# Patient Record
Sex: MALE | Race: OTHER | Employment: OTHER | URBAN - METROPOLITAN AREA
[De-identification: names, ages, dates, MRNs, and addresses within clinical notes are randomized per-mention and may not be internally consistent; named-entity substitution may affect disease eponyms.]

---

## 2017-01-03 DIAGNOSIS — M81.0 OSTEOPOROSIS: ICD-10-CM

## 2017-01-03 DIAGNOSIS — F32.2 SEVERE SINGLE CURRENT EPISODE OF MAJOR DEPRESSIVE DISORDER, WITHOUT PSYCHOTIC FEATURES (HCC): ICD-10-CM

## 2017-01-03 DIAGNOSIS — M54.17 THORACIC AND LUMBOSACRAL NEURITIS: ICD-10-CM

## 2017-01-03 DIAGNOSIS — M54.2 NECK PAIN: Chronic | ICD-10-CM

## 2017-01-03 DIAGNOSIS — M54.14 THORACIC AND LUMBOSACRAL NEURITIS: ICD-10-CM

## 2017-01-05 ENCOUNTER — OFFICE VISIT (OUTPATIENT)
Dept: INTERNAL MEDICINE | Age: 67
End: 2017-01-05

## 2017-01-05 VITALS
RESPIRATION RATE: 16 BRPM | BODY MASS INDEX: 26.84 KG/M2 | HEART RATE: 88 BPM | OXYGEN SATURATION: 98 % | WEIGHT: 171 LBS | TEMPERATURE: 99 F | DIASTOLIC BLOOD PRESSURE: 82 MMHG | HEIGHT: 67 IN | SYSTOLIC BLOOD PRESSURE: 138 MMHG

## 2017-01-05 DIAGNOSIS — I10 ESSENTIAL HYPERTENSION, BENIGN: ICD-10-CM

## 2017-01-05 DIAGNOSIS — M10.00 ACUTE IDIOPATHIC GOUT, UNSPECIFIED SITE: Primary | ICD-10-CM

## 2017-01-05 DIAGNOSIS — D72.819 LEUKOPENIA, UNSPECIFIED TYPE: ICD-10-CM

## 2017-01-05 DIAGNOSIS — E78.00 HYPERCHOLESTEROLEMIA: Chronic | ICD-10-CM

## 2017-01-05 PROCEDURE — 99213 OFFICE O/P EST LOW 20 MIN: CPT | Performed by: INTERNAL MEDICINE

## 2017-01-06 ASSESSMENT — ENCOUNTER SYMPTOMS
VOMITING: 0
COUGH: 0
NAUSEA: 0
SHORTNESS OF BREATH: 0
DIARRHEA: 0
CONSTIPATION: 0

## 2017-01-13 ENCOUNTER — OFFICE VISIT (OUTPATIENT)
Dept: PHYSICAL MEDICINE AND REHAB | Age: 67
End: 2017-01-13

## 2017-01-13 VITALS
DIASTOLIC BLOOD PRESSURE: 80 MMHG | WEIGHT: 163 LBS | HEIGHT: 67 IN | SYSTOLIC BLOOD PRESSURE: 134 MMHG | BODY MASS INDEX: 25.58 KG/M2

## 2017-01-13 DIAGNOSIS — M54.14 THORACIC AND LUMBOSACRAL NEURITIS: ICD-10-CM

## 2017-01-13 DIAGNOSIS — F32.2 SEVERE SINGLE CURRENT EPISODE OF MAJOR DEPRESSIVE DISORDER, WITHOUT PSYCHOTIC FEATURES (HCC): ICD-10-CM

## 2017-01-13 DIAGNOSIS — M54.40 CHRONIC MIDLINE LOW BACK PAIN WITH SCIATICA, SCIATICA LATERALITY UNSPECIFIED: ICD-10-CM

## 2017-01-13 DIAGNOSIS — M81.0 OSTEOPOROSIS: ICD-10-CM

## 2017-01-13 DIAGNOSIS — G89.29 CHRONIC BILATERAL LOW BACK PAIN WITHOUT SCIATICA: ICD-10-CM

## 2017-01-13 DIAGNOSIS — G89.29 CHRONIC MIDLINE LOW BACK PAIN WITH SCIATICA, SCIATICA LATERALITY UNSPECIFIED: ICD-10-CM

## 2017-01-13 DIAGNOSIS — M54.42 CHRONIC MIDLINE LOW BACK PAIN WITH BILATERAL SCIATICA: Primary | ICD-10-CM

## 2017-01-13 DIAGNOSIS — M25.551 RIGHT HIP PAIN: ICD-10-CM

## 2017-01-13 DIAGNOSIS — M54.6 CHRONIC RIGHT-SIDED THORACIC BACK PAIN: ICD-10-CM

## 2017-01-13 DIAGNOSIS — E55.9 VITAMIN D DEFICIENCY: ICD-10-CM

## 2017-01-13 DIAGNOSIS — M10.012 ACUTE IDIOPATHIC GOUT OF LEFT SHOULDER: ICD-10-CM

## 2017-01-13 DIAGNOSIS — M79.10 MYALGIA: ICD-10-CM

## 2017-01-13 DIAGNOSIS — M54.17 THORACIC AND LUMBOSACRAL NEURITIS: ICD-10-CM

## 2017-01-13 DIAGNOSIS — G89.29 CHRONIC RIGHT-SIDED THORACIC BACK PAIN: ICD-10-CM

## 2017-01-13 DIAGNOSIS — G89.29 CHRONIC MIDLINE LOW BACK PAIN WITH BILATERAL SCIATICA: Primary | ICD-10-CM

## 2017-01-13 DIAGNOSIS — Z79.899 HIGH RISK MEDICATION USE: ICD-10-CM

## 2017-01-13 DIAGNOSIS — M54.50 CHRONIC BILATERAL LOW BACK PAIN WITHOUT SCIATICA: ICD-10-CM

## 2017-01-13 DIAGNOSIS — M54.2 NECK PAIN: Chronic | ICD-10-CM

## 2017-01-13 DIAGNOSIS — M53.3 SI (SACROILIAC) PAIN: ICD-10-CM

## 2017-01-13 DIAGNOSIS — M54.41 CHRONIC MIDLINE LOW BACK PAIN WITH BILATERAL SCIATICA: Primary | ICD-10-CM

## 2017-01-13 PROCEDURE — 99213 OFFICE O/P EST LOW 20 MIN: CPT | Performed by: PHYSICAL MEDICINE & REHABILITATION

## 2017-01-13 RX ORDER — OXYCODONE AND ACETAMINOPHEN 10; 325 MG/1; MG/1
TABLET ORAL
Qty: 60 TABLET | Refills: 0 | Status: SHIPPED | OUTPATIENT
Start: 2017-01-13 | End: 2017-02-16 | Stop reason: SDUPTHER

## 2017-01-13 RX ORDER — GABAPENTIN 300 MG/1
CAPSULE ORAL
Qty: 90 CAPSULE | Refills: 3 | Status: SHIPPED | OUTPATIENT
Start: 2017-01-13 | End: 2017-05-26 | Stop reason: SDUPTHER

## 2017-01-13 ASSESSMENT — ENCOUNTER SYMPTOMS
DIARRHEA: 0
CONSTIPATION: 1
SHORTNESS OF BREATH: 0
NAUSEA: 0
RESPIRATORY NEGATIVE: 1
VISUAL CHANGE: 0
VOMITING: 0
BACK PAIN: 1
CHEST TIGHTNESS: 0
EYES NEGATIVE: 1

## 2017-01-24 ENCOUNTER — PROCEDURE VISIT (OUTPATIENT)
Dept: PHYSICAL MEDICINE AND REHAB | Age: 67
End: 2017-01-24

## 2017-01-24 DIAGNOSIS — M79.10 MYALGIA: Primary | ICD-10-CM

## 2017-01-24 PROCEDURE — 20553 NJX 1/MLT TRIGGER POINTS 3/>: CPT | Performed by: PHYSICAL MEDICINE & REHABILITATION

## 2017-02-16 DIAGNOSIS — M54.2 NECK PAIN: Chronic | ICD-10-CM

## 2017-02-16 DIAGNOSIS — M54.14 THORACIC AND LUMBOSACRAL NEURITIS: ICD-10-CM

## 2017-02-16 DIAGNOSIS — G89.29 CHRONIC BILATERAL LOW BACK PAIN WITHOUT SCIATICA: ICD-10-CM

## 2017-02-16 DIAGNOSIS — M54.17 THORACIC AND LUMBOSACRAL NEURITIS: ICD-10-CM

## 2017-02-16 DIAGNOSIS — Z79.899 HIGH RISK MEDICATION USE: ICD-10-CM

## 2017-02-16 DIAGNOSIS — M54.40 CHRONIC MIDLINE LOW BACK PAIN WITH SCIATICA, SCIATICA LATERALITY UNSPECIFIED: ICD-10-CM

## 2017-02-16 DIAGNOSIS — M54.50 CHRONIC BILATERAL LOW BACK PAIN WITHOUT SCIATICA: ICD-10-CM

## 2017-02-16 DIAGNOSIS — G89.29 CHRONIC MIDLINE LOW BACK PAIN WITH SCIATICA, SCIATICA LATERALITY UNSPECIFIED: ICD-10-CM

## 2017-02-16 RX ORDER — OXYCODONE AND ACETAMINOPHEN 10; 325 MG/1; MG/1
TABLET ORAL
Qty: 60 TABLET | Refills: 0 | Status: SHIPPED | OUTPATIENT
Start: 2017-02-16 | End: 2017-03-13 | Stop reason: SDUPTHER

## 2017-02-28 ENCOUNTER — PROCEDURE VISIT (OUTPATIENT)
Dept: PHYSICAL MEDICINE AND REHAB | Age: 67
End: 2017-02-28

## 2017-02-28 DIAGNOSIS — M79.10 MYALGIA: Primary | ICD-10-CM

## 2017-02-28 PROCEDURE — 20553 NJX 1/MLT TRIGGER POINTS 3/>: CPT | Performed by: PHYSICAL MEDICINE & REHABILITATION

## 2017-03-01 DIAGNOSIS — D72.819 LEUKOPENIA, UNSPECIFIED TYPE: ICD-10-CM

## 2017-03-01 DIAGNOSIS — E78.00 HYPERCHOLESTEROLEMIA: Chronic | ICD-10-CM

## 2017-03-01 DIAGNOSIS — I10 ESSENTIAL HYPERTENSION, BENIGN: ICD-10-CM

## 2017-03-01 DIAGNOSIS — M10.00 ACUTE IDIOPATHIC GOUT, UNSPECIFIED SITE: ICD-10-CM

## 2017-03-01 LAB
ALBUMIN SERPL-MCNC: 4.1 G/DL (ref 3.9–4.9)
ALP BLD-CCNC: 53 U/L (ref 35–104)
ALT SERPL-CCNC: 18 U/L (ref 0–41)
ANION GAP SERPL CALCULATED.3IONS-SCNC: 10 MEQ/L (ref 7–13)
AST SERPL-CCNC: 20 U/L (ref 0–40)
BILIRUB SERPL-MCNC: 0.5 MG/DL (ref 0–1.2)
BUN BLDV-MCNC: 16 MG/DL (ref 8–23)
CALCIUM SERPL-MCNC: 9.4 MG/DL (ref 8.6–10.2)
CHLORIDE BLD-SCNC: 100 MEQ/L (ref 98–107)
CHOLESTEROL, TOTAL: 238 MG/DL (ref 0–199)
CO2: 28 MEQ/L (ref 22–29)
CREAT SERPL-MCNC: 1.02 MG/DL (ref 0.7–1.2)
GFR AFRICAN AMERICAN: >60
GFR NON-AFRICAN AMERICAN: >60
GLOBULIN: 2.9 G/DL (ref 2.3–3.5)
GLUCOSE BLD-MCNC: 112 MG/DL (ref 74–109)
HCT VFR BLD CALC: 45.4 % (ref 42–52)
HDLC SERPL-MCNC: 44 MG/DL (ref 40–59)
HEMOGLOBIN: 15.2 G/DL (ref 14–18)
LDL CHOLESTEROL CALCULATED: 166 MG/DL (ref 0–129)
MCH RBC QN AUTO: 32.2 PG (ref 27–31.3)
MCHC RBC AUTO-ENTMCNC: 33.6 % (ref 33–37)
MCV RBC AUTO: 95.9 FL (ref 80–100)
PDW BLD-RTO: 13.9 % (ref 11.5–14.5)
PLATELET # BLD: 244 K/UL (ref 130–400)
POTASSIUM SERPL-SCNC: 4.9 MEQ/L (ref 3.5–5.1)
RBC # BLD: 4.73 M/UL (ref 4.7–6.1)
SODIUM BLD-SCNC: 138 MEQ/L (ref 132–144)
TOTAL PROTEIN: 7 G/DL (ref 6.4–8.1)
TRIGL SERPL-MCNC: 138 MG/DL (ref 0–200)
URIC ACID, SERUM: 4.5 MG/DL (ref 3.4–7)
WBC # BLD: 4.9 K/UL (ref 4.8–10.8)

## 2017-03-06 ENCOUNTER — OFFICE VISIT (OUTPATIENT)
Dept: INTERNAL MEDICINE | Age: 67
End: 2017-03-06

## 2017-03-06 VITALS
HEIGHT: 67 IN | OXYGEN SATURATION: 98 % | DIASTOLIC BLOOD PRESSURE: 86 MMHG | HEART RATE: 82 BPM | BODY MASS INDEX: 26.53 KG/M2 | SYSTOLIC BLOOD PRESSURE: 134 MMHG | WEIGHT: 169 LBS | TEMPERATURE: 98.5 F | RESPIRATION RATE: 16 BRPM

## 2017-03-06 DIAGNOSIS — I25.119 CORONARY ARTERY DISEASE INVOLVING NATIVE CORONARY ARTERY OF NATIVE HEART WITH ANGINA PECTORIS (HCC): Chronic | ICD-10-CM

## 2017-03-06 DIAGNOSIS — Z78.9 STATIN INTOLERANCE: ICD-10-CM

## 2017-03-06 DIAGNOSIS — R73.01 IMPAIRED FASTING GLUCOSE: ICD-10-CM

## 2017-03-06 DIAGNOSIS — E78.00 HYPERCHOLESTEROLEMIA: Chronic | ICD-10-CM

## 2017-03-06 DIAGNOSIS — M1A.00X0 IDIOPATHIC CHRONIC GOUT WITHOUT TOPHUS, UNSPECIFIED SITE: ICD-10-CM

## 2017-03-06 DIAGNOSIS — I10 ESSENTIAL HYPERTENSION, BENIGN: Primary | ICD-10-CM

## 2017-03-06 PROCEDURE — 99213 OFFICE O/P EST LOW 20 MIN: CPT | Performed by: INTERNAL MEDICINE

## 2017-03-06 ASSESSMENT — ENCOUNTER SYMPTOMS
NAUSEA: 0
ABDOMINAL PAIN: 0
CONSTIPATION: 1
BLOOD IN STOOL: 0
DIARRHEA: 0
COUGH: 0
VOMITING: 0
ABDOMINAL DISTENTION: 0
SHORTNESS OF BREATH: 0

## 2017-03-10 DIAGNOSIS — I10 ESSENTIAL HYPERTENSION, BENIGN: Chronic | ICD-10-CM

## 2017-03-12 DIAGNOSIS — I10 ESSENTIAL HYPERTENSION, BENIGN: Chronic | ICD-10-CM

## 2017-03-13 ENCOUNTER — OFFICE VISIT (OUTPATIENT)
Dept: PHYSICAL MEDICINE AND REHAB | Age: 67
End: 2017-03-13

## 2017-03-13 VITALS
HEIGHT: 67 IN | WEIGHT: 168 LBS | SYSTOLIC BLOOD PRESSURE: 164 MMHG | DIASTOLIC BLOOD PRESSURE: 86 MMHG | BODY MASS INDEX: 26.37 KG/M2

## 2017-03-13 DIAGNOSIS — M54.40 CHRONIC MIDLINE LOW BACK PAIN WITH SCIATICA, SCIATICA LATERALITY UNSPECIFIED: ICD-10-CM

## 2017-03-13 DIAGNOSIS — M54.50 CHRONIC BILATERAL LOW BACK PAIN WITHOUT SCIATICA: ICD-10-CM

## 2017-03-13 DIAGNOSIS — G56.03 BILATERAL CARPAL TUNNEL SYNDROME: ICD-10-CM

## 2017-03-13 DIAGNOSIS — Z79.899 HIGH RISK MEDICATION USE: ICD-10-CM

## 2017-03-13 DIAGNOSIS — M81.0 OSTEOPOROSIS: ICD-10-CM

## 2017-03-13 DIAGNOSIS — M79.10 MYALGIA: Primary | ICD-10-CM

## 2017-03-13 DIAGNOSIS — G89.29 CHRONIC BILATERAL LOW BACK PAIN WITHOUT SCIATICA: ICD-10-CM

## 2017-03-13 DIAGNOSIS — G89.29 CHRONIC MIDLINE LOW BACK PAIN WITH SCIATICA, SCIATICA LATERALITY UNSPECIFIED: ICD-10-CM

## 2017-03-13 DIAGNOSIS — M54.14 THORACIC AND LUMBOSACRAL NEURITIS: ICD-10-CM

## 2017-03-13 DIAGNOSIS — M54.2 NECK PAIN: Chronic | ICD-10-CM

## 2017-03-13 DIAGNOSIS — M54.17 THORACIC AND LUMBOSACRAL NEURITIS: ICD-10-CM

## 2017-03-13 DIAGNOSIS — F32.2 SEVERE SINGLE CURRENT EPISODE OF MAJOR DEPRESSIVE DISORDER, WITHOUT PSYCHOTIC FEATURES (HCC): ICD-10-CM

## 2017-03-13 PROCEDURE — 99214 OFFICE O/P EST MOD 30 MIN: CPT | Performed by: PHYSICAL MEDICINE & REHABILITATION

## 2017-03-13 RX ORDER — METOPROLOL SUCCINATE 50 MG/1
TABLET, EXTENDED RELEASE ORAL
Qty: 90 TABLET | Refills: 0 | Status: SHIPPED | OUTPATIENT
Start: 2017-03-13 | End: 2017-06-09 | Stop reason: SDUPTHER

## 2017-03-13 RX ORDER — OXYCODONE AND ACETAMINOPHEN 10; 325 MG/1; MG/1
TABLET ORAL
Qty: 60 TABLET | Refills: 0 | Status: SHIPPED | OUTPATIENT
Start: 2017-03-13 | End: 2017-04-13 | Stop reason: SDUPTHER

## 2017-03-13 RX ORDER — NIFEDIPINE 60 MG/1
TABLET, FILM COATED, EXTENDED RELEASE ORAL
Qty: 90 TABLET | Refills: 0 | Status: SHIPPED | OUTPATIENT
Start: 2017-03-13 | End: 2017-06-13 | Stop reason: SDUPTHER

## 2017-03-13 ASSESSMENT — ENCOUNTER SYMPTOMS
RESPIRATORY NEGATIVE: 1
BACK PAIN: 1
VOMITING: 0
DIARRHEA: 0
CONSTIPATION: 1
NAUSEA: 0
SHORTNESS OF BREATH: 0
CHEST TIGHTNESS: 0
EYES NEGATIVE: 1
VISUAL CHANGE: 0

## 2017-03-21 ENCOUNTER — OFFICE VISIT (OUTPATIENT)
Dept: SURGERY | Age: 67
End: 2017-03-21

## 2017-03-21 VITALS
SYSTOLIC BLOOD PRESSURE: 155 MMHG | WEIGHT: 169 LBS | BODY MASS INDEX: 28.16 KG/M2 | HEIGHT: 65 IN | HEART RATE: 93 BPM | DIASTOLIC BLOOD PRESSURE: 84 MMHG

## 2017-03-21 DIAGNOSIS — N52.9 ERECTILE DYSFUNCTION, UNSPECIFIED ERECTILE DYSFUNCTION TYPE: ICD-10-CM

## 2017-03-21 DIAGNOSIS — E29.1 HYPOGONADISM MALE: Primary | ICD-10-CM

## 2017-03-21 PROCEDURE — 99213 OFFICE O/P EST LOW 20 MIN: CPT | Performed by: INTERNAL MEDICINE

## 2017-03-21 RX ORDER — TADALAFIL 20 MG/1
20 TABLET ORAL PRN
Qty: 10 TABLET | Refills: 3 | Status: SHIPPED | OUTPATIENT
Start: 2017-03-21 | End: 2017-10-11

## 2017-03-21 RX ORDER — TESTOSTERONE CYPIONATE 200 MG/ML
INJECTION INTRAMUSCULAR
Qty: 10 ML | Refills: 0 | Status: SHIPPED | OUTPATIENT
Start: 2017-03-21 | End: 2017-06-21 | Stop reason: SDUPTHER

## 2017-03-30 ENCOUNTER — PROCEDURE VISIT (OUTPATIENT)
Dept: PHYSICAL MEDICINE AND REHAB | Age: 67
End: 2017-03-30

## 2017-03-30 DIAGNOSIS — M79.10 MYALGIA: Primary | ICD-10-CM

## 2017-03-30 PROCEDURE — 20553 NJX 1/MLT TRIGGER POINTS 3/>: CPT | Performed by: PHYSICAL MEDICINE & REHABILITATION

## 2017-03-31 DIAGNOSIS — R73.01 IMPAIRED FASTING GLUCOSE: ICD-10-CM

## 2017-03-31 DIAGNOSIS — E29.1 HYPOGONADISM MALE: ICD-10-CM

## 2017-03-31 LAB
HBA1C MFR BLD: 6 % (ref 4.8–5.9)
HCT VFR BLD CALC: 44.2 % (ref 42–52)
HEMOGLOBIN: 14.6 G/DL (ref 14–18)
MCH RBC QN AUTO: 32 PG (ref 27–31.3)
MCHC RBC AUTO-ENTMCNC: 33.1 % (ref 33–37)
MCV RBC AUTO: 96.5 FL (ref 80–100)
PDW BLD-RTO: 14.3 % (ref 11.5–14.5)
PLATELET # BLD: 273 K/UL (ref 130–400)
RBC # BLD: 4.58 M/UL (ref 4.7–6.1)
WBC # BLD: 5.5 K/UL (ref 4.8–10.8)

## 2017-04-01 LAB
SEX HORMONE BINDING GLOBULIN: 51 NMOL/L (ref 11–80)
TESTOSTERONE FREE PERCENT: 1.8 % (ref 1.6–2.9)
TESTOSTERONE FREE, CALC: 243 PG/ML (ref 47–244)
TESTOSTERONE TOTAL-MALE: 1351 NG/DL (ref 300–720)

## 2017-04-03 DIAGNOSIS — F32.2 SEVERE SINGLE CURRENT EPISODE OF MAJOR DEPRESSIVE DISORDER, WITHOUT PSYCHOTIC FEATURES (HCC): ICD-10-CM

## 2017-04-03 DIAGNOSIS — M81.0 OSTEOPOROSIS: ICD-10-CM

## 2017-04-03 DIAGNOSIS — M54.17 THORACIC AND LUMBOSACRAL NEURITIS: ICD-10-CM

## 2017-04-03 DIAGNOSIS — M54.2 NECK PAIN: Chronic | ICD-10-CM

## 2017-04-03 DIAGNOSIS — M54.14 THORACIC AND LUMBOSACRAL NEURITIS: ICD-10-CM

## 2017-04-05 ENCOUNTER — OFFICE VISIT (OUTPATIENT)
Dept: PHYSICAL MEDICINE AND REHAB | Age: 67
End: 2017-04-05

## 2017-04-05 DIAGNOSIS — M54.12 C6 RADICULOPATHY: ICD-10-CM

## 2017-04-05 DIAGNOSIS — G56.03 BILATERAL CARPAL TUNNEL SYNDROME: Primary | ICD-10-CM

## 2017-04-05 PROCEDURE — 95912 NRV CNDJ TEST 11-12 STUDIES: CPT | Performed by: PHYSICAL MEDICINE & REHABILITATION

## 2017-04-05 PROCEDURE — 95886 MUSC TEST DONE W/N TEST COMP: CPT | Performed by: PHYSICAL MEDICINE & REHABILITATION

## 2017-04-11 DIAGNOSIS — G56.03 BILATERAL CARPAL TUNNEL SYNDROME: ICD-10-CM

## 2017-04-11 DIAGNOSIS — M54.12 C6 RADICULOPATHY: Primary | ICD-10-CM

## 2017-04-13 ENCOUNTER — HOSPITAL ENCOUNTER (OUTPATIENT)
Dept: MRI IMAGING | Age: 67
Discharge: HOME | End: 2017-04-13
Payer: MEDICARE

## 2017-04-13 DIAGNOSIS — G89.29 CHRONIC MIDLINE LOW BACK PAIN WITH SCIATICA, SCIATICA LATERALITY UNSPECIFIED: ICD-10-CM

## 2017-04-13 DIAGNOSIS — G56.03 BILATERAL CARPAL TUNNEL SYNDROME: ICD-10-CM

## 2017-04-13 DIAGNOSIS — M54.40 CHRONIC MIDLINE LOW BACK PAIN WITH SCIATICA, SCIATICA LATERALITY UNSPECIFIED: ICD-10-CM

## 2017-04-13 DIAGNOSIS — Z79.899 HIGH RISK MEDICATION USE: ICD-10-CM

## 2017-04-13 DIAGNOSIS — G89.29 CHRONIC BILATERAL LOW BACK PAIN WITHOUT SCIATICA: ICD-10-CM

## 2017-04-13 DIAGNOSIS — M54.14 THORACIC AND LUMBOSACRAL NEURITIS: ICD-10-CM

## 2017-04-13 DIAGNOSIS — M54.2 NECK PAIN: Chronic | ICD-10-CM

## 2017-04-13 DIAGNOSIS — M54.12 C6 RADICULOPATHY: ICD-10-CM

## 2017-04-13 DIAGNOSIS — M54.17 THORACIC AND LUMBOSACRAL NEURITIS: ICD-10-CM

## 2017-04-13 DIAGNOSIS — M54.50 CHRONIC BILATERAL LOW BACK PAIN WITHOUT SCIATICA: ICD-10-CM

## 2017-04-13 PROCEDURE — 72141 MRI NECK SPINE W/O DYE: CPT

## 2017-04-13 RX ORDER — OXYCODONE AND ACETAMINOPHEN 10; 325 MG/1; MG/1
TABLET ORAL
Qty: 60 TABLET | Refills: 0 | Status: SHIPPED | OUTPATIENT
Start: 2017-04-13 | End: 2017-05-16 | Stop reason: SDUPTHER

## 2017-05-16 DIAGNOSIS — Z79.899 HIGH RISK MEDICATION USE: ICD-10-CM

## 2017-05-16 DIAGNOSIS — M54.2 NECK PAIN: Chronic | ICD-10-CM

## 2017-05-16 DIAGNOSIS — M54.50 CHRONIC BILATERAL LOW BACK PAIN WITHOUT SCIATICA: ICD-10-CM

## 2017-05-16 DIAGNOSIS — M54.40 CHRONIC MIDLINE LOW BACK PAIN WITH SCIATICA, SCIATICA LATERALITY UNSPECIFIED: ICD-10-CM

## 2017-05-16 DIAGNOSIS — G89.29 CHRONIC BILATERAL LOW BACK PAIN WITHOUT SCIATICA: ICD-10-CM

## 2017-05-16 DIAGNOSIS — M54.14 THORACIC AND LUMBOSACRAL NEURITIS: ICD-10-CM

## 2017-05-16 DIAGNOSIS — G89.29 CHRONIC MIDLINE LOW BACK PAIN WITH SCIATICA, SCIATICA LATERALITY UNSPECIFIED: ICD-10-CM

## 2017-05-16 DIAGNOSIS — M54.17 THORACIC AND LUMBOSACRAL NEURITIS: ICD-10-CM

## 2017-05-16 RX ORDER — OXYCODONE AND ACETAMINOPHEN 10; 325 MG/1; MG/1
TABLET ORAL
Qty: 60 TABLET | Refills: 0 | Status: SHIPPED | OUTPATIENT
Start: 2017-05-16 | End: 2017-06-12 | Stop reason: SDUPTHER

## 2017-05-26 DIAGNOSIS — M25.551 RIGHT HIP PAIN: ICD-10-CM

## 2017-05-26 DIAGNOSIS — M54.6 CHRONIC RIGHT-SIDED THORACIC BACK PAIN: ICD-10-CM

## 2017-05-26 DIAGNOSIS — M53.3 SI (SACROILIAC) PAIN: ICD-10-CM

## 2017-05-26 DIAGNOSIS — G89.29 CHRONIC RIGHT-SIDED THORACIC BACK PAIN: ICD-10-CM

## 2017-05-26 DIAGNOSIS — M54.2 NECK PAIN: Chronic | ICD-10-CM

## 2017-05-26 RX ORDER — GABAPENTIN 300 MG/1
CAPSULE ORAL
Qty: 90 CAPSULE | Refills: 0 | Status: SHIPPED | OUTPATIENT
Start: 2017-05-26 | End: 2017-05-26 | Stop reason: SDUPTHER

## 2017-05-26 RX ORDER — GABAPENTIN 300 MG/1
CAPSULE ORAL
Qty: 270 CAPSULE | Refills: 0 | Status: SHIPPED | OUTPATIENT
Start: 2017-05-26 | End: 2017-09-19 | Stop reason: SDUPTHER

## 2017-06-01 ENCOUNTER — OFFICE VISIT (OUTPATIENT)
Dept: PHYSICAL MEDICINE AND REHAB | Age: 67
End: 2017-06-01

## 2017-06-01 VITALS
HEIGHT: 67 IN | SYSTOLIC BLOOD PRESSURE: 136 MMHG | DIASTOLIC BLOOD PRESSURE: 84 MMHG | WEIGHT: 165 LBS | BODY MASS INDEX: 25.9 KG/M2

## 2017-06-01 DIAGNOSIS — Z79.899 HIGH RISK MEDICATION USE: ICD-10-CM

## 2017-06-01 DIAGNOSIS — R29.898 HAND WEAKNESS: ICD-10-CM

## 2017-06-01 DIAGNOSIS — M54.12 C6 RADICULOPATHY: ICD-10-CM

## 2017-06-01 DIAGNOSIS — M54.2 NECK PAIN: ICD-10-CM

## 2017-06-01 DIAGNOSIS — M47.812 DJD (DEGENERATIVE JOINT DISEASE), CERVICAL: ICD-10-CM

## 2017-06-01 DIAGNOSIS — M79.10 MYALGIA: ICD-10-CM

## 2017-06-01 DIAGNOSIS — G56.03 BILATERAL CARPAL TUNNEL SYNDROME: Primary | ICD-10-CM

## 2017-06-01 PROCEDURE — 99215 OFFICE O/P EST HI 40 MIN: CPT | Performed by: PHYSICAL MEDICINE & REHABILITATION

## 2017-06-01 ASSESSMENT — ENCOUNTER SYMPTOMS
ABDOMINAL PAIN: 0
VOMITING: 0
SHORTNESS OF BREATH: 0
VISUAL CHANGE: 0
RESPIRATORY NEGATIVE: 1
DIARRHEA: 0
CONSTIPATION: 1
CHEST TIGHTNESS: 0
ALLERGIC/IMMUNOLOGIC NEGATIVE: 1
WHEEZING: 0
EYES NEGATIVE: 1
BACK PAIN: 1
NAUSEA: 0

## 2017-06-09 DIAGNOSIS — I10 ESSENTIAL HYPERTENSION, BENIGN: Chronic | ICD-10-CM

## 2017-06-12 ENCOUNTER — PROCEDURE VISIT (OUTPATIENT)
Dept: PHYSICAL MEDICINE AND REHAB | Age: 67
End: 2017-06-12

## 2017-06-12 DIAGNOSIS — M54.14 THORACIC AND LUMBOSACRAL NEURITIS: ICD-10-CM

## 2017-06-12 DIAGNOSIS — M79.10 MYALGIA: ICD-10-CM

## 2017-06-12 DIAGNOSIS — G89.29 CHRONIC BILATERAL LOW BACK PAIN WITHOUT SCIATICA: ICD-10-CM

## 2017-06-12 DIAGNOSIS — G89.29 CHRONIC MIDLINE LOW BACK PAIN WITH SCIATICA, SCIATICA LATERALITY UNSPECIFIED: ICD-10-CM

## 2017-06-12 DIAGNOSIS — Z79.899 HIGH RISK MEDICATION USE: ICD-10-CM

## 2017-06-12 DIAGNOSIS — M54.2 NECK PAIN: Chronic | ICD-10-CM

## 2017-06-12 DIAGNOSIS — M79.7 FIBROMYALGIA: Primary | ICD-10-CM

## 2017-06-12 DIAGNOSIS — M54.50 CHRONIC BILATERAL LOW BACK PAIN WITHOUT SCIATICA: ICD-10-CM

## 2017-06-12 DIAGNOSIS — M54.40 CHRONIC MIDLINE LOW BACK PAIN WITH SCIATICA, SCIATICA LATERALITY UNSPECIFIED: ICD-10-CM

## 2017-06-12 DIAGNOSIS — M54.17 THORACIC AND LUMBOSACRAL NEURITIS: ICD-10-CM

## 2017-06-12 PROCEDURE — 20553 NJX 1/MLT TRIGGER POINTS 3/>: CPT | Performed by: PHYSICAL MEDICINE & REHABILITATION

## 2017-06-12 RX ORDER — METOPROLOL SUCCINATE 50 MG/1
TABLET, EXTENDED RELEASE ORAL
Qty: 90 TABLET | Refills: 0 | Status: SHIPPED | OUTPATIENT
Start: 2017-06-12 | End: 2017-09-08 | Stop reason: SDUPTHER

## 2017-06-12 RX ORDER — OXYCODONE AND ACETAMINOPHEN 10; 325 MG/1; MG/1
TABLET ORAL
Qty: 60 TABLET | Refills: 0 | Status: SHIPPED | OUTPATIENT
Start: 2017-06-12 | End: 2017-07-13 | Stop reason: SDUPTHER

## 2017-06-13 ENCOUNTER — OFFICE VISIT (OUTPATIENT)
Dept: INTERNAL MEDICINE | Age: 67
End: 2017-06-13

## 2017-06-13 VITALS
DIASTOLIC BLOOD PRESSURE: 80 MMHG | RESPIRATION RATE: 16 BRPM | OXYGEN SATURATION: 98 % | TEMPERATURE: 97.7 F | SYSTOLIC BLOOD PRESSURE: 138 MMHG | HEIGHT: 67 IN | HEART RATE: 70 BPM | BODY MASS INDEX: 26.06 KG/M2 | WEIGHT: 166 LBS

## 2017-06-13 DIAGNOSIS — G56.03 CARPAL TUNNEL SYNDROME, BILATERAL: ICD-10-CM

## 2017-06-13 DIAGNOSIS — I10 ESSENTIAL HYPERTENSION, BENIGN: Primary | Chronic | ICD-10-CM

## 2017-06-13 DIAGNOSIS — R73.01 IMPAIRED FASTING GLUCOSE: ICD-10-CM

## 2017-06-13 DIAGNOSIS — E78.00 HYPERCHOLESTEROLEMIA: Chronic | ICD-10-CM

## 2017-06-13 DIAGNOSIS — M1A.00X0 IDIOPATHIC CHRONIC GOUT WITHOUT TOPHUS, UNSPECIFIED SITE: Chronic | ICD-10-CM

## 2017-06-13 DIAGNOSIS — Z78.9 STATIN INTOLERANCE: ICD-10-CM

## 2017-06-13 PROCEDURE — 99213 OFFICE O/P EST LOW 20 MIN: CPT | Performed by: INTERNAL MEDICINE

## 2017-06-13 RX ORDER — NIFEDIPINE 60 MG/1
TABLET, EXTENDED RELEASE ORAL
Qty: 90 TABLET | Refills: 0 | Status: SHIPPED | OUTPATIENT
Start: 2017-06-13 | End: 2017-10-14 | Stop reason: SDUPTHER

## 2017-06-13 RX ORDER — FEBUXOSTAT 80 MG/1
80 TABLET, FILM COATED ORAL DAILY
Qty: 30 TABLET | Refills: 2 | Status: SHIPPED | OUTPATIENT
Start: 2017-06-13 | End: 2017-11-01 | Stop reason: ALTCHOICE

## 2017-06-13 ASSESSMENT — PATIENT HEALTH QUESTIONNAIRE - PHQ9
SUM OF ALL RESPONSES TO PHQ9 QUESTIONS 1 & 2: 0
SUM OF ALL RESPONSES TO PHQ QUESTIONS 1-9: 0
2. FEELING DOWN, DEPRESSED OR HOPELESS: 0
1. LITTLE INTEREST OR PLEASURE IN DOING THINGS: 0

## 2017-06-13 ASSESSMENT — ENCOUNTER SYMPTOMS
SHORTNESS OF BREATH: 0
ABDOMINAL PAIN: 0
BACK PAIN: 1

## 2017-06-21 ENCOUNTER — OFFICE VISIT (OUTPATIENT)
Dept: SURGERY | Age: 67
End: 2017-06-21

## 2017-06-21 VITALS
BODY MASS INDEX: 26.37 KG/M2 | WEIGHT: 168 LBS | DIASTOLIC BLOOD PRESSURE: 76 MMHG | HEART RATE: 68 BPM | SYSTOLIC BLOOD PRESSURE: 136 MMHG | HEIGHT: 67 IN

## 2017-06-21 DIAGNOSIS — E29.1 HYPOGONADISM MALE: ICD-10-CM

## 2017-06-21 DIAGNOSIS — N52.9 ERECTILE DYSFUNCTION, UNSPECIFIED ERECTILE DYSFUNCTION TYPE: Primary | ICD-10-CM

## 2017-06-21 PROCEDURE — 99213 OFFICE O/P EST LOW 20 MIN: CPT | Performed by: INTERNAL MEDICINE

## 2017-06-21 RX ORDER — TESTOSTERONE CYPIONATE 200 MG/ML
INJECTION INTRAMUSCULAR
Qty: 10 ML | Refills: 0 | Status: SHIPPED | OUTPATIENT
Start: 2017-06-21 | End: 2017-08-21 | Stop reason: SDUPTHER

## 2017-07-13 DIAGNOSIS — M54.50 CHRONIC BILATERAL LOW BACK PAIN WITHOUT SCIATICA: ICD-10-CM

## 2017-07-13 DIAGNOSIS — M54.17 THORACIC AND LUMBOSACRAL NEURITIS: ICD-10-CM

## 2017-07-13 DIAGNOSIS — M54.14 THORACIC AND LUMBOSACRAL NEURITIS: ICD-10-CM

## 2017-07-13 DIAGNOSIS — Z79.899 HIGH RISK MEDICATION USE: ICD-10-CM

## 2017-07-13 DIAGNOSIS — G89.29 CHRONIC MIDLINE LOW BACK PAIN WITH SCIATICA, SCIATICA LATERALITY UNSPECIFIED: ICD-10-CM

## 2017-07-13 DIAGNOSIS — M54.40 CHRONIC MIDLINE LOW BACK PAIN WITH SCIATICA, SCIATICA LATERALITY UNSPECIFIED: ICD-10-CM

## 2017-07-13 DIAGNOSIS — G89.29 CHRONIC BILATERAL LOW BACK PAIN WITHOUT SCIATICA: ICD-10-CM

## 2017-07-13 DIAGNOSIS — M54.2 NECK PAIN: Chronic | ICD-10-CM

## 2017-07-13 RX ORDER — OXYCODONE AND ACETAMINOPHEN 10; 325 MG/1; MG/1
TABLET ORAL
Qty: 60 TABLET | Refills: 0 | Status: SHIPPED | OUTPATIENT
Start: 2017-07-13 | End: 2017-08-03 | Stop reason: SDUPTHER

## 2017-07-17 ENCOUNTER — PROCEDURE VISIT (OUTPATIENT)
Dept: PHYSICAL MEDICINE AND REHAB | Age: 67
End: 2017-07-17

## 2017-07-17 DIAGNOSIS — M79.10 MYALGIA: ICD-10-CM

## 2017-07-17 DIAGNOSIS — M79.7 FIBROMYALGIA: Primary | ICD-10-CM

## 2017-07-17 PROCEDURE — 20553 NJX 1/MLT TRIGGER POINTS 3/>: CPT | Performed by: PHYSICAL MEDICINE & REHABILITATION

## 2017-07-28 DIAGNOSIS — M54.14 THORACIC AND LUMBOSACRAL NEURITIS: ICD-10-CM

## 2017-07-28 DIAGNOSIS — M54.17 THORACIC AND LUMBOSACRAL NEURITIS: ICD-10-CM

## 2017-07-28 DIAGNOSIS — F32.2 SEVERE SINGLE CURRENT EPISODE OF MAJOR DEPRESSIVE DISORDER, WITHOUT PSYCHOTIC FEATURES (HCC): ICD-10-CM

## 2017-07-28 DIAGNOSIS — M81.0 OSTEOPOROSIS: ICD-10-CM

## 2017-07-28 DIAGNOSIS — M54.2 NECK PAIN: Chronic | ICD-10-CM

## 2017-08-03 ENCOUNTER — OFFICE VISIT (OUTPATIENT)
Dept: PHYSICAL MEDICINE AND REHAB | Age: 67
End: 2017-08-03

## 2017-08-03 VITALS
WEIGHT: 165 LBS | HEIGHT: 67 IN | BODY MASS INDEX: 25.9 KG/M2 | SYSTOLIC BLOOD PRESSURE: 100 MMHG | DIASTOLIC BLOOD PRESSURE: 60 MMHG

## 2017-08-03 DIAGNOSIS — M54.50 CHRONIC BILATERAL LOW BACK PAIN WITHOUT SCIATICA: ICD-10-CM

## 2017-08-03 DIAGNOSIS — G56.03 BILATERAL CARPAL TUNNEL SYNDROME: ICD-10-CM

## 2017-08-03 DIAGNOSIS — M54.12 C6 RADICULOPATHY: ICD-10-CM

## 2017-08-03 DIAGNOSIS — M54.40 LOW BACK PAIN WITH SCIATICA, SCIATICA LATERALITY UNSPECIFIED, UNSPECIFIED BACK PAIN LATERALITY, UNSPECIFIED CHRONICITY: ICD-10-CM

## 2017-08-03 DIAGNOSIS — M54.40 CHRONIC MIDLINE LOW BACK PAIN WITH SCIATICA, SCIATICA LATERALITY UNSPECIFIED: ICD-10-CM

## 2017-08-03 DIAGNOSIS — M54.42 CHRONIC BILATERAL LOW BACK PAIN WITH BILATERAL SCIATICA: Primary | ICD-10-CM

## 2017-08-03 DIAGNOSIS — M54.14 THORACIC AND LUMBOSACRAL NEURITIS: ICD-10-CM

## 2017-08-03 DIAGNOSIS — M53.3 SI (SACROILIAC) PAIN: ICD-10-CM

## 2017-08-03 DIAGNOSIS — G89.29 CHRONIC MIDLINE LOW BACK PAIN WITH SCIATICA, SCIATICA LATERALITY UNSPECIFIED: ICD-10-CM

## 2017-08-03 DIAGNOSIS — M79.10 MYALGIA: ICD-10-CM

## 2017-08-03 DIAGNOSIS — G89.29 CHRONIC BILATERAL LOW BACK PAIN WITHOUT SCIATICA: ICD-10-CM

## 2017-08-03 DIAGNOSIS — M54.2 NECK PAIN: Chronic | ICD-10-CM

## 2017-08-03 DIAGNOSIS — Z79.899 HIGH RISK MEDICATION USE: ICD-10-CM

## 2017-08-03 DIAGNOSIS — M54.41 CHRONIC BILATERAL LOW BACK PAIN WITH BILATERAL SCIATICA: Primary | ICD-10-CM

## 2017-08-03 DIAGNOSIS — G89.29 CHRONIC BILATERAL LOW BACK PAIN WITH BILATERAL SCIATICA: Primary | ICD-10-CM

## 2017-08-03 DIAGNOSIS — M54.16 RIGHT LUMBAR RADICULOPATHY: ICD-10-CM

## 2017-08-03 DIAGNOSIS — M54.17 THORACIC AND LUMBOSACRAL NEURITIS: ICD-10-CM

## 2017-08-03 PROCEDURE — 99214 OFFICE O/P EST MOD 30 MIN: CPT | Performed by: PHYSICAL MEDICINE & REHABILITATION

## 2017-08-03 RX ORDER — OXYCODONE AND ACETAMINOPHEN 10; 325 MG/1; MG/1
TABLET ORAL
Qty: 60 TABLET | Refills: 0 | Status: SHIPPED | OUTPATIENT
Start: 2017-08-14 | End: 2017-09-12 | Stop reason: SDUPTHER

## 2017-08-03 ASSESSMENT — ENCOUNTER SYMPTOMS
VISUAL CHANGE: 0
ALLERGIC/IMMUNOLOGIC NEGATIVE: 1
EYES NEGATIVE: 1
CONSTIPATION: 1
ABDOMINAL PAIN: 0
BACK PAIN: 1
DIARRHEA: 0
WHEEZING: 0
NAUSEA: 0
CHEST TIGHTNESS: 1
VOMITING: 0
SHORTNESS OF BREATH: 1

## 2017-08-15 ENCOUNTER — PROCEDURE VISIT (OUTPATIENT)
Dept: PHYSICAL MEDICINE AND REHAB | Age: 67
End: 2017-08-15

## 2017-08-15 DIAGNOSIS — M79.10 MYALGIA: ICD-10-CM

## 2017-08-15 DIAGNOSIS — M79.7 FIBROMYALGIA: Primary | ICD-10-CM

## 2017-08-15 PROCEDURE — 20553 NJX 1/MLT TRIGGER POINTS 3/>: CPT | Performed by: PHYSICAL MEDICINE & REHABILITATION

## 2017-08-21 DIAGNOSIS — E29.1 HYPOGONADISM MALE: ICD-10-CM

## 2017-08-21 RX ORDER — TESTOSTERONE CYPIONATE 200 MG/ML
INJECTION INTRAMUSCULAR
Qty: 10 ML | Refills: 1 | Status: SHIPPED | OUTPATIENT
Start: 2017-08-21 | End: 2017-08-22 | Stop reason: SDUPTHER

## 2017-08-22 DIAGNOSIS — E29.1 HYPOGONADISM MALE: ICD-10-CM

## 2017-08-22 RX ORDER — TESTOSTERONE CYPIONATE 200 MG/ML
INJECTION INTRAMUSCULAR
Qty: 10 ML | Refills: 1 | Status: SHIPPED | OUTPATIENT
Start: 2017-08-22 | End: 2018-04-06 | Stop reason: SDUPTHER

## 2017-09-05 DIAGNOSIS — M1A.00X0 IDIOPATHIC CHRONIC GOUT WITHOUT TOPHUS, UNSPECIFIED SITE: Chronic | ICD-10-CM

## 2017-09-05 DIAGNOSIS — E29.1 HYPOGONADISM MALE: ICD-10-CM

## 2017-09-05 DIAGNOSIS — I10 ESSENTIAL HYPERTENSION, BENIGN: Chronic | ICD-10-CM

## 2017-09-05 DIAGNOSIS — E78.00 HYPERCHOLESTEROLEMIA: Chronic | ICD-10-CM

## 2017-09-05 LAB
ANION GAP SERPL CALCULATED.3IONS-SCNC: 13 MEQ/L (ref 7–13)
BUN BLDV-MCNC: 19 MG/DL (ref 8–23)
CALCIUM SERPL-MCNC: 9.7 MG/DL (ref 8.6–10.2)
CHLORIDE BLD-SCNC: 100 MEQ/L (ref 98–107)
CHOLESTEROL, TOTAL: 271 MG/DL (ref 0–199)
CO2: 29 MEQ/L (ref 22–29)
CREAT SERPL-MCNC: 0.88 MG/DL (ref 0.7–1.2)
GFR AFRICAN AMERICAN: >60
GFR NON-AFRICAN AMERICAN: >60
GLUCOSE BLD-MCNC: 110 MG/DL (ref 74–109)
HDLC SERPL-MCNC: 54 MG/DL (ref 40–59)
LDL CHOLESTEROL CALCULATED: 186 MG/DL (ref 0–129)
POTASSIUM SERPL-SCNC: 5.2 MEQ/L (ref 3.5–5.1)
SODIUM BLD-SCNC: 142 MEQ/L (ref 132–144)
TRIGL SERPL-MCNC: 154 MG/DL (ref 0–200)
URIC ACID, SERUM: 7.2 MG/DL (ref 3.4–7)

## 2017-09-06 LAB
SEX HORMONE BINDING GLOBULIN: 54 NMOL/L (ref 11–80)
TESTOSTERONE FREE PERCENT: 1.3 % (ref 1.6–2.9)
TESTOSTERONE FREE, CALC: 31 PG/ML (ref 47–244)
TESTOSTERONE TOTAL-MALE: 239 NG/DL (ref 300–720)

## 2017-09-12 DIAGNOSIS — G89.29 CHRONIC MIDLINE LOW BACK PAIN WITH SCIATICA, SCIATICA LATERALITY UNSPECIFIED: ICD-10-CM

## 2017-09-12 DIAGNOSIS — M54.50 CHRONIC BILATERAL LOW BACK PAIN WITHOUT SCIATICA: ICD-10-CM

## 2017-09-12 DIAGNOSIS — Z79.899 HIGH RISK MEDICATION USE: ICD-10-CM

## 2017-09-12 DIAGNOSIS — M54.2 NECK PAIN: Chronic | ICD-10-CM

## 2017-09-12 DIAGNOSIS — M54.40 CHRONIC MIDLINE LOW BACK PAIN WITH SCIATICA, SCIATICA LATERALITY UNSPECIFIED: ICD-10-CM

## 2017-09-12 DIAGNOSIS — M54.14 THORACIC AND LUMBOSACRAL NEURITIS: ICD-10-CM

## 2017-09-12 DIAGNOSIS — G89.29 CHRONIC BILATERAL LOW BACK PAIN WITHOUT SCIATICA: ICD-10-CM

## 2017-09-12 DIAGNOSIS — M54.17 THORACIC AND LUMBOSACRAL NEURITIS: ICD-10-CM

## 2017-09-12 RX ORDER — OXYCODONE AND ACETAMINOPHEN 10; 325 MG/1; MG/1
TABLET ORAL
Qty: 60 TABLET | Refills: 0 | Status: SHIPPED | OUTPATIENT
Start: 2017-09-12 | End: 2017-10-04 | Stop reason: SDUPTHER

## 2017-09-19 ENCOUNTER — PROCEDURE VISIT (OUTPATIENT)
Dept: PHYSICAL MEDICINE AND REHAB | Age: 67
End: 2017-09-19

## 2017-09-19 DIAGNOSIS — G89.29 CHRONIC RIGHT-SIDED THORACIC BACK PAIN: ICD-10-CM

## 2017-09-19 DIAGNOSIS — M79.10 MYALGIA: ICD-10-CM

## 2017-09-19 DIAGNOSIS — M79.7 FIBROMYALGIA: Primary | ICD-10-CM

## 2017-09-19 DIAGNOSIS — M25.551 RIGHT HIP PAIN: ICD-10-CM

## 2017-09-19 DIAGNOSIS — M53.3 SI (SACROILIAC) PAIN: ICD-10-CM

## 2017-09-19 DIAGNOSIS — M54.2 NECK PAIN: Chronic | ICD-10-CM

## 2017-09-19 DIAGNOSIS — M54.6 CHRONIC RIGHT-SIDED THORACIC BACK PAIN: ICD-10-CM

## 2017-09-19 PROCEDURE — 20553 NJX 1/MLT TRIGGER POINTS 3/>: CPT | Performed by: PHYSICAL MEDICINE & REHABILITATION

## 2017-09-19 RX ORDER — GABAPENTIN 300 MG/1
CAPSULE ORAL
Qty: 270 CAPSULE | Refills: 1 | Status: ON HOLD | OUTPATIENT
Start: 2017-09-19 | End: 2017-10-13 | Stop reason: CLARIF

## 2017-10-04 ENCOUNTER — OFFICE VISIT (OUTPATIENT)
Dept: PHYSICAL MEDICINE AND REHAB | Age: 67
End: 2017-10-04

## 2017-10-04 VITALS
SYSTOLIC BLOOD PRESSURE: 110 MMHG | BODY MASS INDEX: 25.58 KG/M2 | WEIGHT: 163 LBS | HEIGHT: 67 IN | DIASTOLIC BLOOD PRESSURE: 62 MMHG

## 2017-10-04 DIAGNOSIS — E55.9 VITAMIN D DEFICIENCY: ICD-10-CM

## 2017-10-04 DIAGNOSIS — M54.12 C6 RADICULOPATHY: ICD-10-CM

## 2017-10-04 DIAGNOSIS — Z79.899 HIGH RISK MEDICATION USE: ICD-10-CM

## 2017-10-04 DIAGNOSIS — M54.14 THORACIC AND LUMBOSACRAL NEURITIS: ICD-10-CM

## 2017-10-04 DIAGNOSIS — M54.40 CHRONIC MIDLINE LOW BACK PAIN WITH SCIATICA, SCIATICA LATERALITY UNSPECIFIED: ICD-10-CM

## 2017-10-04 DIAGNOSIS — M54.50 CHRONIC BILATERAL LOW BACK PAIN WITHOUT SCIATICA: ICD-10-CM

## 2017-10-04 DIAGNOSIS — G89.29 CHRONIC LOW BACK PAIN WITH SCIATICA, SCIATICA LATERALITY UNSPECIFIED, UNSPECIFIED BACK PAIN LATERALITY: Primary | ICD-10-CM

## 2017-10-04 DIAGNOSIS — M54.17 THORACIC AND LUMBOSACRAL NEURITIS: ICD-10-CM

## 2017-10-04 DIAGNOSIS — M79.10 MYALGIA: ICD-10-CM

## 2017-10-04 DIAGNOSIS — G89.29 CHRONIC BILATERAL LOW BACK PAIN WITHOUT SCIATICA: ICD-10-CM

## 2017-10-04 DIAGNOSIS — M54.2 NECK PAIN: ICD-10-CM

## 2017-10-04 DIAGNOSIS — M54.16 RIGHT LUMBAR RADICULOPATHY: ICD-10-CM

## 2017-10-04 DIAGNOSIS — M54.40 CHRONIC LOW BACK PAIN WITH SCIATICA, SCIATICA LATERALITY UNSPECIFIED, UNSPECIFIED BACK PAIN LATERALITY: Primary | ICD-10-CM

## 2017-10-04 DIAGNOSIS — M53.3 SI (SACROILIAC) PAIN: ICD-10-CM

## 2017-10-04 DIAGNOSIS — G89.29 CHRONIC MIDLINE LOW BACK PAIN WITH SCIATICA, SCIATICA LATERALITY UNSPECIFIED: ICD-10-CM

## 2017-10-04 PROCEDURE — 99214 OFFICE O/P EST MOD 30 MIN: CPT | Performed by: PHYSICAL MEDICINE & REHABILITATION

## 2017-10-04 RX ORDER — OXYCODONE AND ACETAMINOPHEN 10; 325 MG/1; MG/1
TABLET ORAL
Qty: 60 TABLET | Refills: 0 | Status: SHIPPED | OUTPATIENT
Start: 2017-10-11 | End: 2017-11-06 | Stop reason: SDUPTHER

## 2017-10-04 ASSESSMENT — ENCOUNTER SYMPTOMS
WHEEZING: 0
ABDOMINAL PAIN: 0
CHEST TIGHTNESS: 1
EYES NEGATIVE: 1
VOMITING: 0
VISUAL CHANGE: 0
BACK PAIN: 1
DIARRHEA: 0
CONSTIPATION: 1
SHORTNESS OF BREATH: 1
NAUSEA: 0
ALLERGIC/IMMUNOLOGIC NEGATIVE: 1

## 2017-10-04 NOTE — MR AVS SNAPSHOT
Date Of Birth Sex Race Ethnicity Preferred Language    1950 Male Other / English      Problem List as of 10/4/2017  Date Reviewed: 9/20/2017                High risk medication use - 08/02/17 OARRS PM&R, 10/03/17 OARRS PM&R, MED CONTRACT 1/17/17    DJD (degenerative joint disease), cervical    Hand weakness    Bilateral carpal tunnel syndrome    C6 radiculopathy    Osteopenia    Chronic midline low back pain with sciatica    Severe single current episode of major depressive disorder, without psychotic features ([address]    Acute idiopathic gout of left shoulder    Prediabetes    Hyperparathyroidism ([address]    Vitamin D deficiency    Elevated hemoglobin A1c    Fasting hyperglycemia    Neck pain    Myalgia    Synovitis and tenosynovitis    SI (sacroiliac) pain    Low back pain with sciatica    Chronic low back pain    Scoliosis of lumbar spine    History of spinal surgery    Essential hypertension, benign    Hypercholesterolemia (Chronic)    Right lumbar radiculopathy    Gout    History of MI (myocardial infarction)    Coronary artery disease (Chronic)    Primary localized osteoarthrosis of shoulder region    Atherosclerosis of coronary artery (Chronic)    H/O percutaneous transluminal coronary angioplasty    Idiopathic localized osteoarthropathy    Schizo-affective schizophrenia, in remission ([address]    Thoracic and lumbosacral neuritis      Your Goals as of 10/4/2017 at 11:56 AM              Today    8/3/17    6/1/17       Exercise    Exercise 3x per week (30 min per time)   Not on track  Not on track  Not on track       Lifestyle    SOAPP-R GOAL LESS THAN 9   Not on track  Not on track  Not on track    Notes    09/16/16 SCORE: 33-HIGH RISK   11/11/16 score: 27-high risk     01/13/17 score: 16-moderate risk   03/13/17 Score: 11-moderate risk   06/01/17 score: 15-moderate risk  08/03/17 score: 15-moderate risk  10/04/17 score: 18-moderate risk          Immunizations as of 10/4/2017     Name Date Influenza Vaccine, unspecified formulation 11/4/2016    Influenza Virus Vaccine 9/16/2015    Influenza, High Dose 9/20/2017    Pneumococcal 13-valent Conjugate (Suanne Ream) 12/8/2016    Zoster 12/9/2014      Preventive Care        Date Due    Hepatitis C screening is recommended for all adults regardless of risk factors born between [facility] at least once (lifetime) who have never been tested. 1950    HIV screening is recommended for all people regardless of risk factors  aged 15-65 years at least once (lifetime) who have never been HIV tested. 12/6/1966    Tetanus Combination Vaccine (1 - Tdap) 9/20/2018 (Originally 12/6/1970)    Pneumococcal Vaccines (two) for all adults aged 72 and over (2 of 2 - PPSV23) 12/8/2017    Diabetes Screening 3/31/2020    Cholesterol Screening 9/5/2022    Colonoscopy 3/10/2024            MedStart[employer] Signup           Our records indicate that you have an active HipLink account. You can view your After Visit Summary by going to [email or web address] and logging in with your HipLink username and password. If you don't have a HipLink username and password but a parent or guardian has access to your record, the parent or guardian should login with their own HipLink username and password and access your record to view the After Visit Summary. Additional Information  If you have questions, please contact the physician practice where you receive care. Remember, HipLink is NOT to be used for urgent needs. For medical emergencies, dial 911. For questions regarding your HipLink account call [phone]. If you have a clinical question, please call your doctor's office.

## 2017-10-04 NOTE — PROGRESS NOTES
Loyd, 73 y.o. male presents today with:    Neck Pain      Back Pain TP injections 09/19/17 gave 95% reduction in pain lasting 3 weeks and would like to schedule for more. Hip Pain bilateral    Leg Pain bilateral    Hand Pain bilateral, was receiving injections for CTS. Medication Refill Percocet, pill count today-compliant    Discuss Medications Gabapentin and diclofenac sodium, pill count today-compliant    Blood Work tox screen needed       HPI Comments: His CT injection did not work. No signs of overuse or abuse of his meds    Injections help very much. Back Pain   This is a chronic problem. The current episode started more than 1 year ago. The problem occurs daily. The problem is unchanged. The pain is present in the lumbar spine. The quality of the pain is described as aching, cramping, shooting and stabbing. The pain radiates to the right foot, right knee and right thigh. The pain is at a severity of 6/10. The pain is severe. The pain is worse during the day. The symptoms are aggravated by bending, lying down, position, sitting, standing and twisting. Stiffness is present in the morning. Associated symptoms include leg pain, numbness and weakness. Pertinent negatives include no abdominal pain, chest pain, headaches, paresis, perianal numbness or tingling. Risk factors include lack of exercise. He has tried analgesics and home exercises (SI injections which help, trigger point injections, OXY-IR ) for the symptoms. The treatment provided significant relief. Mental Health Problem   The primary symptoms include dysphoric mood and negative symptoms. The primary symptoms do not include bizarre behavior, disorganized speech or somatic symptoms. The current episode started more than 1 month ago. This is a chronic problem. The onset of the illness is precipitated by a stressful event (chronic pain).  The degree of incapacity that he is experiencing as a consequence of his illness without psychotic features ([facility] 7/8/2016    Status post coronary artery stent placement 2002    Dr. Lul Fair     Past Surgical History:   Procedure Laterality Date   [address] COLONOSCOPY  3/10/14    DR Paula Jurado  2002    Dr. Jorge Worthington Left 2002    Dr. Zayas Going  12/19/13    Lily Menard  9/2009    Dr. Jeniffer Posada  2008    Dr. Elpidio Doherty  3/10/14    Susana Jeter, DR Barron Miguel     Social History     Social History    Marital status:      Spouse name: N/A    Number of children: N/A    Years of education: N/A     Occupational History    disability       Social History Main Topics    Smoking status: Never Smoker    Smokeless tobacco: Never Used    Alcohol use No      Comment: Stopped years ago.  Drug use: No      Comment: YEARS AGO    Sexual activity: Yes     Partners: Female      Comment: monogomous sexual partner, wife. Other Topics Concern    None     Social History Narrative    Tobacco -- never smoked or chewed. Alcohol -- have not used for past 10 years, prior to had consumed 6 pack of beer daily. Drugs -- tried marijuana and cocaine 14 years ago occasionally used for 3-4 years and then stopped completely 10 years ago. Education -- completed 11th grade in [place].    Occupation -- [employer] work,  at [employer].    Marital status --  44 years, 2 grown sons. Family History   Problem Relation Age of Onset    High Blood Pressure Mother     Arthritis Mother     Cancer Father      throat    Arthritis Father        No Known Allergies    Review of Systems   Constitutional: Positive for fatigue. Negative for activity change and unexpected weight change. HENT: Negative. Eyes: Negative.     Respiratory: Positive for present in the radial distribution. Decreased strength noted. He exhibits finger abduction, thumb/finger opposition and wrist extension trouble. Left hand: He exhibits decreased range of motion and bony tenderness. Decreased sensation noted. Decreased sensation is present in the ulnar distribution and is present in the radial distribution. Decreased strength noted. He exhibits finger abduction and wrist extension trouble. Right upper leg: Normal.        Left upper leg: Normal.        Right lower leg: Normal.        Left lower leg: Normal.        Legs:       Right foot: Normal.        Left foot: Normal.   Tender areas are indicated by numbered spot         Lymphadenopathy:     He has no cervical adenopathy. Neurological: He is alert and oriented to person, place, and time. He displays abnormal reflex. He displays no atrophy and no tremor. A sensory deficit is present. No cranial nerve deficit. He exhibits normal muscle tone. He has an abnormal Finger-Nose-Finger Test, an abnormal Heel to [employer] and an abnormal Romberg Test. Gait abnormal. Coordination normal. He displays no Babinski's sign on the right side. He displays no Babinski's sign on the left side. Reflex Scores:       Patellar reflexes are 1+ on the right side and 1+ on the left side. Achilles reflexes are 0 on the right side and 0 on the left side. Skin: Skin is warm, dry and intact. No abrasion, no bruising, no ecchymosis, no laceration, no petechiae and no rash noted. Rash is not macular, not pustular and not urticarial. He is not diaphoretic. No cyanosis or erythema. No pallor. Nails show no clubbing. Psychiatric: His behavior is normal. Judgment and thought content normal. His mood appears not anxious. His affect is not angry, not blunt, not labile and not inappropriate. His speech is not rapid and/or pressured, not delayed, not tangential and not slurred.  He is not agitated, not aggressive, not hyperactive, not slowed, not withdrawn, not actively hallucinating and not combative. Thought content is not paranoid and not delusional. Cognition and memory are normal. Cognition and memory are not impaired. He does not express impulsivity or inappropriate judgment. He does not exhibit a depressed mood. He expresses no homicidal and no suicidal ideation. He expresses no suicidal plans and no homicidal plans. He is communicative. He exhibits normal recent memory and normal remote memory.   high score on SOAPPR    Calm appropriate    NAD He is attentive. Vitals reviewed. Ortho Exam  Neurologic Exam     Mental Status   Oriented to person, place, and time. Speech: not slurred   Level of consciousness: alert  Knowledge: good. Able to name object. Able to read. Able to repeat. Able to write. Cranial Nerves     CN III, IV, VI   Pupils are equal, round, and reactive to light. Extraocular motions are normal.     Motor Exam   Muscle bulk: normal  Overall muscle tone: normal    Sensory Exam   Right arm light touch: decreased from fingers  Left arm light touch: decreased from fingers  Right leg light touch: decreased from ankle  Left leg light touch: decreased from ankle    Gait, Coordination, and Reflexes     Gait  Gait: wide-based    Coordination   Romberg: positive  Finger to nose coordination: abnormal  Heel to shin coordination: abnormal    Reflexes   Right patellar: 1+  Left patellar: 1+  Right achilles: 0  Left achilles: 0        After a thorough review and discussion of the previous medical records, patient comprehensive medical, surgical, and family and social history, Review of Systems, their OARRS, their Screener and Opioid Assessment for Patients with Pain (SOAPP®-R), recent diagnostics, and symptomatic results to previous treatment, it is my impression that the patients is suffering with progressive and severe:    1. Chronic low back pain with sciatica, sciatica laterality unspecified, unspecified back pain laterality     2. Right lumbar radiculopathy     3. SI (sacroiliac) pain     4. High risk medication use - 08/02/17 OARRS PM&R, 10/03/17 OARRS PM&R, MED CONTRACT 1/17/17     5. C6 radiculopathy     6. Myalgia  [place] INJECT TRIGGER POINTS, 3 OR GREATER    [place] INJECT TRIGGER POINTS, 3 OR GREATER   7. Neck pain  oxyCODONE-acetaminophen (PERCOCET)  MG per tablet   8. Vitamin D deficiency     9. High risk medication use OARRS RUN 11/10/16  Urine Drug Screen    oxyCODONE-acetaminophen (PERCOCET)  MG per tablet   10. Chronic midline low back pain with sciatica, sciatica laterality unspecified  oxyCODONE-acetaminophen (PERCOCET)  MG per tablet   11. Chronic bilateral low back pain without sciatica  oxyCODONE-acetaminophen (PERCOCET)  MG per tablet   12. Thoracic and lumbosacral neuritis  oxyCODONE-acetaminophen (PERCOCET)  MG per tablet       I am also concerned by lifestyle and mood issues including:    Past Medical History:   Diagnosis Date    Chronic back pain     Coronary artery disease 2002    Dr. Brad Gaytan at [address] [facility] Esophageal ulcer 3/10/2014    Dr. Harjit Kumar Gastric ulcer 3/10/2014    Dr. Aura Hand    Gout     Hiatal hernia 3/10/2014    Dr. Aura Hand    Hyperlipidemia     Hypertension     MI (myocardial infarction) [facility]) 2005    Dr. Va Pereira Peripheral vascular disease ([facility]     Prediabetes     Severe single current episode of major depressive disorder, without psychotic features ([facility] 7/8/2016    Status post coronary artery stent placement 2002    Dr. Brad Gaytan           Given their medication, chronic pain and lifestyle and medications they are at risk for :    Falls, constipation, addiction  Loss of livelyhood due to severe pain, debility, weight gain and  vitamin D deficiency    The patient was educated regarding proper diet, fitness routine, and regulatory restrictions concerning pain medications.         Previous notes, comprehensive past 60 tablet     Refill:  0        -which helps with pain and function. Otherwise, continue the current pain medications that I have prescibed. Radiologic:   Old films reviewed -Severe DJD DDD thoracolumbar spine with scoliosis ,     I discussed results with patients. see Follow up plans below  For any new studies. Care Everywhere Updates:  requested and reviewed. No new issues noted.          Labs:  Previous labs reviewed     Lab Results   Component Value Date     09/05/2017    K 5.2 09/05/2017     09/05/2017    CO2 29 09/05/2017    BUN 19 09/05/2017    CREATININE 0.88 09/05/2017    CALCIUM 9.7 09/05/2017    LABALBU 4.1 03/01/2017    BILITOT 0.5 03/01/2017    ALKPHOS 53 03/01/2017    AST 20 03/01/2017    ALT 18 03/01/2017     Lab Results   Component Value Date    WBC 5.5 03/31/2017    RBC 4.58 03/31/2017    HGB 14.6 03/31/2017    HCT 44.2 03/31/2017    MCV 96.5 03/31/2017    MCH 32.0 03/31/2017    MCHC 33.1 03/31/2017    RDW 14.3 03/31/2017     03/31/2017    MPV 9.9 09/15/2015       Lab Results   Component Value Date    LABAMPH Negative 10/03/2013    BARBSCNU Negative 10/03/2013    LABBENZ Negative 10/03/2013    CANSU Negative 10/03/2013    COCAIMETSCRU Negative 10/03/2013    PHENCYCLIDINESCREENURINE Negative 87/45/1726    TRICYCLIC Negative 17/47/8435    DSCOMMENT See Note 10/31/2014       Lab Results   Component Value Date    CODEINE Not Detected 09/16/2016    MORPHINE Not Detected 09/16/2016    ACETYLMORPHI Not Detected 09/16/2016    OXYCODONE Present 09/16/2016    NOROXYCODONE Present 09/16/2016    NOROXYMU Present 09/16/2016    HYDRCO Not Detected 09/16/2016    NORHYDU Not Detected 09/16/2016    HYDROMO Not Detected 09/16/2016    Bentley Harden Not Detected 09/16/2016    Ortiz Borrow Not Detected 09/16/2016    FENTA Not Detected 09/16/2016    NORFENT Not Detected 09/16/2016    MEPERIDINE Not Detected 09/16/2016    TAPENU Not Detected 09/16/2016    TAPOSULFUR Not Detected 09/16/2016 METHADONE Not Detected 09/16/2016    LABPROP Not Detected 09/16/2016    TRAM Not Detected 09/16/2016    AMPH Not Detected 09/16/2016    METHAMP Not Detected 09/16/2016    MDMA Not Detected 09/16/2016    ECMDA Not Detected 09/16/2016       Lab Results   Component Value Date    PHENTERMINE Not Detected 09/16/2016    BENZOYL Not Detected 09/16/2016    Artemio Alfred Not Detected 09/16/2016    ALPHAOHALPRA Not Detected 09/16/2016    CLONAZEPAM Not Detected 09/16/2016    Janessa Low Not Detected 09/16/2016    DIAZEP Not Detected 09/16/2016    SAFIA Not Detected 09/16/2016    OXAZ Not Detected 09/16/2016    Chad Miguel A Not Detected 09/16/2016    LORAZEPAM Not Detected 09/16/2016    MIDAZOLAM Not Detected 09/16/2016    ZOLPIDEM Not Detected 09/16/2016    REG Not Detected 09/16/2016    ETG Not Detected 09/16/2016    MARIJMET Not Detected 09/16/2016    PCP Not Detected 09/16/2016    PAINMGTDRUGP See Below 09/16/2016    EERPAINMGTPA See Note 09/16/2016    LABCREA 58.1 09/16/2016         , I discussed results with patient. See follow-up plans for new studies. Therapies:  HEP-gentle stretching and relaxation techniques-demonstrated with patient-they are to do them twice a day. They are also advised to make the following lifestyle changes:  Goals      Exercise 3x per week (30 min per time)      SOAPP-R GOAL LESS THAN 9            09/16/16 SCORE: 33-HIGH RISK   11/11/16 score: 27-high risk     01/13/17 score: 16-moderate risk   03/13/17 Score: 11-moderate risk   06/01/17 score: 15-moderate risk  08/03/17 score: 15-moderate risk  10/04/17 score: 18-moderate risk              Injections or Epidurals:  Injection options were discussed. Patient gave verbal consent to ordered injections. See follow-up plans for planned injections. Supplements:  Vitamin D,   Education was given on:   Dietary and Fitness             Follow up with Primary Care Physician regarding their general medical needs.            They are to follow up in 2 months

## 2017-10-05 DIAGNOSIS — Z79.899 HIGH RISK MEDICATION USE: ICD-10-CM

## 2017-10-05 LAB
AMPHETAMINE SCREEN, URINE: NORMAL
BARBITURATE SCREEN URINE: NORMAL
BENZODIAZEPINE SCREEN, URINE: NORMAL
CANNABINOID SCREEN URINE: NORMAL
COCAINE METABOLITE SCREEN URINE: NORMAL
Lab: NORMAL
OPIATE SCREEN URINE: NORMAL
PHENCYCLIDINE SCREEN URINE: NORMAL

## 2017-10-10 ENCOUNTER — HOSPITAL ENCOUNTER (OUTPATIENT)
Dept: NON INVASIVE DIAGNOSTICS | Age: 67
Discharge: HOME | End: 2017-10-10
Payer: MEDICARE

## 2017-10-10 DIAGNOSIS — I20.9 ANGINA PECTORIS, UNSPECIFIED (HCC): ICD-10-CM

## 2017-10-10 DIAGNOSIS — R07.9 CHEST PAIN ON EXERTION: ICD-10-CM

## 2017-10-10 LAB
LV EF: 50 %
LVEF MODALITY: NORMAL

## 2017-10-10 PROCEDURE — 93017 CV STRESS TEST TRACING ONLY: CPT

## 2017-10-10 PROCEDURE — 93306 TTE W/DOPPLER COMPLETE: CPT

## 2017-10-11 ENCOUNTER — OFFICE VISIT (OUTPATIENT)
Dept: CARDIOLOGY | Age: 67
End: 2017-10-11

## 2017-10-11 VITALS
HEART RATE: 82 BPM | WEIGHT: 165 LBS | DIASTOLIC BLOOD PRESSURE: 74 MMHG | RESPIRATION RATE: 16 BRPM | OXYGEN SATURATION: 98 % | BODY MASS INDEX: 25.9 KG/M2 | HEIGHT: 67 IN | SYSTOLIC BLOOD PRESSURE: 132 MMHG | TEMPERATURE: 99.1 F

## 2017-10-11 DIAGNOSIS — I25.10 ATHEROSCLEROSIS OF NATIVE CORONARY ARTERY OF NATIVE HEART WITHOUT ANGINA PECTORIS: Chronic | ICD-10-CM

## 2017-10-11 DIAGNOSIS — I25.119 CORONARY ARTERY DISEASE INVOLVING NATIVE CORONARY ARTERY OF NATIVE HEART WITH ANGINA PECTORIS (HCC): Chronic | ICD-10-CM

## 2017-10-11 DIAGNOSIS — Z98.61 H/O PERCUTANEOUS TRANSLUMINAL CORONARY ANGIOPLASTY: ICD-10-CM

## 2017-10-11 DIAGNOSIS — I10 ESSENTIAL HYPERTENSION, BENIGN: ICD-10-CM

## 2017-10-11 DIAGNOSIS — E78.00 HYPERCHOLESTEROLEMIA: Chronic | ICD-10-CM

## 2017-10-11 DIAGNOSIS — I25.2 HISTORY OF MI (MYOCARDIAL INFARCTION): ICD-10-CM

## 2017-10-11 PROCEDURE — 99215 OFFICE O/P EST HI 40 MIN: CPT | Performed by: INTERNAL MEDICINE

## 2017-10-11 ASSESSMENT — ENCOUNTER SYMPTOMS
CHEST TIGHTNESS: 1
BLOOD IN STOOL: 0
COUGH: 0
GASTROINTESTINAL NEGATIVE: 1
WHEEZING: 0
EYES NEGATIVE: 1
STRIDOR: 0
NAUSEA: 0
SHORTNESS OF BREATH: 1

## 2017-10-11 NOTE — PROGRESS NOTES
Subsequent Progress Note  Patient: Iam Mares  YOB: 1950  MRN: [id number]    Chief Complaint:  Chief Complaint   Patient presents with    Follow-up     Referred by [facility], for chest pain, former Sonya Mckinley patient   10/10/2017 Stress Echo ABN Stress Interpretation   This is an abnormal stress echo with wall motion abnormality mid distal   anterior segments at peak stress. Furthermore, distal Later segments   display abnormal motion. EF 50%    CAD stents 2000    Subjective    Has GUILLORY and Exertional chest tihtness. Can last up to 10 minutes but goes away with rest.  No symtoms at rest.  Does not radiate anywhere.   Tight feeling no sharp     EKG:  Past Medical History:   Diagnosis Date    Chronic back pain     Coronary artery disease 2002    Dr. Chuy Hernandez at [facility] Esophageal ulcer 3/10/2014    Dr. Bg Jimenez Gastric ulcer 3/10/2014    Dr. Yumi Clay    Gout     Hiatal hernia 3/10/2014    Dr. Yumi Clay    Hyperlipidemia     Hypertension     MI (myocardial infarction) [facility]) 2005    Dr. Milady Jimenez Peripheral vascular disease ([facility] [address]     Prediabetes     Severe single current episode of major depressive disorder, without psychotic features ([facility] [address] 7/8/2016    Status post coronary artery stent placement 2002    Dr. Chuy Hernandez       Past Surgical History:   Procedure Laterality Date   [address] COLONOSCOPY  3/10/14    DR Katherine Durham  2002    Dr. Samantha Mistry Left 2002    Dr. Leela Hamm  12/19/13    Lizbeth Nieto  9/2009    Dr. May Chatterjee  2008    Dr. Jacquelyn Delarosa  3/10/14    Larry Mose, DR Marylene Fritter       Family History   Problem Relation Age of Onset    High Blood Pressure Mother     Arthritis Mother     Cancer Father throat    Arthritis Father        Social History     Social History    Marital status:      Spouse name: N/A    Number of children: N/A    Years of education: N/A     Occupational History    disability       Social History Main Topics    Smoking status: Never Smoker    Smokeless tobacco: Never Used    Alcohol use No      Comment: Stopped years ago.  Drug use: No      Comment: YEARS AGO    Sexual activity: Yes     Partners: Female      Comment: monogomous sexual partner, wife. Other Topics Concern    None     Social History Narrative    Tobacco -- never smoked or chewed. Alcohol -- have not used for past 10 years, prior to had consumed 6 pack of beer daily. Drugs -- tried marijuana and cocaine 14 years ago occasionally used for 3-4 years and then stopped completely 10 years ago. Education -- completed 11th grade in [place].    Occupation -- [employer]. work,  at [employer].    Marital status --  44 years, 2 grown sons. No Known Allergies    Current Outpatient Prescriptions   Medication Sig Dispense Refill    oxyCODONE-acetaminophen (PERCOCET)  MG per tablet Take one tablet every 4 hours as needed for pain. Maximum of 60 tablets within a month. Do not get narcotic medications from any other doctor. Generic equivalent acceptable, unless otherwise noted. Kristin Ket Date: 10/11/17 60 tablet 0    pravastatin (PRAVACHOL) 40 MG tablet Take 1 tablet by mouth every evening 90 tablet 1    aspirin 81 MG EC tablet Take 1 tablet by mouth daily 90 tablet 1    gabapentin (NEURONTIN) 300 MG capsule TAKE ONE CAPSULE BY MOUTH DURING THE DAY AS TOLERATED AND 2 CAPSULES IN THE EVENING 270 capsule 1    metoprolol succinate (TOPROL XL) 50 MG extended release tablet TAKE 1 TABLET BY MOUTH DAILY 90 tablet 0    testosterone cypionate (DEPOTESTOTERONE CYPIONATE) 200 MG/ML injection INJECT 0.5 ML IM EVERY 2 WEEKS 10 mL 1    diclofenac (VOLTAREN) 50 MG EC tablet TAKE 1 TABLET BY MOUTH TWICE DAILY AS NEEDED FOR JOINT PAIN 180 tablet 0    Febuxostat 80 MG TABS Take 80 mg by mouth daily 30 tablet 2    NIFEdipine (NIFEDICAL XL) 60 MG extended release tablet TAKE 1 TABLET BY MOUTH ONCE DAILY 90 tablet 0    SYRINGE-NEEDLE, DISP, 3 ML (B-D INTEGRA SYRINGE) 22G X 1-1/2\" 3 ML MISC 1 each by Does not apply route daily 20 each 6    diclofenac sodium (VOLTAREN) 1 % GEL Apply 2 g topically 2 times daily      acetaminophen (TYLENOL) 325 MG tablet Take 650 mg by mouth 3 times daily       omeprazole (PRILOSEC) 40 MG capsule Take 1 capsule by mouth daily as needed. Jarm      nitroGLYCERIN (NITROSTAT) 0.4 MG SL tablet Place 1 tablet under the tongue every 5 minutes as needed x 3 doses for chest pain. 25 tablet 0    docusate sodium (COLACE) 100 MG capsule Take 100 mg by mouth 2 times daily. No current facility-administered medications for this visit. Review of Systems:   Review of Systems   Constitutional: Negative. Negative for diaphoresis and fatigue. HENT: Negative. Eyes: Negative. Respiratory: Positive for chest tightness and shortness of breath. Negative for cough, wheezing and stridor. Cardiovascular: Positive for chest pain. Negative for palpitations and leg swelling. Gastrointestinal: Negative. Negative for blood in stool and nausea. Genitourinary: Negative. Musculoskeletal: Negative. Skin: Negative. Neurological: Negative. Negative for dizziness, syncope, weakness and light-headedness. Hematological: Negative. Psychiatric/Behavioral: Negative. Physical Examination:    /74 (Site: Left Arm, Position: Sitting, Cuff Size: Large Adult)   Pulse 82   Temp 99.1 °F (37.3 °C) (Temporal)   Resp 16   Ht 5' 7\" (1.702 m)   Wt 165 lb (74.8 kg)   SpO2 98%   BMI 25.84 kg/m²    Physical Exam   Constitutional: He appears healthy. No distress.    HENT:   Normal cephalic and Atraumatic   Eyes: Pupils are equal, round, and reactive to light. Neck: Normal range of motion and thyroid normal. Neck supple. No JVD present. No neck adenopathy. No thyromegaly present. Cardiovascular: Normal rate, regular rhythm, normal heart sounds, intact distal pulses and normal pulses. Pulmonary/Chest: Effort normal and breath sounds normal. He has no wheezes. He has no rales. He exhibits no tenderness. Abdominal: Soft. Bowel sounds are normal.   Musculoskeletal: Normal range of motion. He exhibits no edema or tenderness. Neurological: He is alert and oriented to person, place, and time. Skin: Skin is warm. No cyanosis. Nails show no clubbing.        LABS:  CBC:   Lab Results   Component Value Date    WBC 5.5 03/31/2017    RBC 4.58 03/31/2017    HGB 14.6 03/31/2017    HCT 44.2 03/31/2017    MCV 96.5 03/31/2017    MCH 32.0 03/31/2017    MCHC 33.1 03/31/2017    RDW 14.3 03/31/2017     03/31/2017    MPV 9.9 09/15/2015     Lipids:  Lab Results   Component Value Date    CHOL 271 (H) 09/05/2017    CHOL 238 (H) 03/01/2017    CHOL 254 (H) 11/18/2016     Lab Results   Component Value Date    TRIG 154 09/05/2017    TRIG 138 03/01/2017    TRIG 129 11/18/2016     Lab Results   Component Value Date    HDL 54 09/05/2017    HDL 44 03/01/2017    HDL 53 11/18/2016     Lab Results   Component Value Date    LDLCALC 186 (H) 09/05/2017    LDLCALC 166 (H) 03/01/2017    LDLCALC 175 (H) 11/18/2016     No results found for: LABVLDL, VLDL  No results found for: CHOLHDLRATIO  CMP:    Lab Results   Component Value Date     09/05/2017    K 5.2 09/05/2017     09/05/2017    CO2 29 09/05/2017    BUN 19 09/05/2017    CREATININE 0.88 09/05/2017    GFRAA >60.0 09/05/2017    LABGLOM >60.0 09/05/2017    GLUCOSE 110 09/05/2017    PROT 7.0 03/01/2017    LABALBU 4.1 03/01/2017    CALCIUM 9.7 09/05/2017    BILITOT 0.5 03/01/2017    ALKPHOS 53 03/01/2017    AST 20 03/01/2017    ALT 18 03/01/2017     BMP:    Lab Results   Component Value Date     09/05/2017 K 5.2 09/05/2017     09/05/2017    CO2 29 09/05/2017    BUN 19 09/05/2017    LABALBU 4.1 03/01/2017    CREATININE 0.88 09/05/2017    CALCIUM 9.7 09/05/2017    GFRAA >60.0 09/05/2017    LABGLOM >60.0 09/05/2017    GLUCOSE 110 09/05/2017     Magnesium:  No results found for: MG  TSH:  Lab Results   Component Value Date    TSH 4.110 05/06/2016       Patient Active Problem List   Diagnosis    Chronic low back pain    Scoliosis of lumbar spine    History of spinal surgery    Essential hypertension, benign    Hypercholesterolemia    Right lumbar radiculopathy    Gout    History of MI (myocardial infarction)    Coronary artery disease    High risk medication use - 08/02/17 OARRS PM&R, 10/03/17 OARRS PM&R, 10/05/17 Urine Drug Screen: negative PM&R, MED CONTRACT 1/17/17    SI (sacroiliac) pain    Low back pain with sciatica    Neck pain    Myalgia    Atherosclerosis of coronary artery    H/O percutaneous transluminal coronary angioplasty    Idiopathic localized osteoarthropathy    Primary localized osteoarthrosis of shoulder region    Schizo-affective schizophrenia, in remission ([address]    Synovitis and tenosynovitis    Thoracic and lumbosacral neuritis    Vitamin D deficiency    Elevated hemoglobin A1c    Fasting hyperglycemia    Prediabetes    Hyperparathyroidism ([address]    Acute idiopathic gout of left shoulder    Severe single current episode of major depressive disorder, without psychotic features (HCC)    Chronic midline low back pain with sciatica    Osteopenia    Bilateral carpal tunnel syndrome    C6 radiculopathy    DJD (degenerative joint disease), cervical    Hand weakness       Assessment:    1. Essential hypertension, benign  CBC    Basic Metabolic Panel   2. Hypercholesterolemia     3. History of MI (myocardial infarction)  Referral to Cardiac Cath   4.  Coronary artery disease involving native coronary artery of native heart with angina pectoris [facility])  Referral to Cardiac

## 2017-10-13 ENCOUNTER — HOSPITAL ENCOUNTER (OUTPATIENT)
Dept: CARDIAC CATH/INVASIVE PROCEDURES | Age: 67
Discharge: ANOTHER HOSPITAL | End: 2017-10-13
Attending: INTERNAL MEDICINE | Admitting: INTERNAL MEDICINE
Payer: MEDICARE

## 2017-10-13 VITALS
BODY MASS INDEX: 25.26 KG/M2 | WEIGHT: 160.94 LBS | DIASTOLIC BLOOD PRESSURE: 64 MMHG | HEIGHT: 67 IN | RESPIRATION RATE: 18 BRPM | HEART RATE: 68 BPM | OXYGEN SATURATION: 100 % | TEMPERATURE: 97.6 F | SYSTOLIC BLOOD PRESSURE: 137 MMHG

## 2017-10-13 LAB
GLUCOSE BLD-MCNC: 93 MG/DL (ref 60–115)
PERFORMED ON: NORMAL

## 2017-10-13 PROCEDURE — 93458 L HRT ARTERY/VENTRICLE ANGIO: CPT | Performed by: INTERNAL MEDICINE

## 2017-10-13 PROCEDURE — C1725 CATH, TRANSLUMIN NON-LASER: HCPCS | Performed by: NURSE PRACTITIONER

## 2017-10-13 PROCEDURE — C1894 INTRO/SHEATH, NON-LASER: HCPCS | Performed by: NURSE PRACTITIONER

## 2017-10-13 PROCEDURE — 2720000010 HC SURG SUPPLY STERILE: Performed by: NURSE PRACTITIONER

## 2017-10-13 PROCEDURE — 6370000000 HC RX 637 (ALT 250 FOR IP): Performed by: NURSE PRACTITIONER

## 2017-10-13 PROCEDURE — 2580000003 HC RX 258: Performed by: INTERNAL MEDICINE

## 2017-10-13 PROCEDURE — 6360000002 HC RX W HCPCS

## 2017-10-13 PROCEDURE — G0378 HOSPITAL OBSERVATION PER HR: HCPCS

## 2017-10-13 PROCEDURE — 2500000003 HC RX 250 WO HCPCS

## 2017-10-13 RX ORDER — PRAVASTATIN SODIUM 40 MG
40 TABLET ORAL EVERY EVENING
Status: DISCONTINUED | OUTPATIENT
Start: 2017-10-13 | End: 2017-10-13 | Stop reason: HOSPADM

## 2017-10-13 RX ORDER — FEBUXOSTAT 40 MG/1
80 TABLET, FILM COATED ORAL DAILY
Status: DISCONTINUED | OUTPATIENT
Start: 2017-10-13 | End: 2017-10-13 | Stop reason: HOSPADM

## 2017-10-13 RX ORDER — SODIUM CHLORIDE 450 MG/100ML
75 INJECTION, SOLUTION INTRAVENOUS CONTINUOUS
Status: DISCONTINUED | OUTPATIENT
Start: 2017-10-13 | End: 2017-10-13 | Stop reason: HOSPADM

## 2017-10-13 RX ORDER — DEXTROSE MONOHYDRATE 25 G/50ML
12.5 INJECTION, SOLUTION INTRAVENOUS PRN
Status: DISCONTINUED | OUTPATIENT
Start: 2017-10-13 | End: 2017-10-13 | Stop reason: HOSPADM

## 2017-10-13 RX ORDER — GABAPENTIN 300 MG/1
300 CAPSULE ORAL 3 TIMES DAILY
COMMUNITY
End: 2018-04-04 | Stop reason: ALTCHOICE

## 2017-10-13 RX ORDER — ASPIRIN 81 MG/1
81 TABLET ORAL DAILY
Status: DISCONTINUED | OUTPATIENT
Start: 2017-10-13 | End: 2017-10-13 | Stop reason: HOSPADM

## 2017-10-13 RX ORDER — METOPROLOL SUCCINATE 50 MG/1
50 TABLET, EXTENDED RELEASE ORAL DAILY
Status: DISCONTINUED | OUTPATIENT
Start: 2017-10-13 | End: 2017-10-13 | Stop reason: HOSPADM

## 2017-10-13 RX ORDER — NITROGLYCERIN 0.4 MG/1
0.4 TABLET SUBLINGUAL EVERY 5 MIN PRN
Status: DISCONTINUED | OUTPATIENT
Start: 2017-10-13 | End: 2017-10-13 | Stop reason: HOSPADM

## 2017-10-13 RX ORDER — SODIUM CHLORIDE 0.9 % (FLUSH) 0.9 %
10 SYRINGE (ML) INJECTION PRN
Status: DISCONTINUED | OUTPATIENT
Start: 2017-10-13 | End: 2017-10-13 | Stop reason: HOSPADM

## 2017-10-13 RX ORDER — NICOTINE POLACRILEX 4 MG
15 LOZENGE BUCCAL PRN
Status: DISCONTINUED | OUTPATIENT
Start: 2017-10-13 | End: 2017-10-13 | Stop reason: HOSPADM

## 2017-10-13 RX ORDER — SODIUM CHLORIDE 0.9 % (FLUSH) 0.9 %
10 SYRINGE (ML) INJECTION EVERY 12 HOURS SCHEDULED
Status: DISCONTINUED | OUTPATIENT
Start: 2017-10-13 | End: 2017-10-13 | Stop reason: HOSPADM

## 2017-10-13 RX ORDER — NIFEDIPINE 60 MG/1
60 TABLET, EXTENDED RELEASE ORAL DAILY
Status: DISCONTINUED | OUTPATIENT
Start: 2017-10-13 | End: 2017-10-13 | Stop reason: HOSPADM

## 2017-10-13 RX ORDER — DEXTROSE MONOHYDRATE 50 MG/ML
100 INJECTION, SOLUTION INTRAVENOUS PRN
Status: DISCONTINUED | OUTPATIENT
Start: 2017-10-13 | End: 2017-10-13 | Stop reason: HOSPADM

## 2017-10-13 RX ORDER — GABAPENTIN 300 MG/1
300 CAPSULE ORAL 3 TIMES DAILY
Status: DISCONTINUED | OUTPATIENT
Start: 2017-10-13 | End: 2017-10-13 | Stop reason: HOSPADM

## 2017-10-13 RX ORDER — ONDANSETRON 2 MG/ML
4 INJECTION INTRAMUSCULAR; INTRAVENOUS EVERY 6 HOURS PRN
Status: DISCONTINUED | OUTPATIENT
Start: 2017-10-13 | End: 2017-10-13 | Stop reason: HOSPADM

## 2017-10-13 RX ORDER — OXYCODONE AND ACETAMINOPHEN 10; 325 MG/1; MG/1
1 TABLET ORAL EVERY 4 HOURS PRN
Status: DISCONTINUED | OUTPATIENT
Start: 2017-10-13 | End: 2017-10-13 | Stop reason: HOSPADM

## 2017-10-13 RX ORDER — DIPHENHYDRAMINE HYDROCHLORIDE 50 MG/ML
50 INJECTION INTRAMUSCULAR; INTRAVENOUS ONCE
Status: DISCONTINUED | OUTPATIENT
Start: 2017-10-13 | End: 2017-10-13 | Stop reason: HOSPADM

## 2017-10-13 RX ADMIN — SODIUM CHLORIDE 75 ML/HR: 4.5 INJECTION, SOLUTION INTRAVENOUS at 10:15

## 2017-10-13 RX ADMIN — GABAPENTIN 300 MG: 300 CAPSULE ORAL at 20:28

## 2017-10-13 RX ADMIN — OXYCODONE HYDROCHLORIDE AND ACETAMINOPHEN 1 TABLET: 10; 325 TABLET ORAL at 15:59

## 2017-10-13 RX ADMIN — GABAPENTIN 300 MG: 300 CAPSULE ORAL at 15:59

## 2017-10-13 RX ADMIN — PRAVASTATIN SODIUM 40 MG: 40 TABLET ORAL at 18:16

## 2017-10-13 RX ADMIN — OXYCODONE HYDROCHLORIDE AND ACETAMINOPHEN 1 TABLET: 10; 325 TABLET ORAL at 20:28

## 2017-10-13 ASSESSMENT — PAIN SCALES - GENERAL
PAINLEVEL_OUTOF10: 0
PAINLEVEL_OUTOF10: 4
PAINLEVEL_OUTOF10: 0
PAINLEVEL_OUTOF10: 5
PAINLEVEL_OUTOF10: 5

## 2017-10-13 NOTE — BRIEF OP NOTE
Section of Cardiology  Adult Brief Cardiac Cath Procedure Note        Procedure(s):  LHC, b/l coronary angio    Pre-operative Diagnosis:  ABN Stress Echo    H&P Status: Completed and reviewed.      Post-operative Diagnosis:  3V CAD  LVEF 50-55%      Findings:  See full report    Complications:  none    Primary Proceduralist:   Li Root MD

## 2017-10-26 DIAGNOSIS — M54.2 NECK PAIN: Chronic | ICD-10-CM

## 2017-10-26 DIAGNOSIS — M54.17 THORACIC AND LUMBOSACRAL NEURITIS: ICD-10-CM

## 2017-10-26 DIAGNOSIS — M81.0 OSTEOPOROSIS: ICD-10-CM

## 2017-10-26 DIAGNOSIS — M54.14 THORACIC AND LUMBOSACRAL NEURITIS: ICD-10-CM

## 2017-10-26 DIAGNOSIS — F32.2 SEVERE SINGLE CURRENT EPISODE OF MAJOR DEPRESSIVE DISORDER, WITHOUT PSYCHOTIC FEATURES (HCC): ICD-10-CM

## 2017-11-01 DIAGNOSIS — R73.09 ABNORMAL BLOOD SUGAR: ICD-10-CM

## 2017-11-01 DIAGNOSIS — E87.5 HYPERKALEMIA: ICD-10-CM

## 2017-11-01 LAB
HBA1C MFR BLD: 5.8 % (ref 4.8–5.9)
POTASSIUM SERPL-SCNC: 5.9 MEQ/L (ref 3.5–5.1)

## 2017-11-02 ENCOUNTER — PROCEDURE VISIT (OUTPATIENT)
Dept: PHYSICAL MEDICINE AND REHAB | Age: 67
End: 2017-11-02

## 2017-11-02 DIAGNOSIS — M79.10 MYALGIA: ICD-10-CM

## 2017-11-02 DIAGNOSIS — M79.7 FIBROMYALGIA: Primary | ICD-10-CM

## 2017-11-02 PROCEDURE — 20553 NJX 1/MLT TRIGGER POINTS 3/>: CPT | Performed by: PHYSICAL MEDICINE & REHABILITATION

## 2017-11-03 LAB — ALDOSTERONE: 17 NG/DL

## 2017-11-06 DIAGNOSIS — G89.29 CHRONIC MIDLINE LOW BACK PAIN WITH SCIATICA, SCIATICA LATERALITY UNSPECIFIED: ICD-10-CM

## 2017-11-06 DIAGNOSIS — M54.40 CHRONIC MIDLINE LOW BACK PAIN WITH SCIATICA, SCIATICA LATERALITY UNSPECIFIED: ICD-10-CM

## 2017-11-06 DIAGNOSIS — M54.50 CHRONIC BILATERAL LOW BACK PAIN WITHOUT SCIATICA: ICD-10-CM

## 2017-11-06 DIAGNOSIS — Z79.899 HIGH RISK MEDICATION USE: ICD-10-CM

## 2017-11-06 DIAGNOSIS — M54.14 THORACIC AND LUMBOSACRAL NEURITIS: ICD-10-CM

## 2017-11-06 DIAGNOSIS — G89.29 CHRONIC BILATERAL LOW BACK PAIN WITHOUT SCIATICA: ICD-10-CM

## 2017-11-06 DIAGNOSIS — M54.2 NECK PAIN: ICD-10-CM

## 2017-11-06 DIAGNOSIS — M54.17 THORACIC AND LUMBOSACRAL NEURITIS: ICD-10-CM

## 2017-11-06 RX ORDER — OXYCODONE AND ACETAMINOPHEN 10; 325 MG/1; MG/1
TABLET ORAL
Qty: 60 TABLET | Refills: 0 | Status: SHIPPED | OUTPATIENT
Start: 2017-11-09 | End: 2017-12-08 | Stop reason: SDUPTHER

## 2017-11-08 ENCOUNTER — OFFICE VISIT (OUTPATIENT)
Dept: CARDIOLOGY | Age: 67
End: 2017-11-08

## 2017-11-08 VITALS
DIASTOLIC BLOOD PRESSURE: 72 MMHG | HEIGHT: 71 IN | RESPIRATION RATE: 16 BRPM | TEMPERATURE: 98.7 F | SYSTOLIC BLOOD PRESSURE: 130 MMHG | HEART RATE: 82 BPM | WEIGHT: 163 LBS | OXYGEN SATURATION: 98 % | BODY MASS INDEX: 22.82 KG/M2

## 2017-11-08 DIAGNOSIS — Z95.1 HX OF CABG: ICD-10-CM

## 2017-11-08 DIAGNOSIS — I10 ESSENTIAL HYPERTENSION, BENIGN: Primary | ICD-10-CM

## 2017-11-08 DIAGNOSIS — I25.2 HISTORY OF MI (MYOCARDIAL INFARCTION): ICD-10-CM

## 2017-11-08 DIAGNOSIS — E78.00 HYPERCHOLESTEROLEMIA: ICD-10-CM

## 2017-11-08 DIAGNOSIS — E87.5 HYPERKALEMIA: ICD-10-CM

## 2017-11-08 LAB — POTASSIUM SERPL-SCNC: 5.4 MEQ/L (ref 3.5–5.1)

## 2017-11-08 PROCEDURE — 99214 OFFICE O/P EST MOD 30 MIN: CPT | Performed by: INTERNAL MEDICINE

## 2017-11-08 RX ORDER — FEBUXOSTAT 40 MG/1
40 TABLET, FILM COATED ORAL DAILY
COMMUNITY
End: 2018-02-23

## 2017-11-08 RX ORDER — ATORVASTATIN CALCIUM 40 MG/1
40 TABLET, FILM COATED ORAL DAILY
Qty: 90 TABLET | Refills: 0 | Status: SHIPPED | OUTPATIENT
Start: 2017-11-08 | End: 2017-12-06 | Stop reason: SDUPTHER

## 2017-11-08 ASSESSMENT — ENCOUNTER SYMPTOMS
BLOOD IN STOOL: 0
SHORTNESS OF BREATH: 0
NAUSEA: 0
WHEEZING: 0
RESPIRATORY NEGATIVE: 1
COUGH: 0
GASTROINTESTINAL NEGATIVE: 1
EYES NEGATIVE: 1
CHEST TIGHTNESS: 0
STRIDOR: 0

## 2017-11-08 NOTE — PROGRESS NOTES
tenderness. Abdominal: Soft. Bowel sounds are normal. There is no tenderness. Musculoskeletal: Normal range of motion. He exhibits no edema or tenderness. Neurological: He is alert and oriented to person, place, and time. Skin: Skin is warm. No cyanosis. Nails show no clubbing.        LABS:  CBC:   Lab Results   Component Value Date    WBC 10.7 10/27/2017    RBC 3.54 10/27/2017    HGB 11.2 10/27/2017    HCT 34.1 10/27/2017    MCV 96.3 10/27/2017    MCH 31.7 10/27/2017    MCHC 32.9 10/27/2017    RDW 13.5 10/27/2017     10/27/2017    MPV 9.9 09/15/2015     Lipids:  Lab Results   Component Value Date    CHOL 271 (H) 09/05/2017    CHOL 238 (H) 03/01/2017    CHOL 254 (H) 11/18/2016     Lab Results   Component Value Date    TRIG 154 09/05/2017    TRIG 138 03/01/2017    TRIG 129 11/18/2016     Lab Results   Component Value Date    HDL 54 09/05/2017    HDL 44 03/01/2017    HDL 53 11/18/2016     Lab Results   Component Value Date    LDLCALC 186 (H) 09/05/2017    LDLCALC 166 (H) 03/01/2017    LDLCALC 175 (H) 11/18/2016     No results found for: LABVLDL, VLDL  No results found for: CHOLHDLRATIO  CMP:    Lab Results   Component Value Date     10/27/2017    K 5.9 11/01/2017    CL 95 10/27/2017    CO2 26 10/27/2017    BUN 28 10/27/2017    CREATININE 0.93 10/27/2017    GFRAA >60.0 10/27/2017    LABGLOM >60.0 10/27/2017    GLUCOSE 102 10/27/2017    PROT 7.0 03/01/2017    LABALBU 4.1 03/01/2017    CALCIUM 9.8 10/27/2017    BILITOT 0.5 03/01/2017    ALKPHOS 53 03/01/2017    AST 20 03/01/2017    ALT 18 03/01/2017     BMP:    Lab Results   Component Value Date     10/27/2017    K 5.9 11/01/2017    CL 95 10/27/2017    CO2 26 10/27/2017    BUN 28 10/27/2017    LABALBU 4.1 03/01/2017    CREATININE 0.93 10/27/2017    CALCIUM 9.8 10/27/2017    GFRAA >60.0 10/27/2017    LABGLOM >60.0 10/27/2017    GLUCOSE 102 10/27/2017     Magnesium:  No results found for: MG  TSH:  Lab Results   Component Value Date    TSH 4.110 05/06/2016       Patient Active Problem List   Diagnosis    Chronic low back pain    Scoliosis of lumbar spine    History of spinal surgery    Essential hypertension, benign    Hypercholesterolemia    Right lumbar radiculopathy    Gout    History of MI (myocardial infarction)    Coronary artery disease    High risk medication use - 08/02/17 OARRS PM&R, 10/03/17 OARRS PM&R, 10/05/17 Urine Drug Screen: negative PM&R, MED CONTRACT 1/17/17    SI (sacroiliac) pain    Low back pain with sciatica    Neck pain    Myalgia    Atherosclerosis of coronary artery    H/O percutaneous transluminal coronary angioplasty    Idiopathic localized osteoarthropathy    Primary localized osteoarthrosis of shoulder region    Schizo-affective schizophrenia, in remission ([facility] [address]    Synovitis and tenosynovitis    Thoracic and lumbosacral neuritis    Vitamin D deficiency    Elevated hemoglobin A1c    Fasting hyperglycemia    Prediabetes    Hyperparathyroidism ([facility] [address]    Acute idiopathic gout of left shoulder    Severe single current episode of major depressive disorder, without psychotic features (HCC)    Chronic midline low back pain with sciatica    Osteopenia    Bilateral carpal tunnel syndrome    C6 radiculopathy    DJD (degenerative joint disease), cervical    Hand weakness    Angina effort (HCC)       Assessment/Plan:    1. Essential hypertension, benign  stable    2. Hypercholesterolemia  pravastatin    3. History of MI (myocardial infarction)  No angina    4. Hx of CABG  Need cardiac rehab eval       Counseling:  Walk. Heart healthy life style    Return in about 2 months (around 1/8/2018) for Cardiovascular care., PRN.         Electronically signed by Miri Avelar MD on 11/8/2017 at 2:21 PM

## 2017-11-10 LAB — ALDOSTERONE: 14.2 NG/DL

## 2017-11-21 ENCOUNTER — HOSPITAL ENCOUNTER (OUTPATIENT)
Dept: CARDIAC REHAB | Age: 67
Setting detail: THERAPIES SERIES
Discharge: HOME | End: 2017-11-21
Payer: MEDICARE

## 2017-11-21 PROCEDURE — 93798 PHYS/QHP OP CAR RHAB W/ECG: CPT

## 2017-11-23 DIAGNOSIS — F32.2 SEVERE SINGLE CURRENT EPISODE OF MAJOR DEPRESSIVE DISORDER, WITHOUT PSYCHOTIC FEATURES (HCC): ICD-10-CM

## 2017-11-23 DIAGNOSIS — M54.14 THORACIC AND LUMBOSACRAL NEURITIS: ICD-10-CM

## 2017-11-23 DIAGNOSIS — M54.2 NECK PAIN: Chronic | ICD-10-CM

## 2017-11-23 DIAGNOSIS — M81.0 OSTEOPOROSIS: ICD-10-CM

## 2017-11-23 DIAGNOSIS — M54.17 THORACIC AND LUMBOSACRAL NEURITIS: ICD-10-CM

## 2017-11-24 ENCOUNTER — HOSPITAL ENCOUNTER (OUTPATIENT)
Dept: CARDIAC REHAB | Age: 67
Setting detail: THERAPIES SERIES
Discharge: HOME | End: 2017-11-24
Payer: MEDICARE

## 2017-11-24 PROCEDURE — 93798 PHYS/QHP OP CAR RHAB W/ECG: CPT

## 2017-11-29 ENCOUNTER — HOSPITAL ENCOUNTER (OUTPATIENT)
Dept: CARDIAC REHAB | Age: 67
Setting detail: THERAPIES SERIES
Discharge: HOME | End: 2017-11-29
Payer: MEDICARE

## 2017-11-29 PROCEDURE — 93798 PHYS/QHP OP CAR RHAB W/ECG: CPT

## 2017-12-01 ENCOUNTER — HOSPITAL ENCOUNTER (OUTPATIENT)
Dept: CARDIAC REHAB | Age: 67
Setting detail: THERAPIES SERIES
Discharge: HOME | End: 2017-12-01
Payer: MEDICARE

## 2017-12-01 DIAGNOSIS — E29.1 HYPOGONADISM MALE: ICD-10-CM

## 2017-12-01 PROCEDURE — 93798 PHYS/QHP OP CAR RHAB W/ECG: CPT

## 2017-12-03 LAB
SEX HORMONE BINDING GLOBULIN: 45 NMOL/L (ref 11–80)
TESTOSTERONE FREE PERCENT: 1.8 % (ref 1.6–2.9)
TESTOSTERONE FREE, CALC: 170 PG/ML (ref 47–244)
TESTOSTERONE TOTAL-MALE: 954 NG/DL (ref 300–720)

## 2017-12-04 ENCOUNTER — HOSPITAL ENCOUNTER (OUTPATIENT)
Dept: CARDIAC REHAB | Age: 67
Setting detail: THERAPIES SERIES
Discharge: HOME | End: 2017-12-04
Payer: MEDICARE

## 2017-12-04 PROCEDURE — 93798 PHYS/QHP OP CAR RHAB W/ECG: CPT

## 2017-12-06 ENCOUNTER — HOSPITAL ENCOUNTER (EMERGENCY)
Age: 67
Discharge: HOME | End: 2017-12-06
Payer: MEDICARE

## 2017-12-06 ENCOUNTER — HOSPITAL ENCOUNTER (OUTPATIENT)
Dept: CARDIAC REHAB | Age: 67
Setting detail: THERAPIES SERIES
Discharge: HOME | End: 2017-12-06
Payer: MEDICARE

## 2017-12-06 VITALS
SYSTOLIC BLOOD PRESSURE: 164 MMHG | BODY MASS INDEX: 25.58 KG/M2 | RESPIRATION RATE: 18 BRPM | HEART RATE: 86 BPM | OXYGEN SATURATION: 98 % | WEIGHT: 163 LBS | TEMPERATURE: 97.9 F | HEIGHT: 67 IN | DIASTOLIC BLOOD PRESSURE: 92 MMHG

## 2017-12-06 DIAGNOSIS — Z71.1 NO PROBLEM, FEARED COMPLAINT UNFOUNDED: Primary | ICD-10-CM

## 2017-12-06 LAB
ANION GAP SERPL CALCULATED.3IONS-SCNC: 8 MEQ/L (ref 7–13)
BUN BLDV-MCNC: 21 MG/DL (ref 8–23)
CALCIUM SERPL-MCNC: 9.6 MG/DL (ref 8.6–10.2)
CHLORIDE BLD-SCNC: 102 MEQ/L (ref 98–107)
CO2: 33 MEQ/L (ref 22–29)
CREAT SERPL-MCNC: 0.79 MG/DL (ref 0.7–1.2)
GFR AFRICAN AMERICAN: >60
GFR NON-AFRICAN AMERICAN: >60
GLUCOSE BLD-MCNC: 139 MG/DL (ref 74–109)
POTASSIUM SERPL-SCNC: 4.9 MEQ/L (ref 3.5–5.1)
SODIUM BLD-SCNC: 143 MEQ/L (ref 132–144)

## 2017-12-06 PROCEDURE — 36415 COLL VENOUS BLD VENIPUNCTURE: CPT

## 2017-12-06 PROCEDURE — 80048 BASIC METABOLIC PNL TOTAL CA: CPT

## 2017-12-06 PROCEDURE — 93798 PHYS/QHP OP CAR RHAB W/ECG: CPT

## 2017-12-06 PROCEDURE — 99284 EMERGENCY DEPT VISIT MOD MDM: CPT

## 2017-12-06 ASSESSMENT — ENCOUNTER SYMPTOMS
DIARRHEA: 0
VOMITING: 0
SHORTNESS OF BREATH: 0
COUGH: 0
ABDOMINAL PAIN: 0
NAUSEA: 0

## 2017-12-06 NOTE — ED PROVIDER NOTES
 Hypertension     MI (myocardial infarction) 2005    Dr. Cady Goel Peripheral vascular disease ([facility] [address]     Prediabetes     Severe single current episode of major depressive disorder, without psychotic features ([facility] [address] 7/8/2016    Status post coronary artery stent placement 2002    Dr. Kojo Bryant       Past Surgical History:   Procedure Laterality Date   [address] COLONOSCOPY  3/10/14    DR Tempie Scheuermann  2002    Dr. Alexandra Burch GRAFT  10/17/2017    KNEE ARTHROSCOPY Left 2002    Dr. Charles Lazcano  12/19/13    CAUDAL Monna Leaver DR Jason Self SPINE SURGERY  9/2009    Dr. April Reese  2008    Dr. Minoo Sales  3/10/14    BX, DILATION, DR Alcira Sosa         CURRENT MEDICATIONS       Previous Medications    ASPIRIN 81 MG EC TABLET    Take 1 tablet by mouth daily    ATORVASTATIN (LIPITOR) 40 MG TABLET    Take 1 tablet by mouth nightly    DICLOFENAC (VOLTAREN) 50 MG EC TABLET    TAKE 1 TABLET BY MOUTH TWICE DAILY AS NEEDED FOR JOINT PAIN    DICLOFENAC SODIUM (VOLTAREN) 1 % GEL    Apply 2 g topically 2 times daily    DOCUSATE SODIUM (COLACE) 100 MG CAPSULE    Take 100 mg by mouth 2 times daily. FEBUXOSTAT (ULORIC) 40 MG TABS TABLET    Take 40 mg by mouth daily    GABAPENTIN (NEURONTIN) 300 MG CAPSULE    Take 300 mg by mouth 3 times daily    METOPROLOL TARTRATE (LOPRESSOR) 50 MG TABLET    Take 1 tablet by mouth 3 times daily    MISC. DEVICES (WALKER) MISC    1 each by Does not apply route daily    NITROGLYCERIN (NITROSTAT) 0.4 MG SL TABLET    Place 1 tablet under the tongue every 5 minutes as needed x 3 doses for chest pain. OXYCODONE-ACETAMINOPHEN (PERCOCET)  MG PER TABLET    Take one tablet every 4 hours as needed for pain.   Maximum of 60 continues to deny any complaints. He will be discharged home in stable condition. Standard anticipatory guidance given to patient upon discharge. Have given them a specific time frame in which to follow-up and who to follow-up with. I have also advised them that they should return to the emergency department if they get worse, or not getting better or develop any new or concerning symptoms. Patient demonstrates understanding. PROCEDURES:    Procedures      FINAL IMPRESSION      1.  No problem, feared complaint unfounded          DISPOSITION/PLAN   DISPOSITION Decision to Discharge    PATIENT REFERRED TO:  Eufemia Lay PA-C  [address] [phone]    In 1 day  As scheduled      DISCHARGE MEDICATIONS:  New Prescriptions    No medications on file       (Please note that portions of this note were completed with a voice recognition program.  Efforts were made to edit the dictations but occasionally words are mis-transcribed.)    Kosta Pickett, [address] [facility], CNP  12/06/17 [id number]

## 2017-12-08 ENCOUNTER — OFFICE VISIT (OUTPATIENT)
Dept: PHYSICAL MEDICINE AND REHAB | Age: 67
End: 2017-12-08

## 2017-12-08 VITALS
HEIGHT: 67 IN | BODY MASS INDEX: 25.43 KG/M2 | DIASTOLIC BLOOD PRESSURE: 62 MMHG | SYSTOLIC BLOOD PRESSURE: 110 MMHG | WEIGHT: 162 LBS

## 2017-12-08 DIAGNOSIS — M54.50 CHRONIC BILATERAL LOW BACK PAIN WITHOUT SCIATICA: ICD-10-CM

## 2017-12-08 DIAGNOSIS — G89.29 CHRONIC LOW BACK PAIN WITH SCIATICA, SCIATICA LATERALITY UNSPECIFIED, UNSPECIFIED BACK PAIN LATERALITY: ICD-10-CM

## 2017-12-08 DIAGNOSIS — M54.16 RIGHT LUMBAR RADICULOPATHY: ICD-10-CM

## 2017-12-08 DIAGNOSIS — Z79.899 HIGH RISK MEDICATION USE: ICD-10-CM

## 2017-12-08 DIAGNOSIS — M54.40 CHRONIC LOW BACK PAIN WITH SCIATICA, SCIATICA LATERALITY UNSPECIFIED, UNSPECIFIED BACK PAIN LATERALITY: ICD-10-CM

## 2017-12-08 DIAGNOSIS — M54.17 THORACIC AND LUMBOSACRAL NEURITIS: ICD-10-CM

## 2017-12-08 DIAGNOSIS — M54.14 THORACIC AND LUMBOSACRAL NEURITIS: ICD-10-CM

## 2017-12-08 DIAGNOSIS — G89.29 CHRONIC MIDLINE LOW BACK PAIN WITH SCIATICA, SCIATICA LATERALITY UNSPECIFIED: ICD-10-CM

## 2017-12-08 DIAGNOSIS — M79.10 MYALGIA: ICD-10-CM

## 2017-12-08 DIAGNOSIS — M54.40 CHRONIC MIDLINE LOW BACK PAIN WITH SCIATICA, SCIATICA LATERALITY UNSPECIFIED: ICD-10-CM

## 2017-12-08 DIAGNOSIS — M53.3 SI (SACROILIAC) PAIN: ICD-10-CM

## 2017-12-08 DIAGNOSIS — G89.29 CHRONIC BILATERAL LOW BACK PAIN WITHOUT SCIATICA: ICD-10-CM

## 2017-12-08 DIAGNOSIS — M54.2 NECK PAIN: Primary | ICD-10-CM

## 2017-12-08 PROCEDURE — 99214 OFFICE O/P EST MOD 30 MIN: CPT | Performed by: PHYSICAL MEDICINE & REHABILITATION

## 2017-12-08 RX ORDER — OXYCODONE AND ACETAMINOPHEN 10; 325 MG/1; MG/1
TABLET ORAL
Qty: 60 TABLET | Refills: 0 | Status: SHIPPED | OUTPATIENT
Start: 2017-12-08 | End: 2018-01-05 | Stop reason: SDUPTHER

## 2017-12-08 ASSESSMENT — ENCOUNTER SYMPTOMS
ABDOMINAL PAIN: 0
SHORTNESS OF BREATH: 0
DIARRHEA: 0
NAUSEA: 0
BACK PAIN: 1
CHEST TIGHTNESS: 0
ALLERGIC/IMMUNOLOGIC NEGATIVE: 1
WHEEZING: 0
VISUAL CHANGE: 0
VOMITING: 0
CONSTIPATION: 1
EYES NEGATIVE: 1

## 2017-12-08 NOTE — PROGRESS NOTES
Loyd, 78 y.o. male presents today with:       Back Pain TP injections 11/02/17 gave 95% reduction in pain lasting 3 weeks and would like to schedule for more. Neck Pain     Hip Pain bilateral    Leg Pain bilateral    Hand Pain bilateral    Medication Refill Percocet, did not bring medication and advised to bring to every F/U appt. Patient thought he was getting injections today. He is having increased pain in his right sacroiliac joint sometimes goes to his left sacroiliac joint rediscussed taking left sacroiliac injections as needed and scheduling a right sacroiliac injection to be followed by trigger point injections. He is in agreement with the plan. No signs of overuse or abuse of his meds    Injections still help very much. Back Pain   This is a chronic problem. The current episode started more than 1 year ago. The problem occurs daily. The problem is unchanged. The pain is present in the lumbar spine. The quality of the pain is described as aching, cramping, shooting and stabbing. The pain radiates to the right foot, right knee and right thigh. The pain is at a severity of 6/10. The pain is severe. The pain is worse during the day. The symptoms are aggravated by bending, lying down, position, sitting, standing and twisting. Stiffness is present in the morning. Associated symptoms include leg pain, numbness and weakness. Pertinent negatives include no abdominal pain, chest pain, headaches, paresis, perianal numbness or tingling. Risk factors include lack of exercise. He has tried analgesics and home exercises (SI injections which help, trigger point injections, OXY-IR ) for the symptoms. The treatment provided significant relief. Mental Health Problem   The primary symptoms include negative symptoms. The primary symptoms do not include dysphoric mood, bizarre behavior, disorganized speech or somatic symptoms. The current episode started more than 1 month ago.  This is a chronic problem. The onset of the illness is precipitated by a stressful event (chronic pain). The degree of incapacity that he is experiencing as a consequence of his illness is severe. Sequelae of the illness include an inability to work. Additional symptoms of the illness include anhedonia and fatigue. Additional symptoms of the illness do not include unexpected weight change, psychomotor retardation, feelings of worthlessness, attention impairment, euphoric mood, increased goal-directed activity, flight of ideas, inflated self-esteem, decreased need for sleep, distractible, poor judgment, visual change, headaches, abdominal pain or seizures. He does not admit to suicidal ideas. He does not have a plan to commit suicide. He does not contemplate harming himself. He has not already injured self. He does not contemplate injuring another person. He has not already  injured another person. Risk factors that are present for mental illness include a history of mental illness. Hand Pain    The incident occurred more than 1 week ago. The incident occurred at home. There was no injury mechanism. The pain is present in the right wrist and right hand. The quality of the pain is described as cramping. The pain is at a severity of 8/10. The pain is moderate. The pain has been intermittent since the incident. Associated symptoms include numbness. Pertinent negatives include no chest pain, muscle weakness or tingling. The symptoms are aggravated by movement. He has tried ice for the symptoms. The treatment provided mild relief.        Past Medical History:   Diagnosis Date    Chronic back pain     Coronary artery disease 2002    [facility] Ziggy [facility]itez at [facility] Esophageal ulcer 3/10/2014    Dr. Delfina Robison    Gastric ulcer 3/10/2014    Dr. Delfina Robison    Gout     Hiatal hernia 3/10/2014    Dr. Delfina Robison    Hyperlipidemia     Hypertension     MI (myocardial infarction) 2005    Dr. Daja Reynaga Osteoarthritis     [facility] [address]     Prediabetes     Severe single current episode of major depressive disorder, without psychotic features ([facility] [address] 7/8/2016    Status post coronary artery stent placement 2002    Dr. Marisol Perrin     Past Surgical History:   Procedure Laterality Date   Brisas 2117      COLONOSCOPY  3/10/14    DR Doris Peoples  2002    Dr. Morris 83 GRAFT  10/17/2017    KNEE ARTHROSCOPY Left 2002    Dr. Jeanne Baldwin  12/19/13    Tash Burgess  9/2009    Dr. Danii Dubose  2008    Dr. Jhonny Durham  3/10/14    Erendira Granados, DR Jacinto Sol     Social History     Social History    Marital status:      Spouse name: N/A    Number of children: N/A    Years of education: N/A     Occupational History    disability       Social History Main Topics    Smoking status: Never Smoker    Smokeless tobacco: Never Used    Alcohol use No      Comment: Stopped years ago.  Drug use: No      Comment: YEARS AGO    Sexual activity: Yes     Partners: Female      Comment: monogomous sexual partner, wife. Other Topics Concern    None     Social History Narrative    Tobacco -- never smoked or chewed. Alcohol -- have not used for past 10 years, prior to had consumed 6 pack of beer daily. Drugs -- tried marijuana and cocaine 14 years ago occasionally used for 3-4 years and then stopped completely 10 years ago. Education -- completed 11th grade in [place].    Occupation -- [address] work,  at [employer].    Marital status --  44 years, 2 grown sons.      Family History   Problem Relation Age of Onset    High Blood Pressure Mother     Arthritis Mother     Cancer Father      throat    Arthritis Father        No Known Allergies    Review of Systems   Constitutional: Positive for fatigue. Negative for activity change and unexpected weight change. HENT: Negative. Eyes: Negative. Respiratory: Negative for chest tightness, shortness of breath and wheezing. Cardiovascular: Negative for chest pain, palpitations and leg swelling. Gastrointestinal: Positive for constipation. Negative for abdominal pain, diarrhea, nausea and vomiting. Endocrine: Negative. Genitourinary: Negative. Musculoskeletal: Positive for arthralgias, back pain, gait problem, myalgias, neck pain and neck stiffness. Skin: Negative. Allergic/Immunologic: Negative. Neurological: Positive for weakness and numbness. Negative for dizziness, tingling, seizures, light-headedness and headaches. Hematological: Bruises/bleeds easily. Psychiatric/Behavioral: Negative for dysphoric mood and sleep disturbance. The patient is not nervous/anxious. Objective    Vitals:    12/08/17 1130   BP: 110/62   Weight: 162 lb (73.5 kg)   Height: 5' 7\" (1.702 m)     Pain Score: SEVEN     Physical Exam   Constitutional: He is oriented to person, place, and time. Vital signs are normal. He appears well-developed and well-nourished. Non-toxic appearance. He does not have a sickly appearance. He does not appear ill. No distress. HENT:   Head: Normocephalic and atraumatic. Right Ear: Hearing normal.   Left Ear: Hearing normal.   Nose: Nose normal.   Mouth/Throat: Oropharynx is clear and moist and mucous membranes are normal. No oral lesions. Normal dentition. No oropharyngeal exudate. No signs of toxic erosions. Eyes: Conjunctivae and EOM are normal. Pupils are equal, round, and reactive to light. Right eye exhibits no chemosis, no discharge and no exudate. Left eye exhibits no chemosis, no discharge and no exudate. No scleral icterus. Neck: Neck supple. No JVD present. Spinous process tenderness and muscular tenderness present.  No tracheal tenderness present. No neck rigidity. No tracheal deviation and no edema present. No thyromegaly present. Cardiovascular: Normal rate, regular rhythm, normal heart sounds and intact distal pulses. Exam reveals no gallop, no friction rub and no decreased pulses. No murmur heard. Pulmonary/Chest: Effort normal. No accessory muscle usage. No apnea, no tachypnea and no bradypnea. No respiratory distress. He has decreased breath sounds. He has no wheezes. He has no rales. He exhibits no tenderness. Abdominal: Soft. Bowel sounds are normal. He exhibits no distension and no mass. There is no tenderness. There is no rebound and no guarding. Musculoskeletal: He exhibits tenderness. He exhibits no edema. Right shoulder: He exhibits decreased range of motion, tenderness and bony tenderness. Left shoulder: He exhibits decreased range of motion, tenderness and bony tenderness. Right elbow: Normal.       Left elbow: Normal.        Right wrist: Normal.        Left wrist: Normal.        Right hip: Normal.        Left hip: Normal.        Right knee: Normal.        Left knee: Normal.        Right ankle: Normal. Achilles tendon normal.        Left ankle: Normal. Achilles tendon normal.        Cervical back: He exhibits decreased range of motion, tenderness, bony tenderness, laceration, pain and spasm. He exhibits no swelling, no edema and no deformity. Thoracic back: He exhibits decreased range of motion, tenderness, bony tenderness, deformity, pain and spasm. Lumbar back: He exhibits decreased range of motion, tenderness, bony tenderness and pain. He exhibits no swelling, no edema, no deformity, no laceration and normal pulse. Back:         Right upper arm: Normal.        Left upper arm: Normal.        Right forearm: Normal.        Left forearm: Normal.        Arms:       Right hand: He exhibits decreased range of motion and bony tenderness.  He exhibits normal capillary refill, no deformity, no laceration and no swelling. Decreased sensation noted. Decreased sensation is present in the ulnar distribution, is present in the medial distribution and is present in the radial distribution. Decreased strength noted. He exhibits finger abduction, thumb/finger opposition and wrist extension trouble. Left hand: He exhibits decreased range of motion and bony tenderness. Decreased sensation noted. Decreased sensation is present in the ulnar distribution and is present in the radial distribution. Decreased strength noted. He exhibits finger abduction and wrist extension trouble. Right upper leg: Normal.        Left upper leg: Normal.        Right lower leg: Normal.        Left lower leg: Normal.        Legs:       Right foot: Normal.        Left foot: Normal.   Tender areas are indicated by numbered spot         Lymphadenopathy:     He has no cervical adenopathy. Neurological: He is alert and oriented to person, place, and time. He displays abnormal reflex. He displays no atrophy and no tremor. A sensory deficit is present. No cranial nerve deficit. He exhibits normal muscle tone. Gait abnormal. Coordination normal. He displays no Babinski's sign on the right side. He displays no Babinski's sign on the left side. Reflex Scores:       Patellar reflexes are 1+ on the right side and 1+ on the left side. Achilles reflexes are 0 on the right side and 0 on the left side. Skin: Skin is warm, dry and intact. No abrasion, no bruising, no ecchymosis, no laceration, no petechiae and no rash noted. Rash is not macular, not pustular and not urticarial. He is not diaphoretic. No cyanosis or erythema. No pallor. Nails show no clubbing. Psychiatric: His behavior is normal. Judgment and thought content normal. His mood appears not anxious. His affect is not angry, not blunt, not labile and not inappropriate.  His speech is not rapid and/or pressured, not delayed, not tangential and not slurred. He is not agitated, not aggressive, not hyperactive, not slowed, not withdrawn, not actively hallucinating and not combative. Thought content is not paranoid and not delusional. Cognition and memory are normal. Cognition and memory are not impaired. He does not express impulsivity or inappropriate judgment. He does not exhibit a depressed mood. He expresses no homicidal and no suicidal ideation. He expresses no suicidal plans and no homicidal plans. He is communicative. He exhibits normal recent memory and normal remote memory.   high score on SOAPPR    Calm appropriate    NAD He is attentive. Vitals reviewed. Ortho Exam  Neurologic Exam     Mental Status   Oriented to person, place, and time. Speech: not slurred     Cranial Nerves     CN III, IV, VI   Pupils are equal, round, and reactive to light. Extraocular motions are normal.     Gait, Coordination, and Reflexes     Reflexes   Right patellar: 1+  Left patellar: 1+  Right achilles: 0  Left achilles: 0        After a thorough review and discussion of the previous medical records, patient comprehensive medical, surgical, and family and social history, Review of Systems, their OARRS, their Screener and Opioid Assessment for Patients with Pain (SOAPP®-R), recent diagnostics, and symptomatic results to previous treatment, it is my impression that the patients is suffering with progressive and severe:    1. Neck pain  oxyCODONE-acetaminophen (PERCOCET)  MG per tablet   2. Myalgia  UT INJECT TRIGGER POINTS, 3 OR GREATER    UT INJECT TRIGGER POINTS, 3 OR GREATER   3. Right lumbar radiculopathy     4. Chronic low back pain with sciatica, sciatica laterality unspecified, unspecified back pain laterality     5. High risk medication use - 08/02/17 OARRS PM&R, 10/03/17 OARRS PM&R, 10/05/17 Urine Drug Screen: negative PM&R, MED CONTRACT 1/17/17     6.  High risk medication use OARRS RUN 11/10/16  oxyCODONE-acetaminophen (PERCOCET)  MG per tablet   7. Chronic midline low back pain with sciatica, sciatica laterality unspecified  oxyCODONE-acetaminophen (PERCOCET)  MG per tablet   8. Chronic bilateral low back pain without sciatica  oxyCODONE-acetaminophen (PERCOCET)  MG per tablet   9. Thoracic and lumbosacral neuritis  oxyCODONE-acetaminophen (PERCOCET)  MG per tablet       I am also concerned by lifestyle and mood issues including:    Past Medical History:   Diagnosis Date    Chronic back pain     Coronary artery disease 2002    Dr. Avani Cheney at [address] Esophageal ulcer 3/10/2014    Dr. Jass Alvarez    Gastric ulcer 3/10/2014    Dr. Jass Alvarez    Gout     Hiatal hernia 3/10/2014    Dr. Jass Alvarez    Hyperlipidemia     Hypertension     MI (myocardial infarction) 2005    Dr. Kaci Drew Peripheral vascular disease ([facility] [address]     Prediabetes     Severe single current episode of major depressive disorder, without psychotic features ([facility] [address] 7/8/2016    Status post coronary artery stent placement 2002    Dr. Avani Cheney           Given their medication, chronic pain and lifestyle and medications they are at risk for :    Falls, constipation, addiction  Loss of livelyhood due to severe pain, debility, weight gain and  vitamin D deficiency    The patient was educated regarding proper diet, fitness routine, and regulatory restrictions concerning pain medications. Previous notes, comprehensive past medical, surgical, family history, and diagnostics were reviewed. Patient education and councelling were provided regarding off label use,treatment options and medication and injection risks. Current and old OARRS ([place] Automated Prescription Reporting System) records reviewed, all refills reviewed since last visit,  Behavioral agreement/[employer] regulations   and Toxicology screen was reviewed with patient and is up to date. There are no current red flags.    They are making good GREATER   Right sacroiliac injection with C-arm    Brenda Scmaryann, DO

## 2017-12-11 ENCOUNTER — PROCEDURE VISIT (OUTPATIENT)
Dept: PHYSICAL MEDICINE AND REHAB | Age: 67
End: 2017-12-11

## 2017-12-11 ENCOUNTER — HOSPITAL ENCOUNTER (OUTPATIENT)
Dept: CARDIAC REHAB | Age: 67
Setting detail: THERAPIES SERIES
Discharge: HOME | End: 2017-12-11
Payer: MEDICARE

## 2017-12-11 DIAGNOSIS — M79.10 MYALGIA: ICD-10-CM

## 2017-12-11 DIAGNOSIS — M79.7 FIBROMYALGIA: Primary | ICD-10-CM

## 2017-12-11 PROCEDURE — 20553 NJX 1/MLT TRIGGER POINTS 3/>: CPT | Performed by: PHYSICAL MEDICINE & REHABILITATION

## 2017-12-11 PROCEDURE — 93798 PHYS/QHP OP CAR RHAB W/ECG: CPT

## 2017-12-15 ENCOUNTER — HOSPITAL ENCOUNTER (OUTPATIENT)
Dept: CARDIAC REHAB | Age: 67
Setting detail: THERAPIES SERIES
Discharge: HOME | End: 2017-12-15
Payer: MEDICARE

## 2017-12-15 PROCEDURE — 93798 PHYS/QHP OP CAR RHAB W/ECG: CPT

## 2017-12-18 ENCOUNTER — HOSPITAL ENCOUNTER (OUTPATIENT)
Dept: CARDIAC REHAB | Age: 67
Setting detail: THERAPIES SERIES
Discharge: HOME | End: 2017-12-18
Payer: MEDICARE

## 2017-12-18 PROCEDURE — 93798 PHYS/QHP OP CAR RHAB W/ECG: CPT

## 2017-12-20 ENCOUNTER — HOSPITAL ENCOUNTER (OUTPATIENT)
Dept: CARDIAC REHAB | Age: 67
Setting detail: THERAPIES SERIES
Discharge: HOME | End: 2017-12-20
Payer: MEDICARE

## 2017-12-20 PROCEDURE — 93798 PHYS/QHP OP CAR RHAB W/ECG: CPT

## 2017-12-22 ENCOUNTER — HOSPITAL ENCOUNTER (OUTPATIENT)
Dept: CARDIAC REHAB | Age: 67
Setting detail: THERAPIES SERIES
Discharge: HOME | End: 2017-12-22
Payer: MEDICARE

## 2017-12-22 PROCEDURE — 93798 PHYS/QHP OP CAR RHAB W/ECG: CPT

## 2017-12-27 ENCOUNTER — HOSPITAL ENCOUNTER (OUTPATIENT)
Dept: CARDIAC REHAB | Age: 67
Setting detail: THERAPIES SERIES
Discharge: HOME | End: 2017-12-27
Payer: MEDICARE

## 2017-12-27 PROCEDURE — 93798 PHYS/QHP OP CAR RHAB W/ECG: CPT

## 2018-01-03 ENCOUNTER — HOSPITAL ENCOUNTER (OUTPATIENT)
Dept: CARDIAC REHAB | Age: 68
Setting detail: THERAPIES SERIES
Discharge: HOME | End: 2018-01-03
Payer: MEDICARE

## 2018-01-03 ENCOUNTER — OFFICE VISIT (OUTPATIENT)
Dept: CARDIOLOGY | Age: 68
End: 2018-01-03

## 2018-01-03 VITALS
RESPIRATION RATE: 16 BRPM | OXYGEN SATURATION: 98 % | DIASTOLIC BLOOD PRESSURE: 76 MMHG | TEMPERATURE: 99.6 F | HEART RATE: 71 BPM | WEIGHT: 166 LBS | SYSTOLIC BLOOD PRESSURE: 136 MMHG | HEIGHT: 67 IN | BODY MASS INDEX: 26.06 KG/M2

## 2018-01-03 DIAGNOSIS — I25.10 CORONARY ARTERY DISEASE WITH HX OF MYOCARDIAL INFARCT W/O HX OF CABG: ICD-10-CM

## 2018-01-03 DIAGNOSIS — I10 ESSENTIAL HYPERTENSION, BENIGN: ICD-10-CM

## 2018-01-03 DIAGNOSIS — R06.83 SNORING: Primary | ICD-10-CM

## 2018-01-03 DIAGNOSIS — I25.2 CORONARY ARTERY DISEASE WITH HX OF MYOCARDIAL INFARCT W/O HX OF CABG: ICD-10-CM

## 2018-01-03 DIAGNOSIS — E78.00 HYPERCHOLESTEROLEMIA: Chronic | ICD-10-CM

## 2018-01-03 DIAGNOSIS — I25.2 HISTORY OF MI (MYOCARDIAL INFARCTION): ICD-10-CM

## 2018-01-03 PROCEDURE — 93798 PHYS/QHP OP CAR RHAB W/ECG: CPT

## 2018-01-03 PROCEDURE — 99214 OFFICE O/P EST MOD 30 MIN: CPT | Performed by: INTERNAL MEDICINE

## 2018-01-03 ASSESSMENT — ENCOUNTER SYMPTOMS
COUGH: 0
CHEST TIGHTNESS: 0
WHEEZING: 0
EYES NEGATIVE: 1
GASTROINTESTINAL NEGATIVE: 1
BLOOD IN STOOL: 0
NAUSEA: 0
SHORTNESS OF BREATH: 0
RESPIRATORY NEGATIVE: 1
STRIDOR: 0

## 2018-01-03 NOTE — PROGRESS NOTES
Subsequent Progress Note  Patient: Renetta Villareal  YOB: 1950  MRN: [id number]    Chief Complaint:CABG, Day time somnolence  Chief Complaint   Patient presents with    Coronary Artery Disease     3 m f/u    Hypertension   10/17/2017 CABG x2 EF 55    Subjective/HPI: slleping a lot 9 hours at night and 3 hrs during the day. Wife complaints he snores extensively. No cp mann. Doing well with cardiac rehab.   Taking mes no bleed no falls     EKG:  Past Medical History:   Diagnosis Date    Chronic back pain     Coronary artery disease 2002    [facility] Jigna Becerra at [facility] Esophageal ulcer 3/10/2014    Dr. Ihsan Rivas Gastric ulcer 3/10/2014    Dr. Mali Perera    Gout     Hiatal hernia 3/10/2014    Dr. Mali Perera    Hyperlipidemia     Hypertension     MI (myocardial infarction) 2005    Dr. Sydney De Jesus Peripheral vascular disease ([facility] [address]     Prediabetes     Severe single current episode of major depressive disorder, without psychotic features ([facility] [address] 7/8/2016    Status post coronary artery stent placement 2002    Dr. Jigna Becerra       Past Surgical History:   Procedure Laterality Date   Brisas 2117      COLONOSCOPY  3/10/14    DR Lisa Rao  2002    Dr. Morris 83 GRAFT  10/17/2017    KNEE ARTHROSCOPY Left 2002    Dr. Esteban Ramirez  12/19/13    Javy Hirsch  9/2009    Dr. Kami Meyer  2008    Dr. Joe Dhillon  3/10/14    DR Chito Strickland       Family History   Problem Relation Age of Onset    High Blood Pressure Mother     Arthritis Mother     Cancer Father      throat    Arthritis Father        Social History     Social History    Marital status:      Spouse name: N/A    Number of children: N/A

## 2018-01-05 DIAGNOSIS — G89.29 CHRONIC MIDLINE LOW BACK PAIN WITH SCIATICA, SCIATICA LATERALITY UNSPECIFIED: ICD-10-CM

## 2018-01-05 DIAGNOSIS — M54.17 THORACIC AND LUMBOSACRAL NEURITIS: ICD-10-CM

## 2018-01-05 DIAGNOSIS — G89.29 CHRONIC BILATERAL LOW BACK PAIN WITHOUT SCIATICA: ICD-10-CM

## 2018-01-05 DIAGNOSIS — M54.50 CHRONIC BILATERAL LOW BACK PAIN WITHOUT SCIATICA: ICD-10-CM

## 2018-01-05 DIAGNOSIS — M54.14 THORACIC AND LUMBOSACRAL NEURITIS: ICD-10-CM

## 2018-01-05 DIAGNOSIS — M54.40 CHRONIC MIDLINE LOW BACK PAIN WITH SCIATICA, SCIATICA LATERALITY UNSPECIFIED: ICD-10-CM

## 2018-01-05 DIAGNOSIS — M54.2 NECK PAIN: ICD-10-CM

## 2018-01-05 DIAGNOSIS — Z79.899 HIGH RISK MEDICATION USE: ICD-10-CM

## 2018-01-05 RX ORDER — OXYCODONE AND ACETAMINOPHEN 10; 325 MG/1; MG/1
TABLET ORAL
Qty: 60 TABLET | Refills: 0 | Status: SHIPPED | OUTPATIENT
Start: 2018-01-05 | End: 2018-02-09 | Stop reason: SDUPTHER

## 2018-01-08 ENCOUNTER — HOSPITAL ENCOUNTER (OUTPATIENT)
Dept: CARDIAC REHAB | Age: 68
Setting detail: THERAPIES SERIES
Discharge: HOME | End: 2018-01-08
Payer: MEDICARE

## 2018-01-08 PROCEDURE — 93798 PHYS/QHP OP CAR RHAB W/ECG: CPT

## 2018-01-09 ENCOUNTER — OFFICE VISIT (OUTPATIENT)
Dept: SURGERY | Age: 68
End: 2018-01-09

## 2018-01-09 VITALS
SYSTOLIC BLOOD PRESSURE: 137 MMHG | HEIGHT: 67 IN | WEIGHT: 163 LBS | HEART RATE: 67 BPM | DIASTOLIC BLOOD PRESSURE: 80 MMHG | BODY MASS INDEX: 25.58 KG/M2

## 2018-01-09 DIAGNOSIS — E29.1 HYPOGONADISM MALE: Primary | ICD-10-CM

## 2018-01-09 PROCEDURE — 99213 OFFICE O/P EST LOW 20 MIN: CPT | Performed by: INTERNAL MEDICINE

## 2018-01-09 NOTE — PROGRESS NOTES
Subjective:      Patient ID: Sandi Meza is a 78 y.o. male. Other   This is a chronic (hypogonadism) problem. The current episode started more than 1 year ago. The problem occurs intermittently. The problem has been waxing and waning. Pertinent negatives include no fatigue. Associated symptoms comments: Low libido and erectile dysfunction . Treatments tried: testosterone injections. The treatment provided moderate relief.            Patient Active Problem List   Diagnosis    Chronic low back pain    Scoliosis of lumbar spine    Essential hypertension, benign    Hypercholesterolemia    Right lumbar radiculopathy    Gout    History of MI (myocardial infarction)    Coronary artery disease    High risk medication use - 10/03/17 OARRS PM&R, 12/07/17 OARRS PM&R, 10/05/17 Urine Drug Screen: negative PM&R, MED CONTRACT 1/17/17    SI (sacroiliac) pain    Low back pain with sciatica    Neck pain    Myalgia    Atherosclerosis of coronary artery    H/O percutaneous transluminal coronary angioplasty    Idiopathic localized osteoarthropathy    Primary localized osteoarthrosis of shoulder region    Schizo-affective schizophrenia, in remission ([address]    Synovitis and tenosynovitis    Thoracic and lumbosacral neuritis    Vitamin D deficiency    Elevated hemoglobin A1c    Fasting hyperglycemia    Prediabetes    Hyperparathyroidism ([address]    Acute idiopathic gout of left shoulder    Severe single current episode of major depressive disorder, without psychotic features ([address]    Chronic midline low back pain with sciatica    Osteopenia    Bilateral carpal tunnel syndrome    C6 radiculopathy    DJD (degenerative joint disease), cervical    Hand weakness    Angina effort ([address]    Snoring    Coronary artery disease with hx of myocardial infarct w/o hx of CABG         No Known Allergies    Current Outpatient Prescriptions:     oxyCODONE-acetaminophen (PERCOCET)  MG per tablet, Take one tablet Pulse: 67    Weight: 163 lb (73.9 kg)    Height: 5' 7\" (1.702 m)        Objective:   Physical Exam   Constitutional: He appears well-developed and well-nourished. HENT:   Head: Normocephalic and atraumatic. Neck: Neck supple. Cardiovascular: Normal rate. Pulmonary/Chest: Effort normal.   Musculoskeletal: Normal range of motion. Neurological: He is alert. Skin: Skin is warm. Psychiatric: He has a normal mood and affect. Results for Leticia Mo (MRN [id number]) as of 1/9/2018 11:04   Ref. Range 12/1/2017 12:35   Sex Hormone Binding Latest Ref Range: 11 - 80 nmol/L 45   Testosterone Free, Calc Latest Ref Range: 47 - 244 pg/mL 170   Total Testosterone Latest Ref Range: 300 - 720 ng/dL 954 (H)   Testosterone % Free Latest Ref Range: 1.6 - 2.9 % 1.8       Assessment:      1.  Hypogonadism male             Plan:       Orders Placed This Encounter   Procedures    Testosterone Free And Total Male     Standing Status:   Future     Standing Expiration Date:   1/9/2019     Continue current dose of testosterone injection  F/u in 3-6 months

## 2018-01-10 ENCOUNTER — HOSPITAL ENCOUNTER (OUTPATIENT)
Dept: CARDIAC REHAB | Age: 68
Setting detail: THERAPIES SERIES
Discharge: HOME | End: 2018-01-10
Payer: MEDICARE

## 2018-01-10 PROCEDURE — 93798 PHYS/QHP OP CAR RHAB W/ECG: CPT

## 2018-01-12 ENCOUNTER — HOSPITAL ENCOUNTER (OUTPATIENT)
Dept: CARDIAC REHAB | Age: 68
Setting detail: THERAPIES SERIES
Discharge: HOME | End: 2018-01-12
Payer: MEDICARE

## 2018-01-12 PROCEDURE — 93798 PHYS/QHP OP CAR RHAB W/ECG: CPT

## 2018-01-15 ENCOUNTER — APPOINTMENT (OUTPATIENT)
Dept: CARDIAC REHAB | Age: 68
End: 2018-01-15
Payer: MEDICARE

## 2018-01-16 ENCOUNTER — HOSPITAL ENCOUNTER (OUTPATIENT)
Dept: INTERVENTIONAL RADIOLOGY/VASCULAR | Age: 68
Discharge: HOME | End: 2018-01-16
Payer: MEDICARE

## 2018-01-16 VITALS
OXYGEN SATURATION: 100 % | DIASTOLIC BLOOD PRESSURE: 97 MMHG | HEART RATE: 70 BPM | RESPIRATION RATE: 18 BRPM | SYSTOLIC BLOOD PRESSURE: 157 MMHG

## 2018-01-16 DIAGNOSIS — M53.3 SACROILIAC PAIN: ICD-10-CM

## 2018-01-16 PROCEDURE — 2500000003 HC RX 250 WO HCPCS: Performed by: PHYSICAL MEDICINE & REHABILITATION

## 2018-01-16 PROCEDURE — 6360000002 HC RX W HCPCS: Performed by: PHYSICAL MEDICINE & REHABILITATION

## 2018-01-16 PROCEDURE — 27096 INJECT SACROILIAC JOINT: CPT | Performed by: PHYSICAL MEDICINE & REHABILITATION

## 2018-01-16 PROCEDURE — 62323 NJX INTERLAMINAR LMBR/SAC: CPT | Performed by: PHYSICAL MEDICINE & REHABILITATION

## 2018-01-16 RX ORDER — LIDOCAINE HYDROCHLORIDE 5 MG/ML
50 INJECTION, SOLUTION INFILTRATION; INTRAVENOUS ONCE
Status: COMPLETED | OUTPATIENT
Start: 2018-01-16 | End: 2018-01-16

## 2018-01-16 RX ORDER — METHYLPREDNISOLONE ACETATE 40 MG/ML
40 INJECTION, SUSPENSION INTRA-ARTICULAR; INTRALESIONAL; INTRAMUSCULAR; SOFT TISSUE ONCE
Status: COMPLETED | OUTPATIENT
Start: 2018-01-16 | End: 2018-01-16

## 2018-01-16 RX ADMIN — LIDOCAINE HYDROCHLORIDE 50 ML: 5 INJECTION, SOLUTION INFILTRATION; INTRAVENOUS at 11:57

## 2018-01-16 RX ADMIN — METHYLPREDNISOLONE ACETATE 40 MG: 40 INJECTION, SUSPENSION INTRA-ARTICULAR; INTRALESIONAL; INTRAMUSCULAR; SOFT TISSUE at 11:57

## 2018-01-16 ASSESSMENT — PAIN - FUNCTIONAL ASSESSMENT
PAIN_FUNCTIONAL_ASSESSMENT: 0-10
PAIN_FUNCTIONAL_ASSESSMENT: 0-10

## 2018-01-16 NOTE — PROGRESS NOTES
Pt to specials via wheelchair. Handoff to caroline PEARL. Electronically signed by Natali Marinelli RN on 1/16/2018 at 11:38 AM

## 2018-01-16 NOTE — PROGRESS NOTES
Pt returned to CT holding via wheelchair. VSS. Pt changed back into their clothes independently. Site clean, dry, and no swelling. Pt provided discharge instructions and verbalized understanding. Pt discharged via ambulation to friend's vehicle who is picking him up as pt drove to hospital. Pt gait steady. Electronically signed by Aurna Butt RN on 1/16/2018 at 12:11 PM

## 2018-01-18 ENCOUNTER — HOSPITAL ENCOUNTER (OUTPATIENT)
Dept: SLEEP CENTER | Age: 68
Discharge: HOME | End: 2018-01-18
Payer: MEDICARE

## 2018-01-18 PROCEDURE — 95810 POLYSOM 6/> YRS 4/> PARAM: CPT

## 2018-01-22 ENCOUNTER — HOSPITAL ENCOUNTER (OUTPATIENT)
Dept: CARDIAC REHAB | Age: 68
Setting detail: THERAPIES SERIES
Discharge: HOME | End: 2018-01-22
Payer: MEDICARE

## 2018-01-22 PROCEDURE — 93798 PHYS/QHP OP CAR RHAB W/ECG: CPT

## 2018-01-24 ENCOUNTER — HOSPITAL ENCOUNTER (OUTPATIENT)
Dept: CARDIAC REHAB | Age: 68
Setting detail: THERAPIES SERIES
Discharge: HOME | End: 2018-01-24
Payer: MEDICARE

## 2018-01-24 DIAGNOSIS — M54.2 NECK PAIN: Chronic | ICD-10-CM

## 2018-01-24 DIAGNOSIS — M81.0 OSTEOPOROSIS: ICD-10-CM

## 2018-01-24 DIAGNOSIS — M54.14 THORACIC AND LUMBOSACRAL NEURITIS: ICD-10-CM

## 2018-01-24 DIAGNOSIS — F32.2 SEVERE SINGLE CURRENT EPISODE OF MAJOR DEPRESSIVE DISORDER, WITHOUT PSYCHOTIC FEATURES (HCC): ICD-10-CM

## 2018-01-24 DIAGNOSIS — M54.17 THORACIC AND LUMBOSACRAL NEURITIS: ICD-10-CM

## 2018-01-24 PROCEDURE — 93798 PHYS/QHP OP CAR RHAB W/ECG: CPT

## 2018-01-26 ENCOUNTER — PROCEDURE VISIT (OUTPATIENT)
Dept: PHYSICAL MEDICINE AND REHAB | Age: 68
End: 2018-01-26
Payer: MEDICARE

## 2018-01-26 ENCOUNTER — HOSPITAL ENCOUNTER (OUTPATIENT)
Dept: CARDIAC REHAB | Age: 68
Setting detail: THERAPIES SERIES
Discharge: HOME | End: 2018-01-26
Payer: MEDICARE

## 2018-01-26 DIAGNOSIS — M79.10 MYALGIA: ICD-10-CM

## 2018-01-26 PROCEDURE — 20553 NJX 1/MLT TRIGGER POINTS 3/>: CPT | Performed by: PHYSICAL MEDICINE & REHABILITATION

## 2018-01-26 PROCEDURE — 93798 PHYS/QHP OP CAR RHAB W/ECG: CPT

## 2018-01-29 ENCOUNTER — HOSPITAL ENCOUNTER (OUTPATIENT)
Dept: CARDIAC REHAB | Age: 68
Setting detail: THERAPIES SERIES
Discharge: HOME | End: 2018-01-29
Payer: MEDICARE

## 2018-01-29 PROCEDURE — 93798 PHYS/QHP OP CAR RHAB W/ECG: CPT

## 2018-01-31 ENCOUNTER — HOSPITAL ENCOUNTER (OUTPATIENT)
Dept: CARDIAC REHAB | Age: 68
Setting detail: THERAPIES SERIES
Discharge: HOME | End: 2018-01-31
Payer: MEDICARE

## 2018-01-31 PROCEDURE — 93798 PHYS/QHP OP CAR RHAB W/ECG: CPT

## 2018-02-02 ENCOUNTER — HOSPITAL ENCOUNTER (OUTPATIENT)
Dept: CARDIAC REHAB | Age: 68
Setting detail: THERAPIES SERIES
Discharge: HOME | End: 2018-02-02
Payer: MEDICARE

## 2018-02-02 PROCEDURE — 93798 PHYS/QHP OP CAR RHAB W/ECG: CPT

## 2018-02-07 ENCOUNTER — HOSPITAL ENCOUNTER (OUTPATIENT)
Dept: CARDIAC REHAB | Age: 68
Setting detail: THERAPIES SERIES
Discharge: HOME | End: 2018-02-07
Payer: MEDICARE

## 2018-02-07 PROCEDURE — 93798 PHYS/QHP OP CAR RHAB W/ECG: CPT

## 2018-02-09 ENCOUNTER — OFFICE VISIT (OUTPATIENT)
Dept: PHYSICAL MEDICINE AND REHAB | Age: 68
End: 2018-02-09
Payer: MEDICARE

## 2018-02-09 ENCOUNTER — HOSPITAL ENCOUNTER (OUTPATIENT)
Dept: CARDIAC REHAB | Age: 68
Setting detail: THERAPIES SERIES
Discharge: HOME | End: 2018-02-09
Payer: MEDICARE

## 2018-02-09 VITALS
DIASTOLIC BLOOD PRESSURE: 80 MMHG | BODY MASS INDEX: 26.68 KG/M2 | SYSTOLIC BLOOD PRESSURE: 130 MMHG | WEIGHT: 170 LBS | HEIGHT: 67 IN

## 2018-02-09 DIAGNOSIS — Z79.899 HIGH RISK MEDICATION USE: ICD-10-CM

## 2018-02-09 DIAGNOSIS — M54.17 THORACIC AND LUMBOSACRAL NEURITIS: ICD-10-CM

## 2018-02-09 DIAGNOSIS — M79.10 MYALGIA: Primary | ICD-10-CM

## 2018-02-09 DIAGNOSIS — G89.29 CHRONIC MIDLINE LOW BACK PAIN WITH SCIATICA, SCIATICA LATERALITY UNSPECIFIED: ICD-10-CM

## 2018-02-09 DIAGNOSIS — G89.29 CHRONIC BILATERAL LOW BACK PAIN WITHOUT SCIATICA: ICD-10-CM

## 2018-02-09 DIAGNOSIS — M54.2 NECK PAIN: ICD-10-CM

## 2018-02-09 DIAGNOSIS — M54.50 CHRONIC BILATERAL LOW BACK PAIN WITHOUT SCIATICA: ICD-10-CM

## 2018-02-09 DIAGNOSIS — M54.14 THORACIC AND LUMBOSACRAL NEURITIS: ICD-10-CM

## 2018-02-09 DIAGNOSIS — M54.40 CHRONIC MIDLINE LOW BACK PAIN WITH SCIATICA, SCIATICA LATERALITY UNSPECIFIED: ICD-10-CM

## 2018-02-09 PROCEDURE — 93798 PHYS/QHP OP CAR RHAB W/ECG: CPT

## 2018-02-09 PROCEDURE — 99214 OFFICE O/P EST MOD 30 MIN: CPT | Performed by: PHYSICAL MEDICINE & REHABILITATION

## 2018-02-09 RX ORDER — OXYCODONE AND ACETAMINOPHEN 10; 325 MG/1; MG/1
TABLET ORAL
Qty: 60 TABLET | Refills: 0 | Status: SHIPPED | OUTPATIENT
Start: 2018-02-09 | End: 2018-03-07 | Stop reason: SDUPTHER

## 2018-02-09 RX ORDER — NIFEDIPINE 60 MG/1
1 TABLET, EXTENDED RELEASE ORAL DAILY
COMMUNITY
Start: 2018-01-14 | End: 2018-03-06 | Stop reason: SDUPTHER

## 2018-02-09 RX ORDER — PRAVASTATIN SODIUM 40 MG
TABLET ORAL
Refills: 1 | COMMUNITY
Start: 2017-12-18 | End: 2018-03-06 | Stop reason: SDUPTHER

## 2018-02-09 ASSESSMENT — ENCOUNTER SYMPTOMS
CHEST TIGHTNESS: 0
VISUAL CHANGE: 0
EYES NEGATIVE: 1
DIARRHEA: 0
SHORTNESS OF BREATH: 0
CONSTIPATION: 1
WHEEZING: 0
ABDOMINAL PAIN: 0
NAUSEA: 0
BACK PAIN: 1
VOMITING: 0
ALLERGIC/IMMUNOLOGIC NEGATIVE: 1

## 2018-02-09 NOTE — PROGRESS NOTES
Loyd, 78 y.o. male presents today with:       Back Pain caudal block 01/16/18 gave 50% reduction in pain lasting 1.5 weeks; TP injection 12/11/17 gave 80% reduction in pain lasting 1.5-2 month and would like to schedule for more     Neck Pain     Hip Pain bilateral    Leg Pain bilateral    Hand Pain bilateral    Medication Refill Percocet, pill count today-compliant            No signs of overuse or abuse of his meds    Injections still help very much. Back Pain   This is a chronic problem. The current episode started more than 1 year ago. The problem occurs daily. The problem is unchanged. The pain is present in the lumbar spine. The quality of the pain is described as aching, cramping, shooting and stabbing. The pain radiates to the right foot, right knee and right thigh. The pain is at a severity of 6/10. The pain is severe. The pain is worse during the day. The symptoms are aggravated by bending, lying down, position, sitting, standing and twisting. Stiffness is present in the morning. Associated symptoms include leg pain, numbness and weakness. Pertinent negatives include no abdominal pain, chest pain, headaches, paresis, perianal numbness or tingling. Risk factors include lack of exercise. He has tried analgesics and home exercises (SI injections which help, trigger point injections, OXY-IR ) for the symptoms. The treatment provided significant relief. Mental Health Problem   The primary symptoms include negative symptoms. The primary symptoms do not include dysphoric mood, bizarre behavior, disorganized speech or somatic symptoms. The current episode started more than 1 month ago. This is a chronic problem. The onset of the illness is precipitated by a stressful event (chronic pain). The degree of incapacity that he is experiencing as a consequence of his illness is severe. Sequelae of the illness include an inability to work.  Additional symptoms of the illness include anhedonia. Additional symptoms of the illness do not include unexpected weight change, fatigue, psychomotor retardation, feelings of worthlessness, attention impairment, euphoric mood, increased goal-directed activity, flight of ideas, inflated self-esteem, decreased need for sleep, distractible, poor judgment, visual change, headaches, abdominal pain or seizures. He does not admit to suicidal ideas. He does not have a plan to commit suicide. He does not contemplate harming himself. He has not already injured self. He does not contemplate injuring another person. He has not already  injured another person. Risk factors that are present for mental illness include a history of mental illness. Hand Pain    The incident occurred more than 1 week ago. The incident occurred at home. There was no injury mechanism. The pain is present in the right wrist and right hand. The quality of the pain is described as cramping. The pain is at a severity of 8/10. The pain is moderate. The pain has been intermittent since the incident. Associated symptoms include numbness. Pertinent negatives include no chest pain, muscle weakness or tingling. The symptoms are aggravated by movement. He has tried ice for the symptoms. The treatment provided mild relief.        Past Medical History:   Diagnosis Date    Chronic back pain     Coronary artery disease 2002    [facility]. Mayola Goltz at [facility] Esophageal ulcer 3/10/2014    Dr. Chelsey Fonseca Gastric ulcer 3/10/2014    Dr. Jenny Ruiz    Gout     Hiatal hernia 3/10/2014    Dr. Jenny Ruiz    Hyperlipidemia     Hypertension     MI (myocardial infarction) 2005    Dr. Marylee Bunkers Peripheral vascular disease ([facility] [address]     Prediabetes     Severe single current episode of major depressive disorder, without psychotic features ([address] 7/8/2016    Status post coronary artery stent placement 2002    Dr. Mayola Goltz     Past Surgical History:   Procedure Laterality Date    APPENDECTOMY  1970    CARDIAC SURGERY      COLONOSCOPY  3/10/14    DR Dm Trevizo  2002    Dr. Morris 83 GRAFT  10/17/2017    KNEE ARTHROSCOPY Left 2002    Dr. Candace Ford  12/19/13    CAUDAL BLOCKS DONE BY DR Amber Cross SURGERY  9/2009    Dr. Rikki Mittal  2008    Dr. Luiz Contreras  3/10/14    Va Cabrera, DR David Altman     Social History     Social History    Marital status:      Spouse name: N/A    Number of children: N/A    Years of education: N/A     Occupational History    disability       Social History Main Topics    Smoking status: Never Smoker    Smokeless tobacco: Never Used    Alcohol use No      Comment: Stopped years ago.  Drug use: No      Comment: YEARS AGO    Sexual activity: Yes     Partners: Female      Comment: monogomous sexual partner, wife. Other Topics Concern    None     Social History Narrative    Tobacco -- never smoked or chewed. Alcohol -- have not used for past 10 years, prior to had consumed 6 pack of beer daily. Drugs -- tried marijuana and cocaine 14 years ago occasionally used for 3-4 years and then stopped completely 10 years ago. Education -- completed 11th grade in [place].    Occupation -- [employer] work,  at [employer].    Marital status --  44 years, 2 grown sons. Family History   Problem Relation Age of Onset    High Blood Pressure Mother     Arthritis Mother     Cancer Father      throat    Arthritis Father        No Known Allergies    Review of Systems   Constitutional: Negative for activity change, fatigue and unexpected weight change. HENT: Negative. Eyes: Negative. Respiratory: Negative for chest tightness, shortness of breath and wheezing.     Cardiovascular: Negative for chest pain, palpitations and Chronic back pain     Coronary artery disease 2002    Dr. Marianne Mendiola at [facility] Esophageal ulcer 3/10/2014    Dr. Diaz Prophet    Gastric ulcer 3/10/2014    Dr. Joe Fuller    Gout     Hiatal hernia 3/10/2014    Dr. Joe Fuller    Hyperlipidemia     Hypertension     MI (myocardial infarction) 2005    Dr. Willow Ibanez Peripheral vascular disease ([address]     Prediabetes     Severe single current episode of major depressive disorder, without psychotic features ([address] 7/8/2016    Status post coronary artery stent placement 2002    Dr. Marianne Mendiola           Given their medication, chronic pain and lifestyle and medications they are at risk for :    Falls, constipation, addiction  Loss of livelyhood due to severe pain, debility, weight gain and  vitamin D deficiency    The patient was educated regarding proper diet, fitness routine, and regulatory restrictions concerning pain medications. Previous notes, comprehensive past medical, surgical, family history, and diagnostics were reviewed. Patient education and councelling were provided regarding off label use,treatment options and medication and injection risks. Current and old OARRS ([place] Automated Prescription Reporting System) records reviewed, all refills reviewed since last visit,  Behavioral agreement/[place] regulations   and Toxicology screen was reviewed with patient and is up to date. There are no current red flags. They are making good progress regarding pain relief, they are performing at a functional level regarding activities of daily living and psychological functioning, they're not having any adverse effects or side effects from the current medications, and I see no findings of aberrant drug taking her addiction related behaviors. Attestation: The Prescription Monitoring Report for this patient was reviewed today.  (Marva Villalta, DO)  Documentation: No signs of potential drug abuse or diversion Neg 10/05/2017    BARBSCNU Neg 10/05/2017    LABBENZ Neg 10/05/2017    CANSU Neg 10/05/2017    COCAIMETSCRU Neg 10/05/2017    PHENCYCLIDINESCREENURINE Neg 19/83/8385    TRICYCLIC Negative 01/06/6546    DSCOMMENT see below 10/05/2017       Lab Results   Component Value Date    CODEINE Not Detected 09/16/2016    MORPHINE Not Detected 09/16/2016    ACETYLMORPHI Not Detected 09/16/2016    OXYCODONE Present 09/16/2016    NOROXYCODONE Present 09/16/2016    NOROXYMU Present 09/16/2016    HYDRCO Not Detected 09/16/2016    NORHYDU Not Detected 09/16/2016    HYDROMO Not Detected 09/16/2016    BUPREN Not Detected 09/16/2016    NORBUPRNOR Not Detected 09/16/2016    FENTA Not Detected 09/16/2016    NORFENT Not Detected 09/16/2016    MEPERIDINE Not Detected 09/16/2016    TAPENU Not Detected 09/16/2016    TAPOSULFUR Not Detected 09/16/2016    METHADONE Not Detected 09/16/2016    LABPROP Not Detected 09/16/2016    TRAM Not Detected 09/16/2016    AMPH Not Detected 09/16/2016    METHAMP Not Detected 09/16/2016    MDMA Not Detected 09/16/2016    ECMDA Not Detected 09/16/2016       Lab Results   Component Value Date    PHENTERMINE Not Detected 09/16/2016    BENZOYL Not Detected 09/16/2016    Gurmeet Likens Not Detected 09/16/2016    ALPHAOHALPRA Not Detected 09/16/2016    CLONAZEPAM Not Detected 09/16/2016    Jettie Reveal Not Detected 09/16/2016    DIAZEP Not Detected 09/16/2016    SAFIA Not Detected 09/16/2016    OXAZ Not Detected 09/16/2016    [place] Not Detected 09/16/2016    LORAZEPAM Not Detected 09/16/2016    MIDAZOLAM Not Detected 09/16/2016    ZOLPIDEM Not Detected 09/16/2016    REG Not Detected 09/16/2016    ETG Not Detected 09/16/2016    MARIJMET Not Detected 09/16/2016    PCP Not Detected 09/16/2016    PAINMGTDRUGP See Below 09/16/2016    EERPAINMGTPA See Note 09/16/2016    LABCREA 58.1 09/16/2016         , I discussed results with patient. See follow-up plans for new studies.     Therapies:  HEP-gentle stretching and relaxation techniques-demonstrated with patient-they are to do them twice a day. They are also advised to make the following lifestyle changes:  Goals      Exercise 3x per week (30 min per time)      SOAPP-R GOAL LESS THAN 9            09/16/16 SCORE: 33-HIGH RISK   11/11/16 score: 27-high risk     01/13/17 score: 16-moderate risk   03/13/17 Score: 11-moderate risk   06/01/17 score: 15-moderate risk  08/03/17 score: 15-moderate risk  10/04/17 score: 18-moderate risk  12/08/17 score: 11-moderate risk  02/09/18 score: 12-moderate risk              Injections or Epidurals:  Injection options were discussed. Patient gave verbal consent to ordered injections. See follow-up plans for planned injections. Supplements:  Vitamin D,   Education was given on:   Dietary and Fitness             Follow up with Primary Care Physician regarding their general medical needs. They are to follow up in 2 months to review medication, efficacy of injections, pill counts, OARRS check, SOAPPR assessment, review diagnostics, to review previous and future treatment plans and assess appropriateness for continued therapy.         New Diagnostics  Orders Placed This Encounter   Procedures    23288 - [place] INJECT TRIGGER POINTS, 3 OR GREATER    99237 - [place] INJECT TRIGGER POINTS, 3 OR GREATER       Brenda tSubbs,

## 2018-02-12 ENCOUNTER — HOSPITAL ENCOUNTER (OUTPATIENT)
Dept: CARDIAC REHAB | Age: 68
Setting detail: THERAPIES SERIES
Discharge: HOME | End: 2018-02-12
Payer: MEDICARE

## 2018-02-12 PROCEDURE — 93798 PHYS/QHP OP CAR RHAB W/ECG: CPT

## 2018-02-16 ENCOUNTER — HOSPITAL ENCOUNTER (OUTPATIENT)
Dept: CARDIAC REHAB | Age: 68
Setting detail: THERAPIES SERIES
Discharge: HOME | End: 2018-02-16
Payer: MEDICARE

## 2018-02-16 PROCEDURE — 93798 PHYS/QHP OP CAR RHAB W/ECG: CPT

## 2018-02-19 ENCOUNTER — HOSPITAL ENCOUNTER (OUTPATIENT)
Dept: CARDIAC REHAB | Age: 68
Setting detail: THERAPIES SERIES
Discharge: HOME | End: 2018-02-19
Payer: MEDICARE

## 2018-02-19 PROCEDURE — 93798 PHYS/QHP OP CAR RHAB W/ECG: CPT

## 2018-02-21 ENCOUNTER — HOSPITAL ENCOUNTER (OUTPATIENT)
Dept: CARDIAC REHAB | Age: 68
Setting detail: THERAPIES SERIES
Discharge: HOME | End: 2018-02-21
Payer: MEDICARE

## 2018-02-21 PROCEDURE — 93798 PHYS/QHP OP CAR RHAB W/ECG: CPT

## 2018-02-22 ENCOUNTER — TELEPHONE (OUTPATIENT)
Dept: FAMILY MEDICINE CLINIC | Age: 68
End: 2018-02-22

## 2018-02-22 DIAGNOSIS — M1A.9XX0 CHRONIC GOUT WITHOUT TOPHUS, UNSPECIFIED CAUSE, UNSPECIFIED SITE: Primary | ICD-10-CM

## 2018-02-22 NOTE — TELEPHONE ENCOUNTER
Patient is asking for an alternate medication for his Uloric. This medication is to expensive. Please send to [facility] if possible.

## 2018-02-23 ENCOUNTER — HOSPITAL ENCOUNTER (OUTPATIENT)
Dept: CARDIAC REHAB | Age: 68
Setting detail: THERAPIES SERIES
Discharge: HOME | End: 2018-02-23
Payer: MEDICARE

## 2018-02-23 PROCEDURE — 93798 PHYS/QHP OP CAR RHAB W/ECG: CPT

## 2018-02-23 RX ORDER — ALLOPURINOL 100 MG/1
100 TABLET ORAL DAILY
Qty: 30 TABLET | Refills: 3 | Status: SHIPPED | OUTPATIENT
Start: 2018-02-23 | End: 2018-06-20 | Stop reason: SDUPTHER

## 2018-02-26 ENCOUNTER — HOSPITAL ENCOUNTER (OUTPATIENT)
Dept: CARDIAC REHAB | Age: 68
Setting detail: THERAPIES SERIES
Discharge: HOME | End: 2018-02-26
Payer: MEDICARE

## 2018-02-26 PROCEDURE — 93798 PHYS/QHP OP CAR RHAB W/ECG: CPT

## 2018-02-27 ENCOUNTER — PROCEDURE VISIT (OUTPATIENT)
Dept: PHYSICAL MEDICINE AND REHAB | Age: 68
End: 2018-02-27
Payer: MEDICARE

## 2018-02-27 DIAGNOSIS — M79.10 MYALGIA: ICD-10-CM

## 2018-02-27 PROCEDURE — 20553 NJX 1/MLT TRIGGER POINTS 3/>: CPT | Performed by: PHYSICAL MEDICINE & REHABILITATION

## 2018-02-28 ENCOUNTER — HOSPITAL ENCOUNTER (OUTPATIENT)
Dept: CARDIAC REHAB | Age: 68
Setting detail: THERAPIES SERIES
Discharge: HOME | End: 2018-02-28
Payer: MEDICARE

## 2018-02-28 PROCEDURE — 93798 PHYS/QHP OP CAR RHAB W/ECG: CPT

## 2018-03-02 ENCOUNTER — HOSPITAL ENCOUNTER (OUTPATIENT)
Dept: CARDIAC REHAB | Age: 68
Setting detail: THERAPIES SERIES
Discharge: HOME | End: 2018-03-02
Payer: MEDICARE

## 2018-03-02 PROCEDURE — 93798 PHYS/QHP OP CAR RHAB W/ECG: CPT

## 2018-03-05 ENCOUNTER — HOSPITAL ENCOUNTER (OUTPATIENT)
Dept: CARDIAC REHAB | Age: 68
Setting detail: THERAPIES SERIES
Discharge: HOME | End: 2018-03-05
Payer: MEDICARE

## 2018-03-05 PROCEDURE — 93798 PHYS/QHP OP CAR RHAB W/ECG: CPT

## 2018-03-06 ENCOUNTER — OFFICE VISIT (OUTPATIENT)
Dept: FAMILY MEDICINE CLINIC | Age: 68
End: 2018-03-06
Payer: MEDICARE

## 2018-03-06 VITALS
DIASTOLIC BLOOD PRESSURE: 88 MMHG | TEMPERATURE: 96.5 F | HEART RATE: 66 BPM | BODY MASS INDEX: 25.8 KG/M2 | SYSTOLIC BLOOD PRESSURE: 148 MMHG | OXYGEN SATURATION: 97 % | HEIGHT: 67 IN | WEIGHT: 164.4 LBS | RESPIRATION RATE: 16 BRPM

## 2018-03-06 DIAGNOSIS — Z86.79 HISTORY OF ANGINA: ICD-10-CM

## 2018-03-06 DIAGNOSIS — I25.118 CORONARY ARTERY DISEASE OF NATIVE ARTERY OF NATIVE HEART WITH STABLE ANGINA PECTORIS (HCC): Primary | ICD-10-CM

## 2018-03-06 DIAGNOSIS — T40.2X5A THERAPEUTIC OPIOID-INDUCED CONSTIPATION (OIC): ICD-10-CM

## 2018-03-06 DIAGNOSIS — G47.33 OSA (OBSTRUCTIVE SLEEP APNEA): ICD-10-CM

## 2018-03-06 DIAGNOSIS — I10 BENIGN ESSENTIAL HTN: ICD-10-CM

## 2018-03-06 DIAGNOSIS — E78.2 MIXED HYPERLIPIDEMIA: ICD-10-CM

## 2018-03-06 DIAGNOSIS — K59.03 THERAPEUTIC OPIOID-INDUCED CONSTIPATION (OIC): ICD-10-CM

## 2018-03-06 DIAGNOSIS — R03.0 ELEVATED BLOOD PRESSURE READING: ICD-10-CM

## 2018-03-06 DIAGNOSIS — Z23 NEED FOR PNEUMOCOCCAL VACCINATION: ICD-10-CM

## 2018-03-06 PROCEDURE — 90732 PPSV23 VACC 2 YRS+ SUBQ/IM: CPT | Performed by: PHYSICIAN ASSISTANT

## 2018-03-06 PROCEDURE — 99214 OFFICE O/P EST MOD 30 MIN: CPT | Performed by: PHYSICIAN ASSISTANT

## 2018-03-06 PROCEDURE — G0009 ADMIN PNEUMOCOCCAL VACCINE: HCPCS | Performed by: PHYSICIAN ASSISTANT

## 2018-03-06 RX ORDER — NIFEDIPINE 60 MG/1
60 TABLET, EXTENDED RELEASE ORAL DAILY
Qty: 90 TABLET | Refills: 2 | Status: SHIPPED | OUTPATIENT
Start: 2018-03-06 | End: 2018-08-08 | Stop reason: SDUPTHER

## 2018-03-06 RX ORDER — PRAVASTATIN SODIUM 40 MG
TABLET ORAL
Qty: 90 TABLET | Refills: 2 | Status: SHIPPED | OUTPATIENT
Start: 2018-03-06 | End: 2018-03-28 | Stop reason: SDUPTHER

## 2018-03-06 RX ORDER — NITROGLYCERIN 0.4 MG/1
TABLET SUBLINGUAL
Qty: 25 TABLET | Refills: 0 | Status: SHIPPED | OUTPATIENT
Start: 2018-03-06 | End: 2018-03-14 | Stop reason: SDUPTHER

## 2018-03-06 ASSESSMENT — ENCOUNTER SYMPTOMS
COLOR CHANGE: 0
DIARRHEA: 0
TROUBLE SWALLOWING: 0
CONSTIPATION: 1
BLOOD IN STOOL: 0
ABDOMINAL DISTENTION: 0
CHEST TIGHTNESS: 0
SHORTNESS OF BREATH: 0
BACK PAIN: 1
COUGH: 0
NAUSEA: 0
ABDOMINAL PAIN: 0

## 2018-03-06 NOTE — PROGRESS NOTES
[facility] 78 [facility] presents today with:  Chief Complaint   Patient presents with    3 Month Follow-Up     no complaints        HPI   Patient presents to the office today for 3 month follow up. Last OV with me was on: 12/6/2017  Due for pneumonia vaccine and shingles. Since last OV, patient followed up with Dr. Nile Lombardi on 1/3/2018. Dr. Nile Lombardi ordered a sleep study for evaluation due to excessive fatigue and excessive daytime sleepiness. Patient had sleep study on 1/18/2018. Study showed moderate apnea. CPAP was recommended. Daniel Nelson has an appointment scheduled with Dr. Veronica Thomas on 3/21/2018 to discuss results. Overall, feeling well. Denies CP, GUILLORY, SOB, dizziness, epigastric pain/discomfort. Requesting refill of his pravastatin as he misplaced his prescription. Has not been taking it for several days. States that he stopped his procardia, not sure why. Cannot remember if he was advised to stop this blood pressure medication. Continuing with cardiac rehab s/p CABG x 2. This is his last week of rehab; he is being graduated out of the program due to progress. Plans to continue exercising on his own. Dr. Precious Bills is continuing to manage patient's hypogonadism. Patient gives himself testosterone injections twice monthly. Reports no adverse side effects from the medications. Last testosterone labs were 12/2017. Patient is continuing to have difficulty with achieving an erection. States that the testosterone has helped with desire, but he is continuing to have erection difficulty. Patient has had this problem in the past but it was when he was on antidepressant medications (in the 1980's). Previous management has included: viagra---with good achievement. Continuing with Dr. Jonatan Camejo for pain management related to chronic joint pain. States that he has been suffering from chronic constipation. Denies hematochezia, melena. Takes a stool softener every evening. Will have a hard bowel movement every morning. Review of Systems   Constitutional: Positive for fatigue. Negative for activity change, appetite change, chills, diaphoresis, fever and unexpected weight change. HENT: Negative for postnasal drip and trouble swallowing. Respiratory: Negative for cough, chest tightness and shortness of breath. Cardiovascular: Negative for chest pain, palpitations and leg swelling. Gastrointestinal: Positive for constipation. Negative for abdominal distention, abdominal pain, blood in stool, diarrhea and nausea. Musculoskeletal: Positive for arthralgias, back pain and neck stiffness. Negative for joint swelling. Skin: Negative for color change and wound. Neurological: Negative for dizziness, weakness, light-headedness, numbness and headaches. Psychiatric/Behavioral: Positive for sleep disturbance (snorning ). Negative for confusion and dysphoric mood. The patient is not nervous/anxious and is not hyperactive.       Past Medical History:   Diagnosis Date    Chronic back pain     Coronary artery disease 2002    [facility] Nicholas [facility]acon [facility] Esophageal ulcer 3/10/2014    Dr. Goodwin Kil Gastric ulcer 3/10/2014    Dr. Lala Seek    Gout     Hiatal hernia 3/10/2014    Dr. Lala Seek    Hyperlipidemia     Hypertension     MI (myocardial infarction) 2005    Dr. Norah Jones Peripheral vascular disease ([facility] [address]     Prediabetes     Severe single current episode of major depressive disorder, without psychotic features ([address] 7/8/2016    Status post coronary artery stent placement 2002    Dr. Nicholas Chacon     Past Surgical History:   Procedure Laterality Date    [facility]      COLONOSCOPY  3/10/14    DR Derrek Scott  2002    Dr. George Keller GRAFT  10/17/2017    KNEE ARTHROSCOPY Left 2002    Dr. Kolby Wallace diclofenac (VOLTAREN) 50 MG EC tablet TAKE 1 TABLET BY MOUTH TWICE DAILY AS NEEDED FOR JOINT PAIN 180 tablet 0    metoprolol tartrate (LOPRESSOR) 50 MG tablet Take 1 tablet by mouth 3 times daily 90 tablet 3    Misc. Devices (WALKER) MISC 1 each by Does not apply route daily 1 each 0    pantoprazole (PROTONIX) 40 MG tablet Take 40 mg by mouth daily      diclofenac sodium (VOLTAREN) 1 % GEL Apply 2 g topically 2 times daily 1 Tube 3    gabapentin (NEURONTIN) 300 MG capsule Take 300 mg by mouth 3 times daily      aspirin 81 MG EC tablet Take 1 tablet by mouth daily 90 tablet 1    testosterone cypionate (DEPOTESTOTERONE CYPIONATE) 200 MG/ML injection INJECT 0.5 ML IM EVERY 2 WEEKS 10 mL 1    SYRINGE-NEEDLE, DISP, 3 ML (B-D INTEGRA SYRINGE) 22G X 1-1/2\" 3 ML MISC 1 each by Does not apply route daily 20 each 6    docusate sodium (COLACE) 100 MG capsule Take 100 mg by mouth 2 times daily.  oxyCODONE-acetaminophen (PERCOCET)  MG per tablet Take one tablet every 4 hours as needed for pain. Maximum of 60 tablets within a month. Do not get narcotic medications from any other doctor. Generic equivalent acceptable, unless otherwise noted. [place] Date: 2/9/18 60 tablet 0     No current facility-administered medications for this visit. PMH, Surgical Hx, Family Hx, and Social Hx reviewed and updated. Health Maintenance reviewed.     Objective  Vitals:    03/06/18 0813 03/06/18 0855   BP: (!) 142/78 (!) 148/88   Site: Left Arm Left Arm   Position: Sitting Sitting   Cuff Size: Medium Adult Medium Adult   Pulse: 66    Resp: 16    Temp: 96.5 °F (35.8 °C)    TempSrc: Tympanic    SpO2: 97%    Weight: 164 lb 6.4 oz (74.6 kg)    Height: 5' 7\" (1.702 m)      BP Readings from Last 3 Encounters:   03/06/18 (!) 148/88   02/09/18 130/80   01/16/18 (!) 157/97     Wt Readings from Last 3 Encounters:   03/06/18 164 lb 6.4 oz (74.6 kg)   02/09/18 170 lb (77.1 kg)   01/09/18 163 lb (73.9 kg)     Physical Exam Constitutional: He is oriented to person, place, and time. He appears well-developed and well-nourished. No distress. Normal weight male, sitting comfortably in exam room. HENT:   Head: Normocephalic and atraumatic. Right Ear: External ear normal.   Left Ear: External ear normal.   Nose: Nose normal.   Mouth/Throat: Oropharynx is clear and moist. No oropharyngeal exudate. Eyes: Conjunctivae are normal.   Neck: Normal range of motion. Neck supple. Cardiovascular: Normal rate, regular rhythm and normal heart sounds. No murmur heard. Pulmonary/Chest: Effort normal and breath sounds normal. No respiratory distress. He has no wheezes. He has no rales. Musculoskeletal:   Ataxic gait noted. Neurological: He is alert and oriented to person, place, and time. No cranial nerve deficit. Skin: Skin is warm and dry. He is not diaphoretic. Psychiatric: He has a normal mood and affect. His behavior is normal. Judgment and thought content normal.     Assessment & Plan   1. Coronary artery disease of native artery of native heart with stable angina pectoris (HCC)  nitroGLYCERIN (NITROSTAT) 0.4 MG SL tablet    pravastatin (PRAVACHOL) 40 MG tablet    NIFEdipine (PROCARDIA XL) 60 MG extended release tablet    CBC Auto Differential    Comprehensive Metabolic Panel    Lipid Panel   2. History of angina  nitroGLYCERIN (NITROSTAT) 0.4 MG SL tablet    pravastatin (PRAVACHOL) 40 MG tablet    Lipid Panel   3. Therapeutic opioid-induced constipation (OIC)  naloxegol (MOVANTIK) 12.5 MG TABS tablet   4. Benign essential HTN  NIFEdipine (PROCARDIA XL) 60 MG extended release tablet    CBC Auto Differential    Comprehensive Metabolic Panel   5. Need for pneumococcal vaccination  Pneumococcal polysaccharide vaccine 23-valent greater than or equal to 1yo subcutaneous/IM   6. Elevated blood pressure reading     7. Mixed hyperlipidemia     8.  PRASAD (obstructive sleep apnea)     -Fasting labs ordered, patient to return in the morning to have drawn.  -Pneumonia vaccine today. -Restart pravastatin and procardia medications.   -Discussed sleep apnea and the long-term effects apneic episodes can have on the body including uncontrolled blood pressure and possible ED.    -Follow up with Dr. Elijah Carpenter as scheduled. -Return to office in 3 months for routine follow up.  -No other questions today. Orders Placed This Encounter   Procedures    Pneumococcal polysaccharide vaccine 23-valent greater than or equal to 1yo subcutaneous/IM     NDC 2636-6457-59 Use for 1 MDV PER PACKAGE  PNEUMOVAX 23 is approved for use in persons 48years of age or  older and persons aged . 2 years who are at increased risk for  pneumococcal disease    CBC Auto Differential     Standing Status:   Future     Standing Expiration Date:   3/6/2019    Comprehensive Metabolic Panel     Standing Status:   Future     Standing Expiration Date:   3/6/2019    Lipid Panel     Standing Status:   Future     Standing Expiration Date:   3/6/2019     Order Specific Question:   Is Patient Fasting?/# of Hours     Answer:   yes, 8+ hours     Orders Placed This Encounter   Medications    nitroGLYCERIN (NITROSTAT) 0.4 MG SL tablet     Sig: Place 1 tablet under the tongue every 5 minutes as needed x 3 doses for chest pain.      Dispense:  25 tablet     Refill:  0    pravastatin (PRAVACHOL) 40 MG tablet     Sig: TK 1 T PO QPM     Dispense:  90 tablet     Refill:  2    NIFEdipine (PROCARDIA XL) 60 MG extended release tablet     Sig: Take 1 tablet by mouth daily     Dispense:  90 tablet     Refill:  2    naloxegol (MOVANTIK) 12.5 MG TABS tablet     Sig: Take 1 tablet by mouth every morning     Dispense:  30 tablet     Refill:  2     Medications Discontinued During This Encounter   Medication Reason    diclofenac (VOLTAREN) 50 MG EC tablet Duplicate Order    nitroGLYCERIN (NITROSTAT) 0.4 MG SL tablet Reorder    pravastatin (PRAVACHOL) 40 MG tablet Reorder    NIFEdipine (PROCARDIA XL) 60 MG extended release tablet Reorder     No Follow-up on file. Reviewed with the patient: current clinical status, medications, activities and diet. Side effects, adverse effects of the medication prescribed today, as well as treatment plan/ rationale and result expectations have been discussed with the patient who expresses understanding and desires to proceed. Close follow up to evaluate treatment results and for coordination of care. I have reviewed the patient's medical history in detail and updated the computerized patient record.     Sheryl Tellez PA-C

## 2018-03-07 ENCOUNTER — HOSPITAL ENCOUNTER (OUTPATIENT)
Dept: CARDIAC REHAB | Age: 68
Setting detail: THERAPIES SERIES
Discharge: HOME | End: 2018-03-07
Payer: MEDICARE

## 2018-03-07 DIAGNOSIS — M54.50 CHRONIC BILATERAL LOW BACK PAIN WITHOUT SCIATICA: ICD-10-CM

## 2018-03-07 DIAGNOSIS — Z79.899 HIGH RISK MEDICATION USE: ICD-10-CM

## 2018-03-07 DIAGNOSIS — M54.14 THORACIC AND LUMBOSACRAL NEURITIS: ICD-10-CM

## 2018-03-07 DIAGNOSIS — G89.29 CHRONIC BILATERAL LOW BACK PAIN WITHOUT SCIATICA: ICD-10-CM

## 2018-03-07 DIAGNOSIS — M54.40 CHRONIC MIDLINE LOW BACK PAIN WITH SCIATICA, SCIATICA LATERALITY UNSPECIFIED: ICD-10-CM

## 2018-03-07 DIAGNOSIS — M54.2 NECK PAIN: ICD-10-CM

## 2018-03-07 DIAGNOSIS — G89.29 CHRONIC MIDLINE LOW BACK PAIN WITH SCIATICA, SCIATICA LATERALITY UNSPECIFIED: ICD-10-CM

## 2018-03-07 DIAGNOSIS — M54.17 THORACIC AND LUMBOSACRAL NEURITIS: ICD-10-CM

## 2018-03-07 PROCEDURE — 93798 PHYS/QHP OP CAR RHAB W/ECG: CPT

## 2018-03-07 RX ORDER — OXYCODONE AND ACETAMINOPHEN 10; 325 MG/1; MG/1
TABLET ORAL
Qty: 60 TABLET | Refills: 0 | Status: SHIPPED | OUTPATIENT
Start: 2018-03-07 | End: 2018-04-06 | Stop reason: SDUPTHER

## 2018-03-09 ENCOUNTER — HOSPITAL ENCOUNTER (OUTPATIENT)
Dept: CARDIAC REHAB | Age: 68
Setting detail: THERAPIES SERIES
Discharge: HOME | End: 2018-03-09
Payer: MEDICARE

## 2018-03-09 PROCEDURE — 93798 PHYS/QHP OP CAR RHAB W/ECG: CPT

## 2018-03-12 ENCOUNTER — TELEPHONE (OUTPATIENT)
Dept: FAMILY MEDICINE CLINIC | Age: 68
End: 2018-03-12

## 2018-03-12 DIAGNOSIS — I10 BENIGN ESSENTIAL HTN: ICD-10-CM

## 2018-03-12 DIAGNOSIS — I25.118 CORONARY ARTERY DISEASE OF NATIVE ARTERY OF NATIVE HEART WITH STABLE ANGINA PECTORIS (HCC): ICD-10-CM

## 2018-03-12 DIAGNOSIS — Z86.79 HISTORY OF ANGINA: ICD-10-CM

## 2018-03-12 LAB
ALBUMIN SERPL-MCNC: 4.4 G/DL (ref 3.9–4.9)
ALP BLD-CCNC: 58 U/L (ref 35–104)
ALT SERPL-CCNC: 36 U/L (ref 0–41)
ANION GAP SERPL CALCULATED.3IONS-SCNC: 13 MEQ/L (ref 7–13)
AST SERPL-CCNC: 26 U/L (ref 0–40)
BASOPHILS ABSOLUTE: 0 K/UL (ref 0–0.2)
BASOPHILS RELATIVE PERCENT: 0.7 %
BILIRUB SERPL-MCNC: 0.4 MG/DL (ref 0–1.2)
BUN BLDV-MCNC: 17 MG/DL (ref 8–23)
CALCIUM SERPL-MCNC: 9.2 MG/DL (ref 8.6–10.2)
CHLORIDE BLD-SCNC: 100 MEQ/L (ref 98–107)
CHOLESTEROL, TOTAL: 152 MG/DL (ref 0–199)
CO2: 29 MEQ/L (ref 22–29)
CREAT SERPL-MCNC: 0.97 MG/DL (ref 0.7–1.2)
EOSINOPHILS ABSOLUTE: 0.1 K/UL (ref 0–0.7)
EOSINOPHILS RELATIVE PERCENT: 1.3 %
GFR AFRICAN AMERICAN: >60
GFR NON-AFRICAN AMERICAN: >60
GLOBULIN: 2.6 G/DL (ref 2.3–3.5)
GLUCOSE BLD-MCNC: 105 MG/DL (ref 74–109)
HCT VFR BLD CALC: 45.7 % (ref 42–52)
HDLC SERPL-MCNC: 45 MG/DL (ref 40–59)
HEMOGLOBIN: 14.9 G/DL (ref 14–18)
LDL CHOLESTEROL CALCULATED: 76 MG/DL (ref 0–129)
LYMPHOCYTES ABSOLUTE: 1.1 K/UL (ref 1–4.8)
LYMPHOCYTES RELATIVE PERCENT: 21.1 %
MCH RBC QN AUTO: 30.6 PG (ref 27–31.3)
MCHC RBC AUTO-ENTMCNC: 32.7 % (ref 33–37)
MCV RBC AUTO: 93.8 FL (ref 80–100)
MONOCYTES ABSOLUTE: 0.5 K/UL (ref 0.2–0.8)
MONOCYTES RELATIVE PERCENT: 11 %
NEUTROPHILS ABSOLUTE: 3.3 K/UL (ref 1.4–6.5)
NEUTROPHILS RELATIVE PERCENT: 65.9 %
PDW BLD-RTO: 16.8 % (ref 11.5–14.5)
PLATELET # BLD: 220 K/UL (ref 130–400)
POTASSIUM SERPL-SCNC: 5.3 MEQ/L (ref 3.5–5.1)
RBC # BLD: 4.88 M/UL (ref 4.7–6.1)
SODIUM BLD-SCNC: 142 MEQ/L (ref 132–144)
TOTAL PROTEIN: 7 G/DL (ref 6.4–8.1)
TRIGL SERPL-MCNC: 157 MG/DL (ref 0–200)
WBC # BLD: 5 K/UL (ref 4.8–10.8)

## 2018-03-14 ENCOUNTER — HOSPITAL ENCOUNTER (OUTPATIENT)
Dept: CARDIAC REHAB | Age: 68
Setting detail: THERAPIES SERIES
Discharge: HOME | End: 2018-03-14
Payer: MEDICARE

## 2018-03-14 DIAGNOSIS — I25.118 CORONARY ARTERY DISEASE OF NATIVE ARTERY OF NATIVE HEART WITH STABLE ANGINA PECTORIS (HCC): ICD-10-CM

## 2018-03-14 DIAGNOSIS — E87.5 HYPERKALEMIA: Primary | ICD-10-CM

## 2018-03-14 DIAGNOSIS — Z86.79 HISTORY OF ANGINA: ICD-10-CM

## 2018-03-14 PROCEDURE — 93798 PHYS/QHP OP CAR RHAB W/ECG: CPT

## 2018-03-15 RX ORDER — NITROGLYCERIN 0.4 MG/1
TABLET SUBLINGUAL
Qty: 25 TABLET | Refills: 0 | Status: SHIPPED | OUTPATIENT
Start: 2018-03-15 | End: 2020-02-20 | Stop reason: SDUPTHER

## 2018-03-16 ENCOUNTER — APPOINTMENT (OUTPATIENT)
Dept: CARDIAC REHAB | Age: 68
End: 2018-03-16
Payer: MEDICARE

## 2018-03-17 DIAGNOSIS — M25.551 RIGHT HIP PAIN: ICD-10-CM

## 2018-03-17 DIAGNOSIS — M54.6 CHRONIC RIGHT-SIDED THORACIC BACK PAIN: ICD-10-CM

## 2018-03-17 DIAGNOSIS — M53.3 SI (SACROILIAC) PAIN: ICD-10-CM

## 2018-03-17 DIAGNOSIS — I25.10 CORONARY ARTERY DISEASE INVOLVING NATIVE HEART WITHOUT ANGINA PECTORIS, UNSPECIFIED VESSEL OR LESION TYPE: ICD-10-CM

## 2018-03-17 DIAGNOSIS — M54.2 NECK PAIN: Chronic | ICD-10-CM

## 2018-03-17 DIAGNOSIS — G89.29 CHRONIC RIGHT-SIDED THORACIC BACK PAIN: ICD-10-CM

## 2018-03-17 NOTE — TELEPHONE ENCOUNTER
Requested Prescriptions     Pending Prescriptions Disp Refills    metoprolol tartrate (LOPRESSOR) 50 MG tablet 90 tablet 3     Sig: Take 1 tablet by mouth 3 times daily

## 2018-03-18 RX ORDER — METOPROLOL TARTRATE 50 MG/1
50 TABLET, FILM COATED ORAL 3 TIMES DAILY
Qty: 90 TABLET | Refills: 3 | Status: SHIPPED | OUTPATIENT
Start: 2018-03-18 | End: 2018-04-04 | Stop reason: SDUPTHER

## 2018-03-19 ENCOUNTER — APPOINTMENT (OUTPATIENT)
Dept: CARDIAC REHAB | Age: 68
End: 2018-03-19
Payer: MEDICARE

## 2018-03-20 RX ORDER — GABAPENTIN 300 MG/1
CAPSULE ORAL
Qty: 270 CAPSULE | Refills: 0 | Status: SHIPPED | OUTPATIENT
Start: 2018-03-20 | End: 2018-07-02 | Stop reason: SDUPTHER

## 2018-03-21 ENCOUNTER — APPOINTMENT (OUTPATIENT)
Dept: CARDIAC REHAB | Age: 68
End: 2018-03-21
Payer: MEDICARE

## 2018-03-22 ENCOUNTER — OFFICE VISIT (OUTPATIENT)
Dept: PULMONOLOGY | Age: 68
End: 2018-03-22
Payer: MEDICARE

## 2018-03-22 VITALS
TEMPERATURE: 96.2 F | HEART RATE: 78 BPM | OXYGEN SATURATION: 96 % | HEIGHT: 67 IN | WEIGHT: 167 LBS | DIASTOLIC BLOOD PRESSURE: 80 MMHG | SYSTOLIC BLOOD PRESSURE: 132 MMHG | BODY MASS INDEX: 26.21 KG/M2

## 2018-03-22 DIAGNOSIS — G47.33 OSA (OBSTRUCTIVE SLEEP APNEA): Primary | ICD-10-CM

## 2018-03-22 DIAGNOSIS — E66.3 OVERWEIGHT (BMI 25.0-29.9): ICD-10-CM

## 2018-03-22 DIAGNOSIS — R06.83 SNORING: ICD-10-CM

## 2018-03-22 PROCEDURE — 99204 OFFICE O/P NEW MOD 45 MIN: CPT | Performed by: INTERNAL MEDICINE

## 2018-03-22 ASSESSMENT — ENCOUNTER SYMPTOMS
CHEST TIGHTNESS: 0
EYE ITCHING: 0
VOMITING: 0
NAUSEA: 0
COUGH: 0
ABDOMINAL PAIN: 0
WHEEZING: 0
VOICE CHANGE: 0
SORE THROAT: 0
DIARRHEA: 0
RHINORRHEA: 0
SHORTNESS OF BREATH: 0

## 2018-03-22 NOTE — PROGRESS NOTES
Subjective:     Renee Owens is a 78 y.o. male who complains today of:     Chief Complaint   Patient presents with    Sleep Apnea     sleep study PSG       HPI  Patient has PSG done on  1/18/18 an shows PRASAD. AHI  26.2  RDI 31.1  Patient is complaining of snoring and daytime sleepiness and tiredness. C/o witness apnea  Patient wakes up with gasping for air. No complaint of morning headache  Patient does not have restful sleep  Patient does not taking daily naps  Patient does not fall asleep while watching TV. Patient does  have a complaint of sleepiness while driving, fall a sleep as passanrger. Patient does not have history of motor vehicle accident. Patient has no c/o vivid dreams or night alex. Patient does not have difficulty falling sleep or staying asleep        Allergies:  Patient has no known allergies.   Past Medical History:   Diagnosis Date    Chronic back pain     Coronary artery disease 2002    Dr. Pernell Ortega at [facility] Esophageal ulcer 3/10/2014    Dr. Blanquita Arnold Gastric ulcer 3/10/2014    Dr. Jamar Han    Gout     Hiatal hernia 3/10/2014    Dr. Jamar Han    Hyperlipidemia     Hypertension     MI (myocardial infarction) 2005    Dr. Reyes Amaro Peripheral vascular disease ([facility] [address]     Prediabetes     Severe single current episode of major depressive disorder, without psychotic features ([address] 7/8/2016    Status post coronary artery stent placement 2002    Dr. Pernell Ortega     Past Surgical History:   Procedure Laterality Date    [facility]      COLONOSCOPY  3/10/14    DR Maru Brumfield  2002    Dr. Morris 83 GRAFT  10/17/2017    KNEE ARTHROSCOPY Left 2002    Dr. Claire Rodrigues  12/19/13    CAUDAL BLOCKS DONE BY DR Obdulia Luna SURGERY  9/2009    Dr. Izabella Dyson machinery if there is a feeling of drowsiness, especially while driving it is preferable to stop driving and take a brief nap. Sleep hygiene: He avoid supine sleep, sleep on her sides. Avoid  sleep deprivation. Explained sleep hygiene. Advice to avoid Alcohol and sedative      2. Snoring  Snoring likely due to sleep apnea, will start CPAP to resolve symptoms once CPAP study done. 3. Overweight (BMI 25.0-29. 9)  Patient patient is advised try to lose weight. obesity related risk explained to the patient ,  Current weight:  167 lb (75.8 kg) Lbs. BMI:  Body mass index is 26.16 kg/m². Return in about 4 weeks (around 4/19/2018) for benoit.       Pooja Montgomery MD

## 2018-03-23 ENCOUNTER — APPOINTMENT (OUTPATIENT)
Dept: CARDIAC REHAB | Age: 68
End: 2018-03-23
Payer: MEDICARE

## 2018-03-28 DIAGNOSIS — I25.118 CORONARY ARTERY DISEASE OF NATIVE ARTERY OF NATIVE HEART WITH STABLE ANGINA PECTORIS (HCC): ICD-10-CM

## 2018-03-28 DIAGNOSIS — Z86.79 HISTORY OF ANGINA: ICD-10-CM

## 2018-03-28 RX ORDER — PRAVASTATIN SODIUM 40 MG
TABLET ORAL
Qty: 90 TABLET | Refills: 2 | Status: SHIPPED | OUTPATIENT
Start: 2018-03-28 | End: 2019-02-25 | Stop reason: SDUPTHER

## 2018-03-29 DIAGNOSIS — E87.5 HYPERKALEMIA: ICD-10-CM

## 2018-03-29 LAB — POTASSIUM SERPL-SCNC: 5.3 MEQ/L (ref 3.5–5.1)

## 2018-04-03 ENCOUNTER — TELEPHONE (OUTPATIENT)
Dept: INTERNAL MEDICINE CLINIC | Age: 68
End: 2018-04-03

## 2018-04-04 ENCOUNTER — OFFICE VISIT (OUTPATIENT)
Dept: CARDIOLOGY CLINIC | Age: 68
End: 2018-04-04
Payer: MEDICARE

## 2018-04-04 VITALS
SYSTOLIC BLOOD PRESSURE: 124 MMHG | BODY MASS INDEX: 26.84 KG/M2 | OXYGEN SATURATION: 98 % | HEIGHT: 67 IN | WEIGHT: 171 LBS | HEART RATE: 78 BPM | RESPIRATION RATE: 16 BRPM | DIASTOLIC BLOOD PRESSURE: 68 MMHG

## 2018-04-04 DIAGNOSIS — Z98.61 H/O PERCUTANEOUS TRANSLUMINAL CORONARY ANGIOPLASTY: ICD-10-CM

## 2018-04-04 DIAGNOSIS — I25.2 HISTORY OF MI (MYOCARDIAL INFARCTION): ICD-10-CM

## 2018-04-04 DIAGNOSIS — I25.10 CORONARY ARTERY DISEASE INVOLVING NATIVE HEART WITHOUT ANGINA PECTORIS, UNSPECIFIED VESSEL OR LESION TYPE: ICD-10-CM

## 2018-04-04 DIAGNOSIS — I25.10 CORONARY ARTERY DISEASE WITH HX OF MYOCARDIAL INFARCT W/O HX OF CABG: ICD-10-CM

## 2018-04-04 DIAGNOSIS — E78.00 HYPERCHOLESTEROLEMIA: Chronic | ICD-10-CM

## 2018-04-04 DIAGNOSIS — I10 ESSENTIAL HYPERTENSION, BENIGN: Primary | ICD-10-CM

## 2018-04-04 DIAGNOSIS — I25.2 CORONARY ARTERY DISEASE WITH HX OF MYOCARDIAL INFARCT W/O HX OF CABG: ICD-10-CM

## 2018-04-04 DIAGNOSIS — G47.33 OSA (OBSTRUCTIVE SLEEP APNEA): ICD-10-CM

## 2018-04-04 PROCEDURE — 99214 OFFICE O/P EST MOD 30 MIN: CPT | Performed by: INTERNAL MEDICINE

## 2018-04-04 RX ORDER — METOPROLOL TARTRATE 50 MG/1
50 TABLET, FILM COATED ORAL 2 TIMES DAILY
Qty: 90 TABLET | Refills: 3
Start: 2018-04-04 | End: 2018-08-16 | Stop reason: SDUPTHER

## 2018-04-04 ASSESSMENT — ENCOUNTER SYMPTOMS
COUGH: 0
CHEST TIGHTNESS: 0
NAUSEA: 0
BLOOD IN STOOL: 0
WHEEZING: 0
GASTROINTESTINAL NEGATIVE: 1
EYES NEGATIVE: 1
SHORTNESS OF BREATH: 0
STRIDOR: 0
RESPIRATORY NEGATIVE: 1

## 2018-04-06 DIAGNOSIS — M54.2 NECK PAIN: ICD-10-CM

## 2018-04-06 DIAGNOSIS — M54.14 THORACIC AND LUMBOSACRAL NEURITIS: ICD-10-CM

## 2018-04-06 DIAGNOSIS — M54.17 THORACIC AND LUMBOSACRAL NEURITIS: ICD-10-CM

## 2018-04-06 DIAGNOSIS — Z79.899 HIGH RISK MEDICATION USE: ICD-10-CM

## 2018-04-06 DIAGNOSIS — G89.29 CHRONIC BILATERAL LOW BACK PAIN WITHOUT SCIATICA: ICD-10-CM

## 2018-04-06 DIAGNOSIS — G89.29 CHRONIC MIDLINE LOW BACK PAIN WITH SCIATICA, SCIATICA LATERALITY UNSPECIFIED: ICD-10-CM

## 2018-04-06 DIAGNOSIS — M54.50 CHRONIC BILATERAL LOW BACK PAIN WITHOUT SCIATICA: ICD-10-CM

## 2018-04-06 DIAGNOSIS — M54.40 CHRONIC MIDLINE LOW BACK PAIN WITH SCIATICA, SCIATICA LATERALITY UNSPECIFIED: ICD-10-CM

## 2018-04-06 DIAGNOSIS — E29.1 HYPOGONADISM MALE: ICD-10-CM

## 2018-04-06 RX ORDER — TESTOSTERONE CYPIONATE 200 MG/ML
INJECTION INTRAMUSCULAR
Qty: 10 ML | Refills: 0 | Status: SHIPPED | OUTPATIENT
Start: 2018-04-06 | End: 2018-10-09 | Stop reason: ALTCHOICE

## 2018-04-09 ENCOUNTER — PROCEDURE VISIT (OUTPATIENT)
Dept: PHYSICAL MEDICINE AND REHAB | Age: 68
End: 2018-04-09
Payer: MEDICARE

## 2018-04-09 ENCOUNTER — TELEPHONE (OUTPATIENT)
Dept: ENDOCRINOLOGY | Age: 68
End: 2018-04-09

## 2018-04-09 DIAGNOSIS — M54.31 SCIATICA OF RIGHT SIDE: Primary | ICD-10-CM

## 2018-04-09 PROCEDURE — 64445 NJX AA&/STRD SCIATIC NRV IMG: CPT | Performed by: PHYSICAL MEDICINE & REHABILITATION

## 2018-04-09 RX ORDER — OXYCODONE AND ACETAMINOPHEN 10; 325 MG/1; MG/1
TABLET ORAL
Qty: 60 TABLET | Refills: 0 | Status: SHIPPED | OUTPATIENT
Start: 2018-04-09 | End: 2018-04-09 | Stop reason: DRUGHIGH

## 2018-04-09 RX ORDER — OXYCODONE AND ACETAMINOPHEN 7.5; 325 MG/1; MG/1
1 TABLET ORAL EVERY 4 HOURS PRN
Qty: 60 TABLET | Refills: 0 | Status: SHIPPED | OUTPATIENT
Start: 2018-04-09 | End: 2018-05-04 | Stop reason: SDUPTHER

## 2018-04-13 ENCOUNTER — OFFICE VISIT (OUTPATIENT)
Dept: PHYSICAL MEDICINE AND REHAB | Age: 68
End: 2018-04-13
Payer: MEDICARE

## 2018-04-13 VITALS
DIASTOLIC BLOOD PRESSURE: 68 MMHG | HEIGHT: 67 IN | BODY MASS INDEX: 26.92 KG/M2 | WEIGHT: 171.5 LBS | SYSTOLIC BLOOD PRESSURE: 116 MMHG

## 2018-04-13 DIAGNOSIS — G89.29 CHRONIC MIDLINE LOW BACK PAIN WITH SCIATICA, SCIATICA LATERALITY UNSPECIFIED: ICD-10-CM

## 2018-04-13 DIAGNOSIS — M79.10 MYALGIA: ICD-10-CM

## 2018-04-13 DIAGNOSIS — M54.16 RIGHT LUMBAR RADICULOPATHY: ICD-10-CM

## 2018-04-13 DIAGNOSIS — G89.29 CHRONIC LOW BACK PAIN WITH SCIATICA, SCIATICA LATERALITY UNSPECIFIED, UNSPECIFIED BACK PAIN LATERALITY: Primary | ICD-10-CM

## 2018-04-13 DIAGNOSIS — M54.40 CHRONIC LOW BACK PAIN WITH SCIATICA, SCIATICA LATERALITY UNSPECIFIED, UNSPECIFIED BACK PAIN LATERALITY: Primary | ICD-10-CM

## 2018-04-13 DIAGNOSIS — M53.3 SI (SACROILIAC) PAIN: ICD-10-CM

## 2018-04-13 DIAGNOSIS — M54.40 CHRONIC MIDLINE LOW BACK PAIN WITH SCIATICA, SCIATICA LATERALITY UNSPECIFIED: ICD-10-CM

## 2018-04-13 PROCEDURE — 99213 OFFICE O/P EST LOW 20 MIN: CPT | Performed by: PHYSICAL MEDICINE & REHABILITATION

## 2018-04-13 ASSESSMENT — ENCOUNTER SYMPTOMS
NAUSEA: 0
ALLERGIC/IMMUNOLOGIC NEGATIVE: 1
SHORTNESS OF BREATH: 0
DIARRHEA: 0
ABDOMINAL PAIN: 0
VOMITING: 0
CONSTIPATION: 1
CHEST TIGHTNESS: 0
VISUAL CHANGE: 0
BACK PAIN: 1
EYES NEGATIVE: 1
WHEEZING: 0

## 2018-04-20 DIAGNOSIS — M81.0 OSTEOPOROSIS: ICD-10-CM

## 2018-04-20 DIAGNOSIS — M54.17 THORACIC AND LUMBOSACRAL NEURITIS: ICD-10-CM

## 2018-04-20 DIAGNOSIS — M54.14 THORACIC AND LUMBOSACRAL NEURITIS: ICD-10-CM

## 2018-04-20 DIAGNOSIS — F32.2 SEVERE SINGLE CURRENT EPISODE OF MAJOR DEPRESSIVE DISORDER, WITHOUT PSYCHOTIC FEATURES (HCC): ICD-10-CM

## 2018-04-20 DIAGNOSIS — M54.2 NECK PAIN: Chronic | ICD-10-CM

## 2018-04-24 ENCOUNTER — TELEPHONE (OUTPATIENT)
Dept: PULMONOLOGY | Age: 68
End: 2018-04-24

## 2018-04-30 ENCOUNTER — HOSPITAL ENCOUNTER (OUTPATIENT)
Dept: SLEEP CENTER | Age: 68
Discharge: HOME | End: 2018-05-02
Payer: MEDICARE

## 2018-05-04 DIAGNOSIS — E29.1 HYPOGONADISM MALE: ICD-10-CM

## 2018-05-04 DIAGNOSIS — G89.29 CHRONIC MIDLINE LOW BACK PAIN WITH SCIATICA, SCIATICA LATERALITY UNSPECIFIED: ICD-10-CM

## 2018-05-04 DIAGNOSIS — M54.40 CHRONIC MIDLINE LOW BACK PAIN WITH SCIATICA, SCIATICA LATERALITY UNSPECIFIED: ICD-10-CM

## 2018-05-04 DIAGNOSIS — M54.50 CHRONIC BILATERAL LOW BACK PAIN WITHOUT SCIATICA: ICD-10-CM

## 2018-05-04 DIAGNOSIS — M54.2 NECK PAIN: ICD-10-CM

## 2018-05-04 DIAGNOSIS — Z79.899 HIGH RISK MEDICATION USE: ICD-10-CM

## 2018-05-04 DIAGNOSIS — M54.14 THORACIC AND LUMBOSACRAL NEURITIS: ICD-10-CM

## 2018-05-04 DIAGNOSIS — M54.17 THORACIC AND LUMBOSACRAL NEURITIS: ICD-10-CM

## 2018-05-04 DIAGNOSIS — G89.29 CHRONIC BILATERAL LOW BACK PAIN WITHOUT SCIATICA: ICD-10-CM

## 2018-05-04 RX ORDER — OXYCODONE AND ACETAMINOPHEN 7.5; 325 MG/1; MG/1
1 TABLET ORAL EVERY 4 HOURS PRN
Qty: 60 TABLET | Refills: 0 | Status: SHIPPED | OUTPATIENT
Start: 2018-05-08 | End: 2018-06-05 | Stop reason: SDUPTHER

## 2018-05-05 LAB
SEX HORMONE BINDING GLOBULIN: 52 NMOL/L (ref 11–80)
TESTOSTERONE FREE PERCENT: 1.3 % (ref 1.6–2.9)
TESTOSTERONE FREE, CALC: 35 PG/ML (ref 47–244)
TESTOSTERONE TOTAL-MALE: 263 NG/DL (ref 300–720)

## 2018-05-07 ENCOUNTER — OFFICE VISIT (OUTPATIENT)
Dept: PULMONOLOGY | Age: 68
End: 2018-05-07
Payer: MEDICARE

## 2018-05-07 VITALS
RESPIRATION RATE: 16 BRPM | WEIGHT: 169.6 LBS | BODY MASS INDEX: 26.62 KG/M2 | TEMPERATURE: 96.1 F | OXYGEN SATURATION: 98 % | SYSTOLIC BLOOD PRESSURE: 124 MMHG | DIASTOLIC BLOOD PRESSURE: 72 MMHG | HEART RATE: 62 BPM | HEIGHT: 67 IN

## 2018-05-07 DIAGNOSIS — G47.33 OSA (OBSTRUCTIVE SLEEP APNEA): Primary | ICD-10-CM

## 2018-05-07 PROCEDURE — 99214 OFFICE O/P EST MOD 30 MIN: CPT | Performed by: INTERNAL MEDICINE

## 2018-05-07 ASSESSMENT — ENCOUNTER SYMPTOMS
NAUSEA: 0
WHEEZING: 0
ABDOMINAL PAIN: 0
SORE THROAT: 0
SHORTNESS OF BREATH: 0
VOMITING: 0
DIARRHEA: 0
COUGH: 0
VOICE CHANGE: 0
CHEST TIGHTNESS: 0
EYE ITCHING: 0
RHINORRHEA: 0

## 2018-05-08 ENCOUNTER — OFFICE VISIT (OUTPATIENT)
Dept: ENDOCRINOLOGY | Age: 68
End: 2018-05-08
Payer: MEDICARE

## 2018-05-08 VITALS
WEIGHT: 169 LBS | HEART RATE: 62 BPM | HEIGHT: 67 IN | SYSTOLIC BLOOD PRESSURE: 142 MMHG | BODY MASS INDEX: 26.53 KG/M2 | DIASTOLIC BLOOD PRESSURE: 75 MMHG

## 2018-05-08 DIAGNOSIS — E29.1 HYPOGONADISM MALE: Primary | ICD-10-CM

## 2018-05-08 PROCEDURE — 99213 OFFICE O/P EST LOW 20 MIN: CPT | Performed by: INTERNAL MEDICINE

## 2018-05-21 ENCOUNTER — PROCEDURE VISIT (OUTPATIENT)
Dept: PHYSICAL MEDICINE AND REHAB | Age: 68
End: 2018-05-21
Payer: MEDICARE

## 2018-05-21 DIAGNOSIS — M54.31 SCIATICA OF RIGHT SIDE: Primary | ICD-10-CM

## 2018-05-21 PROCEDURE — 64445 NJX AA&/STRD SCIATIC NRV IMG: CPT | Performed by: PHYSICAL MEDICINE & REHABILITATION

## 2018-06-05 DIAGNOSIS — G89.29 CHRONIC MIDLINE LOW BACK PAIN WITH SCIATICA, SCIATICA LATERALITY UNSPECIFIED: ICD-10-CM

## 2018-06-05 DIAGNOSIS — Z79.899 HIGH RISK MEDICATION USE: ICD-10-CM

## 2018-06-05 DIAGNOSIS — M54.50 CHRONIC BILATERAL LOW BACK PAIN WITHOUT SCIATICA: ICD-10-CM

## 2018-06-05 DIAGNOSIS — M54.17 THORACIC AND LUMBOSACRAL NEURITIS: ICD-10-CM

## 2018-06-05 DIAGNOSIS — M54.2 NECK PAIN: ICD-10-CM

## 2018-06-05 DIAGNOSIS — M54.40 CHRONIC MIDLINE LOW BACK PAIN WITH SCIATICA, SCIATICA LATERALITY UNSPECIFIED: ICD-10-CM

## 2018-06-05 DIAGNOSIS — G89.29 CHRONIC BILATERAL LOW BACK PAIN WITHOUT SCIATICA: ICD-10-CM

## 2018-06-05 DIAGNOSIS — M54.14 THORACIC AND LUMBOSACRAL NEURITIS: ICD-10-CM

## 2018-06-06 ENCOUNTER — OFFICE VISIT (OUTPATIENT)
Dept: FAMILY MEDICINE CLINIC | Age: 68
End: 2018-06-06
Payer: MEDICARE

## 2018-06-06 ENCOUNTER — TELEPHONE (OUTPATIENT)
Dept: CARDIOLOGY CLINIC | Age: 68
End: 2018-06-06

## 2018-06-06 VITALS
TEMPERATURE: 96.7 F | OXYGEN SATURATION: 99 % | SYSTOLIC BLOOD PRESSURE: 130 MMHG | HEART RATE: 58 BPM | DIASTOLIC BLOOD PRESSURE: 72 MMHG | HEIGHT: 67 IN | WEIGHT: 170 LBS | RESPIRATION RATE: 18 BRPM | BODY MASS INDEX: 26.68 KG/M2

## 2018-06-06 DIAGNOSIS — G89.29 CHRONIC LOW BACK PAIN WITH SCIATICA, SCIATICA LATERALITY UNSPECIFIED, UNSPECIFIED BACK PAIN LATERALITY: ICD-10-CM

## 2018-06-06 DIAGNOSIS — R06.09 DOE (DYSPNEA ON EXERTION): ICD-10-CM

## 2018-06-06 DIAGNOSIS — I10 BENIGN ESSENTIAL HTN: ICD-10-CM

## 2018-06-06 DIAGNOSIS — K59.03 THERAPEUTIC OPIOID INDUCED CONSTIPATION: ICD-10-CM

## 2018-06-06 DIAGNOSIS — G47.33 OSA (OBSTRUCTIVE SLEEP APNEA): ICD-10-CM

## 2018-06-06 DIAGNOSIS — M54.40 CHRONIC LOW BACK PAIN WITH SCIATICA, SCIATICA LATERALITY UNSPECIFIED, UNSPECIFIED BACK PAIN LATERALITY: ICD-10-CM

## 2018-06-06 DIAGNOSIS — T40.2X5A THERAPEUTIC OPIOID INDUCED CONSTIPATION: ICD-10-CM

## 2018-06-06 DIAGNOSIS — I25.10 CORONARY ARTERY DISEASE INVOLVING NATIVE CORONARY ARTERY OF NATIVE HEART WITHOUT ANGINA PECTORIS: Primary | ICD-10-CM

## 2018-06-06 DIAGNOSIS — I25.10 CORONARY ARTERY DISEASE INVOLVING NATIVE CORONARY ARTERY OF NATIVE HEART WITHOUT ANGINA PECTORIS: ICD-10-CM

## 2018-06-06 PROBLEM — R29.898 HAND WEAKNESS: Status: RESOLVED | Noted: 2017-06-01 | Resolved: 2018-06-06

## 2018-06-06 PROBLEM — G56.03 BILATERAL CARPAL TUNNEL SYNDROME: Status: RESOLVED | Noted: 2017-04-05 | Resolved: 2018-06-06

## 2018-06-06 PROBLEM — R06.83 SNORING: Status: RESOLVED | Noted: 2018-01-03 | Resolved: 2018-06-06

## 2018-06-06 LAB
ALBUMIN SERPL-MCNC: 4.6 G/DL (ref 3.9–4.9)
ALP BLD-CCNC: 51 U/L (ref 35–104)
ALT SERPL-CCNC: 26 U/L (ref 0–41)
ANION GAP SERPL CALCULATED.3IONS-SCNC: 13 MEQ/L (ref 7–13)
AST SERPL-CCNC: 28 U/L (ref 0–40)
BASOPHILS ABSOLUTE: 0 K/UL (ref 0–0.2)
BASOPHILS RELATIVE PERCENT: 1 %
BILIRUB SERPL-MCNC: 0.6 MG/DL (ref 0–1.2)
BUN BLDV-MCNC: 17 MG/DL (ref 8–23)
CALCIUM SERPL-MCNC: 9.7 MG/DL (ref 8.6–10.2)
CHLORIDE BLD-SCNC: 98 MEQ/L (ref 98–107)
CO2: 28 MEQ/L (ref 22–29)
CREAT SERPL-MCNC: 0.96 MG/DL (ref 0.7–1.2)
EOSINOPHILS ABSOLUTE: 0.1 K/UL (ref 0–0.7)
EOSINOPHILS RELATIVE PERCENT: 2.2 %
GFR AFRICAN AMERICAN: >60
GFR NON-AFRICAN AMERICAN: >60
GLOBULIN: 3 G/DL (ref 2.3–3.5)
GLUCOSE BLD-MCNC: 83 MG/DL (ref 74–109)
HCT VFR BLD CALC: 45.8 % (ref 42–52)
HEMOGLOBIN: 15.2 G/DL (ref 14–18)
LYMPHOCYTES ABSOLUTE: 0.9 K/UL (ref 1–4.8)
LYMPHOCYTES RELATIVE PERCENT: 18.6 %
MCH RBC QN AUTO: 33 PG (ref 27–31.3)
MCHC RBC AUTO-ENTMCNC: 33.1 % (ref 33–37)
MCV RBC AUTO: 99.5 FL (ref 80–100)
MONOCYTES ABSOLUTE: 0.5 K/UL (ref 0.2–0.8)
MONOCYTES RELATIVE PERCENT: 10.2 %
NEUTROPHILS ABSOLUTE: 3.2 K/UL (ref 1.4–6.5)
NEUTROPHILS RELATIVE PERCENT: 68 %
PDW BLD-RTO: 15.4 % (ref 11.5–14.5)
PLATELET # BLD: 265 K/UL (ref 130–400)
POTASSIUM SERPL-SCNC: 5.3 MEQ/L (ref 3.5–5.1)
RBC # BLD: 4.6 M/UL (ref 4.7–6.1)
SODIUM BLD-SCNC: 139 MEQ/L (ref 132–144)
TOTAL PROTEIN: 7.6 G/DL (ref 6.4–8.1)
WBC # BLD: 4.7 K/UL (ref 4.8–10.8)

## 2018-06-06 PROCEDURE — 93000 ELECTROCARDIOGRAM COMPLETE: CPT | Performed by: PHYSICIAN ASSISTANT

## 2018-06-06 PROCEDURE — 99214 OFFICE O/P EST MOD 30 MIN: CPT | Performed by: PHYSICIAN ASSISTANT

## 2018-06-06 ASSESSMENT — ENCOUNTER SYMPTOMS
ABDOMINAL DISTENTION: 0
BACK PAIN: 1
CHEST TIGHTNESS: 0
SHORTNESS OF BREATH: 0
ABDOMINAL PAIN: 0
COUGH: 0
WHEEZING: 0
COLOR CHANGE: 0

## 2018-06-08 RX ORDER — OXYCODONE AND ACETAMINOPHEN 7.5; 325 MG/1; MG/1
1 TABLET ORAL EVERY 4 HOURS PRN
Qty: 60 TABLET | Refills: 0 | Status: SHIPPED | OUTPATIENT
Start: 2018-06-08 | End: 2018-07-02 | Stop reason: SDUPTHER

## 2018-06-12 ENCOUNTER — TELEPHONE (OUTPATIENT)
Dept: FAMILY MEDICINE CLINIC | Age: 68
End: 2018-06-12

## 2018-06-12 DIAGNOSIS — K59.04 CHRONIC IDIOPATHIC CONSTIPATION: Primary | ICD-10-CM

## 2018-06-18 RX ORDER — LUBIPROSTONE 24 UG/1
24 CAPSULE, GELATIN COATED ORAL 2 TIMES DAILY WITH MEALS
Qty: 60 CAPSULE | Refills: 2 | Status: CANCELLED | OUTPATIENT
Start: 2018-06-18

## 2018-06-18 RX ORDER — LUBIPROSTONE 8 UG/1
8 CAPSULE, GELATIN COATED ORAL 2 TIMES DAILY WITH MEALS
Qty: 60 CAPSULE | Refills: 2 | Status: SHIPPED | OUTPATIENT
Start: 2018-06-18 | End: 2018-10-09 | Stop reason: SDUPTHER

## 2018-06-19 ENCOUNTER — TELEPHONE (OUTPATIENT)
Dept: FAMILY MEDICINE CLINIC | Age: 68
End: 2018-06-19

## 2018-06-20 DIAGNOSIS — M1A.9XX0 CHRONIC GOUT WITHOUT TOPHUS, UNSPECIFIED CAUSE, UNSPECIFIED SITE: ICD-10-CM

## 2018-06-20 RX ORDER — ALLOPURINOL 100 MG/1
100 TABLET ORAL DAILY
Qty: 30 TABLET | Refills: 3 | Status: SHIPPED | OUTPATIENT
Start: 2018-06-20 | End: 2018-10-29 | Stop reason: SDUPTHER

## 2018-06-20 NOTE — TELEPHONE ENCOUNTER
Rx request   Requested Prescriptions     Pending Prescriptions Disp Refills    allopurinol (ZYLOPRIM) 100 MG tablet 30 tablet 3     Sig: Take 1 tablet by mouth daily     LOV 6/6/2018  Next Visit Date:  Future Appointments  Date Time Provider Alexis Vilchisi   7/12/2018 10:00 AM Anali Ramirez DO [address]   8/7/2018 11:00 AM Martinez Long MD [facility]   8/8/2018 1:30 PM [place] [facility]   8/10/2018 11:15 AM Genie White, [address] [facility]   10/3/2018 8:00 AM Linnea Mora PA-C [address] [place] [address]

## 2018-07-02 DIAGNOSIS — M25.551 RIGHT HIP PAIN: ICD-10-CM

## 2018-07-02 DIAGNOSIS — G89.29 CHRONIC BILATERAL LOW BACK PAIN WITHOUT SCIATICA: ICD-10-CM

## 2018-07-02 DIAGNOSIS — M54.2 NECK PAIN: Chronic | ICD-10-CM

## 2018-07-02 DIAGNOSIS — M54.50 CHRONIC BILATERAL LOW BACK PAIN WITHOUT SCIATICA: ICD-10-CM

## 2018-07-02 DIAGNOSIS — G89.29 CHRONIC RIGHT-SIDED THORACIC BACK PAIN: ICD-10-CM

## 2018-07-02 DIAGNOSIS — M54.17 THORACIC AND LUMBOSACRAL NEURITIS: ICD-10-CM

## 2018-07-02 DIAGNOSIS — G89.29 CHRONIC MIDLINE LOW BACK PAIN WITH SCIATICA, SCIATICA LATERALITY UNSPECIFIED: ICD-10-CM

## 2018-07-02 DIAGNOSIS — M53.3 SI (SACROILIAC) PAIN: ICD-10-CM

## 2018-07-02 DIAGNOSIS — M54.6 CHRONIC RIGHT-SIDED THORACIC BACK PAIN: ICD-10-CM

## 2018-07-02 DIAGNOSIS — Z79.899 HIGH RISK MEDICATION USE: ICD-10-CM

## 2018-07-02 DIAGNOSIS — M54.40 CHRONIC MIDLINE LOW BACK PAIN WITH SCIATICA, SCIATICA LATERALITY UNSPECIFIED: ICD-10-CM

## 2018-07-02 DIAGNOSIS — M54.14 THORACIC AND LUMBOSACRAL NEURITIS: ICD-10-CM

## 2018-07-02 RX ORDER — OXYCODONE AND ACETAMINOPHEN 7.5; 325 MG/1; MG/1
1 TABLET ORAL EVERY 4 HOURS PRN
Qty: 60 TABLET | Refills: 0 | Status: SHIPPED | OUTPATIENT
Start: 2018-07-08 | End: 2018-08-02 | Stop reason: SDUPTHER

## 2018-07-02 RX ORDER — GABAPENTIN 300 MG/1
CAPSULE ORAL
Qty: 270 CAPSULE | Refills: 0 | Status: SHIPPED | OUTPATIENT
Start: 2018-07-02 | End: 2018-09-30 | Stop reason: SDUPTHER

## 2018-07-12 ENCOUNTER — OFFICE VISIT (OUTPATIENT)
Dept: PHYSICAL MEDICINE AND REHAB | Age: 68
End: 2018-07-12
Payer: MEDICARE

## 2018-07-12 VITALS
DIASTOLIC BLOOD PRESSURE: 82 MMHG | HEIGHT: 68 IN | BODY MASS INDEX: 24.71 KG/M2 | WEIGHT: 163 LBS | SYSTOLIC BLOOD PRESSURE: 128 MMHG

## 2018-07-12 DIAGNOSIS — F25.9 SCHIZO-AFFECTIVE SCHIZOPHRENIA, IN REMISSION (HCC): ICD-10-CM

## 2018-07-12 DIAGNOSIS — G89.29 CHRONIC LOW BACK PAIN WITH SCIATICA, SCIATICA LATERALITY UNSPECIFIED, UNSPECIFIED BACK PAIN LATERALITY: Primary | ICD-10-CM

## 2018-07-12 DIAGNOSIS — M54.40 CHRONIC LOW BACK PAIN WITH SCIATICA, SCIATICA LATERALITY UNSPECIFIED, UNSPECIFIED BACK PAIN LATERALITY: Primary | ICD-10-CM

## 2018-07-12 DIAGNOSIS — M10.00 IDIOPATHIC GOUT, UNSPECIFIED CHRONICITY, UNSPECIFIED SITE: ICD-10-CM

## 2018-07-12 DIAGNOSIS — M79.10 MYALGIA: ICD-10-CM

## 2018-07-12 DIAGNOSIS — M54.16 RIGHT LUMBAR RADICULOPATHY: ICD-10-CM

## 2018-07-12 DIAGNOSIS — M53.3 SI (SACROILIAC) PAIN: ICD-10-CM

## 2018-07-12 DIAGNOSIS — M54.40 BILATERAL LOW BACK PAIN WITH SCIATICA, SCIATICA LATERALITY UNSPECIFIED, UNSPECIFIED CHRONICITY: ICD-10-CM

## 2018-07-12 DIAGNOSIS — E55.9 VITAMIN D DEFICIENCY: ICD-10-CM

## 2018-07-12 DIAGNOSIS — M47.812 DJD (DEGENERATIVE JOINT DISEASE), CERVICAL: ICD-10-CM

## 2018-07-12 PROCEDURE — 99214 OFFICE O/P EST MOD 30 MIN: CPT | Performed by: PHYSICAL MEDICINE & REHABILITATION

## 2018-07-12 ASSESSMENT — ENCOUNTER SYMPTOMS
VISUAL CHANGE: 0
NAUSEA: 0
ABDOMINAL PAIN: 0
WHEEZING: 0
EYES NEGATIVE: 1
ALLERGIC/IMMUNOLOGIC NEGATIVE: 1
VOMITING: 0
CONSTIPATION: 1
CHEST TIGHTNESS: 0
DIARRHEA: 0
SHORTNESS OF BREATH: 0
BACK PAIN: 1

## 2018-07-12 NOTE — PROGRESS NOTES
ago. This is a chronic problem. The onset of the illness is precipitated by a stressful event (chronic pain). The degree of incapacity that he is experiencing as a consequence of his illness is severe. Sequelae of the illness include an inability to work. Additional symptoms of the illness include anhedonia. Additional symptoms of the illness do not include unexpected weight change, fatigue, psychomotor retardation, feelings of worthlessness, attention impairment, euphoric mood, increased goal-directed activity, flight of ideas, inflated self-esteem, decreased need for sleep, distractible, poor judgment, visual change, headaches, abdominal pain or seizures. He does not admit to suicidal ideas. He does not have a plan to commit suicide. He does not contemplate harming himself. He has not already injured self. He does not contemplate injuring another person. He has not already  injured another person. Risk factors that are present for mental illness include a history of mental illness. Hand Pain    The incident occurred more than 1 week ago. The incident occurred at home. There was no injury mechanism. The pain is present in the right wrist and right hand. The quality of the pain is described as cramping. The pain is at a severity of 5/10. The pain is moderate. The pain has been intermittent since the incident. Associated symptoms include numbness. Pertinent negatives include no chest pain, muscle weakness or tingling. The symptoms are aggravated by movement. He has tried ice for the symptoms. The treatment provided moderate relief.        Past Medical History:   Diagnosis Date    Chronic back pain     Coronary artery disease 2002    Dr. Fredy Carvajal at [facility] Esophageal ulcer 3/10/2014    Dr. Jeniffer Cuadra    Gastric ulcer 3/10/2014    Dr. Jeniffer Cuadra    Gout     Hiatal hernia 3/10/2014    Dr. Jeniffer Cuadra    Hyperlipidemia     Hypertension     MI (myocardial infarction) 2005    Dr. Mukund Lyons No Known Allergies    Review of Systems   Constitutional: Negative for activity change, fatigue and unexpected weight change. HENT: Negative. Eyes: Negative. Respiratory: Negative for chest tightness, shortness of breath and wheezing. Cardiovascular: Negative for chest pain, palpitations and leg swelling. Gastrointestinal: Positive for constipation. Negative for abdominal pain, diarrhea, nausea and vomiting. Endocrine: Negative. Genitourinary: Negative. Musculoskeletal: Positive for arthralgias, back pain, gait problem, myalgias, neck pain and neck stiffness. Skin: Negative. Allergic/Immunologic: Negative. Neurological: Positive for weakness and numbness. Negative for dizziness, tingling, seizures, light-headedness and headaches. Hematological: Bruises/bleeds easily. Psychiatric/Behavioral: Negative for dysphoric mood and sleep disturbance. The patient is not nervous/anxious. Objective    Vitals:    07/12/18 1015   BP: 128/82   Site: Left Arm   Position: Sitting   Cuff Size: Medium Adult   Weight: 163 lb (73.9 kg)   Height: 5' 8.4\" (1.737 m)     Pain Score: SIX     Physical Exam   Constitutional: He is oriented to person, place, and time. Vital signs are normal. He appears well-developed and well-nourished. Non-toxic appearance. He does not have a sickly appearance. He does not appear ill. No distress. HENT:   Head: Normocephalic and atraumatic. Right Ear: Hearing normal.   Left Ear: Hearing normal.   Nose: Nose normal.   Mouth/Throat: Oropharynx is clear and moist and mucous membranes are normal. No oral lesions. Normal dentition. No oropharyngeal exudate. No signs of toxic erosions. Eyes: Conjunctivae and EOM are normal. Pupils are equal, round, and reactive to light. Right eye exhibits no chemosis, no discharge and no exudate. Left eye exhibits no chemosis, no discharge and no exudate. No scleral icterus. Neck: Neck supple. No JVD present.  Spinous process tenderness and muscular tenderness present. No tracheal tenderness present. No neck rigidity. No tracheal deviation and no edema present. No thyromegaly present. Cardiovascular: Normal rate, regular rhythm, normal heart sounds and intact distal pulses. Exam reveals no gallop, no friction rub and no decreased pulses. No murmur heard. Pulmonary/Chest: Effort normal. No accessory muscle usage. No apnea, no tachypnea and no bradypnea. No respiratory distress. He has decreased breath sounds. He has no wheezes. He has no rales. He exhibits no tenderness. Abdominal: Soft. Bowel sounds are normal. He exhibits no distension and no mass. There is no tenderness. There is no rebound and no guarding. Musculoskeletal: He exhibits tenderness. He exhibits no edema. Right shoulder: He exhibits decreased range of motion, tenderness and bony tenderness. Left shoulder: He exhibits decreased range of motion, tenderness and bony tenderness. Right elbow: Normal.       Left elbow: Normal.        Right wrist: Normal.        Left wrist: Normal.        Right hip: Normal.        Left hip: Normal.        Right knee: Normal.        Left knee: Normal.        Right ankle: Normal. Achilles tendon normal.        Left ankle: Normal. Achilles tendon normal.        Cervical back: He exhibits decreased range of motion, tenderness, bony tenderness, laceration, pain and spasm. He exhibits no swelling, no edema and no deformity. Thoracic back: He exhibits decreased range of motion, tenderness, bony tenderness, deformity, pain and spasm. Lumbar back: He exhibits decreased range of motion, tenderness, bony tenderness and pain. He exhibits no swelling, no edema, no deformity, no laceration and normal pulse.         Back:         Right upper arm: Normal.        Left upper arm: Normal.        Right forearm: Normal.        Left forearm: Normal.        Arms:       Right hand: He exhibits decreased range of rapid and/or pressured, not delayed, not tangential and not slurred. He is not agitated, not aggressive, not hyperactive, not slowed, not withdrawn, not actively hallucinating and not combative. Thought content is not paranoid and not delusional. Cognition and memory are normal. Cognition and memory are not impaired. He does not express impulsivity or inappropriate judgment. He does not exhibit a depressed mood. He expresses no homicidal and no suicidal ideation. He expresses no suicidal plans and no homicidal plans. He is communicative. He exhibits normal recent memory and normal remote memory.   high score on SOAPPR    Calm appropriate    NAD He is attentive. Vitals reviewed. Ortho Exam  Neurologic Exam     Mental Status   Oriented to person, place, and time. Speech: not slurred   Level of consciousness: alert  Knowledge: good. Able to name object. Able to read. Able to repeat. Able to write. Normal comprehension. Cranial Nerves     CN III, IV, VI   Pupils are equal, round, and reactive to light. Extraocular motions are normal.     Sensory Exam   Sensory deficit distribution on right: non-dermatomal distribution    Gait, Coordination, and Reflexes     Reflexes   Right patellar: 1+  Left patellar: 1+  Right achilles: 0  Left achilles: 0        After a thorough review and discussion of the previous medical records, patient comprehensive medical, surgical, and family and social history, Review of Systems, their OARRS, their Screener and Opioid Assessment for Patients with Pain (SOAPP®-R), recent diagnostics, and symptomatic results to previous treatment, it is my impression that the patients is suffering with progressive and severe:     Diagnosis Orders   1. Chronic low back pain with sciatica, sciatica laterality unspecified, unspecified back pain laterality  DC INJ,ANES AGENT,SCIATIC NERVE,SINGLE   2. Right lumbar radiculopathy     3. SI (sacroiliac) pain     4.  Bilateral low back pain with sciatica, sciatica laterality unspecified, unspecified chronicity     5. Idiopathic gout, unspecified chronicity, unspecified site     6. Vitamin D deficiency     7. Schizo-affective schizophrenia, in remission ([facility] [address]     8. DJD (degenerative joint disease), cervical     9. Myalgia  NC INJECT TRIGGER POINTS, 3 OR GREATER       I am also concerned by lifestyle and mood issues including:    Past Medical History:   Diagnosis Date    Chronic back pain     Coronary artery disease 2002    [facility] Winter Garcia at [facility] Esophageal ulcer 3/10/2014    Dr. Gianni Velazco    Gastric ulcer 3/10/2014    Dr. Gianni Velazco    Gout     Hiatal hernia 3/10/2014    Dr. Gianni Velazco    Hyperlipidemia     Hypertension     MI (myocardial infarction) 2005    Dr. Osmin Melgoza Peripheral vascular disease ([facility] [address]     Prediabetes     Severe single current [facility] of major [facility], [facility] 7/8/2016    Status post coronary artery stent placement 2002    Dr. Winter Garcia           Given their medication, chronic pain and lifestyle and medications they are at risk for :    Falls, constipation, addiction  Loss of livelyhood due to severe pain, debility, weight gain and  vitamin D deficiency    The patient was educated regarding proper diet, fitness routine, and regulatory restrictions concerning pain medications. Previous notes, comprehensive past medical, surgical, family history, and diagnostics were reviewed. Patient education and councelling were provided regarding off label use,treatment options and medication and injection risks. Current and old OARRS ([place] Automated Prescription Reporting System) records reviewed, all refills reviewed since last visit,  Behavioral agreement/[employer] regulations   and Toxicology screen was reviewed with patient and is up to date. There are no current red flags.    They are making good progress regarding pain relief, they are performing at a functional level regarding activities of daily living and psychological functioning, they're not having any adverse effects or side effects from the current medications, and I see no findings of aberrant drug taking her addiction related behaviors. Attestation: The Prescription Monitoring Report for this patient was reviewed today. (Lyndsey Perez, DO)  Documentation: No signs of potential drug abuse or diversion identified., Obtaining appropriate analgesic effect of treatment. , Possible medication side effects, risk of tolerance/dependence & alternative treatments discussed. (Lyndsey Perez, DO)       Patient is currently taking:       I am having Mr. Wright maintain his SYRINGE-NEEDLE (DISP) 3 ML, aspirin, pantoprazole, diclofenac sodium, Walker, NIFEdipine, nitroGLYCERIN, pravastatin, metoprolol tartrate, testosterone cypionate, diclofenac, CPAP Machine, Plecanatide, lubiprostone, allopurinol, gabapentin, and oxyCODONE-acetaminophen. I also recommend the following Medications:    Continue Percocet and Neurontin as needed     -which helps with pain and function. Otherwise, continue the current pain medications that I have prescibed. Radiologic:   Old films reviewed  severe DDD DJD of the lumbar spine with previous back surgery,     I discussed results with patients. see Follow up plans below  For any new studies. Care Everywhere Updates:  requested and reviewed. No new issues noted.            Labs:  Previous labs reviewed     Lab Results   Component Value Date     06/06/2018    K 5.3 06/06/2018    CL 98 06/06/2018    CO2 28 06/06/2018    BUN 17 06/06/2018    CREATININE 0.96 06/06/2018    CALCIUM 9.7 06/06/2018    LABALBU 4.6 06/06/2018    BILITOT 0.6 06/06/2018    ALKPHOS 51 06/06/2018    AST 28 06/06/2018    ALT 26 06/06/2018     Lab Results   Component Value Date    WBC 4.7 06/06/2018    RBC 4.60 06/06/2018    HGB 15.2 06/06/2018    HCT 45.8 06/06/2018    MCV 99.5

## 2018-07-18 ENCOUNTER — PROCEDURE VISIT (OUTPATIENT)
Dept: PHYSICAL MEDICINE AND REHAB | Age: 68
End: 2018-07-18
Payer: MEDICARE

## 2018-07-18 DIAGNOSIS — F32.2 SEVERE SINGLE CURRENT EPISODE OF MAJOR DEPRESSIVE DISORDER, WITHOUT PSYCHOTIC FEATURES (HCC): ICD-10-CM

## 2018-07-18 DIAGNOSIS — M54.2 NECK PAIN: Chronic | ICD-10-CM

## 2018-07-18 DIAGNOSIS — M79.10 MYALGIA: ICD-10-CM

## 2018-07-18 DIAGNOSIS — M81.0 OSTEOPOROSIS, UNSPECIFIED OSTEOPOROSIS TYPE, UNSPECIFIED PATHOLOGICAL FRACTURE PRESENCE: ICD-10-CM

## 2018-07-18 DIAGNOSIS — M81.0 OSTEOPOROSIS: ICD-10-CM

## 2018-07-18 DIAGNOSIS — M54.17 THORACIC AND LUMBOSACRAL NEURITIS: ICD-10-CM

## 2018-07-18 DIAGNOSIS — M79.7 FIBROMYALGIA: Primary | ICD-10-CM

## 2018-07-18 DIAGNOSIS — M54.14 THORACIC AND LUMBOSACRAL NEURITIS: ICD-10-CM

## 2018-07-18 PROCEDURE — 20553 NJX 1/MLT TRIGGER POINTS 3/>: CPT | Performed by: PHYSICAL MEDICINE & REHABILITATION

## 2018-07-30 RX ORDER — LIDOCAINE HYDROCHLORIDE 5 MG/ML
20 INJECTION, SOLUTION INFILTRATION; INTRAVENOUS ONCE
Status: COMPLETED | OUTPATIENT
Start: 2018-07-30 | End: 2018-07-30

## 2018-07-30 RX ADMIN — LIDOCAINE HYDROCHLORIDE 20 ML: 5 INJECTION, SOLUTION INFILTRATION; INTRAVENOUS at 13:00

## 2018-08-02 DIAGNOSIS — G89.29 CHRONIC MIDLINE LOW BACK PAIN WITH SCIATICA, SCIATICA LATERALITY UNSPECIFIED: ICD-10-CM

## 2018-08-02 DIAGNOSIS — M54.50 CHRONIC BILATERAL LOW BACK PAIN WITHOUT SCIATICA: ICD-10-CM

## 2018-08-02 DIAGNOSIS — Z79.899 HIGH RISK MEDICATION USE: ICD-10-CM

## 2018-08-02 DIAGNOSIS — M54.40 CHRONIC MIDLINE LOW BACK PAIN WITH SCIATICA, SCIATICA LATERALITY UNSPECIFIED: ICD-10-CM

## 2018-08-02 DIAGNOSIS — M54.2 NECK PAIN: Chronic | ICD-10-CM

## 2018-08-02 DIAGNOSIS — M54.17 THORACIC AND LUMBOSACRAL NEURITIS: ICD-10-CM

## 2018-08-02 DIAGNOSIS — G89.29 CHRONIC BILATERAL LOW BACK PAIN WITHOUT SCIATICA: ICD-10-CM

## 2018-08-02 DIAGNOSIS — M54.14 THORACIC AND LUMBOSACRAL NEURITIS: ICD-10-CM

## 2018-08-02 RX ORDER — OXYCODONE AND ACETAMINOPHEN 7.5; 325 MG/1; MG/1
1 TABLET ORAL EVERY 4 HOURS PRN
Qty: 60 TABLET | Refills: 0 | Status: SHIPPED | OUTPATIENT
Start: 2018-08-07 | End: 2018-09-04 | Stop reason: SDUPTHER

## 2018-08-08 ENCOUNTER — OFFICE VISIT (OUTPATIENT)
Dept: CARDIOLOGY CLINIC | Age: 68
End: 2018-08-08
Payer: MEDICARE

## 2018-08-08 VITALS
OXYGEN SATURATION: 100 % | DIASTOLIC BLOOD PRESSURE: 72 MMHG | RESPIRATION RATE: 16 BRPM | HEART RATE: 66 BPM | BODY MASS INDEX: 26.06 KG/M2 | HEIGHT: 67 IN | SYSTOLIC BLOOD PRESSURE: 148 MMHG | WEIGHT: 166 LBS

## 2018-08-08 DIAGNOSIS — G47.33 OSA (OBSTRUCTIVE SLEEP APNEA): ICD-10-CM

## 2018-08-08 DIAGNOSIS — I25.2 HISTORY OF MI (MYOCARDIAL INFARCTION): Primary | ICD-10-CM

## 2018-08-08 DIAGNOSIS — I25.118 CORONARY ARTERY DISEASE OF NATIVE ARTERY OF NATIVE HEART WITH STABLE ANGINA PECTORIS (HCC): ICD-10-CM

## 2018-08-08 DIAGNOSIS — I25.10 CORONARY ARTERY DISEASE WITH HX OF MYOCARDIAL INFARCT W/O HX OF CABG: ICD-10-CM

## 2018-08-08 DIAGNOSIS — E78.00 HYPERCHOLESTEROLEMIA: Chronic | ICD-10-CM

## 2018-08-08 DIAGNOSIS — I25.2 CORONARY ARTERY DISEASE WITH HX OF MYOCARDIAL INFARCT W/O HX OF CABG: ICD-10-CM

## 2018-08-08 DIAGNOSIS — I10 BENIGN ESSENTIAL HTN: ICD-10-CM

## 2018-08-08 DIAGNOSIS — I10 ESSENTIAL HYPERTENSION, BENIGN: ICD-10-CM

## 2018-08-08 PROCEDURE — 99214 OFFICE O/P EST MOD 30 MIN: CPT | Performed by: INTERNAL MEDICINE

## 2018-08-08 RX ORDER — NIFEDIPINE 60 MG/1
60 TABLET, EXTENDED RELEASE ORAL DAILY
Qty: 90 TABLET | Refills: 3 | Status: SHIPPED | OUTPATIENT
Start: 2018-08-08 | End: 2019-01-28 | Stop reason: ALTCHOICE

## 2018-08-08 ASSESSMENT — ENCOUNTER SYMPTOMS
EYES NEGATIVE: 1
RESPIRATORY NEGATIVE: 1
STRIDOR: 0
CHEST TIGHTNESS: 0
COUGH: 0
BLOOD IN STOOL: 0
SHORTNESS OF BREATH: 0
GASTROINTESTINAL NEGATIVE: 1
WHEEZING: 0
NAUSEA: 0

## 2018-08-08 NOTE — PROGRESS NOTES
Subsequent Progress Note  Patient: Betty Gutierrez  YOB: 1950  MRN: [id number]    Chief Complaint: HTN CAD  Chief Complaint   Patient presents with    Coronary Artery Disease     4 m f/u    Hypertension    Hyperlipidemia       CV Data:  Prior CAD/Stentsx 2  10/17/2017 CABG x2 EF 55  Subjective/HPI: ran out of Nifedipine 2 weeks ago.  No cp breathing good no bleed     EKG:    Past Medical History:   Diagnosis Date    Chronic back pain     Coronary artery disease 2002    [facility] Renita [facility] Esophageal ulcer 3/10/2014    Dr. Jamie Castro Gastric ulcer 3/10/2014    Dr. Soledad Davenport    Gout     Hiatal hernia 3/10/2014    Dr. Soledad Daevnport    Hyperlipidemia     Hypertension     MI ([facility]ocardial infarction) [facility]) 2005    Dr. Mariangel Dawn Peripheral vascular disease ([facility] [address]     Prediabetes     Severe single current episode of major depressive disorder, without psychotic features ([facility] [address] 7/8/2016    Status post coronary artery stent placement 2002    Dr. Renita Riojas       Past Surgical History:   Procedure Laterality Date   [address]      COLONOSCOPY  3/10/14    DR Eileen Reveles  2002    Dr. Morris 83 GRAFT  10/17/2017    KNEE ARTHROSCOPY Left 2002    Dr. Tyson Stallings  12/19/13    Maridee Deist DR Johannah Jeans  9/2009    Dr. Brannon Buerger  2008    Dr. Mary Demarco  3/10/14    Severiano Soho, DR Norrine March       Family History   Problem Relation Age of Onset    High Blood Pressure Mother     Arthritis Mother     Cancer Father         throat    Arthritis Father        Social History     Social History    Marital status:      Spouse name: N/A    Number of children: N/A    Years of education: N/A     Occupational History    MG tablet Take 1 tablet by mouth 2 times daily 90 tablet 3    pravastatin (PRAVACHOL) 40 MG tablet TK 1 T PO QPM 90 tablet 2    nitroGLYCERIN (NITROSTAT) 0.4 MG SL tablet Place 1 tablet under the tongue every 5 minutes as needed x 3 doses for chest pain. 25 tablet 0    Misc. Devices (WALKER) MISC 1 each by Does not apply route daily 1 each 0    pantoprazole (PROTONIX) 40 MG tablet Take 40 mg by mouth daily      diclofenac sodium (VOLTAREN) 1 % GEL Apply 2 g topically 2 times daily 1 Tube 3    aspirin 81 MG EC tablet Take 1 tablet by mouth daily 90 tablet 1    SYRINGE-NEEDLE, DISP, 3 ML (B-D [employer] SYRINGE) 22G X 1-1/2\" 3 ML MISC 1 each by Does not apply route daily 20 each 6     No current facility-administered medications for this visit. Review of Systems:   Review of Systems   Constitutional: Negative. Negative for diaphoresis and fatigue. HENT: Negative. Eyes: Negative. Respiratory: Negative. Negative for cough, chest tightness, shortness of breath, wheezing and stridor. Cardiovascular: Negative. Negative for chest pain, palpitations and leg swelling. Gastrointestinal: Negative. Negative for blood in stool and nausea. Genitourinary: Negative. Musculoskeletal: Negative. Skin: Negative. Neurological: Negative. Negative for dizziness, syncope, weakness and light-headedness. Hematological: Negative. Psychiatric/Behavioral: Negative. Physical Examination:    BP (!) 148/72 (Site: Right Arm, Position: Sitting, Cuff Size: Large Adult)   Pulse 66   Resp 16   Ht 5' 7\" (1.702 m)   Wt 166 lb (75.3 kg)   SpO2 100%   BMI 26.00 kg/m²    Physical Exam   Constitutional: He appears healthy. No distress. HENT:   Normal cephalic and Atraumatic   Eyes: Pupils are equal, round, and reactive to light. Neck: Normal range of motion and thyroid normal. Neck supple. No JVD present. No neck adenopathy. No thyromegaly present.    Cardiovascular: Normal rate, regular rhythm, Coronary artery disease of native artery of native heart with stable angina pectoris (HCC)      - NIFEdipine (PROCARDIA XL) 60 MG extended release tablet; Take 1 tablet by mouth daily  Dispense: 90 tablet; Refill: 3    7. Benign essential HTN      - NIFEdipine (PROCARDIA XL) 60 MG extended release tablet; Take 1 tablet by mouth daily  Dispense: 90 tablet; Refill: 3       Counseling:  Heart Healthy Lifestyle, Low Salt Diet, Take Precautions to Prevent Falls, Regular Exercise and Walk Daily    Return in about 4 months (around 12/8/2018) for Cardiovascular care. .      Electronically signed by Rk Ramirez MD on 8/8/2018 at 2:19 PM

## 2018-08-11 DIAGNOSIS — E29.1 HYPOGONADISM MALE: ICD-10-CM

## 2018-08-11 LAB
HCT VFR BLD CALC: 46 % (ref 42–52)
HEMOGLOBIN: 15.8 G/DL (ref 14–18)
MCH RBC QN AUTO: 33.6 PG (ref 27–31.3)
MCHC RBC AUTO-ENTMCNC: 34.3 % (ref 33–37)
MCV RBC AUTO: 97.9 FL (ref 80–100)
PDW BLD-RTO: 13.8 % (ref 11.5–14.5)
PLATELET # BLD: 228 K/UL (ref 130–400)
RBC # BLD: 4.7 M/UL (ref 4.7–6.1)
WBC # BLD: 5.2 K/UL (ref 4.8–10.8)

## 2018-08-13 LAB
SEX HORMONE BINDING GLOBULIN: 48 NMOL/L (ref 11–80)
TESTOSTERONE FREE PERCENT: 1.4 % (ref 1.6–2.9)
TESTOSTERONE FREE, CALC: 40 PG/ML (ref 47–244)
TESTOSTERONE TOTAL-MALE: 283 NG/DL (ref 300–720)

## 2018-08-14 ENCOUNTER — OFFICE VISIT (OUTPATIENT)
Dept: ENDOCRINOLOGY | Age: 68
End: 2018-08-14
Payer: MEDICARE

## 2018-08-14 VITALS
SYSTOLIC BLOOD PRESSURE: 139 MMHG | DIASTOLIC BLOOD PRESSURE: 82 MMHG | HEART RATE: 56 BPM | WEIGHT: 166 LBS | BODY MASS INDEX: 26.06 KG/M2 | HEIGHT: 67 IN

## 2018-08-14 DIAGNOSIS — E29.1 HYPOGONADISM MALE: Primary | ICD-10-CM

## 2018-08-14 DIAGNOSIS — N52.9 ERECTILE DYSFUNCTION, UNSPECIFIED ERECTILE DYSFUNCTION TYPE: ICD-10-CM

## 2018-08-14 PROCEDURE — 99213 OFFICE O/P EST LOW 20 MIN: CPT | Performed by: INTERNAL MEDICINE

## 2018-08-14 RX ORDER — SILDENAFIL 100 MG/1
100 TABLET, FILM COATED ORAL PRN
Qty: 10 TABLET | Refills: 3 | Status: SHIPPED | OUTPATIENT
Start: 2018-08-14 | End: 2018-10-09 | Stop reason: ALTCHOICE

## 2018-08-14 NOTE — PROGRESS NOTES
Subjective:      Patient ID: Nichelle Pablo is a 78 y.o. male. Other   This is a chronic (hypogonadism) problem. The current episode started more than 1 year ago. The problem has been waxing and waning. Treatments tried: testosterone injections. The treatment provided moderate relief. Erectile dysfunction patient requesting prescription for Viagra         3 month f/u last two testosterone levels lower measured late cycle     Reviewed labs   Results for Eleazar Marvin (MRN [id number]) as of 8/14/2018 11:04   Ref.  Range 8/11/2018 08:31   Sex Hormone Binding Latest Ref Range: 11 - 80 nmol/L 48   Testosterone Free, Calc Latest Ref Range: 47 - 244 pg/mL 40 (L)   Total Testosterone Latest Ref Range: 300 - 720 ng/dL 283 (L)   Testosterone % Free Latest Ref Range: 1.6 - 2.9 % 1.4 (L)   WBC Latest Ref Range: 4.8 - 10.8 K/uL 5.2   RBC Latest Ref Range: 4.70 - 6.10 M/uL 4.70   Hemoglobin Quant Latest Ref Range: 14.0 - 18.0 g/dL 15.8   Hematocrit Latest Ref Range: 42.0 - 52.0 % 46.0   MCV Latest Ref Range: 80.0 - 100.0 fL 97.9   MCH Latest Ref Range: 27.0 - 31.3 pg 33.6 (H)   MCHC Latest Ref Range: 33.0 - 37.0 % 34.3   RDW Latest Ref Range: 11.5 - 14.5 % 13.8   Platelet Count Latest Ref Range: 130 - 400 K/uL 228           Patient Active Problem List   Diagnosis    Chronic low back pain    Scoliosis of lumbar spine    Essential hypertension, benign    Hypercholesterolemia    Right lumbar radiculopathy    Gout    History of MI (myocardial infarction)    High risk medication use - 12/07/17 OARRS PM&R, 02/08/18 OARRS PM&R, 10/05/17 Urine Drug Screen: negative PM&R, MED CONTRACT 1/17/17    SI (sacroiliac) pain    Low back pain with sciatica    Neck pain    Schizo-affective schizophrenia, in remission (HCC)    Synovitis and tenosynovitis    Thoracic and lumbosacral neuritis    Vitamin D deficiency    Prediabetes    Hyperparathyroidism ([address]    Osteopenia    C6 radiculopathy    DJD (degenerative joint disease), cervical    Coronary artery disease with hx of myocardial infarct w/o hx of CABG    PRASAD (obstructive sleep apnea)         No Known Allergies    Current Outpatient Prescriptions:     NIFEdipine (PROCARDIA XL) 60 MG extended release tablet, Take 1 tablet by mouth daily, Disp: 90 tablet, Rfl: 3    oxyCODONE-acetaminophen (PERCOCET) 7.5-325 MG per tablet, Take 1 tablet by mouth every 4 hours as needed for Pain (Max of 60 tablets per month) for up to 30 days. Efra Bond Date: 8/7/18, Disp: 60 tablet, Rfl: 0    diclofenac (VOLTAREN) 50 MG EC tablet, TAKE 1 TABLET BY MOUTH TWICE DAILY AS NEEDED FOR JOINT PAIN, Disp: 180 tablet, Rfl: 0    gabapentin (NEURONTIN) 300 MG capsule, TAKE 1 CAPSULE BY MOUTH DURING THE DAY AND 2 CAPSULES IN THE EVENING, Disp: 270 capsule, Rfl: 0    allopurinol (ZYLOPRIM) 100 MG tablet, Take 1 tablet by mouth daily, Disp: 30 tablet, Rfl: 3    lubiprostone (AMITIZA) 8 MCG CAPS capsule, Take 1 capsule by mouth 2 times daily (with meals), Disp: 60 capsule, Rfl: 2    Plecanatide 3 MG TABS, Take 1 tablet daily for idiopathic constipation. , Disp: 30 tablet, Rfl: 2    CPAP Machine MISC, by Does not apply route New CPAP  with 10 cm, Disp: 1 each, Rfl: 0    testosterone cypionate (DEPOTESTOTERONE CYPIONATE) 200 MG/ML injection, INJECT 0.5 ML INTO THE MUSCLE EVERY 2 WEEKS, Disp: 10 mL, Rfl: 0    metoprolol tartrate (LOPRESSOR) 50 MG tablet, Take 1 tablet by mouth 2 times daily, Disp: 90 tablet, Rfl: 3    pravastatin (PRAVACHOL) 40 MG tablet, TK 1 T PO QPM, Disp: 90 tablet, Rfl: 2    nitroGLYCERIN (NITROSTAT) 0.4 MG SL tablet, Place 1 tablet under the tongue every 5 minutes as needed x 3 doses for chest pain., Disp: 25 tablet, Rfl: 0    Misc.  Devices (WALKER) MISC, 1 each by Does not apply route daily, Disp: 1 each, Rfl: 0    pantoprazole (PROTONIX) 40 MG tablet, Take 40 mg by mouth daily, Disp: , Rfl:     diclofenac sodium (VOLTAREN) 1 % GEL, Apply 2 g topically 2 times daily, Disp: 1 Tube, Rfl: 3    aspirin 81 MG EC tablet, Take 1 tablet by mouth daily, Disp: 90 tablet, Rfl: 1    SYRINGE-NEEDLE, DISP, 3 ML (B-D [employer]TE[employer]RA SYRINGE) 22G X 1-1/2\" 3 ML MISC, 1 each by Does not apply route daily, Disp: 20 each, Rfl: 6    Review of Systems   All other systems reviewed and are negative. Vitals:    08/14/18 1058   BP: 139/82   Site: Left Arm   Position: Sitting   Cuff Size: Medium Adult   Pulse: 56   Weight: 166 lb (75.3 kg)   Height: 5' 7\" (1.702 m)       Objective:   Physical Exam   Constitutional: He appears well-developed and well-nourished. HENT:   Head: Normocephalic and atraumatic. Neck: Neck supple. Cardiovascular: Normal rate. Pulmonary/Chest: Effort normal.   Musculoskeletal: Normal range of motion. Neurological: He is alert. Skin: Skin is warm. Psychiatric: He has a normal mood and affect. Assessment:       Diagnosis Orders   1. Hypogonadism male  CBC    Testosterone Free And Total Male   2.  Erectile dysfunction, unspecified erectile dysfunction type             Plan:       Orders Placed This Encounter   Procedures    CBC     Standing Status:   Future     Standing Expiration Date:   8/14/2019    Testosterone Free And Total Male     Standing Status:   Future     Standing Expiration Date:   8/14/2019     Continue testosterone injection 200 mg/mL half cc every 2 weeks  Advised patient to do a midcycle labs  Orders Placed This Encounter   Medications    sildenafil (VIAGRA) 100 MG tablet     Sig: Take 1 tablet by mouth as needed for Erectile Dysfunction     Dispense:  10 tablet     Refill:  3

## 2018-08-16 DIAGNOSIS — I25.10 CORONARY ARTERY DISEASE INVOLVING NATIVE HEART WITHOUT ANGINA PECTORIS, UNSPECIFIED VESSEL OR LESION TYPE: ICD-10-CM

## 2018-08-16 RX ORDER — METOPROLOL TARTRATE 50 MG/1
TABLET, FILM COATED ORAL
Qty: 270 TABLET | Refills: 0 | Status: SHIPPED | OUTPATIENT
Start: 2018-08-16 | End: 2018-11-19 | Stop reason: SDUPTHER

## 2018-08-17 ENCOUNTER — PROCEDURE VISIT (OUTPATIENT)
Dept: PHYSICAL MEDICINE AND REHAB | Age: 68
End: 2018-08-17
Payer: MEDICARE

## 2018-08-17 DIAGNOSIS — M79.10 MYALGIA: ICD-10-CM

## 2018-08-17 DIAGNOSIS — M79.7 FIBROMYALGIA: Primary | ICD-10-CM

## 2018-08-17 PROCEDURE — 20553 NJX 1/MLT TRIGGER POINTS 3/>: CPT | Performed by: PHYSICAL MEDICINE & REHABILITATION

## 2018-08-23 RX ORDER — LIDOCAINE HYDROCHLORIDE 5 MG/ML
30 INJECTION, SOLUTION INFILTRATION; INTRAVENOUS ONCE
Status: COMPLETED | OUTPATIENT
Start: 2018-08-17 | End: 2018-08-23

## 2018-08-23 RX ADMIN — LIDOCAINE HYDROCHLORIDE 30 ML: 5 INJECTION, SOLUTION INFILTRATION; INTRAVENOUS at 09:17

## 2018-08-23 NOTE — PROGRESS NOTES
Patient taken to exam room, seated on draped locked stool, and area marked and cleansed with alcohol.

## 2018-09-04 DIAGNOSIS — M54.50 CHRONIC BILATERAL LOW BACK PAIN WITHOUT SCIATICA: ICD-10-CM

## 2018-09-04 DIAGNOSIS — M54.17 THORACIC AND LUMBOSACRAL NEURITIS: ICD-10-CM

## 2018-09-04 DIAGNOSIS — G89.29 CHRONIC MIDLINE LOW BACK PAIN WITH SCIATICA, SCIATICA LATERALITY UNSPECIFIED: ICD-10-CM

## 2018-09-04 DIAGNOSIS — M54.14 THORACIC AND LUMBOSACRAL NEURITIS: ICD-10-CM

## 2018-09-04 DIAGNOSIS — M54.2 NECK PAIN: Chronic | ICD-10-CM

## 2018-09-04 DIAGNOSIS — M54.40 CHRONIC MIDLINE LOW BACK PAIN WITH SCIATICA, SCIATICA LATERALITY UNSPECIFIED: ICD-10-CM

## 2018-09-04 DIAGNOSIS — Z79.899 HIGH RISK MEDICATION USE: ICD-10-CM

## 2018-09-04 DIAGNOSIS — G89.29 CHRONIC BILATERAL LOW BACK PAIN WITHOUT SCIATICA: ICD-10-CM

## 2018-09-04 RX ORDER — OXYCODONE AND ACETAMINOPHEN 7.5; 325 MG/1; MG/1
1 TABLET ORAL EVERY 4 HOURS PRN
Qty: 60 TABLET | Refills: 0 | Status: SHIPPED | OUTPATIENT
Start: 2018-09-06 | End: 2018-10-04 | Stop reason: SDUPTHER

## 2018-09-12 ENCOUNTER — OFFICE VISIT (OUTPATIENT)
Dept: PHYSICAL MEDICINE AND REHAB | Age: 68
End: 2018-09-12
Payer: MEDICARE

## 2018-09-12 VITALS
WEIGHT: 168 LBS | HEIGHT: 68 IN | DIASTOLIC BLOOD PRESSURE: 80 MMHG | BODY MASS INDEX: 25.46 KG/M2 | SYSTOLIC BLOOD PRESSURE: 138 MMHG

## 2018-09-12 DIAGNOSIS — G89.29 CHRONIC LOW BACK PAIN WITH SCIATICA, SCIATICA LATERALITY UNSPECIFIED, UNSPECIFIED BACK PAIN LATERALITY: Primary | ICD-10-CM

## 2018-09-12 DIAGNOSIS — M53.3 SI (SACROILIAC) PAIN: ICD-10-CM

## 2018-09-12 DIAGNOSIS — M54.16 RIGHT LUMBAR RADICULOPATHY: ICD-10-CM

## 2018-09-12 DIAGNOSIS — M54.40 CHRONIC LOW BACK PAIN WITH SCIATICA, SCIATICA LATERALITY UNSPECIFIED, UNSPECIFIED BACK PAIN LATERALITY: Primary | ICD-10-CM

## 2018-09-12 DIAGNOSIS — M10.00 IDIOPATHIC GOUT, UNSPECIFIED CHRONICITY, UNSPECIFIED SITE: ICD-10-CM

## 2018-09-12 DIAGNOSIS — F25.9 SCHIZO-AFFECTIVE SCHIZOPHRENIA, IN REMISSION (HCC): ICD-10-CM

## 2018-09-12 DIAGNOSIS — M79.10 MYALGIA: ICD-10-CM

## 2018-09-12 DIAGNOSIS — Z79.899 HIGH RISK MEDICATION USE: ICD-10-CM

## 2018-09-12 DIAGNOSIS — M54.12 C6 RADICULOPATHY: ICD-10-CM

## 2018-09-12 PROCEDURE — 99214 OFFICE O/P EST MOD 30 MIN: CPT | Performed by: PHYSICAL MEDICINE & REHABILITATION

## 2018-09-12 ASSESSMENT — ENCOUNTER SYMPTOMS
VOMITING: 0
ABDOMINAL PAIN: 0
SHORTNESS OF BREATH: 0
BACK PAIN: 1
ALLERGIC/IMMUNOLOGIC NEGATIVE: 1
CONSTIPATION: 1
CHEST TIGHTNESS: 0
DIARRHEA: 0
NAUSEA: 0
VISUAL CHANGE: 0
EYES NEGATIVE: 1
WHEEZING: 0

## 2018-09-12 NOTE — PROGRESS NOTES
Loyd, 78 y.o. male presents today with:       Back Pain 7/18/18 TR injection 90% pain reduction X 1 month. 8/17/18 TR injection 90% pain reduction X 3 weeks. Patient does want to schedule more injections today. Neck Pain     Hip Pain bilat    Leg Pain bilat    Medication Check percocet pill count today-compliant        He is more functional on the opiate meds--able to do yard work and interact with friends and family in a positive friendly manner. No signs of overuse or abuse of his meds. He states that his right leg has been going numb but he is told that from his back and there is no further surgery they could help that. He had surgery in the back several times in the past.  He would benefit from a handicap parking placard and I will put through a ladder and signed it so she can get one from the [employer]. Injections still help very much. He would like to schedule monthly trigger point and sciatic injection in between times. Back Pain   This is a chronic problem. The current episode started more than 1 year ago. The problem occurs daily. The problem is unchanged. The pain is present in the lumbar spine. The quality of the pain is described as aching, cramping, shooting and stabbing. The pain radiates to the right foot, right knee and right thigh. The pain is at a severity of 5/10. The pain is severe. The pain is worse during the day. The symptoms are aggravated by bending, lying down, position, sitting, standing and twisting. Stiffness is present in the morning. Associated symptoms include leg pain, numbness and weakness. Pertinent negatives include no abdominal pain, chest pain, headaches, paresis, perianal numbness or tingling. Risk factors include lack of exercise. He has tried analgesics, home exercises, NSAIDs, muscle relaxant and heat (SI injections which help, trigger point injections, OXY-IR ) for the symptoms.  The treatment provided significant Esophageal ulcer 3/10/2014    Dr. Alec Contreras    Gastric ulcer 3/10/2014    Dr. Alec Contreras    Gout     Hiatal hernia 3/10/2014    Dr. Alec Contreras    Hyperlipidemia     Hypertension     [facility]    Dr. Checo Krishna [facility]     Prediabetes     Severe single current episode of major depressive disorder, without psychotic features ([facility] 7/8/2016    Status post coronary artery stent placement 2002    Dr. Kymberly Hastings     Past Surgical History:   Procedure Laterality Date   [address]      COLONOSCOPY  3/10/14    DR Farhad Morton  2002    Dr. Morris 83 GRAFT  10/17/2017    KNEE ARTHROSCOPY Left 2002    Dr. Gelacio Modi  12/19/13    Ky Daniel DR Viridiana Wilson  9/2009    Dr. Jihan Moreno  2008    Dr. Shaq Minaya  3/10/14    Kristen Rhoades, DR Suly Rodarte     Social History     Social History    Marital status:      Spouse name: N/A    Number of children: N/A    Years of education: N/A     Occupational History    disability       Social History Main Topics    Smoking status: Never Smoker    Smokeless tobacco: Never Used    Alcohol use No      Comment: Stopped years ago.  Drug use: No      Comment: YEARS AGO    Sexual activity: Yes     Partners: Female      Comment: monogomous sexual partner, wife. Other Topics Concern    None     Social History Narrative    Tobacco -- never smoked or chewed. Alcohol -- have not used for past 10 years, prior to had consumed 6 pack of beer daily. Drugs -- tried marijuana and cocaine 14 years ago occasionally used for 3-4 years and then stopped completely 10 years ago.     Education -- completed 11th grade in [employer].    Occupation -- [employer] work, discharge and no exudate. Left eye exhibits no chemosis, no discharge and no exudate. No scleral icterus. Neck: Neck supple. No JVD present. Spinous process tenderness and muscular tenderness present. No tracheal tenderness present. No neck rigidity. No tracheal deviation and no edema present. No thyromegaly present. Cardiovascular: Normal rate, regular rhythm, normal heart sounds and intact distal pulses. Exam reveals no gallop, no friction rub and no decreased pulses. No murmur heard. Pulmonary/Chest: Effort normal. No accessory muscle usage. No apnea, no tachypnea and no bradypnea. No respiratory distress. He has decreased breath sounds. He has no wheezes. He has no rales. He exhibits no tenderness. Abdominal: Soft. Bowel sounds are normal. He exhibits no distension and no mass. There is no tenderness. There is no rebound and no guarding. Musculoskeletal: He exhibits tenderness. He exhibits no edema. Right shoulder: He exhibits decreased range of motion, tenderness and bony tenderness. Left shoulder: He exhibits decreased range of motion, tenderness and bony tenderness. Right elbow: Normal.       Left elbow: Normal.        Right wrist: Normal.        Left wrist: Normal.        Right hip: Normal.        Left hip: Normal.        Right knee: Normal.        Left knee: Normal.        Right ankle: Normal. Achilles tendon normal.        Left ankle: Normal. Achilles tendon normal.        Cervical back: He exhibits decreased range of motion, tenderness, bony tenderness, laceration, pain and spasm. He exhibits no swelling, no edema and no deformity. Thoracic back: He exhibits decreased range of motion, tenderness, bony tenderness, deformity, pain and spasm. Lumbar back: He exhibits decreased range of motion, tenderness, bony tenderness and pain. He exhibits no swelling, no edema, no deformity, no laceration and normal pulse.         Back:         Right upper arm: Normal. Left upper arm: Normal.        Right forearm: Normal.        Left forearm: Normal.        Arms:       Right hand: He exhibits decreased range of motion and bony tenderness. He exhibits normal capillary refill, no deformity, no laceration and no swelling. Decreased sensation noted. Decreased sensation is present in the ulnar distribution, is present in the medial distribution and is present in the radial distribution. Decreased strength noted. He exhibits finger abduction, thumb/finger opposition and wrist extension trouble. Left hand: He exhibits decreased range of motion and bony tenderness. Decreased sensation noted. Decreased sensation is present in the ulnar distribution and is present in the radial distribution. Decreased strength noted. He exhibits finger abduction and wrist extension trouble. Right upper leg: Normal.        Left upper leg: Normal.        Right lower leg: Normal.        Left lower leg: Normal.        Legs:       Right foot: Normal.        Left foot: Normal.   Tender areas are indicated by numbered spot         Lymphadenopathy:     He has no cervical adenopathy. Neurological: He is alert and oriented to person, place, and time. He displays abnormal reflex. He displays no atrophy and no tremor. A sensory deficit is present. No cranial nerve deficit. He exhibits normal muscle tone. Gait abnormal. Coordination normal. He displays no Babinski's sign on the right side. He displays no Babinski's sign on the left side. Reflex Scores:       Patellar reflexes are 1+ on the right side and 1+ on the left side. Achilles reflexes are 0 on the right side and 0 on the left side. Skin: Skin is warm, dry and intact. No abrasion, no bruising, no ecchymosis, no laceration, no petechiae and no rash noted. Rash is not macular, not pustular and not urticarial. He is not diaphoretic. No cyanosis or erythema. No pallor. Nails show no clubbing.    Psychiatric: His behavior is normal. Judgment and thought content normal. His mood appears not anxious. His affect is not angry, not blunt, not labile and not inappropriate. His speech is not rapid and/or pressured, not delayed, not tangential and not slurred. He is not agitated, not aggressive, not hyperactive, not slowed, not withdrawn, not actively hallucinating and not combative. Thought content is not paranoid and not delusional. Cognition and memory are normal. Cognition and memory are not impaired. He does not express impulsivity or inappropriate judgment. He does not exhibit a depressed mood. He expresses no homicidal and no suicidal ideation. He expresses no suicidal plans and no homicidal plans. He is communicative. He exhibits normal recent memory and normal remote memory.   high score on SOAPPR    Calm appropriate    NAD He is attentive. Vitals reviewed. Ortho Exam  Neurologic Exam     Mental Status   Oriented to person, place, and time. Speech: not slurred     Cranial Nerves     CN III, IV, VI   Pupils are equal, round, and reactive to light. Extraocular motions are normal.     Gait, Coordination, and Reflexes     Reflexes   Right patellar: 1+  Left patellar: 1+  Right achilles: 0  Left achilles: 0      After a thorough review and discussion of the previous medical records, patient comprehensive medical, surgical, and family and social history, Review of Systems, their OARRS, their Screener and Opioid Assessment for Patients with Pain (SOAPP®-R), recent diagnostics, and symptomatic results to previous treatment, it is my impression that the patients is suffering with progressive and severe:     Diagnosis Orders   1. Chronic low back pain with sciatica, sciatica laterality unspecified, unspecified back pain laterality     2. Right lumbar radiculopathy     3. SI (sacroiliac) pain     4. C6 radiculopathy     5.  High risk medication use - 12/07/17 OARRS PM&R, 02/08/18 OARRS PM&R, 10/05/17 Urine Drug Screen: negative PM&R, MED CONTRACT functioning, they're not having any adverse effects or side effects from the current medications, and I see no findings of aberrant drug taking her addiction related behaviors. Attestation: The Prescription Monitoring Report for this patient was reviewed today. (Jen Ford DO)  Documentation: No signs of potential drug abuse or diversion identified., Obtaining appropriate analgesic effect of treatment. , Possible medication side effects, risk of tolerance/dependence & alternative treatments discussed. (Jen Ford DO)       Patient is currently taking:       I am having Mr. Wright maintain his SYRINGE-NEEDLE (DISP) 3 ML, aspirin, pantoprazole, diclofenac sodium, Walker, nitroGLYCERIN, pravastatin, testosterone cypionate, CPAP Machine, Plecanatide, lubiprostone, allopurinol, gabapentin, diclofenac, NIFEdipine, sildenafil, metoprolol tartrate, and oxyCODONE-acetaminophen. I also recommend the following Medications:    No orders of the defined types were placed in this encounter.       -which helps with pain and function. Otherwise, continue the current pain medications that I have prescibed. Radiologic:   Old films reviewed  ,     I discussed results with patients. see Follow up plans below  For any new studies. Care Everywhere Updates:  requested and reviewed. No new issues noted.              Labs:  Previous labs reviewed     Lab Results   Component Value Date     06/06/2018    K 5.3 06/06/2018    CL 98 06/06/2018    CO2 28 06/06/2018    BUN 17 06/06/2018    CREATININE 0.96 06/06/2018    CALCIUM 9.7 06/06/2018    LABALBU 4.6 06/06/2018    BILITOT 0.6 06/06/2018    ALKPHOS 51 06/06/2018    AST 28 06/06/2018    ALT 26 06/06/2018     Lab Results   Component Value Date    WBC 5.2 08/11/2018    RBC 4.70 08/11/2018    HGB 15.8 08/11/2018    HCT 46.0 08/11/2018    MCV 97.9 08/11/2018    MCH 33.6 08/11/2018    MCHC 34.3 08/11/2018    RDW 13.8 08/11/2018     08/11/2018    MPV 9.9 09/15/2015       Lab Results   Component Value Date    LABAMPH Neg 10/05/2017    BARBSCNU Neg 10/05/2017    LABBENZ Neg 10/05/2017    CANSU Neg 10/05/2017    COCAIMETSCRU Neg 10/05/2017    PHENCYCLIDINESCREENURINE Neg 30/42/0679    TRICYCLIC Negative 88/33/5006    DSCOMMENT see below 10/05/2017       Lab Results   Component Value Date    CODEINE Not Detected 09/16/2016    MORPHINE Not Detected 09/16/2016    ACETYLMORPHI Not Detected 09/16/2016    OXYCODONE Present 09/16/2016    NOROXYCODONE Present 09/16/2016    NOROXYMU Present 09/16/2016    HYDRCO Not Detected 09/16/2016    NORHYDU Not Detected 09/16/2016    HYDROMO Not Detected 09/16/2016    BUPREN Not Detected 09/16/2016    NORBUPRNOR Not Detected 09/16/2016    FENTA Not Detected 09/16/2016    NORFENT Not Detected 09/16/2016    MEPERIDINE Not Detected 09/16/2016    TAPENU Not Detected 09/16/2016    TAPOSULFUR Not Detected 09/16/2016    METHADONE Not Detected 09/16/2016    LABPROP Not Detected 09/16/2016    TRAM Not Detected 09/16/2016    AMPH Not Detected 09/16/2016    METHAMP Not Detected 09/16/2016    MDMA Not Detected 09/16/2016    ECMDA Not Detected 09/16/2016       Lab Results   Component Value Date    PHENTERMINE Not Detected 09/16/2016    BENZOYL Not Detected 09/16/2016    Margret Coffey Not Detected 09/16/2016    ALPHAOHALPRA Not Detected 09/16/2016    CLONAZEPAM Not Detected 09/16/2016    Woody Gold Not Detected 09/16/2016    DIAZEP Not Detected 09/16/2016    SAFIA Not Detected 09/16/2016    OXAZ Not Detected 09/16/2016    Trever Poll Not Detected 09/16/2016    LORAZEPAM Not Detected 09/16/2016    MIDAZOLAM Not Detected 09/16/2016    ZOLPIDEM Not Detected 09/16/2016    REG Not Detected 09/16/2016    ETG Not Detected 09/16/2016    MARIJMET Not Detected 09/16/2016    PCP Not Detected 09/16/2016    PAINMGTDRUGP See Below 09/16/2016    EERPAINMGTPA See Note 09/16/2016    LABCREA 58.1 09/16/2016         , I discussed results with patient.   See follow-up plans for new studies. Therapies:  HEP-gentle stretching and relaxation techniques-demonstrated with patient-they are to do them twice a day. They are also advised to make the following lifestyle changes:  Goals      Exercise 3x per week (30 min per time)      SOAPP-R GOAL LESS THAN 9            09/16/16 SCORE: 33-HIGH RISK   11/11/16 score: 27-high risk     01/13/17 score: 16-moderate risk   03/13/17 Score: 11-moderate risk   06/01/17 score: 15-moderate risk  08/03/17 score: 15-moderate risk  10/04/17 score: 18-moderate risk  12/08/17 score: 11-moderate risk  02/09/18 score: 12-moderate risk  04/13/18 score: 14-moderate risk  7/12/18 score 10-moderate risk            Injections or Epidurals:  Injection options were discussed. Monthly trigger point injections I will add vitamin B12   Patient gave verbal consent to ordered injections. See follow-up plans for planned injections. Supplements:  Vitamin D,   Education was given on:   Dietary and Fitness             Follow up with Primary Care Physician regarding their general medical needs. They are to follow up in 2 months to review medication, efficacy of injections, pill counts, OARRS check, SOAPPR assessment, review diagnostics, to review previous and future treatment plans and assess appropriateness for continued therapy.         New Diagnostics  Orders Placed This Encounter   Procedures    OR INJECT TRIGGER POINTS, 3 OR GREATER    OR INJECT TRIGGER POINTS, 3 OR GREATER       Brenda Enoc, DO

## 2018-09-12 NOTE — LETTER
[facility] Physical Medicine and [address], [address] [address]  Phone: [phone]  Fax: [phone] [facility],         September 12, 2018     Patient: René Suarez   YOB: 1950   Date of Visit: 9/12/2018       To Whom It May Concern: It is my medical opinion that René Suarez requires a disability parking placard for the following reasons:  He has limited walking ability due to a neurologic condition. Duration of need: 5 years    If you have any questions or concerns, please don't hesitate to call.     Sincerely,        Bryan Johnston DO

## 2018-09-14 ENCOUNTER — OFFICE VISIT (OUTPATIENT)
Dept: PULMONOLOGY | Age: 68
End: 2018-09-14
Payer: MEDICARE

## 2018-09-14 VITALS
HEIGHT: 67 IN | RESPIRATION RATE: 16 BRPM | SYSTOLIC BLOOD PRESSURE: 134 MMHG | WEIGHT: 169 LBS | HEART RATE: 69 BPM | OXYGEN SATURATION: 96 % | TEMPERATURE: 97 F | DIASTOLIC BLOOD PRESSURE: 70 MMHG | BODY MASS INDEX: 26.53 KG/M2

## 2018-09-14 DIAGNOSIS — G47.33 OSA (OBSTRUCTIVE SLEEP APNEA): Primary | ICD-10-CM

## 2018-09-14 DIAGNOSIS — E66.3 OVERWEIGHT (BMI 25.0-29.9): ICD-10-CM

## 2018-09-14 PROCEDURE — 99214 OFFICE O/P EST MOD 30 MIN: CPT | Performed by: INTERNAL MEDICINE

## 2018-09-14 ASSESSMENT — ENCOUNTER SYMPTOMS
NAUSEA: 0
SORE THROAT: 0
VOMITING: 0
CHEST TIGHTNESS: 0
RHINORRHEA: 0
VOICE CHANGE: 0
ABDOMINAL PAIN: 0
COUGH: 0
EYE ITCHING: 0
WHEEZING: 0
SHORTNESS OF BREATH: 0
DIARRHEA: 0

## 2018-09-14 NOTE — PROGRESS NOTES
Subjective:     Sourav Jackson is a 78 y.o. male who complains today of:     Chief Complaint   Patient presents with    Follow-up     three month f/u for PRASAD. HPI  Patient is using CPAP with  10 centimeters of H2O with heated humidity. Patient is using CPAP for about  7- 8 hours every night. Patient is using CPAP with  Nasal pillow   Patient said  sleep is restful with the CPAP use. Patient is compliant with CPAP therapy and benefiting with CPAP use. No snoring with CPAP use. No early morning headache. Patient has no c/o vivid dreams or night alex. No complaint of daytime sleepiness or tiredness with CPAP use. Patient denies taking naps. No sleepiness with driving,   Patient denies difficulty falling asleep or staying asleep. Allergies:  Patient has no known allergies.   Past Medical History:   Diagnosis Date    Chronic back pain     Coronary artery disease 2002    Dr. Ortega at [facility] Esophageal ulcer 3/10/2014    Dr. Cathy Rivers Gastric ulcer 3/10/2014    Dr. Ashleigh Schwartz    Gout     Hiatal hernia 3/10/2014    Dr. Ashleigh Schwartz    Hyperlipidemia     Hypertension     [facility]) 2005    Dr. Lopez Bhatti Peripheral vascular disease [facility] [address]     Prediabetes     Severe single current episode of major depressive disorder, without psychotic features ([facility] [address] 7/8/2016    Status post coronary artery stent placement 2002    Dr. Ortega     Past Surgical History:   Procedure Laterality Date    [address] COLONOSCOPY  3/10/14    DR Angela Richardson  2002    Dr. Morris 83 GRAFT  10/17/2017    KNEE ARTHROSCOPY Left 2002    Dr. Howie Marks  12/19/13    CAUDAL Sam[place] DR Vincent Elliott SPINE SURGERY  9/2009    Dr. Nicki Villaseñor  2008    Dr. Asya Long tablet by mouth daily 90 tablet 3    diclofenac (VOLTAREN) 50 MG EC tablet TAKE 1 TABLET BY MOUTH TWICE DAILY AS NEEDED FOR JOINT PAIN 180 tablet 0    gabapentin (NEURONTIN) 300 MG capsule TAKE 1 CAPSULE BY MOUTH DURING THE DAY AND 2 CAPSULES IN THE EVENING 270 capsule 0    allopurinol (ZYLOPRIM) 100 MG tablet Take 1 tablet by mouth daily 30 tablet 3    lubiprostone (AMITIZA) 8 MCG CAPS capsule Take 1 capsule by mouth 2 times daily (with meals) 60 capsule 2    Plecanatide 3 MG TABS Take 1 tablet daily for idiopathic constipation. 30 tablet 2    CPAP Machine MISC by Does not apply route New CPAP  with 10 cm 1 each 0    pravastatin (PRAVACHOL) 40 MG tablet TK 1 T PO QPM 90 tablet 2    nitroGLYCERIN (NITROSTAT) 0.4 MG SL tablet Place 1 tablet under the tongue every 5 minutes as needed x 3 doses for chest pain. 25 tablet 0    Misc. Devices (WALKER) MISC 1 each by Does not apply route daily 1 each 0    pantoprazole (PROTONIX) 40 MG tablet Take 40 mg by mouth daily      diclofenac sodium (VOLTAREN) 1 % GEL Apply 2 g topically 2 times daily 1 Tube 3    aspirin 81 MG EC tablet Take 1 tablet by mouth daily 90 tablet 1    SYRINGE-NEEDLE, DISP, 3 ML (B-D INTEGRA SYRINGE) 22G X 1-1/2\" 3 ML MISC 1 each by Does not apply route daily 20 each 6    testosterone cypionate (DEPOTESTOTERONE CYPIONATE) 200 MG/ML injection INJECT 0.5 ML INTO THE MUSCLE EVERY 2 WEEKS 10 mL 0     No current facility-administered medications for this visit. Assessment/Plan:     1. PRASAD (obstructive sleep apnea)  Patient is using CPAP with  10 centimeters of H2O with heated humidity. Patient is using CPAP for about  7- 8 hours every night. Patient is using CPAP with  Nasal pillow . Patient said  sleep is restful with the CPAP use. Patient is compliant with CPAP therapy and benefiting with CPAP use. Counseling: CPAP/BiPAP uses, patient advised to use CPAP at least 5-6 hours every night.     Driving: patient is advised for extreme

## 2018-09-30 DIAGNOSIS — M53.3 SI (SACROILIAC) PAIN: ICD-10-CM

## 2018-09-30 DIAGNOSIS — M54.2 NECK PAIN: Chronic | ICD-10-CM

## 2018-09-30 DIAGNOSIS — G89.29 CHRONIC RIGHT-SIDED THORACIC BACK PAIN: ICD-10-CM

## 2018-09-30 DIAGNOSIS — M54.6 CHRONIC RIGHT-SIDED THORACIC BACK PAIN: ICD-10-CM

## 2018-09-30 DIAGNOSIS — M25.551 RIGHT HIP PAIN: ICD-10-CM

## 2018-10-01 RX ORDER — GABAPENTIN 300 MG/1
CAPSULE ORAL
Qty: 270 CAPSULE | Refills: 0 | Status: SHIPPED | OUTPATIENT
Start: 2018-10-01 | End: 2018-12-28 | Stop reason: SDUPTHER

## 2018-10-04 DIAGNOSIS — G89.29 CHRONIC MIDLINE LOW BACK PAIN WITH SCIATICA, SCIATICA LATERALITY UNSPECIFIED: ICD-10-CM

## 2018-10-04 DIAGNOSIS — M54.50 CHRONIC BILATERAL LOW BACK PAIN WITHOUT SCIATICA: ICD-10-CM

## 2018-10-04 DIAGNOSIS — M54.40 CHRONIC MIDLINE LOW BACK PAIN WITH SCIATICA, SCIATICA LATERALITY UNSPECIFIED: ICD-10-CM

## 2018-10-04 DIAGNOSIS — G89.29 CHRONIC BILATERAL LOW BACK PAIN WITHOUT SCIATICA: ICD-10-CM

## 2018-10-04 DIAGNOSIS — M54.2 NECK PAIN: Chronic | ICD-10-CM

## 2018-10-04 DIAGNOSIS — Z79.899 HIGH RISK MEDICATION USE: ICD-10-CM

## 2018-10-04 DIAGNOSIS — M54.17 THORACIC AND LUMBOSACRAL NEURITIS: ICD-10-CM

## 2018-10-04 DIAGNOSIS — M54.14 THORACIC AND LUMBOSACRAL NEURITIS: ICD-10-CM

## 2018-10-04 RX ORDER — OXYCODONE AND ACETAMINOPHEN 7.5; 325 MG/1; MG/1
1 TABLET ORAL EVERY 4 HOURS PRN
Qty: 60 TABLET | Refills: 0 | Status: SHIPPED | OUTPATIENT
Start: 2018-10-05 | End: 2018-11-01 | Stop reason: SDUPTHER

## 2018-10-09 ENCOUNTER — OFFICE VISIT (OUTPATIENT)
Dept: FAMILY MEDICINE CLINIC | Age: 68
End: 2018-10-09
Payer: MEDICARE

## 2018-10-09 VITALS
BODY MASS INDEX: 25.99 KG/M2 | HEIGHT: 67 IN | WEIGHT: 165.6 LBS | HEART RATE: 68 BPM | DIASTOLIC BLOOD PRESSURE: 78 MMHG | OXYGEN SATURATION: 99 % | SYSTOLIC BLOOD PRESSURE: 128 MMHG | RESPIRATION RATE: 16 BRPM | TEMPERATURE: 97 F

## 2018-10-09 DIAGNOSIS — Z13.1 DIABETES MELLITUS SCREENING: ICD-10-CM

## 2018-10-09 DIAGNOSIS — M54.40 CHRONIC LOW BACK PAIN WITH SCIATICA, SCIATICA LATERALITY UNSPECIFIED, UNSPECIFIED BACK PAIN LATERALITY: ICD-10-CM

## 2018-10-09 DIAGNOSIS — G89.29 CHRONIC LOW BACK PAIN WITH SCIATICA, SCIATICA LATERALITY UNSPECIFIED, UNSPECIFIED BACK PAIN LATERALITY: ICD-10-CM

## 2018-10-09 DIAGNOSIS — T40.2X5A THERAPEUTIC OPIOID-INDUCED CONSTIPATION (OIC): Primary | ICD-10-CM

## 2018-10-09 DIAGNOSIS — E87.5 HYPERKALEMIA: ICD-10-CM

## 2018-10-09 DIAGNOSIS — R79.9 ABNORMAL FINDING OF BLOOD CHEMISTRY: ICD-10-CM

## 2018-10-09 DIAGNOSIS — I10 BENIGN ESSENTIAL HTN: ICD-10-CM

## 2018-10-09 DIAGNOSIS — M54.16 RIGHT LUMBAR RADICULOPATHY: ICD-10-CM

## 2018-10-09 DIAGNOSIS — K59.03 THERAPEUTIC OPIOID-INDUCED CONSTIPATION (OIC): Primary | ICD-10-CM

## 2018-10-09 DIAGNOSIS — M41.116 JUVENILE IDIOPATHIC SCOLIOSIS OF LUMBAR REGION: ICD-10-CM

## 2018-10-09 DIAGNOSIS — R73.03 PREDIABETES: ICD-10-CM

## 2018-10-09 LAB
ALBUMIN SERPL-MCNC: 4.4 G/DL (ref 3.9–4.9)
ALP BLD-CCNC: 46 U/L (ref 35–104)
ALT SERPL-CCNC: 25 U/L (ref 0–41)
ANION GAP SERPL CALCULATED.3IONS-SCNC: 13 MEQ/L (ref 7–13)
AST SERPL-CCNC: 28 U/L (ref 0–40)
BILIRUB SERPL-MCNC: 0.6 MG/DL (ref 0–1.2)
BUN BLDV-MCNC: 22 MG/DL (ref 8–23)
CALCIUM SERPL-MCNC: 9.3 MG/DL (ref 8.6–10.2)
CHLORIDE BLD-SCNC: 96 MEQ/L (ref 98–107)
CO2: 30 MEQ/L (ref 22–29)
CREAT SERPL-MCNC: 1.11 MG/DL (ref 0.7–1.2)
GFR AFRICAN AMERICAN: >60
GFR NON-AFRICAN AMERICAN: >60
GLOBULIN: 3.3 G/DL (ref 2.3–3.5)
GLUCOSE BLD-MCNC: 79 MG/DL (ref 74–109)
HBA1C MFR BLD: 6.2 % (ref 4.8–5.9)
POTASSIUM SERPL-SCNC: 5.5 MEQ/L (ref 3.5–5.1)
SODIUM BLD-SCNC: 139 MEQ/L (ref 132–144)
TOTAL PROTEIN: 7.7 G/DL (ref 6.4–8.1)

## 2018-10-09 PROCEDURE — 99214 OFFICE O/P EST MOD 30 MIN: CPT | Performed by: PHYSICIAN ASSISTANT

## 2018-10-09 RX ORDER — LUBIPROSTONE 24 UG/1
8 CAPSULE, GELATIN COATED ORAL 2 TIMES DAILY WITH MEALS
Qty: 180 CAPSULE | Refills: 1 | Status: SHIPPED | OUTPATIENT
Start: 2018-10-09 | End: 2018-10-18 | Stop reason: SDUPTHER

## 2018-10-09 ASSESSMENT — ENCOUNTER SYMPTOMS
ABDOMINAL DISTENTION: 1
RECTAL PAIN: 0
BLOOD IN STOOL: 0
SINUS PRESSURE: 0
CHEST TIGHTNESS: 0
CONSTIPATION: 1
WHEEZING: 0
SHORTNESS OF BREATH: 0
BACK PAIN: 1
NAUSEA: 0
COLOR CHANGE: 0
COUGH: 0
ABDOMINAL PAIN: 0
TROUBLE SWALLOWING: 0
DIARRHEA: 0

## 2018-10-09 ASSESSMENT — PATIENT HEALTH QUESTIONNAIRE - PHQ9
1. LITTLE INTEREST OR PLEASURE IN DOING THINGS: 0
2. FEELING DOWN, DEPRESSED OR HOPELESS: 0
SUM OF ALL RESPONSES TO PHQ9 QUESTIONS 1 & 2: 0
SUM OF ALL RESPONSES TO PHQ QUESTIONS 1-9: 0
SUM OF ALL RESPONSES TO PHQ QUESTIONS 1-9: 0

## 2018-10-09 NOTE — PROGRESS NOTES
education: N/A     Occupational History    disability       Social History Main Topics    Smoking status: Never Smoker    Smokeless tobacco: Never Used    Alcohol use No      Comment: Stopped years ago.  Drug use: No      Comment: YEARS AGO    Sexual activity: Yes     Partners: Female      Comment: monogomous sexual partner, wife. Other Topics Concern    Not on file     Social History Narrative    Tobacco -- never smoked or chewed. Alcohol -- have not used for past 10 years, prior to had consumed 6 pack of beer daily. Drugs -- tried marijuana and cocaine 14 years ago occasionally used for 3-4 years and then stopped completely 10 years ago. Education -- completed 11th grade in [place].    Occupation -- [employer]. work,  at [employer].    Marital status --  44 years, 2 grown sons. Family History   Problem Relation Age of Onset    High Blood Pressure Mother     Arthritis Mother     Cancer Father         throat    Arthritis Father      No Known Allergies  Current Outpatient Prescriptions   Medication Sig Dispense Refill    lubiprostone (AMITIZA) 24 MCG capsule Take 1 capsule by mouth 2 times daily (with meals) 180 capsule 1    oxyCODONE-acetaminophen (PERCOCET) 7.5-325 MG per tablet Take 1 tablet by mouth every 4 hours as needed for Pain (Max of 60 tablets per month) for up to 30 days. Vickii Kidney Date: 10/5/18 60 tablet 0    gabapentin (NEURONTIN) 300 MG capsule TAKE 1 CAPSULE BY MOUTH DURING THE DAY AND 2 CAPSULE BY MOUTH IN THE EVENING 270 capsule 0    metoprolol tartrate (LOPRESSOR) 50 MG tablet TAKE 1 TABLET BY MOUTH THREE TIMES DAILY 270 tablet 0    NIFEdipine (PROCARDIA XL) 60 MG extended release tablet Take 1 tablet by mouth daily 90 tablet 3    diclofenac (VOLTAREN) 50 MG EC tablet TAKE 1 TABLET BY MOUTH TWICE DAILY AS NEEDED FOR JOINT PAIN 180 tablet 0    allopurinol (ZYLOPRIM) 100 MG tablet Take 1 tablet by mouth daily 30 tablet 3    CPAP Neurosurgery     Referral Priority:   Routine     Referral Type:   Eval and Treat     Referral Reason:   Specialty Services Required     Referred to Provider:   Devon Sheehan MD     Requested Specialty:   Neurosurgery     Number of Visits Requested:   1     Orders Placed This Encounter   Medications    lubiprostone (AMITIZA) 24 MCG capsule     Sig: Take 1 capsule by mouth 2 times daily (with meals)     Dispense:  180 capsule     Refill:  1     Medications Discontinued During This Encounter   Medication Reason    pantoprazole (PROTONIX) 40 MG tablet Therapy completed    testosterone cypionate (DEPOTESTOTERONE CYPIONATE) 200 MG/ML injection Therapy completed    Plecanatide 3 MG TABS Therapy completed    sildenafil (VIAGRA) 100 MG tablet Therapy completed    lubiprostone (AMITIZA) 8 MCG CAPS capsule REORDER     No Follow-up on file. Reviewed with the patient: current clinical status, medications, activities and diet. Side effects, adverse effects of the medication prescribed today, as well as treatment plan/ rationale and result expectations have been discussed with the patient who expresses understanding and desires to proceed. Close follow up to evaluate treatment results and for coordination of care. I have reviewed the patient's medical history in detail and updated the computerized patient record.     Sheryl Tellez PA-C

## 2018-10-10 ENCOUNTER — TELEPHONE (OUTPATIENT)
Dept: CARDIOLOGY CLINIC | Age: 68
End: 2018-10-10

## 2018-10-11 DIAGNOSIS — E87.5 HYPERKALEMIA: Primary | ICD-10-CM

## 2018-10-16 ENCOUNTER — TELEPHONE (OUTPATIENT)
Dept: FAMILY MEDICINE CLINIC | Age: 68
End: 2018-10-16

## 2018-10-17 ENCOUNTER — TELEPHONE (OUTPATIENT)
Dept: FAMILY MEDICINE CLINIC | Age: 68
End: 2018-10-17

## 2018-10-17 DIAGNOSIS — E87.5 HYPERKALEMIA: ICD-10-CM

## 2018-10-17 LAB
ANION GAP SERPL CALCULATED.3IONS-SCNC: 10 MEQ/L (ref 7–13)
BUN BLDV-MCNC: 17 MG/DL (ref 8–23)
CALCIUM SERPL-MCNC: 9.5 MG/DL (ref 8.6–10.2)
CHLORIDE BLD-SCNC: 98 MEQ/L (ref 98–107)
CO2: 32 MEQ/L (ref 22–29)
CREAT SERPL-MCNC: 0.9 MG/DL (ref 0.7–1.2)
GFR AFRICAN AMERICAN: >60
GFR NON-AFRICAN AMERICAN: >60
GLUCOSE BLD-MCNC: 109 MG/DL (ref 74–109)
POTASSIUM SERPL-SCNC: 5 MEQ/L (ref 3.5–5.1)
SODIUM BLD-SCNC: 140 MEQ/L (ref 132–144)

## 2018-10-18 ENCOUNTER — OFFICE VISIT (OUTPATIENT)
Dept: CARDIOLOGY CLINIC | Age: 68
End: 2018-10-18
Payer: MEDICARE

## 2018-10-18 VITALS
DIASTOLIC BLOOD PRESSURE: 72 MMHG | HEIGHT: 67 IN | HEART RATE: 78 BPM | SYSTOLIC BLOOD PRESSURE: 140 MMHG | BODY MASS INDEX: 25.9 KG/M2 | RESPIRATION RATE: 16 BRPM | OXYGEN SATURATION: 98 % | WEIGHT: 165 LBS

## 2018-10-18 DIAGNOSIS — M54.40 CHRONIC LOW BACK PAIN WITH SCIATICA, SCIATICA LATERALITY UNSPECIFIED, UNSPECIFIED BACK PAIN LATERALITY: Primary | ICD-10-CM

## 2018-10-18 DIAGNOSIS — G89.29 CHRONIC LOW BACK PAIN WITH SCIATICA, SCIATICA LATERALITY UNSPECIFIED, UNSPECIFIED BACK PAIN LATERALITY: Primary | ICD-10-CM

## 2018-10-18 DIAGNOSIS — E87.5 HYPERKALEMIA: Primary | ICD-10-CM

## 2018-10-18 DIAGNOSIS — I25.2 CORONARY ARTERY DISEASE WITH HX OF MYOCARDIAL INFARCT W/O HX OF CABG: ICD-10-CM

## 2018-10-18 DIAGNOSIS — I10 ESSENTIAL HYPERTENSION, BENIGN: ICD-10-CM

## 2018-10-18 DIAGNOSIS — M41.116 JUVENILE IDIOPATHIC SCOLIOSIS OF LUMBAR REGION: ICD-10-CM

## 2018-10-18 DIAGNOSIS — T40.2X5A THERAPEUTIC OPIOID-INDUCED CONSTIPATION (OIC): ICD-10-CM

## 2018-10-18 DIAGNOSIS — K59.03 THERAPEUTIC OPIOID-INDUCED CONSTIPATION (OIC): ICD-10-CM

## 2018-10-18 DIAGNOSIS — E78.00 HYPERCHOLESTEROLEMIA: Chronic | ICD-10-CM

## 2018-10-18 DIAGNOSIS — I25.10 CORONARY ARTERY DISEASE WITH HX OF MYOCARDIAL INFARCT W/O HX OF CABG: ICD-10-CM

## 2018-10-18 DIAGNOSIS — M54.16 RIGHT LUMBAR RADICULOPATHY: ICD-10-CM

## 2018-10-18 PROCEDURE — 99214 OFFICE O/P EST MOD 30 MIN: CPT | Performed by: INTERNAL MEDICINE

## 2018-10-18 RX ORDER — LUBIPROSTONE 8 UG/1
24 CAPSULE, GELATIN COATED ORAL 2 TIMES DAILY WITH MEALS
Qty: 180 CAPSULE | Refills: 2 | Status: SHIPPED | OUTPATIENT
Start: 2018-10-18 | End: 2019-02-04

## 2018-10-18 ASSESSMENT — ENCOUNTER SYMPTOMS
SHORTNESS OF BREATH: 0
COUGH: 0
EYES NEGATIVE: 1
BLOOD IN STOOL: 0
NAUSEA: 0
RESPIRATORY NEGATIVE: 1
STRIDOR: 0
CHEST TIGHTNESS: 0
WHEEZING: 0
GASTROINTESTINAL NEGATIVE: 1

## 2018-10-19 DIAGNOSIS — M54.2 NECK PAIN: Chronic | ICD-10-CM

## 2018-10-19 DIAGNOSIS — M54.14 THORACIC AND LUMBOSACRAL NEURITIS: ICD-10-CM

## 2018-10-19 DIAGNOSIS — M81.0 OSTEOPOROSIS: ICD-10-CM

## 2018-10-19 DIAGNOSIS — M54.17 THORACIC AND LUMBOSACRAL NEURITIS: ICD-10-CM

## 2018-10-19 DIAGNOSIS — F32.2 SEVERE SINGLE CURRENT EPISODE OF MAJOR DEPRESSIVE DISORDER, WITHOUT PSYCHOTIC FEATURES (HCC): ICD-10-CM

## 2018-11-01 DIAGNOSIS — G89.29 CHRONIC BILATERAL LOW BACK PAIN WITHOUT SCIATICA: ICD-10-CM

## 2018-11-01 DIAGNOSIS — Z79.899 HIGH RISK MEDICATION USE: ICD-10-CM

## 2018-11-01 DIAGNOSIS — M54.17 THORACIC AND LUMBOSACRAL NEURITIS: ICD-10-CM

## 2018-11-01 DIAGNOSIS — M54.14 THORACIC AND LUMBOSACRAL NEURITIS: ICD-10-CM

## 2018-11-01 DIAGNOSIS — M54.40 CHRONIC MIDLINE LOW BACK PAIN WITH SCIATICA, SCIATICA LATERALITY UNSPECIFIED: ICD-10-CM

## 2018-11-01 DIAGNOSIS — M54.2 NECK PAIN: Chronic | ICD-10-CM

## 2018-11-01 DIAGNOSIS — M54.50 CHRONIC BILATERAL LOW BACK PAIN WITHOUT SCIATICA: ICD-10-CM

## 2018-11-01 DIAGNOSIS — G89.29 CHRONIC MIDLINE LOW BACK PAIN WITH SCIATICA, SCIATICA LATERALITY UNSPECIFIED: ICD-10-CM

## 2018-11-01 RX ORDER — OXYCODONE AND ACETAMINOPHEN 7.5; 325 MG/1; MG/1
1 TABLET ORAL EVERY 4 HOURS PRN
Qty: 60 TABLET | Refills: 0 | Status: SHIPPED | OUTPATIENT
Start: 2018-11-04 | End: 2018-11-29 | Stop reason: SDUPTHER

## 2018-11-12 ENCOUNTER — PROCEDURE VISIT (OUTPATIENT)
Dept: PHYSICAL MEDICINE AND REHAB | Age: 68
End: 2018-11-12
Payer: MEDICARE

## 2018-11-12 DIAGNOSIS — M54.31 SCIATIC NOTCH PAIN, RIGHT: Primary | ICD-10-CM

## 2018-11-12 PROCEDURE — 96372 THER/PROPH/DIAG INJ SC/IM: CPT | Performed by: PHYSICAL MEDICINE & REHABILITATION

## 2018-11-12 PROCEDURE — 76942 ECHO GUIDE FOR BIOPSY: CPT | Performed by: PHYSICAL MEDICINE & REHABILITATION

## 2018-11-12 PROCEDURE — 64445 NJX AA&/STRD SCIATIC NRV IMG: CPT | Performed by: PHYSICAL MEDICINE & REHABILITATION

## 2018-11-13 DIAGNOSIS — E29.1 HYPOGONADISM MALE: ICD-10-CM

## 2018-11-13 LAB
HCT VFR BLD CALC: 45.4 % (ref 42–52)
HEMOGLOBIN: 15.7 G/DL (ref 14–18)
MCH RBC QN AUTO: 34.1 PG (ref 27–31.3)
MCHC RBC AUTO-ENTMCNC: 34.6 % (ref 33–37)
MCV RBC AUTO: 98.5 FL (ref 80–100)
PDW BLD-RTO: 14.5 % (ref 11.5–14.5)
PLATELET # BLD: 240 K/UL (ref 130–400)
RBC # BLD: 4.61 M/UL (ref 4.7–6.1)
WBC # BLD: 4.9 K/UL (ref 4.8–10.8)

## 2018-11-14 ENCOUNTER — OFFICE VISIT (OUTPATIENT)
Dept: ENDOCRINOLOGY | Age: 68
End: 2018-11-14
Payer: MEDICARE

## 2018-11-14 VITALS
HEIGHT: 67 IN | HEART RATE: 62 BPM | SYSTOLIC BLOOD PRESSURE: 133 MMHG | OXYGEN SATURATION: 98 % | DIASTOLIC BLOOD PRESSURE: 76 MMHG | BODY MASS INDEX: 25.74 KG/M2 | WEIGHT: 164 LBS

## 2018-11-14 DIAGNOSIS — R73.9 HYPERGLYCEMIA: ICD-10-CM

## 2018-11-14 DIAGNOSIS — E29.1 HYPOGONADISM MALE: Primary | ICD-10-CM

## 2018-11-14 DIAGNOSIS — N52.9 ERECTILE DYSFUNCTION, UNSPECIFIED ERECTILE DYSFUNCTION TYPE: ICD-10-CM

## 2018-11-14 PROCEDURE — 99213 OFFICE O/P EST LOW 20 MIN: CPT | Performed by: INTERNAL MEDICINE

## 2018-11-14 RX ORDER — SILDENAFIL 100 MG/1
100 TABLET, FILM COATED ORAL PRN
Qty: 30 TABLET | Refills: 3 | Status: SHIPPED | OUTPATIENT
Start: 2018-11-14 | End: 2019-05-16 | Stop reason: ALTCHOICE

## 2018-11-14 RX ORDER — CYANOCOBALAMIN 1000 UG/ML
1000 INJECTION INTRAMUSCULAR; SUBCUTANEOUS ONCE
Status: COMPLETED | OUTPATIENT
Start: 2018-11-14 | End: 2018-11-14

## 2018-11-14 RX ADMIN — CYANOCOBALAMIN 1000 MCG: 1000 INJECTION INTRAMUSCULAR; SUBCUTANEOUS at 12:10

## 2018-11-15 LAB
SEX HORMONE BINDING GLOBULIN: 60 NMOL/L (ref 11–80)
TESTOSTERONE FREE PERCENT: 1.3 % (ref 1.6–2.9)
TESTOSTERONE FREE, CALC: 56 PG/ML (ref 47–244)
TESTOSTERONE TOTAL-MALE: 439 NG/DL (ref 300–720)

## 2018-11-19 DIAGNOSIS — I25.10 CORONARY ARTERY DISEASE INVOLVING NATIVE HEART WITHOUT ANGINA PECTORIS, UNSPECIFIED VESSEL OR LESION TYPE: ICD-10-CM

## 2018-11-19 RX ORDER — METOPROLOL TARTRATE 50 MG/1
TABLET, FILM COATED ORAL
Qty: 270 TABLET | Refills: 0 | Status: SHIPPED | OUTPATIENT
Start: 2018-11-19 | End: 2019-02-15 | Stop reason: SDUPTHER

## 2018-11-21 RX ORDER — ROPIVACAINE HYDROCHLORIDE 5 MG/ML
16 INJECTION, SOLUTION EPIDURAL; INFILTRATION; PERINEURAL ONCE
Status: COMPLETED | OUTPATIENT
Start: 2018-11-21 | End: 2018-11-21

## 2018-11-21 RX ADMIN — ROPIVACAINE HYDROCHLORIDE 16 ML: 5 INJECTION, SOLUTION EPIDURAL; INFILTRATION; PERINEURAL at 14:07

## 2018-11-29 ENCOUNTER — OFFICE VISIT (OUTPATIENT)
Dept: PHYSICAL MEDICINE AND REHAB | Age: 68
End: 2018-11-29
Payer: MEDICARE

## 2018-11-29 VITALS
SYSTOLIC BLOOD PRESSURE: 120 MMHG | DIASTOLIC BLOOD PRESSURE: 82 MMHG | HEIGHT: 68 IN | BODY MASS INDEX: 25.01 KG/M2 | WEIGHT: 165 LBS

## 2018-11-29 DIAGNOSIS — M54.50 CHRONIC BILATERAL LOW BACK PAIN WITHOUT SCIATICA: ICD-10-CM

## 2018-11-29 DIAGNOSIS — M54.14 THORACIC AND LUMBOSACRAL NEURITIS: ICD-10-CM

## 2018-11-29 DIAGNOSIS — G89.29 CHRONIC MIDLINE LOW BACK PAIN WITH SCIATICA, SCIATICA LATERALITY UNSPECIFIED: ICD-10-CM

## 2018-11-29 DIAGNOSIS — M54.40 CHRONIC MIDLINE LOW BACK PAIN WITH SCIATICA, SCIATICA LATERALITY UNSPECIFIED: ICD-10-CM

## 2018-11-29 DIAGNOSIS — M54.17 THORACIC AND LUMBOSACRAL NEURITIS: ICD-10-CM

## 2018-11-29 DIAGNOSIS — F25.9 SCHIZO-AFFECTIVE SCHIZOPHRENIA, IN REMISSION (HCC): ICD-10-CM

## 2018-11-29 DIAGNOSIS — M47.892 OTHER OSTEOARTHRITIS OF SPINE, CERVICAL REGION: ICD-10-CM

## 2018-11-29 DIAGNOSIS — M54.2 NECK PAIN: Chronic | ICD-10-CM

## 2018-11-29 DIAGNOSIS — M54.12 C6 RADICULOPATHY: Primary | ICD-10-CM

## 2018-11-29 DIAGNOSIS — G89.29 CHRONIC BILATERAL LOW BACK PAIN WITHOUT SCIATICA: ICD-10-CM

## 2018-11-29 DIAGNOSIS — M41.06 INFANTILE IDIOPATHIC SCOLIOSIS OF LUMBAR REGION: ICD-10-CM

## 2018-11-29 DIAGNOSIS — Z79.899 HIGH RISK MEDICATION USE: ICD-10-CM

## 2018-11-29 DIAGNOSIS — M79.10 MYALGIA: ICD-10-CM

## 2018-11-29 PROCEDURE — 99214 OFFICE O/P EST MOD 30 MIN: CPT | Performed by: PHYSICAL MEDICINE & REHABILITATION

## 2018-11-29 RX ORDER — OXYCODONE AND ACETAMINOPHEN 7.5; 325 MG/1; MG/1
1 TABLET ORAL EVERY 4 HOURS PRN
Qty: 60 TABLET | Refills: 0 | Status: SHIPPED | OUTPATIENT
Start: 2018-12-04 | End: 2018-12-27 | Stop reason: SDUPTHER

## 2018-11-29 ASSESSMENT — ENCOUNTER SYMPTOMS
EYES NEGATIVE: 1
ALLERGIC/IMMUNOLOGIC NEGATIVE: 1
NAUSEA: 0
DIARRHEA: 0
SHORTNESS OF BREATH: 0
BACK PAIN: 1
WHEEZING: 0
VISUAL CHANGE: 0
CHEST TIGHTNESS: 0
ABDOMINAL PAIN: 0
VOMITING: 0
CONSTIPATION: 1

## 2018-11-29 NOTE — PROGRESS NOTES
Loyd, 78 y.o. male presents today with:       Back Pain      Hip Pain bilat    Leg Pain bilat    Medication Refill Percocet pill count today- compliant    Injections Right sciatic block 50% pain reduction X 2 wks. Patient wants to schedule more injection today. He is more functional on the opiate meds--able to do yard work and interact with friends and family in a positive friendly manner. Still no signs of overuse or abuse of his meds. He states that his right leg has been going numb but he is told that from his back and there is no further surgery they could help that. He had surgery in the back several times in the past.     Injections still help very much. He would like to schedule monthly trigger point and sciatic injection in between times. Back Pain   This is a chronic problem. The current episode started more than 1 year ago. The problem occurs daily. The problem is unchanged. The pain is present in the lumbar spine. The quality of the pain is described as aching, cramping, shooting and stabbing. The pain radiates to the right foot, right knee and right thigh. The pain is at a severity of 5/10. The pain is severe. The pain is worse during the day. The symptoms are aggravated by bending, lying down, position, sitting, standing and twisting. Stiffness is present in the morning. Associated symptoms include leg pain, numbness and weakness. Pertinent negatives include no abdominal pain, chest pain, headaches, paresis, perianal numbness or tingling. Risk factors include lack of exercise. He has tried analgesics, home exercises, NSAIDs, muscle relaxant and heat (SI injections which help, trigger point injections, OXY-IR ) for the symptoms. The treatment provided significant relief. Mental Health Problem   The primary symptoms include negative symptoms. The primary symptoms do not include dysphoric mood, bizarre behavior, disorganized speech or somatic symptoms.  The current infarction) [facility]) 2005    Dr. Jodi Roman Peripheral vascular disease ([facility] [address]     Prediabetes     Severe single current episode of major depressive disorder, without psychotic features ([facility] [address] 7/8/2016    Status post coronary artery stent placement 2002    Dr. Laura Skelton     Past Surgical History:   Procedure Laterality Date   [address]      COLONOSCOPY  3/10/14    DR Kevin Edgar  2002    Dr. Morris 83 GRAFT  10/17/2017    KNEE ARTHROSCOPY Left 2002    Dr. Paula Martin  12/19/13    Emmanuel Abbott  9/2009    Dr. Terra Ayala  2008    Dr. Serenity Reyes  3/10/14    Gagandeep Ocampo, DR Christiano Acosta     Social History     Social History    Marital status:      Spouse name: N/A    Number of children: N/A    Years of education: N/A     Occupational History    disability       Social History Main Topics    Smoking status: Never Smoker    Smokeless tobacco: Never Used    Alcohol use No      Comment: Stopped years ago.  Drug use: No      Comment: YEARS AGO    Sexual activity: Yes     Partners: Female      Comment: monogomous sexual partner, wife. Other Topics Concern    None     Social History Narrative    Tobacco -- never smoked or chewed. Alcohol -- have not used for past 10 years, prior to had consumed 6 pack of beer daily. Drugs -- tried marijuana and cocaine 14 years ago occasionally used for 3-4 years and then stopped completely 10 years ago. Education -- completed 11th grade in [place].    Occupation -- [employer]. work,  at [employer].    Marital status --  44 years, 2 grown sons.      Family History   Problem Relation Age of Onset    High Blood Pressure Mother     Arthritis Mother     Cancer bony tenderness. He exhibits normal capillary refill, no deformity, no laceration and no swelling. Decreased sensation noted. Decreased sensation is present in the ulnar distribution, is present in the medial distribution and is present in the radial distribution. Decreased strength noted. He exhibits finger abduction, thumb/finger opposition and wrist extension trouble. Left hand: He exhibits decreased range of motion and bony tenderness. Decreased sensation noted. Decreased sensation is present in the ulnar distribution and is present in the radial distribution. Decreased strength noted. He exhibits finger abduction and wrist extension trouble. Right upper leg: Normal.        Left upper leg: Normal.        Right lower leg: Normal.        Left lower leg: Normal.        Legs:       Right foot: Normal.        Left foot: Normal.   Tender areas are indicated by numbered spot         Lymphadenopathy:     He has no cervical adenopathy. Neurological: He is alert and oriented to person, place, and time. He displays abnormal reflex. He displays no atrophy and no tremor. A sensory deficit is present. No cranial nerve deficit. He exhibits normal muscle tone. Gait abnormal. Coordination normal. He displays no Babinski's sign on the right side. He displays no Babinski's sign on the left side. Reflex Scores:       Patellar reflexes are 1+ on the right side and 1+ on the left side. Achilles reflexes are 0 on the right side and 0 on the left side. Skin: Skin is warm, dry and intact. No abrasion, no bruising, no ecchymosis, no laceration, no petechiae and no rash noted. Rash is not macular, not pustular and not urticarial. He is not diaphoretic. No cyanosis or erythema. No pallor. Nails show no clubbing. Psychiatric: His behavior is normal. Judgment and thought content normal. His mood appears not anxious. His affect is not angry, not blunt, not labile and not inappropriate.  His speech is not rapid and/or MG per tablet   5. Neck pain  oxyCODONE-acetaminophen (PERCOCET) 7.5-325 MG per tablet   6. Thoracic and lumbosacral neuritis  oxyCODONE-acetaminophen (PERCOCET) 7.5-325 MG per tablet   7. Myalgia  SD INJECT TRIGGER POINTS, 3 OR GREATER    SD INJECT TRIGGER POINTS, 3 OR GREATER   8. Infantile idiopathic scoliosis of lumbar region     9. Schizo-affective schizophrenia, in remission ([facility] [address]     10. Other osteoarthritis of spine, cervical region         I am also concerned by lifestyle and mood issues including:    Past Medical History:   Diagnosis Date    Chronic back pain     Coronary artery disease 2002    [facility]. Harris [facility] at [facility] Esophageal ulcer 3/10/2014    Dr. Karen Reid    Gastric ulcer 3/10/2014    Dr. Karen Reid    Gout     Hiatal hernia 3/10/2014    Dr. Karen Reid    Hyperlipidemia     Hypertension     MI (myocardial infarction) [facility]) 2005    Dr. Dacia Grider Peripheral vascular disease ([facility] [address]     Prediabetes     Severe single current episode of major depressive disorder, without psychotic features ([facility] [address] 7/8/2016    Status post coronary artery stent placement 2002    Dr. Harris Garcia           Given their medication, chronic pain and lifestyle and medications they are at risk for :    Falls, constipation, addiction  Loss of livelyhood due to severe pain, debility, weight gain and  vitamin D deficiency    The patient was educated regarding proper diet, fitness routine, and regulatory restrictions concerning pain medications. Previous notes, comprehensive past medical, surgical, family history, and diagnostics were reviewed. Patient education and councelling were provided regarding off label use,treatment options and medication and injection risks.     Current and old OARRS ([place] Automated Prescription Reporting System) records reviewed, all refills reviewed since last visit,  Behavioral agreement/[place] regulations   and Toxicology screen was reviewed with patient and is up to date. There are no current red flags. They are making good progress regarding pain relief, they are performing at a functional level regarding activities of daily living and psychological functioning, they're not having any adverse effects or side effects from the current medications, and I see no findings of aberrant drug taking her addiction related behaviors. RX Monitoring 11/29/2018   Attestation The Prescription Monitoring Report for this patient was reviewed today. Documentation Obtaining appropriate analgesic effect of treatment. ;No signs of potential drug abuse or diversion identified. ;Possible medication side effects, risk of tolerance/dependence & alternative treatments discussed. Chronic Pain Dose reduction has been attempted. ;Reviewed the patient's functional status and documentation. ;Reestablished informed consent. ;Functional status reviewed - continues with improved or maintaining ADL's.;Treatment objectives documented - patient is progressing appropriately. Medication Contracts Existing medication contract. Attestation: The Prescription Monitoring Report for this patient was reviewed today. (Driss Coronado, DO)  Documentation: Obtaining appropriate analgesic effect of treatment., No signs of potential drug abuse or diversion identified. , Possible medication side effects, risk of tolerance/dependence & alternative treatments discussed. (Driss Coronado, )       Patient is currently taking:       I have changed Mr. Jorge Carter oxyCODONE-acetaminophen. I am also having him maintain his SYRINGE-NEEDLE (DISP) 3 ML, aspirin, diclofenac sodium, Walker, nitroGLYCERIN, pravastatin, CPAP Machine, diclofenac, NIFEdipine, gabapentin, lubiprostone, diclofenac, allopurinol, sildenafil, and metoprolol tartrate.               I also recommend the following Medications:    Orders Placed This Encounter   Medications    oxyCODONE-acetaminophen (PERCOCET) 7.5-325 MG per tablet Sig: Take 1 tablet by mouth every 4 hours as needed for Pain (Max of 60 tablets per month) for up to 30 days. Mainor Natasha Date: 12/4/18     Dispense:  60 tablet     Refill:  0        -which helps with pain and function. Otherwise, continue the current pain medications that I have prescibed. Radiologic:   Old films reviewed  ,     I discussed results with patients. see Follow up plans below  For any new studies. Care Everywhere Updates:  requested and reviewed. No new issues noted.            Labs:  Previous labs reviewed     Lab Results   Component Value Date     10/17/2018    K 5.0 10/17/2018    CL 98 10/17/2018    CO2 32 10/17/2018    BUN 17 10/17/2018    CREATININE 0.90 10/17/2018    CALCIUM 9.5 10/17/2018    LABALBU 4.4 10/09/2018    BILITOT 0.6 10/09/2018    ALKPHOS 46 10/09/2018    AST 28 10/09/2018    ALT 25 10/09/2018     Lab Results   Component Value Date    WBC 4.9 11/13/2018    RBC 4.61 11/13/2018    HGB 15.7 11/13/2018    HCT 45.4 11/13/2018    MCV 98.5 11/13/2018    MCH 34.1 11/13/2018    MCHC 34.6 11/13/2018    RDW 14.5 11/13/2018     11/13/2018    MPV 9.9 09/15/2015       Lab Results   Component Value Date    LABAMPH Neg 10/05/2017    BARBSCNU Neg 10/05/2017    LABBENZ Neg 10/05/2017    CANSU Neg 10/05/2017    COCAIMETSCRU Neg 10/05/2017    PHENCYCLIDINESCREENURINE Neg 77/36/7094    TRICYCLIC Negative 49/37/5129    DSCOMMENT see below 10/05/2017       Lab Results   Component Value Date    CODEINE Not Detected 09/16/2016    MORPHINE Not Detected 09/16/2016    ACETYLMORPHI Not Detected 09/16/2016    OXYCODONE Present 09/16/2016    NOROXYCODONE Present 09/16/2016    NOROXYMU Present 09/16/2016    HYDRCO Not Detected 09/16/2016    NORHYDU Not Detected 09/16/2016    HYDROMO Not Detected 09/16/2016    BUPREN Not Detected 09/16/2016    NORBUPRNOR Not Detected 09/16/2016    FENTA Not Detected 09/16/2016    NORFENT Not Detected 09/16/2016    MEPERIDINE Not Detected See follow-up plans for planned injections. Supplements:  Vitamin D---With increased dosing during the winter months,   Education was given on:   Dietary and Fitness-daily and/or walks and stretches             Follow up with Primary Care Physician regarding their general medical needs. They are to follow up in 2 months to review medication, efficacy of injections, pill counts, OARRS check, SOAPPR assessment, review diagnostics, to review previous and future treatment plans and assess appropriateness for continued therapy.         New Diagnostics  Orders Placed This Encounter   Procedures    Urine Drug Screen    OH INJECT TRIGGER POINTS, 3 OR GREATER    OH INJECT TRIGGER POINTS, 3 OR GREATER       Brenda Stubbs,

## 2018-11-30 DIAGNOSIS — Z79.899 HIGH RISK MEDICATION USE: ICD-10-CM

## 2018-12-08 DIAGNOSIS — E29.1 HYPOGONADISM MALE: Primary | ICD-10-CM

## 2018-12-10 ENCOUNTER — TELEPHONE (OUTPATIENT)
Dept: ENDOCRINOLOGY | Age: 68
End: 2018-12-10

## 2018-12-12 ENCOUNTER — PROCEDURE VISIT (OUTPATIENT)
Dept: PHYSICAL MEDICINE AND REHAB | Age: 68
End: 2018-12-12
Payer: MEDICARE

## 2018-12-12 DIAGNOSIS — M79.10 MYALGIA: ICD-10-CM

## 2018-12-12 DIAGNOSIS — M79.7 FIBROMYALGIA: Primary | ICD-10-CM

## 2018-12-12 PROCEDURE — 20553 NJX 1/MLT TRIGGER POINTS 3/>: CPT | Performed by: PHYSICAL MEDICINE & REHABILITATION

## 2018-12-12 PROCEDURE — 96372 THER/PROPH/DIAG INJ SC/IM: CPT | Performed by: PHYSICAL MEDICINE & REHABILITATION

## 2018-12-12 RX ORDER — CYANOCOBALAMIN 1000 UG/ML
1000 INJECTION INTRAMUSCULAR; SUBCUTANEOUS ONCE
Status: COMPLETED | OUTPATIENT
Start: 2018-12-12 | End: 2018-12-12

## 2018-12-12 RX ORDER — ROPIVACAINE HYDROCHLORIDE 5 MG/ML
16 INJECTION, SOLUTION EPIDURAL; INFILTRATION; PERINEURAL ONCE
Status: COMPLETED | OUTPATIENT
Start: 2018-12-12 | End: 2018-12-12

## 2018-12-12 RX ADMIN — CYANOCOBALAMIN 1000 MCG: 1000 INJECTION INTRAMUSCULAR; SUBCUTANEOUS at 11:49

## 2018-12-12 RX ADMIN — ROPIVACAINE HYDROCHLORIDE 16 ML: 5 INJECTION, SOLUTION EPIDURAL; INFILTRATION; PERINEURAL at 11:50

## 2018-12-13 DIAGNOSIS — E29.1 HYPOGONADISM MALE: ICD-10-CM

## 2018-12-13 RX ORDER — TESTOSTERONE CYPIONATE 200 MG/ML
INJECTION INTRAMUSCULAR
Qty: 10 ML | Refills: 2 | Status: SHIPPED | OUTPATIENT
Start: 2018-12-13 | End: 2019-06-19 | Stop reason: SDUPTHER

## 2018-12-27 DIAGNOSIS — G89.29 CHRONIC BILATERAL LOW BACK PAIN WITHOUT SCIATICA: ICD-10-CM

## 2018-12-27 DIAGNOSIS — M54.14 THORACIC AND LUMBOSACRAL NEURITIS: ICD-10-CM

## 2018-12-27 DIAGNOSIS — M54.2 NECK PAIN: Chronic | ICD-10-CM

## 2018-12-27 DIAGNOSIS — M54.40 CHRONIC MIDLINE LOW BACK PAIN WITH SCIATICA, SCIATICA LATERALITY UNSPECIFIED: ICD-10-CM

## 2018-12-27 DIAGNOSIS — Z79.899 HIGH RISK MEDICATION USE: ICD-10-CM

## 2018-12-27 DIAGNOSIS — G89.29 CHRONIC MIDLINE LOW BACK PAIN WITH SCIATICA, SCIATICA LATERALITY UNSPECIFIED: ICD-10-CM

## 2018-12-27 DIAGNOSIS — M54.17 THORACIC AND LUMBOSACRAL NEURITIS: ICD-10-CM

## 2018-12-27 DIAGNOSIS — M54.50 CHRONIC BILATERAL LOW BACK PAIN WITHOUT SCIATICA: ICD-10-CM

## 2018-12-27 RX ORDER — OXYCODONE AND ACETAMINOPHEN 7.5; 325 MG/1; MG/1
1 TABLET ORAL EVERY 4 HOURS PRN
Qty: 60 TABLET | Refills: 0 | Status: SHIPPED | OUTPATIENT
Start: 2018-12-27 | End: 2019-01-28 | Stop reason: SDUPTHER

## 2018-12-28 DIAGNOSIS — M25.551 RIGHT HIP PAIN: ICD-10-CM

## 2018-12-28 DIAGNOSIS — M53.3 SI (SACROILIAC) PAIN: ICD-10-CM

## 2018-12-28 DIAGNOSIS — M54.6 CHRONIC RIGHT-SIDED THORACIC BACK PAIN: ICD-10-CM

## 2018-12-28 DIAGNOSIS — M54.2 NECK PAIN: Chronic | ICD-10-CM

## 2018-12-28 DIAGNOSIS — G89.29 CHRONIC RIGHT-SIDED THORACIC BACK PAIN: ICD-10-CM

## 2018-12-28 RX ORDER — GABAPENTIN 300 MG/1
CAPSULE ORAL
Qty: 270 CAPSULE | Refills: 0 | Status: SHIPPED | OUTPATIENT
Start: 2018-12-28 | End: 2019-03-26 | Stop reason: SDUPTHER

## 2019-01-14 ENCOUNTER — PROCEDURE VISIT (OUTPATIENT)
Dept: PHYSICAL MEDICINE AND REHAB | Age: 69
End: 2019-01-14
Payer: MEDICARE

## 2019-01-14 DIAGNOSIS — M79.10 MYALGIA: ICD-10-CM

## 2019-01-14 DIAGNOSIS — M54.40 BILATERAL LOW BACK PAIN WITH SCIATICA, SCIATICA LATERALITY UNSPECIFIED, UNSPECIFIED CHRONICITY: ICD-10-CM

## 2019-01-14 DIAGNOSIS — M54.40 CHRONIC LOW BACK PAIN WITH SCIATICA, SCIATICA LATERALITY UNSPECIFIED, UNSPECIFIED BACK PAIN LATERALITY: Primary | ICD-10-CM

## 2019-01-14 DIAGNOSIS — G89.29 CHRONIC LOW BACK PAIN WITH SCIATICA, SCIATICA LATERALITY UNSPECIFIED, UNSPECIFIED BACK PAIN LATERALITY: Primary | ICD-10-CM

## 2019-01-14 PROCEDURE — 96372 THER/PROPH/DIAG INJ SC/IM: CPT | Performed by: PHYSICAL MEDICINE & REHABILITATION

## 2019-01-14 PROCEDURE — 20553 NJX 1/MLT TRIGGER POINTS 3/>: CPT | Performed by: PHYSICAL MEDICINE & REHABILITATION

## 2019-01-14 RX ORDER — CYANOCOBALAMIN 1000 UG/ML
1000 INJECTION INTRAMUSCULAR; SUBCUTANEOUS ONCE
Status: COMPLETED | OUTPATIENT
Start: 2019-01-14 | End: 2019-01-14

## 2019-01-14 RX ORDER — LIDOCAINE HYDROCHLORIDE 10 MG/ML
15 INJECTION, SOLUTION INFILTRATION; PERINEURAL ONCE
Status: COMPLETED | OUTPATIENT
Start: 2019-01-14 | End: 2019-01-14

## 2019-01-14 RX ADMIN — CYANOCOBALAMIN 1000 MCG: 1000 INJECTION INTRAMUSCULAR; SUBCUTANEOUS at 11:13

## 2019-01-14 RX ADMIN — LIDOCAINE HYDROCHLORIDE 15 ML: 10 INJECTION, SOLUTION INFILTRATION; PERINEURAL at 11:14

## 2019-01-15 DIAGNOSIS — M79.10 MYALGIA: ICD-10-CM

## 2019-01-15 PROCEDURE — 96372 THER/PROPH/DIAG INJ SC/IM: CPT | Performed by: PHYSICAL MEDICINE & REHABILITATION

## 2019-01-15 PROCEDURE — 20553 NJX 1/MLT TRIGGER POINTS 3/>: CPT | Performed by: PHYSICAL MEDICINE & REHABILITATION

## 2019-01-23 DIAGNOSIS — M81.0 OSTEOPOROSIS: ICD-10-CM

## 2019-01-23 DIAGNOSIS — M54.17 THORACIC AND LUMBOSACRAL NEURITIS: ICD-10-CM

## 2019-01-23 DIAGNOSIS — M54.2 NECK PAIN: Chronic | ICD-10-CM

## 2019-01-23 DIAGNOSIS — M54.14 THORACIC AND LUMBOSACRAL NEURITIS: ICD-10-CM

## 2019-01-23 DIAGNOSIS — F32.2 SEVERE SINGLE CURRENT EPISODE OF MAJOR DEPRESSIVE DISORDER, WITHOUT PSYCHOTIC FEATURES (HCC): ICD-10-CM

## 2019-01-28 ENCOUNTER — OFFICE VISIT (OUTPATIENT)
Dept: PHYSICAL MEDICINE AND REHAB | Age: 69
End: 2019-01-28
Payer: MEDICARE

## 2019-01-28 VITALS
SYSTOLIC BLOOD PRESSURE: 134 MMHG | DIASTOLIC BLOOD PRESSURE: 68 MMHG | BODY MASS INDEX: 24.95 KG/M2 | OXYGEN SATURATION: 99 % | HEART RATE: 75 BPM | WEIGHT: 166 LBS

## 2019-01-28 DIAGNOSIS — M54.12 C6 RADICULOPATHY: ICD-10-CM

## 2019-01-28 DIAGNOSIS — M54.17 THORACIC AND LUMBOSACRAL NEURITIS: Primary | ICD-10-CM

## 2019-01-28 DIAGNOSIS — M54.14 THORACIC AND LUMBOSACRAL NEURITIS: Primary | ICD-10-CM

## 2019-01-28 DIAGNOSIS — M25.511 CHRONIC PAIN OF BOTH SHOULDERS: ICD-10-CM

## 2019-01-28 DIAGNOSIS — Z79.899 HIGH RISK MEDICATION USE: ICD-10-CM

## 2019-01-28 DIAGNOSIS — M79.10 MYALGIA: ICD-10-CM

## 2019-01-28 DIAGNOSIS — G89.29 CHRONIC RIGHT SHOULDER PAIN: ICD-10-CM

## 2019-01-28 DIAGNOSIS — G89.29 CHRONIC PAIN OF BOTH SHOULDERS: ICD-10-CM

## 2019-01-28 DIAGNOSIS — F25.9 SCHIZO-AFFECTIVE SCHIZOPHRENIA, IN REMISSION (HCC): ICD-10-CM

## 2019-01-28 DIAGNOSIS — G89.29 CHRONIC MIDLINE LOW BACK PAIN WITH SCIATICA, SCIATICA LATERALITY UNSPECIFIED: ICD-10-CM

## 2019-01-28 DIAGNOSIS — M54.40 CHRONIC MIDLINE LOW BACK PAIN WITH SCIATICA, SCIATICA LATERALITY UNSPECIFIED: ICD-10-CM

## 2019-01-28 DIAGNOSIS — M54.2 NECK PAIN: Chronic | ICD-10-CM

## 2019-01-28 DIAGNOSIS — M54.50 CHRONIC BILATERAL LOW BACK PAIN WITHOUT SCIATICA: ICD-10-CM

## 2019-01-28 DIAGNOSIS — E55.9 VITAMIN D DEFICIENCY: ICD-10-CM

## 2019-01-28 DIAGNOSIS — M25.511 CHRONIC RIGHT SHOULDER PAIN: ICD-10-CM

## 2019-01-28 DIAGNOSIS — M25.512 CHRONIC PAIN OF BOTH SHOULDERS: ICD-10-CM

## 2019-01-28 DIAGNOSIS — G89.29 CHRONIC BILATERAL LOW BACK PAIN WITHOUT SCIATICA: ICD-10-CM

## 2019-01-28 DIAGNOSIS — M54.16 RIGHT LUMBAR RADICULOPATHY: ICD-10-CM

## 2019-01-28 PROCEDURE — 99214 OFFICE O/P EST MOD 30 MIN: CPT | Performed by: PHYSICAL MEDICINE & REHABILITATION

## 2019-01-28 RX ORDER — NALOXONE HYDROCHLORIDE 4 MG/.1ML
1 SPRAY NASAL PRN
Qty: 1 EACH | Refills: 1 | Status: CANCELLED | OUTPATIENT
Start: 2019-01-28

## 2019-01-28 RX ORDER — OXYCODONE AND ACETAMINOPHEN 7.5; 325 MG/1; MG/1
1 TABLET ORAL EVERY 4 HOURS PRN
Qty: 60 TABLET | Refills: 0 | Status: SHIPPED | OUTPATIENT
Start: 2019-02-01 | End: 2019-03-04 | Stop reason: SDUPTHER

## 2019-01-28 RX ORDER — NALOXONE HYDROCHLORIDE 4 MG/.1ML
1 SPRAY NASAL PRN
Qty: 1 EACH | Refills: 0 | Status: SHIPPED | OUTPATIENT
Start: 2019-01-28 | End: 2019-02-04

## 2019-01-28 ASSESSMENT — ENCOUNTER SYMPTOMS
ALLERGIC/IMMUNOLOGIC NEGATIVE: 1
NAUSEA: 0
EYES NEGATIVE: 1
VOMITING: 0
SHORTNESS OF BREATH: 0
VISUAL CHANGE: 0
DIARRHEA: 0
ABDOMINAL PAIN: 0
CHEST TIGHTNESS: 0
WHEEZING: 0
BACK PAIN: 1
CONSTIPATION: 1

## 2019-02-04 ENCOUNTER — OFFICE VISIT (OUTPATIENT)
Dept: FAMILY MEDICINE CLINIC | Age: 69
End: 2019-02-04
Payer: MEDICARE

## 2019-02-04 ENCOUNTER — HOSPITAL ENCOUNTER (OUTPATIENT)
Dept: GENERAL RADIOLOGY | Age: 69
Discharge: HOME | End: 2019-02-06
Payer: MEDICARE

## 2019-02-04 VITALS
DIASTOLIC BLOOD PRESSURE: 74 MMHG | BODY MASS INDEX: 26.37 KG/M2 | HEART RATE: 66 BPM | OXYGEN SATURATION: 98 % | TEMPERATURE: 97.9 F | RESPIRATION RATE: 16 BRPM | SYSTOLIC BLOOD PRESSURE: 126 MMHG | HEIGHT: 67 IN | WEIGHT: 168 LBS

## 2019-02-04 DIAGNOSIS — G89.29 CHRONIC PAIN OF BOTH SHOULDERS: ICD-10-CM

## 2019-02-04 DIAGNOSIS — Z79.899 HIGH RISK MEDICATION USE: ICD-10-CM

## 2019-02-04 DIAGNOSIS — I25.2 CORONARY ARTERY DISEASE WITH HX OF MYOCARDIAL INFARCT W/O HX OF CABG: ICD-10-CM

## 2019-02-04 DIAGNOSIS — T40.2X5A THERAPEUTIC OPIOID-INDUCED CONSTIPATION (OIC): Primary | ICD-10-CM

## 2019-02-04 DIAGNOSIS — I10 ESSENTIAL HYPERTENSION, BENIGN: ICD-10-CM

## 2019-02-04 DIAGNOSIS — K59.03 THERAPEUTIC OPIOID-INDUCED CONSTIPATION (OIC): Primary | ICD-10-CM

## 2019-02-04 DIAGNOSIS — E29.1 HYPOGONADISM IN MALE: ICD-10-CM

## 2019-02-04 DIAGNOSIS — M25.511 CHRONIC PAIN OF BOTH SHOULDERS: ICD-10-CM

## 2019-02-04 DIAGNOSIS — M41.06 INFANTILE IDIOPATHIC SCOLIOSIS OF LUMBAR REGION: ICD-10-CM

## 2019-02-04 DIAGNOSIS — G47.33 OSA (OBSTRUCTIVE SLEEP APNEA): ICD-10-CM

## 2019-02-04 DIAGNOSIS — G56.91 NEUROPATHY OF RIGHT UPPER EXTREMITY: ICD-10-CM

## 2019-02-04 DIAGNOSIS — M25.511 CHRONIC RIGHT SHOULDER PAIN: ICD-10-CM

## 2019-02-04 DIAGNOSIS — M25.512 CHRONIC PAIN OF BOTH SHOULDERS: ICD-10-CM

## 2019-02-04 DIAGNOSIS — G89.29 CHRONIC RIGHT SHOULDER PAIN: ICD-10-CM

## 2019-02-04 DIAGNOSIS — I25.10 CORONARY ARTERY DISEASE WITH HX OF MYOCARDIAL INFARCT W/O HX OF CABG: ICD-10-CM

## 2019-02-04 PROCEDURE — 99214 OFFICE O/P EST MOD 30 MIN: CPT | Performed by: PHYSICIAN ASSISTANT

## 2019-02-04 PROCEDURE — 73030 X-RAY EXAM OF SHOULDER: CPT

## 2019-02-04 ASSESSMENT — ENCOUNTER SYMPTOMS
COLOR CHANGE: 0
CONSTIPATION: 1
BACK PAIN: 1
RECTAL PAIN: 0
WHEEZING: 0
COUGH: 0
BLOOD IN STOOL: 0
SINUS PRESSURE: 0
DIARRHEA: 0
ABDOMINAL DISTENTION: 1
NAUSEA: 0
ABDOMINAL PAIN: 0
CHEST TIGHTNESS: 0
SHORTNESS OF BREATH: 0
TROUBLE SWALLOWING: 0

## 2019-02-08 ENCOUNTER — TELEPHONE (OUTPATIENT)
Dept: FAMILY MEDICINE CLINIC | Age: 69
End: 2019-02-08

## 2019-02-08 NOTE — TELEPHONE ENCOUNTER
Patient is calling to let you know that the medication that you prescribed for constipation is too expensive. He was wondering if you can sent something different to his pharmacy for him. Also want you to know that the EMG is going to be in March 20, 2019 done by Dr. Hamilton Shelley his pain management doctor. Please advise and thanks!

## 2019-02-14 ENCOUNTER — OFFICE VISIT (OUTPATIENT)
Dept: CARDIOLOGY CLINIC | Age: 69
End: 2019-02-14
Payer: MEDICARE

## 2019-02-14 VITALS
RESPIRATION RATE: 18 BRPM | HEIGHT: 67 IN | BODY MASS INDEX: 26.53 KG/M2 | SYSTOLIC BLOOD PRESSURE: 155 MMHG | DIASTOLIC BLOOD PRESSURE: 77 MMHG | OXYGEN SATURATION: 99 % | WEIGHT: 169 LBS | HEART RATE: 70 BPM

## 2019-02-14 DIAGNOSIS — I25.10 CORONARY ARTERY DISEASE WITH HX OF MYOCARDIAL INFARCT W/O HX OF CABG: ICD-10-CM

## 2019-02-14 DIAGNOSIS — E78.00 HYPERCHOLESTEROLEMIA: Primary | Chronic | ICD-10-CM

## 2019-02-14 DIAGNOSIS — I25.2 CORONARY ARTERY DISEASE WITH HX OF MYOCARDIAL INFARCT W/O HX OF CABG: ICD-10-CM

## 2019-02-14 DIAGNOSIS — I10 ESSENTIAL HYPERTENSION, BENIGN: ICD-10-CM

## 2019-02-14 PROCEDURE — 99214 OFFICE O/P EST MOD 30 MIN: CPT | Performed by: INTERNAL MEDICINE

## 2019-02-14 ASSESSMENT — ENCOUNTER SYMPTOMS
NAUSEA: 0
RESPIRATORY NEGATIVE: 1
EYES NEGATIVE: 1
STRIDOR: 0
GASTROINTESTINAL NEGATIVE: 1
COUGH: 0
SHORTNESS OF BREATH: 0
CHEST TIGHTNESS: 0
WHEEZING: 0
BLOOD IN STOOL: 0

## 2019-02-15 ENCOUNTER — PROCEDURE VISIT (OUTPATIENT)
Dept: PHYSICAL MEDICINE AND REHAB | Age: 69
End: 2019-02-15
Payer: MEDICARE

## 2019-02-15 DIAGNOSIS — M89.8X1 PAIN OF RIGHT SCAPULA: Primary | ICD-10-CM

## 2019-02-15 PROCEDURE — 64418 NJX AA&/STRD SPRSCAP NRV: CPT | Performed by: PHYSICAL MEDICINE & REHABILITATION

## 2019-03-01 ENCOUNTER — PROCEDURE VISIT (OUTPATIENT)
Dept: PHYSICAL MEDICINE AND REHAB | Age: 69
End: 2019-03-01
Payer: MEDICARE

## 2019-03-01 DIAGNOSIS — M54.17 THORACIC AND LUMBOSACRAL NEURITIS: ICD-10-CM

## 2019-03-01 DIAGNOSIS — Z79.899 HIGH RISK MEDICATION USE: ICD-10-CM

## 2019-03-01 DIAGNOSIS — M54.2 NECK PAIN: Chronic | ICD-10-CM

## 2019-03-01 DIAGNOSIS — M54.40 CHRONIC MIDLINE LOW BACK PAIN WITH SCIATICA, SCIATICA LATERALITY UNSPECIFIED: ICD-10-CM

## 2019-03-01 DIAGNOSIS — G89.29 CHRONIC BILATERAL LOW BACK PAIN WITHOUT SCIATICA: ICD-10-CM

## 2019-03-01 DIAGNOSIS — M54.50 CHRONIC BILATERAL LOW BACK PAIN WITHOUT SCIATICA: ICD-10-CM

## 2019-03-01 DIAGNOSIS — M54.14 THORACIC AND LUMBOSACRAL NEURITIS: ICD-10-CM

## 2019-03-01 DIAGNOSIS — M79.7 FIBROMYALGIA: Primary | ICD-10-CM

## 2019-03-01 DIAGNOSIS — G89.29 CHRONIC MIDLINE LOW BACK PAIN WITH SCIATICA, SCIATICA LATERALITY UNSPECIFIED: ICD-10-CM

## 2019-03-01 PROCEDURE — 20553 NJX 1/MLT TRIGGER POINTS 3/>: CPT | Performed by: PHYSICAL MEDICINE & REHABILITATION

## 2019-03-01 RX ORDER — LIDOCAINE HYDROCHLORIDE 10 MG/ML
16 INJECTION, SOLUTION INFILTRATION; PERINEURAL ONCE
Status: COMPLETED | OUTPATIENT
Start: 2019-03-01 | End: 2019-03-01

## 2019-03-01 RX ORDER — OXYCODONE AND ACETAMINOPHEN 7.5; 325 MG/1; MG/1
1 TABLET ORAL EVERY 4 HOURS PRN
Qty: 60 TABLET | Refills: 0 | Status: CANCELLED | OUTPATIENT
Start: 2019-03-01 | End: 2019-03-31

## 2019-03-01 RX ADMIN — LIDOCAINE HYDROCHLORIDE 16 ML: 10 INJECTION, SOLUTION INFILTRATION; PERINEURAL at 12:46

## 2019-03-04 DIAGNOSIS — G89.29 CHRONIC MIDLINE LOW BACK PAIN WITH SCIATICA, SCIATICA LATERALITY UNSPECIFIED: ICD-10-CM

## 2019-03-04 DIAGNOSIS — Z79.899 HIGH RISK MEDICATION USE: ICD-10-CM

## 2019-03-04 DIAGNOSIS — M54.17 THORACIC AND LUMBOSACRAL NEURITIS: ICD-10-CM

## 2019-03-04 DIAGNOSIS — M54.40 CHRONIC MIDLINE LOW BACK PAIN WITH SCIATICA, SCIATICA LATERALITY UNSPECIFIED: ICD-10-CM

## 2019-03-04 DIAGNOSIS — M54.14 THORACIC AND LUMBOSACRAL NEURITIS: ICD-10-CM

## 2019-03-04 DIAGNOSIS — G89.29 CHRONIC BILATERAL LOW BACK PAIN WITHOUT SCIATICA: ICD-10-CM

## 2019-03-04 DIAGNOSIS — M54.50 CHRONIC BILATERAL LOW BACK PAIN WITHOUT SCIATICA: ICD-10-CM

## 2019-03-04 DIAGNOSIS — M54.2 NECK PAIN: Chronic | ICD-10-CM

## 2019-03-04 RX ORDER — OXYCODONE AND ACETAMINOPHEN 7.5; 325 MG/1; MG/1
1 TABLET ORAL EVERY 4 HOURS PRN
Qty: 60 TABLET | Refills: 0 | Status: SHIPPED | OUTPATIENT
Start: 2019-03-04 | End: 2019-04-01 | Stop reason: SDUPTHER

## 2019-03-07 DIAGNOSIS — E29.1 HYPOGONADISM MALE: ICD-10-CM

## 2019-03-07 DIAGNOSIS — R73.9 HYPERGLYCEMIA: ICD-10-CM

## 2019-03-07 LAB
ANION GAP SERPL CALCULATED.3IONS-SCNC: 14 MEQ/L (ref 9–15)
BUN BLDV-MCNC: 14 MG/DL (ref 8–23)
CALCIUM SERPL-MCNC: 9.7 MG/DL (ref 8.5–9.9)
CHLORIDE BLD-SCNC: 95 MEQ/L (ref 95–107)
CO2: 29 MEQ/L (ref 20–31)
CREAT SERPL-MCNC: 0.99 MG/DL (ref 0.7–1.2)
GFR AFRICAN AMERICAN: >60
GFR NON-AFRICAN AMERICAN: >60
GLUCOSE BLD-MCNC: 102 MG/DL (ref 70–99)
HBA1C MFR BLD: 6.3 % (ref 4.8–5.9)
HCT VFR BLD CALC: 46 % (ref 42–52)
HEMOGLOBIN: 15.3 G/DL (ref 14–18)
MCH RBC QN AUTO: 33.2 PG (ref 27–31.3)
MCHC RBC AUTO-ENTMCNC: 33.3 % (ref 33–37)
MCV RBC AUTO: 99.7 FL (ref 80–100)
PDW BLD-RTO: 14.4 % (ref 11.5–14.5)
PLATELET # BLD: 242 K/UL (ref 130–400)
POTASSIUM SERPL-SCNC: 5 MEQ/L (ref 3.4–4.9)
RBC # BLD: 4.62 M/UL (ref 4.7–6.1)
SODIUM BLD-SCNC: 138 MEQ/L (ref 135–144)
WBC # BLD: 4.8 K/UL (ref 4.8–10.8)

## 2019-03-08 RX ORDER — LIDOCAINE HYDROCHLORIDE 5 MG/ML
6 INJECTION, SOLUTION INFILTRATION; INTRAVENOUS ONCE
Status: COMPLETED | OUTPATIENT
Start: 2019-02-15 | End: 2019-03-08

## 2019-03-08 RX ORDER — LIDOCAINE HYDROCHLORIDE 20 MG/ML
5 INJECTION, SOLUTION INFILTRATION; PERINEURAL ONCE
Status: COMPLETED | OUTPATIENT
Start: 2019-02-15 | End: 2019-03-08

## 2019-03-08 RX ADMIN — LIDOCAINE HYDROCHLORIDE 5 ML: 20 INJECTION, SOLUTION INFILTRATION; PERINEURAL at 14:06

## 2019-03-08 RX ADMIN — LIDOCAINE HYDROCHLORIDE 6 ML: 5 INJECTION, SOLUTION INFILTRATION; INTRAVENOUS at 14:07

## 2019-03-09 LAB
SEX HORMONE BINDING GLOBULIN: 52 NMOL/L (ref 11–80)
TESTOSTERONE FREE PERCENT: 1.3 % (ref 1.6–2.9)
TESTOSTERONE FREE, CALC: 34 PG/ML (ref 47–244)
TESTOSTERONE TOTAL-MALE: 255 NG/DL (ref 300–720)

## 2019-03-14 ENCOUNTER — OFFICE VISIT (OUTPATIENT)
Dept: ENDOCRINOLOGY | Age: 69
End: 2019-03-14
Payer: MEDICARE

## 2019-03-14 VITALS
WEIGHT: 171 LBS | BODY MASS INDEX: 26.84 KG/M2 | HEART RATE: 73 BPM | SYSTOLIC BLOOD PRESSURE: 125 MMHG | HEIGHT: 67 IN | DIASTOLIC BLOOD PRESSURE: 75 MMHG

## 2019-03-14 DIAGNOSIS — E29.1 HYPOGONADISM IN MALE: Primary | ICD-10-CM

## 2019-03-14 DIAGNOSIS — N52.9 ERECTILE DYSFUNCTION, UNSPECIFIED ERECTILE DYSFUNCTION TYPE: ICD-10-CM

## 2019-03-14 DIAGNOSIS — R73.03 PREDIABETES: ICD-10-CM

## 2019-03-14 PROCEDURE — 99213 OFFICE O/P EST LOW 20 MIN: CPT | Performed by: INTERNAL MEDICINE

## 2019-03-14 RX ORDER — TADALAFIL 20 MG/1
20 TABLET ORAL PRN
Qty: 30 TABLET | Refills: 3 | Status: SHIPPED | OUTPATIENT
Start: 2019-03-14 | End: 2019-08-05 | Stop reason: SDUPTHER

## 2019-03-20 ENCOUNTER — OFFICE VISIT (OUTPATIENT)
Dept: PHYSICAL MEDICINE AND REHAB | Age: 69
End: 2019-03-20
Payer: MEDICARE

## 2019-03-20 DIAGNOSIS — G56.91 NEUROPATHY OF RIGHT UPPER EXTREMITY: ICD-10-CM

## 2019-03-20 DIAGNOSIS — M54.12 C6 RADICULOPATHY: Primary | ICD-10-CM

## 2019-03-20 DIAGNOSIS — G56.03 BILATERAL CARPAL TUNNEL SYNDROME: ICD-10-CM

## 2019-03-20 PROCEDURE — 95886 MUSC TEST DONE W/N TEST COMP: CPT | Performed by: PHYSICAL MEDICINE & REHABILITATION

## 2019-03-20 PROCEDURE — 95912 NRV CNDJ TEST 11-12 STUDIES: CPT | Performed by: PHYSICAL MEDICINE & REHABILITATION

## 2019-03-26 DIAGNOSIS — M54.40 CHRONIC MIDLINE LOW BACK PAIN WITH SCIATICA, SCIATICA LATERALITY UNSPECIFIED: ICD-10-CM

## 2019-03-26 DIAGNOSIS — G89.29 CHRONIC MIDLINE LOW BACK PAIN WITH SCIATICA, SCIATICA LATERALITY UNSPECIFIED: ICD-10-CM

## 2019-03-26 DIAGNOSIS — M25.551 RIGHT HIP PAIN: ICD-10-CM

## 2019-03-26 DIAGNOSIS — M54.2 NECK PAIN: Chronic | ICD-10-CM

## 2019-03-26 DIAGNOSIS — M54.17 THORACIC AND LUMBOSACRAL NEURITIS: ICD-10-CM

## 2019-03-26 DIAGNOSIS — M54.50 CHRONIC BILATERAL LOW BACK PAIN WITHOUT SCIATICA: ICD-10-CM

## 2019-03-26 DIAGNOSIS — G89.29 CHRONIC RIGHT-SIDED THORACIC BACK PAIN: ICD-10-CM

## 2019-03-26 DIAGNOSIS — Z79.899 HIGH RISK MEDICATION USE: ICD-10-CM

## 2019-03-26 DIAGNOSIS — M53.3 SI (SACROILIAC) PAIN: ICD-10-CM

## 2019-03-26 DIAGNOSIS — M54.14 THORACIC AND LUMBOSACRAL NEURITIS: ICD-10-CM

## 2019-03-26 DIAGNOSIS — G89.29 CHRONIC BILATERAL LOW BACK PAIN WITHOUT SCIATICA: ICD-10-CM

## 2019-03-26 DIAGNOSIS — M54.6 CHRONIC RIGHT-SIDED THORACIC BACK PAIN: ICD-10-CM

## 2019-04-01 RX ORDER — GABAPENTIN 300 MG/1
CAPSULE ORAL
Qty: 270 CAPSULE | Refills: 0 | Status: SHIPPED | OUTPATIENT
Start: 2019-04-01 | End: 2019-06-10 | Stop reason: SDUPTHER

## 2019-04-01 RX ORDER — OXYCODONE AND ACETAMINOPHEN 7.5; 325 MG/1; MG/1
1 TABLET ORAL EVERY 4 HOURS PRN
Qty: 60 TABLET | Refills: 0 | Status: SHIPPED | OUTPATIENT
Start: 2019-04-01 | End: 2019-04-29 | Stop reason: SDUPTHER

## 2019-04-08 ENCOUNTER — OFFICE VISIT (OUTPATIENT)
Dept: PHYSICAL MEDICINE AND REHAB | Age: 69
End: 2019-04-08
Payer: MEDICARE

## 2019-04-08 VITALS
SYSTOLIC BLOOD PRESSURE: 118 MMHG | WEIGHT: 158 LBS | DIASTOLIC BLOOD PRESSURE: 78 MMHG | BODY MASS INDEX: 24.8 KG/M2 | HEIGHT: 67 IN

## 2019-04-08 DIAGNOSIS — E87.5 HYPERKALEMIA: ICD-10-CM

## 2019-04-08 DIAGNOSIS — M79.10 MYALGIA: Primary | ICD-10-CM

## 2019-04-08 DIAGNOSIS — M25.511 CHRONIC PAIN OF BOTH SHOULDERS: ICD-10-CM

## 2019-04-08 DIAGNOSIS — M47.892 OTHER OSTEOARTHRITIS OF SPINE, CERVICAL REGION: ICD-10-CM

## 2019-04-08 DIAGNOSIS — G56.03 BILATERAL CARPAL TUNNEL SYNDROME: ICD-10-CM

## 2019-04-08 DIAGNOSIS — M75.21 BICIPITAL TENDINITIS OF RIGHT SHOULDER: ICD-10-CM

## 2019-04-08 DIAGNOSIS — F32.2 SEVERE SINGLE CURRENT EPISODE OF MAJOR DEPRESSIVE DISORDER, WITHOUT PSYCHOTIC FEATURES (HCC): ICD-10-CM

## 2019-04-08 DIAGNOSIS — M25.512 CHRONIC PAIN OF BOTH SHOULDERS: ICD-10-CM

## 2019-04-08 DIAGNOSIS — M54.2 NECK PAIN: Chronic | ICD-10-CM

## 2019-04-08 DIAGNOSIS — G89.29 CHRONIC PAIN OF BOTH SHOULDERS: ICD-10-CM

## 2019-04-08 DIAGNOSIS — M54.14 THORACIC AND LUMBOSACRAL NEURITIS: ICD-10-CM

## 2019-04-08 DIAGNOSIS — M54.17 THORACIC AND LUMBOSACRAL NEURITIS: ICD-10-CM

## 2019-04-08 DIAGNOSIS — M75.21 BICEPS TENDINITIS OF RIGHT UPPER EXTREMITY: ICD-10-CM

## 2019-04-08 PROCEDURE — 99215 OFFICE O/P EST HI 40 MIN: CPT | Performed by: PHYSICAL MEDICINE & REHABILITATION

## 2019-04-08 PROCEDURE — 20553 NJX 1/MLT TRIGGER POINTS 3/>: CPT | Performed by: PHYSICAL MEDICINE & REHABILITATION

## 2019-04-08 ASSESSMENT — ENCOUNTER SYMPTOMS
CONSTIPATION: 1
BACK PAIN: 1
NAUSEA: 0
ALLERGIC/IMMUNOLOGIC NEGATIVE: 1
DIARRHEA: 0
VISUAL CHANGE: 0
CHEST TIGHTNESS: 0
VOMITING: 0
EYES NEGATIVE: 1
WHEEZING: 0
ABDOMINAL PAIN: 0
SHORTNESS OF BREATH: 0

## 2019-04-08 NOTE — PROGRESS NOTES
Problem   The primary symptoms include negative symptoms. The primary symptoms do not include dysphoric mood, bizarre behavior, disorganized speech or somatic symptoms. The current episode started more than 1 month ago. This is a chronic problem. The onset of the illness is precipitated by a stressful event (chronic pain). The degree of incapacity that he is experiencing as a consequence of his illness is severe. Sequelae of the illness include an inability to work. Additional symptoms of the illness include anhedonia and fatigue. Additional symptoms of the illness do not include unexpected weight change, psychomotor retardation, feelings of worthlessness, attention impairment, euphoric mood, increased goal-directed activity, flight of ideas, inflated self-esteem, decreased need for sleep, distractible, poor judgment, visual change, headaches, abdominal pain or seizures. He does not admit to suicidal ideas. He does not have a plan to commit suicide. He does not contemplate harming himself. He has not already injured self. He does not contemplate injuring another person. He has not already  injured another person. Risk factors that are present for mental illness include a history of mental illness. Hand Pain    The incident occurred more than 1 week ago. The incident occurred at home. There was no injury mechanism. The pain is present in the right wrist and right hand. The quality of the pain is described as cramping. The pain is at a severity of 5/10. The pain is moderate. The pain has been intermittent since the incident. Associated symptoms include numbness. Pertinent negatives include no chest pain, muscle weakness or tingling. The symptoms are aggravated by movement. He has tried ice for the symptoms. The treatment provided moderate relief.        Past Medical History:   Diagnosis Date    Chronic back pain     Coronary artery disease 2002    [facility]. Ko Clark at [facility] Esophageal ulcer 3/10/2014    Dr. Jose M Craft    Gastric ulcer 3/10/2014    Dr. Jose M Craft    Gout     Hiatal hernia 3/10/2014    Dr. Jose M Craft    Hyperlipidemia     Hypertension     [facility]) 2005    Dr. Evelia Robertson [facility]     Prediabetes     Severe single current episode of major depressive disorder, without psychotic features ([facility] 7/8/2016    Status post coronary artery stent placement 2002    Dr. Gil Hein     Past Surgical History:   Procedure Laterality Date   [address]ris[address]      COLONOSCOPY  3/10/14    DR Yonny Leong  2002    Dr. Morris 84 GRAFT  10/17/2017    KNEE ARTHROSCOPY Left 2002    Dr. Srinath Callahan  12/19/13    Ziggy Thomas  9/2009    Dr. Puma Glez  2008    Dr. Robert Thomas  3/10/14    Clara Barrera, DR Denisa Ball     Social History     Socioeconomic History    Marital status:      Spouse name: None    Number of children: None    Years of education: None    Highest education level: None   Occupational History    Occupation: disability    Social Needs    Financial resource strain: None    Food insecurity:     Worry: None     Inability: None    Transportation needs:     Medical: None     Non-medical: None   Tobacco Use    Smoking status: Never Smoker    Smokeless tobacco: Never Used   Substance and Sexual Activity    Alcohol use: No     Alcohol/week: 0.0 oz     Comment: Stopped years ago.  Drug use: No     Comment: YEARS AGO    Sexual activity: Yes     Partners: Female     Comment: monogomous sexual partner, wife.    Lifestyle    Physical activity:     Days per week: None     Minutes per session: None    Stress: None   Relationships    Social connections:     Talks on dysphoric mood and sleep disturbance. The patient is not nervous/anxious. Objective    Vitals:    04/08/19 1014   BP: 118/78   Weight: 158 lb (71.7 kg)   Height: 5' 7\" (1.702 m)     Pain Score: Three       Physical Exam   Constitutional: He is oriented to person, place, and time. Vital signs are normal. He appears well-developed and well-nourished. Non-toxic appearance. He does not have a sickly appearance. He does not appear ill. No distress. HENT:   Head: Normocephalic and atraumatic. Right Ear: Hearing normal.   Left Ear: Hearing normal.   Nose: Nose normal.   Mouth/Throat: Oropharynx is clear and moist and mucous membranes are normal. No oral lesions. Normal dentition. No oropharyngeal exudate. No signs of toxic erosions. Eyes: Pupils are equal, round, and reactive to light. Conjunctivae and EOM are normal. Right eye exhibits no chemosis, no discharge and no exudate. Left eye exhibits no chemosis, no discharge and no exudate. No scleral icterus. Neck: Neck supple. No JVD present. Spinous process tenderness and muscular tenderness present. No tracheal tenderness present. No neck rigidity. No tracheal deviation and no edema present. No thyromegaly present. Cardiovascular: Intact distal pulses. Exam reveals no gallop, no friction rub and no decreased pulses. No murmur heard. Pulmonary/Chest: Effort normal. No accessory muscle usage. No apnea, no tachypnea and no bradypnea. No respiratory distress. He has decreased breath sounds. He has no wheezes. He has no rales. He exhibits no tenderness. Abdominal: Soft. He exhibits no distension and no mass. There is no tenderness. There is no rebound and no guarding. Musculoskeletal: He exhibits tenderness. He exhibits no edema. Right shoulder: He exhibits decreased range of motion, tenderness and bony tenderness. Left shoulder: He exhibits decreased range of motion, tenderness and bony tenderness.         Right elbow: Normal.       Left elbow: Normal.        Right wrist: Normal.        Left wrist: Normal.        Right hip: Normal.        Left hip: Normal.        Right knee: Normal.        Left knee: Normal.        Right ankle: Normal. Achilles tendon normal.        Left ankle: Normal. Achilles tendon normal.        Cervical back: He exhibits decreased range of motion, tenderness, bony tenderness, laceration, pain and spasm. He exhibits no swelling, no edema and no deformity. Thoracic back: He exhibits decreased range of motion, tenderness, bony tenderness, deformity, pain and spasm. Lumbar back: He exhibits decreased range of motion, tenderness, bony tenderness and pain. He exhibits no swelling, no edema, no deformity, no laceration and normal pulse. Back:         Right upper arm: Normal.        Left upper arm: Normal.        Right forearm: Normal.        Left forearm: Normal.        Arms:       Right hand: He exhibits decreased range of motion and bony tenderness. He exhibits normal capillary refill, no deformity, no laceration and no swelling. Decreased sensation noted. Decreased sensation is present in the ulnar distribution, is present in the medial distribution and is present in the radial distribution. Decreased strength noted. He exhibits finger abduction, thumb/finger opposition and wrist extension trouble. Left hand: He exhibits decreased range of motion and bony tenderness. Decreased sensation noted. Decreased sensation is present in the ulnar distribution and is present in the radial distribution. Decreased strength noted. He exhibits finger abduction and wrist extension trouble. Right upper leg: Normal.        Left upper leg: Normal.        Right lower leg: Normal.        Left lower leg: Normal.        Legs:       Right foot: Normal.        Left foot: Normal.   Tender areas are indicated by numbered spot         Lymphadenopathy:     He has no cervical adenopathy.    Neurological: He is alert and oriented to person, place, and time. He displays abnormal reflex. He displays no atrophy and no tremor. A sensory deficit is present. No cranial nerve deficit. He exhibits normal muscle tone. Gait abnormal. Coordination normal. He displays no Babinski's sign on the right side. He displays no Babinski's sign on the left side. Reflex Scores:       Patellar reflexes are 1+ on the right side and 1+ on the left side. Achilles reflexes are 0 on the right side and 0 on the left side. Skin: Skin is warm, dry and intact. No abrasion, no bruising, no ecchymosis, no laceration, no petechiae and no rash noted. Rash is not macular, not pustular and not urticarial. He is not diaphoretic. No cyanosis or erythema. No pallor. Nails show no clubbing. Psychiatric: His behavior is normal. Judgment and thought content normal. His mood appears not anxious. His affect is not angry, not blunt, not labile and not inappropriate. His speech is not rapid and/or pressured, not delayed, not tangential and not slurred. He is not agitated, not aggressive, not hyperactive, not slowed, not withdrawn, not actively hallucinating and not combative. Thought content is not paranoid and not delusional. Cognition and memory are normal. Cognition and memory are not impaired. He does not express impulsivity or inappropriate judgment. He does not exhibit a depressed mood. He expresses no homicidal and no suicidal ideation. He expresses no suicidal plans and no homicidal plans. He is communicative. He exhibits normal recent memory and normal remote memory.   high score on SOAPPR    Calm appropriate    NAD He is attentive. Vitals reviewed. Ortho Exam  Neurologic Exam     Mental Status   Oriented to person, place, and time. Speech: not slurred   Level of consciousness: alert  Knowledge: good. Able to name object. Able to read. Able to repeat. Able to write. Normal comprehension.      Cranial Nerves     CN III, IV, VI   Pupils are equal, round, and reactive to light. Extraocular motions are normal.     Sensory Exam   Right arm light touch: decreased from fingers  Left arm light touch: decreased from fingers    Gait, Coordination, and Reflexes     Reflexes   Right patellar: 1+  Left patellar: 1+  Right achilles: 0  Left achilles: 0          After a thorough review and discussion of the previous medical records, patient comprehensive medical, surgical, and family and social history, Review of Systems, their OARRS, their Screener and Opioid Assessment for Patients with Pain (SOAPP®-R), recent diagnostics, and symptomatic results to previous treatment, it is my impression that the patients is suffering with progressive and severe:     Diagnosis Orders   1. Myalgia  MD INJECT TRIGGER POINTS, 3 OR GREATER    MD INJECT TRIGGER POINTS, 3 OR GREATER   2. Neck pain  DISCONTINUED: diclofenac (VOLTAREN) 50 MG EC tablet   3. Thoracic and lumbosacral neuritis  DISCONTINUED: diclofenac (VOLTAREN) 50 MG EC tablet   4. Osteoporosis  DISCONTINUED: diclofenac (VOLTAREN) 50 MG EC tablet   5. Severe single current episode of major depressive disorder, without psychotic features ([address]  DISCONTINUED: diclofenac (VOLTAREN) 50 MG EC tablet   6. Bilateral carpal tunnel syndrome  Ambulatory referral to Orthopedic Surgery   7. Biceps tendinitis of right upper extremity  Ambulatory referral to Orthopedic Surgery    MRI SHOULDER RIGHT W WO CONTRAST   8. Bicipital tendinitis of right shoulder  MRI SHOULDER RIGHT W WO CONTRAST   9. Hyperkalemia     10. Chronic pain of both shoulders  MRI SHOULDER RIGHT W WO CONTRAST   11.  Other osteoarthritis of spine, cervical region         I am also concerned by lifestyle and mood issues including:    Past Medical History:   Diagnosis Date    Chronic back pain     Coronary artery disease 2002    [facility] Reid Fry at [facility] Esophageal ulcer 3/10/2014    Dr. Rodri Cassidy    Gastric ulcer 3/10/2014    Dr. Richard Medina Gout  Hiatal hernia 3/10/2014    Dr. Fanta Brand    Hyperlipidemia     Hypertension     MI (myocardial infarction) [facility]) 2005    Dr. Nomi Hemphill Peripheral vascular disease ([facility]     Prediabetes     Severe single current episode of major depressive disorder, without psychotic features ([facility] 7/8/2016    Status post coronary artery stent placement 2002    Dr. Basilia Saha           Given their medication, chronic pain and lifestyle and medications they are at risk for :    Falls, constipation, addiction  Loss of livelyhood due to severe pain, debility, weight gain and  vitamin D deficiency    The patient was educated regarding proper diet, fitness routine, and regulatory restrictions concerning pain medications. Previous notes, comprehensive past medical, surgical, family history, and diagnostics were reviewed. Patient education and councelling were provided regarding off label use,treatment options and medication and injection risks. Current and old OARRS ([place] Automated Prescription Reporting System) records reviewed, all refills reviewed since last visit,  Behavioral agreement/[employer] regulations   and Toxicology screen was reviewed with patient and is up to date. There are no current red flags. They are making good progress regarding pain relief, they are performing at a functional level regarding activities of daily living, family interactions and psychological functioning, they're not having any adverse effects or side effects from the current medications, and I see no findings of aberrant drug taking or addiction related behaviors. The patient is aware that they have a chronic pain condition and they may require opiates dosing for life. All efforts will be made to wean to the lowest effective dose. Other therapies for pain have not been effective including nonopiate medications. Injections and exercises are only partially effective.   A Rx for Narcan was offered to help prevent accidental overdose. RX Monitoring 4/8/2019   Attestation The Prescription Monitoring Report for this patient was reviewed today. Chronic Pain Routine Monitoring No signs of potential drug abuse or diversion identified: otherwise, see note documentation;Obtaining appropriate analgesic effect of treatment.;Possible medication side effects, risk of tolerance/dependence & alternative treatments discussed. Chronic Pain > 50 MEDD Obtained or confirmened \"Consent of Opioid Use\" on file.;Considered consultation with a specialist.;Re-evaluated the status of the patient's underlying condition causing pain. Chronic Pain > 80 MEDD Obtained or confirmed a written medication contract was on file.;Consulted with a specialist.;Looked for signs of prescription misuse. Attestation: The Prescription Monitoring Report for this patient was reviewed today. (Sera Garcia DO)  Chronic Pain Routine Monitoring: No signs of potential drug abuse or diversion identified: otherwise, see note documentation, Obtaining appropriate analgesic effect of treatment. , Possible medication side effects, risk of tolerance/dependence & alternative treatments discussed. (Sera Garcia DO)       Patient is currently taking:       I have discontinued Scotty Wright's diclofenac. I am also having him start on diclofenac. Additionally, I am having him maintain his SYRINGE-NEEDLE (DISP) 3 ML, aspirin, diclofenac sodium, Walker, nitroGLYCERIN, CPAP Machine, sildenafil, testosterone cypionate, metoprolol tartrate, pravastatin, allopurinol, tadalafil, gabapentin, and oxyCODONE-acetaminophen.               I also recommend the following Medications:    Orders Placed This Encounter   Medications    DISCONTD: diclofenac (VOLTAREN) 50 MG EC tablet     Sig: TAKE 1 TABLET BY MOUTH TWICE DAILY AS NEEDED FOR JOINT PAIN     Dispense:  180 tablet     Refill:  0    diclofenac (VOLTAREN) 50 MG EC tablet     Sig: Take 1 tablet by mouth daily as needed for Pain     Dispense:  30 tablet     Refill:  1        -which helps with pain and function. Otherwise, continue the current pain medications that I have prescibed. Radiologic:   Old films reviewed  ddd djd c spine,  XR SHOULDER RIGHT (MIN 2 VIEWS)       CLINICAL HISTORY: M25.511 Chronic right shoulder pain ICD10 no history of trauma reported       COMPARISONS: None.       FINDINGS:       Four views of the right shoulder are submitted. No acute fractures. No dislocations. No focal bony abnormalities                                                I discussed results with patients. see Follow up plans below  For any new studies. Care Everywhere Updates:  requested and reviewed. No new issues noted. Electrodiagnostic:  Previous studies requested,     I discussed results with patient. See follow-up plans for new studies.         Labs:  Previous labs reviewed     Lab Results   Component Value Date     03/07/2019    K 5.0 03/07/2019    CL 95 03/07/2019    CO2 29 03/07/2019    BUN 14 03/07/2019    CREATININE 0.99 03/07/2019    CALCIUM 9.7 03/07/2019    LABALBU 4.4 10/09/2018    BILITOT 0.6 10/09/2018    ALKPHOS 46 10/09/2018    AST 28 10/09/2018    ALT 25 10/09/2018     Lab Results   Component Value Date    WBC 4.8 03/07/2019    RBC 4.62 03/07/2019    HGB 15.3 03/07/2019    HCT 46.0 03/07/2019    MCV 99.7 03/07/2019    MCH 33.2 03/07/2019    MCHC 33.3 03/07/2019    RDW 14.4 03/07/2019     03/07/2019    MPV 9.9 09/15/2015       Lab Results   Component Value Date    LABAMPH Neg 11/30/2018    BARBSCNU Neg 11/30/2018    LABBENZ Neg 11/30/2018    CANSU Neg 11/30/2018    COCAIMETSCRU Neg 11/30/2018    PHENCYCLIDINESCREENURINE Neg 17/53/3378    TRICYCLIC Negative 92/15/2058    DSCOMMENT see below 11/30/2018       Lab Results   Component Value Date    CODEINE Not Detected 09/16/2016    MORPHINE Not Detected 09/16/2016    ACETYLMORPHI Not Detected 09/16/2016    OXYCODONE Present 09/16/2016    NOROXYCODONE Present 09/16/2016    NOROXYMU Present 09/16/2016    HYDRCO Not Detected 09/16/2016    NORHYDU Not Detected 09/16/2016    HYDROMO Not Detected 09/16/2016    BUPREN Not Detected 09/16/2016    NORBUPRNOR Not Detected 09/16/2016    FENTA Not Detected 09/16/2016    NORFENT Not Detected 09/16/2016    MEPERIDINE Not Detected 09/16/2016    TAPENU Not Detected 09/16/2016    TAPOSULFUR Not Detected 09/16/2016    METHADONE Not Detected 09/16/2016    LABPROP Not Detected 09/16/2016    TRAM Not Detected 09/16/2016    AMPH Not Detected 09/16/2016    METHAMP Not Detected 09/16/2016    MDMA Not Detected 09/16/2016    ECMDA Not Detected 09/16/2016       Lab Results   Component Value Date    PHENTERMINE Not Detected 09/16/2016    BENZOYL Not Detected 09/16/2016    Yoko Contras Not Detected 09/16/2016    ALPHAOHALPRA Not Detected 09/16/2016    CLONAZEPAM Not Detected 09/16/2016    Veleria Espinoza Not Detected 09/16/2016    DIAZEP Not Detected 09/16/2016    SAFIA Not Detected 09/16/2016    OXAZ Not Detected 09/16/2016    Cordie Admire Not Detected 09/16/2016    LORAZEPAM Not Detected 09/16/2016    MIDAZOLAM Not Detected 09/16/2016    ZOLPIDEM Not Detected 09/16/2016    REG Not Detected 09/16/2016    ETG Not Detected 09/16/2016    MARIJMET Not Detected 09/16/2016    PCP Not Detected 09/16/2016    PAINMGTDRUGP See Below 09/16/2016    EERPAINMGTPA See Note 09/16/2016    LABCREA 58.1 09/16/2016         , I discussed results with patient. See follow-up plans for new studies. Therapies:  HEP-gentle stretching and relaxation techniques-demonstrated with patient-they are to do them twice a day.   They are also advised to make the following lifestyle changes:  Goals      Exercise 3x per week (30 min per time)      SOAPP-R GOAL LESS THAN 9      09/16/16 SCORE: 33-HIGH RISK   11/11/16 score: 27-high risk     01/13/17 score: 16-moderate risk   03/13/17 Score: 11-moderate risk   06/01/17 score: 15-moderate risk  08/03/17 score: 15-moderate risk  10/04/17 score: 18-moderate risk  12/08/17 score: 11-moderate risk  02/09/18 score: 12-moderate risk  04/13/18 score: 14-moderate risk  7/12/18 score 10-moderate risk  11/29/18 score 17- moderate risk  01/28/19 score  16-moderate risk  4/8/19 score  7- low risk            Injections or Epidurals:  Injection options were discussed. Patient gave verbal consent to ordered injections. See follow-up plans for planned injections. Supplements:  Vitamin D with increased dosing during the winter months,   Education was given on:   Dietary and Fitness--daily stretches and low carb diet             Follow up with Primary Care Physician regarding their general medical needs. They are to follow up in 2 months to review medication, efficacy of injections, pill counts, OARRS check, SOAPPR assessment, review diagnostics, to review previous and future treatment plans and assess appropriateness for continued therapy.         New Diagnostics  Orders Placed This Encounter   Procedures    MRI SHOULDER RIGHT W WO CONTRAST    Ambulatory referral to Orthopedic Surgery    CA INJECT TRIGGER POINTS, 3 OR GREATER    CA INJECT TRIGGER POINTS, 3 OR GREATER       Brenda Stubbs DO

## 2019-04-09 ENCOUNTER — PROCEDURE VISIT (OUTPATIENT)
Dept: PHYSICAL MEDICINE AND REHAB | Age: 69
End: 2019-04-09
Payer: MEDICARE

## 2019-04-09 DIAGNOSIS — M25.511 CHRONIC RIGHT SHOULDER PAIN: Primary | ICD-10-CM

## 2019-04-09 DIAGNOSIS — G89.29 CHRONIC RIGHT SHOULDER PAIN: Primary | ICD-10-CM

## 2019-04-09 PROCEDURE — 76942 ECHO GUIDE FOR BIOPSY: CPT | Performed by: PHYSICAL MEDICINE & REHABILITATION

## 2019-04-09 PROCEDURE — 96372 THER/PROPH/DIAG INJ SC/IM: CPT | Performed by: PHYSICAL MEDICINE & REHABILITATION

## 2019-04-09 RX ORDER — LIDOCAINE HYDROCHLORIDE 10 MG/ML
8 INJECTION, SOLUTION INFILTRATION; PERINEURAL ONCE
Status: COMPLETED | OUTPATIENT
Start: 2019-04-09 | End: 2019-04-09

## 2019-04-09 RX ORDER — LIDOCAINE HYDROCHLORIDE 20 MG/ML
2 INJECTION, SOLUTION INFILTRATION; PERINEURAL ONCE
Status: COMPLETED | OUTPATIENT
Start: 2019-04-09 | End: 2019-04-09

## 2019-04-09 RX ADMIN — LIDOCAINE HYDROCHLORIDE 2 ML: 20 INJECTION, SOLUTION INFILTRATION; PERINEURAL at 12:10

## 2019-04-09 RX ADMIN — LIDOCAINE HYDROCHLORIDE 8 ML: 10 INJECTION, SOLUTION INFILTRATION; PERINEURAL at 12:09

## 2019-04-09 NOTE — PROGRESS NOTES
Patient was here today for TP injections of R ight Bicipital tendon guided by U/S . Patient placed in upright position areas marked and prepped with alcohol. Sites injected with 2% 2 cc Lidocaine and Kenalog 1 cc and TP of 1% 8 cc of Lidocaine administered  By provider.

## 2019-04-10 DIAGNOSIS — M25.511 CHRONIC PAIN OF BOTH SHOULDERS: ICD-10-CM

## 2019-04-10 DIAGNOSIS — M75.21 BICEPS TENDINITIS OF RIGHT UPPER EXTREMITY: Primary | ICD-10-CM

## 2019-04-10 DIAGNOSIS — M25.512 CHRONIC PAIN OF BOTH SHOULDERS: ICD-10-CM

## 2019-04-10 DIAGNOSIS — G89.29 CHRONIC PAIN OF BOTH SHOULDERS: ICD-10-CM

## 2019-04-10 DIAGNOSIS — M75.21 BICIPITAL TENDINITIS OF RIGHT SHOULDER: ICD-10-CM

## 2019-04-11 DIAGNOSIS — F41.9 ANXIETY: ICD-10-CM

## 2019-04-11 RX ORDER — DIAZEPAM 5 MG/1
TABLET ORAL
Qty: 2 TABLET | Refills: 0 | Status: SHIPPED | OUTPATIENT
Start: 2019-04-11 | End: 2019-06-12 | Stop reason: SDUPTHER

## 2019-04-17 ENCOUNTER — HOSPITAL ENCOUNTER (OUTPATIENT)
Dept: MRI IMAGING | Age: 69
Discharge: HOME | End: 2019-04-19
Payer: MEDICARE

## 2019-04-17 DIAGNOSIS — G89.29 CHRONIC PAIN OF BOTH SHOULDERS: ICD-10-CM

## 2019-04-17 DIAGNOSIS — M25.511 CHRONIC PAIN OF BOTH SHOULDERS: ICD-10-CM

## 2019-04-17 DIAGNOSIS — M75.21 BICIPITAL TENDINITIS OF RIGHT SHOULDER: ICD-10-CM

## 2019-04-17 DIAGNOSIS — M25.512 CHRONIC PAIN OF BOTH SHOULDERS: ICD-10-CM

## 2019-04-17 PROCEDURE — 73221 MRI JOINT UPR EXTREM W/O DYE: CPT

## 2019-04-23 ENCOUNTER — PROCEDURE VISIT (OUTPATIENT)
Dept: PHYSICAL MEDICINE AND REHAB | Age: 69
End: 2019-04-23
Payer: MEDICARE

## 2019-04-23 DIAGNOSIS — M79.7 FIBROMYALGIA: Primary | ICD-10-CM

## 2019-04-23 PROCEDURE — 20553 NJX 1/MLT TRIGGER POINTS 3/>: CPT | Performed by: PHYSICAL MEDICINE & REHABILITATION

## 2019-04-23 RX ORDER — LIDOCAINE HYDROCHLORIDE 10 MG/ML
16 INJECTION, SOLUTION INFILTRATION; PERINEURAL ONCE
Status: COMPLETED | OUTPATIENT
Start: 2019-04-23 | End: 2019-04-23

## 2019-04-23 RX ADMIN — LIDOCAINE HYDROCHLORIDE 16 ML: 10 INJECTION, SOLUTION INFILTRATION; PERINEURAL at 12:02

## 2019-04-23 NOTE — PROGRESS NOTES
Patient was here today for TP injections of lower back. Patient placed in upright position areas marked and prepped with alcohol. Sites injected with 16cc 1% Lidocaine  administered  By provider.

## 2019-04-29 DIAGNOSIS — M54.14 THORACIC AND LUMBOSACRAL NEURITIS: ICD-10-CM

## 2019-04-29 DIAGNOSIS — M54.40 CHRONIC MIDLINE LOW BACK PAIN WITH SCIATICA, SCIATICA LATERALITY UNSPECIFIED: ICD-10-CM

## 2019-04-29 DIAGNOSIS — M54.50 CHRONIC BILATERAL LOW BACK PAIN WITHOUT SCIATICA: ICD-10-CM

## 2019-04-29 DIAGNOSIS — G89.29 CHRONIC MIDLINE LOW BACK PAIN WITH SCIATICA, SCIATICA LATERALITY UNSPECIFIED: ICD-10-CM

## 2019-04-29 DIAGNOSIS — G89.29 CHRONIC BILATERAL LOW BACK PAIN WITHOUT SCIATICA: ICD-10-CM

## 2019-04-29 DIAGNOSIS — M54.17 THORACIC AND LUMBOSACRAL NEURITIS: ICD-10-CM

## 2019-04-29 DIAGNOSIS — M54.2 NECK PAIN: Chronic | ICD-10-CM

## 2019-04-29 DIAGNOSIS — Z79.899 HIGH RISK MEDICATION USE: ICD-10-CM

## 2019-04-30 RX ORDER — OXYCODONE AND ACETAMINOPHEN 7.5; 325 MG/1; MG/1
1 TABLET ORAL EVERY 4 HOURS PRN
Qty: 60 TABLET | Refills: 0 | Status: SHIPPED | OUTPATIENT
Start: 2019-04-30 | End: 2019-05-28 | Stop reason: SDUPTHER

## 2019-05-16 ENCOUNTER — OFFICE VISIT (OUTPATIENT)
Dept: FAMILY MEDICINE CLINIC | Age: 69
End: 2019-05-16
Payer: MEDICARE

## 2019-05-16 VITALS
BODY MASS INDEX: 26.37 KG/M2 | HEIGHT: 67 IN | OXYGEN SATURATION: 98 % | DIASTOLIC BLOOD PRESSURE: 68 MMHG | SYSTOLIC BLOOD PRESSURE: 132 MMHG | HEART RATE: 68 BPM | RESPIRATION RATE: 16 BRPM | WEIGHT: 168 LBS | TEMPERATURE: 98.1 F

## 2019-05-16 DIAGNOSIS — Z01.818 PREOP EXAMINATION: Primary | ICD-10-CM

## 2019-05-16 DIAGNOSIS — I25.10 CORONARY ARTERY DISEASE INVOLVING NATIVE HEART WITHOUT ANGINA PECTORIS, UNSPECIFIED VESSEL OR LESION TYPE: ICD-10-CM

## 2019-05-16 DIAGNOSIS — E78.00 HYPERCHOLESTEROLEMIA: Chronic | ICD-10-CM

## 2019-05-16 DIAGNOSIS — M1A.9XX0 CHRONIC GOUT WITHOUT TOPHUS, UNSPECIFIED CAUSE, UNSPECIFIED SITE: ICD-10-CM

## 2019-05-16 PROCEDURE — 93000 ELECTROCARDIOGRAM COMPLETE: CPT | Performed by: FAMILY MEDICINE

## 2019-05-16 PROCEDURE — 99214 OFFICE O/P EST MOD 30 MIN: CPT | Performed by: FAMILY MEDICINE

## 2019-05-16 RX ORDER — NIFEDIPINE 60 MG/1
60 TABLET, EXTENDED RELEASE ORAL DAILY
COMMUNITY
End: 2019-10-25 | Stop reason: SDUPTHER

## 2019-05-16 RX ORDER — METOPROLOL TARTRATE 50 MG/1
TABLET, FILM COATED ORAL
Qty: 270 TABLET | Refills: 3 | Status: ON HOLD | OUTPATIENT
Start: 2019-05-16 | End: 2019-06-04 | Stop reason: SDUPTHER

## 2019-05-16 RX ORDER — ALLOPURINOL 100 MG/1
100 TABLET ORAL DAILY
Qty: 90 TABLET | Refills: 3 | Status: SHIPPED | OUTPATIENT
Start: 2019-05-16 | End: 2020-05-05

## 2019-05-16 RX ORDER — PRAVASTATIN SODIUM 40 MG
TABLET ORAL
Qty: 90 TABLET | Refills: 3 | Status: SHIPPED | OUTPATIENT
Start: 2019-05-16 | End: 2019-08-12 | Stop reason: SDUPTHER

## 2019-05-16 NOTE — PROGRESS NOTES
Preoperative Consultation      Long Hagen  YOB: 1950    Date of Service:  5/16/2019    Vitals:    05/16/19 1023   BP: 132/68   Pulse: 68   Resp: 16   Temp: 98.1 °F (36.7 °C)   SpO2: 98%   Weight: 168 lb (76.2 kg)   Height: 5' 7\" (1.702 m)      Wt Readings from Last 2 Encounters:   05/16/19 168 lb (76.2 kg)   04/08/19 158 lb (71.7 kg)     BP Readings from Last 3 Encounters:   05/16/19 132/68   04/08/19 118/78   03/14/19 125/75        Chief Complaint   Patient presents with    Pre-op Exam     right carpal tunnel surgery scheduled 06/04/19 with dr Soheila Naylor     No Known Allergies  Outpatient Medications Marked as Taking for the 5/16/19 encounter (Office Visit) with Carolyn Hobson MD   Medication Sig Dispense Refill    allopurinol (ZYLOPRIM) 100 MG tablet Take 1 tablet by mouth daily 90 tablet 3    metoprolol tartrate (LOPRESSOR) 50 MG tablet TAKE 1 TABLET BY MOUTH THREE TIMES DAILY 270 tablet 3    pravastatin (PRAVACHOL) 40 MG tablet TAKE 1 TABLET BY MOUTH EVERY EVENING 90 tablet 3    NIFEdipine (PROCARDIA XL) 60 MG extended release tablet Take 60 mg by mouth daily      oxyCODONE-acetaminophen (PERCOCET) 7.5-325 MG per tablet Take 1 tablet by mouth every 4 hours as needed for Pain (Max of 60 tablets per month) for up to 30 days. 60 tablet 0    diclofenac (VOLTAREN) 50 MG EC tablet Take 1 tablet by mouth daily as needed for Pain 30 tablet 1    tadalafil (CIALIS) 20 MG tablet Take 1 tablet by mouth as needed for Erectile Dysfunction 30 tablet 3    CPAP Machine MISC by Does not apply route New CPAP  with 10 cm 1 each 0    nitroGLYCERIN (NITROSTAT) 0.4 MG SL tablet Place 1 tablet under the tongue every 5 minutes as needed x 3 doses for chest pain. 25 tablet 0    Misc.  Devices (WALKER) MISC 1 each by Does not apply route daily 1 each 0    diclofenac sodium (VOLTAREN) 1 % GEL Apply 2 g topically 2 times daily 1 Tube 3    aspirin 81 MG EC tablet Take 1 tablet by mouth daily 90 tablet 1    SYRINGE-NEEDLE, DISP, 3 ML (B-D INTEGRA SYRINGE) 22G X 1-1/2\" 3 ML MISC 1 each by Does not apply route daily 20 each 6       This patient presents to the office today for a preoperative consultation at the request of surgeon, Dr. Ayush Louis, who plans on performing right CTR on June 4. The current problem began several years ago, and symptoms have been unchanged with time. Conservative therapy: Yes: NSAIDS, PT, rest, immobilization, which has been not very effective. .    Planned anesthesia: TBD   Known anesthesia problems: None   Bleeding risk: No recent or remote history of abnormal bleeding  Personal or FH of DVT/PE: No    Patient objection to receiving blood products: No    Patient Active Problem List   Diagnosis    Chronic low back pain    Scoliosis of lumbar spine    Essential hypertension, benign    Hypercholesterolemia    Right lumbar radiculopathy    Gout    High risk medication use - 12/07/17 OARRS PM&R, 02/08/18 OARRS PM&R, 10/05/17 Urine Drug Screen: negative PM&R, MED CONTRACT 1/17/17    Low back pain with sciatica    Myalgia    Schizo-affective schizophrenia, in remission ([address]    Synovitis and tenosynovitis    Thoracic and lumbosacral neuritis    Vitamin D deficiency    Prediabetes    Hyperparathyroidism ([address]    Osteopenia    C6 radiculopathy    DJD (degenerative joint disease), cervical    Coronary artery disease with hx of myocardial infarct w/o hx of CABG    PRASAD (obstructive sleep apnea)    Hyperkalemia    Chronic pain of both shoulders    Hypogonadism in male    Bicipital tendinitis of right shoulder       Past Medical History:   Diagnosis Date    Chronic back pain     Coronary [facility] disease 2002    [facility] Esophageal ulcer 3/10/2014    Dr. Justine Smith    Gastric ulcer 3/10/2014    Dr. Justine Smith    Gout     Hiatal hernia 3/10/2014    Dr. Justine Smith    Hyperlipidemia     Hypertension     MI (myocardial infarction) ([address] Relationships    Social connections:     Talks on phone: Not on file     Gets together: Not on file     Attends Baptism service: Not on file     Active member of club or organization: Not on file     Attends meetings of clubs or organizations: Not on file     Relationship status: Not on file    Intimate partner violence:     Fear of current or ex partner: Not on file     Emotionally abused: Not on file     Physically abused: Not on file     Forced sexual activity: Not on file   Other Topics Concern    Not on file   Social History Narrative    Tobacco -- never smoked or chewed. Alcohol -- have not used for past 10 years, prior to had consumed 6 pack of beer daily. Drugs -- tried marijuana and cocaine 14 years ago occasionally used for 3-4 years and then stopped completely 10 years ago. Education -- completed 11th grade in [place].    Occupation -- [employer]. work,  at [employer].    Marital status --  44 years, 2 grown sons. Review of Systems  A comprehensive review of systems was negative except for what was noted in the HPI. Physical Exam   Constitutional: He is oriented to person, place, and time. He appears well-developed and well-nourished. No distress. HENT:   Head: Normocephalic and atraumatic. Mouth/Throat: Uvula is midline, oropharynx is clear and moist and mucous membranes are normal.   Eyes: Conjunctivae and EOM are normal. Pupils are equal, round, and reactive to light. Neck: Trachea normal and normal range of motion. Neck supple. No JVD present. Carotid bruit is not present. No mass and no thyromegaly present. Cardiovascular: Normal rate, regular rhythm, normal heart sounds and intact distal pulses. Exam reveals no gallop and no friction rub. No murmur heard. Pulmonary/Chest: Effort normal and breath sounds normal. No respiratory distress. He has no wheezes. He has no rales. Abdominal: Soft.  Normal aorta and bowel sounds are normal. He exhibits no distension and no mass. There is no hepatosplenomegaly. No tenderness. Musculoskeletal: He exhibits no edema and no tenderness. Neurological: He is alert and oriented to person, place, and time. He has normal strength. No cranial nerve deficit or sensory deficit. Coordination and gait normal.   Skin: Skin is warm and dry. No rash noted. No erythema. Psychiatric: He has a normal mood and affect. His behavior is normal.     EKG Interpretation:  NSR, RBBB, LAE, unchanged from previous tracings. Lab Review   No visits with results within 2 Month(s) from this visit. Latest known visit with results is:   Orders Only on 03/07/2019   Component Date Value    Sodium 03/07/2019 138     Potassium 03/07/2019 5.0*    Chloride 03/07/2019 95     CO2 03/07/2019 29     Anion Gap 03/07/2019 14     Glucose 03/07/2019 102*    BUN 03/07/2019 14     CREATININE 03/07/2019 0.99     GFR Non- 03/07/2019 >60.0     GFR  03/07/2019 >60.0     Calcium 03/07/2019 9.7     Hemoglobin A1C 03/07/2019 6.3*    Testosterone Free, Calc 03/07/2019 34*    Testosterone % Free 03/07/2019 1.3*    Total Testosterone 03/07/2019 255*    Sex Hormone Binding 03/07/2019 52     WBC 03/07/2019 4.8     RBC 03/07/2019 4.62*    Hemoglobin 03/07/2019 15.3     Hematocrit 03/07/2019 46.0     MCV 03/07/2019 99.7     MCH 03/07/2019 33.2*    MCHC 03/07/2019 33.3     RDW 03/07/2019 14.4     Platelets 35/85/5834 242           CV Data:  Prior CAD/Stentsx 2  10/17/2017 CABG x2 EF 55  10/2018  Stress echo EF 55       Assessment:       80 y.o. patient with planned surgery as above. Known risk factors for perioperative complications: Coronary artery disease, Hypertension  Current medications which may produce withdrawal symptoms if withheld perioperatively: none      Plan:     1.  Preoperative workup as follows: ECG, hemoglobin, hematocrit, electrolytes, creatinine, glucose, liver function studies,

## 2019-05-24 DIAGNOSIS — M1A.9XX0 CHRONIC GOUT WITHOUT TOPHUS, UNSPECIFIED CAUSE, UNSPECIFIED SITE: ICD-10-CM

## 2019-05-24 DIAGNOSIS — Z01.818 PREOP EXAMINATION: ICD-10-CM

## 2019-05-24 DIAGNOSIS — I25.10 CORONARY ARTERY DISEASE INVOLVING NATIVE HEART WITHOUT ANGINA PECTORIS, UNSPECIFIED VESSEL OR LESION TYPE: ICD-10-CM

## 2019-05-24 DIAGNOSIS — E78.00 HYPERCHOLESTEROLEMIA: Chronic | ICD-10-CM

## 2019-05-24 LAB
ALBUMIN SERPL-MCNC: 4.1 G/DL (ref 3.5–4.6)
ALP BLD-CCNC: 43 U/L (ref 35–104)
ALT SERPL-CCNC: 21 U/L (ref 0–41)
ANION GAP SERPL CALCULATED.3IONS-SCNC: 11 MEQ/L (ref 9–15)
AST SERPL-CCNC: 22 U/L (ref 0–40)
BACTERIA: NEGATIVE /HPF
BASOPHILS ABSOLUTE: 0 K/UL (ref 0–0.2)
BASOPHILS RELATIVE PERCENT: 1 %
BILIRUB SERPL-MCNC: 0.3 MG/DL (ref 0.2–0.7)
BILIRUBIN URINE: NEGATIVE
BLOOD, URINE: NEGATIVE
BUN BLDV-MCNC: 17 MG/DL (ref 8–23)
CALCIUM SERPL-MCNC: 8.9 MG/DL (ref 8.5–9.9)
CHLORIDE BLD-SCNC: 101 MEQ/L (ref 95–107)
CHOLESTEROL, FASTING: 148 MG/DL (ref 0–199)
CLARITY: CLEAR
CO2: 27 MEQ/L (ref 20–31)
COLOR: YELLOW
CREAT SERPL-MCNC: 0.96 MG/DL (ref 0.7–1.2)
EOSINOPHILS ABSOLUTE: 0.3 K/UL (ref 0–0.7)
EOSINOPHILS RELATIVE PERCENT: 7 %
EPITHELIAL CELLS, UA: NORMAL /HPF (ref 0–5)
GFR AFRICAN AMERICAN: >60
GFR NON-AFRICAN AMERICAN: >60
GLOBULIN: 2.8 G/DL (ref 2.3–3.5)
GLUCOSE BLD-MCNC: 122 MG/DL (ref 70–99)
GLUCOSE URINE: NEGATIVE MG/DL
HCT VFR BLD CALC: 43.4 % (ref 42–52)
HDLC SERPL-MCNC: 42 MG/DL (ref 40–59)
HEMOGLOBIN: 14.6 G/DL (ref 14–18)
HYALINE CASTS: NORMAL /HPF (ref 0–5)
KETONES, URINE: NEGATIVE MG/DL
LDL CHOLESTEROL CALCULATED: 86 MG/DL (ref 0–129)
LEUKOCYTE ESTERASE, URINE: NEGATIVE
LYMPHOCYTES ABSOLUTE: 0.8 K/UL (ref 1–4.8)
LYMPHOCYTES RELATIVE PERCENT: 21 %
MCH RBC QN AUTO: 33.4 PG (ref 27–31.3)
MCHC RBC AUTO-ENTMCNC: 33.6 % (ref 33–37)
MCV RBC AUTO: 99.4 FL (ref 80–100)
MONOCYTES ABSOLUTE: 0.3 K/UL (ref 0.2–0.8)
MONOCYTES RELATIVE PERCENT: 6.7 %
NEUTROPHILS ABSOLUTE: 2.5 K/UL (ref 1.4–6.5)
NEUTROPHILS RELATIVE PERCENT: 65 %
NITRITE, URINE: NEGATIVE
PDW BLD-RTO: 14.2 % (ref 11.5–14.5)
PH UA: 6 (ref 5–9)
PLATELET # BLD: 264 K/UL (ref 130–400)
PLATELET SLIDE REVIEW: ADEQUATE
POTASSIUM SERPL-SCNC: 4.6 MEQ/L (ref 3.4–4.9)
PROTEIN UA: NEGATIVE MG/DL
RBC # BLD: 4.37 M/UL (ref 4.7–6.1)
RBC UA: NORMAL /HPF (ref 0–5)
SODIUM BLD-SCNC: 139 MEQ/L (ref 135–144)
SPECIFIC GRAVITY UA: 1.02 (ref 1–1.03)
TOTAL PROTEIN: 6.9 G/DL (ref 6.3–8)
TRIGLYCERIDE, FASTING: 98 MG/DL (ref 0–150)
URIC ACID, SERUM: 6.5 MG/DL (ref 3.4–7)
UROBILINOGEN, URINE: 0.2 E.U./DL
WBC # BLD: 3.9 K/UL (ref 4.8–10.8)
WBC UA: NORMAL /HPF (ref 0–5)

## 2019-05-28 ENCOUNTER — PROCEDURE VISIT (OUTPATIENT)
Dept: PHYSICAL MEDICINE AND REHAB | Age: 69
End: 2019-05-28
Payer: MEDICARE

## 2019-05-28 DIAGNOSIS — M79.7 FIBROMYALGIA: Primary | ICD-10-CM

## 2019-05-28 DIAGNOSIS — G89.29 CHRONIC BILATERAL LOW BACK PAIN WITHOUT SCIATICA: ICD-10-CM

## 2019-05-28 DIAGNOSIS — M54.17 THORACIC AND LUMBOSACRAL NEURITIS: ICD-10-CM

## 2019-05-28 DIAGNOSIS — Z79.899 HIGH RISK MEDICATION USE: ICD-10-CM

## 2019-05-28 DIAGNOSIS — G89.29 CHRONIC MIDLINE LOW BACK PAIN WITH SCIATICA, SCIATICA LATERALITY UNSPECIFIED: ICD-10-CM

## 2019-05-28 DIAGNOSIS — M54.40 CHRONIC MIDLINE LOW BACK PAIN WITH SCIATICA, SCIATICA LATERALITY UNSPECIFIED: ICD-10-CM

## 2019-05-28 DIAGNOSIS — M54.14 THORACIC AND LUMBOSACRAL NEURITIS: ICD-10-CM

## 2019-05-28 DIAGNOSIS — M54.50 CHRONIC BILATERAL LOW BACK PAIN WITHOUT SCIATICA: ICD-10-CM

## 2019-05-28 DIAGNOSIS — M54.2 NECK PAIN: Chronic | ICD-10-CM

## 2019-05-28 PROCEDURE — 20553 NJX 1/MLT TRIGGER POINTS 3/>: CPT | Performed by: PHYSICAL MEDICINE & REHABILITATION

## 2019-05-29 RX ORDER — LIDOCAINE HYDROCHLORIDE 10 MG/ML
16 INJECTION, SOLUTION INFILTRATION; PERINEURAL ONCE
Status: COMPLETED | OUTPATIENT
Start: 2019-05-29 | End: 2019-05-29

## 2019-05-29 RX ORDER — OXYCODONE AND ACETAMINOPHEN 7.5; 325 MG/1; MG/1
1 TABLET ORAL EVERY 4 HOURS PRN
Qty: 60 TABLET | Refills: 0 | Status: SHIPPED | OUTPATIENT
Start: 2019-05-29 | End: 2019-06-10 | Stop reason: SDUPTHER

## 2019-05-29 RX ADMIN — LIDOCAINE HYDROCHLORIDE 16 ML: 10 INJECTION, SOLUTION INFILTRATION; PERINEURAL at 15:03

## 2019-05-29 NOTE — PROGRESS NOTES
Patient was here today for TP injections of low back. Patient placed in upright position areas marked and prepped with alcohol. Sites injected with 16cc 1% Lidocaine administered  By provider.

## 2019-05-30 ENCOUNTER — TELEPHONE (OUTPATIENT)
Dept: FAMILY MEDICINE CLINIC | Age: 69
End: 2019-05-30

## 2019-06-03 ENCOUNTER — ANESTHESIA EVENT (OUTPATIENT)
Dept: OPERATING ROOM | Age: 69
End: 2019-06-03
Payer: MEDICARE

## 2019-06-03 NOTE — ANESTHESIA PRE PROCEDURE
Department of Anesthesiology  Preprocedure Note       Name:  Matteo Calhoun   Age:  80 y.o.  :  1950                                          MRN:  [id number]         Date:  2019      Surgeon: Rossy Cordova):  Sharifa Diaz MD    Procedure: RIGHT WRIST CARPAL TUNNEL RELEASE, SUPINE, PAT AT PCP (Right )    Medications prior to admission:   Prior to Admission medications    Medication Sig Start Date End Date Taking? Authorizing Provider   oxyCODONE-acetaminophen (PERCOCET) 7.5-325 MG per tablet Take 1 tablet by mouth every 4 hours as needed for Pain (Max of 60 tablets per month) for up to 30 days. 19 Yes Brenda Stubbs DO   metoprolol tartrate (LOPRESSOR) 50 MG tablet TAKE 1 TABLET BY MOUTH THREE TIMES DAILY 19  Yes Sheryl Tellez PA-C   allopurinol (ZYLOPRIM) 100 MG tablet Take 1 tablet by mouth daily 19  Yes Florence Whalen MD   pravastatin (PRAVACHOL) 40 MG tablet TAKE 1 TABLET BY MOUTH EVERY EVENING 19  Yes Florence Whalen MD   NIFEdipine (PROCARDIA XL) 60 MG extended release tablet Take 60 mg by mouth daily   Yes Historical Provider, MD   diclofenac (VOLTAREN) 50 MG EC tablet Take 1 tablet by mouth daily as needed for Pain 19  Yes Brenda Stubbs DO   gabapentin (NEURONTIN) 300 MG capsule TAKE ONE CAPSULE BY MOUTH DURING THE DAY AND 2 CAPSULES BY MOUTH EVERY EVENING 19 Yes Brenda Stubbs DO   testosterone cypionate (DEPOTESTOTERONE CYPIONATE) 200 MG/ML injection INJECT 0.5 ML INTO THE MUSCLE EVERY 2 WEEKS. 18 Yes Gilmar Byers MD   CPAP Machine MISC by Does not apply route New CPAP  with 10 cm 18  Yes Marsha Henriquez MD   Misc.  Devices [facility]) MISC 1 each by Does not apply route daily 17  Yes Sheryl Tellez PA-C   aspirin 81 MG EC tablet Take 1 tablet by mouth daily 17  Yes Sheryl Tellez PA-C   SYRINGE-NEEDLE, DISP, 3 ML (B-D INTEGRA SYRINGE) 22G X 1-1/2\" 3 ML MISC 1 each by Does not apply route daily 12/19/16  Yes Eric Hutchison MD   tadalafil (CIALIS) 20 MG tablet Take 1 tablet by mouth as needed for Erectile Dysfunction 3/14/19   Eric Hutchison MD   nitroGLYCERIN (NITROSTAT) 0.4 MG SL tablet Place 1 tablet under the tongue every 5 minutes as needed x 3 doses for chest pain.  3/15/18   Sheryl Tellez PA-C   diclofenac sodium (VOLTAREN) 1 % GEL Apply 2 g topically 2 times daily 11/1/17   Sheryl Tellez PA-C       Current medications:    Current Facility-Administered Medications   Medication Dose Route Frequency Provider Last Rate Last Dose    lactated ringers infusion   Intravenous Continuous Jose Cirri APRN -  mL/hr at 06/04/19 0645 1,000 mL at 06/04/19 0645    sodium chloride flush 0.9 % injection 10 mL  10 mL Intravenous 2 times per day Jose Cirri, APRN - CNP        sodium chloride flush 0.9 % injection 10 mL  10 mL Intravenous PRN Jose Cirri, APRN - CNP        lidocaine PF 1 % injection 1 mL  1 mL Intradermal Once PRN Jose Cirri, APRN - CNP        ceFAZolin (ANCEF) 2 g in dextrose 3 % 50 mL IVPB (duplex)  2 g Intravenous On Call to Kwaku Mccrary MD           Allergies:  No Known Allergies    Problem List:    Patient Active Problem List   Diagnosis Code    Chronic low back pain M54.5, G89.29    Scoliosis of lumbar spine M41.9    Essential hypertension, benign I10    Hypercholesterolemia E78.00    Right lumbar radiculopathy M54.16    Gout M10.9    High risk medication use - 12/07/17 OARRS PM&R, 02/08/18 OARRS PM&R, 10/05/17 Urine Drug Screen: negative PM&R, MED CONTRACT 1/17/17 Z79.899    Low back pain with sciatica M54.40    Myalgia M79.10    Schizo-affective schizophrenia, in remission ([facility] F25.8    Synovitis and tenosynovitis M65.9    Thoracic and lumbosacral neuritis M54.14, M54.17    Vitamin D deficiency E55.9    Prediabetes R73.03    Hyperparathyroidism ([facility] E21.3    Osteopenia M85.80    C6 radiculopathy M54.12    DJD (degenerative joint disease), TempSrc: Temporal   SpO2: 98%   Weight: 168 lb (76.2 kg)   Height: 5' 4\" (1.626 m)                                              BP Readings from Last 3 Encounters:   06/04/19 (!) 180/93   05/16/19 132/68   04/08/19 118/78       NPO Status: Time of last liquid consumption: 2200                        Time of last solid consumption: 2200                        Date of last liquid consumption: 06/03/19                        Date of last solid food consumption: 06/03/19    BMI:   Wt Readings from Last 3 Encounters:   06/04/19 168 lb (76.2 kg)   05/16/19 168 lb (76.2 kg)   04/08/19 158 lb (71.7 kg)     Body mass index is 28.84 kg/m². CBC:   Lab Results   Component Value Date    WBC 3.9 05/24/2019    RBC 4.37 05/24/2019    HGB 14.6 05/24/2019    HCT 43.4 05/24/2019    MCV 99.4 05/24/2019    RDW 14.2 05/24/2019     05/24/2019       CMP:   Lab Results   Component Value Date     05/24/2019    K 4.6 05/24/2019     05/24/2019    CO2 27 05/24/2019    BUN 17 05/24/2019    CREATININE 0.96 05/24/2019    GFRAA >60.0 05/24/2019    LABGLOM >60.0 05/24/2019    GLUCOSE 122 05/24/2019    PROT 6.9 05/24/2019    CALCIUM 8.9 05/24/2019    BILITOT 0.3 05/24/2019    ALKPHOS 43 05/24/2019    AST 22 05/24/2019    ALT 21 05/24/2019       POC Tests: No results for input(s): POCGLU, POCNA, POCK, POCCL, POCBUN, POCHEMO, POCHCT in the last 72 hours.     Coags: No results found for: PROTIME, INR, APTT    HCG (If Applicable): No results found for: PREGTESTUR, PREGSERUM, HCG, HCGQUANT     ABGs: No results found for: PHART, PO2ART, XMY2SLN, NII7ZFM, BEART, N9BGPOJV     Type & Screen (If Applicable):  No results found for: LABABO, [address]    Anesthesia Evaluation  Patient summary reviewed and Nursing notes reviewed no history of anesthetic complications:   Airway: Mallampati: II  TM distance: >3 FB   Neck ROM: full  Mouth opening: > = 3 FB Dental: normal exam         Pulmonary:normal exam  breath sounds clear to auscultation  (+) sleep apnea: on CPAP,                             Cardiovascular:  Exercise tolerance: good (>4 METS),   (+) hypertension:, past MI:, CAD:, CABG/stent:, hyperlipidemia      ECG reviewed  Rhythm: regular  Rate: normal           Beta Blocker:  Dose within 24 Hrs      ROS comment: Sinus  Rhythm   -Right bundle branch block.    -Left atrial enlargement.    -  Nonspecific T-abnormality. Neuro/Psych:   (+) neuromuscular disease:, psychiatric history:            GI/Hepatic/Renal:   (+) hiatal hernia, PUD,           Endo/Other: Negative Endo/Other ROS             Pt had PAT visit. Abdominal:           Vascular: negative vascular ROS. Anesthesia Plan      Myra block and MAC     ASA 3       Induction: intravenous. MIPS: Prophylactic antiemetics administered. Anesthetic plan and risks discussed with patient. Plan discussed with CRNA.     Attending anesthesiologist reviewed and agrees with DO Nicola   6/4/2019

## 2019-06-04 ENCOUNTER — ANESTHESIA (OUTPATIENT)
Dept: OPERATING ROOM | Age: 69
End: 2019-06-04
Payer: MEDICARE

## 2019-06-04 ENCOUNTER — HOSPITAL ENCOUNTER (OUTPATIENT)
Age: 69
Setting detail: OUTPATIENT SURGERY
Discharge: HOME | End: 2019-06-04
Attending: ORTHOPAEDIC SURGERY | Admitting: ORTHOPAEDIC SURGERY
Payer: MEDICARE

## 2019-06-04 VITALS
WEIGHT: 168 LBS | HEART RATE: 63 BPM | HEIGHT: 64 IN | RESPIRATION RATE: 16 BRPM | OXYGEN SATURATION: 99 % | BODY MASS INDEX: 28.68 KG/M2 | DIASTOLIC BLOOD PRESSURE: 94 MMHG | TEMPERATURE: 97.9 F | SYSTOLIC BLOOD PRESSURE: 162 MMHG

## 2019-06-04 VITALS — DIASTOLIC BLOOD PRESSURE: 79 MMHG | SYSTOLIC BLOOD PRESSURE: 140 MMHG | OXYGEN SATURATION: 100 %

## 2019-06-04 PROCEDURE — 3600000013 HC SURGERY LEVEL 3 ADDTL 15MIN: Performed by: ORTHOPAEDIC SURGERY

## 2019-06-04 PROCEDURE — 6360000002 HC RX W HCPCS: Performed by: NURSE ANESTHETIST, CERTIFIED REGISTERED

## 2019-06-04 PROCEDURE — 3700000001 HC ADD 15 MINUTES (ANESTHESIA): Performed by: ORTHOPAEDIC SURGERY

## 2019-06-04 PROCEDURE — 2500000003 HC RX 250 WO HCPCS: Performed by: ORTHOPAEDIC SURGERY

## 2019-06-04 PROCEDURE — 2580000003 HC RX 258: Performed by: NURSE PRACTITIONER

## 2019-06-04 PROCEDURE — 3700000000 HC ANESTHESIA ATTENDED CARE: Performed by: ORTHOPAEDIC SURGERY

## 2019-06-04 PROCEDURE — 7100000011 HC PHASE II RECOVERY - ADDTL 15 MIN: Performed by: ORTHOPAEDIC SURGERY

## 2019-06-04 PROCEDURE — 7100000010 HC PHASE II RECOVERY - FIRST 15 MIN: Performed by: ORTHOPAEDIC SURGERY

## 2019-06-04 PROCEDURE — 2709999900 HC NON-CHARGEABLE SUPPLY: Performed by: ORTHOPAEDIC SURGERY

## 2019-06-04 PROCEDURE — 2580000003 HC RX 258: Performed by: ORTHOPAEDIC SURGERY

## 2019-06-04 PROCEDURE — 6360000002 HC RX W HCPCS: Performed by: ORTHOPAEDIC SURGERY

## 2019-06-04 PROCEDURE — 2500000003 HC RX 250 WO HCPCS: Performed by: NURSE ANESTHETIST, CERTIFIED REGISTERED

## 2019-06-04 PROCEDURE — 2580000003 HC RX 258: Performed by: NURSE ANESTHETIST, CERTIFIED REGISTERED

## 2019-06-04 PROCEDURE — 3600000003 HC SURGERY LEVEL 3 BASE: Performed by: ORTHOPAEDIC SURGERY

## 2019-06-04 RX ORDER — ONDANSETRON 2 MG/ML
4 INJECTION INTRAMUSCULAR; INTRAVENOUS EVERY 6 HOURS PRN
Status: DISCONTINUED | OUTPATIENT
Start: 2019-06-04 | End: 2019-06-04 | Stop reason: HOSPADM

## 2019-06-04 RX ORDER — FENTANYL CITRATE 50 UG/ML
INJECTION, SOLUTION INTRAMUSCULAR; INTRAVENOUS PRN
Status: DISCONTINUED | OUTPATIENT
Start: 2019-06-04 | End: 2019-06-04 | Stop reason: SDUPTHER

## 2019-06-04 RX ORDER — PROPOFOL 10 MG/ML
INJECTION, EMULSION INTRAVENOUS PRN
Status: DISCONTINUED | OUTPATIENT
Start: 2019-06-04 | End: 2019-06-04 | Stop reason: SDUPTHER

## 2019-06-04 RX ORDER — ONDANSETRON 2 MG/ML
4 INJECTION INTRAMUSCULAR; INTRAVENOUS
Status: DISCONTINUED | OUTPATIENT
Start: 2019-06-04 | End: 2019-06-04 | Stop reason: HOSPADM

## 2019-06-04 RX ORDER — SODIUM CHLORIDE 9 MG/ML
50 INJECTION, SOLUTION INTRAVENOUS CONTINUOUS
Status: DISCONTINUED | OUTPATIENT
Start: 2019-06-04 | End: 2019-06-04 | Stop reason: HOSPADM

## 2019-06-04 RX ORDER — LIDOCAINE HYDROCHLORIDE 20 MG/ML
INJECTION, SOLUTION INTRAVENOUS PRN
Status: DISCONTINUED | OUTPATIENT
Start: 2019-06-04 | End: 2019-06-04 | Stop reason: SDUPTHER

## 2019-06-04 RX ORDER — SODIUM CHLORIDE, SODIUM LACTATE, POTASSIUM CHLORIDE, CALCIUM CHLORIDE 600; 310; 30; 20 MG/100ML; MG/100ML; MG/100ML; MG/100ML
INJECTION, SOLUTION INTRAVENOUS CONTINUOUS
Status: DISCONTINUED | OUTPATIENT
Start: 2019-06-04 | End: 2019-06-04 | Stop reason: HOSPADM

## 2019-06-04 RX ORDER — FENTANYL CITRATE 50 UG/ML
50 INJECTION, SOLUTION INTRAMUSCULAR; INTRAVENOUS EVERY 10 MIN PRN
Status: DISCONTINUED | OUTPATIENT
Start: 2019-06-04 | End: 2019-06-04 | Stop reason: HOSPADM

## 2019-06-04 RX ORDER — MORPHINE SULFATE 4 MG/ML
4 INJECTION, SOLUTION INTRAMUSCULAR; INTRAVENOUS
Status: DISCONTINUED | OUTPATIENT
Start: 2019-06-04 | End: 2019-06-04 | Stop reason: HOSPADM

## 2019-06-04 RX ORDER — MIDAZOLAM HYDROCHLORIDE 1 MG/ML
INJECTION INTRAMUSCULAR; INTRAVENOUS PRN
Status: DISCONTINUED | OUTPATIENT
Start: 2019-06-04 | End: 2019-06-04 | Stop reason: SDUPTHER

## 2019-06-04 RX ORDER — BUPIVACAINE HYDROCHLORIDE 5 MG/ML
INJECTION, SOLUTION EPIDURAL; INTRACAUDAL PRN
Status: DISCONTINUED | OUTPATIENT
Start: 2019-06-04 | End: 2019-06-04 | Stop reason: ALTCHOICE

## 2019-06-04 RX ORDER — HYDROCODONE BITARTRATE AND ACETAMINOPHEN 5; 325 MG/1; MG/1
2 TABLET ORAL PRN
Status: DISCONTINUED | OUTPATIENT
Start: 2019-06-04 | End: 2019-06-04 | Stop reason: HOSPADM

## 2019-06-04 RX ORDER — PROPOFOL 10 MG/ML
INJECTION, EMULSION INTRAVENOUS CONTINUOUS PRN
Status: DISCONTINUED | OUTPATIENT
Start: 2019-06-04 | End: 2019-06-04 | Stop reason: SDUPTHER

## 2019-06-04 RX ORDER — OXYCODONE HYDROCHLORIDE AND ACETAMINOPHEN 5; 325 MG/1; MG/1
1 TABLET ORAL EVERY 4 HOURS PRN
Status: DISCONTINUED | OUTPATIENT
Start: 2019-06-04 | End: 2019-06-04 | Stop reason: HOSPADM

## 2019-06-04 RX ORDER — SODIUM CHLORIDE 0.9 % (FLUSH) 0.9 %
10 SYRINGE (ML) INJECTION PRN
Status: DISCONTINUED | OUTPATIENT
Start: 2019-06-04 | End: 2019-06-04 | Stop reason: HOSPADM

## 2019-06-04 RX ORDER — HYDROCODONE BITARTRATE AND ACETAMINOPHEN 5; 325 MG/1; MG/1
1 TABLET ORAL PRN
Status: DISCONTINUED | OUTPATIENT
Start: 2019-06-04 | End: 2019-06-04 | Stop reason: HOSPADM

## 2019-06-04 RX ORDER — MORPHINE SULFATE 2 MG/ML
2 INJECTION, SOLUTION INTRAMUSCULAR; INTRAVENOUS
Status: DISCONTINUED | OUTPATIENT
Start: 2019-06-04 | End: 2019-06-04 | Stop reason: HOSPADM

## 2019-06-04 RX ORDER — LIDOCAINE HYDROCHLORIDE 5 MG/ML
INJECTION, SOLUTION INFILTRATION; INTRAVENOUS PRN
Status: DISCONTINUED | OUTPATIENT
Start: 2019-06-04 | End: 2019-06-04 | Stop reason: SDUPTHER

## 2019-06-04 RX ORDER — CEFAZOLIN SODIUM 2 G/50ML
2 SOLUTION INTRAVENOUS
Status: COMPLETED | OUTPATIENT
Start: 2019-06-04 | End: 2019-06-04

## 2019-06-04 RX ORDER — SODIUM CHLORIDE, SODIUM LACTATE, POTASSIUM CHLORIDE, CALCIUM CHLORIDE 600; 310; 30; 20 MG/100ML; MG/100ML; MG/100ML; MG/100ML
INJECTION, SOLUTION INTRAVENOUS CONTINUOUS PRN
Status: DISCONTINUED | OUTPATIENT
Start: 2019-06-04 | End: 2019-06-04 | Stop reason: SDUPTHER

## 2019-06-04 RX ORDER — METOCLOPRAMIDE HYDROCHLORIDE 5 MG/ML
10 INJECTION INTRAMUSCULAR; INTRAVENOUS
Status: DISCONTINUED | OUTPATIENT
Start: 2019-06-04 | End: 2019-06-04 | Stop reason: HOSPADM

## 2019-06-04 RX ORDER — MAGNESIUM HYDROXIDE 1200 MG/15ML
LIQUID ORAL CONTINUOUS PRN
Status: COMPLETED | OUTPATIENT
Start: 2019-06-04 | End: 2019-06-04

## 2019-06-04 RX ORDER — MEPERIDINE HYDROCHLORIDE 25 MG/ML
12.5 INJECTION INTRAMUSCULAR; INTRAVENOUS; SUBCUTANEOUS EVERY 5 MIN PRN
Status: DISCONTINUED | OUTPATIENT
Start: 2019-06-04 | End: 2019-06-04 | Stop reason: HOSPADM

## 2019-06-04 RX ORDER — OXYCODONE HYDROCHLORIDE AND ACETAMINOPHEN 5; 325 MG/1; MG/1
2 TABLET ORAL EVERY 4 HOURS PRN
Status: DISCONTINUED | OUTPATIENT
Start: 2019-06-04 | End: 2019-06-04 | Stop reason: HOSPADM

## 2019-06-04 RX ORDER — DOCUSATE SODIUM 100 MG/1
100 CAPSULE, LIQUID FILLED ORAL 2 TIMES DAILY
Status: DISCONTINUED | OUTPATIENT
Start: 2019-06-04 | End: 2019-06-04 | Stop reason: HOSPADM

## 2019-06-04 RX ORDER — SODIUM CHLORIDE 0.9 % (FLUSH) 0.9 %
10 SYRINGE (ML) INJECTION EVERY 12 HOURS SCHEDULED
Status: DISCONTINUED | OUTPATIENT
Start: 2019-06-04 | End: 2019-06-04 | Stop reason: HOSPADM

## 2019-06-04 RX ORDER — DIPHENHYDRAMINE HYDROCHLORIDE 50 MG/ML
12.5 INJECTION INTRAMUSCULAR; INTRAVENOUS
Status: DISCONTINUED | OUTPATIENT
Start: 2019-06-04 | End: 2019-06-04 | Stop reason: HOSPADM

## 2019-06-04 RX ORDER — LIDOCAINE HYDROCHLORIDE 10 MG/ML
1 INJECTION, SOLUTION EPIDURAL; INFILTRATION; INTRACAUDAL; PERINEURAL
Status: DISCONTINUED | OUTPATIENT
Start: 2019-06-04 | End: 2019-06-04 | Stop reason: HOSPADM

## 2019-06-04 RX ADMIN — LIDOCAINE HYDROCHLORIDE 150 MG: 5 INJECTION, SOLUTION INFILTRATION at 07:37

## 2019-06-04 RX ADMIN — CEFAZOLIN SODIUM 2 G: 2 SOLUTION INTRAVENOUS at 07:31

## 2019-06-04 RX ADMIN — FENTANYL CITRATE 50 MCG: 50 INJECTION, SOLUTION INTRAMUSCULAR; INTRAVENOUS at 07:45

## 2019-06-04 RX ADMIN — LIDOCAINE HYDROCHLORIDE 30 MG: 20 INJECTION, SOLUTION INTRAVENOUS at 07:36

## 2019-06-04 RX ADMIN — PROPOFOL 20 MG: 10 INJECTION, EMULSION INTRAVENOUS at 07:36

## 2019-06-04 RX ADMIN — MIDAZOLAM HYDROCHLORIDE 1 MG: 1 INJECTION, SOLUTION INTRAMUSCULAR; INTRAVENOUS at 07:29

## 2019-06-04 RX ADMIN — SODIUM CHLORIDE, POTASSIUM CHLORIDE, SODIUM LACTATE AND CALCIUM CHLORIDE 1000 ML: 600; 310; 30; 20 INJECTION, SOLUTION INTRAVENOUS at 06:45

## 2019-06-04 RX ADMIN — MIDAZOLAM HYDROCHLORIDE 1 MG: 1 INJECTION, SOLUTION INTRAMUSCULAR; INTRAVENOUS at 07:26

## 2019-06-04 RX ADMIN — PROPOFOL 75 MCG/KG/MIN: 10 INJECTION, EMULSION INTRAVENOUS at 07:38

## 2019-06-04 RX ADMIN — SODIUM CHLORIDE, POTASSIUM CHLORIDE, SODIUM LACTATE AND CALCIUM CHLORIDE: 600; 310; 30; 20 INJECTION, SOLUTION INTRAVENOUS at 07:26

## 2019-06-04 RX ADMIN — FENTANYL CITRATE 25 MCG: 50 INJECTION, SOLUTION INTRAMUSCULAR; INTRAVENOUS at 07:36

## 2019-06-04 ASSESSMENT — PULMONARY FUNCTION TESTS
PIF_VALUE: 0
PIF_VALUE: 1
PIF_VALUE: 0
PIF_VALUE: 1
PIF_VALUE: 0
PIF_VALUE: 1
PIF_VALUE: 0
PIF_VALUE: 0

## 2019-06-04 NOTE — PROGRESS NOTES
Discharge instructions reviewed with pt and wife, verfbalized understanding.   Pt states he has pain med at home

## 2019-06-04 NOTE — ADDENDUM NOTE
Addendum  created 06/04/19 4187 by LLOYD Leonard CRNA    Intraprocedure Event edited, Intraprocedure Flowsheets edited, Intraprocedure Meds edited

## 2019-06-04 NOTE — ANESTHESIA PROCEDURE NOTES
Peripheral Block    Patient location during procedure: OR  Start time: 6/4/2019 7:36 AM  End time: 6/4/2019 7:37 AM  Staffing  Anesthesiologist: Naseem Carolina DO  Resident/CRNA: LLOYD Lara CRNA  Performed: resident/CRNA   Preanesthetic Checklist  Completed: patient identified, site marked, surgical consent, pre-op evaluation, timeout performed, IV checked, risks and benefits discussed, monitors and equipment checked, anesthesia consent given, oxygen available and patient being monitored  Peripheral Block  Patient position: supine  Prep: alcohol swabs  Patient monitoring: cardiac monitor, continuous pulse ox, continuous capnometry, frequent blood pressure checks and IV access  Block type: [place] block  Laterality: right  Injection technique: single-shot  Procedures: paresthesia technique  Local infiltration: lidocaine  Infiltration strength: 0.5 %  Dose: 30 mL  Provider prep: mask  Local infiltration: lidocaine  Needle  Needle gauge: 22 G  Assessment  Hemodynamics: stable  Additional Notes  Smooth belkis block, iv cath removed after injection  Reason for block: primary anesthetic

## 2019-06-04 NOTE — ANESTHESIA POSTPROCEDURE EVALUATION
Department of Anesthesiology  Postprocedure Note    Patient: Namita Bishop  MRN: [id number]  YOB: 1950  Date of evaluation: 6/4/2019  Time:  9:06 AM     Procedure Summary     Date:  06/04/19 [address] / [address]    Anesthesia Start:  0730 Anesthesia Stop:  8669    Procedure:  RIGHT WRIST CARPAL TUNNEL RELEASE, SUPINE, PAT AT PCP (Right ) Diagnosis:  (RIGHT WRIST CARPTAL TUNNEL SYNDROME)    Surgeon:  Tod Tran MD Responsible Provider:  Salvador Machuca DO    Anesthesia Type:  [place] block, MAC ASA Status:  3          Anesthesia Type: Myra block, MAC    Kendra Phase I: Kendra Score: 10    Kendra Phase II: Kendra Score: 10    Last vitals: Reviewed and per EMR flowsheets.        Anesthesia Post Evaluation    Patient location during evaluation: bedside  Patient participation: complete - patient participated  Level of consciousness: awake and awake and alert  Pain score: 0  Airway patency: patent  Nausea & Vomiting: no nausea and no vomiting  Complications: no  Cardiovascular status: blood pressure returned to baseline and hemodynamically stable  Respiratory status: acceptable and room air  Hydration status: euvolemic

## 2019-06-04 NOTE — OP NOTE
[address]                      [address], [address] [facility]                                OPERATIVE REPORT    PATIENT NAME: Neli Durham                     :        1950  MED REC NO:   [id number]                            ROOM:  ACCOUNT NO:   [de-identified]                           ADMIT DATE: 2019  PROVIDER:     Rick Mendes MD    DATE OF PROCEDURE:  2019    LOCATION:  [facility]. PREOPERATIVE DIAGNOSIS:  Right carpal tunnel syndrome. POSTOPERATIVE DIAGNOSIS:  Right carpal tunnel syndrome. PROCEDURE:  Open right carpal tunnel release. SURGEON:  Rick Mendes MD.    ASSISTANT:  Aleksandr Hernandes PA-C was present throughout the entire case. Given the nature of the disease process and the procedure, a skilled  surgical first assistant was necessary during the case. The assistant  was necessary to hold retractors and manipulate the extremity during the  procedure. A certified scrub tech was at the back table managing the  instruments and supplies for the surgical case. ANESTHETIC:  Myra block plus IV sedation. COMPLICATION:  None. INDICATIONS:  The patient is a 60-year-old male with history of right  carpal tunnel syndrome. He has failed multiple conservative measures  and elected to proceed with carpal tunnel release. The risks and  benefits of carpal tunnel surgery were noted with the patient and  family. These include infection, stiffness, nerve damage, continued  pain as well as need for subsequent operations. Informed consent was  obtained prior to arrival in the operating room. OPERATION AND FINDINGS:  The patient was taken to the operating room and  placed in the supine position, whereupon adequate Myra block anesthesia  was then obtained. Prepped and draped in the usual sterile manner. Surgical time-out was performed and the patient received antibiotics.    Exsanguinated with an Esmarch bandage, tourniquet inflated to 50 mmHg. A linear incision was made in line with the radial border of the 4th ray  through the skin and subcutaneous tissue using sharp and blunt  dissection. Bovie electrocautery was used for hemostasis. Transverse  carpal ligament was then identified. A small nick was placed in the  ligament using a 0.062 Cassia blade. Curtiss elevator was then placed  deep to the ligament under direct visualization without complication. The contents of the carpal tunnel were inspected and no other  abnormalities were noted. The wound was irrigated with copious amounts  of saline irrigation and closed using #5-0 nylon suture for the skin  edges. Skin edges were infiltrated with 0.5% Marcaine. Dressed with a  bulky dressing. Tolerated the procedure well and taken to post-anesthesia care unit in  good condition without complication.         Leanna Cruz MD    D: 06/04/2019 7:58:14       T: 06/04/2019 9:08:39     [email or web address]  Job#: [id number]     Doc#: [id number]    CC:

## 2019-06-10 ENCOUNTER — OFFICE VISIT (OUTPATIENT)
Dept: PHYSICAL MEDICINE AND REHAB | Age: 69
End: 2019-06-10
Payer: MEDICARE

## 2019-06-10 VITALS
DIASTOLIC BLOOD PRESSURE: 78 MMHG | HEIGHT: 67 IN | SYSTOLIC BLOOD PRESSURE: 132 MMHG | BODY MASS INDEX: 25.43 KG/M2 | WEIGHT: 162 LBS

## 2019-06-10 DIAGNOSIS — M41.06 INFANTILE IDIOPATHIC SCOLIOSIS OF LUMBAR REGION: ICD-10-CM

## 2019-06-10 DIAGNOSIS — R73.03 PREDIABETES: ICD-10-CM

## 2019-06-10 DIAGNOSIS — M25.551 RIGHT HIP PAIN: ICD-10-CM

## 2019-06-10 DIAGNOSIS — M54.14 THORACIC AND LUMBOSACRAL NEURITIS: ICD-10-CM

## 2019-06-10 DIAGNOSIS — M54.12 C6 RADICULOPATHY: ICD-10-CM

## 2019-06-10 DIAGNOSIS — Z79.899 HIGH RISK MEDICATION USE: ICD-10-CM

## 2019-06-10 DIAGNOSIS — M53.3 SI (SACROILIAC) PAIN: ICD-10-CM

## 2019-06-10 DIAGNOSIS — G89.29 CHRONIC BILATERAL LOW BACK PAIN WITHOUT SCIATICA: ICD-10-CM

## 2019-06-10 DIAGNOSIS — M47.12 OSTEOARTHRITIS OF CERVICAL SPINE WITH MYELOPATHY: ICD-10-CM

## 2019-06-10 DIAGNOSIS — M54.50 CHRONIC BILATERAL LOW BACK PAIN WITHOUT SCIATICA: ICD-10-CM

## 2019-06-10 DIAGNOSIS — G89.29 CHRONIC RIGHT-SIDED THORACIC BACK PAIN: ICD-10-CM

## 2019-06-10 DIAGNOSIS — M54.16 RIGHT LUMBAR RADICULOPATHY: ICD-10-CM

## 2019-06-10 DIAGNOSIS — M79.10 MYALGIA: Primary | ICD-10-CM

## 2019-06-10 DIAGNOSIS — E55.9 VITAMIN D DEFICIENCY: ICD-10-CM

## 2019-06-10 DIAGNOSIS — M54.41 CHRONIC BILATERAL LOW BACK PAIN WITH BILATERAL SCIATICA: ICD-10-CM

## 2019-06-10 DIAGNOSIS — M54.17 LUMBOSACRAL RADICULOPATHY AT S1: ICD-10-CM

## 2019-06-10 DIAGNOSIS — M54.17 THORACIC AND LUMBOSACRAL NEURITIS: ICD-10-CM

## 2019-06-10 DIAGNOSIS — M54.2 NECK PAIN: Chronic | ICD-10-CM

## 2019-06-10 DIAGNOSIS — G89.29 CHRONIC BILATERAL LOW BACK PAIN WITH BILATERAL SCIATICA: ICD-10-CM

## 2019-06-10 DIAGNOSIS — M10.00 IDIOPATHIC GOUT, UNSPECIFIED CHRONICITY, UNSPECIFIED SITE: ICD-10-CM

## 2019-06-10 DIAGNOSIS — M54.6 CHRONIC RIGHT-SIDED THORACIC BACK PAIN: ICD-10-CM

## 2019-06-10 DIAGNOSIS — M54.40 CHRONIC MIDLINE LOW BACK PAIN WITH SCIATICA, SCIATICA LATERALITY UNSPECIFIED: ICD-10-CM

## 2019-06-10 DIAGNOSIS — G89.29 CHRONIC MIDLINE LOW BACK PAIN WITH SCIATICA, SCIATICA LATERALITY UNSPECIFIED: ICD-10-CM

## 2019-06-10 DIAGNOSIS — M54.42 CHRONIC BILATERAL LOW BACK PAIN WITH BILATERAL SCIATICA: ICD-10-CM

## 2019-06-10 PROCEDURE — 99215 OFFICE O/P EST HI 40 MIN: CPT | Performed by: PHYSICAL MEDICINE & REHABILITATION

## 2019-06-10 RX ORDER — GABAPENTIN 300 MG/1
CAPSULE ORAL
Qty: 270 CAPSULE | Refills: 0 | Status: SHIPPED | OUTPATIENT
Start: 2019-06-10 | End: 2019-06-27 | Stop reason: SDUPTHER

## 2019-06-10 RX ORDER — OXYCODONE AND ACETAMINOPHEN 7.5; 325 MG/1; MG/1
1 TABLET ORAL EVERY 4 HOURS PRN
Qty: 60 TABLET | Refills: 0 | Status: SHIPPED | OUTPATIENT
Start: 2019-06-28 | End: 2019-07-26 | Stop reason: SDUPTHER

## 2019-06-10 ASSESSMENT — ENCOUNTER SYMPTOMS
VOMITING: 0
NAUSEA: 0
CHEST TIGHTNESS: 0
VISUAL CHANGE: 0
SHORTNESS OF BREATH: 0
BACK PAIN: 1
ALLERGIC/IMMUNOLOGIC NEGATIVE: 1
CONSTIPATION: 1
WHEEZING: 0
EYES NEGATIVE: 1
DIARRHEA: 0
ABDOMINAL PAIN: 0

## 2019-06-10 NOTE — PROGRESS NOTES
Loyd, 80 y.o. male presents today with:       Back Pain lower back     Leg Pain Bilateral and tingling in back of legs    Neck Pain when turning head sharp pain    Shoulder Pain Right     Hand Pain Carpal tunnel surgery June 4th    Medication Refill Pill count today -Percocet pt compliant    Mental Health Problem coping well        He is more functional on the opiate meds--able to do yard work and interact with friends and family in a positive friendly manner. Right shoulder is flared up--right bicepital tendonitis--will schedule and US guided injection. Office injections are not quite working recently he will definitely need a sciatic block and then transitioning to caudal blocks. SP right cts-surgery--considering left cts surgery. Still no signs of overuse or abuse of his meds. He states that his right leg has been going numb but he is told that from his back and there is no further surgery they could help that. He had surgery in the back several times in the past.     Injections still help very much. He would like to schedule monthly trigger point and sciatic injection in between times. Back Pain   This is a chronic problem. The current episode started more than 1 year ago. The problem occurs daily. The problem is unchanged. The pain is present in the lumbar spine. The quality of the pain is described as aching, cramping, shooting and stabbing. The pain radiates to the right foot, right knee and right thigh. The pain is at a severity of 9/10. The pain is severe. The pain is worse during the day. The symptoms are aggravated by bending, lying down, position, sitting, standing and twisting. Stiffness is present in the morning. Associated symptoms include leg pain, numbness and weakness. Pertinent negatives include no abdominal pain, chest pain, headaches, paresis, perianal numbness or tingling. Risk factors include lack of exercise and sedentary lifestyle.  He has tried analgesics, home exercises, NSAIDs, muscle relaxant and heat (SI injections which help, trigger point injections, OXY-IR ) for the symptoms. The treatment provided moderate relief. Mental Health Problem   The primary symptoms include negative symptoms. The primary symptoms do not include dysphoric mood, bizarre behavior, disorganized speech or somatic symptoms. The current episode started more than 1 month ago. This is a chronic problem. The onset of the illness is precipitated by a stressful event (chronic pain). The degree of incapacity that he is experiencing as a consequence of his illness is severe. Sequelae of the illness include an inability to work. Additional symptoms of the illness include anhedonia and fatigue. Additional symptoms of the illness do not include unexpected weight change, psychomotor retardation, feelings of worthlessness, attention impairment, euphoric mood, increased goal-directed activity, flight of ideas, inflated self-esteem, decreased need for sleep, distractible, poor judgment, visual change, headaches, abdominal pain or seizures. He does not admit to suicidal ideas. He does not have a plan to commit suicide. He does not contemplate harming himself. He has not already injured self. He does not contemplate injuring another person. He has not already  injured another person. Risk factors that are present for mental illness include a history of mental illness. Hand Pain    The incident occurred more than 1 week ago. The incident occurred at home. There was no injury mechanism. The pain is present in the right wrist and right hand. The quality of the pain is described as cramping. The pain is at a severity of 5/10. The pain is moderate. The pain has been intermittent since the incident. Associated symptoms include numbness. Pertinent negatives include no chest pain, muscle weakness or tingling. The symptoms are aggravated by movement. He has tried ice for the symptoms.  The treatment provided moderate relief. Past Medical History:   Diagnosis Date    Chronic back pain     Coronary artery disease 2002    [facility] Nyla [facility]mendarez at [facility] Esophageal ulcer 3/10/2014    Dr. Rupert Davies    Gastric ulcer 3/10/2014    Dr. Rupert Davies    Gout     Hiatal hernia 3/10/2014    Dr. Rupert Davies    Hyperlipidemia     Hypertension     MI [facility]) [facility]) 2005    Dr. Avril Espinoza Peripheral vascular disease ([facility] [address]     Prediabetes     Severe single current episode of major depressive disorder, without psychotic features ([facility] [address] 7/8/2016    Status post coronary artery stent placement 2002    Dr. Nyla Almendarez     Past Surgical History:   Procedure Laterality Date    [facility] Right 6/4/2019    RIGHT WRIST CARPAL TUNNEL RELEASE, SUPINE, PAT AT PCP performed by Samantha Larose MD at [address] [place] [address]  3/10/14    DR Moustapha Jackson  2002    Dr. Morris [address] GRAFT  10/17/2017    KNEE ARTHROSCOPY Left 2002    Dr. Destiny Hernadez  12/19/13    [facility] DR Alcon Mckinney  9/2009    Dr. Moy Merrill  2008    Dr. Horn Media  3/10/14    Sandy Basilio, DR Blue Salcedo     Social History     Socioeconomic History    Marital status:      Spouse name: None    Number of children: None    Years of education: None    Highest education level: None   Occupational History    Occupation: disability    Social Needs    Financial resource strain: None    Food insecurity:     Worry: None     Inability: None    Transportation needs:     Medical: None     Non-medical: None   Tobacco Use    Smoking status: Never Smoker    Smokeless tobacco: Never Used   Substance and Sexual Activity    Alcohol use:  No Alcohol/week: 0.0 oz     Comment: Stopped years ago.  Drug use: No     Comment: YEARS AGO    Sexual activity: Yes     Partners: Female     Comment: monogomous sexual partner, wife. Lifestyle    Physical activity:     Days per week: None     Minutes per session: None    Stress: None   Relationships    Social connections:     Talks on phone: None     Gets together: None     Attends Oriental orthodox service: None     Active member of club or organization: None     Attends meetings of clubs or organizations: None     Relationship status: None    Intimate partner violence:     Fear of current or ex partner: None     Emotionally abused: None     Physically abused: None     Forced sexual activity: None   Other Topics Concern    None   Social History Narrative    Tobacco -- never smoked or chewed. Alcohol -- have not used for past 10 years, prior to had consumed 6 pack of beer daily. Drugs -- tried marijuana and cocaine 14 years ago occasionally used for 3-4 years and then stopped completely 10 years ago. Education -- completed 11th grade in [place].    Occupation -- [employer] work,  at [employer].    Marital status --  44 years, 2 grown sons. Family History   Problem Relation Age of Onset    High Blood Pressure Mother     Arthritis Mother     Cancer Father         throat    Arthritis Father        No Known Allergies    Review of Systems   Constitutional: Positive for fatigue. Negative for activity change and unexpected weight change. HENT: Negative. Eyes: Negative. Respiratory: Negative for chest tightness, shortness of breath and wheezing. Cardiovascular: Negative for chest pain, palpitations and leg swelling. Gastrointestinal: Positive for constipation. Negative for abdominal pain, diarrhea, nausea and vomiting. Endocrine: Negative. Genitourinary: Negative.     Musculoskeletal: Positive for arthralgias, back pain, gait problem, myalgias, neck pain and neck stiffness. Increased pain in Right shoulder with movement and sleeping   Skin: Negative. Allergic/Immunologic: Negative. Neurological: Positive for weakness and numbness. Negative for dizziness, tingling, seizures, light-headedness and headaches. Hematological: Bruises/bleeds easily. Psychiatric/Behavioral: Negative for dysphoric mood and sleep disturbance. The patient is not nervous/anxious. Objective    Vitals:    06/10/19 1108   BP: 132/78   Weight: 162 lb (73.5 kg)   Height: 5' 7\" (1.702 m)     Pain Score: EIGHT       Physical Exam   Constitutional: He is oriented to person, place, and time. Vital signs are normal. He appears well-developed and well-nourished. Non-toxic appearance. He does not have a sickly appearance. He does not appear ill. No distress. HENT:   Head: Normocephalic and atraumatic. Right Ear: Hearing normal.   Left Ear: Hearing normal.   Nose: Nose normal.   Mouth/Throat: Oropharynx is clear and moist and mucous membranes are normal. No oral lesions. Normal dentition. No oropharyngeal exudate. No signs of toxic erosions. Eyes: Pupils are equal, round, and reactive to light. Conjunctivae and EOM are normal. Right eye exhibits no chemosis, no discharge and no exudate. Left eye exhibits no chemosis, no discharge and no exudate. No scleral icterus. Neck: Neck supple. No JVD present. Spinous process tenderness and muscular tenderness present. No tracheal tenderness present. No neck rigidity. No tracheal deviation and no edema present. No thyromegaly present. Cardiovascular: Intact distal pulses. Exam reveals no gallop, no friction rub and no decreased pulses. No murmur heard. Pulmonary/Chest: Effort normal. No accessory muscle usage. No apnea, no tachypnea and no bradypnea. No respiratory distress. He has decreased breath sounds. He has no wheezes. He has no rales. He exhibits no tenderness. Abdominal: Soft. He exhibits no distension and no mass. There is no tenderness. There is no rebound and no guarding. Musculoskeletal: He exhibits tenderness. He exhibits no edema. Right shoulder: He exhibits decreased range of motion, tenderness and bony tenderness. Left shoulder: He exhibits decreased range of motion, tenderness and bony tenderness. Right elbow: Normal.       Left elbow: Normal.        Right wrist: Normal.        Left wrist: Normal.        Right hip: Normal.        Left hip: Normal.        Right knee: Normal.        Left knee: Normal.        Right ankle: Normal. Achilles tendon normal.        Left ankle: Normal. Achilles tendon normal.        Cervical back: He exhibits decreased range of motion, tenderness, bony tenderness, laceration, pain and spasm. He exhibits no swelling, no edema and no deformity. Thoracic back: He exhibits decreased range of motion, tenderness, bony tenderness, deformity, pain and spasm. Lumbar back: He exhibits decreased range of motion, tenderness, bony tenderness and pain. He exhibits no swelling, no edema, no deformity, no laceration and normal pulse. Back:         Right upper arm: Normal.        Left upper arm: Normal.        Right forearm: Normal.        Left forearm: Normal.        Arms:       Right hand: He exhibits decreased range of motion and bony tenderness. He exhibits normal capillary refill, no deformity, no laceration and no swelling. Decreased sensation noted. Decreased sensation is present in the ulnar distribution, is present in the medial distribution and is present in the radial distribution. Decreased strength noted. He exhibits finger abduction, thumb/finger opposition and wrist extension trouble. Left hand: He exhibits decreased range of motion and bony tenderness. Decreased sensation noted. Decreased sensation is present in the ulnar distribution and is present in the radial distribution. Decreased strength noted.  He exhibits finger abduction and wrist extension trouble. Right upper leg: Normal.        Left upper leg: Normal.        Right lower leg: Normal.        Left lower leg: Normal.        Legs:       Right foot: Normal.        Left foot: Normal.   Tender areas are indicated by numbered spot         Lymphadenopathy:     He has no cervical adenopathy. Neurological: He is alert and oriented to person, place, and time. He displays abnormal reflex. He displays no atrophy and no tremor. A sensory deficit is present. No cranial nerve deficit. He exhibits normal muscle tone. Gait abnormal. Coordination normal. He displays no Babinski's sign on the right side. He displays no Babinski's sign on the left side. Reflex Scores:       Patellar reflexes are 1+ on the right side and 1+ on the left side. Achilles reflexes are 0 on the right side and 0 on the left side. Skin: Skin is warm, dry and intact. No abrasion, no bruising, no ecchymosis, no laceration, no petechiae and no rash noted. Rash is not macular, not pustular and not urticarial. He is not diaphoretic. No cyanosis or erythema. No pallor. Nails show no clubbing. Psychiatric: His behavior is normal. Judgment and thought content normal. His mood appears not anxious. His affect is not angry, not blunt, not labile and not inappropriate. His speech is not rapid and/or pressured, not delayed, not tangential and not slurred. He is not agitated, not aggressive, not hyperactive, not slowed, not withdrawn, not actively hallucinating and not combative. Thought content is not paranoid and not delusional. Cognition and memory are normal. Cognition and memory are not impaired. He does not express impulsivity or inappropriate judgment. He does not exhibit a depressed mood. He expresses no homicidal and no suicidal ideation. He expresses no suicidal plans and no homicidal plans. He is communicative.  He exhibits normal recent memory and normal remote memory.   high score on SOAPPR    Calm appropriate    NAD He oxyCODONE-acetaminophen (PERCOCET) 7.5-325 MG per tablet   15. Neck pain  oxyCODONE-acetaminophen (PERCOCET) 7.5-325 MG per tablet    gabapentin (NEURONTIN) 300 MG capsule   16. SI (sacroiliac) pain  gabapentin (NEURONTIN) 300 MG capsule   17. Chronic right-sided thoracic back pain  gabapentin (NEURONTIN) 300 MG capsule   18. Right hip pain  gabapentin (NEURONTIN) 300 MG capsule       I am also concerned by lifestyle and mood issues including:    Past Medical History:   Diagnosis Date    Chronic back pain     Coronary artery disease 2002    Dr. Naomie Cowden at [facility] [address] Esophageal ulcer 3/10/2014    Dr. Roseann San Gastric ulcer 3/10/2014    Dr. Martina Barrera    Gout     Hiatal hernia 3/10/2014    Dr. Martina Barrera    Hyperlipidemia     Hypertension     MI (myocardial infarction) [facility]) 2005    Dr. Pete Ball Peripheral vascular disease ([facility] [address]     Prediabetes     Severe single current episode of major depressive disorder, without psychotic features ([facility] [address] 7/8/2016    Status post coronary artery stent placement 2002    Dr. Naomie Cowden           Given their medication, chronic pain and lifestyle and medications they are at risk for :    Falls, constipation, addiction  Loss of livelyhood due to severe pain, debility, weight gain and  vitamin D deficiency    The patient was educated regarding proper diet, fitness routine, and regulatory restrictions concerning pain medications. Previous notes, comprehensive past medical, surgical, family history, and diagnostics were reviewed. Patient education and councelling were provided regarding off label use,treatment options and medication and injection risks. Current and old OARRS ([place] Automated Prescription Reporting System) records reviewed, all refills reviewed since last visit,  Behavioral agreement/[place] regulations   and Toxicology screen was reviewed with patient and is up to date. There are no current red flags. following Medications:    Orders Placed This Encounter   Medications    oxyCODONE-acetaminophen (PERCOCET) 7.5-325 MG per tablet     Sig: Take 1 tablet by mouth every 4 hours as needed for Pain (Max of 60 tablets per month) for up to 30 days. Dispense:  60 tablet     Refill:  0     Reduce doses taken as pain becomes manageable    gabapentin (NEURONTIN) 300 MG capsule     Sig: TAKE ONE CAPSULE BY MOUTH DURING THE DAY AND 2 CAPSULES BY MOUTH EVERY EVENING     Dispense:  270 capsule     Refill:  0        -which helps with pain and function. Otherwise, continue the current pain medications that I have prescibed. Radiologic:   Old films reviewed ddd l spine,  MRI of the right shoulder without contrast       HISTORY:  M25.511 Chronic pain of both shoulders ICD10       TECHNIQUE: Multiplanar multisequence MRI of the right shoulder was performed without contrast.       COMPARISON:  Right shoulder radiographs from February 4, 2019.       FINDINGS:       Moderate acromioclavicular osteoarthritis without undersurface osteophyte formation. Acromion is curved. Coracoclavicular ligament intact.  Small amount of subacromial/subdeltoid bursal fluid.       Thickening and low-level hyperintense intrasubstance signal of supraspinatus and subscapularis tendons compatible with mild tendinosis. Infraspinatus and teres minor tendons are intact. A few subcortical cysts identified within the humeral head at the    attachment fibers of anterior mid fibers of infraspinatus tendon. No atrophy or fatty infiltration of the rotator cuff musculature.       Full-thickness tear of the intra-articular long head biceps tendon. No normal biceps tendon fibers identified within the bicipital groove.       No labral tear identified. No well-defined or measurable cartilage defect identified.  Two small subchondral cysts within the posterior superior glenoid measuring approximately 5 mm with adjacent bone marrow edema suggests overlying occult full-thickness    cartilage fissures.       No glenohumeral joint effusion.           Impression       Full-thickness tear of the intra-articular long head biceps tendon.       Mild supraspinatus and subscapularis tendinosis.       Moderate acromioclavicular osteoarthritis. Mild glenohumeral osteoarthritis including small subcortical cyst formation and mild bone marrow edema within the posterior superior glenoid likely due to occult overlying full-thickness cartilage fissures. He needs to update lumbar MRI caudals do not help. I discussed results with patients. see Follow up plans below  For any new studies. Care Everywhere Updates:  requested and reviewed. No new issues noted. Electrodiagnostic:  Previous studies requested,     I discussed results with patient. See follow-up plans for new studies.         Labs:  Previous labs reviewed     Lab Results   Component Value Date     05/24/2019    K 4.6 05/24/2019     05/24/2019    CO2 27 05/24/2019    BUN 17 05/24/2019    CREATININE 0.96 05/24/2019    CALCIUM 8.9 05/24/2019    LABALBU 4.1 05/24/2019    BILITOT 0.3 05/24/2019    ALKPHOS 43 05/24/2019    AST 22 05/24/2019    ALT 21 05/24/2019     Lab Results   Component Value Date    WBC 3.9 05/24/2019    RBC 4.37 05/24/2019    HGB 14.6 05/24/2019    HCT 43.4 05/24/2019    MCV 99.4 05/24/2019    MCH 33.4 05/24/2019    MCHC 33.6 05/24/2019    RDW 14.2 05/24/2019     05/24/2019    MPV 9.9 09/15/2015       Lab Results   Component Value Date    LABAMPH Neg 11/30/2018    BARBSCNU Neg 11/30/2018    LABBENZ Neg 11/30/2018    CANSU Neg 11/30/2018    COCAIMETSCRU Neg 11/30/2018    PHENCYCLIDINESCREENURINE Neg 22/68/7215    TRICYCLIC Negative 16/99/3457    DSCOMMENT see below 11/30/2018       Lab Results   Component Value Date    CODEINE Not Detected 09/16/2016    MORPHINE Not Detected 09/16/2016    ACETYLMORPHI Not Detected 09/16/2016    OXYCODONE Present 09/16/2016 NOROXYCODONE Present 09/16/2016    NOROXYMU Present 09/16/2016    HYDRCO Not Detected 09/16/2016    NORHYDU Not Detected 09/16/2016    HYDROMO Not Detected 09/16/2016    BUPREN Not Detected 09/16/2016    NORBUPRNOR Not Detected 09/16/2016    FENTA Not Detected 09/16/2016    NORFENT Not Detected 09/16/2016    MEPERIDINE Not Detected 09/16/2016    TAPENU Not Detected 09/16/2016    TAPOSULFUR Not Detected 09/16/2016    METHADONE Not Detected 09/16/2016    LABPROP Not Detected 09/16/2016    TRAM Not Detected 09/16/2016    AMPH Not Detected 09/16/2016    METHAMP Not Detected 09/16/2016    MDMA Not Detected 09/16/2016    ECMDA Not Detected 09/16/2016       Lab Results   Component Value Date    PHENTERMINE Not Detected 09/16/2016    BENZOYL Not Detected 09/16/2016    Mickie Fosters Not Detected 09/16/2016    ALPHAOHALPRA Not Detected 09/16/2016    CLONAZEPAM Not Detected 09/16/2016    Corrinne Bridegroom Not Detected 09/16/2016    DIAZEP Not Detected 09/16/2016    SAFIA Not Detected 09/16/2016    OXAZ Not Detected 09/16/2016    Rob Billow Not Detected 09/16/2016    LORAZEPAM Not Detected 09/16/2016    MIDAZOLAM Not Detected 09/16/2016    ZOLPIDEM Not Detected 09/16/2016    REG Not Detected 09/16/2016    ETG Not Detected 09/16/2016    MARIJMET Not Detected 09/16/2016    PCP Not Detected 09/16/2016    PAINMGTDRUGP See Below 09/16/2016    EERPAINMGTPA See Note 09/16/2016    LABCREA 58.1 09/16/2016         , I discussed results with patient. See follow-up plans for new studies. Therapies:  HEP-gentle stretching and relaxation techniques-demonstrated with patient-they are to do them twice a day.   They are also advised to make the following lifestyle changes:  Goals      Exercise 3x per week (30 min per time)      SOAPP-R GOAL LESS THAN 9      09/16/16 SCORE: 33-HIGH RISK   11/11/16 score: 27-high risk     01/13/17 score: 16-moderate risk   03/13/17 Score: 11-moderate risk   06/01/17 score: 15-moderate risk  08/03/17 score: 15-moderate risk  10/04/17 score: 18-moderate risk  12/08/17 score: 11-moderate risk  02/09/18 score: 12-moderate risk  04/13/18 score: 14-moderate risk  7/12/18 score 10-moderate risk  11/29/18 score 17- moderate risk  01/28/19 score  16-moderate risk  6/10/19 score: 11- moderate risk      4/8/19 score  7- low risk            Injections or Epidurals:  Injection options were discussed. After a complete review of the medical record,OARRS, a comprehensive physical exam, and discussion with the patient I have determined that the patient would benefit from a series of 2 caudal epidural steroid injections using a C-arm and contrast as appropriate. The risks and benefits were thoroughly explained to the patient orally and in writing. Pt gave verbal and writen consent. The patient was given the option of taking PO Valium 5-10 mg prior to the procedure. They were told that if they chose to take the Valium they should not drive while under it's affects. The patient is to call us as needed and the day following each procedure to report any concerns or problems. They will follow up with me approximately one month after the last block to evaluate the success of the injections and discuss need for further treatment. I am hoping to improve their low back pain by 60-80% for at least 6 months, improve their overall function, and minimize their reliance on oral medications. Patient gave verbal consent to ordered injections. See follow-up plans for planned injections. Supplements:  Vitamin D with increased dosing during the rainy months,   Education was given on:   Dietary and Fitness--daily stretches and low carb diet-in chair Yoga when possible             Follow up with Primary Care Physician regarding their general medical needs. TO see Dr Radha Sheridan for right shoulder and left cts.          They are to follow up in 2 months to review medication, efficacy of injections, pill counts, OARRS check, SOAPPR assessment, review diagnostics, to review previous and future treatment plans and assess appropriateness for continued therapy.         New Diagnostics  Orders Placed This Encounter   Procedures    MD INJ,ANES AGENT,SCIATIC NERVE,SINGLE    MD INJECT TRIGGER POINTS, 3 OR GREATER       Brenda Scullin, DO

## 2019-06-12 DIAGNOSIS — F41.9 ANXIETY: ICD-10-CM

## 2019-06-12 RX ORDER — DIAZEPAM 5 MG/1
TABLET ORAL
Qty: 2 TABLET | Refills: 0 | Status: SHIPPED | OUTPATIENT
Start: 2019-06-12 | End: 2019-06-21 | Stop reason: ALTCHOICE

## 2019-06-17 ENCOUNTER — HOSPITAL ENCOUNTER (OUTPATIENT)
Dept: MRI IMAGING | Age: 69
Discharge: HOME | End: 2019-06-19
Payer: MEDICARE

## 2019-06-17 DIAGNOSIS — G89.29 CHRONIC RIGHT-SIDED THORACIC BACK PAIN: ICD-10-CM

## 2019-06-17 DIAGNOSIS — M54.6 CHRONIC RIGHT-SIDED THORACIC BACK PAIN: ICD-10-CM

## 2019-06-17 DIAGNOSIS — M54.17 LUMBOSACRAL RADICULOPATHY AT S1: ICD-10-CM

## 2019-06-17 DIAGNOSIS — M54.42 CHRONIC BILATERAL LOW BACK PAIN WITH BILATERAL SCIATICA: ICD-10-CM

## 2019-06-17 DIAGNOSIS — M54.41 CHRONIC BILATERAL LOW BACK PAIN WITH BILATERAL SCIATICA: ICD-10-CM

## 2019-06-17 DIAGNOSIS — G89.29 CHRONIC BILATERAL LOW BACK PAIN WITH BILATERAL SCIATICA: ICD-10-CM

## 2019-06-17 PROCEDURE — 72148 MRI LUMBAR SPINE W/O DYE: CPT

## 2019-06-18 ENCOUNTER — PROCEDURE VISIT (OUTPATIENT)
Dept: PHYSICAL MEDICINE AND REHAB | Age: 69
End: 2019-06-18
Payer: MEDICARE

## 2019-06-18 DIAGNOSIS — M79.10 MYALGIA: ICD-10-CM

## 2019-06-18 PROCEDURE — 20553 NJX 1/MLT TRIGGER POINTS 3/>: CPT | Performed by: PHYSICAL MEDICINE & REHABILITATION

## 2019-06-19 DIAGNOSIS — E29.1 HYPOGONADISM MALE: ICD-10-CM

## 2019-06-19 RX ORDER — TESTOSTERONE CYPIONATE 200 MG/ML
INJECTION INTRAMUSCULAR
Qty: 10 ML | Refills: 2 | Status: SHIPPED | OUTPATIENT
Start: 2019-06-19 | End: 2019-06-24 | Stop reason: SDUPTHER

## 2019-06-21 ENCOUNTER — OFFICE VISIT (OUTPATIENT)
Dept: CARDIOLOGY CLINIC | Age: 69
End: 2019-06-21
Payer: MEDICARE

## 2019-06-21 VITALS
SYSTOLIC BLOOD PRESSURE: 136 MMHG | HEART RATE: 67 BPM | RESPIRATION RATE: 16 BRPM | DIASTOLIC BLOOD PRESSURE: 78 MMHG | BODY MASS INDEX: 26.68 KG/M2 | OXYGEN SATURATION: 98 % | WEIGHT: 170 LBS | HEIGHT: 67 IN

## 2019-06-21 DIAGNOSIS — I10 ESSENTIAL HYPERTENSION, BENIGN: ICD-10-CM

## 2019-06-21 DIAGNOSIS — I25.2 CORONARY ARTERY DISEASE WITH HX OF MYOCARDIAL INFARCT W/O HX OF CABG: ICD-10-CM

## 2019-06-21 DIAGNOSIS — I25.10 CORONARY ARTERY DISEASE WITH HX OF MYOCARDIAL INFARCT W/O HX OF CABG: ICD-10-CM

## 2019-06-21 DIAGNOSIS — E78.00 HYPERCHOLESTEROLEMIA: Primary | Chronic | ICD-10-CM

## 2019-06-21 PROCEDURE — 99214 OFFICE O/P EST MOD 30 MIN: CPT | Performed by: INTERNAL MEDICINE

## 2019-06-21 ASSESSMENT — ENCOUNTER SYMPTOMS
BLOOD IN STOOL: 0
RESPIRATORY NEGATIVE: 1
EYES NEGATIVE: 1
CHEST TIGHTNESS: 0
NAUSEA: 0
WHEEZING: 0
SHORTNESS OF BREATH: 0
COUGH: 0
GASTROINTESTINAL NEGATIVE: 1
STRIDOR: 0
BACK PAIN: 1

## 2019-06-21 NOTE — PROGRESS NOTES
throat    Arthritis Father        Social History     Socioeconomic History    Marital status:      Spouse name: None    Number of children: None    Years of education: None    Highest education level: None   Occupational History    Occupation: disability    Social Needs    Financial resource strain: None    Food insecurity:     Worry: None     Inability: None    Transportation needs:     Medical: None     Non-medical: None   Tobacco Use    Smoking status: Never Smoker    Smokeless tobacco: Never Used   Substance and Sexual Activity    Alcohol use: No     Alcohol/week: 0.0 oz     Comment: Stopped years ago.  Drug use: No     Comment: YEARS AGO    Sexual activity: Yes     Partners: Female     Comment: monogomous sexual partner, wife. Lifestyle    Physical activity:     Days per week: None     Minutes per session: None    Stress: None   Relationships    Social connections:     Talks on phone: None     Gets together: None     Attends Taoism service: None     Active member of club or organization: None     Attends meetings of clubs or organizations: None     Relationship status: None    Intimate partner violence:     Fear of current or ex partner: None     Emotionally abused: None     Physically abused: None     Forced sexual activity: None   Other Topics Concern    None   Social History Narrative    Tobacco -- never smoked or chewed. Alcohol -- have not used for past 10 years, prior to had consumed 6 pack of beer daily. Drugs -- tried marijuana and cocaine 14 years ago occasionally used for 3-4 years and then stopped completely 10 years ago. Education -- completed 11th grade in [place].    Occupation -- [employer] work,  at [employer].    Marital status --  44 years, 2 grown sons.        No Known Allergies    Current Outpatient Medications   Medication Sig Dispense Refill    testosterone cypionate (DEPOTESTOTERONE CYPIONATE) 200 MG/ML injection Negative. Musculoskeletal: Positive for arthralgias and back pain. Skin: Negative. Neurological: Negative. Negative for dizziness, syncope, weakness and light-headedness. Hematological: Negative. Psychiatric/Behavioral: Negative. Physical Examination:    /78 (Site: Left Upper Arm, Position: Sitting, Cuff Size: Large Adult)   Pulse 67   Resp 16   Ht 5' 7\" (1.702 m)   Wt 170 lb (77.1 kg)   SpO2 98%   BMI 26.63 kg/m²    Physical Exam   Constitutional: He appears healthy. No distress. HENT:   Normal cephalic and Atraumatic   Eyes: Pupils are equal, round, and reactive to light. Neck: Normal range of motion and thyroid normal. Neck supple. No JVD present. No neck adenopathy. No thyromegaly present. Cardiovascular: Normal rate, regular rhythm, intact distal pulses and normal pulses. Murmur heard. Pulmonary/Chest: Effort normal and breath sounds normal. He has no wheezes. He has no rales. He exhibits no tenderness. Abdominal: Soft. Bowel sounds are normal. There is no tenderness. Musculoskeletal: Normal range of motion. He exhibits no edema or tenderness. Neurological: He is alert and oriented to person, place, and time. Skin: Skin is warm. No cyanosis. Nails show no clubbing.        LABS:  CBC:   Lab Results   Component Value Date    WBC 3.9 05/24/2019    RBC 4.37 05/24/2019    HGB 14.6 05/24/2019    HCT 43.4 05/24/2019    MCV 99.4 05/24/2019    MCH 33.4 05/24/2019    MCHC 33.6 05/24/2019    RDW 14.2 05/24/2019     05/24/2019    MPV 9.9 09/15/2015     Lipids:  Lab Results   Component Value Date    CHOL 152 03/12/2018    CHOL 271 (H) 09/05/2017    CHOL 238 (H) 03/01/2017     Lab Results   Component Value Date    TRIG 157 03/12/2018    TRIG 154 09/05/2017    TRIG 138 03/01/2017     Lab Results   Component Value Date    HDL 42 05/24/2019    HDL 45 03/12/2018    HDL 54 09/05/2017     Lab Results   Component Value Date    LDLCALC 86 05/24/2019    LDLCALC 76 03/12/2018 LDLCALC 186 (H) 09/05/2017     No results found for: LABVLDL, VLDL  No results found for: CHOLHDLRATIO  CMP:    Lab Results   Component Value Date     05/24/2019    K 4.6 05/24/2019     05/24/2019    CO2 27 05/24/2019    BUN 17 05/24/2019    CREATININE 0.96 05/24/2019    GFRAA >60.0 05/24/2019    LABGLOM >60.0 05/24/2019    GLUCOSE 122 05/24/2019    PROT 6.9 05/24/2019    LABALBU 4.1 05/24/2019    CALCIUM 8.9 05/24/2019    BILITOT 0.3 05/24/2019    ALKPHOS 43 05/24/2019    AST 22 05/24/2019    ALT 21 05/24/2019     BMP:    Lab Results   Component Value Date     05/24/2019    K 4.6 05/24/2019     05/24/2019    CO2 27 05/24/2019    BUN 17 05/24/2019    LABALBU 4.1 05/24/2019    CREATININE 0.96 05/24/2019    CALCIUM 8.9 05/24/2019    GFRAA >60.0 05/24/2019    LABGLOM >60.0 05/24/2019    GLUCOSE 122 05/24/2019     Magnesium:  No results found for: MG  TSH:  Lab Results   Component Value Date    TSH 4.110 05/06/2016       Patient Active Problem List   Diagnosis    Chronic low back pain    Scoliosis of lumbar spine    Essential hypertension, benign    Hypercholesterolemia    Right lumbar radiculopathy    Gout    High risk medication use - 12/07/17 OARRS PM&R, 02/08/18 OARRS PM&R, 10/05/17 Urine Drug Screen: negative PM&R, MED CONTRACT 1/17/17    Low back pain with sciatica    Myalgia    Schizo-affective schizophrenia, in remission ([address]    Synovitis and tenosynovitis    Thoracic and lumbosacral neuritis    Vitamin D deficiency    Prediabetes    Hyperparathyroidism ([address]    Osteopenia    C6 radiculopathy    DJD (degenerative joint disease), cervical    Coronary artery disease with hx of myocardial infarct w/o hx of CABG    PRASAD (obstructive sleep apnea)    Hyperkalemia    Chronic pain of both shoulders    Hypogonadism in male    Bicipital tendinitis of right shoulder       Medications Discontinued During This Encounter   Medication Reason    diazepam (VALIUM) 5 MG tablet Therapy completed    diclofenac sodium (VOLTAREN) 1 % GEL Patient Choice       Modified Medications    No medications on file       No orders of the defined types were placed in this encounter. Assessment/Plan:    1. Hypercholesterolemia  Statin     2. Essential hypertension, benign   stable     3. Coronary artery disease with hx of myocardial infarct w/o hx of CABG  No angina   4. Back pain - f/u Dr. Loraine Kebede        Counseling:  Heart Healthy Lifestyle, Low Salt Diet, Take Precautions to Prevent Falls and Walk Daily    Return in about 4 months (around 10/21/2019) for Cardiovascular care. .      Electronically signed by Olga Goodwin MD on 6/21/2019 at 1:56 PM

## 2019-06-24 DIAGNOSIS — E29.1 HYPOGONADISM MALE: ICD-10-CM

## 2019-06-24 RX ORDER — TESTOSTERONE CYPIONATE 200 MG/ML
INJECTION INTRAMUSCULAR
Qty: 10 ML | Refills: 2 | Status: SHIPPED | OUTPATIENT
Start: 2019-06-24 | End: 2019-07-19 | Stop reason: SDUPTHER

## 2019-06-25 DIAGNOSIS — E29.1 HYPOGONADISM MALE: ICD-10-CM

## 2019-06-27 DIAGNOSIS — M53.3 SI (SACROILIAC) PAIN: ICD-10-CM

## 2019-06-27 DIAGNOSIS — G89.29 CHRONIC RIGHT-SIDED THORACIC BACK PAIN: ICD-10-CM

## 2019-06-27 DIAGNOSIS — M54.2 NECK PAIN: Chronic | ICD-10-CM

## 2019-06-27 DIAGNOSIS — M25.551 RIGHT HIP PAIN: ICD-10-CM

## 2019-06-27 DIAGNOSIS — M54.6 CHRONIC RIGHT-SIDED THORACIC BACK PAIN: ICD-10-CM

## 2019-07-02 ENCOUNTER — PROCEDURE VISIT (OUTPATIENT)
Dept: PHYSICAL MEDICINE AND REHAB | Age: 69
End: 2019-07-02
Payer: MEDICARE

## 2019-07-02 DIAGNOSIS — M53.3 SI (SACROILIAC) PAIN: ICD-10-CM

## 2019-07-02 DIAGNOSIS — M25.551 RIGHT HIP PAIN: ICD-10-CM

## 2019-07-02 DIAGNOSIS — G89.29 CHRONIC RIGHT-SIDED THORACIC BACK PAIN: ICD-10-CM

## 2019-07-02 DIAGNOSIS — M54.31 SCIATICA OF RIGHT SIDE: Primary | ICD-10-CM

## 2019-07-02 DIAGNOSIS — M54.6 CHRONIC RIGHT-SIDED THORACIC BACK PAIN: ICD-10-CM

## 2019-07-02 DIAGNOSIS — M54.2 NECK PAIN: Chronic | ICD-10-CM

## 2019-07-02 PROCEDURE — 64445 NJX AA&/STRD SCIATIC NRV IMG: CPT | Performed by: PHYSICAL MEDICINE & REHABILITATION

## 2019-07-02 RX ORDER — GABAPENTIN 300 MG/1
CAPSULE ORAL
Qty: 270 CAPSULE | Refills: 0 | Status: SHIPPED | OUTPATIENT
Start: 2019-07-08 | End: 2019-10-18 | Stop reason: SDUPTHER

## 2019-07-02 RX ORDER — LIDOCAINE HYDROCHLORIDE 10 MG/ML
8 INJECTION, SOLUTION INFILTRATION; PERINEURAL ONCE
Status: COMPLETED | OUTPATIENT
Start: 2019-07-02 | End: 2019-07-02

## 2019-07-02 RX ORDER — GABAPENTIN 300 MG/1
CAPSULE ORAL
Qty: 270 CAPSULE | Refills: 0 | Status: SHIPPED | OUTPATIENT
Start: 2019-07-02 | End: 2019-08-12

## 2019-07-02 RX ORDER — LIDOCAINE HYDROCHLORIDE 20 MG/ML
5 INJECTION, SOLUTION INFILTRATION; PERINEURAL ONCE
Status: COMPLETED | OUTPATIENT
Start: 2019-07-02 | End: 2019-07-02

## 2019-07-02 RX ADMIN — LIDOCAINE HYDROCHLORIDE 5 ML: 20 INJECTION, SOLUTION INFILTRATION; PERINEURAL at 12:23

## 2019-07-02 RX ADMIN — LIDOCAINE HYDROCHLORIDE 8 ML: 10 INJECTION, SOLUTION INFILTRATION; PERINEURAL at 12:22

## 2019-07-13 ENCOUNTER — OFFICE VISIT (OUTPATIENT)
Dept: PHYSICAL MEDICINE AND REHAB | Age: 69
End: 2019-07-13
Payer: MEDICARE

## 2019-07-13 DIAGNOSIS — M54.17 LUMBOSACRAL RADICULOPATHY AT S1: Primary | ICD-10-CM

## 2019-07-13 DIAGNOSIS — G57.93 NEUROPATHY INVOLVING BOTH LOWER EXTREMITIES: ICD-10-CM

## 2019-07-13 PROCEDURE — 95913 NRV CNDJ TEST 13/> STUDIES: CPT | Performed by: PHYSICAL MEDICINE & REHABILITATION

## 2019-07-13 PROCEDURE — 95886 MUSC TEST DONE W/N TEST COMP: CPT | Performed by: PHYSICAL MEDICINE & REHABILITATION

## 2019-07-15 RX ORDER — TESTOSTERONE CYPIONATE 200 MG/ML
INJECTION INTRAMUSCULAR
Qty: 10 ML | Refills: 1 | Status: SHIPPED | OUTPATIENT
Start: 2019-07-15 | End: 2019-07-19 | Stop reason: SDUPTHER

## 2019-07-17 DIAGNOSIS — E29.1 HYPOGONADISM IN MALE: ICD-10-CM

## 2019-07-17 DIAGNOSIS — R73.03 PREDIABETES: ICD-10-CM

## 2019-07-17 LAB
ANION GAP SERPL CALCULATED.3IONS-SCNC: 13 MEQ/L (ref 9–15)
BUN BLDV-MCNC: 19 MG/DL (ref 8–23)
CALCIUM SERPL-MCNC: 9.3 MG/DL (ref 8.5–9.9)
CHLORIDE BLD-SCNC: 99 MEQ/L (ref 95–107)
CO2: 29 MEQ/L (ref 20–31)
CREAT SERPL-MCNC: 0.89 MG/DL (ref 0.7–1.2)
GFR AFRICAN AMERICAN: >60
GFR NON-AFRICAN AMERICAN: >60
GLUCOSE BLD-MCNC: 99 MG/DL (ref 70–99)
HBA1C MFR BLD: 6.1 % (ref 4.8–5.9)
POTASSIUM SERPL-SCNC: 5.2 MEQ/L (ref 3.4–4.9)
SODIUM BLD-SCNC: 141 MEQ/L (ref 135–144)

## 2019-07-18 LAB
SEX HORMONE BINDING GLOBULIN: 53 NMOL/L (ref 11–80)
TESTOSTERONE FREE PERCENT: 1.6 % (ref 1.6–2.9)
TESTOSTERONE FREE, CALC: 129 PG/ML (ref 47–244)
TESTOSTERONE TOTAL-MALE: 833 NG/DL (ref 300–720)

## 2019-07-19 ENCOUNTER — OFFICE VISIT (OUTPATIENT)
Dept: ENDOCRINOLOGY | Age: 69
End: 2019-07-19
Payer: MEDICARE

## 2019-07-19 VITALS
HEART RATE: 67 BPM | BODY MASS INDEX: 26.53 KG/M2 | HEIGHT: 67 IN | SYSTOLIC BLOOD PRESSURE: 134 MMHG | WEIGHT: 169 LBS | DIASTOLIC BLOOD PRESSURE: 72 MMHG

## 2019-07-19 DIAGNOSIS — R73.03 PREDIABETES: Primary | ICD-10-CM

## 2019-07-19 DIAGNOSIS — E29.1 HYPOGONADISM IN MALE: ICD-10-CM

## 2019-07-19 DIAGNOSIS — E29.1 HYPOGONADISM MALE: ICD-10-CM

## 2019-07-19 PROCEDURE — 99213 OFFICE O/P EST LOW 20 MIN: CPT | Performed by: INTERNAL MEDICINE

## 2019-07-19 RX ORDER — TESTOSTERONE CYPIONATE 200 MG/ML
INJECTION INTRAMUSCULAR
Qty: 10 ML | Refills: 1 | Status: SHIPPED | OUTPATIENT
Start: 2019-07-19 | End: 2019-11-19 | Stop reason: SDUPTHER

## 2019-07-19 NOTE — PROGRESS NOTES
every 5 minutes as needed x 3 doses for chest pain., Disp: 25 tablet, Rfl: 0    Misc. Devices (WALKER) MISC, 1 each by Does not apply route daily, Disp: 1 each, Rfl: 0    aspirin 81 MG EC tablet, Take 1 tablet by mouth daily, Disp: 90 tablet, Rfl: 1    SYRINGE-NEEDLE, DISP, 3 ML (B-D INTEGRA SYRINGE) 22G X 1-1/2\" 3 ML MISC, 1 each by Does not apply route daily, Disp: 20 each, Rfl: 6      Review of Systems   Genitourinary: Negative. All other systems reviewed and are negative. Vitals:    19 0858   BP: 134/72   Site: Right Upper Arm   Position: Sitting   Cuff Size: Medium Adult   Pulse: 67   Weight: 169 lb (76.7 kg)   Height: 5' 7\" (1.702 m)       Objective:   Physical Exam   Constitutional: He appears well-developed and well-nourished. Cardiovascular: Normal rate. Musculoskeletal: Normal range of motion. Skin: Skin is warm. Assessment:       Diagnosis Orders   1. Prediabetes  Basic Metabolic Panel    Hemoglobin A1C   2. Hypogonadism in male  Basic Metabolic Panel    Testosterone Free and Total Male   3.  Hypogonadism male  testosterone cypionate (DEPOTESTOTERONE CYPIONATE) 200 MG/ML injection           Plan:      Orders Placed This Encounter   Procedures    Basic Metabolic Panel     Standing Status:   Future     Standing Expiration Date:   2020    Hemoglobin A1C     Standing Status:   Future     Standing Expiration Date:   2020    Testosterone Free and Total Male     Standing Status:   Future     Standing Expiration Date:   2020       Orders Placed This Encounter   Medications    testosterone cypionate (DEPOTESTOTERONE CYPIONATE) 200 MG/ML injection     Sig: INJECT 0.5 ML INTO THE MUSCLE EVERY 2 WEEKS     Dispense:  10 mL     Refill:  1    alprostadil (EDEX) 20 MCG injection     Si mcg by Intracavitary route as needed for Erectile Dysfunction use no more than 3 times per week     Dispense:  2 Syringe     Refill:  0             Oleg Valdez MD

## 2019-07-26 DIAGNOSIS — M54.40 CHRONIC MIDLINE LOW BACK PAIN WITH SCIATICA, SCIATICA LATERALITY UNSPECIFIED: ICD-10-CM

## 2019-07-26 DIAGNOSIS — M54.17 THORACIC AND LUMBOSACRAL NEURITIS: ICD-10-CM

## 2019-07-26 DIAGNOSIS — G89.29 CHRONIC BILATERAL LOW BACK PAIN WITHOUT SCIATICA: ICD-10-CM

## 2019-07-26 DIAGNOSIS — M54.2 NECK PAIN: Chronic | ICD-10-CM

## 2019-07-26 DIAGNOSIS — M54.50 CHRONIC BILATERAL LOW BACK PAIN WITHOUT SCIATICA: ICD-10-CM

## 2019-07-26 DIAGNOSIS — G89.29 CHRONIC MIDLINE LOW BACK PAIN WITH SCIATICA, SCIATICA LATERALITY UNSPECIFIED: ICD-10-CM

## 2019-07-26 DIAGNOSIS — M54.14 THORACIC AND LUMBOSACRAL NEURITIS: ICD-10-CM

## 2019-07-26 DIAGNOSIS — Z79.899 HIGH RISK MEDICATION USE: ICD-10-CM

## 2019-07-26 RX ORDER — OXYCODONE AND ACETAMINOPHEN 7.5; 325 MG/1; MG/1
1 TABLET ORAL EVERY 4 HOURS PRN
Qty: 60 TABLET | Refills: 0 | Status: SHIPPED | OUTPATIENT
Start: 2019-07-27 | End: 2019-08-15 | Stop reason: SDUPTHER

## 2019-07-29 DIAGNOSIS — M54.17 LUMBOSACRAL RADICULOPATHY AT S1: ICD-10-CM

## 2019-07-29 DIAGNOSIS — G89.29 CHRONIC RIGHT-SIDED THORACIC BACK PAIN: ICD-10-CM

## 2019-07-29 DIAGNOSIS — M54.6 CHRONIC RIGHT-SIDED THORACIC BACK PAIN: ICD-10-CM

## 2019-08-05 ENCOUNTER — TELEPHONE (OUTPATIENT)
Dept: ENDOCRINOLOGY | Age: 69
End: 2019-08-05

## 2019-08-05 RX ORDER — TADALAFIL 20 MG/1
20 TABLET ORAL PRN
Qty: 30 TABLET | Refills: 3 | Status: SHIPPED | OUTPATIENT
Start: 2019-08-05 | End: 2019-08-12

## 2019-08-05 NOTE — TELEPHONE ENCOUNTER
Patient is requesting an RX for ED  Medication to be sent to [facility]. The cost is too expensive elsewhere. Please advise patient.

## 2019-08-09 DIAGNOSIS — E29.1 HYPOGONADISM MALE: ICD-10-CM

## 2019-08-09 LAB — PROSTATE SPECIFIC ANTIGEN: 1.84 NG/ML (ref 0–5.4)

## 2019-08-09 RX ORDER — SILDENAFIL 100 MG/1
100 TABLET, FILM COATED ORAL PRN
Qty: 30 TABLET | Refills: 3 | Status: SHIPPED | OUTPATIENT
Start: 2019-08-09 | End: 2020-04-09 | Stop reason: SDUPTHER

## 2019-08-12 ENCOUNTER — OFFICE VISIT (OUTPATIENT)
Dept: FAMILY MEDICINE CLINIC | Age: 69
End: 2019-08-12
Payer: MEDICARE

## 2019-08-12 VITALS
TEMPERATURE: 97.3 F | SYSTOLIC BLOOD PRESSURE: 128 MMHG | DIASTOLIC BLOOD PRESSURE: 78 MMHG | OXYGEN SATURATION: 100 % | HEART RATE: 97 BPM | WEIGHT: 168 LBS | BODY MASS INDEX: 26.37 KG/M2 | HEIGHT: 67 IN | RESPIRATION RATE: 16 BRPM

## 2019-08-12 DIAGNOSIS — M79.605 BILATERAL LEG PAIN: ICD-10-CM

## 2019-08-12 DIAGNOSIS — I25.10 CORONARY ARTERY DISEASE WITH HX OF MYOCARDIAL INFARCT W/O HX OF CABG: ICD-10-CM

## 2019-08-12 DIAGNOSIS — M54.41 CHRONIC BILATERAL LOW BACK PAIN WITH BILATERAL SCIATICA: ICD-10-CM

## 2019-08-12 DIAGNOSIS — I10 ESSENTIAL HYPERTENSION, BENIGN: ICD-10-CM

## 2019-08-12 DIAGNOSIS — M54.42 CHRONIC BILATERAL LOW BACK PAIN WITH BILATERAL SCIATICA: ICD-10-CM

## 2019-08-12 DIAGNOSIS — I25.2 CORONARY ARTERY DISEASE WITH HX OF MYOCARDIAL INFARCT W/O HX OF CABG: ICD-10-CM

## 2019-08-12 DIAGNOSIS — M79.604 BILATERAL LEG PAIN: ICD-10-CM

## 2019-08-12 DIAGNOSIS — G47.33 OSA (OBSTRUCTIVE SLEEP APNEA): ICD-10-CM

## 2019-08-12 DIAGNOSIS — G89.29 CHRONIC LOW BACK PAIN WITH SCIATICA, SCIATICA LATERALITY UNSPECIFIED, UNSPECIFIED BACK PAIN LATERALITY: ICD-10-CM

## 2019-08-12 DIAGNOSIS — E87.5 HYPERKALEMIA: ICD-10-CM

## 2019-08-12 DIAGNOSIS — R73.03 PREDIABETES: ICD-10-CM

## 2019-08-12 DIAGNOSIS — E78.00 HYPERCHOLESTEROLEMIA: Chronic | ICD-10-CM

## 2019-08-12 DIAGNOSIS — M54.40 CHRONIC LOW BACK PAIN WITH SCIATICA, SCIATICA LATERALITY UNSPECIFIED, UNSPECIFIED BACK PAIN LATERALITY: ICD-10-CM

## 2019-08-12 DIAGNOSIS — I25.10 CORONARY ARTERY DISEASE INVOLVING NATIVE HEART WITHOUT ANGINA PECTORIS, UNSPECIFIED VESSEL OR LESION TYPE: Primary | ICD-10-CM

## 2019-08-12 DIAGNOSIS — G89.29 CHRONIC BILATERAL LOW BACK PAIN WITH BILATERAL SCIATICA: ICD-10-CM

## 2019-08-12 DIAGNOSIS — R09.89 OTHER SPECIFIED SYMPTOMS AND SIGNS INVOLVING THE CIRCULATORY AND RESPIRATORY SYSTEMS: ICD-10-CM

## 2019-08-12 DIAGNOSIS — K59.03 THERAPEUTIC OPIOID-INDUCED CONSTIPATION (OIC): ICD-10-CM

## 2019-08-12 DIAGNOSIS — T40.2X5A THERAPEUTIC OPIOID-INDUCED CONSTIPATION (OIC): ICD-10-CM

## 2019-08-12 PROCEDURE — 99214 OFFICE O/P EST MOD 30 MIN: CPT | Performed by: PHYSICIAN ASSISTANT

## 2019-08-12 RX ORDER — LACTULOSE 10 G/15ML
20 SOLUTION ORAL DAILY
Qty: 473 ML | Refills: 1 | Status: SHIPPED | OUTPATIENT
Start: 2019-08-12 | End: 2019-08-12 | Stop reason: SDUPTHER

## 2019-08-12 RX ORDER — METOPROLOL TARTRATE 50 MG/1
TABLET, FILM COATED ORAL
Qty: 270 TABLET | Refills: 2 | Status: SHIPPED | OUTPATIENT
Start: 2019-08-12 | End: 2020-02-20 | Stop reason: SDUPTHER

## 2019-08-12 RX ORDER — PRAVASTATIN SODIUM 40 MG
TABLET ORAL
Qty: 90 TABLET | Refills: 3 | Status: SHIPPED | OUTPATIENT
Start: 2019-08-12 | End: 2020-05-05

## 2019-08-12 ASSESSMENT — ENCOUNTER SYMPTOMS
SINUS PRESSURE: 0
BLOOD IN STOOL: 0
RECTAL PAIN: 0
NAUSEA: 0
ABDOMINAL PAIN: 0
ABDOMINAL DISTENTION: 1
WHEEZING: 0
COLOR CHANGE: 0
COUGH: 0
CONSTIPATION: 1
DIARRHEA: 0
BACK PAIN: 1
TROUBLE SWALLOWING: 0
CHEST TIGHTNESS: 0
SHORTNESS OF BREATH: 0

## 2019-08-12 NOTE — PROGRESS NOTES
Inability: Not on file    Transportation needs:     Medical: Not on file     Non-medical: Not on file   Tobacco Use    Smoking status: Never Smoker    Smokeless tobacco: Never Used   Substance and Sexual Activity    Alcohol use: No     Alcohol/week: 0.0 standard drinks     Comment: Stopped years ago.  Drug use: No     Comment: YEARS AGO    Sexual activity: Yes     Partners: Female     Comment: monogomous sexual partner, wife. Lifestyle    Physical activity:     Days per week: Not on file     Minutes per session: Not on file    Stress: Not on file   Relationships    Social connections:     Talks on phone: Not on file     Gets together: Not on file     Attends Jain service: Not on file     Active member of club or organization: Not on file     Attends meetings of clubs or organizations: Not on file     Relationship status: Not on file    Intimate partner violence:     Fear of current or ex partner: Not on file     Emotionally abused: Not on file     Physically abused: Not on file     Forced sexual activity: Not on file   Other Topics Concern    Not on file   Social History Narrative    Tobacco -- never smoked or chewed. Alcohol -- have not used for past 10 years, prior to had consumed 6 pack of beer daily. Drugs -- tried marijuana and cocaine 14 years ago occasionally used for 3-4 years and then stopped completely 10 years ago. Education -- completed 11th grade in [place].    Occupation -- [employer] work,  at [employer].    Marital status --  44 years, 2 grown sons.      Family History   Problem Relation Age of Onset    High Blood Pressure Mother     Arthritis Mother     Cancer Father         throat    Arthritis Father       No Known Allergies  Current Outpatient Medications   Medication Sig Dispense Refill    metoprolol tartrate (LOPRESSOR) 50 MG tablet TAKE 1 TABLET BY MOUTH THREE TIMES DAILY 270 tablet 2    pravastatin (PRAVACHOL) 40 MG tablet TAKE 1 Dispense:  473 mL     Refill:  1     Medications Discontinued During This Encounter   Medication Reason    metoprolol tartrate (LOPRESSOR) 50 MG tablet REORDER    pravastatin (PRAVACHOL) 40 MG tablet REORDER    gabapentin (NEURONTIN) 300 MG capsule LIST CLEANUP    alprostadil (EDEX) 20 MCG injection LIST CLEANUP    tadalafil (CIALIS) 20 MG tablet LIST CLEANUP     No follow-ups on file. Reviewed with the patient: current clinical status,medications, activities and diet. Side effects, adverse effects of themedication prescribed today, as well as treatment plan/ rationale and result expectations have been discussed with the patient who expresses understanding and desires to proceed. Close follow up to evaluate treatment results and for coordination of care. I have reviewed the patient's medical history in detail and updated the computerized patient record.      Sheryl Tellez PA-C

## 2019-08-13 RX ORDER — SILDENAFIL 100 MG/1
TABLET, FILM COATED ORAL
Qty: 30 TABLET | Refills: 3 | Status: SHIPPED | OUTPATIENT
Start: 2019-08-13 | End: 2019-11-19 | Stop reason: SDUPTHER

## 2019-08-15 ENCOUNTER — OFFICE VISIT (OUTPATIENT)
Dept: PHYSICAL MEDICINE AND REHAB | Age: 69
End: 2019-08-15
Payer: MEDICARE

## 2019-08-15 VITALS
DIASTOLIC BLOOD PRESSURE: 88 MMHG | BODY MASS INDEX: 26.37 KG/M2 | SYSTOLIC BLOOD PRESSURE: 126 MMHG | HEIGHT: 67 IN | WEIGHT: 168 LBS

## 2019-08-15 DIAGNOSIS — M25.512 CHRONIC PAIN OF BOTH SHOULDERS: ICD-10-CM

## 2019-08-15 DIAGNOSIS — G89.29 CHRONIC MIDLINE LOW BACK PAIN WITH SCIATICA, SCIATICA LATERALITY UNSPECIFIED: ICD-10-CM

## 2019-08-15 DIAGNOSIS — M25.511 CHRONIC PAIN OF BOTH SHOULDERS: ICD-10-CM

## 2019-08-15 DIAGNOSIS — M41.56 OTHER SECONDARY SCOLIOSIS, LUMBAR REGION: ICD-10-CM

## 2019-08-15 DIAGNOSIS — M75.21 BICIPITAL TENDINITIS OF RIGHT SHOULDER: ICD-10-CM

## 2019-08-15 DIAGNOSIS — E87.5 HYPERKALEMIA: ICD-10-CM

## 2019-08-15 DIAGNOSIS — M54.40 BILATERAL LOW BACK PAIN WITH SCIATICA, SCIATICA LATERALITY UNSPECIFIED, UNSPECIFIED CHRONICITY: ICD-10-CM

## 2019-08-15 DIAGNOSIS — M79.604 BILATERAL LEG PAIN: Primary | ICD-10-CM

## 2019-08-15 DIAGNOSIS — G89.29 CHRONIC PAIN OF BOTH SHOULDERS: ICD-10-CM

## 2019-08-15 DIAGNOSIS — M79.10 MYALGIA: ICD-10-CM

## 2019-08-15 DIAGNOSIS — M54.17 THORACIC AND LUMBOSACRAL NEURITIS: ICD-10-CM

## 2019-08-15 DIAGNOSIS — Z79.899 HIGH RISK MEDICATION USE: ICD-10-CM

## 2019-08-15 DIAGNOSIS — E55.9 VITAMIN D DEFICIENCY: ICD-10-CM

## 2019-08-15 DIAGNOSIS — E78.00 HYPERCHOLESTEROLEMIA: Chronic | ICD-10-CM

## 2019-08-15 DIAGNOSIS — M85.80 OSTEOPENIA, UNSPECIFIED LOCATION: ICD-10-CM

## 2019-08-15 DIAGNOSIS — M79.605 BILATERAL LEG PAIN: Primary | ICD-10-CM

## 2019-08-15 DIAGNOSIS — G89.29 CHRONIC BILATERAL LOW BACK PAIN WITHOUT SCIATICA: ICD-10-CM

## 2019-08-15 DIAGNOSIS — M54.40 CHRONIC MIDLINE LOW BACK PAIN WITH SCIATICA, SCIATICA LATERALITY UNSPECIFIED: ICD-10-CM

## 2019-08-15 DIAGNOSIS — M62.838 SPASM OF MUSCLE: ICD-10-CM

## 2019-08-15 DIAGNOSIS — M54.14 THORACIC AND LUMBOSACRAL NEURITIS: ICD-10-CM

## 2019-08-15 DIAGNOSIS — M54.2 NECK PAIN: Chronic | ICD-10-CM

## 2019-08-15 DIAGNOSIS — I25.10 CORONARY ARTERY DISEASE INVOLVING NATIVE HEART WITHOUT ANGINA PECTORIS, UNSPECIFIED VESSEL OR LESION TYPE: ICD-10-CM

## 2019-08-15 DIAGNOSIS — M54.50 CHRONIC BILATERAL LOW BACK PAIN WITHOUT SCIATICA: ICD-10-CM

## 2019-08-15 LAB — POTASSIUM SERPL-SCNC: 5 MEQ/L (ref 3.4–4.9)

## 2019-08-15 PROCEDURE — 99215 OFFICE O/P EST HI 40 MIN: CPT | Performed by: PHYSICAL MEDICINE & REHABILITATION

## 2019-08-15 RX ORDER — OXYCODONE AND ACETAMINOPHEN 7.5; 325 MG/1; MG/1
1 TABLET ORAL EVERY 4 HOURS PRN
Qty: 90 TABLET | Refills: 0 | Status: SHIPPED | OUTPATIENT
Start: 2019-08-25 | End: 2019-10-18 | Stop reason: SDUPTHER

## 2019-08-15 RX ORDER — BACLOFEN 10 MG/1
10 TABLET ORAL 3 TIMES DAILY
Qty: 90 TABLET | Refills: 1 | Status: SHIPPED | OUTPATIENT
Start: 2019-08-15 | End: 2019-10-18 | Stop reason: SDUPTHER

## 2019-08-15 RX ORDER — OXYCODONE AND ACETAMINOPHEN 7.5; 325 MG/1; MG/1
1 TABLET ORAL EVERY 4 HOURS PRN
Qty: 60 TABLET | Refills: 0 | Status: SHIPPED | OUTPATIENT
Start: 2019-08-25 | End: 2019-08-15

## 2019-08-15 ASSESSMENT — ENCOUNTER SYMPTOMS
SHORTNESS OF BREATH: 0
EYES NEGATIVE: 1
WHEEZING: 0
CONSTIPATION: 1
BACK PAIN: 1
CHEST TIGHTNESS: 0
ABDOMINAL PAIN: 0
VISUAL CHANGE: 0
DIARRHEA: 0
ALLERGIC/IMMUNOLOGIC NEGATIVE: 1
VOMITING: 0
NAUSEA: 0

## 2019-08-15 NOTE — PROGRESS NOTES
Loyd, 80 y.o. male presents today with:       Back Pain Trigger points 6/18/19 with 80% reduction in pain lasting 1.5 months. Right sciatic 7/2/19 with 80% reduction in pain lasting 1 month. Evaluation at [facility] in [place] with Dr. Mekhi Lucas for back surgery. Leg Pain Bilateral, right is worse. Muscle spams. BLE U/S scheduled 8/25/19. Shoulder Pain Right    Hand Pain Improving since carpal tunnel surgery 6/2019    Foot Pain Bilateral, right is worse. Numbness & tingling. Medication Refill Percocet, Gabapentin, Diclofenac refill & pill count today- compliant         He is more functional on the opiate meds--able to do yard work and interact with friends and family in a positive friendly manner. Right shoulder is flared up--right bicepital tendonitis--will schedule and US guided injection. Caudals no help--spasms are worse --occ bowel cramps with the spasms--awaits NS eval..  Need Baclofen and to increase Percocet to 3/day prn until he sees the surgeon-no signs of abuse--more functional on the meds--good interactions with friends and family. Office injections are  TP sciatic block and help some. Still no signs of overuse or abuse of his meds. He states that his right leg has been going numb but he is told that from his back and there is no further surgery they could help that. He had surgery in the back several times in the past.     Injections still help very much. He would like to schedule monthly trigger point and sciatic injection in between times. Back Pain   This is a chronic problem. The current episode started more than 1 year ago. The problem occurs daily. The problem is unchanged. The pain is present in the lumbar spine. The quality of the pain is described as aching, cramping, shooting and stabbing. The pain radiates to the right foot, right knee and right thigh. The pain is at a severity of 9/10. The pain is severe. The pain is worse during the day.  The symptoms are aggravated by bending, lying down, position, sitting, standing and twisting. Stiffness is present in the morning. Associated symptoms include leg pain, numbness and weakness. Pertinent negatives include no abdominal pain, chest pain, headaches, paresis, perianal numbness or tingling. Risk factors include lack of exercise and sedentary lifestyle. He has tried analgesics, home exercises, NSAIDs, muscle relaxant and heat (SI injections which help, trigger point injections, OXY-IR ) for the symptoms. The treatment provided moderate relief. Mental Health Problem   The primary symptoms include negative symptoms. The primary symptoms do not include dysphoric mood, bizarre behavior, disorganized speech or somatic symptoms. The current episode started more than 1 month ago. This is a chronic problem. The onset of the illness is precipitated by a stressful event (chronic pain). The degree of incapacity that he is experiencing as a consequence of his illness is severe. Sequelae of the illness include an inability to work. Additional symptoms of the illness include anhedonia and fatigue. Additional symptoms of the illness do not include unexpected weight change, psychomotor retardation, feelings of worthlessness, attention impairment, euphoric mood, increased goal-directed activity, flight of ideas, inflated self-esteem, decreased need for sleep, distractible, poor judgment, visual change, headaches, abdominal pain or seizures. He does not admit to suicidal ideas. He does not have a plan to commit suicide. He does not contemplate harming himself. He has not already injured self. He does not contemplate injuring another person. He has not already  injured another person. Risk factors that are present for mental illness include a history of mental illness. Hand Pain    The incident occurred more than 1 week ago. The incident occurred at home. There was no injury mechanism.  The pain is present in the right wrist and right hand. The quality of the pain is described as cramping. The pain is at a severity of 5/10. The pain is moderate. The pain has been intermittent since the incident. Associated symptoms include numbness. Pertinent negatives include no chest pain, muscle weakness or tingling. The symptoms are aggravated by movement. He has tried ice for the symptoms. The treatment provided moderate relief.        Past Medical History:   Diagnosis Date    Chronic back pain     Coronary artery disease 2002    Dr. Carlita Yee at [facility] Esophageal ulcer 3/10/2014    Dr. Cris Elaine Gastric ulcer 3/10/2014    Dr. Alfredo Bah    Gout     Hiatal hernia 3/10/2014    Dr. Alfredo Bah    Hyperlipidemia     Hypertension     MI [facility]myocardial infarction) [facility]) 2005    Dr. Alisa Dillard Peripheral vascular disease ([facility]     Prediabetes     Schizo-affective schizophrenia, in remission ([facility] 2/1/2005    Overview:  followed at the [facility] Severe single current episode of major depressive disorder, without psychotic features ([facility] 7/8/2016    Status post coronary artery stent placement 2002    Dr. Carlita Yee     Past Surgical History:   Procedure Laterality Date    [facility] Right 6/4/2019    RIGHT WRIST CARPAL TUNNEL RELEASE, SUPINE, PAT AT PCP performed by Rob Barajas MD at [address] [place] [address]  3/10/14    DR Trevor Hugo  2002    Dr. Morris [address] GRAFT  10/17/2017    KNEE ARTHROSCOPY Left 2002    Dr. Tom Valdovinos  12/19/13    DARWIN Quinteros  9/2009    Dr. Nnamdi Fuller  2008    Dr. Marc Davenport  3/10/14    DR Jenni Tovar     Social History     Socioeconomic History    Marital status:  no laceration and no swelling. Decreased sensation noted. Decreased sensation is present in the ulnar distribution, is present in the medial distribution and is present in the radial distribution. Decreased strength noted. He exhibits finger abduction, thumb/finger opposition and wrist extension trouble. Left hand: He exhibits decreased range of motion and bony tenderness. Decreased sensation noted. Decreased sensation is present in the ulnar distribution and is present in the radial distribution. Decreased strength noted. He exhibits finger abduction and wrist extension trouble. Right upper leg: Normal.        Left upper leg: Normal.        Right lower leg: Normal.        Left lower leg: Normal.        Legs:       Right foot: Normal.        Left foot: Normal.   Tender areas are indicated by numbered spot         Lymphadenopathy:     He has no cervical adenopathy. Neurological: He is alert and oriented to person, place, and time. He displays abnormal reflex. He displays no atrophy and no tremor. A sensory deficit is present. No cranial nerve deficit. He exhibits normal muscle tone. He has an abnormal Finger-Nose-Finger Test and an abnormal Romberg Test. Gait abnormal. Coordination normal. He displays no Babinski's sign on the right side. He displays no Babinski's sign on the left side. Reflex Scores:       Patellar reflexes are 1+ on the right side and 1+ on the left side. Achilles reflexes are 0 on the right side and 0 on the left side. Skin: Skin is warm, dry and intact. No abrasion, no bruising, no ecchymosis, no laceration, no petechiae and no rash noted. Rash is not macular, not pustular and not urticarial. He is not diaphoretic. No cyanosis or erythema. No pallor. Nails show no clubbing. Psychiatric: His behavior is normal. Judgment and thought content normal. His mood appears not anxious. His affect is not angry, not blunt, not labile and not inappropriate.  His speech is not rapid and/or pressured, not delayed, not tangential and not slurred. He is not agitated, not aggressive, not hyperactive, not slowed, not withdrawn, not actively hallucinating and not combative. Thought content is not paranoid and not delusional. Cognition and memory are normal. Cognition and memory are not impaired. He does not express impulsivity or inappropriate judgment. He does not exhibit a depressed mood. He expresses no homicidal and no suicidal ideation. He expresses no suicidal plans and no homicidal plans. He is communicative. He exhibits normal recent memory and normal remote memory.   high score on SOAPPR    Calm appropriate    NAD He is attentive. Vitals reviewed. Ortho Exam  Neurologic Exam     Mental Status   Oriented to person, place, and time. Speech: not slurred   Level of consciousness: alert  Knowledge: good. Able to name object. Able to read. Able to repeat. Able to write. Normal comprehension. Cranial Nerves     CN III, IV, VI   Pupils are equal, round, and reactive to light. Extraocular motions are normal.     Motor Exam   Muscle bulk: decreased  Overall muscle tone: normal    Sensory Exam   Right leg light touch: decreased from ankle  Left leg light touch: decreased from ankle  Sensory deficit distribution on right: L5    Gait, Coordination, and Reflexes     Gait  Gait: wide-based    Coordination   Romberg: positive  Finger to nose coordination: abnormal    Reflexes   Right patellar: 1+  Left patellar: 1+  Right achilles: 0  Left achilles: 0          After a thorough review and discussion of the previous medical records, patient comprehensive medical, surgical, and family and social history, Review of Systems, their OARRS, their Screener and Opioid Assessment for Patients with Pain (SOAPP®-R), recent diagnostics, and symptomatic results to previous treatment, it is my impression that the patients is suffering with progressive and severe:     Diagnosis Orders   1.  Bilateral leg pain

## 2019-08-20 ENCOUNTER — PROCEDURE VISIT (OUTPATIENT)
Dept: PHYSICAL MEDICINE AND REHAB | Age: 69
End: 2019-08-20
Payer: MEDICARE

## 2019-08-20 DIAGNOSIS — M79.10 MYALGIA: Primary | ICD-10-CM

## 2019-08-20 PROCEDURE — 20553 NJX 1/MLT TRIGGER POINTS 3/>: CPT | Performed by: PHYSICAL MEDICINE & REHABILITATION

## 2019-08-20 RX ORDER — LIDOCAINE HYDROCHLORIDE 10 MG/ML
16 INJECTION, SOLUTION INFILTRATION; PERINEURAL ONCE
Status: COMPLETED | OUTPATIENT
Start: 2019-08-20 | End: 2019-08-20

## 2019-08-20 RX ADMIN — LIDOCAINE HYDROCHLORIDE 16 ML: 10 INJECTION, SOLUTION INFILTRATION; PERINEURAL at 15:42

## 2019-08-20 NOTE — PROGRESS NOTES
Patient here for trigger point injections. Patient taken back to exam room, and placed on drape locking stool. Areas to be injected marked appropriately, and cleansed with alcohol. 16cc of 1% Lidocaine to be injected by provider.

## 2019-08-25 ENCOUNTER — HOSPITAL ENCOUNTER (OUTPATIENT)
Dept: ULTRASOUND IMAGING | Age: 69
Discharge: HOME | End: 2019-08-27
Payer: MEDICARE

## 2019-08-25 DIAGNOSIS — I25.10 CORONARY ARTERY DISEASE WITH HX OF MYOCARDIAL INFARCT W/O HX OF CABG: ICD-10-CM

## 2019-08-25 DIAGNOSIS — I25.2 CORONARY ARTERY DISEASE WITH HX OF MYOCARDIAL INFARCT W/O HX OF CABG: ICD-10-CM

## 2019-08-25 DIAGNOSIS — M79.604 BILATERAL LEG PAIN: ICD-10-CM

## 2019-08-25 DIAGNOSIS — M79.605 BILATERAL LEG PAIN: ICD-10-CM

## 2019-08-25 DIAGNOSIS — R09.89 OTHER SPECIFIED SYMPTOMS AND SIGNS INVOLVING THE CIRCULATORY AND RESPIRATORY SYSTEMS: ICD-10-CM

## 2019-08-25 PROCEDURE — 93926 LOWER EXTREMITY STUDY: CPT

## 2019-08-25 PROCEDURE — 93925 LOWER EXTREMITY STUDY: CPT

## 2019-08-26 DIAGNOSIS — Z01.818 PREOP EXAMINATION: ICD-10-CM

## 2019-08-26 LAB
BILIRUBIN URINE: NEGATIVE
BLOOD, URINE: NEGATIVE
CLARITY: CLEAR
COLOR: YELLOW
GLUCOSE URINE: NEGATIVE MG/DL
KETONES, URINE: NEGATIVE MG/DL
LEUKOCYTE ESTERASE, URINE: NEGATIVE
NITRITE, URINE: NEGATIVE
PH UA: 5.5 (ref 5–9)
PROTEIN UA: NEGATIVE MG/DL
SPECIFIC GRAVITY UA: 1.02 (ref 1–1.03)
UROBILINOGEN, URINE: 0.2 E.U./DL

## 2019-08-29 ENCOUNTER — TELEPHONE (OUTPATIENT)
Dept: FAMILY MEDICINE CLINIC | Age: 69
End: 2019-08-29

## 2019-08-29 NOTE — TELEPHONE ENCOUNTER
I called patient and gave results of most recent US and patient verbalized understanding. Patient wanted to let you know that he has been having really bad neck pain for 2 days and he is unable to turn his head to one side. He says that he is unsure if it is related to his back pain but he is unsure and wants to know what to do. Please advise.

## 2019-09-10 ENCOUNTER — PROCEDURE VISIT (OUTPATIENT)
Dept: PHYSICAL MEDICINE AND REHAB | Age: 69
End: 2019-09-10
Payer: MEDICARE

## 2019-09-10 DIAGNOSIS — M54.40 BILATERAL LOW BACK PAIN WITH SCIATICA, SCIATICA LATERALITY UNSPECIFIED, UNSPECIFIED CHRONICITY: Primary | ICD-10-CM

## 2019-09-10 PROCEDURE — 64445 NJX AA&/STRD SCIATIC NRV IMG: CPT | Performed by: PHYSICAL MEDICINE & REHABILITATION

## 2019-09-10 PROCEDURE — 76942 ECHO GUIDE FOR BIOPSY: CPT | Performed by: PHYSICAL MEDICINE & REHABILITATION

## 2019-09-10 RX ORDER — LIDOCAINE HYDROCHLORIDE 20 MG/ML
5 INJECTION, SOLUTION INFILTRATION; PERINEURAL ONCE
Status: COMPLETED | OUTPATIENT
Start: 2019-09-10 | End: 2019-09-10

## 2019-09-10 RX ORDER — LIDOCAINE HYDROCHLORIDE 10 MG/ML
8 INJECTION, SOLUTION INFILTRATION; PERINEURAL ONCE
Status: COMPLETED | OUTPATIENT
Start: 2019-09-10 | End: 2019-09-10

## 2019-09-10 RX ADMIN — LIDOCAINE HYDROCHLORIDE 8 ML: 10 INJECTION, SOLUTION INFILTRATION; PERINEURAL at 12:56

## 2019-09-10 RX ADMIN — LIDOCAINE HYDROCHLORIDE 5 ML: 20 INJECTION, SOLUTION INFILTRATION; PERINEURAL at 12:57

## 2019-09-10 NOTE — PROGRESS NOTES
Patient here for right sciatic injection with U/S. Patient taken back to exam room, and placed on drape locking stool. Areas to be injected marked appropriately, and cleansed with alcohol. 8cc of 1% Lidocaine, and 5cc of 2% Lidocaine to be injected by provider.

## 2019-10-07 ENCOUNTER — PROCEDURE VISIT (OUTPATIENT)
Dept: PHYSICAL MEDICINE AND REHAB | Age: 69
End: 2019-10-07
Payer: MEDICARE

## 2019-10-07 DIAGNOSIS — M79.10 MYALGIA: Primary | ICD-10-CM

## 2019-10-07 PROCEDURE — 20553 NJX 1/MLT TRIGGER POINTS 3/>: CPT | Performed by: PHYSICAL MEDICINE & REHABILITATION

## 2019-10-07 RX ORDER — LIDOCAINE HYDROCHLORIDE 10 MG/ML
24 INJECTION, SOLUTION INFILTRATION; PERINEURAL ONCE
Status: COMPLETED | OUTPATIENT
Start: 2019-10-07 | End: 2019-10-07

## 2019-10-07 RX ADMIN — LIDOCAINE HYDROCHLORIDE 24 ML: 10 INJECTION, SOLUTION INFILTRATION; PERINEURAL at 11:32

## 2019-10-18 ENCOUNTER — OFFICE VISIT (OUTPATIENT)
Dept: PHYSICAL MEDICINE AND REHAB | Age: 69
End: 2019-10-18
Payer: MEDICARE

## 2019-10-18 VITALS
DIASTOLIC BLOOD PRESSURE: 82 MMHG | HEIGHT: 67 IN | WEIGHT: 168 LBS | SYSTOLIC BLOOD PRESSURE: 140 MMHG | BODY MASS INDEX: 26.37 KG/M2

## 2019-10-18 DIAGNOSIS — R26.9 GAIT ABNORMALITY: ICD-10-CM

## 2019-10-18 DIAGNOSIS — M53.3 SI (SACROILIAC) PAIN: ICD-10-CM

## 2019-10-18 DIAGNOSIS — G89.29 CHRONIC BILATERAL LOW BACK PAIN WITHOUT SCIATICA: ICD-10-CM

## 2019-10-18 DIAGNOSIS — G89.29 CHRONIC RIGHT-SIDED THORACIC BACK PAIN: ICD-10-CM

## 2019-10-18 DIAGNOSIS — M54.40 CHRONIC LOW BACK PAIN WITH SCIATICA, SCIATICA LATERALITY UNSPECIFIED, UNSPECIFIED BACK PAIN LATERALITY: ICD-10-CM

## 2019-10-18 DIAGNOSIS — M54.16 RIGHT LUMBAR RADICULOPATHY: Primary | ICD-10-CM

## 2019-10-18 DIAGNOSIS — M54.14 THORACIC AND LUMBOSACRAL NEURITIS: ICD-10-CM

## 2019-10-18 DIAGNOSIS — G89.29 CHRONIC LOW BACK PAIN WITH SCIATICA, SCIATICA LATERALITY UNSPECIFIED, UNSPECIFIED BACK PAIN LATERALITY: ICD-10-CM

## 2019-10-18 DIAGNOSIS — M41.9 SCOLIOSIS OF LUMBAR SPINE, UNSPECIFIED SCOLIOSIS TYPE: ICD-10-CM

## 2019-10-18 DIAGNOSIS — M25.551 RIGHT HIP PAIN: ICD-10-CM

## 2019-10-18 DIAGNOSIS — M79.604 BILATERAL LEG PAIN: ICD-10-CM

## 2019-10-18 DIAGNOSIS — M54.2 NECK PAIN: Chronic | ICD-10-CM

## 2019-10-18 DIAGNOSIS — Z79.899 HIGH RISK MEDICATION USE: ICD-10-CM

## 2019-10-18 DIAGNOSIS — M79.10 MYALGIA: ICD-10-CM

## 2019-10-18 DIAGNOSIS — M54.17 THORACIC AND LUMBOSACRAL NEURITIS: ICD-10-CM

## 2019-10-18 DIAGNOSIS — E55.9 VITAMIN D DEFICIENCY: ICD-10-CM

## 2019-10-18 DIAGNOSIS — G89.29 CHRONIC MIDLINE LOW BACK PAIN WITH SCIATICA, SCIATICA LATERALITY UNSPECIFIED: ICD-10-CM

## 2019-10-18 DIAGNOSIS — M62.838 SPASM OF MUSCLE: ICD-10-CM

## 2019-10-18 DIAGNOSIS — M54.12 C6 RADICULOPATHY: ICD-10-CM

## 2019-10-18 DIAGNOSIS — G95.9 MYELOPATHY (HCC): ICD-10-CM

## 2019-10-18 DIAGNOSIS — M54.40 CHRONIC MIDLINE LOW BACK PAIN WITH SCIATICA, SCIATICA LATERALITY UNSPECIFIED: ICD-10-CM

## 2019-10-18 DIAGNOSIS — M79.605 BILATERAL LEG PAIN: ICD-10-CM

## 2019-10-18 DIAGNOSIS — M54.6 CHRONIC RIGHT-SIDED THORACIC BACK PAIN: ICD-10-CM

## 2019-10-18 DIAGNOSIS — M75.21 BICIPITAL TENDINITIS OF RIGHT SHOULDER: ICD-10-CM

## 2019-10-18 DIAGNOSIS — M54.50 CHRONIC BILATERAL LOW BACK PAIN WITHOUT SCIATICA: ICD-10-CM

## 2019-10-18 PROCEDURE — 99215 OFFICE O/P EST HI 40 MIN: CPT | Performed by: PHYSICAL MEDICINE & REHABILITATION

## 2019-10-18 RX ORDER — FENTANYL 25 UG/H
1 PATCH TRANSDERMAL
Qty: 10 PATCH | Refills: 0 | Status: SHIPPED | OUTPATIENT
Start: 2019-10-18 | End: 2019-11-17

## 2019-10-18 RX ORDER — GABAPENTIN 300 MG/1
CAPSULE ORAL
Qty: 270 CAPSULE | Refills: 0 | Status: SHIPPED | OUTPATIENT
Start: 2019-10-18 | End: 2019-12-16

## 2019-10-18 RX ORDER — OXYCODONE AND ACETAMINOPHEN 7.5; 325 MG/1; MG/1
1 TABLET ORAL EVERY 4 HOURS PRN
Qty: 90 TABLET | Refills: 0 | Status: SHIPPED | OUTPATIENT
Start: 2019-10-18 | End: 2019-11-21 | Stop reason: SDUPTHER

## 2019-10-18 RX ORDER — BACLOFEN 10 MG/1
10 TABLET ORAL 3 TIMES DAILY
Qty: 90 TABLET | Refills: 1 | Status: SHIPPED | OUTPATIENT
Start: 2019-10-18 | End: 2020-03-11 | Stop reason: SDUPTHER

## 2019-10-18 ASSESSMENT — ENCOUNTER SYMPTOMS
VOMITING: 0
WHEEZING: 0
EYES NEGATIVE: 1
VISUAL CHANGE: 0
NAUSEA: 0
ALLERGIC/IMMUNOLOGIC NEGATIVE: 1
CONSTIPATION: 1
ABDOMINAL PAIN: 0
SHORTNESS OF BREATH: 0
DIARRHEA: 0
CHEST TIGHTNESS: 0
BACK PAIN: 1

## 2019-10-25 ENCOUNTER — OFFICE VISIT (OUTPATIENT)
Dept: CARDIOLOGY CLINIC | Age: 69
End: 2019-10-25
Payer: MEDICARE

## 2019-10-25 VITALS
OXYGEN SATURATION: 97 % | RESPIRATION RATE: 18 BRPM | DIASTOLIC BLOOD PRESSURE: 84 MMHG | WEIGHT: 165 LBS | BODY MASS INDEX: 25.84 KG/M2 | SYSTOLIC BLOOD PRESSURE: 160 MMHG | HEART RATE: 90 BPM

## 2019-10-25 DIAGNOSIS — I10 ESSENTIAL HYPERTENSION, BENIGN: Primary | ICD-10-CM

## 2019-10-25 DIAGNOSIS — E87.5 HYPERKALEMIA: ICD-10-CM

## 2019-10-25 DIAGNOSIS — E78.00 HYPERCHOLESTEROLEMIA: Chronic | ICD-10-CM

## 2019-10-25 DIAGNOSIS — I25.118 CORONARY ARTERY DISEASE OF NATIVE ARTERY OF NATIVE HEART WITH STABLE ANGINA PECTORIS (HCC): ICD-10-CM

## 2019-10-25 PROCEDURE — 99214 OFFICE O/P EST MOD 30 MIN: CPT | Performed by: INTERNAL MEDICINE

## 2019-10-25 RX ORDER — NIFEDIPINE 60 MG/1
60 TABLET, EXTENDED RELEASE ORAL DAILY
Qty: 90 TABLET | Refills: 3 | Status: SHIPPED | OUTPATIENT
Start: 2019-10-25 | End: 2020-06-25

## 2019-10-25 ASSESSMENT — ENCOUNTER SYMPTOMS
WHEEZING: 0
SHORTNESS OF BREATH: 0
RESPIRATORY NEGATIVE: 1
STRIDOR: 0
COUGH: 0
EYES NEGATIVE: 1
BLOOD IN STOOL: 0
NAUSEA: 0
GASTROINTESTINAL NEGATIVE: 1
CHEST TIGHTNESS: 0
BACK PAIN: 1

## 2019-11-08 ENCOUNTER — PROCEDURE VISIT (OUTPATIENT)
Dept: PHYSICAL MEDICINE AND REHAB | Age: 69
End: 2019-11-08
Payer: MEDICARE

## 2019-11-08 DIAGNOSIS — M79.10 MYALGIA: Primary | ICD-10-CM

## 2019-11-08 PROCEDURE — 20553 NJX 1/MLT TRIGGER POINTS 3/>: CPT | Performed by: PHYSICAL MEDICINE & REHABILITATION

## 2019-11-08 RX ORDER — LIDOCAINE HYDROCHLORIDE 10 MG/ML
24 INJECTION, SOLUTION INFILTRATION; PERINEURAL ONCE
Status: COMPLETED | OUTPATIENT
Start: 2019-11-08 | End: 2019-11-08

## 2019-11-08 RX ADMIN — LIDOCAINE HYDROCHLORIDE 24 ML: 10 INJECTION, SOLUTION INFILTRATION; PERINEURAL at 10:49

## 2019-11-11 DIAGNOSIS — E29.1 HYPOGONADISM IN MALE: ICD-10-CM

## 2019-11-11 DIAGNOSIS — R73.03 PREDIABETES: ICD-10-CM

## 2019-11-11 LAB
ANION GAP SERPL CALCULATED.3IONS-SCNC: 14 MEQ/L (ref 9–15)
BUN BLDV-MCNC: 15 MG/DL (ref 8–23)
CALCIUM SERPL-MCNC: 10 MG/DL (ref 8.5–9.9)
CHLORIDE BLD-SCNC: 96 MEQ/L (ref 95–107)
CO2: 30 MEQ/L (ref 20–31)
CREAT SERPL-MCNC: 1.05 MG/DL (ref 0.7–1.2)
GFR AFRICAN AMERICAN: >60
GFR NON-AFRICAN AMERICAN: >60
GLUCOSE BLD-MCNC: 105 MG/DL (ref 70–99)
HBA1C MFR BLD: 6.5 % (ref 4.8–5.9)
POTASSIUM SERPL-SCNC: 5 MEQ/L (ref 3.4–4.9)
PROSTATE SPECIFIC ANTIGEN: 2.22 NG/ML (ref 0–5.4)
SODIUM BLD-SCNC: 140 MEQ/L (ref 135–144)

## 2019-11-12 LAB
SEX HORMONE BINDING GLOBULIN: 53 NMOL/L (ref 11–80)
TESTOSTERONE FREE PERCENT: 1.5 % (ref 1.6–2.9)
TESTOSTERONE FREE, CALC: 103 PG/ML (ref 47–244)
TESTOSTERONE TOTAL-MALE: 691 NG/DL (ref 300–720)

## 2019-11-19 ENCOUNTER — OFFICE VISIT (OUTPATIENT)
Dept: ENDOCRINOLOGY | Age: 69
End: 2019-11-19
Payer: MEDICARE

## 2019-11-19 VITALS
HEART RATE: 76 BPM | HEIGHT: 67 IN | BODY MASS INDEX: 26.21 KG/M2 | DIASTOLIC BLOOD PRESSURE: 79 MMHG | SYSTOLIC BLOOD PRESSURE: 127 MMHG | WEIGHT: 167 LBS

## 2019-11-19 DIAGNOSIS — E29.1 HYPOGONADISM MALE: ICD-10-CM

## 2019-11-19 DIAGNOSIS — E29.1 HYPOGONADISM IN MALE: Primary | ICD-10-CM

## 2019-11-19 DIAGNOSIS — E11.65 UNCONTROLLED TYPE 2 DIABETES MELLITUS WITH HYPERGLYCEMIA (HCC): ICD-10-CM

## 2019-11-19 PROCEDURE — 99213 OFFICE O/P EST LOW 20 MIN: CPT | Performed by: INTERNAL MEDICINE

## 2019-11-19 RX ORDER — SILDENAFIL 100 MG/1
TABLET, FILM COATED ORAL
Qty: 30 TABLET | Refills: 3 | Status: SHIPPED | OUTPATIENT
Start: 2019-11-19 | End: 2020-02-20

## 2019-11-19 RX ORDER — GABAPENTIN 300 MG/1
CAPSULE ORAL
Qty: 270 CAPSULE | Refills: 0 | Status: CANCELLED | OUTPATIENT
Start: 2019-11-19 | End: 2019-12-24

## 2019-11-19 RX ORDER — TESTOSTERONE CYPIONATE 200 MG/ML
INJECTION INTRAMUSCULAR
Qty: 10 ML | Refills: 1 | Status: SHIPPED | OUTPATIENT
Start: 2019-11-19 | End: 2020-02-18 | Stop reason: SDUPTHER

## 2019-11-21 DIAGNOSIS — Z79.899 HIGH RISK MEDICATION USE: ICD-10-CM

## 2019-11-21 DIAGNOSIS — G89.29 CHRONIC BILATERAL LOW BACK PAIN WITHOUT SCIATICA: ICD-10-CM

## 2019-11-21 DIAGNOSIS — M54.50 CHRONIC BILATERAL LOW BACK PAIN WITHOUT SCIATICA: ICD-10-CM

## 2019-11-21 DIAGNOSIS — M54.40 CHRONIC MIDLINE LOW BACK PAIN WITH SCIATICA, SCIATICA LATERALITY UNSPECIFIED: ICD-10-CM

## 2019-11-21 DIAGNOSIS — G89.29 CHRONIC MIDLINE LOW BACK PAIN WITH SCIATICA, SCIATICA LATERALITY UNSPECIFIED: ICD-10-CM

## 2019-11-21 DIAGNOSIS — M54.14 THORACIC AND LUMBOSACRAL NEURITIS: ICD-10-CM

## 2019-11-21 DIAGNOSIS — M54.17 THORACIC AND LUMBOSACRAL NEURITIS: ICD-10-CM

## 2019-11-21 DIAGNOSIS — M54.2 NECK PAIN: Chronic | ICD-10-CM

## 2019-11-21 RX ORDER — OXYCODONE AND ACETAMINOPHEN 7.5; 325 MG/1; MG/1
1 TABLET ORAL EVERY 4 HOURS PRN
Qty: 90 TABLET | Refills: 0 | Status: SHIPPED | OUTPATIENT
Start: 2019-11-21 | End: 2019-11-22 | Stop reason: SDUPTHER

## 2019-11-22 DIAGNOSIS — E29.1 HYPOGONADISM IN MALE: ICD-10-CM

## 2019-11-22 DIAGNOSIS — G89.29 CHRONIC MIDLINE LOW BACK PAIN WITH SCIATICA, SCIATICA LATERALITY UNSPECIFIED: ICD-10-CM

## 2019-11-22 DIAGNOSIS — M54.50 CHRONIC BILATERAL LOW BACK PAIN WITHOUT SCIATICA: ICD-10-CM

## 2019-11-22 DIAGNOSIS — M54.17 THORACIC AND LUMBOSACRAL NEURITIS: ICD-10-CM

## 2019-11-22 DIAGNOSIS — G89.29 CHRONIC BILATERAL LOW BACK PAIN WITHOUT SCIATICA: ICD-10-CM

## 2019-11-22 DIAGNOSIS — E11.65 UNCONTROLLED TYPE 2 DIABETES MELLITUS WITH HYPERGLYCEMIA (HCC): ICD-10-CM

## 2019-11-22 DIAGNOSIS — M54.2 NECK PAIN: Chronic | ICD-10-CM

## 2019-11-22 DIAGNOSIS — M54.40 CHRONIC MIDLINE LOW BACK PAIN WITH SCIATICA, SCIATICA LATERALITY UNSPECIFIED: ICD-10-CM

## 2019-11-22 DIAGNOSIS — Z79.899 HIGH RISK MEDICATION USE: ICD-10-CM

## 2019-11-22 DIAGNOSIS — M54.14 THORACIC AND LUMBOSACRAL NEURITIS: ICD-10-CM

## 2019-11-22 LAB
ANION GAP SERPL CALCULATED.3IONS-SCNC: 10 MEQ/L (ref 9–15)
BUN BLDV-MCNC: 15 MG/DL (ref 8–23)
CALCIUM SERPL-MCNC: 9.8 MG/DL (ref 8.5–9.9)
CHLORIDE BLD-SCNC: 96 MEQ/L (ref 95–107)
CO2: 32 MEQ/L (ref 20–31)
CREAT SERPL-MCNC: 0.95 MG/DL (ref 0.7–1.2)
GFR AFRICAN AMERICAN: >60
GFR NON-AFRICAN AMERICAN: >60
GLUCOSE BLD-MCNC: 97 MG/DL (ref 70–99)
HBA1C MFR BLD: 6.4 % (ref 4.8–5.9)
POTASSIUM SERPL-SCNC: 5 MEQ/L (ref 3.4–4.9)
SODIUM BLD-SCNC: 138 MEQ/L (ref 135–144)

## 2019-11-22 RX ORDER — OXYCODONE AND ACETAMINOPHEN 7.5; 325 MG/1; MG/1
1 TABLET ORAL EVERY 4 HOURS PRN
Qty: 90 TABLET | Refills: 0 | Status: SHIPPED | OUTPATIENT
Start: 2019-11-22 | End: 2019-12-23 | Stop reason: SDUPTHER

## 2019-11-23 LAB
SEX HORMONE BINDING GLOBULIN: 53 NMOL/L (ref 11–80)
TESTOSTERONE FREE PERCENT: 1.6 % (ref 1.6–2.9)
TESTOSTERONE FREE, CALC: 133 PG/ML (ref 47–244)
TESTOSTERONE TOTAL-MALE: 856 NG/DL (ref 300–720)

## 2019-12-16 ENCOUNTER — OFFICE VISIT (OUTPATIENT)
Dept: FAMILY MEDICINE CLINIC | Age: 69
End: 2019-12-16
Payer: MEDICARE

## 2019-12-16 VITALS
HEART RATE: 62 BPM | BODY MASS INDEX: 30.91 KG/M2 | RESPIRATION RATE: 15 BRPM | WEIGHT: 168 LBS | TEMPERATURE: 98 F | SYSTOLIC BLOOD PRESSURE: 132 MMHG | HEIGHT: 62 IN | DIASTOLIC BLOOD PRESSURE: 84 MMHG | OXYGEN SATURATION: 98 %

## 2019-12-16 VITALS
HEART RATE: 62 BPM | RESPIRATION RATE: 15 BRPM | SYSTOLIC BLOOD PRESSURE: 132 MMHG | WEIGHT: 168 LBS | TEMPERATURE: 97.9 F | OXYGEN SATURATION: 98 % | HEIGHT: 62 IN | BODY MASS INDEX: 30.91 KG/M2 | DIASTOLIC BLOOD PRESSURE: 84 MMHG

## 2019-12-16 DIAGNOSIS — E29.1 HYPOGONADISM IN MALE: ICD-10-CM

## 2019-12-16 DIAGNOSIS — T40.2X5A THERAPEUTIC OPIOID-INDUCED CONSTIPATION (OIC): ICD-10-CM

## 2019-12-16 DIAGNOSIS — K59.03 THERAPEUTIC OPIOID-INDUCED CONSTIPATION (OIC): ICD-10-CM

## 2019-12-16 DIAGNOSIS — R73.03 PREDIABETES: ICD-10-CM

## 2019-12-16 DIAGNOSIS — M54.17 THORACIC AND LUMBOSACRAL NEURITIS: ICD-10-CM

## 2019-12-16 DIAGNOSIS — M41.9 SCOLIOSIS OF LUMBAR SPINE, UNSPECIFIED SCOLIOSIS TYPE: ICD-10-CM

## 2019-12-16 DIAGNOSIS — E78.00 HYPERCHOLESTEROLEMIA: Chronic | ICD-10-CM

## 2019-12-16 DIAGNOSIS — G47.33 OSA (OBSTRUCTIVE SLEEP APNEA): ICD-10-CM

## 2019-12-16 DIAGNOSIS — M54.14 THORACIC AND LUMBOSACRAL NEURITIS: ICD-10-CM

## 2019-12-16 DIAGNOSIS — I10 ESSENTIAL HYPERTENSION, BENIGN: Primary | ICD-10-CM

## 2019-12-16 DIAGNOSIS — I25.118 CORONARY ARTERY DISEASE OF NATIVE ARTERY OF NATIVE HEART WITH STABLE ANGINA PECTORIS (HCC): ICD-10-CM

## 2019-12-16 DIAGNOSIS — M54.40 CHRONIC LOW BACK PAIN WITH SCIATICA, SCIATICA LATERALITY UNSPECIFIED, UNSPECIFIED BACK PAIN LATERALITY: ICD-10-CM

## 2019-12-16 DIAGNOSIS — G89.29 CHRONIC LOW BACK PAIN WITH SCIATICA, SCIATICA LATERALITY UNSPECIFIED, UNSPECIFIED BACK PAIN LATERALITY: ICD-10-CM

## 2019-12-16 DIAGNOSIS — Z00.00 ROUTINE GENERAL MEDICAL EXAMINATION AT A HEALTH CARE FACILITY: Primary | ICD-10-CM

## 2019-12-16 PROBLEM — I25.10 CORONARY ARTERY DISEASE INVOLVING NATIVE HEART WITHOUT ANGINA PECTORIS: Status: RESOLVED | Noted: 2018-01-03 | Resolved: 2019-12-16

## 2019-12-16 PROCEDURE — 99214 OFFICE O/P EST MOD 30 MIN: CPT | Performed by: PHYSICIAN ASSISTANT

## 2019-12-16 PROCEDURE — G0438 PPPS, INITIAL VISIT: HCPCS | Performed by: PHYSICIAN ASSISTANT

## 2019-12-16 ASSESSMENT — ENCOUNTER SYMPTOMS
SINUS PRESSURE: 0
CONSTIPATION: 1
BACK PAIN: 1
TROUBLE SWALLOWING: 0
DIARRHEA: 0
WHEEZING: 0
COUGH: 0
NAUSEA: 0
COLOR CHANGE: 0
BLOOD IN STOOL: 0
SHORTNESS OF BREATH: 0
CHEST TIGHTNESS: 0
RECTAL PAIN: 0
ABDOMINAL DISTENTION: 1
ABDOMINAL PAIN: 0

## 2019-12-16 ASSESSMENT — PATIENT HEALTH QUESTIONNAIRE - PHQ9
1. LITTLE INTEREST OR PLEASURE IN DOING THINGS: 0
SUM OF ALL RESPONSES TO PHQ QUESTIONS 1-9: 0
2. FEELING DOWN, DEPRESSED OR HOPELESS: 0
SUM OF ALL RESPONSES TO PHQ9 QUESTIONS 1 & 2: 0
SUM OF ALL RESPONSES TO PHQ QUESTIONS 1-9: 0

## 2019-12-16 ASSESSMENT — LIFESTYLE VARIABLES: HOW OFTEN DO YOU HAVE A DRINK CONTAINING ALCOHOL: 0

## 2019-12-23 DIAGNOSIS — M54.40 CHRONIC MIDLINE LOW BACK PAIN WITH SCIATICA, SCIATICA LATERALITY UNSPECIFIED: ICD-10-CM

## 2019-12-23 DIAGNOSIS — M54.2 NECK PAIN: Chronic | ICD-10-CM

## 2019-12-23 DIAGNOSIS — M54.14 THORACIC AND LUMBOSACRAL NEURITIS: ICD-10-CM

## 2019-12-23 DIAGNOSIS — G89.29 CHRONIC MIDLINE LOW BACK PAIN WITH SCIATICA, SCIATICA LATERALITY UNSPECIFIED: ICD-10-CM

## 2019-12-23 DIAGNOSIS — M54.50 CHRONIC BILATERAL LOW BACK PAIN WITHOUT SCIATICA: ICD-10-CM

## 2019-12-23 DIAGNOSIS — M54.17 THORACIC AND LUMBOSACRAL NEURITIS: ICD-10-CM

## 2019-12-23 DIAGNOSIS — G89.29 CHRONIC BILATERAL LOW BACK PAIN WITHOUT SCIATICA: ICD-10-CM

## 2019-12-23 DIAGNOSIS — Z79.899 HIGH RISK MEDICATION USE: ICD-10-CM

## 2019-12-23 RX ORDER — OXYCODONE AND ACETAMINOPHEN 7.5; 325 MG/1; MG/1
1 TABLET ORAL EVERY 4 HOURS PRN
Qty: 90 TABLET | Refills: 0 | Status: SHIPPED | OUTPATIENT
Start: 2019-12-23 | End: 2020-03-11 | Stop reason: SDUPTHER

## 2019-12-26 DIAGNOSIS — K59.03 THERAPEUTIC OPIOID-INDUCED CONSTIPATION (OIC): ICD-10-CM

## 2019-12-26 DIAGNOSIS — T40.2X5A THERAPEUTIC OPIOID-INDUCED CONSTIPATION (OIC): ICD-10-CM

## 2020-01-19 NOTE — FLOWSHEET NOTE
Patient complains of back pain rated 4/10. Medicated with 1 tab PO percocet 10/325mg. No distress noted. Call light remains in reach.
Female

## 2020-02-18 ENCOUNTER — OFFICE VISIT (OUTPATIENT)
Dept: ENDOCRINOLOGY | Age: 70
End: 2020-02-18
Payer: MEDICARE

## 2020-02-18 VITALS
DIASTOLIC BLOOD PRESSURE: 80 MMHG | SYSTOLIC BLOOD PRESSURE: 145 MMHG | HEIGHT: 62 IN | WEIGHT: 167 LBS | BODY MASS INDEX: 30.73 KG/M2 | HEART RATE: 71 BPM

## 2020-02-18 PROCEDURE — 99213 OFFICE O/P EST LOW 20 MIN: CPT | Performed by: INTERNAL MEDICINE

## 2020-02-18 RX ORDER — TESTOSTERONE CYPIONATE 200 MG/ML
INJECTION INTRAMUSCULAR
Qty: 10 ML | Refills: 1 | Status: SHIPPED | OUTPATIENT
Start: 2020-02-18 | End: 2020-08-31

## 2020-02-18 NOTE — PROGRESS NOTES
Subjective:      Patient ID: Allyson Espinoza is a 77 y.o. male. 3 to 4-month follow-up on hypogonadism and prediabetes recently had back surgery at [facility] his had prior back surgeries also had a PSA done which was normal and had prostate checked is going to see urologist for possible evaluation for penile transplant implant  Other   This is a chronic (Hypogonadism) problem. The current episode started more than 1 year ago. The problem has been unchanged. Treatments tried: Testosterone injections. The treatment provided moderate relief. Prediabetes on metformin 500 mg twice daily  Results for Tawnya Chavez (MRN [id number]) as of 2/18/2020 10:04   Ref. Range 11/11/2019 10:04 11/22/2019 09:31   Hemoglobin A1C Latest Ref Range: 4.8 - 5.9 % 6.5 (H) 6.4 (H)       Results for Tawnya Chavez (MRN [id number]) as of 2/18/2020 10:04   Ref. Range 11/11/2019 10:04 11/22/2019 09:31   Total Testosterone Latest Ref Range: 300 - 720 ng/dL 691 856 (H)       Results for Tawnya Chavez (MRN [id number]) as of 2/18/2020 10:04   Ref.  Range 2/3/2020 12:15   Prostatic Specific Ag Latest Ref Range: 0.00 - 5.40 ng/mL 2.89     Patient Active Problem List   Diagnosis    Chronic low back pain    Scoliosis of lumbar spine    Essential hypertension, benign    Hypercholesterolemia    Right lumbar radiculopathy    Gout    High risk medication use - 12/07/17 OARRS PM&R, 02/08/18 OARRS PM&R, 10/05/17 Urine Drug Screen: negative PM&R, MED CONTRACT 1/17/17    Low back pain with sciatica    Myalgia    Synovitis and tenosynovitis    Thoracic and lumbosacral neuritis    Vitamin D deficiency    Prediabetes    Hyperparathyroidism ([address]    Osteopenia    C6 radiculopathy    DJD (degenerative joint disease), cervical    PRASAD (obstructive sleep apnea)    Hyperkalemia    Chronic pain of both shoulders    Hypogonadism in male    Bicipital tendinitis of right shoulder    Therapeutic opioid-induced constipation (OIC)    Bilateral leg pain    Other specified symptoms and signs involving the circulatory and respiratory systems     Myelopathy (HCC)    Coronary artery disease of native artery of native heart with stable angina pectoris (HCC)     No Known Allergies      Current Outpatient Medications:     testosterone cypionate (DEPOTESTOTERONE CYPIONATE) 200 MG/ML injection, INJECT 0.5 ML INTO THE MUSCLE EVERY 2 WEEKS, Disp: 10 mL, Rfl: 1    naloxegol (MOVANTIK) 12.5 MG TABS tablet, Take 1 tablet by mouth every morning, Disp: 30 tablet, Rfl: 3    CPAP Machine MISC, by NOT APPLICABLE route, Disp: , Rfl:     diclofenac (VOLTAREN) 50 MG EC tablet, Take by mouth, Disp: , Rfl:     metFORMIN (GLUCOPHAGE) 500 MG tablet, TAKE 1 TABLET BY MOUTH TWICE DAILY WITH MEALS, Disp: 180 tablet, Rfl: 3    metFORMIN (GLUCOPHAGE) 500 MG tablet, TAKE 1 TABLET BY MOUTH TWICE DAILY WITH MEALS, Disp: 180 tablet, Rfl: 3    sildenafil (VIAGRA) 100 MG tablet, TAKE 1 TABLET BY MOUTH AS NEEDED FOR ERECTILE DYSFUNCTION (NO MORE THAN 1 TABLET EVERY 24 HOURS), Disp: 30 tablet, Rfl: 3    SYRINGE-NEEDLE, DISP, 3 ML (B-D INTEGRA SYRINGE) 22G X 1-1/2\" 3 ML MISC, 1 each by Does not apply route daily, Disp: 20 each, Rfl: 6    NIFEdipine (PROCARDIA XL) 60 MG extended release tablet, Take 1 tablet by mouth daily, Disp: 90 tablet, Rfl: 3    baclofen (LIORESAL) 10 MG tablet, Take 1 tablet by mouth 3 times daily, Disp: 90 tablet, Rfl: 1    Misc.  Devices (ROLLATOR) MISC, 1 Device by Does not apply route daily, Disp: 1 each, Rfl: 1    metoprolol tartrate (LOPRESSOR) 50 MG tablet, TAKE 1 TABLET BY MOUTH THREE TIMES DAILY, Disp: 270 tablet, Rfl: 2    pravastatin (PRAVACHOL) 40 MG tablet, TAKE 1 TABLET BY MOUTH EVERY EVENING, Disp: 90 tablet, Rfl: 3    lactulose (CHRONULAC) 10 GM/15ML solution, TAKE 30 ML BY MOUTH DAILY, Disp: 2838 mL, Rfl: 1    sildenafil (VIAGRA) 100 MG tablet, Take 1 tablet by mouth as needed for Erectile Dysfunction, Disp: 30 tablet, Rfl: 3   EVERY 2 WEEKS     Dispense:  10 mL     Refill:  1     Continue testosterone 100 mg every 2 weeks repeat labs continue metformin 500 mg twice daily follow-up in 3 months        Júnior Negrete MD

## 2020-02-20 ENCOUNTER — OFFICE VISIT (OUTPATIENT)
Dept: FAMILY MEDICINE CLINIC | Age: 70
End: 2020-02-20
Payer: MEDICARE

## 2020-02-20 VITALS
HEART RATE: 72 BPM | DIASTOLIC BLOOD PRESSURE: 72 MMHG | BODY MASS INDEX: 30.99 KG/M2 | HEIGHT: 62 IN | SYSTOLIC BLOOD PRESSURE: 128 MMHG | TEMPERATURE: 97.6 F | WEIGHT: 168.4 LBS | RESPIRATION RATE: 16 BRPM | OXYGEN SATURATION: 99 %

## 2020-02-20 PROBLEM — Z86.79 HISTORY OF ANGINA: Status: ACTIVE | Noted: 2020-02-20

## 2020-02-20 PROBLEM — R79.89 ABNORMAL CBC: Status: ACTIVE | Noted: 2020-02-20

## 2020-02-20 PROBLEM — Z86.79 HISTORY OF ANGINA: Status: RESOLVED | Noted: 2020-02-20 | Resolved: 2020-02-20

## 2020-02-20 PROBLEM — M75.21 BICIPITAL TENDINITIS OF RIGHT SHOULDER: Status: RESOLVED | Noted: 2019-04-08 | Resolved: 2020-02-20

## 2020-02-20 PROBLEM — E87.5 HYPERKALEMIA: Status: RESOLVED | Noted: 2018-10-09 | Resolved: 2020-02-20

## 2020-02-20 PROCEDURE — 99214 OFFICE O/P EST MOD 30 MIN: CPT | Performed by: PHYSICIAN ASSISTANT

## 2020-02-20 RX ORDER — TAMSULOSIN HYDROCHLORIDE 0.4 MG/1
CAPSULE ORAL
COMMUNITY
Start: 2020-02-04

## 2020-02-20 RX ORDER — METOPROLOL TARTRATE 50 MG/1
TABLET, FILM COATED ORAL
Qty: 270 TABLET | Refills: 2 | Status: SHIPPED | OUTPATIENT
Start: 2020-02-20 | End: 2021-01-26

## 2020-02-20 RX ORDER — NITROGLYCERIN 0.4 MG/1
TABLET SUBLINGUAL
Qty: 25 TABLET | Refills: 0 | Status: SHIPPED | OUTPATIENT
Start: 2020-02-20 | End: 2020-03-16 | Stop reason: SDUPTHER

## 2020-02-20 ASSESSMENT — ENCOUNTER SYMPTOMS
CONSTIPATION: 1
ABDOMINAL PAIN: 0
BLOOD IN STOOL: 0
TROUBLE SWALLOWING: 0
BACK PAIN: 1
CHEST TIGHTNESS: 0
DIARRHEA: 0
COLOR CHANGE: 0
SHORTNESS OF BREATH: 0
RECTAL PAIN: 0
ABDOMINAL DISTENTION: 1
COUGH: 0
NAUSEA: 0
WHEEZING: 0
SINUS PRESSURE: 0

## 2020-02-20 ASSESSMENT — PATIENT HEALTH QUESTIONNAIRE - PHQ9
1. LITTLE INTEREST OR PLEASURE IN DOING THINGS: 0
2. FEELING DOWN, DEPRESSED OR HOPELESS: 0
SUM OF ALL RESPONSES TO PHQ QUESTIONS 1-9: 0
SUM OF ALL RESPONSES TO PHQ QUESTIONS 1-9: 0
SUM OF ALL RESPONSES TO PHQ9 QUESTIONS 1 & 2: 0

## 2020-02-20 NOTE — PROGRESS NOTES
service: Not on file     Active member of club or organization: Not on file     Attends meetings of clubs or organizations: Not on file     Relationship status: Not on file    Intimate partner violence:     Fear of current or ex partner: Not on file     Emotionally abused: Not on file     Physically abused: Not on file     Forced sexual activity: Not on file   Other Topics Concern    Not on file   Social History Narrative    Tobacco -- never smoked or chewed. Alcohol -- have not used for past 10 years, prior to had consumed 6 pack of beer daily. Drugs -- tried marijuana and cocaine 14 years ago occasionally used for 3-4 years and then stopped completely 10 years ago. Education -- completed 11th grade in [place].    Occupation -- [employer] work,  at [employer].    Marital status --  44 years, 2 grown sons. Family History   Problem Relation Age of Onset    High Blood Pressure Mother     Arthritis Mother     Cancer Father         throat    Arthritis Father      No Known Allergies  Current Outpatient Medications   Medication Sig Dispense Refill    tamsulosin (FLOMAX) 0.4 MG capsule TAKE 1 CAPSULE DAILY AT BEDTIME      metoprolol tartrate (LOPRESSOR) 50 MG tablet TAKE 1 TABLET BY MOUTH THREE TIMES DAILY 270 tablet 2    nitroGLYCERIN (NITROSTAT) 0.4 MG SL tablet Place 1 tablet under the tongue every 5 minutes as needed x 3 doses for chest pain.  25 tablet 0    testosterone cypionate (DEPOTESTOTERONE CYPIONATE) 200 MG/ML injection INJECT 0.5 ML INTO THE MUSCLE EVERY 2 WEEKS 10 mL 1    naloxegol (MOVANTIK) 12.5 MG TABS tablet Take 1 tablet by mouth every morning 30 tablet 3    CPAP Machine MISC by NOT APPLICABLE route      diclofenac (VOLTAREN) 50 MG EC tablet Take by mouth      metFORMIN (GLUCOPHAGE) 500 MG tablet TAKE 1 TABLET BY MOUTH TWICE DAILY WITH MEALS 180 tablet 3    SYRINGE-NEEDLE, DISP, 3 ML (B-D INTEGRA SYRINGE) 22G X 1-1/2\" 3 ML MISC 1 each by Does not apply route daily 20 each 6    NIFEdipine (PROCARDIA XL) 60 MG extended release tablet Take 1 tablet by mouth daily 90 tablet 3    baclofen (LIORESAL) 10 MG tablet Take 1 tablet by mouth 3 times daily 90 tablet 1    Misc. Devices (ROLLATOR) MISC 1 Device by Does not apply route daily 1 each 1    pravastatin (PRAVACHOL) 40 MG tablet TAKE 1 TABLET BY MOUTH EVERY EVENING 90 tablet 3    sildenafil (VIAGRA) 100 MG tablet Take 1 tablet by mouth as needed for Erectile Dysfunction 30 tablet 3    allopurinol (ZYLOPRIM) 100 MG tablet Take 1 tablet by mouth daily 90 tablet 3    CPAP Machine MISC by Does not apply route New CPAP  with 10 cm 1 each 0    Misc. Devices (WALKER) MISC 1 each by Does not apply route daily 1 each 0    aspirin 81 MG EC tablet Take 1 tablet by mouth daily 90 tablet 1     No current facility-administered medications for this visit. PMH, Surgical Hx, Family Hx, and Social Hx reviewed and updated. Health Maintenance reviewed. Objective  Vitals:    02/20/20 1043   BP: 128/72   Site: Left Upper Arm   Position: Sitting   Cuff Size: Medium Adult   Pulse: 72   Resp: 16   Temp: 97.6 °F (36.4 °C)   TempSrc: Temporal   SpO2: 99%   Weight: 168 lb 6.4 oz (76.4 kg)   Height: 5' 2\" (1.575 m)     BP Readings from Last 3 Encounters:   02/20/20 128/72   02/18/20 (!) 145/80   12/16/19 132/84     Wt Readings from Last 3 Encounters:   02/20/20 168 lb 6.4 oz (76.4 kg)   02/18/20 167 lb (75.8 kg)   12/16/19 168 lb (76.2 kg)     Physical Exam  Vitals signs reviewed. Constitutional:       General: He is not in acute distress. Appearance: He is well-developed. He is not diaphoretic. Comments: Sitting comfortably in exam room. NAD. HENT:      Head: Normocephalic and atraumatic. Right Ear: External ear normal.      Left Ear: External ear normal.      Nose: Nose normal.   Eyes:      Conjunctiva/sclera: Conjunctivae normal.   Neck:      Musculoskeletal: Normal range of motion and neck supple. Cardiovascular:      Rate and Rhythm: Normal rate and regular rhythm. Heart sounds: Normal heart sounds. No murmur. Pulmonary:      Effort: Pulmonary effort is normal. No respiratory distress. Breath sounds: Normal breath sounds. No wheezing or rales. Abdominal:      Palpations: There is no mass. Tenderness: There is no guarding or rebound. Hernia: No hernia is present. Musculoskeletal:         General: Deformity present. No swelling or tenderness. Comments: Shoulder height almost at even height. Hip height discrepancy also improved. Skin:     General: Skin is warm and dry. Findings: Laceration present. Neurological:      Mental Status: He is alert and oriented to person, place, and time. Cranial Nerves: No cranial nerve deficit. Psychiatric:         Behavior: Behavior normal.         Thought Content: Thought content normal.         Judgment: Judgment normal.      Comments: Overall, feeling well. Sleeping much better. Trying to stay as active as possible. Doing very well post-surgery. Assessment & Plan   Nae ACUÑA was seen today for hypertension. Diagnoses and all orders for this visit:    Coronary artery disease of native artery of native heart with stable angina pectoris (HCC)  -     nitroGLYCERIN (NITROSTAT) 0.4 MG SL tablet; Place 1 tablet under the tongue every 5 minutes as needed x 3 doses for chest pain. History of angina  -     nitroGLYCERIN (NITROSTAT) 0.4 MG SL tablet; Place 1 tablet under the tongue every 5 minutes as needed x 3 doses for chest pain. Abnormal CBC  -     CBC;  Future    Essential hypertension, benign  -     metoprolol tartrate (LOPRESSOR) 50 MG tablet; TAKE 1 TABLET BY MOUTH THREE TIMES DAILY    Other specified symptoms and signs involving the circulatory and respiratory systems     PRASAD (obstructive sleep apnea)    Therapeutic opioid-induced constipation (OIC)    Hypogonadism in male    Bilateral low back pain with sciatica, sciatica laterality unspecified, unspecified chronicity    Thoracic and lumbosacral neuritis    Scoliosis of lumbar spine, unspecified scoliosis type    Vitamin D deficiency    Prediabetes    Urinary retention    Overall, doing very well. No acute questions or concerns at this time. Continue flomax. Follow up with me in 4 months. Orders Placed This Encounter   Procedures    CBC     Standing Status:   Future     Standing Expiration Date:   2/19/2021     Orders Placed This Encounter   Medications    metoprolol tartrate (LOPRESSOR) 50 MG tablet     Sig: TAKE 1 TABLET BY MOUTH THREE TIMES DAILY     Dispense:  270 tablet     Refill:  2    nitroGLYCERIN (NITROSTAT) 0.4 MG SL tablet     Sig: Place 1 tablet under the tongue every 5 minutes as needed x 3 doses for chest pain. Dispense:  25 tablet     Refill:  0     Medications Discontinued During This Encounter   Medication Reason    metFORMIN (GLUCOPHAGE) 500 MG tablet LIST CLEANUP    sildenafil (VIAGRA) 100 MG tablet LIST CLEANUP    metoprolol tartrate (LOPRESSOR) 50 MG tablet REORDER    nitroGLYCERIN (NITROSTAT) 0.4 MG SL tablet REORDER    lactulose (CHRONULAC) 10 GM/15ML solution LIST CLEANUP     No follow-ups on file. Reviewed with the patient: current clinical status, medications, activities and diet. Side effects, adverse effects of the medication prescribed today, as well as treatment plan/ rationale and result expectations have been discussed with the patient who expresses understanding and desires to proceed. Close follow up to evaluate treatment results and for coordination of care. I have reviewed the patient's medical history in detail and updated the computerized patient record.     Sheryl Tellez PA-C

## 2020-02-26 ENCOUNTER — OFFICE VISIT (OUTPATIENT)
Dept: CARDIOLOGY CLINIC | Age: 70
End: 2020-02-26
Payer: MEDICARE

## 2020-02-26 VITALS
HEIGHT: 67 IN | SYSTOLIC BLOOD PRESSURE: 141 MMHG | RESPIRATION RATE: 18 BRPM | HEART RATE: 77 BPM | BODY MASS INDEX: 25.9 KG/M2 | WEIGHT: 165 LBS | DIASTOLIC BLOOD PRESSURE: 70 MMHG | OXYGEN SATURATION: 100 %

## 2020-02-26 PROBLEM — R09.89 BILATERAL CAROTID BRUITS: Status: ACTIVE | Noted: 2020-02-26

## 2020-02-26 PROCEDURE — 99214 OFFICE O/P EST MOD 30 MIN: CPT | Performed by: INTERNAL MEDICINE

## 2020-02-26 ASSESSMENT — ENCOUNTER SYMPTOMS
CHEST TIGHTNESS: 0
COUGH: 0
BACK PAIN: 1
BLOOD IN STOOL: 0
STRIDOR: 0
EYES NEGATIVE: 1
GASTROINTESTINAL NEGATIVE: 1
NAUSEA: 0
WHEEZING: 0
SHORTNESS OF BREATH: 0
RESPIRATORY NEGATIVE: 1

## 2020-02-26 NOTE — PROGRESS NOTES
Subsequent Progress Note  Patient: Courtney Reagan  YOB: 1950  MRN: [id number]    Chief Complaint: htn cad lipid preop back   Chief Complaint   Patient presents with    Follow-up     4 month    Coronary Artery Disease    Hypertension       CV Data:  Prior CAD/Stentsx 2  10/17/2017 CABG x2 EF 55  10/2018  Stress echo EF 55  Persistent HyperK    Subjective/HPI: no cp no osb no falls noblled has back arthritis getting shots with some releif. 10/25/2019 No cp no sob no falls no bleed. Takes meds. Eating well. 2/26/2020 no cp no sob. Had extensive back SX. Did well.       EKG:    Past Medical History:   Diagnosis Date    Chronic back pain     Coronary artery disease 2002    [facility]. Seble [facility]spinosa [facility] Esophageal ulcer 3/10/2014    Dr. Maria Dolores Whyte    Gastric ulcer 3/10/2014    Dr. Maria Dolores Whyte    Gout     Hiatal hernia 3/10/2014    Dr. Maria Dolores Whyte    Hyperlipidemia     Hypertension     MI (myocardial infarction) [facility]) 2005    Dr. Myles Swartz Peripheral vascular disease ([address]     Prediabetes     Schizo-affective schizophrenia, in remission ([address] 2/1/2005    Overview:  followed at the [facility] Severe single current episode of major depressive disorder, without psychotic features ([address] 7/8/2016    Status post coronary artery stent placement 2002    Dr. Seble Espinosa       Past Surgical History:   Procedure Laterality Date    [facility] Right 6/4/2019    RIGHT WRIST CARPAL TUNNEL RELEASE, SUPINE, PAT AT PCP performed by Gerhardt Grapes, MD at [address] [place] [address]  3/10/14    DR Stephanie Garcia  2002    Dr. Morris 83 GRAFT  10/17/2017    KNEE ARTHROSCOPY Left 2002    Dr. Jerod Soler  12/19/13    Swetha Wiley  [address]  9/2009    Dr. Scarlet Cheadle [facility] SURGERY  2008    Dr. Lupis Medrano UPPER GASTROINTESTINAL ENDOSCOPY  3/10/14    BX, DILATION, DR Mayo Raymond       Family History   Problem Relation Age of Onset    High Blood Pressure Mother     Arthritis Mother     Cancer Father         throat    Arthritis Father        Social History     Socioeconomic History    Marital status:      Spouse name: None    Number of children: None    Years of education: None    Highest education level: None   Occupational History    Occupation: disability    Social Needs    Financial resource strain: None    Food insecurity:     Worry: None     Inability: None    Transportation needs:     Medical: None     Non-medical: None   Tobacco Use    Smoking status: Never Smoker    Smokeless tobacco: Never Used   Substance and Sexual Activity    Alcohol use: No     Alcohol/week: 0.0 standard drinks     Comment: Stopped years ago.  Drug use: No     Comment: YEARS AGO    Sexual activity: Yes     Partners: Female     Comment: monogomous sexual partner, wife. Lifestyle    Physical activity:     Days per week: None     Minutes per session: None    Stress: None   Relationships    Social connections:     Talks on phone: None     Gets together: None     Attends Amish service: None     Active member of club or organization: None     Attends meetings of clubs or organizations: None     Relationship status: None    Intimate partner violence:     Fear of current or ex partner: None     Emotionally abused: None     Physically abused: None     Forced sexual activity: None   Other Topics Concern    None   Social History Narrative    Tobacco -- never smoked or chewed. Alcohol -- have not used for past 10 years, prior to had consumed 6 pack of beer daily. Drugs -- tried marijuana and cocaine 14 years ago occasionally used for 3-4 years and then stopped completely 10 years ago.     Education -- completed 11th grade in [place].    Occupation -- factory work,  at [employer].    Marital status --  44 years, 2 grown sons. No Known Allergies    Current Outpatient Medications   Medication Sig Dispense Refill    tamsulosin (FLOMAX) 0.4 MG capsule TAKE 1 CAPSULE DAILY AT BEDTIME      metoprolol tartrate (LOPRESSOR) 50 MG tablet TAKE 1 TABLET BY MOUTH THREE TIMES DAILY 270 tablet 2    nitroGLYCERIN (NITROSTAT) 0.4 MG SL tablet Place 1 tablet under the tongue every 5 minutes as needed x 3 doses for chest pain. 25 tablet 0    testosterone cypionate (DEPOTESTOTERONE CYPIONATE) 200 MG/ML injection INJECT 0.5 ML INTO THE MUSCLE EVERY 2 WEEKS 10 mL 1    CPAP Machine MISC by NOT APPLICABLE route      diclofenac (VOLTAREN) 50 MG EC tablet Take by mouth      metFORMIN (GLUCOPHAGE) 500 MG tablet TAKE 1 TABLET BY MOUTH TWICE DAILY WITH MEALS 180 tablet 3    SYRINGE-NEEDLE, DISP, 3 ML (B-D INTEGRA SYRINGE) 22G X 1-1/2\" 3 ML MISC 1 each by Does not apply route daily 20 each 6    NIFEdipine (PROCARDIA XL) 60 MG extended release tablet Take 1 tablet by mouth daily 90 tablet 3    baclofen (LIORESAL) 10 MG tablet Take 1 tablet by mouth 3 times daily 90 tablet 1    Misc. Devices (ROLLATOR) MISC 1 Device by Does not apply route daily 1 each 1    pravastatin (PRAVACHOL) 40 MG tablet TAKE 1 TABLET BY MOUTH EVERY EVENING 90 tablet 3    sildenafil (VIAGRA) 100 MG tablet Take 1 tablet by mouth as needed for Erectile Dysfunction 30 tablet 3    allopurinol (ZYLOPRIM) 100 MG tablet Take 1 tablet by mouth daily 90 tablet 3    CPAP Machine MISC by Does not apply route New CPAP  with 10 cm 1 each 0    Misc. Devices (WALKER) MISC 1 each by Does not apply route daily 1 each 0    aspirin 81 MG EC tablet Take 1 tablet by mouth daily 90 tablet 1    naloxegol (MOVANTIK) 12.5 MG TABS tablet Take 1 tablet by mouth every morning (Patient not taking: Reported on 2/26/2020) 30 tablet 3     No current facility-administered medications for this visit.

## 2020-03-02 DIAGNOSIS — R79.89 ABNORMAL CBC: ICD-10-CM

## 2020-03-02 DIAGNOSIS — E29.1 HYPOGONADISM MALE: ICD-10-CM

## 2020-03-02 LAB
ANION GAP SERPL CALCULATED.3IONS-SCNC: 14 MEQ/L (ref 9–15)
BUN BLDV-MCNC: 19 MG/DL (ref 8–23)
CALCIUM SERPL-MCNC: 9.7 MG/DL (ref 8.5–9.9)
CHLORIDE BLD-SCNC: 99 MEQ/L (ref 95–107)
CO2: 28 MEQ/L (ref 20–31)
CREAT SERPL-MCNC: 1.03 MG/DL (ref 0.7–1.2)
CREATININE URINE: 199.2 MG/DL
GFR AFRICAN AMERICAN: >60
GFR NON-AFRICAN AMERICAN: >60
GLUCOSE BLD-MCNC: 103 MG/DL (ref 70–99)
HBA1C MFR BLD: 5.7 % (ref 4.8–5.9)
HCT VFR BLD CALC: 39.4 % (ref 42–52)
HEMOGLOBIN: 12.5 G/DL (ref 14–18)
MCH RBC QN AUTO: 28.9 PG (ref 27–31.3)
MCHC RBC AUTO-ENTMCNC: 31.8 % (ref 33–37)
MCV RBC AUTO: 91 FL (ref 80–100)
MICROALBUMIN UR-MCNC: 1.7 MG/DL
MICROALBUMIN/CREAT UR-RTO: 8.5 MG/G (ref 0–30)
PDW BLD-RTO: 15.9 % (ref 11.5–14.5)
PLATELET # BLD: 349 K/UL (ref 130–400)
POTASSIUM SERPL-SCNC: 5 MEQ/L (ref 3.4–4.9)
RBC # BLD: 4.33 M/UL (ref 4.7–6.1)
SODIUM BLD-SCNC: 141 MEQ/L (ref 135–144)
WBC # BLD: 6.8 K/UL (ref 4.8–10.8)

## 2020-03-03 ENCOUNTER — HOSPITAL ENCOUNTER (EMERGENCY)
Age: 70
Discharge: HOME | End: 2020-03-03
Payer: MEDICARE

## 2020-03-03 VITALS
TEMPERATURE: 99.6 F | DIASTOLIC BLOOD PRESSURE: 81 MMHG | WEIGHT: 162 LBS | SYSTOLIC BLOOD PRESSURE: 155 MMHG | RESPIRATION RATE: 18 BRPM | HEIGHT: 67 IN | OXYGEN SATURATION: 97 % | HEART RATE: 98 BPM | BODY MASS INDEX: 25.43 KG/M2

## 2020-03-03 LAB
INFLUENZA A BY PCR: NEGATIVE
INFLUENZA B BY PCR: NEGATIVE

## 2020-03-03 PROCEDURE — 87502 INFLUENZA DNA AMP PROBE: CPT

## 2020-03-03 PROCEDURE — 99282 EMERGENCY DEPT VISIT SF MDM: CPT

## 2020-03-03 RX ORDER — GABAPENTIN 300 MG/1
300 CAPSULE ORAL 2 TIMES DAILY
COMMUNITY
End: 2020-03-11 | Stop reason: SDUPTHER

## 2020-03-03 RX ORDER — OSELTAMIVIR PHOSPHATE 75 MG/1
75 CAPSULE ORAL 2 TIMES DAILY
Qty: 10 CAPSULE | Refills: 0 | Status: SHIPPED | OUTPATIENT
Start: 2020-03-03 | End: 2020-03-08

## 2020-03-03 RX ORDER — OXYCODONE AND ACETAMINOPHEN 7.5; 325 MG/1; MG/1
1 TABLET ORAL EVERY 4 HOURS PRN
COMMUNITY
End: 2020-06-25

## 2020-03-03 ASSESSMENT — ENCOUNTER SYMPTOMS
COUGH: 1
SHORTNESS OF BREATH: 0
NAUSEA: 0
ABDOMINAL PAIN: 0
VOMITING: 0
DIARRHEA: 0
SORE THROAT: 0
BACK PAIN: 0
WHEEZING: 0
TROUBLE SWALLOWING: 0

## 2020-03-03 ASSESSMENT — PAIN DESCRIPTION - LOCATION: LOCATION: GENERALIZED

## 2020-03-03 ASSESSMENT — PAIN DESCRIPTION - PAIN TYPE: TYPE: ACUTE PAIN

## 2020-03-03 ASSESSMENT — PAIN DESCRIPTION - DESCRIPTORS: DESCRIPTORS: ACHING

## 2020-03-03 ASSESSMENT — PAIN SCALES - GENERAL: PAINLEVEL_OUTOF10: 8

## 2020-03-04 LAB
SEX HORMONE BINDING GLOBULIN: 52 NMOL/L (ref 11–80)
TESTOSTERONE FREE PERCENT: 1.4 % (ref 1.6–2.9)
TESTOSTERONE FREE, CALC: 73 PG/ML (ref 47–244)
TESTOSTERONE TOTAL-MALE: 508 NG/DL (ref 300–720)

## 2020-03-04 NOTE — ED PROVIDER NOTES
History:   Procedure Laterality Date    APPENDECTOMY  1970    CARDIAC SURGERY      CARPAL TUNNEL RELEASE Right 6/4/2019    RIGHT WRIST CARPAL TUNNEL RELEASE, SUPINE, PAT AT PCP performed by Jacey Weaver MD at [address] [place] [address]  3/10/14    DR Lora Cates  2002    Dr. Morris 83 GRAFT  10/17/2017    KNEE ARTHROSCOPY Left 2002    Dr. Jose Scherer  12/19/13    CAUDAL Tommy Bevels DR Joeseph Bence SPINE SURGERY  9/2009    Dr. Iris Alcaraz  2008    Dr. Mellisa Moscoso  3/10/14    BX, DILATION, DR García [place]         CURRENT MEDICATIONS       Previous Medications    ALLOPURINOL (ZYLOPRIM) 100 MG TABLET    Take 1 tablet by mouth daily    ASPIRIN 81 MG EC TABLET    Take 1 tablet by mouth daily    BACLOFEN (LIORESAL) 10 MG TABLET    Take 1 tablet by mouth 3 times daily    CPAP MACHINE MISC    by Does not apply route New CPAP  with 10 cm    CPAP MACHINE MISC    by NOT APPLICABLE route    DICLOFENAC (VOLTAREN) 50 MG EC TABLET    Take by mouth    GABAPENTIN (NEURONTIN) 300 MG CAPSULE    Take 300 mg by mouth 2 times daily. One capsule in am, 2 in hs    METFORMIN (GLUCOPHAGE) 500 MG TABLET    TAKE 1 TABLET BY MOUTH TWICE DAILY WITH MEALS    METOPROLOL TARTRATE (LOPRESSOR) 50 MG TABLET    TAKE 1 TABLET BY MOUTH THREE TIMES DAILY    MISC. DEVICES (ROLLATOR) MISC    1 Device by Does not apply route daily    MISC. DEVICES (WALKER) MISC    1 each by Does not apply route daily    NALOXEGOL (MOVANTIK) 12.5 MG TABS TABLET    Take 1 tablet by mouth every morning    NIFEDIPINE (PROCARDIA XL) 60 MG EXTENDED RELEASE TABLET    Take 1 tablet by mouth daily    NITROGLYCERIN (NITROSTAT) 0.4 MG SL TABLET    Place 1 tablet under the tongue every 5 minutes as needed x 3 doses for chest pain.     OXYCODONE-ACETAMINOPHEN (PERCOCET) 7.5-325 MG PER TABLET    Take 1 tablet by mouth every 4 hours as needed for Pain. PRAVASTATIN (PRAVACHOL) 40 MG TABLET    TAKE 1 TABLET BY MOUTH EVERY EVENING    SILDENAFIL (VIAGRA) 100 MG TABLET    Take 1 tablet by mouth as needed for Erectile Dysfunction    SYRINGE-NEEDLE, DISP, 3 ML (B-D INTEGRA SYRINGE) 22G X 1-1/2\" 3 ML MISC    1 each by Does not apply route daily    TAMSULOSIN (FLOMAX) 0.4 MG CAPSULE    TAKE 1 CAPSULE DAILY AT BEDTIME    TESTOSTERONE CYPIONATE (DEPOTESTOTERONE CYPIONATE) 200 MG/ML INJECTION    INJECT 0.5 ML INTO THE MUSCLE EVERY 2 WEEKS    VITAMIN D (CHOLECALCIFEROL) 25 MCG (1000 UT) TABS TABLET    Take 1,000 Units by mouth daily       ALLERGIES     Patient has no known allergies. FAMILY HISTORY       Family History   Problem Relation Age of Onset    High Blood Pressure Mother     Arthritis Mother     Cancer Father         throat    Arthritis Father           SOCIAL HISTORY       Social History     Socioeconomic History    Marital status:      Spouse name: None    Number of children: None    Years of education: None    Highest education level: None   Occupational History    Occupation: disability    Social Needs    Financial resource strain: None    Food insecurity:     Worry: None     Inability: None    Transportation needs:     Medical: None     Non-medical: None   Tobacco Use    Smoking status: Never Smoker    Smokeless tobacco: Never Used   Substance and Sexual Activity    Alcohol use: No     Alcohol/week: 0.0 standard drinks     Comment: Stopped years ago.  Drug use: No     Comment: YEARS AGO    Sexual activity: Yes     Partners: Female     Comment: monogomous sexual partner, wife.    Lifestyle    Physical activity:     Days per week: None     Minutes per session: None    Stress: None   Relationships    Social connections:     Talks on phone: None     Gets together: None     Attends Latter day service: None     Active member of club or organization: None     Attends meetings of clubs or organizations: None     Relationship status: None    Intimate partner violence:     Fear of current or ex partner: None     Emotionally abused: None     Physically abused: None     Forced sexual activity: None   Other Topics Concern    None   Social History Narrative    Tobacco -- never smoked or chewed. Alcohol -- have not used for past 10 years, prior to had consumed 6 pack of beer daily. Drugs -- tried marijuana and cocaine 14 years ago occasionally used for 3-4 years and then stopped completely 10 years ago. Education -- completed 11th grade in [place].    Occupation -- [address] work,  at [employer].    Marital status --  44 years, 2 grown sons. SCREENINGS      @[id number])@      PHYSICAL EXAM    (up to 7 for level 4, 8 or more for level 5)     ED Triage Vitals [03/03/20 2005]   BP Temp Temp Source Pulse Resp SpO2 Height Weight   (!) 155/81 99.6 °F (37.6 °C) Oral 98 18 97 % 5' 7\" (1.702 m) 162 lb (73.5 kg)       Physical Exam  Vitals signs and nursing note reviewed. Constitutional:       Appearance: He is well-developed. HENT:      Head: Normocephalic and atraumatic. Right Ear: Hearing, tympanic membrane, ear canal and external ear normal.      Left Ear: Hearing, tympanic membrane, ear canal and external ear normal.      Nose: Nose normal.      Mouth/Throat:      Lips: Pink. Mouth: Mucous membranes are moist.      Pharynx: Oropharynx is clear. Eyes:      Conjunctiva/sclera: Conjunctivae normal.      Pupils: Pupils are equal, round, and reactive to light. Neck:      Musculoskeletal: Normal range of motion and neck supple. Cardiovascular:      Rate and Rhythm: Normal rate and regular rhythm. Pulmonary:      Effort: Pulmonary effort is normal. No accessory muscle usage or respiratory distress. Breath sounds: Normal breath sounds. No decreased air movement. No decreased breath sounds or wheezing.    Abdominal:      General: Bowel sounds are

## 2020-03-04 NOTE — ED TRIAGE NOTES
Patient c/o cough, runny nose, body aches since yesterday, wife tested positive for influenza today. He took baby aspirin at 1700 for fever of 100.7.

## 2020-03-11 ENCOUNTER — OFFICE VISIT (OUTPATIENT)
Dept: PHYSICAL MEDICINE AND REHAB | Age: 70
End: 2020-03-11
Payer: MEDICARE

## 2020-03-11 VITALS
SYSTOLIC BLOOD PRESSURE: 138 MMHG | WEIGHT: 161 LBS | BODY MASS INDEX: 25.27 KG/M2 | DIASTOLIC BLOOD PRESSURE: 64 MMHG | HEIGHT: 67 IN

## 2020-03-11 PROBLEM — G95.9 SPINAL CORD DISEASE (HCC): Status: ACTIVE | Noted: 2019-10-18

## 2020-03-11 PROBLEM — J11.1 INFLUENZA-LIKE ILLNESS: Status: ACTIVE | Noted: 2020-03-11

## 2020-03-11 PROCEDURE — 99214 OFFICE O/P EST MOD 30 MIN: CPT | Performed by: PHYSICAL MEDICINE & REHABILITATION

## 2020-03-11 RX ORDER — GABAPENTIN 300 MG/1
300 CAPSULE ORAL 2 TIMES DAILY
Qty: 60 CAPSULE | Refills: 2 | Status: SHIPPED | OUTPATIENT
Start: 2020-03-11 | End: 2020-06-25

## 2020-03-11 RX ORDER — GABAPENTIN 300 MG/1
CAPSULE ORAL
COMMUNITY
End: 2020-03-11

## 2020-03-11 RX ORDER — OXYCODONE AND ACETAMINOPHEN 7.5; 325 MG/1; MG/1
1 TABLET ORAL EVERY 4 HOURS PRN
Qty: 90 TABLET | Refills: 0 | Status: SHIPPED | OUTPATIENT
Start: 2020-03-17 | End: 2020-04-13 | Stop reason: SDUPTHER

## 2020-03-11 RX ORDER — BACLOFEN 10 MG/1
10 TABLET ORAL 3 TIMES DAILY
Qty: 90 TABLET | Refills: 1 | Status: SHIPPED | OUTPATIENT
Start: 2020-03-11 | End: 2020-05-08

## 2020-03-11 ASSESSMENT — ENCOUNTER SYMPTOMS
NAUSEA: 0
CONSTIPATION: 1
VOMITING: 0
BACK PAIN: 1
DIARRHEA: 0
EYES NEGATIVE: 1
CHEST TIGHTNESS: 0
WHEEZING: 0
SHORTNESS OF BREATH: 0
ALLERGIC/IMMUNOLOGIC NEGATIVE: 1
VISUAL CHANGE: 0
ABDOMINAL PAIN: 0

## 2020-03-11 NOTE — PROGRESS NOTES
OXY-IR ) for the symptoms. The treatment provided significant relief. Mental Health Problem   The primary symptoms include negative symptoms. The primary symptoms do not include dysphoric mood, bizarre behavior, disorganized speech or somatic symptoms. The current episode started more than 1 month ago. This is a chronic problem. The onset of the illness is precipitated by a stressful event (chronic pain). The degree of incapacity that he is experiencing as a consequence of his illness is severe. Sequelae of the illness include an inability to work. Additional symptoms of the illness include anhedonia and fatigue. Additional symptoms of the illness do not include unexpected weight change, psychomotor retardation, feelings of worthlessness, attention impairment, euphoric mood, increased goal-directed activity, flight of ideas, inflated self-esteem, decreased need for sleep, distractible, poor judgment, visual change, headaches, abdominal pain or seizures. He does not admit to suicidal ideas. He does not have a plan to commit suicide. He does not contemplate harming himself. He has not already injured self. He does not contemplate injuring another person. He has not already  injured another person. Risk factors that are present for mental illness include a history of mental illness. Hand Pain    The incident occurred more than 1 week ago. The incident occurred at home. There was no injury mechanism. The pain is present in the right wrist and right hand. The quality of the pain is described as cramping. The pain is at a severity of 5/10. The pain is moderate. The pain has been intermittent since the incident. Associated symptoms include numbness. Pertinent negatives include no chest pain, muscle weakness or tingling. The symptoms are aggravated by movement. He has tried ice for the symptoms. The treatment provided moderate relief.        Past Medical History:   Diagnosis Date    Chronic back pain     Coronary noted. Decreased sensation is present in the ulnar distribution and is present in the radial distribution. Decreased strength noted. He exhibits finger abduction and wrist extension trouble. Right upper leg: Normal.      Left upper leg: Normal.      Right lower leg: Normal.      Left lower leg: Normal.        Legs:       Right foot: Normal.      Left foot: Normal.      Comments: Tender areas are indicated by numbered spot         Lymphadenopathy:      Cervical: No cervical adenopathy. Skin:     General: Skin is warm and dry. Coloration: Skin is not pale. Findings: No abrasion, bruising, ecchymosis, erythema, laceration, petechiae or rash. Rash is not macular, pustular or urticarial.      Nails: There is no clubbing. Neurological:      Mental Status: He is alert and oriented to person, place, and time. Cranial Nerves: No cranial nerve deficit. Sensory: Sensory deficit present. Motor: No tremor, atrophy or abnormal muscle tone. Coordination: Coordination normal.      Gait: Gait abnormal.      Deep Tendon Reflexes: Reflexes abnormal. Babinski sign absent on the right side. Babinski sign absent on the left side. Reflex Scores:       Patellar reflexes are 1+ on the right side and 1+ on the left side. Achilles reflexes are 0 on the right side and 0 on the left side. Psychiatric:         Attention and Perception: He is attentive. Mood and Affect: Mood is not anxious or depressed. Affect is not labile, blunt, angry or inappropriate. Speech: He is communicative. Speech is not rapid and pressured, delayed, slurred or tangential.         Behavior: Behavior normal. Behavior is not agitated, slowed, aggressive, withdrawn, hyperactive or combative. Thought Content: Thought content normal. Thought content is not paranoid or delusional. Thought content does not include homicidal or suicidal ideation.  Thought content does not include homicidal or suicidal their Screener and Opioid Assessment for Patients with Pain (SOAPP®-R), recent diagnostics, and symptomatic results to previous treatment, it is my impression that the patients is suffering with progressive and severe:     Diagnosis Orders   1. Scoliosis of lumbar spine, unspecified scoliosis type     2. High risk medication use - 12/07/17 OARRS PM&R, 02/08/18 OARRS PM&R, 10/05/17 Urine Drug Screen: negative PM&R, MED CONTRACT 1/17/17     3. C6 radiculopathy     4. Osteopenia, unspecified location     5. Hyperparathyroidism ([facility] [address]     6. Thoracic and lumbosacral neuritis  oxyCODONE-acetaminophen (PERCOCET) 7.5-325 MG per tablet   7. Vitamin D deficiency     8. Bilateral low back pain with sciatica, sciatica laterality unspecified, unspecified chronicity     9. High risk medication use OARRS RUN 11/10/16  Urine Drug Screen    oxyCODONE-acetaminophen (PERCOCET) 7.5-325 MG per tablet   10. Chronic midline low back pain with sciatica, sciatica laterality unspecified  oxyCODONE-acetaminophen (PERCOCET) 7.5-325 MG per tablet   11. Chronic bilateral low back pain without sciatica  oxyCODONE-acetaminophen (PERCOCET) 7.5-325 MG per tablet   12. Neck pain  oxyCODONE-acetaminophen (PERCOCET) 7.5-325 MG per tablet   13. Bilateral leg pain  baclofen (LIORESAL) 10 MG tablet   14. Spasm of muscle  baclofen (LIORESAL) 10 MG tablet   15.  Myalgia  [place] INJECT TRIGGER POINTS, 3 OR GREATER       I am also concerned by lifestyle and mood issues including:    Past Medical History:   Diagnosis Date    Chronic back pain     Coronary artery disease 2002    Dr. Gonsalo Macdonald at [facility] Esophageal ulcer 3/10/2014    Dr. Joselin Soriano    Gastric ulcer 3/10/2014    Dr. Joselin Soriano    Gout     Hiatal hernia 3/10/2014    Dr. Joselin Soriano    Hyperlipidemia     Hypertension     MI ([facility]ocardial infarction) [facility]) 2005    Dr. Tracey Santoyo Peripheral vascular disease ([facility] [address]     Prediabetes     Schizo-affective with the patient to discuss their questions. Additional time spent with the patient devoted to discussing treatment strategy, planning, and implementation. Discussed findings, impression and plan with patient. Patient understands above plan; questions asked and answered. Patient agrees to plan as noted above. F/U in 2-3months  At least 50% of the visit was involved in the discussion of the options for treatment. We discussed exercises, medication, interventional therapies and surgery. Healthy life style is essential with patient hard work to achieve the wellness. In addition; discussion with the patient and/or family about any of the diagnostic results, impressions and/or recommended diagnostic studies, prognosis, risks and benefits of treatment options, instructions for treatment and/or follow-up, importance of compliance with chosen treatment options, risk-factor reduction, and patient/family education. They are to follow up in 2 months to review medication, efficacy of injections, pill counts, OARRS check, SOAPPR assessment, review diagnostics, to review previous and future treatment plans and assess appropriateness for continued therapy.         New Diagnostics  Orders Placed This Encounter   Procedures    Urine Drug Screen    [place] INJECT TRIGGER POINTS, 3 OR GREATER       Brenda Stubbs,

## 2020-03-12 DIAGNOSIS — Z79.899 HIGH RISK MEDICATION USE: ICD-10-CM

## 2020-03-12 LAB
AMPHETAMINE SCREEN, URINE: ABNORMAL
BARBITURATE SCREEN URINE: ABNORMAL
BENZODIAZEPINE SCREEN, URINE: ABNORMAL
CANNABINOID SCREEN URINE: ABNORMAL
COCAINE METABOLITE SCREEN URINE: ABNORMAL
Lab: ABNORMAL
METHADONE SCREEN, URINE: ABNORMAL
OPIATE SCREEN URINE: ABNORMAL
OXYCODONE URINE: POSITIVE
PHENCYCLIDINE SCREEN URINE: ABNORMAL
PROPOXYPHENE SCREEN: ABNORMAL

## 2020-03-13 ENCOUNTER — TELEPHONE (OUTPATIENT)
Dept: ENDOCRINOLOGY | Age: 70
End: 2020-03-13

## 2020-03-13 NOTE — TELEPHONE ENCOUNTER
Patient would like to have results from labs done recently. He would also like to have results sent to nurse at [email or web address] call her at [phone].

## 2020-03-16 ENCOUNTER — PROCEDURE VISIT (OUTPATIENT)
Dept: PHYSICAL MEDICINE AND REHAB | Age: 70
End: 2020-03-16
Payer: MEDICARE

## 2020-03-16 PROCEDURE — 20553 NJX 1/MLT TRIGGER POINTS 3/>: CPT | Performed by: PHYSICAL MEDICINE & REHABILITATION

## 2020-03-16 PROCEDURE — 96372 THER/PROPH/DIAG INJ SC/IM: CPT | Performed by: PHYSICAL MEDICINE & REHABILITATION

## 2020-03-16 RX ORDER — NITROGLYCERIN 0.4 MG/1
TABLET SUBLINGUAL
Qty: 25 TABLET | Refills: 0 | OUTPATIENT
Start: 2020-03-16

## 2020-03-16 RX ORDER — LIDOCAINE HYDROCHLORIDE 10 MG/ML
15 INJECTION, SOLUTION INFILTRATION; PERINEURAL ONCE
Status: COMPLETED | OUTPATIENT
Start: 2020-03-16 | End: 2020-03-16

## 2020-03-16 RX ORDER — NITROGLYCERIN 0.4 MG/1
TABLET SUBLINGUAL
Qty: 25 TABLET | Refills: 2 | Status: SHIPPED | OUTPATIENT
Start: 2020-03-16

## 2020-03-16 RX ORDER — CYANOCOBALAMIN 1000 UG/ML
1000 INJECTION INTRAMUSCULAR; SUBCUTANEOUS ONCE
Status: COMPLETED | OUTPATIENT
Start: 2020-03-16 | End: 2020-03-16

## 2020-03-16 RX ADMIN — LIDOCAINE HYDROCHLORIDE 15 ML: 10 INJECTION, SOLUTION INFILTRATION; PERINEURAL at 15:19

## 2020-03-16 RX ADMIN — CYANOCOBALAMIN 1000 MCG: 1000 INJECTION INTRAMUSCULAR; SUBCUTANEOUS at 15:18

## 2020-03-20 NOTE — TELEPHONE ENCOUNTER
Okay to do so testosterone was 508 A1c was 5.7  Ok  to send the labs to [facility] nurse

## 2020-04-09 RX ORDER — SILDENAFIL 100 MG/1
100 TABLET, FILM COATED ORAL PRN
Qty: 30 TABLET | Refills: 3 | Status: SHIPPED | OUTPATIENT
Start: 2020-04-09 | End: 2020-06-25

## 2020-04-09 RX ORDER — SILDENAFIL 100 MG/1
TABLET, FILM COATED ORAL
Qty: 30 TABLET | Refills: 3 | Status: SHIPPED | OUTPATIENT
Start: 2020-04-09 | End: 2020-12-15 | Stop reason: ALTCHOICE

## 2020-04-09 NOTE — TELEPHONE ENCOUNTER
Requested Prescriptions     Pending Prescriptions Disp Refills    sildenafil (VIAGRA) 100 MG tablet 30 tablet 3     Sig: Take 1 tablet by mouth as needed for Erectile Dysfunction

## 2020-04-14 RX ORDER — OXYCODONE AND ACETAMINOPHEN 7.5; 325 MG/1; MG/1
1 TABLET ORAL EVERY 4 HOURS PRN
Qty: 90 TABLET | Refills: 0 | Status: SHIPPED | OUTPATIENT
Start: 2020-05-14 | End: 2020-06-13

## 2020-04-14 RX ORDER — OXYCODONE AND ACETAMINOPHEN 7.5; 325 MG/1; MG/1
1 TABLET ORAL EVERY 4 HOURS PRN
Qty: 90 TABLET | Refills: 0 | Status: SHIPPED | OUTPATIENT
Start: 2020-04-15 | End: 2020-05-15

## 2020-04-21 ENCOUNTER — PROCEDURE VISIT (OUTPATIENT)
Dept: PHYSICAL MEDICINE AND REHAB | Age: 70
End: 2020-04-21
Payer: MEDICARE

## 2020-04-21 PROCEDURE — 20553 NJX 1/MLT TRIGGER POINTS 3/>: CPT | Performed by: PHYSICAL MEDICINE & REHABILITATION

## 2020-04-21 PROCEDURE — 96372 THER/PROPH/DIAG INJ SC/IM: CPT | Performed by: PHYSICAL MEDICINE & REHABILITATION

## 2020-04-21 RX ORDER — CYANOCOBALAMIN 1000 UG/ML
1000 INJECTION INTRAMUSCULAR; SUBCUTANEOUS ONCE
Status: COMPLETED | OUTPATIENT
Start: 2020-04-21 | End: 2020-04-21

## 2020-04-21 RX ORDER — LIDOCAINE HYDROCHLORIDE 10 MG/ML
23 INJECTION, SOLUTION INFILTRATION; PERINEURAL ONCE
Status: COMPLETED | OUTPATIENT
Start: 2020-04-21 | End: 2020-04-21

## 2020-04-21 RX ADMIN — LIDOCAINE HYDROCHLORIDE 23 ML: 10 INJECTION, SOLUTION INFILTRATION; PERINEURAL at 16:13

## 2020-04-21 RX ADMIN — CYANOCOBALAMIN 1000 MCG: 1000 INJECTION INTRAMUSCULAR; SUBCUTANEOUS at 16:12

## 2020-04-21 NOTE — PROGRESS NOTES
Patient here for trigger point injections. Patient taken back to exam room, and placed on drape locking stool. Areas to be injected marked appropriately, and cleansed with alcohol. 23cc of 1% Lidocaine, and 1cc of B12 to be injected by provider.

## 2020-05-08 RX ORDER — BACLOFEN 10 MG/1
TABLET ORAL
Qty: 90 TABLET | Refills: 1 | Status: SHIPPED | OUTPATIENT
Start: 2020-05-08 | End: 2020-07-08

## 2020-05-18 ENCOUNTER — OFFICE VISIT (OUTPATIENT)
Dept: PHYSICAL MEDICINE AND REHAB | Age: 70
End: 2020-05-18
Payer: MEDICARE

## 2020-05-18 ENCOUNTER — VIRTUAL VISIT (OUTPATIENT)
Dept: ENDOCRINOLOGY | Age: 70
End: 2020-05-18
Payer: MEDICARE

## 2020-05-18 VITALS
BODY MASS INDEX: 25.43 KG/M2 | DIASTOLIC BLOOD PRESSURE: 68 MMHG | HEIGHT: 67 IN | SYSTOLIC BLOOD PRESSURE: 126 MMHG | WEIGHT: 162 LBS

## 2020-05-18 PROCEDURE — 99214 OFFICE O/P EST MOD 30 MIN: CPT | Performed by: PHYSICAL MEDICINE & REHABILITATION

## 2020-05-18 PROCEDURE — 99442 PR PHYS/QHP TELEPHONE EVALUATION 11-20 MIN: CPT | Performed by: INTERNAL MEDICINE

## 2020-05-18 ASSESSMENT — ENCOUNTER SYMPTOMS
EYE PAIN: 0
CONSTIPATION: 1
VISUAL CHANGE: 0
SHORTNESS OF BREATH: 1
STRIDOR: 0
NAUSEA: 0
WHEEZING: 0
BLOOD IN STOOL: 0
EYE REDNESS: 0
DIARRHEA: 0
COUGH: 0
ABDOMINAL PAIN: 0
SORE THROAT: 0
BACK PAIN: 1
VOMITING: 0
PHOTOPHOBIA: 0

## 2020-05-18 NOTE — PROGRESS NOTES
Standing Status:   Future     Standing Expiration Date:   5/18/2021    Hemoglobin A1C     Standing Status:   Future     Standing Expiration Date:   5/18/2021    Testosterone Free And Total Male     Standing Status:   Future     Standing Expiration Date:   5/18/2021    Basic Metabolic Panel     Standing Status:   Future     Standing Expiration Date:   5/18/2021     Continue on metformin 500 mg twice a day plus testosterone 100 mg every 2 weeks repeat labs for testosterone A1c BMP prior to his next visit    Total time spent with patient was 17 minutes    An  electronic signature was used to authenticate this note. --Fawn Hunt MD on 5/18/2020 at 10:10 AM        Pursuant to the emergency declaration under the [facility], [facility] waiver authority and the [employer] Act, this Virtual  Visit was conducted, with patient's consent, to reduce the patient's risk of exposure to COVID-19 and provide continuity of care for an established patient. Services were provided through a video synchronous discussion virtually to substitute for in-person clinic visit.

## 2020-05-18 NOTE — PROGRESS NOTES
distribution and is present in the radial distribution. Decreased strength noted. He exhibits finger abduction, thumb/finger opposition and wrist extension trouble. Left hand: He exhibits decreased range of motion and bony tenderness. Decreased sensation noted. Decreased sensation is present in the ulnar distribution and is present in the radial distribution. Decreased strength noted. He exhibits finger abduction and wrist extension trouble. Right upper leg: Normal.      Left upper leg: Normal.      Right lower leg: Normal.      Left lower leg: Normal.        Legs:       Right foot: Normal.      Left foot: Normal.      Comments: Tender areas are indicated by numbered spot         Lymphadenopathy:      Cervical: No cervical adenopathy. Skin:     General: Skin is warm and dry. Coloration: Skin is not pale. Findings: No abrasion, bruising, ecchymosis, erythema, laceration, petechiae or rash. Rash is not macular, pustular or urticarial.      Nails: There is no clubbing. Neurological:      Mental Status: He is alert and oriented to person, place, and time. Cranial Nerves: No cranial nerve deficit. Sensory: Sensory deficit present. Motor: No tremor, atrophy or abnormal muscle tone. Coordination: Coordination normal.      Gait: Gait abnormal.      Deep Tendon Reflexes: Reflexes abnormal. Babinski sign absent on the right side. Babinski sign absent on the left side. Reflex Scores:       Patellar reflexes are 1+ on the right side and 1+ on the left side. Achilles reflexes are 0 on the right side and 0 on the left side. Psychiatric:         Attention and Perception: He is attentive. Mood and Affect: Mood is not anxious or depressed. Affect is not labile, blunt, angry or inappropriate. Speech: He is communicative.  Speech is not rapid and pressured, delayed, slurred or tangential.         Behavior: Behavior normal. Behavior is not agitated, slowed, reviewed. No new issues noted. Electrodiagnostic:  Previous studies requested,     I discussed results with patient. See follow-up plans for new studies.         Labs:  Previous labs reviewed     Lab Results   Component Value Date     05/01/2020    K 4.6 05/05/2020     05/01/2020    CO2 28 03/02/2020    BUN 19 03/02/2020    CREATININE 1.22 05/01/2020    CALCIUM 10.4 05/01/2020    LABALBU 4.6 05/01/2020    BILITOT 0.3 05/01/2020    ALKPHOS 98 05/01/2020    AST 18 05/01/2020    ALT 13 05/01/2020     Lab Results   Component Value Date    WBC 5.2 05/01/2020    WBC 6.8 03/02/2020    RBC 4.68 05/01/2020    HGB 13.1 05/01/2020    HCT 43.1 05/01/2020    MCV 92 05/01/2020    MCH 28.9 03/02/2020    MCHC 30.4 05/01/2020    RDW 15.9 03/02/2020     05/01/2020    MPV 9.9 09/15/2015       Lab Results   Component Value Date    LABAMPH Neg 03/12/2020    BARBSCNU Neg 03/12/2020    LABBENZ Neg 03/12/2020    CANSU Neg 03/12/2020    COCAIMETSCRU Neg 03/12/2020    PHENCYCLIDINESCREENURINE Neg 03/08/6628    TRICYCLIC Negative 00/20/8727    DSCOMMENT see below 03/12/2020       Lab Results   Component Value Date    CODEINE Not Detected 09/16/2016    MORPHINE Not Detected 09/16/2016    ACETYLMORPHI Not Detected 09/16/2016    OXYCODONE Present 09/16/2016    NOROXYCODONE Present 09/16/2016    NOROXYMU Present 09/16/2016    HYDRCO Not Detected 09/16/2016    NORHYDU Not Detected 09/16/2016    HYDROMO Not Detected 09/16/2016    BUPREN Not Detected 09/16/2016    Harrel Cherry Not Detected 09/16/2016    FENTA Not Detected 09/16/2016    NORFENT Not Detected 09/16/2016    MEPERIDINE Not Detected 09/16/2016    TAPENU Not Detected 09/16/2016    TAPOSULFUR Not Detected 09/16/2016    METHADONE Not Detected 09/16/2016    LABPROP Not Detected 09/16/2016    TRAM Not Detected 09/16/2016    AMPH Not Detected 09/16/2016    METHAMP Not Detected 09/16/2016    MDMA Not Detected 09/16/2016    ECMDA Not Detected 09/16/2016       Lab Results   Component Value Date    PHENTERMINE Not Detected 09/16/2016    BENZOYL Not Detected 09/16/2016    Job Blush Not Detected 09/16/2016    ALPHAOHALPRA Not Detected 09/16/2016    CLONAZEPAM Not Detected 09/16/2016    Aneita Ellsworth Not Detected 09/16/2016    DIAZEP Not Detected 09/16/2016    SAFIA Not Detected 09/16/2016    OXAZ Not Detected 09/16/2016    Arvilla Sutton Not Detected 09/16/2016    LORAZEPAM Not Detected 09/16/2016    MIDAZOLAM Not Detected 09/16/2016    ZOLPIDEM Not Detected 09/16/2016    REG Not Detected 09/16/2016    ETG Not Detected 09/16/2016    MARIJMET Not Detected 09/16/2016    PCP Not Detected 09/16/2016    PAINMGTDRUGP See Below 09/16/2016    EERPAINMGTPA See Note 09/16/2016    LABCREA 199.2 03/02/2020         , I discussed results with patient. See follow-up plans for new studies. Therapies:  HEP-gentle stretching and relaxation techniques-demonstrated with patient-they are to do them twice a day. They are also advised to make the following lifestyle changes:  Goals      Exercise 3x per week (30 min per time)      SOAPP-R GOAL LESS THAN 9      09/16/16 SCORE: 33-HIGH RISK   11/11/16 score: 27-high risk     01/13/17 score: 16-moderate risk   03/13/17 Score: 11-moderate risk   06/01/17 score: 15-moderate risk  08/03/17 score: 15-moderate risk  10/04/17 score: 18-moderate risk  12/08/17 score: 11-moderate risk  02/09/18 score: 12-moderate risk  04/13/18 score: 14-moderate risk  7/12/18 score 10-moderate risk  11/29/18 score 17- moderate risk  01/28/19 score  16-moderate risk  6/10/19 score: 11- moderate risk  8/15/19 score: 17- moderate risk   4/8/19 score  7- low risk  3/11/20 score: 17- moderate risk  5- score- 16 - moderate risk             Injections or Epidurals:  Injection options were discussed. Monthly trigger point injections add sciatic blocks as needed patient gave verbal consent to ordered injections. See follow-up plans for planned injections.     Supplements:  Vitamin D

## 2020-05-22 ENCOUNTER — PROCEDURE VISIT (OUTPATIENT)
Dept: PHYSICAL MEDICINE AND REHAB | Age: 70
End: 2020-05-22
Payer: MEDICARE

## 2020-05-22 PROCEDURE — 96372 THER/PROPH/DIAG INJ SC/IM: CPT | Performed by: PHYSICAL MEDICINE & REHABILITATION

## 2020-05-22 RX ORDER — LIDOCAINE HYDROCHLORIDE 10 MG/ML
23 INJECTION, SOLUTION INFILTRATION; PERINEURAL ONCE
Status: COMPLETED | OUTPATIENT
Start: 2020-05-22 | End: 2020-05-22

## 2020-05-22 RX ORDER — CYANOCOBALAMIN 1000 UG/ML
1000 INJECTION INTRAMUSCULAR; SUBCUTANEOUS ONCE
Status: COMPLETED | OUTPATIENT
Start: 2020-05-22 | End: 2020-05-22

## 2020-05-22 RX ADMIN — CYANOCOBALAMIN 1000 MCG: 1000 INJECTION INTRAMUSCULAR; SUBCUTANEOUS at 12:27

## 2020-05-22 RX ADMIN — LIDOCAINE HYDROCHLORIDE 23 ML: 10 INJECTION, SOLUTION INFILTRATION; PERINEURAL at 12:28

## 2020-05-22 NOTE — PROGRESS NOTES
Patient here for trigger point injections. Patient taken back to exam room, and placed on drape locking stool. Areas to be injected marked appropriately, and cleansed with alcohol. 23cc of 1% Lidocaine and 1 cc of Vitamin B12 to be injected by provider.

## 2020-06-11 DIAGNOSIS — E29.1 HYPOGONADISM MALE: ICD-10-CM

## 2020-06-11 DIAGNOSIS — R73.03 PREDIABETES: ICD-10-CM

## 2020-06-11 LAB
ANION GAP SERPL CALCULATED.3IONS-SCNC: 11 MEQ/L (ref 9–15)
BUN BLDV-MCNC: 17 MG/DL (ref 8–23)
CALCIUM SERPL-MCNC: 9.9 MG/DL (ref 8.5–9.9)
CHLORIDE BLD-SCNC: 103 MEQ/L (ref 95–107)
CO2: 28 MEQ/L (ref 20–31)
CREAT SERPL-MCNC: 1.05 MG/DL (ref 0.7–1.2)
GFR AFRICAN AMERICAN: >60
GFR NON-AFRICAN AMERICAN: >60
GLUCOSE BLD-MCNC: 107 MG/DL (ref 70–99)
HBA1C MFR BLD: 6.5 % (ref 4.8–5.9)
HCT VFR BLD CALC: 41.1 % (ref 42–52)
HEMOGLOBIN: 13.3 G/DL (ref 14–18)
MCH RBC QN AUTO: 29.1 PG (ref 27–31.3)
MCHC RBC AUTO-ENTMCNC: 32.3 % (ref 33–37)
MCV RBC AUTO: 90.2 FL (ref 80–100)
PDW BLD-RTO: 19.7 % (ref 11.5–14.5)
PLATELET # BLD: 291 K/UL (ref 130–400)
POTASSIUM SERPL-SCNC: 4.8 MEQ/L (ref 3.4–4.9)
RBC # BLD: 4.56 M/UL (ref 4.7–6.1)
SODIUM BLD-SCNC: 142 MEQ/L (ref 135–144)
WBC # BLD: 4.7 K/UL (ref 4.8–10.8)

## 2020-06-11 RX ORDER — GABAPENTIN 300 MG/1
CAPSULE ORAL
Qty: 270 CAPSULE | Refills: 0 | Status: SHIPPED | OUTPATIENT
Start: 2020-06-11 | End: 2020-09-09 | Stop reason: SDUPTHER

## 2020-06-12 LAB
SEX HORMONE BINDING GLOBULIN: 44 NMOL/L (ref 11–80)
TESTOSTERONE FREE PERCENT: 1.5 % (ref 1.6–2.9)
TESTOSTERONE FREE, CALC: 48 PG/ML (ref 47–244)
TESTOSTERONE TOTAL-MALE: 318 NG/DL (ref 300–720)

## 2020-06-16 RX ORDER — OXYCODONE AND ACETAMINOPHEN 7.5; 325 MG/1; MG/1
1 TABLET ORAL EVERY 4 HOURS PRN
Qty: 90 TABLET | Refills: 0 | Status: SHIPPED | OUTPATIENT
Start: 2020-06-16 | End: 2020-07-14 | Stop reason: SDUPTHER

## 2020-06-22 ENCOUNTER — PROCEDURE VISIT (OUTPATIENT)
Dept: PHYSICAL MEDICINE AND REHAB | Age: 70
End: 2020-06-22
Payer: MEDICARE

## 2020-06-22 PROCEDURE — 20553 NJX 1/MLT TRIGGER POINTS 3/>: CPT | Performed by: PHYSICAL MEDICINE & REHABILITATION

## 2020-06-22 PROCEDURE — 96372 THER/PROPH/DIAG INJ SC/IM: CPT | Performed by: PHYSICAL MEDICINE & REHABILITATION

## 2020-06-23 RX ORDER — LIDOCAINE HYDROCHLORIDE 10 MG/ML
23 INJECTION, SOLUTION INFILTRATION; PERINEURAL ONCE
Status: COMPLETED | OUTPATIENT
Start: 2020-06-23 | End: 2020-06-23

## 2020-06-23 RX ORDER — CYANOCOBALAMIN 1000 UG/ML
1000 INJECTION INTRAMUSCULAR; SUBCUTANEOUS ONCE
Status: COMPLETED | OUTPATIENT
Start: 2020-06-23 | End: 2020-06-23

## 2020-06-23 RX ADMIN — CYANOCOBALAMIN 1000 MCG: 1000 INJECTION INTRAMUSCULAR; SUBCUTANEOUS at 09:48

## 2020-06-23 RX ADMIN — LIDOCAINE HYDROCHLORIDE 23 ML: 10 INJECTION, SOLUTION INFILTRATION; PERINEURAL at 09:49

## 2020-06-24 RX ORDER — FENTANYL 25 UG/H
PATCH TRANSDERMAL
COMMUNITY
Start: 2019-10-18 | End: 2020-06-25

## 2020-06-24 RX ORDER — LACTULOSE 10 G/15ML
SOLUTION ORAL
COMMUNITY
Start: 2019-08-12 | End: 2020-06-25

## 2020-06-24 RX ORDER — PHENAZOPYRIDINE HYDROCHLORIDE 100 MG/1
TABLET, FILM COATED ORAL
COMMUNITY
Start: 2020-05-05 | End: 2020-06-25

## 2020-06-24 RX ORDER — KETOROLAC TROMETHAMINE 15.75 MG/1
SPRAY, METERED NASAL
COMMUNITY
Start: 2020-05-04 | End: 2020-06-25

## 2020-06-25 ENCOUNTER — OFFICE VISIT (OUTPATIENT)
Dept: FAMILY MEDICINE CLINIC | Age: 70
End: 2020-06-25
Payer: MEDICARE

## 2020-06-25 VITALS
DIASTOLIC BLOOD PRESSURE: 60 MMHG | RESPIRATION RATE: 16 BRPM | HEIGHT: 62 IN | SYSTOLIC BLOOD PRESSURE: 110 MMHG | OXYGEN SATURATION: 99 % | BODY MASS INDEX: 30.18 KG/M2 | HEART RATE: 62 BPM | TEMPERATURE: 98.1 F | WEIGHT: 164 LBS

## 2020-06-25 PROBLEM — I10 HYPERTENSIVE DISORDER: Status: ACTIVE | Noted: 2020-06-25

## 2020-06-25 PROCEDURE — 99214 OFFICE O/P EST MOD 30 MIN: CPT | Performed by: PHYSICIAN ASSISTANT

## 2020-06-25 ASSESSMENT — PATIENT HEALTH QUESTIONNAIRE - PHQ9
2. FEELING DOWN, DEPRESSED OR HOPELESS: 0
SUM OF ALL RESPONSES TO PHQ QUESTIONS 1-9: 0
SUM OF ALL RESPONSES TO PHQ9 QUESTIONS 1 & 2: 0
SUM OF ALL RESPONSES TO PHQ QUESTIONS 1-9: 0
1. LITTLE INTEREST OR PLEASURE IN DOING THINGS: 0

## 2020-06-25 NOTE — PROGRESS NOTES
daytime sleepiness.  No HA.      Hypogonadism.  Monitor and followed by Dr. Deandre Page with home testosterone injections.  Denies any unusual side effects.       Prediabetes.  Recent HgbA1c: 6.5%. Taking metformin.  Tolerating medication without any noted side effects.  Not checking blood sugars at home.      Review of Systems   Constitutional: Negative for activity change, appetite change, chills, diaphoresis, fatigue and unexpected weight change. HENT: Negative for congestion, dental problem, nosebleeds, sinus pressure and trouble swallowing. Eyes: Negative for visual disturbance. Respiratory: Negative for cough, chest tightness, shortness of breath and wheezing. Cardiovascular: Negative for chest pain, palpitations and leg swelling. Gastrointestinal: Positive for constipation. Negative for abdominal distention, abdominal pain, blood in stool, diarrhea, nausea and rectal pain. Genitourinary: Positive for frequency. Negative for decreased urine volume, difficulty urinating, dysuria, hematuria, penile swelling, scrotal swelling, testicular pain and urgency. Musculoskeletal: Positive for arthralgias, back pain, gait problem, myalgias and neck stiffness. Negative for joint swelling and neck pain. IMPROVED overall   Skin: Negative for color change and rash. Neurological: Negative for dizziness, tremors, syncope, facial asymmetry, speech difficulty, weakness, light-headedness, numbness and headaches. Psychiatric/Behavioral: Negative for dysphoric mood and sleep disturbance. The patient is not nervous/anxious.       Past Medical History:   Diagnosis Date    Chronic back pain     Coronary artery disease 2002    [facility] Enmanuel Dahl at [facility] Esophageal ulcer 3/10/2014    Dr. Angie Becerra    Gastric ulcer 3/10/2014    Dr. Angie Becerra    Gout     Hiatal hernia 3/10/2014    Dr. Angie Becerra    Hyperlipidemia     Hypertension     MI (myocardial infarction) [facility]) 2005     Female     Comment: monogomous sexual partner, wife. Lifestyle    Physical activity     Days per week: Not on file     Minutes per session: Not on file    Stress: Not on file   Relationships    Social connections     Talks on phone: Not on file     Gets together: Not on file     Attends [employer] service: Not on file     Active member of club or organization: Not on file     Attends meetings of clubs or organizations: Not on file     Relationship status: Not on file    Intimate partner violence     Fear of current or ex partner: Not on file     Emotionally abused: Not on file     Physically abused: Not on file     Forced sexual activity: Not on file   Other Topics Concern    Not on file   Social History Narrative    Tobacco -- never smoked or chewed. Alcohol -- have not used for past 10 years, prior to had consumed 6 pack of beer daily. Drugs -- tried marijuana and cocaine 14 years ago occasionally used for 3-4 years and then stopped completely 10 years ago. Education -- completed 11th grade in [place].    Occupation -- [employer] work,  at [employer].    Marital status --  44 years, 2 grown sons. Family History   Problem Relation Age of Onset    High Blood Pressure Mother     Arthritis Mother     Cancer Father         throat    Arthritis Father      No Known Allergies  Current Outpatient Medications   Medication Sig Dispense Refill    oxyCODONE-acetaminophen (PERCOCET) 7.5-325 MG per tablet Take 1 tablet by mouth every 4 hours as needed for Pain (Max of 90 tablets per month) for up to 30 days.  Intended supply: 30 days 90 tablet 0    gabapentin (NEURONTIN) 300 MG capsule One capsule in the am, 2 in hs Intended supply: 30 days 270 capsule 0    baclofen (LIORESAL) 10 MG tablet TAKE 1 TABLET BY MOUTH THREE TIMES DAILY 90 tablet 1    pravastatin (PRAVACHOL) 40 MG tablet TAKE 1 TABLET BY MOUTH EVERY EVENING 90 tablet 3    allopurinol (ZYLOPRIM) 100 MG tablet TAKE 1 TABLET BY MOUTH DAILY 90 tablet 3    sildenafil (VIAGRA) 100 MG tablet TAKE 1 TABLET BY MOUTH AS NEEDED FOR ERECTILE DYSFUNCTION (NO MORE THAN 1 TABLET EVERY 24 HOURS) 30 tablet 3    nitroGLYCERIN (NITROSTAT) 0.4 MG SL tablet Place 1 tablet under the tongue every 5 minutes as needed x 3 doses for chest pain. 25 tablet 2    vitamin D (CHOLECALCIFEROL) 25 MCG (1000 UT) TABS tablet Take 1,000 Units by mouth daily      tamsulosin (FLOMAX) 0.4 MG capsule TAKE 1 CAPSULE DAILY AT BEDTIME      metoprolol tartrate (LOPRESSOR) 50 MG tablet TAKE 1 TABLET BY MOUTH THREE TIMES DAILY 270 tablet 2    testosterone cypionate (DEPOTESTOTERONE CYPIONATE) 200 MG/ML injection INJECT 0.5 ML INTO THE MUSCLE EVERY 2 WEEKS 10 mL 1    CPAP Machine MISC by NOT APPLICABLE route      metFORMIN (GLUCOPHAGE) 500 MG tablet TAKE 1 TABLET BY MOUTH TWICE DAILY WITH MEALS 180 tablet 3    SYRINGE-NEEDLE, DISP, 3 ML (B-D INTEGRA SYRINGE) 22G X 1-1/2\" 3 ML MISC 1 each by Does not apply route daily 20 each 6    CPAP Machine MISC by Does not apply route New CPAP  with 10 cm 1 each 0    aspirin 81 MG EC tablet Take 1 tablet by mouth daily 90 tablet 1    NIFEdipine (PROCARDIA XL) 60 MG extended release tablet Take 60 mg by mouth daily       No current facility-administered medications for this visit. PMH, Surgical Hx, Family Hx, and Social Hx reviewed and updated. Health Maintenance reviewed. Objective  Vitals:    06/25/20 1106   BP: 110/60   Site: Left Upper Arm   Position: Sitting   Cuff Size: Medium Adult   Pulse: 62   Resp: 16   Temp: 98.1 °F (36.7 °C)   TempSrc: Temporal   SpO2: 99%   Weight: 164 lb (74.4 kg)   Height: 5' 2\" (1.575 m)     BP Readings from Last 3 Encounters:   06/26/20 (!) 140/75   06/25/20 110/60   05/18/20 126/68     Wt Readings from Last 3 Encounters:   06/26/20 163 lb (73.9 kg)   06/25/20 164 lb (74.4 kg)   05/18/20 162 lb (73.5 kg)     Physical Exam  Vitals signs reviewed.    Constitutional:       General: He is

## 2020-06-26 ENCOUNTER — OFFICE VISIT (OUTPATIENT)
Dept: CARDIOLOGY CLINIC | Age: 70
End: 2020-06-26
Payer: MEDICARE

## 2020-06-26 VITALS
WEIGHT: 163 LBS | DIASTOLIC BLOOD PRESSURE: 75 MMHG | HEART RATE: 77 BPM | SYSTOLIC BLOOD PRESSURE: 140 MMHG | RESPIRATION RATE: 18 BRPM | OXYGEN SATURATION: 99 % | BODY MASS INDEX: 29.81 KG/M2

## 2020-06-26 PROCEDURE — 99214 OFFICE O/P EST MOD 30 MIN: CPT | Performed by: INTERNAL MEDICINE

## 2020-06-26 RX ORDER — NIFEDIPINE 60 MG/1
60 TABLET, EXTENDED RELEASE ORAL DAILY
COMMUNITY
End: 2020-09-24 | Stop reason: SDUPTHER

## 2020-06-26 ASSESSMENT — ENCOUNTER SYMPTOMS
GASTROINTESTINAL NEGATIVE: 1
BACK PAIN: 1
STRIDOR: 0
EYES NEGATIVE: 1
COUGH: 0
CHEST TIGHTNESS: 0
WHEEZING: 0
RESPIRATORY NEGATIVE: 1
BLOOD IN STOOL: 0
NAUSEA: 0
SHORTNESS OF BREATH: 0

## 2020-06-26 NOTE — PROGRESS NOTES
Subsequent Progress Note  Patient: Norma Zurita  YOB: 1950  MRN: [id number]    Chief Complaint: htn cad lipid preop back   Chief Complaint   Patient presents with    Follow-up     4 MONTH    Coronary Artery Disease    Hypertension    Dizziness     occasional       CV Data:  Prior CAD/Stentsx 2  10/17/2017 CABG x2 EF 55  10/2018  Stress echo EF 55  Persistent HyperK    Subjective/HPI: no cp no osb no falls noblled has back arthritis getting shots with some releif. 10/25/2019 No cp no sob no falls no bleed. Takes meds. Eating well. 2/26/2020 no cp no sob. Had extensive back SX. Did well. 6/26/2020 no cp no osb no falls no bleed. Takes meds.  Walks and active no bleed     EKG:    Past Medical History:   Diagnosis Date    Chronic back pain     Coronary artery disease 2002    [facility]. Fareed Keating at [facility] Esophageal ulcer 3/10/2014    Dr. Cleo Peoples    Gastric ulcer 3/10/2014    Dr. Cleo Peoples    Gout     Hiatal hernia 3/10/2014    Dr. Cleo Peoples    Hyperlipidemia     Hypertension     MI ([facility]ocardial infarction) [facility]) 2005    Dr. Peter Ro Peripheral vascular disease ([facility] [address]     Prediabetes     Schizo-affective schizophrenia, in remission ([address] 2/1/2005    Overview:  followed at the [facility] Severe single current episode of major depressive disorder, without psychotic features ([facility] [address] 7/8/2016    Status post coronary artery stent placement 2002    Dr. Fareed Keating       Past Surgical History:   Procedure Laterality Date    Johanna Marmolejo  01/03/2020    Dr. Adams Right 6/4/2019    RIGHT WRIST CARPAL TUNNEL RELEASE, SUPINE, PAT AT PCP performed by Betsy Mcelroy MD at [address] [facility]  3/10/14    DR Zabrina Ching  2002    Dr. Morris 83 GRAFT  10/17/2017    KNEE daily.    Drugs -- tried marijuana and cocaine 14 years ago occasionally used for 3-4 years and then stopped completely 10 years ago. Education -- completed 11th grade in [place].    Occupation -- [employer] work,  at [employer].    Marital status --  44 years, 2 grown sons. No Known Allergies    Current Outpatient Medications   Medication Sig Dispense Refill    NIFEdipine (PROCARDIA XL) 60 MG extended release tablet Take 60 mg by mouth daily      oxyCODONE-acetaminophen (PERCOCET) 7.5-325 MG per tablet Take 1 tablet by mouth every 4 hours as needed for Pain (Max of 90 tablets per month) for up to 30 days. Intended supply: 30 days 90 tablet 0    gabapentin (NEURONTIN) 300 MG capsule One capsule in the am, 2 in hs Intended supply: 30 days 270 capsule 0    baclofen (LIORESAL) 10 MG tablet TAKE 1 TABLET BY MOUTH THREE TIMES DAILY 90 tablet 1    pravastatin (PRAVACHOL) 40 MG tablet TAKE 1 TABLET BY MOUTH EVERY EVENING 90 tablet 3    allopurinol (ZYLOPRIM) 100 MG tablet TAKE 1 TABLET BY MOUTH DAILY 90 tablet 3    sildenafil (VIAGRA) 100 MG tablet TAKE 1 TABLET BY MOUTH AS NEEDED FOR ERECTILE DYSFUNCTION (NO MORE THAN 1 TABLET EVERY 24 HOURS) 30 tablet 3    nitroGLYCERIN (NITROSTAT) 0.4 MG SL tablet Place 1 tablet under the tongue every 5 minutes as needed x 3 doses for chest pain.  25 tablet 2    vitamin D (CHOLECALCIFEROL) 25 MCG (1000 UT) TABS tablet Take 1,000 Units by mouth daily      tamsulosin (FLOMAX) 0.4 MG capsule TAKE 1 CAPSULE DAILY AT BEDTIME      metoprolol tartrate (LOPRESSOR) 50 MG tablet TAKE 1 TABLET BY MOUTH THREE TIMES DAILY 270 tablet 2    testosterone cypionate (DEPOTESTOTERONE CYPIONATE) 200 MG/ML injection INJECT 0.5 ML INTO THE MUSCLE EVERY 2 WEEKS 10 mL 1    CPAP Machine MISC by NOT APPLICABLE route      metFORMIN (GLUCOPHAGE) 500 MG tablet TAKE 1 TABLET BY MOUTH TWICE DAILY WITH MEALS 180 tablet 3    SYRINGE-NEEDLE, DISP, 3 ML (B-D INTE[employer]RA SYRINGE) 22G X 1-1/2\" 3 ML MISC 1 each by Does not apply route daily 20 each 6    CPAP Machine MISC by Does not apply route New CPAP  with 10 cm 1 each 0    aspirin 81 MG EC tablet Take 1 tablet by mouth daily 90 tablet 1     No current facility-administered medications for this visit. Review of Systems:   Review of Systems   Constitutional: Negative. Negative for diaphoresis and fatigue. HENT: Negative. Eyes: Negative. Respiratory: Negative. Negative for cough, chest tightness, shortness of breath, wheezing and stridor. Cardiovascular: Negative. Negative for chest pain, palpitations and leg swelling. Gastrointestinal: Negative. Negative for blood in stool and nausea. Genitourinary: Negative. Musculoskeletal: Positive for arthralgias and back pain. Skin: Negative. Neurological: Negative. Negative for dizziness, syncope, weakness and light-headedness. Hematological: Negative. Psychiatric/Behavioral: Negative. Physical Examination:    BP (!) 140/75 (Site: Left Upper Arm, Position: Sitting, Cuff Size: Small Adult)   Pulse 77   Resp 18   Wt 163 lb (73.9 kg)   SpO2 99%   BMI 29.81 kg/m²    Physical Exam   Constitutional: He appears healthy. No distress. HENT:   Normal cephalic and Atraumatic   Eyes: Pupils are equal, round, and reactive to light. Neck: Normal range of motion and thyroid normal. Neck supple. No JVD present. No neck adenopathy. No thyromegaly present. Cardiovascular: Normal rate, regular rhythm, intact distal pulses and normal pulses. Murmur heard. Pulmonary/Chest: Effort normal and breath sounds normal. He has no wheezes. He has no rales. He exhibits no tenderness. Abdominal: Soft. Bowel sounds are normal. There is no abdominal tenderness. Musculoskeletal: Normal range of motion. General: No tenderness or edema. Neurological: He is alert and oriented to person, place, and time. Skin: Skin is warm. No cyanosis.  Nails show no

## 2020-06-28 PROBLEM — I10 HYPERTENSIVE DISORDER: Status: RESOLVED | Noted: 2020-06-25 | Resolved: 2020-06-28

## 2020-06-28 PROBLEM — J11.1 INFLUENZA-LIKE ILLNESS: Status: RESOLVED | Noted: 2020-03-11 | Resolved: 2020-06-28

## 2020-06-28 PROBLEM — R79.89 ABNORMAL CBC: Status: RESOLVED | Noted: 2020-02-20 | Resolved: 2020-06-28

## 2020-06-28 ASSESSMENT — ENCOUNTER SYMPTOMS
SHORTNESS OF BREATH: 0
COUGH: 0
DIARRHEA: 0
RECTAL PAIN: 0
NAUSEA: 0
COLOR CHANGE: 0
ABDOMINAL PAIN: 0
CONSTIPATION: 1
BACK PAIN: 1
SINUS PRESSURE: 0
WHEEZING: 0
ABDOMINAL DISTENTION: 0
CHEST TIGHTNESS: 0
BLOOD IN STOOL: 0
TROUBLE SWALLOWING: 0

## 2020-07-08 RX ORDER — BACLOFEN 10 MG/1
TABLET ORAL
Qty: 90 TABLET | Refills: 1 | Status: SHIPPED | OUTPATIENT
Start: 2020-07-08 | End: 2020-09-10

## 2020-07-13 ENCOUNTER — HOSPITAL ENCOUNTER (OUTPATIENT)
Dept: ULTRASOUND IMAGING | Age: 70
Discharge: HOME | End: 2020-07-15
Payer: MEDICARE

## 2020-07-13 PROCEDURE — 93880 EXTRACRANIAL BILAT STUDY: CPT | Performed by: INTERNAL MEDICINE

## 2020-07-13 PROCEDURE — 93880 EXTRACRANIAL BILAT STUDY: CPT

## 2020-07-14 RX ORDER — OXYCODONE AND ACETAMINOPHEN 7.5; 325 MG/1; MG/1
1 TABLET ORAL EVERY 4 HOURS PRN
Qty: 90 TABLET | Refills: 0 | Status: SHIPPED | OUTPATIENT
Start: 2020-07-15 | End: 2020-09-11 | Stop reason: SDUPTHER

## 2020-07-20 ENCOUNTER — OFFICE VISIT (OUTPATIENT)
Dept: PHYSICAL MEDICINE AND REHAB | Age: 70
End: 2020-07-20
Payer: MEDICARE

## 2020-07-20 VITALS
DIASTOLIC BLOOD PRESSURE: 78 MMHG | SYSTOLIC BLOOD PRESSURE: 134 MMHG | WEIGHT: 158 LBS | BODY MASS INDEX: 24.8 KG/M2 | HEIGHT: 67 IN

## 2020-07-20 PROCEDURE — 99214 OFFICE O/P EST MOD 30 MIN: CPT | Performed by: PHYSICAL MEDICINE & REHABILITATION

## 2020-07-20 ASSESSMENT — ENCOUNTER SYMPTOMS
VISUAL CHANGE: 0
BACK PAIN: 1
COUGH: 0
VOMITING: 0
CONSTIPATION: 1
DIARRHEA: 0
SHORTNESS OF BREATH: 1
SORE THROAT: 0
EYE PAIN: 0
STRIDOR: 0
BLOOD IN STOOL: 0
WHEEZING: 0
ABDOMINAL PAIN: 0
EYE REDNESS: 0
NAUSEA: 0
PHOTOPHOBIA: 0

## 2020-07-20 NOTE — PROGRESS NOTES
Loyd, 77 y.o. male presents today with:       Back Pain (Trigger point injections 5/22/20 & 6/22/20 with 90% reduction in pain lasting 1 month)   Shoulder Pain (Right)   Leg Pain (Bilateral)   Foot Pain (Bilateral)   Medication Refill (Percocet, Gabapentin, Baclofen refill & pill count today)        He is more functional on the opiate meds--able to do yard work and interact with friends and family in a positive friendly manner. Right shoulder is flared up- -he is back is feeling better status post recent back surgery at [facility]. Office injections are  TP sciatic block and help a lot. Still no signs of overuse or abuse of his meds. He states that his right leg has been going numb but he is told that from his back and there is no further surgery they could help that. He had surgery in the back several times in the    Will see urology re prostate surgery. Injections still help very much. He would like to schedule monthly trigger point and sciatic injection in between times. Back Pain   This is a chronic problem. The current episode started more than 1 year ago. The problem occurs daily. The problem is unchanged. The pain is present in the lumbar spine. The quality of the pain is described as aching, cramping, shooting and stabbing. The pain radiates to the right foot, right knee and right thigh. The pain is at a severity of 3/10. The pain is severe. The pain is worse during the day. The symptoms are aggravated by bending, lying down, position, sitting, standing and twisting. Stiffness is present in the morning. Associated symptoms include leg pain, numbness and weakness. Pertinent negatives include no abdominal pain, chest pain, dysuria, fever, headaches, paresis, perianal numbness or tingling. Risk factors include lack of exercise and sedentary lifestyle.  He has tried analgesics, home exercises, NSAIDs, muscle relaxant, heat and walking (SI injections which help, trigger point Diagnosis Date    Chronic back pain     Coronary artery disease 2002    Dr. Sa[facility]l Carranza at [facility] Esophageal ulcer 3/10/2014    Dr. Karlene Valenzuela Gastric ulcer 3/10/2014    Dr. Malathi Dumont    Gout     Hiatal hernia 3/10/2014    Dr. Malathi Dumont    Hyperlipidemia     Hypertension     MI [facility]ocardial infarction) [facility]) 2005    Dr. Noreen Shah Peripheral vascular disease ([facility] [address]     Prediabetes     Schizo-affective schizophrenia, in remission ([facility] [address] 2/1/2005    Overview:  followed at the [facility] Severe single current episode of major depressive disorder, without psychotic features ([facility] [address] 7/8/2016    Status post coronary artery stent placement 2002    Dr. Saúl Carranza     Past Surgical History:   Procedure Laterality Date   [address]  01/03/2020    Dr. Sagrario Nuno Right 6/4/2019    RIGHT WRIST CARPAL TUNNEL RELEASE, SUPINE, PAT AT PCP performed by Alicia Carvajal MD at [place] [id number]  3/10/14    DR Yuriy Watkins  2002    Dr. Saleem Sees GRAFT  10/17/2017    KNEE ARTHROSCOPY Left 2002    Dr. Gamaliel Toure  12/19/13    CAUDAL BLOCKS DONE BY DR Allie Stockton    OTHER SURGICAL HISTORY  04/2020    [facility]      SPINE SURGERY  9/2009    Dr. Justin Lorenzana  2008    Dr. Joo Purvis  3/10/14    Sheryl Petty, DR Sachi Hawley [address]     Social History     Socioeconomic History    Marital status:      Spouse name: Not on file    Number of children: Not on file    Years of education: Not on file    Highest education level: Not on file   Occupational History    Occupation: disability    Social Needs    Financial resource strain: Not on file    Food insecurity     Worry: Not on file     Inability: Not on file   Daina العلي Transportation needs     Medical: Not on file     Non-medical: Not on file   Tobacco Use    Smoking status: Never Smoker    Smokeless tobacco: Never Used   Substance and Sexual Activity    Alcohol use: No     Alcohol/week: 0.0 standard drinks     Comment: Stopped years ago.  Drug use: No     Comment: YEARS AGO    Sexual activity: Yes     Partners: Female     Comment: monogomous sexual partner, wife. Lifestyle    Physical activity     Days per week: Not on file     Minutes per session: Not on file    Stress: Not on file   Relationships    Social connections     Talks on phone: Not on file     Gets together: Not on file     Attends Christianity service: Not on file     Active member of club or organization: Not on file     Attends meetings of clubs or organizations: Not on file     Relationship status: Not on file    Intimate partner violence     Fear of current or ex partner: Not on file     Emotionally abused: Not on file     Physically abused: Not on file     Forced sexual activity: Not on file   Other Topics Concern    Not on file   Social History Narrative    Tobacco -- never smoked or chewed. Alcohol -- have not used for past 10 years, prior to had consumed 6 pack of beer daily. Drugs -- tried marijuana and cocaine 14 years ago occasionally used for 3-4 years and then stopped completely 10 years ago. Education -- completed 11th grade in [employer].    Occupation -- [employer] work,  at [employer].    Marital status --  44 years, 2 grown sons. Family History   Problem Relation Age of Onset    High Blood Pressure Mother     Arthritis Mother     Cancer Father         throat    Arthritis Father        No Known Allergies    Review of Systems   Constitutional: Positive for activity change and fatigue. Negative for chills, diaphoresis, fever and unexpected weight change.    HENT: Negative for congestion, ear discharge, ear pain, hearing loss, nosebleeds, sore throat and tinnitus. Eyes: Negative for photophobia, pain and redness. Respiratory: Positive for shortness of breath. Negative for cough, wheezing and stridor. Shortness of breath on exertion   Cardiovascular: Negative for chest pain, palpitations and leg swelling. Gastrointestinal: Positive for constipation. Negative for abdominal pain, blood in stool, diarrhea, nausea and vomiting. Endocrine: Negative for polydipsia. Genitourinary: Negative for dysuria, flank pain, frequency, hematuria and urgency. Musculoskeletal: Positive for back pain, gait problem and myalgias. Negative for neck pain. Skin: Negative for rash. Allergic/Immunologic: Positive for immunocompromised state. Negative for environmental allergies. Neurological: Positive for weakness and numbness. Negative for dizziness, tingling, tremors, seizures and headaches. Hematological: Does not bruise/bleed easily. Psychiatric/Behavioral: Negative for dysphoric mood, hallucinations and suicidal ideas. The patient is not nervous/anxious. Objective    There were no vitals filed for this visit. No data recorded     Physical Exam  Vitals signs reviewed. Constitutional:       General: He is not in acute distress. Appearance: He is well-developed. He is not ill-appearing, toxic-appearing or diaphoretic. Comments:     HENT:      Head: Normocephalic and atraumatic. Right Ear: Hearing normal.      Left Ear: Hearing normal.      Nose: Nose normal.      Mouth/Throat:      Mouth: No oral lesions. Dentition: Normal dentition. Pharynx: No oropharyngeal exudate. Eyes:      General: No scleral icterus. Right eye: No discharge. Left eye: No discharge. Conjunctiva/sclera: Conjunctivae normal.      Right eye: No chemosis or exudate. Left eye: No chemosis or exudate. Pupils: Pupils are equal, round, and reactive to light. Neck:      Musculoskeletal: Neck supple.  Spinous process tenderness and capillary refill, no deformity, no laceration and no swelling. Decreased sensation noted. Decreased sensation is present in the ulnar distribution, is present in the medial distribution and is present in the radial distribution. Decreased strength noted. He exhibits finger abduction, thumb/finger opposition and wrist extension trouble. Left hand: He exhibits decreased range of motion and bony tenderness. Decreased sensation noted. Decreased sensation is present in the ulnar distribution and is present in the radial distribution. Decreased strength noted. He exhibits finger abduction and wrist extension trouble. Right upper leg: Normal.      Left upper leg: Normal.      Right lower leg: Normal.      Left lower leg: Normal.        Legs:       Right foot: Normal.      Left foot: Normal.      Comments: Tender areas are indicated by numbered spot         Lymphadenopathy:      Cervical: No cervical adenopathy. Skin:     General: Skin is warm and dry. Coloration: Skin is not pale. Findings: No abrasion, bruising, ecchymosis, erythema, laceration, petechiae or rash. Rash is not macular, pustular or urticarial.      Nails: There is no clubbing. Neurological:      Mental Status: He is alert and oriented to person, place, and time. Cranial Nerves: No cranial nerve deficit. Sensory: Sensory deficit present. Motor: No tremor, atrophy or abnormal muscle tone. Coordination: Coordination normal.      Gait: Gait abnormal.      Deep Tendon Reflexes: Reflexes abnormal. Babinski sign absent on the right side. Babinski sign absent on the left side. Reflex Scores:       Patellar reflexes are 1+ on the right side and 1+ on the left side. Achilles reflexes are 0 on the right side and 0 on the left side. Psychiatric:         Attention and Perception: He is attentive. Mood and Affect: Mood is not anxious or depressed. Affect is not labile, blunt, angry or inappropriate. Speech: He is communicative. Speech is not rapid and pressured, delayed, slurred or tangential.         Behavior: Behavior normal. Behavior is not agitated, slowed, aggressive, withdrawn, hyperactive or combative. Thought Content: Thought content normal. Thought content is not paranoid or delusional. Thought content does not include homicidal or suicidal ideation. Thought content does not include homicidal or suicidal plan. Cognition and Memory: Memory is not impaired. He does not exhibit impaired recent memory or impaired remote memory. Judgment: Judgment normal. Judgment is not impulsive or inappropriate. Comments: high score on SOAPPR    Calm appropriate    NAD       Ortho Exam  Neurologic Exam     Mental Status   Oriented to person, place, and time. Speech: not slurred   Level of consciousness: alert  Knowledge: good. Able to name object. Able to read. Able to repeat. Able to write. Normal comprehension. Cranial Nerves     CN III, IV, VI   Pupils are equal, round, and reactive to light. Gait, Coordination, and Reflexes     Reflexes   Right patellar: 1+  Left patellar: 1+  Right achilles: 0  Left achilles: 0        After a thorough review and discussion of the previous medical records, patient comprehensive medical, surgical, and family and social history, Review of Systems, their OARRS, their Screener and Opioid Assessment for Patients with Pain (SOAPP®-R), recent diagnostics, and symptomatic results to previous treatment, it is my impression that the patients is suffering with progressive and severe:     Diagnosis Orders   1. Chronic right-sided low back pain with right-sided sciatica     2. High risk medication use - 12/07/17 OARRS PM&R, 02/08/18 OARRS PM&R, 10/05/17 Urine Drug Screen: negative PM&R, MED CONTRACT 1/17/17     3. Thoracic and lumbosacral neuritis     4. Vitamin D deficiency     5. C6 radiculopathy     6.  Myalgia  NV INJECT TRIGGER POINTS, 3 OR GREATER NM INJECT TRIGGER POINTS, 3 OR GREATER       I am also concerned by lifestyle and mood issues including:    Past Medical History:   Diagnosis Date    Chronic back pain     Coronary artery disease 2002    [facility]ia [facility] Esophageal ulcer 3/10/2014    Dr. Jean Marie Bernardo    Gastric ulcer 3/10/2014    Dr. Jean Marie Bernardo    Gout     Hiatal hernia 3/10/2014    Dr. Jean Marie Bernardo    Hyperlipidemia     Hypertension     MI ([facility]) [facility]) 2005    Dr. Temo Paredes Peripheral vascular disease ([facility] [address]     Prediabetes     Schizo-affective schizophrenia, in remission ([facility] [address] 2/1/2005    Overview:  followed at the [facility] Severe single current episode of major depressive disorder, without psychotic features ([facility] [address] 7/8/2016    Status post coronary artery stent placement 2002    Dr. Nadia Issa           Given their medication, chronic pain and lifestyle and medications they are at risk for :    Falls, constipation, addiction  Loss of livelyhood due to severe pain, debility, weight gain and  vitamin D deficiency    The patient was educated regarding proper diet, fitness routine, and regulatory restrictions concerning pain medications. Previous notes, comprehensive past medical, surgical, family history, and diagnostics were reviewed. Patient education and councelling were provided regarding off label use,treatment options and medication and injection risks. Current and old OARRS ([place] Automated Prescription Reporting System) records reviewed, all refills reviewed since last visit,  Behavioral agreement/[facility] regulations   and Toxicology screen was reviewed with patient and is up to date. There are no current red flags.    They are making good progress regarding pain relief, they are performing at a functional level regarding activities of daily living, family interactions and psychological functioning, they're not having any adverse effects or side effects from the current medications, and I see no findings of aberrant drug taking or addiction related behaviors. The patient is aware that they have a chronic pain condition and they may require opiates dosing for life. All efforts will be made to wean to the lowest effective dose. Other therapies for pain have not been effective including nonopiate medications. Injections and exercises are only partially effective. A Rx for Narcan was offered to help prevent accidental overdose. RX Monitoring 5/18/2020   Attestation -   Periodic Controlled Substance Monitoring Possible medication side effects, risk of tolerance/dependence & alternative treatments discussed. ;No signs of potential drug abuse or diversion identified. ;Assessed functional status. ;Obtaining appropriate analgesic effect of treatment. Chronic Pain > 50 MEDD Re-evaluated the status of the patient's underlying condition causing pain. ;Considered consultation with a specialist.;Obtained or confirmed \"Consent for Opioid Use\" on file. Chronic Pain > 80 MEDD -               Patient is currently taking:       I am having Eli Saldana maintain his aspirin, CPAP Machine, SYRINGE-NEEDLE (DISP) 3 ML, metFORMIN, CPAP Machine, testosterone cypionate, tamsulosin, metoprolol tartrate, vitamin D, nitroGLYCERIN, sildenafil, pravastatin, allopurinol, gabapentin, NIFEdipine, baclofen, and oxyCODONE-acetaminophen. I also recommend the following Medications:    No orders of the defined types were placed in this encounter.       -which helps with pain and function. Otherwise, continue the current pain medications that I have prescibed. Radiologic:   Old films reviewed severe DDD DJD with multiple fixation devices and internal hardware in the lumbar spine and thoracic spine,     I discussed results with patients. see Follow up plans below  For any new studies. Care Everywhere Updates:  requested and reviewed.     No new issues noted.          Electrodiagnostic:  Previous studies requested,     I discussed results with patient. See follow-up plans for new studies.         Labs:  Previous labs reviewed     Lab Results   Component Value Date     06/11/2020    K 4.8 06/11/2020     06/11/2020    CO2 28 06/11/2020    BUN 17 06/11/2020    CREATININE 1.05 06/11/2020    CALCIUM 9.9 06/11/2020    LABALBU 4.6 05/01/2020    BILITOT 0.3 05/01/2020    ALKPHOS 98 05/01/2020    AST 18 05/01/2020    ALT 13 05/01/2020     Lab Results   Component Value Date    WBC 4.7 06/11/2020    RBC 4.56 06/11/2020    HGB 13.3 06/11/2020    HCT 41.1 06/11/2020    MCV 90.2 06/11/2020    MCH 29.1 06/11/2020    MCHC 32.3 06/11/2020    RDW 19.7 06/11/2020     06/11/2020    MPV 9.9 09/15/2015       Lab Results   Component Value Date    LABAMPH Neg 03/12/2020    BARBSCNU Neg 03/12/2020    LABBENZ Neg 03/12/2020    CANSU Neg 03/12/2020    COCAIMETSCRU Neg 03/12/2020    PHENCYCLIDINESCREENURINE Neg 51/93/4342    TRICYCLIC Negative 41/76/0870    DSCOMMENT see below 03/12/2020       Lab Results   Component Value Date    CODEINE Not Detected 09/16/2016    MORPHINE Not Detected 09/16/2016    ACETYLMORPHI Not Detected 09/16/2016    OXYCODONE Present 09/16/2016    NOROXYCODONE Present 09/16/2016    NOROXYMU Present 09/16/2016    HYDRCO Not Detected 09/16/2016    NORHYDU Not Detected 09/16/2016    HYDROMO Not Detected 09/16/2016    BUPREN Not Detected 09/16/2016    Mina Bruna Not Detected 09/16/2016    FENTA Not Detected 09/16/2016    NORFENT Not Detected 09/16/2016    MEPERIDINE Not Detected 09/16/2016    TAPENU Not Detected 09/16/2016    TAPOSULFUR Not Detected 09/16/2016    METHADONE Not Detected 09/16/2016    LABPROP Not Detected 09/16/2016    TRAM Not Detected 09/16/2016    AMPH Not Detected 09/16/2016    METHAMP Not Detected 09/16/2016    MDMA Not Detected 09/16/2016    ECMDA Not Detected 09/16/2016       Lab Results   Component Value Date    PHENTERMINE Not Detected 09/16/2016    BENZOYL Not Detected 09/16/2016    ALPRAZ Not Detected 09/16/2016    ALPHAOHALPRA Not Detected 09/16/2016    CLONAZEPAM Not Detected 09/16/2016    7AMINOCLONAZ Not Detected 09/16/2016    DIAZEP Not Detected 09/16/2016    SAFIA Not Detected 09/16/2016    OXAZ Not Detected 09/16/2016    Anabella Nan Not Detected 09/16/2016    LORAZEPAM Not Detected 09/16/2016    MIDAZOLAM Not Detected 09/16/2016    ZOLPIDEM Not Detected 09/16/2016    REG Not Detected 09/16/2016    ETG Not Detected 09/16/2016    MARIJMET Not Detected 09/16/2016    PCP Not Detected 09/16/2016    PAINMGTDRUGP See Below 09/16/2016    EERPAINMGTPA See Note 09/16/2016    LABCREA 199.2 03/02/2020     EXAMINATION:  CAROTID DUPLEX ULTRASONOGRAPHY         CLINICAL HISTORY:  CAROTID BRUITS         COMPARISONS:  July 28, 2007         TECHNIQUE:  B-mode, color flow and spectral Doppler         FINDINGS:           ARTERIAL BLOOD FLOW VELOCITY         RIGHT PS                                                       Prox CCA    118 cm/s                Mid CCA     88 cm/s                  Dist CCA    91 cm/s                  Prox ICA    90 cm/s                  Mid ICA     78 cm/s                Dist ICA    73 cm/s                Prox ECA    118 cm/s                Prox VERT   81 cm/s                       ICA/CCA     1.0                                 LEFT PS         Prox CCA    106 cm/s    Mid CCA     103 cm/s    Dist CCA    109 cm/s    Prox ICA    87 cm/s    Mid ICA     77 cm/s    Dist ICA    78 cm/s    Prox ECA    150 cm/s    Prox VERT   66 cm/s         ICA/CCA     0.8              Impression    MILD ATHEROSCLEROSIS THROUGHOUT THE CAROTID TREE WITH MINIMAL INTIMAL THICKENING.         BILATERAL ICA LESS THAN 50% STENOSIS.         BILATERAL ANTEGRADE VERTEBRAL FLOW.          , I discussed results with patient. See follow-up plans for new studies.     Therapies:  HEP-gentle stretching and relaxation techniques-demonstrated with patient-they are to do them twice a day. They are also advised to make the following lifestyle changes:  Goals      Exercise 3x per week (30 min per time)      SOAPP-R GOAL LESS THAN 9      09/16/16 SCORE: 33-HIGH RISK   11/11/16 score: 27-high risk     01/13/17 score: 16-moderate risk   03/13/17 Score: 11-moderate risk   06/01/17 score: 15-moderate risk  08/03/17 score: 15-moderate risk  10/04/17 score: 18-moderate risk  12/08/17 score: 11-moderate risk  02/09/18 score: 12-moderate risk  04/13/18 score: 14-moderate risk  7/12/18 score 10-moderate risk  11/29/18 score 17- moderate risk  01/28/19 score  16-moderate risk  6/10/19 score: 11- moderate risk  8/15/19 score: 17- moderate risk   4/8/19 score  7- low risk  3/11/20 score: 17- moderate risk  5- score- 16 - moderate risk   7/20/20 score: 21- high risk            Injections or Epidurals:  Injection options were discussed. Patient gave verbal consent to ordered injections. See follow-up plans for planned injections. Supplements:  Vitamin D with increased dosing during the rainy months,   Education was given on:   Dietary and Fitness--daily stretches and low carb diet-in chair Yoga when possible             Follow up with Primary Care Physician regarding their general medical needs. Stressed the importance of following up with PCP and specialists for his/her chronic diseases, health, CV, and cancer screening and continued care. Will follow disease activity/progression and adjust therapeutic regimen to disease activity and severity. Discussed medication dosage, usage, goals of therapy, and side effects. Available test results were reviewed -Discussed findings, impression and plan with patient. An additional 20 minutes were spent outside of the patient visit to review records. Additional time spent with the patient to discuss their questions.   Additional time spent with the patient devoted to discussing treatment strategy, planning, and implementation. Patient understands above plan; questions asked and answered. Patient agrees to plan as noted above. F/U in 2-3months  At least 50% of the visit was involved in the discussion of the options for treatment. We discussed exercises, medication, interventional therapies and surgery. Healthy life style is essential with patient hard work to achieve the wellness. In addition; discussion with the patient and/or family about any of the diagnostic results, impressions and/or recommended diagnostic studies, prognosis, risks and benefits of treatment options, instructions for treatment and/or follow-up, importance of compliance with chosen treatment options, risk-factor reduction, and patient/family education. They are to follow up in 2 months to review medication, efficacy of injections, pill counts, OARRS check, SOAPPR assessment, review diagnostics, to review previous and future treatment plans and assess appropriateness for continued therapy.         New Diagnostics  Orders Placed This Encounter   Procedures    [place] INJECT TRIGGER POINTS, 3 OR GREATER    [place] INJECT TRIGGER POINTS, 3 OR GREATER       Brenda Stubbs,

## 2020-08-31 RX ORDER — TESTOSTERONE CYPIONATE 200 MG/ML
INJECTION INTRAMUSCULAR
Qty: 10 ML | Refills: 1 | Status: SHIPPED | OUTPATIENT
Start: 2020-08-31 | End: 2021-03-30 | Stop reason: SDUPTHER

## 2020-09-01 ENCOUNTER — TELEPHONE (OUTPATIENT)
Dept: CARDIOLOGY CLINIC | Age: 70
End: 2020-09-01

## 2020-09-09 RX ORDER — GABAPENTIN 300 MG/1
CAPSULE ORAL
Qty: 270 CAPSULE | Refills: 0 | Status: SHIPPED | OUTPATIENT
Start: 2020-09-09 | End: 2020-12-04 | Stop reason: SDUPTHER

## 2020-09-10 RX ORDER — BACLOFEN 10 MG/1
TABLET ORAL
Qty: 90 TABLET | Refills: 1 | Status: SHIPPED | OUTPATIENT
Start: 2020-09-10 | End: 2020-11-06

## 2020-09-11 RX ORDER — OXYCODONE AND ACETAMINOPHEN 7.5; 325 MG/1; MG/1
1 TABLET ORAL EVERY 4 HOURS PRN
Qty: 90 TABLET | Refills: 0 | Status: SHIPPED | OUTPATIENT
Start: 2020-09-11 | End: 2020-10-12 | Stop reason: SDUPTHER

## 2020-09-18 ENCOUNTER — TELEPHONE (OUTPATIENT)
Dept: ENDOCRINOLOGY | Age: 70
End: 2020-09-18

## 2020-09-18 NOTE — TELEPHONE ENCOUNTER
I was unable to leave patient a message his labs were ordered and sent to him.  Patient phone was busy

## 2020-09-18 NOTE — TELEPHONE ENCOUNTER
Patient calling has appointment 9/29/20 and would like labs done prior to visit.  Please call him once entered [phone]

## 2020-09-21 ENCOUNTER — PROCEDURE VISIT (OUTPATIENT)
Dept: PHYSICAL MEDICINE AND REHAB | Age: 70
End: 2020-09-21
Payer: MEDICARE

## 2020-09-21 PROCEDURE — 96372 THER/PROPH/DIAG INJ SC/IM: CPT | Performed by: PHYSICAL MEDICINE & REHABILITATION

## 2020-09-21 RX ORDER — CYANOCOBALAMIN 1000 UG/ML
1000 INJECTION INTRAMUSCULAR; SUBCUTANEOUS ONCE
Status: COMPLETED | OUTPATIENT
Start: 2020-09-21 | End: 2020-09-21

## 2020-09-21 RX ORDER — LIDOCAINE HYDROCHLORIDE 10 MG/ML
24 INJECTION, SOLUTION INFILTRATION; PERINEURAL ONCE
Status: COMPLETED | OUTPATIENT
Start: 2020-09-21 | End: 2020-09-21

## 2020-09-21 RX ADMIN — LIDOCAINE HYDROCHLORIDE 23 ML: 10 INJECTION, SOLUTION INFILTRATION; PERINEURAL at 15:10

## 2020-09-21 RX ADMIN — CYANOCOBALAMIN 1000 MCG: 1000 INJECTION INTRAMUSCULAR; SUBCUTANEOUS at 15:12

## 2020-09-24 ENCOUNTER — TELEPHONE (OUTPATIENT)
Dept: FAMILY MEDICINE CLINIC | Age: 70
End: 2020-09-24

## 2020-09-24 ENCOUNTER — OFFICE VISIT (OUTPATIENT)
Dept: FAMILY MEDICINE CLINIC | Age: 70
End: 2020-09-24
Payer: MEDICARE

## 2020-09-24 VITALS
RESPIRATION RATE: 16 BRPM | BODY MASS INDEX: 25.77 KG/M2 | HEART RATE: 61 BPM | OXYGEN SATURATION: 100 % | SYSTOLIC BLOOD PRESSURE: 118 MMHG | HEIGHT: 67 IN | DIASTOLIC BLOOD PRESSURE: 68 MMHG | WEIGHT: 164.2 LBS | TEMPERATURE: 95.9 F

## 2020-09-24 DIAGNOSIS — R39.9 LOWER URINARY TRACT SYMPTOMS (LUTS): ICD-10-CM

## 2020-09-24 DIAGNOSIS — R35.0 URINARY FREQUENCY: ICD-10-CM

## 2020-09-24 PROBLEM — R42 DIZZINESS: Status: ACTIVE | Noted: 2020-09-24

## 2020-09-24 LAB
BILIRUBIN, POC: NORMAL
BLOOD URINE, POC: NORMAL
CLARITY, POC: CLEAR
COLOR, POC: YELLOW
GLUCOSE URINE, POC: NORMAL
KETONES, POC: NORMAL
LEUKOCYTE EST, POC: NORMAL
NITRITE, POC: NORMAL
PH, POC: 6
PROTEIN, POC: NORMAL
SPECIFIC GRAVITY, POC: 1.02
UROBILINOGEN, POC: NORMAL

## 2020-09-24 PROCEDURE — G0008 ADMIN INFLUENZA VIRUS VAC: HCPCS | Performed by: PHYSICIAN ASSISTANT

## 2020-09-24 PROCEDURE — 99214 OFFICE O/P EST MOD 30 MIN: CPT | Performed by: PHYSICIAN ASSISTANT

## 2020-09-24 PROCEDURE — 81002 URINALYSIS NONAUTO W/O SCOPE: CPT | Performed by: PHYSICIAN ASSISTANT

## 2020-09-24 PROCEDURE — 90694 VACC AIIV4 NO PRSRV 0.5ML IM: CPT | Performed by: PHYSICIAN ASSISTANT

## 2020-09-24 RX ORDER — BLOOD-GLUCOSE METER
1 KIT MISCELLANEOUS DAILY
Qty: 1 KIT | Refills: 0 | Status: SHIPPED | OUTPATIENT
Start: 2020-09-24

## 2020-09-24 RX ORDER — LANCETS 30 GAUGE
1 EACH MISCELLANEOUS DAILY
Qty: 300 EACH | Refills: 1 | Status: SHIPPED | OUTPATIENT
Start: 2020-09-24 | End: 2020-09-25 | Stop reason: SDUPTHER

## 2020-09-24 RX ORDER — NIFEDIPINE 60 MG/1
60 TABLET, EXTENDED RELEASE ORAL DAILY
Qty: 90 TABLET | Refills: 4 | Status: SHIPPED | OUTPATIENT
Start: 2020-09-24 | End: 2020-10-26

## 2020-09-24 ASSESSMENT — PATIENT HEALTH QUESTIONNAIRE - PHQ9
SUM OF ALL RESPONSES TO PHQ9 QUESTIONS 1 & 2: 0
SUM OF ALL RESPONSES TO PHQ QUESTIONS 1-9: 0
SUM OF ALL RESPONSES TO PHQ QUESTIONS 1-9: 0
1. LITTLE INTEREST OR PLEASURE IN DOING THINGS: 0
2. FEELING DOWN, DEPRESSED OR HOPELESS: 0

## 2020-09-24 ASSESSMENT — ENCOUNTER SYMPTOMS
TROUBLE SWALLOWING: 0
ABDOMINAL DISTENTION: 0
COUGH: 0
CHEST TIGHTNESS: 0
DIARRHEA: 0
SHORTNESS OF BREATH: 0
BACK PAIN: 1
BLOOD IN STOOL: 0
WHEEZING: 0
ABDOMINAL PAIN: 0
SINUS PRESSURE: 0
NAUSEA: 0
RECTAL PAIN: 0
CONSTIPATION: 1
COLOR CHANGE: 0

## 2020-09-24 NOTE — PROGRESS NOTES
Loyd, 77 y.o. male presents today with:  Chief Complaint   Patient presents with    3 Month Follow-Up    Urinary Frequency     urinating multiple times at night       HPI   Last OV: 6/25/2020  Overall, feeling well. Scoliosis, lumbar canal stenosis.  Patient had spinal fusion surgery (rhBMP) with Dr. Shannan Singh at [facility] on 1/3/2020;posterior hardware removal on L5-S1 with T-lift and realignment.  Surgery was successful with fusion of multilevel vertebrae. Continues to have improved pain, ROM, ADLs have improved.    Denies fall at home or recent trauma.    Did have [address]. Met all target goals. Recently followed up with surgeon, xray were done. Imaging remains stable. Continues to see Dr. Garry Garland for PMR related to chronic joint pain. Pain level, ROM, activity has significantly improved since surgery.       Decreased urinary flow.  S/p back surgery.  Initially had urinary retention.  Was referred to urology office at [address] seen by Mayco Jackson CNP, on 2/3/2020.  Started patient on 0.4 mg flomax nightly. Juliette Saldaña then underwent Urolift on 5/5/2020 as he was continuing to have symptoms of retention. Has not recently had follow up since 5/2020 (scanned under media). Continues to have frequency at night. Would like to establish with [facility] urology due to proximity.      Chronic, opioid constipation.  Chronic. Was taking 12.5 mg of movantik--which helped tremendously. Was taking samples as medication too expensive. He has been on multiple OTC and Rx medications for constipation. Failed therapy. Currently managing with OTC therapy.      CAD. Had follow up with Dr. Pérez Lieberman on 6/26/2020.    Denies CP, GUILLORY, SOB, dizziness, epigastric pain/discomfort.  Feeling well, continuing with all of his medications as prescribed.    No history of tobacco abuse. Does not drink alcohol.   Had u/s of carotid arteries on 7/13/2020--bilateral ICA less than 50% stenosis.      PRASAD.  Started on doesn't eat lunch around 12 pm   Psychiatric/Behavioral: Negative for dysphoric mood and sleep disturbance. The patient is not nervous/anxious.       Past Medical History:   Diagnosis Date    Chronic back pain     Coronary artery disease 2002    Dr. Karlos Rutledge at [facility] Esophageal ulcer 3/10/2014    Dr. Luke Garland Gastric ulcer 3/10/2014    Dr. Marisabel Umana    Gout     Hiatal hernia 3/10/2014    Dr. Marisabel Umana    Hyperlipidemia     Hypertension     MI (myocardial infarction) [facility]) 2005    Dr. Nickolas Law Peripheral vascular disease ([facility] [address]     Prediabetes     Schizo-affective schizophrenia, in remission ([facility] [address] 2/1/2005    Overview:  followed at the [facility] Severe single current episode of major depressive disorder, without psychotic features ([facility] [address] 7/8/2016    Status post coronary artery stent placement 2002    Dr. Karlos Rutledge     Past Surgical History:   Procedure Laterality Date   [address]  01/03/2020    Dr. Lasha Sotelo Right 6/4/2019    RIGHT WRIST CARPAL TUNNEL RELEASE, SUPINE, PAT AT PCP performed by Desiree Nascimento MD at [address] [place] [address]  3/10/14    DR Ranjeet Guerrero  2002    Dr. Sharyle Post GRAFT  10/17/2017    KNEE ARTHROSCOPY Left 2002    Dr. Delroy Granados  12/19/13    CAUDAL BLOCKS DONE BY DR Rose Crabtree. OTHER SURGICAL HISTORY  04/2020    urolift      SPINE SURGERY  9/2009    Dr. Samia Neri  2008    Dr. Jermaine Ball  3/10/14    Severo Lauth, DR [facility]     Social History     Socioeconomic History    Marital status:      Spouse name: Not on file    Number of children: Not on file    Years of education: Not on file    Highest education level: Not on file   Occupational History    Occupation: disability    Social Needs    Financial resource strain: Not on file    Food insecurity     Worry: Not on file     Inability: Not on file   [employer] needs     Medical: Not on file     Non-medical: Not on file   Tobacco Use    Smoking status: Never Smoker    Smokeless tobacco: Never Used   Substance and Sexual Activity    Alcohol use: No     Alcohol/week: 0.0 standard drinks     Comment: Stopped years ago.  Drug use: No     Comment: YEARS AGO    Sexual activity: Yes     Partners: Female     Comment: monogomous sexual partner, wife. Lifestyle    Physical activity     Days per week: Not on file     Minutes per session: Not on file    Stress: Not on file   Relationships    Social connections     Talks on phone: Not on file     Gets together: Not on file     Attends Cheondoism service: Not on file     Active member of club or organization: Not on file     Attends meetings of clubs or organizations: Not on file     Relationship status: Not on file    Intimate partner violence     Fear of current or ex partner: Not on file     Emotionally abused: Not on file     Physically abused: Not on file     Forced sexual activity: Not on file   Other Topics Concern    Not on file   Social History Narrative    Tobacco -- never smoked or chewed. Alcohol -- have not used for past 10 years, prior to had consumed 6 pack of beer daily. Drugs -- tried marijuana and cocaine 14 years ago occasionally used for 3-4 years and then stopped completely 10 years ago. Education -- completed 11th grade in [place].    Occupation -- [employer] work,  at [employer].    Marital status --  44 years, 2 grown sons.      Family History   Problem Relation Age of Onset    High Blood Pressure Mother     Arthritis Mother     Cancer Father         throat    Arthritis Father      No Known Allergies  Current Outpatient Medications   Medication Sig Dispense Refill    NIFEdipine (PROCARDIA XL) 60 MG extended release tablet Take 1 tablet by mouth daily 90 tablet 4    glucose monitoring kit (FREESTYLE) monitoring kit 1 kit by Does not apply route daily 1 kit 0    Lancets MISC 1 each by Does not apply route daily 300 each 1    oxyCODONE-acetaminophen (PERCOCET) 7.5-325 MG per tablet Take 1 tablet by mouth every 4 hours as needed for Pain (Max of 90 tablets per month) for up to 30 days. Intended supply: 30 days 90 tablet 0    baclofen (LIORESAL) 10 MG tablet TAKE 1 TABLET BY MOUTH THREE TIMES DAILY 90 tablet 1    gabapentin (NEURONTIN) 300 MG capsule One capsule in the am, 2 in hs Intended supply: 30 days 270 capsule 0    testosterone cypionate (DEPOTESTOTERONE CYPIONATE) 200 MG/ML injection INJECT 0.5 M ML IN THE MUSCLE EVERY 2 WEEKS 10 mL 1    pravastatin (PRAVACHOL) 40 MG tablet TAKE 1 TABLET BY MOUTH EVERY EVENING 90 tablet 3    allopurinol (ZYLOPRIM) 100 MG tablet TAKE 1 TABLET BY MOUTH DAILY 90 tablet 3    sildenafil (VIAGRA) 100 MG tablet TAKE 1 TABLET BY MOUTH AS NEEDED FOR ERECTILE DYSFUNCTION (NO MORE THAN 1 TABLET EVERY 24 HOURS) 30 tablet 3    nitroGLYCERIN (NITROSTAT) 0.4 MG SL tablet Place 1 tablet under the tongue every 5 minutes as needed x 3 doses for chest pain. 25 tablet 2    vitamin D (CHOLECALCIFEROL) 25 MCG (1000 UT) TABS tablet Take 1,000 Units by mouth daily      tamsulosin (FLOMAX) 0.4 MG capsule TAKE 1 CAPSULE DAILY AT BEDTIME      metoprolol tartrate (LOPRESSOR) 50 MG tablet TAKE 1 TABLET BY MOUTH THREE TIMES DAILY 270 tablet 2    metFORMIN (GLUCOPHAGE) 500 MG tablet TAKE 1 TABLET BY MOUTH TWICE DAILY WITH MEALS 180 tablet 3    SYRINGE-NEEDLE, DISP, 3 ML (B-D INTEGRA SYRINGE) 22G X 1-1/2\" 3 ML MISC 1 each by Does not apply route daily 20 each 6    aspirin 81 MG EC tablet Take 1 tablet by mouth daily 90 tablet 1    CPAP Machine MISC by NOT APPLICABLE route       No current facility-administered medications for this visit.       PMH, Surgical Hx, Family for home monitoring, discuss ranges of blood sugar. 4 month follow up with me; sooner if there are any questions or concerns. Orders Placed This Encounter   Procedures    Culture, Urine     Standing Status:   Future     Standing Expiration Date:   2021     Order Specific Question:   Specify (ex-cath, midstream, cysto, etc)? Answer:   midstream    INFLUENZA, QUADV, ADJUVANTED, 65 YRS =, IM, PF, PREFILL SYR, 0.5ML (FLUAD)    CBC Auto Differential     Standing Status:   Future     Standing Expiration Date:   2021    Hemoglobin A1C     Standing Status:   Future     Standing Expiration Date:   2021    Comprehensive Metabolic Panel     Standing Status:   Future     Standing Expiration Date:   2021    Testosterone Free And Total Male     Standing Status:   Future     Standing Expiration Date:   2021    Ambulatory referral to Urology     Referral Priority:   Routine     Referral Type:   Eval and Treat     Referral Reason:   Second Opinion     Referred to Provider:   Jaswinder Lazcano MD     Number of Visits Requested:   1    POCT Urinalysis no Micro     Orders Placed This Encounter   Medications    NIFEdipine (PROCARDIA XL) 60 MG extended release tablet     Sig: Take 1 tablet by mouth daily     Dispense:  90 tablet     Refill:  4    glucose monitoring kit (FREESTYLE) monitoring kit     Si kit by Does not apply route daily     Dispense:  1 kit     Refill:  0    Lancets MISC     Si each by Does not apply route daily     Dispense:  300 each     Refill:  1     Medications Discontinued During This Encounter   Medication Reason    CPAP Machine MISC Therapy completed    NIFEdipine (PROCARDIA XL) 60 MG extended release tablet REORDER     No follow-ups on file. Reviewed with the patient: current clinical status, medications, activities and diet.      Side effects, adverse effects of the medication prescribed today, as well as treatment plan/ rationale and result

## 2020-09-25 DIAGNOSIS — E11.65 UNCONTROLLED TYPE 2 DIABETES MELLITUS WITH HYPERGLYCEMIA (HCC): ICD-10-CM

## 2020-09-25 DIAGNOSIS — R73.09 OTHER ABNORMAL GLUCOSE: ICD-10-CM

## 2020-09-25 DIAGNOSIS — E29.1 HYPOGONADISM IN MALE: ICD-10-CM

## 2020-09-25 DIAGNOSIS — I10 ESSENTIAL HYPERTENSION, BENIGN: ICD-10-CM

## 2020-09-25 LAB
ALBUMIN SERPL-MCNC: 4.3 G/DL (ref 3.5–4.6)
ALP BLD-CCNC: 64 U/L (ref 35–104)
ALT SERPL-CCNC: 22 U/L (ref 0–41)
ANION GAP SERPL CALCULATED.3IONS-SCNC: 9 MEQ/L (ref 9–15)
AST SERPL-CCNC: 27 U/L (ref 0–40)
BASOPHILS ABSOLUTE: 0 K/UL (ref 0–0.2)
BASOPHILS RELATIVE PERCENT: 0.7 %
BILIRUB SERPL-MCNC: 0.4 MG/DL (ref 0.2–0.7)
BUN BLDV-MCNC: 13 MG/DL (ref 8–23)
CALCIUM SERPL-MCNC: 9.1 MG/DL (ref 8.5–9.9)
CHLORIDE BLD-SCNC: 99 MEQ/L (ref 95–107)
CO2: 30 MEQ/L (ref 20–31)
CREAT SERPL-MCNC: 0.88 MG/DL (ref 0.7–1.2)
EOSINOPHILS ABSOLUTE: 0 K/UL (ref 0–0.7)
EOSINOPHILS RELATIVE PERCENT: 0.6 %
GFR AFRICAN AMERICAN: >60
GFR NON-AFRICAN AMERICAN: >60
GLOBULIN: 3.1 G/DL (ref 2.3–3.5)
GLUCOSE FASTING: 97 MG/DL (ref 70–99)
HBA1C MFR BLD: 6.4 % (ref 4.8–5.9)
HCT VFR BLD CALC: 42.5 % (ref 42–52)
HEMOGLOBIN: 14 G/DL (ref 14–18)
LYMPHOCYTES ABSOLUTE: 0.7 K/UL (ref 1–4.8)
LYMPHOCYTES RELATIVE PERCENT: 12.1 %
MCH RBC QN AUTO: 31.5 PG (ref 27–31.3)
MCHC RBC AUTO-ENTMCNC: 32.9 % (ref 33–37)
MCV RBC AUTO: 95.7 FL (ref 80–100)
MONOCYTES ABSOLUTE: 0.7 K/UL (ref 0.2–0.8)
MONOCYTES RELATIVE PERCENT: 12.1 %
NEUTROPHILS ABSOLUTE: 4.4 K/UL (ref 1.4–6.5)
NEUTROPHILS RELATIVE PERCENT: 74.5 %
PDW BLD-RTO: 15 % (ref 11.5–14.5)
PLATELET # BLD: 238 K/UL (ref 130–400)
POTASSIUM SERPL-SCNC: 4.6 MEQ/L (ref 3.4–4.9)
RBC # BLD: 4.44 M/UL (ref 4.7–6.1)
SODIUM BLD-SCNC: 138 MEQ/L (ref 135–144)
TOTAL PROTEIN: 7.4 G/DL (ref 6.3–8)
WBC # BLD: 6 K/UL (ref 4.8–10.8)

## 2020-09-25 RX ORDER — UBIQUINOL 100 MG
CAPSULE ORAL
Qty: 100 EACH | Refills: 11 | Status: SHIPPED | OUTPATIENT
Start: 2020-09-25

## 2020-09-25 RX ORDER — LANCETS 30 GAUGE
1 EACH MISCELLANEOUS DAILY
Qty: 100 EACH | Refills: 11 | Status: SHIPPED | OUTPATIENT
Start: 2020-09-25 | End: 2021-10-04

## 2020-09-25 RX ORDER — GLUCOSAMINE HCL/CHONDROITIN SU 500-400 MG
CAPSULE ORAL
Qty: 100 STRIP | Refills: 11 | Status: SHIPPED | OUTPATIENT
Start: 2020-09-25 | End: 2021-10-05

## 2020-09-26 LAB
SEX HORMONE BINDING GLOBULIN: 41 NMOL/L (ref 11–80)
TESTOSTERONE FREE PERCENT: 1.9 % (ref 1.6–2.9)
TESTOSTERONE FREE, CALC: 194 PG/ML (ref 47–244)
TESTOSTERONE TOTAL-MALE: 1015 NG/DL (ref 300–720)
URINE CULTURE, ROUTINE: NORMAL

## 2020-09-29 ENCOUNTER — OFFICE VISIT (OUTPATIENT)
Dept: ENDOCRINOLOGY | Age: 70
End: 2020-09-29
Payer: MEDICARE

## 2020-09-29 VITALS
DIASTOLIC BLOOD PRESSURE: 68 MMHG | SYSTOLIC BLOOD PRESSURE: 125 MMHG | HEART RATE: 63 BPM | WEIGHT: 164 LBS | BODY MASS INDEX: 25.69 KG/M2 | OXYGEN SATURATION: 99 %

## 2020-09-29 PROCEDURE — 99213 OFFICE O/P EST LOW 20 MIN: CPT | Performed by: INTERNAL MEDICINE

## 2020-09-29 NOTE — PROGRESS NOTES
Latest Ref Range: 80.0 - 100.0 fL 95.7   MCH Latest Ref Range: 27.0 - 31.3 pg 31.5 (H)   MCHC Latest Ref Range: 33.0 - 37.0 % 32.9 (L)   RDW Latest Ref Range: 11.5 - 14.5 % 15.0 (H)   Platelet Count Latest Ref Range: 130 - 400 K/uL 238     Patient Active Problem List   Diagnosis    Chronic low back pain    Scoliosis of lumbar spine    Essential hypertension, benign    Hypercholesterolemia    Right lumbar radiculopathy    Gout    High risk medication use - 12/07/17 OARRS PM&R, 02/08/18 OARRS PM&R, 10/05/17 Urine Drug Screen: negative PM&R, MED CONTRACT 1/17/17    Low back pain with sciatica    Myalgia    Synovitis and tenosynovitis    Thoracic and lumbosacral neuritis    Vitamin D deficiency    Prediabetes    [facility]par[facility]roidism ([facility] [address]    Osteopenia    C6 radiculopathy    DJD (degenerative joint disease), cervical    PRASAD (obstructive sleep apnea)    Chronic pain of both shoulders    Hypogonadism in male   [facility] Therapeutic opioid-induced constipation (OIC)    Bilateral leg pain    Other specified symptoms and signs involving the circulatory and respiratory systems     Myelopathy (HCC)    Coronary artery disease of native artery of native heart with stable angina pectoris (HCC)    Bilateral carotid bruits    Spinal cord disease (HCC)    Urinary frequency    Dizziness    Lower urinary tract symptoms (LUTS)       Current Outpatient Medications:     Lancets MISC, 1 each by Does not apply route daily Dx E11.9, Disp: 100 each, Rfl: 11    blood glucose monitor strips, Patient to test daily. Dx: E11.9.  Please dispense the brand that is covered by insurance., Disp: 100 strip, Rfl: 11    Alcohol Swabs (ALCOHOL PREP) 70 % PADS, Patient to test once daily E11.9, Disp: 100 each, Rfl: 11    NIFEdipine (PROCARDIA XL) 60 MG extended release tablet, Take 1 tablet by mouth daily, Disp: 90 tablet, Rfl: 4    glucose monitoring kit (FREESTYLE) monitoring kit, 1 kit by Does not apply route daily, Disp: 1 kit, Rfl: 0    oxyCODONE-acetaminophen (PERCOCET) 7.5-325 MG per tablet, Take 1 tablet by mouth every 4 hours as needed for Pain (Max of 90 tablets per month) for up to 30 days.  Intended supply: 30 days, Disp: 90 tablet, Rfl: 0    baclofen (LIORESAL) 10 MG tablet, TAKE 1 TABLET BY MOUTH THREE TIMES DAILY, Disp: 90 tablet, Rfl: 1    gabapentin (NEURONTIN) 300 MG capsule, One capsule in the am, 2 in hs Intended supply: 30 days, Disp: 270 capsule, Rfl: 0    testosterone cypionate (DEPOTESTOTERONE CYPIONATE) 200 MG/ML injection, INJECT 0.5 M ML IN THE MUSCLE EVERY 2 WEEKS, Disp: 10 mL, Rfl: 1    pravastatin (PRAVACHOL) 40 MG tablet, TAKE 1 TABLET BY MOUTH EVERY EVENING, Disp: 90 tablet, Rfl: 3    allopurinol (ZYLOPRIM) 100 MG tablet, TAKE 1 TABLET BY MOUTH DAILY, Disp: 90 tablet, Rfl: 3    sildenafil (VIAGRA) 100 MG tablet, TAKE 1 TABLET BY MOUTH AS NEEDED FOR ERECTILE DYSFUNCTION (NO MORE THAN 1 TABLET EVERY 24 HOURS), Disp: 30 tablet, Rfl: 3    nitroGLYCERIN (NITROSTAT) 0.4 MG SL tablet, Place 1 tablet under the tongue every 5 minutes as needed x 3 doses for chest pain., Disp: 25 tablet, Rfl: 2    vitamin D (CHOLECALCIFEROL) 25 MCG (1000 UT) TABS tablet, Take 1,000 Units by mouth daily, Disp: , Rfl:     tamsulosin (FLOMAX) 0.4 MG capsule, TAKE 1 CAPSULE DAILY AT BEDTIME, Disp: , Rfl:     metoprolol tartrate (LOPRESSOR) 50 MG tablet, TAKE 1 TABLET BY MOUTH THREE TIMES DAILY, Disp: 270 tablet, Rfl: 2    CPAP Machine MISC, by NOT APPLICABLE route, Disp: , Rfl:     metFORMIN (GLUCOPHAGE) 500 MG tablet, TAKE 1 TABLET BY MOUTH TWICE DAILY WITH MEALS, Disp: 180 tablet, Rfl: 3    SYRINGE-NEEDLE, DISP, 3 ML (B-D INTEGRA SYRINGE) 22G X 1-1/2\" 3 ML MISC, 1 each by Does not apply route daily, Disp: 20 each, Rfl: 6    aspirin 81 MG EC tablet, Take 1 tablet by mouth daily, Disp: 90 tablet, Rfl: 1      Review of Systems   Genitourinary:        Erectile dysfunction       Vitals:    09/29/20 0933   BP: 125/68   Pulse: 63

## 2020-09-30 RX ORDER — BLOOD-GLUCOSE METER
KIT MISCELLANEOUS
Qty: 150 STRIP | Refills: 0 | OUTPATIENT
Start: 2020-09-30

## 2020-10-12 RX ORDER — OXYCODONE AND ACETAMINOPHEN 7.5; 325 MG/1; MG/1
1 TABLET ORAL EVERY 4 HOURS PRN
Qty: 90 TABLET | Refills: 0 | Status: SHIPPED | OUTPATIENT
Start: 2020-10-12 | End: 2020-11-12 | Stop reason: SDUPTHER

## 2020-10-16 ENCOUNTER — OFFICE VISIT (OUTPATIENT)
Dept: PHYSICAL MEDICINE AND REHAB | Age: 70
End: 2020-10-16
Payer: MEDICARE

## 2020-10-16 VITALS
WEIGHT: 156 LBS | SYSTOLIC BLOOD PRESSURE: 140 MMHG | DIASTOLIC BLOOD PRESSURE: 78 MMHG | BODY MASS INDEX: 24.48 KG/M2 | HEIGHT: 67 IN

## 2020-10-16 PROBLEM — N52.9 IMPOTENCE: Status: ACTIVE | Noted: 2020-10-16

## 2020-10-16 PROCEDURE — 99214 OFFICE O/P EST MOD 30 MIN: CPT | Performed by: PHYSICAL MEDICINE & REHABILITATION

## 2020-10-16 ASSESSMENT — ENCOUNTER SYMPTOMS
CONSTIPATION: 1
BLOOD IN STOOL: 0
SORE THROAT: 0
VISUAL CHANGE: 0
COUGH: 0
VOMITING: 0
STRIDOR: 0
EYE PAIN: 0
EYE REDNESS: 0
SHORTNESS OF BREATH: 1
ABDOMINAL PAIN: 0
DIARRHEA: 0
PHOTOPHOBIA: 0
NAUSEA: 0
WHEEZING: 0
BACK PAIN: 1
BOWEL INCONTINENCE: 0
TROUBLE SWALLOWING: 0

## 2020-10-22 ENCOUNTER — PROCEDURE VISIT (OUTPATIENT)
Dept: PHYSICAL MEDICINE AND REHAB | Age: 70
End: 2020-10-22
Payer: MEDICARE

## 2020-10-22 PROCEDURE — 20553 NJX 1/MLT TRIGGER POINTS 3/>: CPT | Performed by: PHYSICAL MEDICINE & REHABILITATION

## 2020-10-22 PROCEDURE — 96372 THER/PROPH/DIAG INJ SC/IM: CPT | Performed by: PHYSICAL MEDICINE & REHABILITATION

## 2020-10-26 ENCOUNTER — OFFICE VISIT (OUTPATIENT)
Dept: FAMILY MEDICINE CLINIC | Age: 70
End: 2020-10-26
Payer: MEDICARE

## 2020-10-26 VITALS
SYSTOLIC BLOOD PRESSURE: 112 MMHG | HEIGHT: 67 IN | HEART RATE: 64 BPM | DIASTOLIC BLOOD PRESSURE: 72 MMHG | BODY MASS INDEX: 25.87 KG/M2 | TEMPERATURE: 97.1 F | OXYGEN SATURATION: 100 % | WEIGHT: 164.8 LBS | RESPIRATION RATE: 16 BRPM

## 2020-10-26 PROBLEM — G47.09 TROUBLE GETTING TO SLEEP: Status: ACTIVE | Noted: 2020-10-26

## 2020-10-26 PROBLEM — R41.3 MEMORY CHANGE: Status: ACTIVE | Noted: 2020-10-26

## 2020-10-26 PROBLEM — R42 DIZZINESS: Status: RESOLVED | Noted: 2020-09-24 | Resolved: 2020-10-26

## 2020-10-26 PROCEDURE — 99214 OFFICE O/P EST MOD 30 MIN: CPT | Performed by: PHYSICIAN ASSISTANT

## 2020-10-26 RX ORDER — DOCUSATE SODIUM 100 MG/1
CAPSULE, LIQUID FILLED ORAL
COMMUNITY
Start: 2020-09-19

## 2020-10-26 RX ORDER — CYANOCOBALAMIN 1000 UG/ML
1000 INJECTION INTRAMUSCULAR; SUBCUTANEOUS ONCE
Status: COMPLETED | OUTPATIENT
Start: 2020-10-26 | End: 2020-10-26

## 2020-10-26 RX ORDER — LIDOCAINE HYDROCHLORIDE 10 MG/ML
23 INJECTION, SOLUTION INFILTRATION; PERINEURAL ONCE
Status: COMPLETED | OUTPATIENT
Start: 2020-10-26 | End: 2020-10-26

## 2020-10-26 RX ORDER — TRAZODONE HYDROCHLORIDE 50 MG/1
50 TABLET ORAL NIGHTLY
Qty: 30 TABLET | Refills: 2 | Status: SHIPPED | OUTPATIENT
Start: 2020-10-26 | End: 2020-10-27 | Stop reason: SINTOL

## 2020-10-26 RX ADMIN — CYANOCOBALAMIN 1000 MCG: 1000 INJECTION INTRAMUSCULAR; SUBCUTANEOUS at 08:52

## 2020-10-26 RX ADMIN — LIDOCAINE HYDROCHLORIDE 23 ML: 10 INJECTION, SOLUTION INFILTRATION; PERINEURAL at 09:02

## 2020-10-26 ASSESSMENT — ENCOUNTER SYMPTOMS
WHEEZING: 0
SINUS PRESSURE: 0
BACK PAIN: 1
SHORTNESS OF BREATH: 0
COLOR CHANGE: 0
CONSTIPATION: 1
ABDOMINAL PAIN: 0
COUGH: 0
CHEST TIGHTNESS: 0
ABDOMINAL DISTENTION: 0
DIARRHEA: 0
TROUBLE SWALLOWING: 0
NAUSEA: 0
RECTAL PAIN: 0
BLOOD IN STOOL: 0

## 2020-10-26 NOTE — PROGRESS NOTES
[facility], 77 y.o. male presents today with:  Chief Complaint   Patient presents with    Hypertension     follow up      HPI  Isabela Hernandez is in the office today for follow up. Last OV with me: 9/24/2020     HTN. Improved BP today. Doing well. No CP, dizziness, fatigue. Dizziness. Improved. Recording sugars--did not bring journal with him today. Eating small, frequent meals. ED. Has penile implant procedure scheduled for 11/6/2020. Outpatient surgery at [facility]. Has PAT tomorrow; covid testing next week. Memory changes. Has noticed some changes with his short-term memory. Will feel frustrated as he can't remember a task he was doing or a name of someone. The more frustrated/stressed he gets, the worse the memory recall is. Denies depressed mood      Trouble sleeping. Sleeping 3-4 hours/night. NOT using CPAP as he feels like it will make him feel claustrophobic. Feels like he can't settle his mind. Will eventually fall asleep around 3 am, wake up at 7 am.          Review of Systems   Constitutional: Negative for activity change, appetite change, chills, diaphoresis, fatigue and unexpected weight change. HENT: Negative for congestion, dental problem, nosebleeds, sinus pressure and trouble swallowing. Eyes: Negative for visual disturbance. Respiratory: Negative for cough, chest tightness, shortness of breath and wheezing. Cardiovascular: Negative for chest pain, palpitations and leg swelling. Gastrointestinal: Positive for constipation. Negative for abdominal distention, abdominal pain, blood in stool, diarrhea, nausea and rectal pain. Genitourinary: Positive for decreased urine volume (improved with flomax) and frequency (nocturnal ). Negative for difficulty urinating, dysuria, hematuria, penile swelling, scrotal swelling, testicular pain and urgency. Musculoskeletal: Positive for arthralgias, back pain, gait problem, myalgias and neck stiffness.  Negative 2 [address] [place] History     Socioeconomic History    Marital status:      Spouse name: Not on file    Number of children: Not on file    Years of education: Not on file    Highest education level: Not on file   Occupational History    Occupation: disability    Social Needs    Financial resource strain: Not on file    Food insecurity     Worry: Not on file     Inability: Not on file   [employer] needs     Medical: Not on file     Non-medical: Not on file   Tobacco Use    Smoking status: Never Smoker    Smokeless tobacco: Never Used   Substance and Sexual Activity    Alcohol use: No     Alcohol/week: 0.0 standard drinks     Comment: Stopped years ago.  Drug use: No     Comment: YEARS AGO    Sexual activity: Yes     Partners: Female     Comment: monogomous sexual partner, wife. Lifestyle    Physical activity     Days per week: Not on file     Minutes per session: Not on file    Stress: Not on file   Relationships    Social connections     Talks on phone: Not on file     Gets together: Not on file     Attends Hinduism service: Not on file     Active member of club or organization: Not on file     Attends meetings of clubs or organizations: Not on file     Relationship status: Not on file    Intimate partner violence     Fear of current or ex partner: Not on file     Emotionally abused: Not on file     Physically abused: Not on file     Forced sexual activity: Not on file   Other Topics Concern    Not on file   Social History Narrative    Tobacco -- never smoked or chewed. Alcohol -- have not used for past 10 years, prior to had consumed 6 pack of beer daily. Drugs -- tried marijuana and cocaine 14 years ago occasionally used for 3-4 years and then stopped completely 10 years ago. Education -- completed 11th grade in [place].    Occupation -- [employer] work,  at [employer].    Marital status --  44 years, 2 grown sons.      Family History Problem Relation Age of Onset    High Blood Pressure Mother     Arthritis Mother     Cancer Father         throat    Arthritis Father      No Known Allergies  Current Outpatient Medications   Medication Sig Dispense Refill     MG capsule TK ONE C PO  BID      traZODone (DESYREL) 50 MG tablet Take 1 tablet by mouth nightly 30 tablet 2    oxyCODONE-acetaminophen (PERCOCET) 7.5-325 MG per tablet Take 1 tablet by mouth every 4 hours as needed for Pain (Max of 90 tablets per month) for up to 30 days. Intended supply: 30 days 90 tablet 0    Lancets MISC 1 each by Does not apply route daily Dx E11.9 100 each 11    blood glucose monitor strips Patient to test daily. Dx: E11.9. Please dispense the brand that is covered by insurance. 100 strip 11    Alcohol Swabs (ALCOHOL PREP) 70 % PADS Patient to test once daily E11.9 100 each 11    glucose monitoring kit (FREESTYLE) monitoring kit 1 kit by Does not apply route daily 1 kit 0    baclofen (LIORESAL) 10 MG tablet TAKE 1 TABLET BY MOUTH THREE TIMES DAILY 90 tablet 1    gabapentin (NEURONTIN) 300 MG capsule One capsule in the am, 2 in hs Intended supply: 30 days 270 capsule 0    testosterone cypionate (DEPOTESTOTERONE CYPIONATE) 200 MG/ML injection INJECT 0.5 M ML IN THE MUSCLE EVERY 2 WEEKS 10 mL 1    pravastatin (PRAVACHOL) 40 MG tablet TAKE 1 TABLET BY MOUTH EVERY EVENING 90 tablet 3    allopurinol (ZYLOPRIM) 100 MG tablet TAKE 1 TABLET BY MOUTH DAILY 90 tablet 3    sildenafil (VIAGRA) 100 MG tablet TAKE 1 TABLET BY MOUTH AS NEEDED FOR ERECTILE DYSFUNCTION (NO MORE THAN 1 TABLET EVERY 24 HOURS) 30 tablet 3    nitroGLYCERIN (NITROSTAT) 0.4 MG SL tablet Place 1 tablet under the tongue every 5 minutes as needed x 3 doses for chest pain.  25 tablet 2    vitamin D (CHOLECALCIFEROL) 25 MCG (1000 UT) TABS tablet Take 1,000 Units by mouth daily      tamsulosin (FLOMAX) 0.4 MG capsule TAKE 1 CAPSULE DAILY AT BEDTIME      metoprolol tartrate (LOPRESSOR) 50 MG tablet TAKE 1 TABLET BY MOUTH THREE TIMES DAILY 270 tablet 2    CPAP Machine MISC by NOT APPLICABLE route      metFORMIN (GLUCOPHAGE) 500 MG tablet TAKE 1 TABLET BY MOUTH TWICE DAILY WITH MEALS 180 tablet 3    SYRINGE-NEEDLE, DISP, 3 ML (B-D INTEGRA SYRINGE) 22G X 1-1/2\" 3 ML MISC 1 each by Does not apply route daily 20 each 6    aspirin 81 MG EC tablet Take 1 tablet by mouth daily 90 tablet 1     No current facility-administered medications for this visit. PMH, Surgical Hx, Family Hx, and Social Hx reviewed and updated. Health Maintenance reviewed. Objective  Vitals:    10/26/20 0920   BP: 112/72   Site: Left Upper Arm   Position: Sitting   Cuff Size: Medium Adult   Pulse: 64   Resp: 16   Temp: 97.1 °F (36.2 °C)   TempSrc: Temporal   SpO2: 100%   Weight: 164 lb 12.8 oz (74.8 kg)   Height: 5' 7\" (1.702 m)     BP Readings from Last 3 Encounters:   10/26/20 112/72   10/16/20 (!) 140/78   09/29/20 125/68     Wt Readings from Last 3 Encounters:   10/26/20 164 lb 12.8 oz (74.8 kg)   10/16/20 156 lb (70.8 kg)   09/29/20 164 lb (74.4 kg)     Physical Exam  Vitals signs reviewed. Constitutional:       Appearance: Normal appearance. HENT:      Head: Normocephalic and atraumatic. Right Ear: External ear normal.      Left Ear: External ear normal.      Nose: Nose normal.   Cardiovascular:      Rate and Rhythm: Normal rate and regular rhythm. Pulmonary:      Effort: Pulmonary effort is normal.      Breath sounds: Normal breath sounds. Musculoskeletal: Normal range of motion. Skin:     General: Skin is warm and dry. Neurological:      Mental Status: He is alert. Psychiatric:         Attention and Perception: Attention normal.         Mood and Affect: Mood is anxious. Speech: Speech normal.         Behavior: Behavior normal.         Thought Content:  Thought content normal.         Cognition and Memory: Cognition normal.         Judgment: Judgment normal.      Comments: Able to recall words/places today. Appears anxious though when discussing his memory frustrations. +anxiety, denies depressed mood. Assessment & Plan   Karin ACUÑA was seen today for hypertension. Diagnoses and all orders for this visit:    Trouble getting to sleep  Comments:  encouraged cpap use. trial trazodone. Orders:  -     traZODone (DESYREL) 50 MG tablet; Take 1 tablet by mouth nightly    Essential hypertension, benign  Comments:  stable     PRASAD (obstructive sleep apnea)  Comments:  not using cpap.  encouraged patient to use. Dizziness  Comments:  resolved     Impotence  Comments:  scheduled for penile implant procedure. 11/6/2020    Prediabetes  Comments:  monitoring sugars. Memory change  Comments:  start word puzzle/games. Screening for hyperlipidemia  -     Lipid, Fasting; Future      Orders Placed This Encounter   Procedures    Lipid, Fasting     Standing Status:   Future     Standing Expiration Date:   10/26/2021     Orders Placed This Encounter   Medications    traZODone (DESYREL) 50 MG tablet     Sig: Take 1 tablet by mouth nightly     Dispense:  30 tablet     Refill:  2     Medications Discontinued During This Encounter   Medication Reason    NIFEdipine (PROCARDIA XL) 60 MG extended release tablet LIST CLEANUP     No follow-ups on file. Reviewed with the patient: current clinical status, medications, activities and diet. Side effects, adverse effects of the medication prescribed today, as well as treatment plan/ rationale and result expectations have been discussed with the patient who expresses understanding and desires to proceed. Close follow up to evaluate treatment results and for coordination of care. I have reviewed the patient's medical history in detail and updated the computerized patient record.     Sheryl Tellez PA-C

## 2020-10-27 ENCOUNTER — OFFICE VISIT (OUTPATIENT)
Dept: CARDIOLOGY CLINIC | Age: 70
End: 2020-10-27
Payer: MEDICARE

## 2020-10-27 VITALS
HEART RATE: 64 BPM | WEIGHT: 165 LBS | OXYGEN SATURATION: 98 % | DIASTOLIC BLOOD PRESSURE: 66 MMHG | BODY MASS INDEX: 25.84 KG/M2 | RESPIRATION RATE: 16 BRPM | SYSTOLIC BLOOD PRESSURE: 120 MMHG

## 2020-10-27 PROCEDURE — 93000 ELECTROCARDIOGRAM COMPLETE: CPT | Performed by: INTERNAL MEDICINE

## 2020-10-27 PROCEDURE — 99214 OFFICE O/P EST MOD 30 MIN: CPT | Performed by: INTERNAL MEDICINE

## 2020-10-27 RX ORDER — NIFEDIPINE 60 MG/1
60 TABLET, EXTENDED RELEASE ORAL DAILY
COMMUNITY
End: 2021-10-05

## 2020-10-27 ASSESSMENT — ENCOUNTER SYMPTOMS
STRIDOR: 0
CHEST TIGHTNESS: 0
BLOOD IN STOOL: 0
BACK PAIN: 1
GASTROINTESTINAL NEGATIVE: 1
EYES NEGATIVE: 1
SHORTNESS OF BREATH: 0
NAUSEA: 0
WHEEZING: 0
RESPIRATORY NEGATIVE: 1
COUGH: 0

## 2020-10-27 NOTE — PROGRESS NOTES
Subsequent Progress Note  Patient: Bri Leyva  YOB: 1950  MRN: [id number]    Chief Complaint: htn cad lipid preop back   Chief Complaint   Patient presents with    Follow-up     4 MONTH    Coronary Artery Disease    Hypertension    Cardiac Clearance     11/6/20, -Main       CV Data:  Prior CAD/Stentsx 2  10/17/2017 CABG x2 EF 55  10/2018  Stress echo EF 55  Persistent HyperK  7/2020 CUS mild     Subjective/HPI: no cp no osb no falls noblled has back arthritis getting shots with some releif. 10/25/2019 No cp no sob no falls no bleed. Takes meds. Eating well. 2/26/2020 no cp no sob. Had extensive back SX. Did well. 6/26/2020 no cp no osb no falls no bleed. Takes meds. Walks and active no bleed    10/27/2020 pt will have ED surgery at [facility] next week. He is active and has no CP no SOB no falls no bleed.       EKG: SR    Past Medical History:   Diagnosis Date    Chronic back pain     Coronary artery disease 2002    Dr. Ro [facility]queda at [facility] Esophageal ulcer 3/10/2014    Dr. Ivonne Wharton    Gastric ulcer 3/10/2014    Dr. Ivonne Wharton    Gout     Hiatal hernia 3/10/2014    Dr. Dino Danielson Hyperlipidemia     Hypertension     Impotence 10/16/2020    MI ([facility] infarction) [facility]) 2005    Dr. Joel Rios Peripheral vascular disease ([facility] [address]     Prediabetes     Schizo-affective schizophrenia, in remission ([address] 2/1/2005    Overview:  followed at the [facility] Severe single current episode of major depressive disorder, without psychotic features ([address] 7/8/2016    Status post coronary artery stent placement 2002    Dr. Ro Mosqueda       Past Surgical History:   Procedure Laterality Date    [address]  01/03/2020    Dr. Jolly Berumen Right 6/4/2019    RIGHT WRIST CARPAL TUNNEL RELEASE, SUPINE, PAT AT PCP performed by Steffany Herron MD at [address] [place] [address] 3/10/14    DR Simone Cherry  2002    Dr. Morris 84 GRAFT  10/17/2017    KNEE ARTHROSCOPY Left 2002    Dr. Alberta Upton HISTORY  12/19/13    CAUDAL BLOCKS DONE BY DR Bauman Score    OTHER SURGICAL HISTORY  04/2020    [facility]      SPINE SURGERY  9/2009    Dr. Jamey Florentino  2008    Dr. Carmen Davidson  3/10/14    BX, DILATION, DR [address]       Family History   Problem Relation Age of Onset    High Blood Pressure Mother     Arthritis Mother     Cancer Father         throat    Arthritis Father        Social History     Socioeconomic History    Marital status:      Spouse name: None    Number of children: None    Years of education: None    Highest education level: None   Occupational History    Occupation: disability    Social Needs    Financial resource strain: None    Food insecurity     Worry: None     Inability: None    Transportation needs     Medical: None     Non-medical: None   Tobacco Use    Smoking status: Never Smoker    Smokeless tobacco: Never Used   Substance and Sexual Activity    Alcohol use: No     Alcohol/week: 0.0 standard drinks     Comment: Stopped years ago.  Drug use: No     Comment: YEARS AGO    Sexual activity: Yes     Partners: Female     Comment: monogomous sexual partner, wife.    Lifestyle    Physical activity     Days per week: None     Minutes per session: None    Stress: None   Relationships    Social connections     Talks on phone: None     Gets together: None     Attends Voodoo service: None     Active member of club or organization: None     Attends meetings of clubs or organizations: None     Relationship status: None    Intimate partner violence     Fear of current or ex partner: None     Emotionally abused: None     Physically abused: None     Forced sexual activity: None   Other Topics Concern    None   Social History Narrative    Tobacco -- never smoked or chewed. Alcohol -- have not used for past 10 years, prior to had consumed 6 pack of beer daily. Drugs -- tried marijuana and cocaine 14 years ago occasionally used for 3-4 years and then stopped completely 10 years ago. Education -- completed 11th grade in [place].    Occupation -- [employer] work,  at [employer].    Marital status --  44 years, 2 grown sons. No Known Allergies    Current Outpatient Medications   Medication Sig Dispense Refill    NIFEdipine (PROCARDIA XL) 60 MG extended release tablet Take 60 mg by mouth daily       MG capsule TK ONE C PO  BID      oxyCODONE-acetaminophen (PERCOCET) 7.5-325 MG per tablet Take 1 tablet by mouth every 4 hours as needed for Pain (Max of 90 tablets per month) for up to 30 days. Intended supply: 30 days 90 tablet 0    Lancets MISC 1 each by Does not apply route daily Dx E11.9 100 each 11    blood glucose monitor strips Patient to test daily. Dx: E11.9. Please dispense the brand that is covered by insurance.  100 strip 11    Alcohol Swabs (ALCOHOL PREP) 70 % PADS Patient to test once daily E11.9 100 each 11    glucose monitoring kit (FREESTYLE) monitoring kit 1 kit by Does not apply route daily 1 kit 0    baclofen (LIORESAL) 10 MG tablet TAKE 1 TABLET BY MOUTH THREE TIMES DAILY 90 tablet 1    gabapentin (NEURONTIN) 300 MG capsule One capsule in the am, 2 in hs Intended supply: 30 days 270 capsule 0    testosterone cypionate (DEPOTESTOTERONE CYPIONATE) 200 MG/ML injection INJECT 0.5 M ML IN THE MUSCLE EVERY 2 WEEKS 10 mL 1    pravastatin (PRAVACHOL) 40 MG tablet TAKE 1 TABLET BY MOUTH EVERY EVENING 90 tablet 3    allopurinol (ZYLOPRIM) 100 MG tablet TAKE 1 TABLET BY MOUTH DAILY 90 tablet 3    sildenafil (VIAGRA) 100 MG tablet TAKE 1 TABLET BY MOUTH AS NEEDED FOR ERECTILE DYSFUNCTION (NO MORE THAN 1 TABLET EVERY 24 HOURS) 30 tablet 3  nitroGLYCERIN (NITROSTAT) 0.4 MG SL tablet Place 1 tablet under the tongue every 5 minutes as needed x 3 doses for chest pain. 25 tablet 2    vitamin D (CHOLECALCIFEROL) 25 MCG (1000 UT) TABS tablet Take 1,000 Units by mouth daily      tamsulosin (FLOMAX) 0.4 MG capsule TAKE 1 CAPSULE DAILY AT BEDTIME      metoprolol tartrate (LOPRESSOR) 50 MG tablet TAKE 1 TABLET BY MOUTH THREE TIMES DAILY 270 tablet 2    CPAP Machine MISC by NOT APPLICABLE route      metFORMIN (GLUCOPHAGE) 500 MG tablet TAKE 1 TABLET BY MOUTH TWICE DAILY WITH MEALS 180 tablet 3    SYRINGE-NEEDLE, DISP, 3 ML (B-D INTEGRA SYRINGE) 22G X 1-1/2\" 3 ML MISC 1 each by Does not apply route daily 20 each 6    aspirin 81 MG EC tablet Take 1 tablet by mouth daily 90 tablet 1     No current facility-administered medications for this visit. Review of Systems:   Review of Systems   Constitutional: Negative. Negative for diaphoresis and fatigue. HENT: Negative. Eyes: Negative. Respiratory: Negative. Negative for cough, chest tightness, shortness of breath, wheezing and stridor. Cardiovascular: Negative. Negative for chest pain, palpitations and leg swelling. Gastrointestinal: Negative. Negative for blood in stool and nausea. Genitourinary: Negative. Musculoskeletal: Positive for arthralgias and back pain. Skin: Negative. Neurological: Negative. Negative for dizziness, syncope, weakness and light-headedness. Hematological: Negative. Psychiatric/Behavioral: Negative. Physical Examination:    /66 (Site: Left Upper Arm, Position: Sitting, Cuff Size: Medium Adult)   Pulse 64   Resp 16   Wt 165 lb (74.8 kg)   SpO2 98%   BMI 25.84 kg/m²    Physical Exam   Constitutional: He appears healthy. No distress. HENT:   Normal cephalic and Atraumatic   Eyes: Pupils are equal, round, and reactive to light. Neck: Normal range of motion and thyroid normal. Neck supple. No JVD present. No neck adenopathy.  No thyromegaly present. Cardiovascular: Normal rate, regular rhythm, intact distal pulses and normal pulses. Murmur heard. Pulmonary/Chest: Effort normal and breath sounds normal. He has no wheezes. He has no rales. He exhibits no tenderness. Abdominal: Soft. Bowel sounds are normal. There is no abdominal tenderness. Musculoskeletal: Normal range of motion. General: No tenderness or edema. Neurological: He is alert and oriented to person, place, and time. Skin: Skin is warm. No cyanosis. Nails show no clubbing.        LABS:  CBC:   Lab Results   Component Value Date    WBC 4.8 10/27/2020    WBC 6.0 09/25/2020    RBC 4.53 10/27/2020    HGB 14.3 10/27/2020    HCT 44.5 10/27/2020    MCV 98 10/27/2020    MCH 31.5 09/25/2020    MCHC 32.1 10/27/2020    RDW 15.0 09/25/2020     10/27/2020    MPV 9.9 09/15/2015     Lipids:  Lab Results   Component Value Date    CHOL 152 03/12/2018    CHOL 271 (H) 09/05/2017    CHOL 238 (H) 03/01/2017     Lab Results   Component Value Date    TRIG 157 03/12/2018    TRIG 154 09/05/2017    TRIG 138 03/01/2017     Lab Results   Component Value Date    HDL 42 05/24/2019    HDL 45 03/12/2018    HDL 54 09/05/2017     Lab Results   Component Value Date    LDLCALC 86 05/24/2019    LDLCALC 76 03/12/2018    LDLCALC 186 (H) 09/05/2017     No results found for: LABVLDL, VLDL  No results found for: CHOLHDLRATIO  CMP:    Lab Results   Component Value Date     10/27/2020    K 5.3 10/27/2020     10/27/2020    CO2 30 09/25/2020    BUN 13 09/25/2020    CREATININE 1.03 10/27/2020    GFRAA >60 10/27/2020    LABGLOM >60 10/27/2020    GLUCOSE 115 10/27/2020    PROT 7.2 10/27/2020    LABALBU 4.4 10/27/2020    CALCIUM 9.6 10/27/2020    BILITOT 0.4 10/27/2020    ALKPHOS 60 10/27/2020    AST 22 10/27/2020    ALT 17 10/27/2020     BMP:    Lab Results   Component Value Date     10/27/2020    K 5.3 10/27/2020     10/27/2020    CO2 30 09/25/2020    BUN 13 09/25/2020 LABALBU 4.4 10/27/2020    CREATININE 1.03 10/27/2020    CALCIUM 9.6 10/27/2020    GFRAA >60 10/27/2020    LABGLOM >60 10/27/2020    GLUCOSE 115 10/27/2020     Magnesium:    Lab Results   Component Value Date    MG 2.07 01/06/2020     TSH:  Lab Results   Component Value Date    TSH 4.110 05/06/2016       Patient Active Problem List   Diagnosis    Chronic low back pain    Scoliosis of lumbar spine    Essential hypertension, benign    Hypercholesterolemia    Right lumbar radiculopathy    Gout    High risk medication use - 12/07/17 OARRS PM&R, 02/08/18 [facility] PM&R, 10/05/17 Urine Drug Screen: negative PM&R, MED CONTRACT 1/17/17    Low back pain with sciatica    Myalgia    Synovitis and tenosynovitis    Thoracic and lumbosacral neuritis    Vitamin D deficiency    Prediabetes    Hyperparathyroidism ([facility] [address]    Osteopenia    C6 radiculopathy    DJD (degenerative joint disease), cervical    PRASAD (obstructive sleep apnea)    Chronic pain of both shoulders    Hypogonadism in male   Herlinda Verdugo Therapeutic opioid-induced constipation (OIC)    Bilateral leg pain    Other specified symptoms and signs involving the circulatory and respiratory systems     Myelopathy (HCC)    Coronary artery disease of native artery of native heart with stable angina pectoris (HCC)    Bilateral carotid bruits    Spinal cord disease (HCC)    Urinary frequency    Lower urinary tract symptoms (LUTS)    Impotence    Trouble getting to sleep    Memory change       Medications Discontinued During This Encounter   Medication Reason    traZODone (DESYREL) 50 MG tablet Side effects       Modified Medications    No medications on file       No orders of the defined types were placed in this encounter. Assessment/Plan:    1. Hypercholesterolemia  Statin     2. Essential hypertension, benign   stable     3. Coronary artery disease with hx of myocardial infarct w/o hx of CABG  No angina     4.  Carotid Bruits- CUS - was cancelled due to COVID-19. Will reschedule. -- stable with mild plawue. 5. Preop ED - implant at [facility]- pt is an acceptable candidate for planned procedure. Counseling:  Heart Healthy Lifestyle, Low Salt Diet, Take Precautions to Prevent Falls and Walk Daily    Return in about 4 months (around 2/27/2021).       Electronically signed by Gabriela Triana MD on 10/27/2020 at 4:04 PM

## 2020-11-06 RX ORDER — BACLOFEN 10 MG/1
TABLET ORAL
Qty: 90 TABLET | Refills: 1 | Status: SHIPPED | OUTPATIENT
Start: 2020-11-06 | End: 2021-01-08 | Stop reason: SDUPTHER

## 2020-11-12 RX ORDER — OXYCODONE AND ACETAMINOPHEN 7.5; 325 MG/1; MG/1
1 TABLET ORAL EVERY 4 HOURS PRN
Qty: 90 TABLET | Refills: 0 | Status: SHIPPED | OUTPATIENT
Start: 2020-11-12 | End: 2020-12-04 | Stop reason: SDUPTHER

## 2020-11-16 DIAGNOSIS — R73.03 PREDIABETES: ICD-10-CM

## 2020-11-16 DIAGNOSIS — Z13.220 SCREENING FOR HYPERLIPIDEMIA: ICD-10-CM

## 2020-11-16 LAB
ANION GAP SERPL CALCULATED.3IONS-SCNC: 9 MEQ/L (ref 9–15)
BUN BLDV-MCNC: 16 MG/DL (ref 8–23)
CALCIUM SERPL-MCNC: 9.3 MG/DL (ref 8.5–9.9)
CHLORIDE BLD-SCNC: 101 MEQ/L (ref 95–107)
CHOLESTEROL, TOTAL: 150 MG/DL (ref 0–199)
CO2: 29 MEQ/L (ref 20–31)
CREAT SERPL-MCNC: 1.16 MG/DL (ref 0.7–1.2)
GFR AFRICAN AMERICAN: >60
GFR NON-AFRICAN AMERICAN: >60
GLUCOSE BLD-MCNC: 112 MG/DL (ref 70–99)
HBA1C MFR BLD: 6.2 % (ref 4.8–5.9)
HCT VFR BLD CALC: 45.2 % (ref 42–52)
HDLC SERPL-MCNC: 45 MG/DL (ref 40–59)
HEMOGLOBIN: 14.8 G/DL (ref 14–18)
LDL CHOLESTEROL CALCULATED: 86 MG/DL (ref 0–129)
MCH RBC QN AUTO: 32.1 PG (ref 27–31.3)
MCHC RBC AUTO-ENTMCNC: 32.8 % (ref 33–37)
MCV RBC AUTO: 98 FL (ref 80–100)
PDW BLD-RTO: 15.4 % (ref 11.5–14.5)
PLATELET # BLD: 362 K/UL (ref 130–400)
POTASSIUM SERPL-SCNC: 5.7 MEQ/L (ref 3.4–4.9)
RBC # BLD: 4.61 M/UL (ref 4.7–6.1)
SODIUM BLD-SCNC: 139 MEQ/L (ref 135–144)
TRIGL SERPL-MCNC: 96 MG/DL (ref 0–150)
WBC # BLD: 7.2 K/UL (ref 4.8–10.8)

## 2020-11-18 LAB
SEX HORMONE BINDING GLOBULIN: 37 NMOL/L (ref 11–80)
TESTOSTERONE FREE PERCENT: 2 % (ref 1.6–2.9)
TESTOSTERONE FREE, CALC: 195 PG/ML (ref 47–244)
TESTOSTERONE TOTAL-MALE: 976 NG/DL (ref 300–720)

## 2020-11-24 ENCOUNTER — PROCEDURE VISIT (OUTPATIENT)
Dept: PHYSICAL MEDICINE AND REHAB | Age: 70
End: 2020-11-24
Payer: MEDICARE

## 2020-11-24 PROCEDURE — 96372 THER/PROPH/DIAG INJ SC/IM: CPT | Performed by: PHYSICAL MEDICINE & REHABILITATION

## 2020-11-24 PROCEDURE — 20553 NJX 1/MLT TRIGGER POINTS 3/>: CPT | Performed by: PHYSICAL MEDICINE & REHABILITATION

## 2020-11-24 RX ORDER — CYANOCOBALAMIN 1000 UG/ML
1000 INJECTION INTRAMUSCULAR; SUBCUTANEOUS ONCE
Status: COMPLETED | OUTPATIENT
Start: 2020-11-24 | End: 2020-11-24

## 2020-11-24 RX ORDER — LIDOCAINE HYDROCHLORIDE 10 MG/ML
23 INJECTION, SOLUTION INFILTRATION; PERINEURAL ONCE
Status: COMPLETED | OUTPATIENT
Start: 2020-11-24 | End: 2020-11-24

## 2020-11-24 RX ADMIN — CYANOCOBALAMIN 1000 MCG: 1000 INJECTION INTRAMUSCULAR; SUBCUTANEOUS at 16:56

## 2020-11-24 RX ADMIN — LIDOCAINE HYDROCHLORIDE 23 ML: 10 INJECTION, SOLUTION INFILTRATION; PERINEURAL at 16:56

## 2020-12-04 RX ORDER — GABAPENTIN 300 MG/1
CAPSULE ORAL
Qty: 270 CAPSULE | Refills: 0 | Status: SHIPPED | OUTPATIENT
Start: 2020-12-04 | End: 2021-03-05 | Stop reason: SDUPTHER

## 2020-12-04 RX ORDER — OXYCODONE AND ACETAMINOPHEN 7.5; 325 MG/1; MG/1
1 TABLET ORAL EVERY 4 HOURS PRN
Qty: 90 TABLET | Refills: 0 | Status: SHIPPED | OUTPATIENT
Start: 2020-12-11 | End: 2021-01-08 | Stop reason: SDUPTHER

## 2020-12-15 ENCOUNTER — OFFICE VISIT (OUTPATIENT)
Dept: FAMILY MEDICINE CLINIC | Age: 70
End: 2020-12-15
Payer: MEDICARE

## 2020-12-15 VITALS
BODY MASS INDEX: 24.8 KG/M2 | RESPIRATION RATE: 16 BRPM | TEMPERATURE: 97.2 F | OXYGEN SATURATION: 100 % | HEART RATE: 72 BPM | SYSTOLIC BLOOD PRESSURE: 136 MMHG | WEIGHT: 158 LBS | HEIGHT: 67 IN | DIASTOLIC BLOOD PRESSURE: 84 MMHG

## 2020-12-15 DIAGNOSIS — I20.9 ANGINA PECTORIS (HCC): ICD-10-CM

## 2020-12-15 DIAGNOSIS — E87.5 HYPERKALEMIA: ICD-10-CM

## 2020-12-15 DIAGNOSIS — R73.03 PREDIABETES: ICD-10-CM

## 2020-12-15 DIAGNOSIS — M10.9 ACUTE GOUT OF LEFT KNEE, UNSPECIFIED CAUSE: ICD-10-CM

## 2020-12-15 LAB
POTASSIUM SERPL-SCNC: 4 MEQ/L (ref 3.4–4.9)
TSH REFLEX: 2.73 UIU/ML (ref 0.44–3.86)
URIC ACID, SERUM: 7.2 MG/DL (ref 3.4–7)

## 2020-12-15 PROCEDURE — 99215 OFFICE O/P EST HI 40 MIN: CPT | Performed by: PHYSICIAN ASSISTANT

## 2020-12-15 RX ORDER — COLCHICINE 0.6 MG/1
TABLET ORAL
Qty: 30 TABLET | Refills: 0 | Status: SHIPPED | OUTPATIENT
Start: 2020-12-15 | End: 2021-07-14

## 2020-12-15 ASSESSMENT — PATIENT HEALTH QUESTIONNAIRE - PHQ9
SUM OF ALL RESPONSES TO PHQ QUESTIONS 1-9: 0
2. FEELING DOWN, DEPRESSED OR HOPELESS: 0
SUM OF ALL RESPONSES TO PHQ QUESTIONS 1-9: 0
1. LITTLE INTEREST OR PLEASURE IN DOING THINGS: 0
SUM OF ALL RESPONSES TO PHQ9 QUESTIONS 1 & 2: 0
SUM OF ALL RESPONSES TO PHQ QUESTIONS 1-9: 0

## 2020-12-15 NOTE — PROGRESS NOTES
[facility], 72 y.o. male presents today with:  Chief Complaint   Patient presents with    Gout     he has been getting a flare up in left knee     Constipation     getting worse     HPI  Leif Dupree is in the office today for several concerns. Last OV with me: 10/26/2020. Gout. History of acute gout flare of L knee. Patient reports that he woke up one morning with tender, swollen L knee. Similar to past gout flares. Denies injury or trauma. He rest, elevated, took OTC pain medication. Pain has almost completely resolved. History of hyperuricemia. Was previously taking allopurinol. Stopped medication, unsure why. No alcohol use. Does not eat a lot of red meat, limited seafood/lunch meat. Admits to drinking only about 16-20 oz water/daily. Previously had Rx for colchicine. Asking for a refill today. Opioid induced constipation. C/o chronic constipation secondary to chronic opioid use for back pain. Water intake is limited. Remains physically active. Has been on multiple OTC and Rx medications including miralax, metamucil, stool softener, amitiza, lactulose. At this time, he is using an OTC laxative to have a BM. Stools are hard. Admits to a lot of abdominal bloating/discomfort. Chest tightness/GUILLORY. History of CABG/CAD. Reports worsening GUILLORY with activity/physical activity over the past couple of weeks. Leif Dupree did have cardiac clearance with Dr. Liisa Peabody on 10/27/2020 for urologic procedure. Recently had surgery for ED at [facility]. Surgery was on 11/6/2020. Has noticed chest tightness and discomfort with activity and with stress since the procedure. EKG in office was NSR. Pressure that will radiate into left shoulder region. Admits to not taking SL nitro. If he rests, discomfort goes away. Review of Systems   Constitutional: Positive for activity change. Negative for appetite change, chills, diaphoresis and fatigue.    HENT: Negative for congestion. Respiratory: Positive for chest tightness and shortness of breath (with activity). Negative for wheezing. Cardiovascular: Positive for chest pain (with activity/stress). Negative for palpitations and leg swelling. Gastrointestinal: Positive for abdominal distention, abdominal pain and constipation. Negative for blood in stool, diarrhea, rectal pain and vomiting. Musculoskeletal: Positive for arthralgias and back pain (improved since surgery). Negative for gait problem and myalgias. Skin: Negative for color change and rash. Neurological: Negative for dizziness, weakness, light-headedness, numbness and headaches. Psychiatric/Behavioral: Positive for sleep disturbance. Negative for dysphoric mood.      Past Medical History:   Diagnosis Date    Chronic back pain     Coronary artery disease 2002    [facility] Amisha [facility]illo [facility] Esophageal ulcer 3/10/2014    Dr. Edwige Hdez    Gastric ulcer 3/10/2014    Dr. Edwige Hdez    Gout     Hiatal hernia 3/10/2014    Dr. Swathi Young Hyperlipidemia     Hypertension     Impotence 10/16/2020    MI (myocardial infarction) [facility]) 2005    Dr. Derian Mendiola Peripheral vascular disease ([facility]     Prediabetes     Schizo-affective schizophrenia, in remission ([facility] 2/1/2005    Overview:  followed at the [facility] Severe single current episode of major depressive disorder, without psychotic features ([facility] 7/8/2016    Status post coronary artery stent placement 2002    Dr. Amisha Castillo     Past Surgical History:   Procedure Laterality Date    [address]  01/03/2020    Dr. Cody Hussein Right 6/4/2019    RIGHT WRIST CARPAL TUNNEL RELEASE, SUPINE, PAT AT PCP performed by Benito Mcqueen MD at [facility]  3/10/14    DR Shayy Kim  2002    Dr. Bright [address] ARTERY BYPASS GRAFT  10/17/2017    KNEE ARTHROSCOPY Left 2002    Dr. Lynn Crump HISTORY  12/19/13    CAUDAL BLOCKS DONE BY DR Deon Payne OTHER SURGICAL HISTORY  04/2020    [facility]rolift      SPINE SURGERY  9/2009    Dr. Paulette Velázquez  2008    Dr. Uziar Sim  3/10/14    DR Kev Griffith     Social History     Socioeconomic History    Marital status:      Spouse name: Not on file    Number of children: Not on file    Years of education: Not on file    Highest education level: Not on file   Occupational History    Occupation: disability    Social Needs    Financial resource strain: Not on file    Food insecurity     Worry: Not on file     Inability: Not on file   [employer] needs     Medical: Not on file     Non-medical: Not on file   Tobacco Use    Smoking status: Never Smoker    Smokeless tobacco: Never Used   Substance and Sexual Activity    Alcohol use: No     Alcohol/week: 0.0 standard drinks     Comment: Stopped years ago.  Drug use: No     Comment: YEARS AGO    Sexual activity: Yes     Partners: Female     Comment: monogomous sexual partner, wife. Lifestyle    Physical activity     Days per week: Not on file     Minutes per session: Not on file    Stress: Not on file   Relationships    Social connections     Talks on phone: Not on file     Gets together: Not on file     Attends Nondenominational service: Not on file     Active member of club or organization: Not on file     Attends meetings of clubs or organizations: Not on file     Relationship status: Not on file    Intimate partner violence     Fear of current or ex partner: Not on file     Emotionally abused: Not on file     Physically abused: Not on file     Forced sexual activity: Not on file   Other Topics Concern    Not on file   Social History Narrative    Tobacco -- never smoked or chewed.     Alcohol -- have not used for past 10 years, prior to had consumed 6 pack of beer daily. Drugs -- tried marijuana and cocaine 14 years ago occasionally used for 3-4 years and then stopped completely 10 years ago. Education -- completed 11th grade in [place].    Occupation -- [employer] work,  at [employer].    Marital status --  44 years, 2 grown sons. Family History   Problem Relation Age of Onset    High Blood Pressure Mother     Arthritis Mother     Cancer Father         throat    Arthritis Father      No Known Allergies  Current Outpatient Medications   Medication Sig Dispense Refill    colchicine (COLCRYS) 0.6 MG tablet Take for gout flare, 1 tablet daily during flare. 30 tablet 0    metFORMIN (GLUCOPHAGE) 500 MG tablet TAKE 1 TABLET BY MOUTH TWICE DAILY WITH MEALS 180 tablet 3    gabapentin (NEURONTIN) 300 MG capsule One capsule in the am, 2 in hs Intended supply: 30 days 270 capsule 0    oxyCODONE-acetaminophen (PERCOCET) 7.5-325 MG per tablet Take 1 tablet by mouth every 4 hours as needed for Pain (Max of 90 tablets per month) for up to 30 days. Intended supply: 30 days 90 tablet 0    baclofen (LIORESAL) 10 MG tablet TAKE 1 TABLET BY MOUTH THREE TIMES DAILY 90 tablet 1    NIFEdipine (PROCARDIA XL) 60 MG extended release tablet Take 60 mg by mouth daily       MG capsule TK ONE C PO  BID      Lancets MISC 1 each by Does not apply route daily Dx E11.9 100 each 11    blood glucose monitor strips Patient to test daily. Dx: E11.9. Please dispense the brand that is covered by insurance.  100 strip 11    Alcohol Swabs (ALCOHOL PREP) 70 % PADS Patient to test once daily E11.9 100 each 11    glucose monitoring kit (FREESTYLE) monitoring kit 1 kit by Does not apply route daily 1 kit 0    testosterone cypionate (DEPOTESTOTERONE CYPIONATE) 200 MG/ML injection INJECT 0.5 M ML IN THE MUSCLE EVERY 2 WEEKS 10 mL 1    pravastatin (PRAVACHOL) 40 MG tablet TAKE 1 TABLET BY MOUTH EVERY EVENING 90 tablet 3    allopurinol (ZYLOPRIM) 100 MG tablet TAKE 1 TABLET BY MOUTH DAILY 90 tablet 3    nitroGLYCERIN (NITROSTAT) 0.4 MG SL tablet Place 1 tablet under the tongue every 5 minutes as needed x 3 doses for chest pain. 25 tablet 2    vitamin D (CHOLECALCIFEROL) 25 MCG (1000 UT) TABS tablet Take 1,000 Units by mouth daily      tamsulosin (FLOMAX) 0.4 MG capsule TAKE 1 CAPSULE DAILY AT BEDTIME      metoprolol tartrate (LOPRESSOR) 50 MG tablet TAKE 1 TABLET BY MOUTH THREE TIMES DAILY 270 tablet 2    CPAP Machine MISC by NOT APPLICABLE route      SYRINGE-NEEDLE, DISP, 3 ML (B-D INTEGRA SYRINGE) 22G X 1-1/2\" 3 ML MISC 1 each by Does not apply route daily 20 each 6    aspirin 81 MG EC tablet Take 1 tablet by mouth daily 90 tablet 1    lubiprostone (AMITIZA) 24 MCG capsule Take 1 capsule by mouth daily 60 capsule 2     No current facility-administered medications for this visit. PMH, Surgical Hx, Family Hx, and Social Hx reviewed and updated. Health Maintenance reviewed. Objective  Vitals:    12/15/20 1031   BP: 136/84   Site: Left Upper Arm   Position: Sitting   Cuff Size: Medium Adult   Pulse: 72   Resp: 16   Temp: 97.2 °F (36.2 °C)   TempSrc: Temporal   SpO2: 100%   Weight: 158 lb (71.7 kg)   Height: 5' 7\" (1.702 m)     BP Readings from Last 3 Encounters:   12/15/20 136/84   10/27/20 120/66   10/26/20 112/72     Wt Readings from Last 3 Encounters:   12/15/20 158 lb (71.7 kg)   10/27/20 165 lb (74.8 kg)   10/26/20 164 lb 12.8 oz (74.8 kg)     Physical Exam  Vitals signs reviewed. Constitutional:       General: He is not in acute distress. Appearance: Normal appearance. He is normal weight. He is not ill-appearing or toxic-appearing. HENT:      Head: Normocephalic and atraumatic. Right Ear: External ear normal.      Left Ear: External ear normal.      Nose: Nose normal.   Eyes:      Conjunctiva/sclera: Conjunctivae normal.   Cardiovascular:      Rate and Rhythm: Normal rate and regular rhythm. Pulmonary:      Effort: Pulmonary effort is normal.      Breath sounds: Normal breath sounds. Abdominal:      General: There is distension. Palpations: Abdomen is soft. There is no mass. Tenderness: There is abdominal tenderness. There is no guarding. Hernia: No hernia is present. Musculoskeletal:         General: No swelling or tenderness. Skin:     General: Skin is warm and dry. Neurological:      General: No focal deficit present. Mental Status: He is alert and oriented to person, place, and time. Psychiatric:         Mood and Affect: Mood normal.         Behavior: Behavior normal.         Thought Content: Thought content normal.         Judgment: Judgment normal.       Assessment & Plan   Karin ACUÑA was seen today for gout and constipation. Diagnoses and all orders for this visit:    Angina pectoris [facility])  Comments:  Cardiac stress ordered today. Dr. Ronit Dela Cruz office contacted, patient to have follow up prior to March, 2021. SL nitro for chest tightness/pain. ED for CP  Orders:  -     TSH with Reflex; Future  -     CARDIAC STRESS TEST EXERCISE ONLY; Future    Acute gout of left knee, unspecified cause  Comments:  has seemed to resolved. labs ordered for further evaluation. Orders:  -     Uric Acid; Future  -     colchicine (COLCRYS) 0.6 MG tablet; Take for gout flare, 1 tablet daily during flare. Hyperkalemia  Comments:  repeat today. Orders:  -     Potassium; Future    Hyperuricemia  Comments:  restart allopurinol  colchicine for prn flares. increase fluids/water. Orders:  -     colchicine (COLCRYS) 0.6 MG tablet; Take for gout flare, 1 tablet daily during flare. Prediabetes   -     TSH with Reflex; Future    4 week follow up with me. Contact office with any questions or concerns. In the event of persistent CP, chest tightness, patient to contact 911 or safely present to nearest ED for evaluation. Patient verbalized understanding.      Orders Placed This Encounter   Procedures    Uric Acid     Standing Status:   Future     Number of Occurrences:   1     Standing Expiration Date:   12/15/2021    Potassium     Standing Status:   Future     Number of Occurrences:   1     Standing Expiration Date:   12/15/2021    TSH with Reflex     Standing Status:   Future     Number of Occurrences:   1     Standing Expiration Date:   12/15/2021    CARDIAC STRESS TEST EXERCISE ONLY     Standing Status:   Future     Standing Expiration Date:   12/15/2021     Order Specific Question:   Reason for Exam?     Answer:   Chest pain     Orders Placed This Encounter   Medications    colchicine (COLCRYS) 0.6 MG tablet     Sig: Take for gout flare, 1 tablet daily during flare. Dispense:  30 tablet     Refill:  0     Medications Discontinued During This Encounter   Medication Reason    sildenafil (VIAGRA) 100 MG tablet Therapy completed     No follow-ups on file. Reviewed with the patient: current clinical status, medications, activities and diet. Side effects, adverse effects of the medication prescribed today, as well as treatment plan/ rationale and result expectations have been discussed with the patient who expresses understanding and desires to proceed. Close follow up to evaluate treatment results and for coordination of care. I have reviewed the patient's medical history in detail and updated the computerized patient record.     Sheryl Tellez PA-C

## 2020-12-20 RX ORDER — LUBIPROSTONE 24 UG/1
24 CAPSULE, GELATIN COATED ORAL DAILY
Qty: 60 CAPSULE | Refills: 2 | Status: SHIPPED | OUTPATIENT
Start: 2020-12-20 | End: 2021-05-07 | Stop reason: ALTCHOICE

## 2020-12-24 ASSESSMENT — ENCOUNTER SYMPTOMS
BACK PAIN: 1
RECTAL PAIN: 0
CONSTIPATION: 1
ABDOMINAL DISTENTION: 1
VOMITING: 0
ABDOMINAL PAIN: 1
WHEEZING: 0
BLOOD IN STOOL: 0
DIARRHEA: 0
COLOR CHANGE: 0
SHORTNESS OF BREATH: 1
CHEST TIGHTNESS: 1

## 2020-12-28 ENCOUNTER — PROCEDURE VISIT (OUTPATIENT)
Dept: PHYSICAL MEDICINE AND REHAB | Age: 70
End: 2020-12-28
Payer: MEDICARE

## 2020-12-28 PROCEDURE — 20553 NJX 1/MLT TRIGGER POINTS 3/>: CPT | Performed by: PHYSICAL MEDICINE & REHABILITATION

## 2020-12-28 RX ORDER — LIDOCAINE HYDROCHLORIDE 10 MG/ML
16 INJECTION, SOLUTION INFILTRATION; PERINEURAL ONCE
Status: COMPLETED | OUTPATIENT
Start: 2020-12-28 | End: 2020-12-28

## 2020-12-28 RX ADMIN — LIDOCAINE HYDROCHLORIDE 16 ML: 10 INJECTION, SOLUTION INFILTRATION; PERINEURAL at 12:34

## 2020-12-30 ENCOUNTER — HOSPITAL ENCOUNTER (OUTPATIENT)
Dept: NON INVASIVE DIAGNOSTICS | Age: 70
Discharge: HOME | End: 2020-12-30
Payer: MEDICARE

## 2020-12-30 PROCEDURE — 93017 CV STRESS TEST TRACING ONLY: CPT

## 2020-12-30 NOTE — PROGRESS NOTES
Hx,allergies and medications reviewed. Procedure explained and informed consent obtained. Tolerated treadmill well for 3:22 minutes. Reached 102 % target HR. SOB noted. Returned to baseline in recovery. Denies chest pain or pressure. EKG shows noted starting HR was over 100 but no noted ectopy.

## 2020-12-30 NOTE — PROCEDURES
[address]                      [address], [address] [facility]                              CARDIAC STRESS TEST    PATIENT NAME: Andre Moser                     :        1950  MED REC NO:   [id number]                            ROOM:  ACCOUNT NO:   [de-identified]                           ADMIT DATE: 2020  PROVIDER:     Larene Canavan, MD    CARDIOVASCULAR DIAGNOSTIC DEPARTMENT    DATE OF STUDY:  2020    GRADED EXERCISE STRESS TEST    INDICATION OF THE PROCEDURE:  Chest pain. Underlying electrocardiogram is sinus rhythm. Heart rate is 92 beats  per minute, blood pressure 166/81. The patient was exercised for total  of 3 minutes and 22 seconds on a standard Sreedhar protocol obtaining a  peak heart rate of 155 beats per minute with a peak blood pressure of  192/87 representing 102% of age maximum predicted heart rate giving him  a functional capacity of 5.4 METs. The patient denied any chest pain  nor chest pressures. The patient maintained in sinus mechanism  throughout the study. No ST-segment changes nor any significant  arrhythmias noted. IMPRESSION:  1. Suboptimal functional capacity for age. 2.  No exercise-induced ischemia nor arrhythmias noted.         Hector Nath MD    D: 2020 #17:40:28       T: 2020 17:47:51     [email or web address]  Job#: [id number]     Doc#: [id number]    CC:

## 2021-01-06 ENCOUNTER — OFFICE VISIT (OUTPATIENT)
Dept: CARDIOLOGY CLINIC | Age: 71
End: 2021-01-06
Payer: MEDICARE

## 2021-01-06 VITALS
HEIGHT: 67 IN | RESPIRATION RATE: 16 BRPM | WEIGHT: 165 LBS | OXYGEN SATURATION: 99 % | DIASTOLIC BLOOD PRESSURE: 83 MMHG | SYSTOLIC BLOOD PRESSURE: 138 MMHG | HEART RATE: 74 BPM | BODY MASS INDEX: 25.9 KG/M2

## 2021-01-06 DIAGNOSIS — I10 ESSENTIAL HYPERTENSION, BENIGN: ICD-10-CM

## 2021-01-06 DIAGNOSIS — I25.118 CORONARY ARTERY DISEASE OF NATIVE ARTERY OF NATIVE HEART WITH STABLE ANGINA PECTORIS (HCC): Primary | ICD-10-CM

## 2021-01-06 PROCEDURE — 99214 OFFICE O/P EST MOD 30 MIN: CPT | Performed by: INTERNAL MEDICINE

## 2021-01-06 ASSESSMENT — ENCOUNTER SYMPTOMS
CHEST TIGHTNESS: 0
NAUSEA: 0
RESPIRATORY NEGATIVE: 1
EYES NEGATIVE: 1
BACK PAIN: 1
BLOOD IN STOOL: 0
WHEEZING: 0
SHORTNESS OF BREATH: 0
STRIDOR: 0
COUGH: 0
GASTROINTESTINAL NEGATIVE: 1

## 2021-01-06 NOTE — PROGRESS NOTES
Subsequent Progress Note  Patient: Keily Rubin  YOB: 1950  MRN: [id number]    Chief Complaint: htn cad lipid preop back   Chief Complaint   Patient presents with    Results     stress test    Chest Pain     described as minimal    Coronary Artery Disease       CV Data:  Prior CAD/Stentsx 2  10/17/2017 CABG x2 EF 55  10/2018  Stress echo EF 55  Persistent HyperK  7/2020 CUS mild   12/2020 GXT negative     Subjective/HPI: no cp no osb no falls noblled has back arthritis getting shots with some releif. 10/25/2019 No cp no sob no falls no bleed. Takes meds. Eating well. 2/26/2020 no cp no sob. Had extensive back SX. Did well. 6/26/2020 no cp no osb no falls no bleed. Takes meds. Walks and active no bleed    10/27/2020 pt will have ED surgery at [facility] next week. He is active and has no CP no SOB no falls no bleed.      1/6/21 no cp no sob no falls no bleed     EKG: SR    Past Medical History:   Diagnosis Date    Chronic back pain     Coronary artery disease 2002    Dr. Vita [facility] at [facility] Esophageal ulcer 3/10/2014    Dr. Ti Jaramillo    Gastric ulcer 3/10/2014    Dr. Ti Jaramillo    Gout     Hiatal hernia 3/10/2014    Dr. Areli Lang Hyperlipidemia     Hypertension     Impotence 10/16/2020    MI ([facility]ocardial infarction) [facility]) 2005    Dr. Justin Marks Peripheral vascular disease ([facility] [address]     Prediabetes     Schizo-affective schizophrenia, in remission ([address] 2/1/2005    Overview:  followed at the [facility] Severe single current episode of major depressive disorder, without psychotic features ([address] 7/8/2016    Status post coronary artery stent placement 2002    Dr. Vita Irene       Past Surgical History:   Procedure Laterality Date    Johanna Marmoljeo  01/03/2020    Dr. Reginaldo Santoyo Right 6/4/2019    RIGHT WRIST CARPAL TUNNEL RELEASE, SUPINE, PAT AT PCP performed by John Jeff Debbie Conrad MD at [facility]  3/10/14    DR Estella Rand  2002    Dr. Morris 84 GRAFT  10/17/2017    KNEE ARTHROSCOPY Left 2002    Dr. Anna Ball  12/19/13    CAUDAL BLOCKS DONE BY DR Kavin Fernandez    OTHER SURGICAL HISTORY  04/2020    [employer]rolift      SPINE SURGERY  9/2009    Dr. Salina Sprague  2008    Dr. Scotty Brewer  3/10/14    BX, DILATION, DR Elvia Narvaez       Family History   Problem Relation Age of Onset    High Blood Pressure Mother     Arthritis Mother     Cancer Father         throat    Arthritis Father        Social History     Socioeconomic History    Marital status:      Spouse name: None    Number of children: None    Years of education: None    Highest education level: None   Occupational History    Occupation: disability    Social Needs    Financial resource strain: None    Food insecurity     Worry: None     Inability: None    Transportation needs     Medical: None     Non-medical: None   Tobacco Use    Smoking status: Never Smoker    Smokeless tobacco: Never Used   Substance and Sexual Activity    Alcohol use: No     Alcohol/week: 0.0 standard drinks     Comment: Stopped years ago.  Drug use: No     Comment: YEARS AGO    Sexual activity: Yes     Partners: Female     Comment: monogomous sexual partner, wife.    Lifestyle    Physical activity     Days per week: None     Minutes per session: None    Stress: None   Relationships    Social connections     Talks on phone: None     Gets together: None     Attends Confucianist service: None     Active member of club or organization: None     Attends meetings of clubs or organizations: None     Relationship status: None    Intimate partner violence     Fear of current or ex partner: None     Emotionally abused: None     Physically abused: None Forced sexual activity: None   Other Topics Concern    None   Social History Narrative    Tobacco -- never smoked or chewed. Alcohol -- have not used for past 10 years, prior to had consumed 6 pack of beer daily. Drugs -- tried marijuana and cocaine 14 years ago occasionally used for 3-4 years and then stopped completely 10 years ago. Education -- completed 11th grade in [place].    Occupation -- [employer] work,  at [employer].    Marital status --  44 years, 2 grown sons. No Known Allergies    Current Outpatient Medications   Medication Sig Dispense Refill    lubiprostone (AMITIZA) 24 MCG capsule Take 1 capsule by mouth daily 60 capsule 2    colchicine (COLCRYS) 0.6 MG tablet Take for gout flare, 1 tablet daily during flare. 30 tablet 0    metFORMIN (GLUCOPHAGE) 500 MG tablet TAKE 1 TABLET BY MOUTH TWICE DAILY WITH MEALS 180 tablet 3    gabapentin (NEURONTIN) 300 MG capsule One capsule in the am, 2 in hs Intended supply: 30 days 270 capsule 0    oxyCODONE-acetaminophen (PERCOCET) 7.5-325 MG per tablet Take 1 tablet by mouth every 4 hours as needed for Pain (Max of 90 tablets per month) for up to 30 days. Intended supply: 30 days 90 tablet 0    baclofen (LIORESAL) 10 MG tablet TAKE 1 TABLET BY MOUTH THREE TIMES DAILY 90 tablet 1    NIFEdipine (PROCARDIA XL) 60 MG extended release tablet Take 60 mg by mouth daily       MG capsule TK ONE C PO  BID      Lancets MISC 1 each by Does not apply route daily Dx E11.9 100 each 11    blood glucose monitor strips Patient to test daily. Dx: E11.9. Please dispense the brand that is covered by insurance.  100 strip 11    Alcohol Swabs (ALCOHOL PREP) 70 % PADS Patient to test once daily E11.9 100 each 11    glucose monitoring kit (FREESTYLE) monitoring kit 1 kit by Does not apply route daily 1 kit 0    pravastatin (PRAVACHOL) 40 MG tablet TAKE 1 TABLET BY MOUTH EVERY EVENING 90 tablet 3    allopurinol (ZYLOPRIM) 100 MG Neck: Normal range of motion and thyroid normal. Neck supple. No JVD present. No neck adenopathy. No thyromegaly present. Cardiovascular: Normal rate, regular rhythm, intact distal pulses and normal pulses. Murmur heard. Pulmonary/Chest: Effort normal and breath sounds normal. He has no wheezes. He has no rales. He exhibits no tenderness. Abdominal: Soft. Bowel sounds are normal. There is no abdominal tenderness. Musculoskeletal: Normal range of motion. General: No tenderness or edema. Neurological: He is alert and oriented to person, place, and time. Skin: Skin is warm. No cyanosis. Nails show no clubbing.        LABS:  CBC:   Lab Results   Component Value Date    WBC 7.2 11/16/2020    RBC 4.61 11/16/2020    HGB 14.8 11/16/2020    HCT 45.2 11/16/2020    MCV 98.0 11/16/2020    MCH 32.1 11/16/2020    MCHC 32.8 11/16/2020    RDW 15.4 11/16/2020     11/16/2020    MPV 9.9 09/15/2015     Lipids:  Lab Results   Component Value Date    CHOL 150 11/16/2020    CHOL 152 03/12/2018    CHOL 271 (H) 09/05/2017     Lab Results   Component Value Date    TRIG 96 11/16/2020    TRIG 157 03/12/2018    TRIG 154 09/05/2017     Lab Results   Component Value Date    HDL 45 11/16/2020    HDL 42 05/24/2019    HDL 45 03/12/2018     Lab Results   Component Value Date    LDLCALC 86 11/16/2020    LDLCALC 86 05/24/2019    LDLCALC 76 03/12/2018     No results found for: LABVLDL, VLDL  No results found for: CHOLHDLRATIO  CMP:    Lab Results   Component Value Date     11/16/2020    K 4.0 12/15/2020     11/16/2020    CO2 29 11/16/2020    BUN 16 11/16/2020    CREATININE 1.16 11/16/2020    GFRAA >60.0 11/16/2020    LABGLOM >60.0 11/16/2020    GLUCOSE 112 11/16/2020    GLUCOSE 115 10/27/2020    PROT 7.2 10/27/2020    LABALBU 4.4 10/27/2020    CALCIUM 9.3 11/16/2020    BILITOT 0.4 10/27/2020    ALKPHOS 60 10/27/2020    AST 22 10/27/2020    ALT 17 10/27/2020     BMP:    Lab Results   Component Value Date     11/16/2020    K 4.0 12/15/2020     11/16/2020    CO2 29 11/16/2020    BUN 16 11/16/2020    LABALBU 4.4 10/27/2020    CREATININE 1.16 11/16/2020    CALCIUM 9.3 11/16/2020    GFRAA >60.0 11/16/2020    LABGLOM >60.0 11/16/2020    GLUCOSE 112 11/16/2020    GLUCOSE 115 10/27/2020     Magnesium:    Lab Results   Component Value Date    MG 2.07 01/06/2020     TSH:  Lab Results   Component Value Date    TSH 4.110 05/06/2016       Patient Active Problem List   Diagnosis    Chronic low back pain    Scoliosis of lumbar spine    Essential hypertension, benign    Hypercholesterolemia    Right lumbar radiculopathy    Gout    High risk medication use - 12/07/17 OARRS PM&R, 02/08/18 OARRS PM&R, 10/05/17 Urine Drug Screen: negative PM&R, MED CONTRACT 1/17/17    Low back pain with sciatica    Myalgia    Synovitis and tenosynovitis    Thoracic and lumbosacral neuritis    Vitamin D deficiency    Prediabetes    Hyperparathyroidism ([address]    Osteopenia    C6 radiculopathy    DJD (degenerative joint disease), cervical    Angina pectoris (HCC)    PRASAD (obstructive sleep apnea)    Hyperkalemia    Chronic pain of both shoulders    Hypogonadism in male    Therapeutic opioid-induced constipation (OIC)    Bilateral leg pain    Other specified symptoms and signs involving the circulatory and respiratory systems     Myelopathy (HCC)    Coronary artery disease of native artery of native heart with stable angina pectoris (HCC)    Bilateral carotid bruits    Spinal cord disease (HCC)    Urinary frequency    Lower urinary tract symptoms (LUTS)    Impotence    Trouble getting to sleep    Memory change       There are no discontinued medications. Modified Medications    No medications on file       No orders of the defined types were placed in this encounter. Assessment/Plan:    1. Hypercholesterolemia  Statin     2. Essential hypertension, benign   stable     3.  Coronary artery disease with hx of myocardial infarct w/o hx of CABG  No angina     4. Carotid Bruits- CUS - was cancelled due to COVID-19. Will reschedule. -- stable with mild plaque. 5. Preop ED - implant at [facility]- pt is an acceptable candidate for planned procedure. - did well. Counseling:  Heart Healthy Lifestyle, Low Salt Diet, Take Precautions to Prevent Falls and Walk Daily    Return in about 4 months (around 5/6/2021).       Electronically signed by Richardson Pride MD on 1/6/2021 at 2:56 PM

## 2021-01-08 DIAGNOSIS — G89.29 CHRONIC RIGHT-SIDED LOW BACK PAIN WITH RIGHT-SIDED SCIATICA: ICD-10-CM

## 2021-01-08 DIAGNOSIS — M79.605 BILATERAL LEG PAIN: ICD-10-CM

## 2021-01-08 DIAGNOSIS — M41.9 SCOLIOSIS OF LUMBAR SPINE, UNSPECIFIED SCOLIOSIS TYPE: ICD-10-CM

## 2021-01-08 DIAGNOSIS — M54.16 RIGHT LUMBAR RADICULOPATHY: ICD-10-CM

## 2021-01-08 DIAGNOSIS — M62.838 SPASM OF MUSCLE: ICD-10-CM

## 2021-01-08 DIAGNOSIS — M54.17 THORACIC AND LUMBOSACRAL NEURITIS: ICD-10-CM

## 2021-01-08 DIAGNOSIS — M54.14 THORACIC AND LUMBOSACRAL NEURITIS: ICD-10-CM

## 2021-01-08 DIAGNOSIS — M79.604 BILATERAL LEG PAIN: ICD-10-CM

## 2021-01-08 DIAGNOSIS — Z79.899 HIGH RISK MEDICATION USE: ICD-10-CM

## 2021-01-08 DIAGNOSIS — M54.41 CHRONIC RIGHT-SIDED LOW BACK PAIN WITH RIGHT-SIDED SCIATICA: ICD-10-CM

## 2021-01-08 RX ORDER — BACLOFEN 10 MG/1
TABLET ORAL
Qty: 90 TABLET | Refills: 1 | Status: SHIPPED | OUTPATIENT
Start: 2021-01-08 | End: 2021-03-11 | Stop reason: SDUPTHER

## 2021-01-08 RX ORDER — OXYCODONE AND ACETAMINOPHEN 7.5; 325 MG/1; MG/1
1 TABLET ORAL EVERY 4 HOURS PRN
Qty: 90 TABLET | Refills: 0 | Status: SHIPPED | OUTPATIENT
Start: 2021-01-09 | End: 2021-02-08 | Stop reason: SDUPTHER

## 2021-01-15 ENCOUNTER — OFFICE VISIT (OUTPATIENT)
Dept: PHYSICAL MEDICINE AND REHAB | Age: 71
End: 2021-01-15
Payer: MEDICARE

## 2021-01-15 VITALS
WEIGHT: 165 LBS | DIASTOLIC BLOOD PRESSURE: 76 MMHG | SYSTOLIC BLOOD PRESSURE: 130 MMHG | BODY MASS INDEX: 25.9 KG/M2 | HEIGHT: 67 IN

## 2021-01-15 DIAGNOSIS — M62.838 SPASM OF MUSCLE: ICD-10-CM

## 2021-01-15 DIAGNOSIS — M54.14 THORACIC AND LUMBOSACRAL NEURITIS: ICD-10-CM

## 2021-01-15 DIAGNOSIS — M54.40 BILATERAL LOW BACK PAIN WITH SCIATICA, SCIATICA LATERALITY UNSPECIFIED, UNSPECIFIED CHRONICITY: ICD-10-CM

## 2021-01-15 DIAGNOSIS — Z79.899 HIGH RISK MEDICATION USE: ICD-10-CM

## 2021-01-15 DIAGNOSIS — M79.604 BILATERAL LEG PAIN: Primary | ICD-10-CM

## 2021-01-15 DIAGNOSIS — M54.17 THORACIC AND LUMBOSACRAL NEURITIS: ICD-10-CM

## 2021-01-15 DIAGNOSIS — M79.605 BILATERAL LEG PAIN: Primary | ICD-10-CM

## 2021-01-15 DIAGNOSIS — M47.892 OTHER OSTEOARTHRITIS OF SPINE, CERVICAL REGION: ICD-10-CM

## 2021-01-15 DIAGNOSIS — M79.10 MYALGIA: ICD-10-CM

## 2021-01-15 PROCEDURE — 99214 OFFICE O/P EST MOD 30 MIN: CPT | Performed by: PHYSICAL MEDICINE & REHABILITATION

## 2021-01-15 ASSESSMENT — ENCOUNTER SYMPTOMS
EYE REDNESS: 0
WHEEZING: 0
BACK PAIN: 1
ABDOMINAL PAIN: 0
STRIDOR: 0
CONSTIPATION: 1
DIARRHEA: 0
EYE PAIN: 0
TROUBLE SWALLOWING: 0
SHORTNESS OF BREATH: 1
VOMITING: 0
SORE THROAT: 0
COUGH: 0
VISUAL CHANGE: 0
BLOOD IN STOOL: 0
NAUSEA: 0
BOWEL INCONTINENCE: 0
PHOTOPHOBIA: 0

## 2021-01-15 NOTE — PROGRESS NOTES
Loyd, 72 y.o. male presents today with:     Back Pain (Trigger point injections 10/22/20, 11/24/20 & 12/28/20 with 50% reduction in pain lasting 2-3 weeks)   Shoulder Pain (Right)   Leg Pain (Bilateral)   Foot Pain (Bilateral)   Medication Refill (Percocet, Gabapentin, Baclofen, Vit D refill & pill count today)     He is more functional on the opiate meds--able to do yard work and interact with friends and family in a positive friendly manner. Right shoulder is flared up- -he is back is feeling better status post  back surgery at [facility]. Injections still help very much. He would like to schedule monthly trigger point and sciatic injection in between times. Back Pain  This is a chronic problem. The current episode started more than 1 year ago. The problem occurs daily. The problem is unchanged. The pain is present in the lumbar spine. The quality of the pain is described as aching, cramping, shooting and stabbing. The pain radiates to the right foot, right knee and right thigh. The pain is at a severity of 2/10. The pain is severe. The pain is worse during the day. The symptoms are aggravated by bending, lying down, position, sitting, standing and twisting. Stiffness is present in the morning. Associated symptoms include leg pain, numbness and weakness. Pertinent negatives include no abdominal pain, bladder incontinence, bowel incontinence, chest pain, dysuria, fever, headaches, paresis, perianal numbness or tingling. Risk factors include lack of exercise and sedentary lifestyle. He has tried analgesics, home exercises, NSAIDs, muscle relaxant, heat and walking (SI injections which help, trigger point injections, OXY-IR ) for the symptoms. The treatment provided significant relief. Mental Health Problem  The primary symptoms include negative symptoms. The primary symptoms do not include dysphoric mood, hallucinations, bizarre behavior, disorganized speech or somatic symptoms.  The current episode started more than 1 month ago. This is a chronic problem. The onset of the illness is precipitated by a stressful event (chronic pain). The degree of incapacity that he is experiencing as a consequence of his illness is severe. Sequelae of the illness include an inability to work. Additional symptoms of the illness include anhedonia and fatigue. Additional symptoms of the illness do not include unexpected weight change, psychomotor retardation, feelings of worthlessness, attention impairment, euphoric mood, increased goal-directed activity, flight of ideas, inflated self-esteem, decreased need for sleep, distractible, poor judgment, visual change, headaches, abdominal pain or seizures. He does not admit to suicidal ideas. He does not have a plan to attempt suicide. He does not contemplate harming himself. He has not already injured self. He does not contemplate injuring another person. He has not already  injured another person. Risk factors that are present for mental illness include a history of mental illness. Hand Pain   The incident occurred more than 1 week ago. The incident occurred at home. There was no injury mechanism. The pain is present in the right wrist and right hand. The quality of the pain is described as cramping. The pain does not radiate. The pain is at a severity of 3/10. The pain is moderate. The pain has been intermittent since the incident. Associated symptoms include numbness. Pertinent negatives include no chest pain, muscle weakness or tingling. The symptoms are aggravated by movement. He has tried ice for the symptoms. The treatment provided moderate relief. Neck Pain   This is a recurrent problem. The current episode started more than 1 month ago. The problem occurs constantly. The problem has been gradually worsening. The pain is present in the occipital region. The quality of the pain is described as aching. The pain is at a severity of 4/10. The pain is moderate.  The symptoms are aggravated by twisting. Stiffness is present in the morning. Associated symptoms include leg pain, numbness and weakness. Pertinent negatives include no chest pain, fever, headaches, pain with swallowing, paresis, photophobia, syncope, tingling, trouble swallowing or visual change. He has tried heat, acetaminophen, bed rest, home exercises, muscle relaxants, neck support, ice, NSAIDs and oral narcotics for the symptoms. The treatment provided mild relief.        Past Medical History:   Diagnosis Date    Chronic back pain     Coronary artery disease 2002    Dr. Sean Joyner at [facility] Esophageal ulcer 3/10/2014    Dr. Shantal Alamo Gastric ulcer 3/10/2014    Dr. Zeyad Ross    Gout     Hiatal hernia 3/10/2014    Dr. Shantal Alamo Hyperlipidemia     Hypertension     Impotence 10/16/2020    MI (myocardial infarction) [facility]) 2005    Dr. Maxwell Lim Peripheral vascular disease ([facility] [address]     Prediabetes     Schizo-affective schizophrenia, in remission ([facility] [address] 2/1/2005    Overview:  followed at the [facility] Severe single current episode of major depressive disorder, without psychotic features ([facility] [address] 7/8/2016    Status post coronary artery stent placement 2002    Dr. Sean Joyner     Past Surgical History:   Procedure Laterality Date    [address]  01/03/2020    Dr. aRdha Zapata Right 6/4/2019    RIGHT WRIST CARPAL TUNNEL RELEASE, SUPINE, PAT AT PCP performed by Nita Velasquez MD at [address] [place] [address]  3/10/14    DR Savannah Mesa  2002    Dr. Morris [address] GRAFT  10/17/2017    KNEE ARTHROSCOPY Left 2002    Dr. Nahed Hallman  12/19/13    CAUDAL BLOCKS DONE BY DR Rose Crabtree. OTHER SURGICAL HISTORY  04/2020    [facility]      SPINE SURGERY  9/2009    Dr. Malathi Velarde [facility] SURGERY  2008    Dr. Micah Fermin UPPER GASTROINTESTINAL ENDOSCOPY  3/10/14    DR Dunia Luevano     Social History     Socioeconomic History    Marital status:      Spouse name: None    Number of children: None    Years of education: None    Highest education level: None   Occupational History    Occupation: disability    Social Needs    Financial resource strain: None    Food insecurity     Worry: None     Inability: None    Transportation needs     Medical: None     Non-medical: None   Tobacco Use    Smoking status: Never Smoker    Smokeless tobacco: Never Used   Substance and Sexual Activity    Alcohol use: No     Alcohol/week: 0.0 standard drinks     Comment: Stopped years ago.  Drug use: No     Comment: YEARS AGO    Sexual activity: Yes     Partners: Female     Comment: monogomous sexual partner, wife. Lifestyle    Physical activity     Days per week: None     Minutes per session: None    Stress: None   Relationships    Social connections     Talks on phone: None     Gets together: None     Attends [employer] service: None     Active member of club or organization: None     Attends meetings of clubs or organizations: None     Relationship status: None    Intimate partner violence     Fear of current or ex partner: None     Emotionally abused: None     Physically abused: None     Forced sexual activity: None   Other Topics Concern    None   Social History Narrative    Tobacco -- never smoked or chewed. Alcohol -- have not used for past 10 years, prior to had consumed 6 pack of beer daily. Drugs -- tried marijuana and cocaine 14 years ago occasionally used for 3-4 years and then stopped completely 10 years ago. Education -- completed 11th grade in [place].    Occupation -- [address] work,  at [employer].    Marital status --  44 years, 2 grown sons.      Family History   Problem Relation Age of Onset    High Blood Pressure Mother     Arthritis Mother     Cancer Father         throat    Arthritis Father        No Known Allergies    Review of Systems   Constitutional: Positive for activity change and fatigue. Negative for chills, diaphoresis, fever and unexpected weight change. HENT: Negative for congestion, ear discharge, ear pain, hearing loss, nosebleeds, sore throat, tinnitus and trouble swallowing. Eyes: Negative for photophobia, pain and redness. Respiratory: Positive for shortness of breath. Negative for cough, wheezing and stridor. Shortness of breath on exertion   Cardiovascular: Negative for chest pain, palpitations, leg swelling and syncope. Gastrointestinal: Positive for constipation. Negative for abdominal pain, blood in stool, bowel incontinence, diarrhea, nausea and vomiting. Endocrine: Negative for polydipsia. Genitourinary: Negative for bladder incontinence, dysuria, flank pain, frequency, hematuria and urgency. Musculoskeletal: Positive for back pain, gait problem and myalgias. Negative for neck pain. Skin: Negative for rash. Allergic/Immunologic: Positive for immunocompromised state. Negative for environmental allergies. Neurological: Positive for weakness and numbness. Negative for dizziness, tingling, tremors, seizures and headaches. Hematological: Does not bruise/bleed easily. Psychiatric/Behavioral: Negative for dysphoric mood, hallucinations and suicidal ideas. The patient is not nervous/anxious. Objective    Vitals:    01/15/21 1120   BP: 130/76   Site: Left Upper Arm   Position: Sitting   Cuff Size: Small Adult   Weight: 165 lb (74.8 kg)   Height: 5' 7\" (1.702 m)     Pain Score: Three (With medication)       Physical Exam  Vitals signs reviewed. Constitutional:       General: He is not in acute distress. Appearance: He is well-developed. He is not ill-appearing, toxic-appearing or diaphoretic. Comments:     HENT:      Head: Normocephalic and atraumatic. Right Ear: Hearing normal.      Left Ear: Hearing normal.      Nose: Nose normal.      Mouth/Throat:      Mouth: No oral lesions. Dentition: Normal dentition. Pharynx: No oropharyngeal exudate. Eyes:      General: No scleral icterus. Right eye: No discharge. Left eye: No discharge. Conjunctiva/sclera: Conjunctivae normal.      Right eye: No chemosis or exudate. Left eye: No chemosis or exudate. Neck:      Musculoskeletal: Neck supple. Spinous process tenderness and muscular tenderness present. No edema or neck rigidity. Thyroid: No thyromegaly. Vascular: No JVD. Trachea: No tracheal tenderness or tracheal deviation. Pulmonary:      Effort: Pulmonary effort is normal. No tachypnea, bradypnea, accessory muscle usage or respiratory distress. Breath sounds: Decreased breath sounds present. No wheezing or rales. Chest:      Chest wall: No tenderness. Abdominal:      General: There is no distension. Musculoskeletal:         General: Tenderness present. Right shoulder: He exhibits decreased range of motion, tenderness and bony tenderness. Left shoulder: He exhibits decreased range of motion, tenderness and bony tenderness. Right elbow: Normal.     Left elbow: Normal.      Right wrist: Normal.      Left wrist: Normal.      Right hip: Normal.      Left hip: Normal.      Right knee: Normal.      Left knee: Normal.      Right ankle: Normal. Achilles tendon normal.      Left ankle: Normal. Achilles tendon normal.      Cervical back: He exhibits decreased range of motion, tenderness, bony tenderness, laceration, pain and spasm. He exhibits no swelling, no edema and no deformity. Thoracic back: He exhibits decreased range of motion, tenderness, bony tenderness, deformity, pain and spasm. Lumbar back: He exhibits decreased range of motion, tenderness, bony tenderness and pain.  He exhibits no swelling, no edema, no deformity, no laceration and normal pulse. Back:       Right upper arm: Normal.      Left upper arm: Normal.      Right forearm: Normal.      Left forearm: Normal.        Arms:       Right hand: He exhibits decreased range of motion and bony tenderness. He exhibits normal capillary refill, no deformity, no laceration and no swelling. Decreased sensation noted. Decreased sensation is present in the ulnar distribution, is present in the medial distribution and is present in the radial distribution. Decreased strength noted. He exhibits finger abduction, thumb/finger opposition and wrist extension trouble. Left hand: He exhibits decreased range of motion and bony tenderness. Decreased sensation noted. Decreased sensation is present in the ulnar distribution and is present in the radial distribution. Decreased strength noted. He exhibits finger abduction and wrist extension trouble. Right upper leg: Normal.      Left upper leg: Normal.      Right lower leg: Normal.      Left lower leg: Normal.        Legs:       Right foot: Normal.      Left foot: Normal.      Comments: Tender areas are indicated by numbered spot         Skin:     General: Skin is warm and dry. Coloration: Skin is not pale. Findings: No abrasion, bruising, ecchymosis, erythema, laceration, petechiae or rash. Rash is not macular, pustular or urticarial.      Nails: There is no clubbing. Neurological:      Mental Status: He is alert and oriented to person, place, and time. Cranial Nerves: No cranial nerve deficit. Sensory: Sensory deficit present. Motor: No tremor, atrophy or abnormal muscle tone. Coordination: Coordination normal.      Gait: Gait abnormal.      Deep Tendon Reflexes: Reflexes abnormal. Babinski sign absent on the right side. Babinski sign absent on the left side. Reflex Scores:       Patellar reflexes are 1+ on the right side and 1+ on the left side.        Achilles reflexes are 0 on the right side and 0 on the left side. Psychiatric:         Attention and Perception: He is attentive. Mood and Affect: Mood is not anxious or depressed. Affect is not labile, blunt, angry or inappropriate. Speech: He is communicative. Speech is not rapid and pressured, delayed, slurred or tangential.         Behavior: Behavior normal. Behavior is not agitated, slowed, aggressive, withdrawn, hyperactive or combative. Thought Content: Thought content normal. Thought content is not paranoid or delusional. Thought content does not include homicidal or suicidal ideation. Thought content does not include homicidal or suicidal plan. Cognition and Memory: Memory is not impaired. He does not exhibit impaired recent memory or impaired remote memory. Judgment: Judgment normal. Judgment is not impulsive or inappropriate. Comments: Calm appropriate    NAD       Ortho Exam  Neurologic Exam     Mental Status   Oriented to person, place, and time. Speech: not slurred   Level of consciousness: alert  Knowledge: good. Able to name object. Able to read. Able to repeat. Able to write. Gait, Coordination, and Reflexes     Reflexes   Right patellar: 1+  Left patellar: 1+  Right achilles: 0  Left achilles: 0        After a thorough review and discussion of the previous medical records, patient comprehensive medical, surgical, and family and social history, Review of Systems, their OARRS, their Screener and Opioid Assessment for Patients with Pain (SOAPP®-R), recent diagnostics, and symptomatic results to previous treatment, it is my impression that the patients is suffering with progressive and severe:     Diagnosis Orders   1. Bilateral leg pain     2. Thoracic and lumbosacral neuritis     3. Bilateral low back pain with sciatica, sciatica laterality unspecified, unspecified chronicity     4. Other osteoarthritis of spine, cervical region     5. High risk medication use OARRS RUN 11/10/16     6.  Spasm of activities of daily living, family interactions and psychological functioning, they're not having any adverse effects or side effects from the current medications, and I see no findings of aberrant drug taking or addiction related behaviors. The patient is aware that they have a chronic pain condition and they may require opiates dosing for life. All efforts will be made to wean to the lowest effective dose. Other therapies for pain have not been effective including nonopiate medications. Injections and exercises are only partially effective. A Rx for Narcan was offered to help prevent accidental overdose. RX Monitoring 5/18/2020   Attestation -   Periodic Controlled Substance Monitoring Possible medication side effects, risk of tolerance/dependence & alternative treatments discussed. ;No signs of potential drug abuse or diversion identified. ;Assessed functional status. ;Obtaining appropriate analgesic effect of treatment. Chronic Pain > 50 MEDD Re-evaluated the status of the patient's underlying condition causing pain. ;Considered consultation with a specialist.;Obtained or confirmed \"Consent for Opioid Use\" on file. Chronic Pain > 80 MEDD -               Patient is currently taking:       I am having Lisa Odor maintain his aspirin, SYRINGE-NEEDLE (DISP) 3 ML, CPAP Machine, tamsulosin, metoprolol tartrate, vitamin D, nitroGLYCERIN, pravastatin, allopurinol, testosterone cypionate, glucose monitoring kit, Lancets, blood glucose test strips, Alcohol Prep, DOK, NIFEdipine, gabapentin, metFORMIN, colchicine, lubiprostone, baclofen, and oxyCODONE-acetaminophen. I also recommend the following Medications:    No orders of the defined types were placed in this encounter. continue Baclofen, Percocet and Neurontin     -which helps with pain and function. Otherwise, continue the current pain medications that I have prescibed.       Radiologic:   Old films reviewed-  FINDINGS:   Fused PA and lateral images of spine were obtained utilizing   individual PA and lateral images of the whole spine.       Redemonstration of posterior spinal instrumented fusion changes noted   from T9 to bilateral iliac bones. Interbody fusion changes noted at   L5-S1. Additional disc spacers are again noted in the mid lumbar spine. No perihardware fractures or lucencies. No change in alignment when   compared with the radiograph from 02/13/2020. There is residual dextroscoliosis centered in the upper lumbar   region. There is straightening of normal lumbar lordosis and thoracic   kyphosis.       IMPRESSION:       ,     I discussed results with patients. see Follow up plans below  For any new studies. Care Everywhere Updates:  requested and reviewed. No new issues noted. Electrodiagnostic:  Previous studies requested,     I discussed results with patient. See follow-up plans for new studies.         Labs:  Previous labs reviewed     Lab Results   Component Value Date     11/16/2020    K 4.0 12/15/2020     11/16/2020    CO2 29 11/16/2020    BUN 16 11/16/2020    CREATININE 1.16 11/16/2020    CALCIUM 9.3 11/16/2020    LABALBU 4.4 10/27/2020    BILITOT 0.4 10/27/2020    ALKPHOS 60 10/27/2020    AST 22 10/27/2020    ALT 17 10/27/2020     Lab Results   Component Value Date    WBC 7.2 11/16/2020    RBC 4.61 11/16/2020    HGB 14.8 11/16/2020    HCT 45.2 11/16/2020    MCV 98.0 11/16/2020    MCH 32.1 11/16/2020    MCHC 32.8 11/16/2020    RDW 15.4 11/16/2020     11/16/2020    MPV 9.9 09/15/2015       Lab Results   Component Value Date    LABAMPH Neg 03/12/2020    BARBSCNU Neg 03/12/2020    LABBENZ Neg 03/12/2020    CANSU Neg 03/12/2020    COCAIMETSCRU Neg 03/12/2020    PHENCYCLIDINESCREENURINE Neg 69/21/6642    TRICYCLIC Negative 32/18/7978    DSCOMMENT see below 03/12/2020       Lab Results   Component Value Date    CODEINE Not Detected 09/16/2016    MORPHINE Not Detected 09/16/2016 Elias Hoist Not Detected 09/16/2016    OXYCODONE Present 09/16/2016    NOROXYCODONE Present 09/16/2016    NOROXYMU Present 09/16/2016    HYDRCO Not Detected 09/16/2016    NORHYDU Not Detected 09/16/2016    HYDROMO Not Detected 09/16/2016    BUPREN Not Detected 09/16/2016    NORBUPRNOR Not Detected 09/16/2016    FENTA Not Detected 09/16/2016    NORFENT Not Detected 09/16/2016    MEPERIDINE Not Detected 09/16/2016    TAPENU Not Detected 09/16/2016    TAPOSULFUR Not Detected 09/16/2016    METHADONE Not Detected 09/16/2016    LABPROP Not Detected 09/16/2016    TRAM Not Detected 09/16/2016    AMPH Not Detected 09/16/2016    METHAMP Not Detected 09/16/2016    MDMA Not Detected 09/16/2016    ECMDA Not Detected 09/16/2016       Lab Results   Component Value Date    PHENTERMINE Not Detected 09/16/2016    BENZOYL Not Detected 09/16/2016    Flo Brooms Not Detected 09/16/2016    ALPHAOHALPRA Not Detected 09/16/2016    CLONAZEPAM Not Detected 09/16/2016    Maura [place] Not Detected 09/16/2016    DIAZEP Not Detected 09/16/2016    SAFIA Not Detected 09/16/2016    OXAZ Not Detected 09/16/2016    Darroll Brand Not Detected 09/16/2016    LORAZEPAM Not Detected 09/16/2016    MIDAZOLAM Not Detected 09/16/2016    ZOLPIDEM Not Detected 09/16/2016    REG Not Detected 09/16/2016    ETG Not Detected 09/16/2016    MARIJMET Not Detected 09/16/2016    PCP Not Detected 09/16/2016    PAINMGTDRUGP See Below 09/16/2016    EERPAINMGTPA See Note 09/16/2016    LABCREA 199.2 03/02/2020         , I discussed results with patient. See follow-up plans for new studies. Therapies:  HEP-gentle stretching and relaxation techniques-demonstrated with patient-they are to do them twice a day.   They are also advised to make the following lifestyle changes:  Goals      Exercise 3x per week (30 min per time)      SOAPP-R GOAL LESS THAN 9      09/16/16 SCORE: 33-HIGH RISK   11/11/16 score: 27-high risk     01/13/17 score: 16-moderate risk   03/13/17 Score: 11-moderate risk   06/01/17 score: 15-moderate risk  08/03/17 score: 15-moderate risk  10/04/17 score: 18-moderate risk  12/08/17 score: 11-moderate risk  02/09/18 score: 12-moderate risk  04/13/18 score: 14-moderate risk  7/12/18 score 10-moderate risk  11/29/18 score 17- moderate risk  01/28/19 score  16-moderate risk  6/10/19 score: 11- moderate risk  8/15/19 score: 17- moderate risk   4/8/19 score  7- low risk  3/11/20 score: 17- moderate risk  5- score- 16 - moderate risk   7/20/20 score: 21- high risk  10/16/20 score: 16- moderate risk  1/15/21 score: 16- moderate risk            Injections or Epidurals:  Injection options were discussed. Patient gave verbal consent to ordered injections. See follow-up plans for planned injections. Supplements:  Vitamin D with increased dosing during the rainy months,   Education was given on:   Dietary and Fitness--daily stretches and low carb diet-in chair Yoga when possible             Follow up with Primary Care Physician regarding their general medical needs. Stressed the importance of following up with PCP and specialists for his/her chronic diseases, health, CV, and cancer screening and continued care. Will follow disease activity/progression and adjust therapeutic regimen to disease activity and severity. Discussed medication dosage, usage, goals of therapy, and side effects. Available test results were reviewed -Discussed findings, impression and plan with patient. An additional 4 minutes were spent outside of the patient visit to review records. Additional time spent with the patient to discuss their questions. Additional time spent with the patient devoted to discussing treatment strategy, planning, and implementation. Patient understands above plan; questions asked and answered. Patient agrees to plan as noted above. F/U in 2-3 months  At least 50% of the visit was involved in the discussion of the options for treatment.  We discussed exercises, medication, interventional therapies and surgery. Healthy life style is essential with patient hard work to achieve the wellness. In addition; discussion with the patient and/or family about any of the diagnostic results, impressions and/or recommended diagnostic studies, prognosis, risks and benefits of treatment options, instructions for treatment and/or follow-up, importance of compliance with chosen treatment options, risk-factor reduction, and patient/family education. They are to follow up in 2 months to review medication, efficacy of injections, pill counts, OARRS check, SOAPPR assessment, review diagnostics, to review previous and future treatment plans and assess appropriateness for continued therapy.         New Diagnostics  Orders Placed This Encounter   Procedures    [place] INJECT TRIGGER POINTS, 3 OR GREATER    [place] INJECT TRIGGER POINTS, 3 OR GREATER    [place] INJECT TRIGGER POINTS, 3 OR GREATER       Brenda Stubbs, DO

## 2021-01-25 ENCOUNTER — PROCEDURE VISIT (OUTPATIENT)
Dept: PHYSICAL MEDICINE AND REHAB | Age: 71
End: 2021-01-25
Payer: MEDICARE

## 2021-01-25 DIAGNOSIS — M79.10 MYALGIA: ICD-10-CM

## 2021-01-25 PROCEDURE — 20553 NJX 1/MLT TRIGGER POINTS 3/>: CPT | Performed by: PHYSICAL MEDICINE & REHABILITATION

## 2021-01-25 RX ORDER — LIDOCAINE HYDROCHLORIDE 10 MG/ML
16 INJECTION, SOLUTION INFILTRATION; PERINEURAL ONCE
Status: COMPLETED | OUTPATIENT
Start: 2021-01-25 | End: 2021-01-25

## 2021-01-25 RX ADMIN — LIDOCAINE HYDROCHLORIDE 16 ML: 10 INJECTION, SOLUTION INFILTRATION; PERINEURAL at 15:35

## 2021-02-08 DIAGNOSIS — M54.41 CHRONIC RIGHT-SIDED LOW BACK PAIN WITH RIGHT-SIDED SCIATICA: ICD-10-CM

## 2021-02-08 DIAGNOSIS — G89.29 CHRONIC RIGHT-SIDED LOW BACK PAIN WITH RIGHT-SIDED SCIATICA: ICD-10-CM

## 2021-02-08 DIAGNOSIS — Z79.899 HIGH RISK MEDICATION USE: ICD-10-CM

## 2021-02-08 DIAGNOSIS — M54.14 THORACIC AND LUMBOSACRAL NEURITIS: ICD-10-CM

## 2021-02-08 DIAGNOSIS — M54.16 RIGHT LUMBAR RADICULOPATHY: ICD-10-CM

## 2021-02-08 DIAGNOSIS — M54.17 THORACIC AND LUMBOSACRAL NEURITIS: ICD-10-CM

## 2021-02-08 DIAGNOSIS — M41.9 SCOLIOSIS OF LUMBAR SPINE, UNSPECIFIED SCOLIOSIS TYPE: ICD-10-CM

## 2021-02-08 RX ORDER — OXYCODONE AND ACETAMINOPHEN 7.5; 325 MG/1; MG/1
1 TABLET ORAL EVERY 4 HOURS PRN
Qty: 90 TABLET | Refills: 0 | Status: SHIPPED | OUTPATIENT
Start: 2021-02-08 | End: 2021-03-09 | Stop reason: SDUPTHER

## 2021-03-02 ENCOUNTER — PROCEDURE VISIT (OUTPATIENT)
Dept: PHYSICAL MEDICINE AND REHAB | Age: 71
End: 2021-03-02
Payer: MEDICARE

## 2021-03-02 DIAGNOSIS — M79.10 MYALGIA: ICD-10-CM

## 2021-03-02 PROCEDURE — 20553 NJX 1/MLT TRIGGER POINTS 3/>: CPT | Performed by: PHYSICAL MEDICINE & REHABILITATION

## 2021-03-02 RX ORDER — LIDOCAINE HYDROCHLORIDE 10 MG/ML
16 INJECTION, SOLUTION INFILTRATION; PERINEURAL ONCE
Status: COMPLETED | OUTPATIENT
Start: 2021-03-02 | End: 2021-03-02

## 2021-03-02 RX ADMIN — LIDOCAINE HYDROCHLORIDE 16 ML: 10 INJECTION, SOLUTION INFILTRATION; PERINEURAL at 12:11

## 2021-03-05 RX ORDER — GABAPENTIN 300 MG/1
CAPSULE ORAL
Qty: 270 CAPSULE | Refills: 0 | Status: SHIPPED | OUTPATIENT
Start: 2021-03-05 | End: 2021-04-14 | Stop reason: SDUPTHER

## 2021-03-09 DIAGNOSIS — M54.17 THORACIC AND LUMBOSACRAL NEURITIS: ICD-10-CM

## 2021-03-09 DIAGNOSIS — M54.41 CHRONIC RIGHT-SIDED LOW BACK PAIN WITH RIGHT-SIDED SCIATICA: ICD-10-CM

## 2021-03-09 DIAGNOSIS — M41.9 SCOLIOSIS OF LUMBAR SPINE, UNSPECIFIED SCOLIOSIS TYPE: ICD-10-CM

## 2021-03-09 DIAGNOSIS — M54.16 RIGHT LUMBAR RADICULOPATHY: ICD-10-CM

## 2021-03-09 DIAGNOSIS — G89.29 CHRONIC RIGHT-SIDED LOW BACK PAIN WITH RIGHT-SIDED SCIATICA: ICD-10-CM

## 2021-03-09 DIAGNOSIS — M54.14 THORACIC AND LUMBOSACRAL NEURITIS: ICD-10-CM

## 2021-03-09 DIAGNOSIS — Z79.899 HIGH RISK MEDICATION USE: ICD-10-CM

## 2021-03-09 RX ORDER — OXYCODONE AND ACETAMINOPHEN 7.5; 325 MG/1; MG/1
1 TABLET ORAL EVERY 4 HOURS PRN
Qty: 90 TABLET | Refills: 0 | Status: SHIPPED | OUTPATIENT
Start: 2021-03-09 | End: 2021-04-08 | Stop reason: SDUPTHER

## 2021-03-11 DIAGNOSIS — M79.605 BILATERAL LEG PAIN: ICD-10-CM

## 2021-03-11 DIAGNOSIS — M79.604 BILATERAL LEG PAIN: ICD-10-CM

## 2021-03-11 DIAGNOSIS — M62.838 SPASM OF MUSCLE: ICD-10-CM

## 2021-03-11 RX ORDER — BACLOFEN 10 MG/1
TABLET ORAL
Qty: 90 TABLET | Refills: 1 | Status: SHIPPED | OUTPATIENT
Start: 2021-03-11 | End: 2021-05-07 | Stop reason: SDUPTHER

## 2021-03-18 ENCOUNTER — OFFICE VISIT (OUTPATIENT)
Dept: FAMILY MEDICINE CLINIC | Age: 71
End: 2021-03-18
Payer: MEDICARE

## 2021-03-18 VITALS
WEIGHT: 168 LBS | BODY MASS INDEX: 26.37 KG/M2 | TEMPERATURE: 97.2 F | DIASTOLIC BLOOD PRESSURE: 80 MMHG | OXYGEN SATURATION: 98 % | RESPIRATION RATE: 18 BRPM | HEART RATE: 61 BPM | SYSTOLIC BLOOD PRESSURE: 126 MMHG | HEIGHT: 67 IN

## 2021-03-18 VITALS
OXYGEN SATURATION: 98 % | WEIGHT: 168 LBS | RESPIRATION RATE: 18 BRPM | DIASTOLIC BLOOD PRESSURE: 80 MMHG | HEART RATE: 61 BPM | TEMPERATURE: 97.2 F | BODY MASS INDEX: 26.37 KG/M2 | HEIGHT: 67 IN | SYSTOLIC BLOOD PRESSURE: 126 MMHG

## 2021-03-18 DIAGNOSIS — K59.03 THERAPEUTIC OPIOID-INDUCED CONSTIPATION (OIC): ICD-10-CM

## 2021-03-18 DIAGNOSIS — M54.50 CHRONIC LOW BACK PAIN, UNSPECIFIED BACK PAIN LATERALITY, UNSPECIFIED WHETHER SCIATICA PRESENT: ICD-10-CM

## 2021-03-18 DIAGNOSIS — I25.118 CORONARY ARTERY DISEASE OF NATIVE ARTERY OF NATIVE HEART WITH STABLE ANGINA PECTORIS (HCC): ICD-10-CM

## 2021-03-18 DIAGNOSIS — I20.9 ANGINA PECTORIS (HCC): ICD-10-CM

## 2021-03-18 DIAGNOSIS — R00.1 BRADYCARDIA: ICD-10-CM

## 2021-03-18 DIAGNOSIS — I10 ESSENTIAL HYPERTENSION, BENIGN: ICD-10-CM

## 2021-03-18 DIAGNOSIS — I20.9 ANGINA PECTORIS (HCC): Primary | ICD-10-CM

## 2021-03-18 DIAGNOSIS — E87.5 HYPERKALEMIA: ICD-10-CM

## 2021-03-18 DIAGNOSIS — M1A.9XX0 CHRONIC GOUT WITHOUT TOPHUS, UNSPECIFIED CAUSE, UNSPECIFIED SITE: ICD-10-CM

## 2021-03-18 DIAGNOSIS — Z00.00 ROUTINE GENERAL MEDICAL EXAMINATION AT A HEALTH CARE FACILITY: Primary | ICD-10-CM

## 2021-03-18 DIAGNOSIS — R73.03 PREDIABETES: ICD-10-CM

## 2021-03-18 DIAGNOSIS — T40.2X5A THERAPEUTIC OPIOID-INDUCED CONSTIPATION (OIC): ICD-10-CM

## 2021-03-18 DIAGNOSIS — G89.29 CHRONIC LOW BACK PAIN, UNSPECIFIED BACK PAIN LATERALITY, UNSPECIFIED WHETHER SCIATICA PRESENT: ICD-10-CM

## 2021-03-18 LAB
ALBUMIN SERPL-MCNC: 4.6 G/DL (ref 3.5–4.6)
ALP BLD-CCNC: 73 U/L (ref 35–104)
ALT SERPL-CCNC: 21 U/L (ref 0–41)
ANION GAP SERPL CALCULATED.3IONS-SCNC: 12 MEQ/L (ref 9–15)
AST SERPL-CCNC: 27 U/L (ref 0–40)
BASOPHILS ABSOLUTE: 0 K/UL (ref 0–0.2)
BASOPHILS RELATIVE PERCENT: 0.8 %
BILIRUB SERPL-MCNC: 0.5 MG/DL (ref 0.2–0.7)
BUN BLDV-MCNC: 16 MG/DL (ref 8–23)
CALCIUM SERPL-MCNC: 10.2 MG/DL (ref 8.5–9.9)
CHLORIDE BLD-SCNC: 96 MEQ/L (ref 95–107)
CO2: 31 MEQ/L (ref 20–31)
CREAT SERPL-MCNC: 0.93 MG/DL (ref 0.7–1.2)
EOSINOPHILS ABSOLUTE: 0.1 K/UL (ref 0–0.7)
EOSINOPHILS RELATIVE PERCENT: 1.1 %
GFR AFRICAN AMERICAN: >60
GFR NON-AFRICAN AMERICAN: >60
GLOBULIN: 3.3 G/DL (ref 2.3–3.5)
GLUCOSE BLD-MCNC: 99 MG/DL (ref 70–99)
HBA1C MFR BLD: 6.4 % (ref 4.8–5.9)
HCT VFR BLD CALC: 46.8 % (ref 42–52)
HEMOGLOBIN: 15.8 G/DL (ref 14–18)
LYMPHOCYTES ABSOLUTE: 1.1 K/UL (ref 1–4.8)
LYMPHOCYTES RELATIVE PERCENT: 21.7 %
MCH RBC QN AUTO: 32.3 PG (ref 27–31.3)
MCHC RBC AUTO-ENTMCNC: 33.9 % (ref 33–37)
MCV RBC AUTO: 95.4 FL (ref 80–100)
MONOCYTES ABSOLUTE: 0.5 K/UL (ref 0.2–0.8)
MONOCYTES RELATIVE PERCENT: 10.6 %
NEUTROPHILS ABSOLUTE: 3.4 K/UL (ref 1.4–6.5)
NEUTROPHILS RELATIVE PERCENT: 65.8 %
PDW BLD-RTO: 15 % (ref 11.5–14.5)
PLATELET # BLD: 266 K/UL (ref 130–400)
POTASSIUM SERPL-SCNC: 5.3 MEQ/L (ref 3.4–4.9)
RBC # BLD: 4.9 M/UL (ref 4.7–6.1)
SODIUM BLD-SCNC: 139 MEQ/L (ref 135–144)
TOTAL PROTEIN: 7.9 G/DL (ref 6.3–8)
URIC ACID, SERUM: 5.8 MG/DL (ref 3.4–7)
WBC # BLD: 5.1 K/UL (ref 4.8–10.8)

## 2021-03-18 PROCEDURE — 99214 OFFICE O/P EST MOD 30 MIN: CPT | Performed by: PHYSICIAN ASSISTANT

## 2021-03-18 PROCEDURE — G0439 PPPS, SUBSEQ VISIT: HCPCS | Performed by: PHYSICIAN ASSISTANT

## 2021-03-18 RX ORDER — METOPROLOL TARTRATE 50 MG/1
TABLET, FILM COATED ORAL
Qty: 270 TABLET | Refills: 2
Start: 2021-03-18 | End: 2021-10-05

## 2021-03-18 SDOH — ECONOMIC STABILITY: TRANSPORTATION INSECURITY
IN THE PAST 12 MONTHS, HAS THE LACK OF TRANSPORTATION KEPT YOU FROM MEDICAL APPOINTMENTS OR FROM GETTING MEDICATIONS?: NOT ASKED

## 2021-03-18 SDOH — ECONOMIC STABILITY: TRANSPORTATION INSECURITY
IN THE PAST 12 MONTHS, HAS LACK OF TRANSPORTATION KEPT YOU FROM MEETINGS, WORK, OR FROM GETTING THINGS NEEDED FOR DAILY LIVING?: NOT ASKED

## 2021-03-18 SDOH — ECONOMIC STABILITY: FOOD INSECURITY: WITHIN THE PAST 12 MONTHS, THE FOOD YOU BOUGHT JUST DIDN'T LAST AND YOU DIDN'T HAVE MONEY TO GET MORE.: NOT ASKED

## 2021-03-18 SDOH — ECONOMIC STABILITY: INCOME INSECURITY: HOW HARD IS IT FOR YOU TO PAY FOR THE VERY BASICS LIKE FOOD, HOUSING, MEDICAL CARE, AND HEATING?: NOT HARD AT ALL

## 2021-03-18 SDOH — ECONOMIC STABILITY: FOOD INSECURITY: WITHIN THE PAST 12 MONTHS, YOU WORRIED THAT YOUR FOOD WOULD RUN OUT BEFORE YOU GOT MONEY TO BUY MORE.: NOT ASKED

## 2021-03-18 ASSESSMENT — LIFESTYLE VARIABLES
AUDIT TOTAL SCORE: INCOMPLETE
HOW OFTEN DO YOU HAVE A DRINK CONTAINING ALCOHOL: NEVER
AUDIT-C TOTAL SCORE: INCOMPLETE

## 2021-03-18 ASSESSMENT — PATIENT HEALTH QUESTIONNAIRE - PHQ9
2. FEELING DOWN, DEPRESSED OR HOPELESS: 0
SUM OF ALL RESPONSES TO PHQ9 QUESTIONS 1 & 2: 0
SUM OF ALL RESPONSES TO PHQ QUESTIONS 1-9: 0

## 2021-03-18 NOTE — PROGRESS NOTES
[facility], 72 y.o. male presents today with:  Chief Complaint   Patient presents with    Hypertension     3 Month follow up     Bradycardia     Patient had some concerns with his heart rate at home he states his readings are 51-60     HPI  Pb Haney is in the office today for follow up visit. Last OV with me: 12/15/2020    Due for routine lab monitoring. Bradycardia/CAD/HTN. Has been having episodes of low heart rate at home. Pulse will drop in to the 50s without any notable dizziness/fatigue. Denies CP, GUILLORY, SOB. Currently taking 50 mg metoprolol BID. Patient had exercise stress test on 12/30/2020--had a peak HR of 155 BPM.    EKG 10/2020 showed rate of 66 BPM.    Last OV with Dr. Sarah Beth Stevens was 1/6/2021. No changes to medications made. Instructed to follow up in 4 months. Gout. Stable on allopurinol. Denies any recent flares or unilateral joint swelling/erythema. Opioid induced constipation. Improved with amitiza use. Denies abdominal bloating/pain. This has improved. Chronic back pain. Followed by PMR. Receives injections with Dr. Mateo Piedra. No fall/injury/trauma. Pain is controlled with injections, medication. Review of Systems   Constitutional: Negative for activity change, appetite change, chills, diaphoresis and fatigue. HENT: Negative for congestion. Respiratory: Negative for chest tightness, shortness of breath and wheezing. Cardiovascular: Negative for chest pain, palpitations and leg swelling. Gastrointestinal: Negative for abdominal distention, abdominal pain, blood in stool, constipation (improved), diarrhea, rectal pain and vomiting. Musculoskeletal: Positive for arthralgias and back pain (improved since surgery). Negative for gait problem and myalgias. Skin: Negative for color change and rash. Neurological: Negative for dizziness, weakness, light-headedness, numbness and headaches.    Psychiatric/Behavioral: Negative for dysphoric mood and sleep disturbance. The patient is not nervous/anxious.       Past Medical History:   Diagnosis Date    Chronic back pain     Coronary artery disease 2002    Dr. Mary Chavez at [facility] Esophageal ulcer 3/10/2014    Dr. Karla Flores Gastric ulcer 3/10/2014    Dr. Jordan Petersen    Gout     Hiatal hernia 3/10/2014    Dr. Karla Flores Hyperlipidemia     Hypertension     Impotence 10/16/2020    MI (myocardial infarction) [facility]) 2005    Dr. Jason Soria Peripheral vascular disease ([facility] [address]     Prediabetes     Schizo-affective schizophrenia, in remission ([address] 2/1/2005    Overview:  followed at the [facility] Severe single current episode of major depressive disorder, without psychotic features ([facility] [address] 7/8/2016    Status post coronary artery stent placement 2002    Dr. Mary Chavez     Past Surgical History:   Procedure Laterality Date   [address]  01/03/2020    Dr. Roque Alcala Right 6/4/2019    RIGHT WRIST CARPAL TUNNEL RELEASE, SUPINE, PAT AT PCP performed by Toni Cheatham MD at [address] [place] [address]  3/10/14    DR Estella Rand  2002    Dr. Shen Ar GRAFT  10/17/2017    KNEE ARTHROSCOPY Left 2002    Dr. Anna Ball  12/19/13    CAUDAL BLOCKS DONE BY DR Rose Crabtree. OTHER SURGICAL HISTORY  04/2020    urolift      SPINE SURGERY  9/2009    Dr. Salina Sprague  2008    Dr. Scotty Brewer  3/10/14    Karen Cain, DR Elvia Narvaez     Social History     Socioeconomic History    Marital status:      Spouse name: Not on file    Number of children: Not on file    Years of education: Not on file    Highest education level: Not on file   Occupational History    Occupation: disability    Social Needs    Financial DAILY 270 tablet 2    baclofen (LIORESAL) 10 MG tablet TAKE 1 TABLET BY MOUTH THREE TIMES DAILY 90 tablet 1    oxyCODONE-acetaminophen (PERCOCET) 7.5-325 MG per tablet Take 1 tablet by mouth every 4 hours as needed for Pain (Max of 90 tablets per month) for up to 30 days. Intended supply: 30 days 90 tablet 0    gabapentin (NEURONTIN) 300 MG capsule One capsule in the am, 2 in hs Intended supply: 30 days 270 capsule 0    lubiprostone (AMITIZA) 24 MCG capsule Take 1 capsule by mouth daily 60 capsule 2    colchicine (COLCRYS) 0.6 MG tablet Take for gout flare, 1 tablet daily during flare. 30 tablet 0    metFORMIN (GLUCOPHAGE) 500 MG tablet TAKE 1 TABLET BY MOUTH TWICE DAILY WITH MEALS 180 tablet 3    NIFEdipine (PROCARDIA XL) 60 MG extended release tablet Take 60 mg by mouth daily       MG capsule TK ONE C PO  BID      Lancets MISC 1 each by Does not apply route daily Dx E11.9 100 each 11    blood glucose monitor strips Patient to test daily. Dx: E11.9. Please dispense the brand that is covered by insurance. 100 strip 11    Alcohol Swabs (ALCOHOL PREP) 70 % PADS Patient to test once daily E11.9 100 each 11    glucose monitoring kit (FREESTYLE) monitoring kit 1 kit by Does not apply route daily 1 kit 0    testosterone cypionate (DEPOTESTOTERONE CYPIONATE) 200 MG/ML injection INJECT 0.5 M ML IN THE MUSCLE EVERY 2 WEEKS 10 mL 1    pravastatin (PRAVACHOL) 40 MG tablet TAKE 1 TABLET BY MOUTH EVERY EVENING 90 tablet 3    allopurinol (ZYLOPRIM) 100 MG tablet TAKE 1 TABLET BY MOUTH DAILY 90 tablet 3    nitroGLYCERIN (NITROSTAT) 0.4 MG SL tablet Place 1 tablet under the tongue every 5 minutes as needed x 3 doses for chest pain.  25 tablet 2    vitamin D (CHOLECALCIFEROL) 25 MCG (1000 UT) TABS tablet Take 1,000 Units by mouth daily      tamsulosin (FLOMAX) 0.4 MG capsule TAKE 1 CAPSULE DAILY AT BEDTIME      CPAP Machine MISC by NOT APPLICABLE route      SYRINGE-NEEDLE, DISP, 3 ML (B-D INTEGRA SYRINGE) 22G X 1-1/2\" 3 ML MISC 1 each by Does not apply route daily 20 each 6    aspirin 81 MG EC tablet Take 1 tablet by mouth daily 90 tablet 1     No current facility-administered medications for this visit. PMH, Surgical Hx, Family Hx, and Social Hx reviewed and updated. Health Maintenance reviewed. Objective  Vitals:    03/18/21 0924   BP: 126/80   Pulse: 61   Resp: 18   Temp: 97.2 °F (36.2 °C)   TempSrc: Temporal   SpO2: 98%   Weight: 168 lb (76.2 kg)   Height: 5' 7\" (1.702 m)     BP Readings from Last 3 Encounters:   03/18/21 126/80   03/18/21 126/80   01/15/21 130/76     Wt Readings from Last 3 Encounters:   03/18/21 168 lb (76.2 kg)   03/18/21 168 lb (76.2 kg)   01/15/21 165 lb (74.8 kg)     Physical Exam  Vitals signs reviewed. Constitutional:       General: He is not in acute distress. Appearance: Normal appearance. He is normal weight. He is not ill-appearing or toxic-appearing. HENT:      Head: Normocephalic and atraumatic. Right Ear: External ear normal.      Left Ear: External ear normal.      Nose: Nose normal.   Eyes:      Conjunctiva/sclera: Conjunctivae normal.   Cardiovascular:      Rate and Rhythm: Normal rate and regular rhythm. Pulmonary:      Effort: Pulmonary effort is normal.      Breath sounds: Normal breath sounds. Abdominal:      General: There is no distension. Palpations: Abdomen is soft. There is no mass. Tenderness: There is no abdominal tenderness. There is no guarding. Hernia: No hernia is present. Musculoskeletal:         General: No swelling or tenderness. Skin:     General: Skin is warm and dry. Neurological:      General: No focal deficit present. Mental Status: He is alert and oriented to person, place, and time. Psychiatric:         Mood and Affect: Mood normal.         Behavior: Behavior normal.         Thought Content:  Thought content normal.         Judgment: Judgment normal.       Assessment & Plan   Ned ACUÑA was seen today for hypertension and bradycardia. Diagnoses and all orders for this visit:    Angina pectoris ([address]  -     Comprehensive Metabolic Panel; Future    Essential hypertension, benign  -     metoprolol tartrate (LOPRESSOR) 50 MG tablet; TAKE 1/2 TABLET BY MOUTH twice DAILY  -     Comprehensive Metabolic Panel; Future  -     CBC Auto Differential; Future    Chronic gout without tophus, unspecified cause, unspecified site  -     Uric Acid; Future    Hyperkalemia    Prediabetes  -     Hemoglobin A1C; Future    Coronary artery disease of native artery of native heart with stable angina pectoris (HCC)    Bradycardia  -     metoprolol tartrate (LOPRESSOR) 50 MG tablet; TAKE 1/2 TABLET BY MOUTH twice DAILY    Therapeutic opioid-induced constipation (OIC)    Chronic low back pain, unspecified back pain laterality, unspecified whether sciatica present    Overall, doing well. Decrease metoprolol dose to 25 mg bid, monitor response. Due for labs today. 4 month follow up with me. Orders Placed This Encounter   Procedures    Comprehensive Metabolic Panel     Standing Status:   Future     Number of Occurrences:   1     Standing Expiration Date:   3/18/2022    Hemoglobin A1C     Standing Status:   Future     Number of Occurrences:   1     Standing Expiration Date:   3/18/2022    Uric Acid     Standing Status:   Future     Number of Occurrences:   1     Standing Expiration Date:   3/18/2022    CBC Auto Differential     Standing Status:   Future     Number of Occurrences:   1     Standing Expiration Date:   3/18/2022     Orders Placed This Encounter   Medications    metoprolol tartrate (LOPRESSOR) 50 MG tablet     Sig: TAKE 1/2 TABLET BY MOUTH twice DAILY     Dispense:  270 tablet     Refill:  2     Medications Discontinued During This Encounter   Medication Reason    metoprolol tartrate (LOPRESSOR) 50 MG tablet REORDER     No follow-ups on file.       Reviewed with the patient: current clinical status, medications, activities and diet. Side effects, adverse effects of the medication prescribed today, as well as treatment plan/ rationale and result expectations have been discussed with the patient who expresses understanding and desires to proceed. Close follow up to evaluate treatment results and for coordination of care. I have reviewed the patient's medical history in detail and updated the computerized patient record.     Sheryl Tellez PA-C

## 2021-03-18 NOTE — PROGRESS NOTES
Medicare Annual Wellness Visit  Name: Cheli Ugalde Date: 3/18/2021   MRN: [id number] Sex: Male   Age: 72 y.o. Ethnicity: /   : 1950 Race: Debi Hill is here for Teja ALVES (annual exam )    Screenings for behavioral, psychosocial and functional/safety risks, and cognitive dysfunction are all negative except as indicated below. These results, as well as other patient data from the [address] [facility] [address] form, are documented in Flowsheets linked to this Encounter. No Known Allergies      Prior to Visit Medications    Medication Sig Taking? Authorizing Provider   baclofen (LIORESAL) 10 MG tablet TAKE 1 TABLET BY MOUTH THREE TIMES DAILY Yes Brenda Stubbs DO   oxyCODONE-acetaminophen (PERCOCET) 7.5-325 MG per tablet Take 1 tablet by mouth every 4 hours as needed for Pain (Max of 90 tablets per month) for up to 30 days. Intended supply: 30 days Yes Brenda Stubbs DO   gabapentin (NEURONTIN) 300 MG capsule One capsule in the am, 2 in hs Intended supply: 30 days Yes Brenda Stubbs DO   metoprolol tartrate (LOPRESSOR) 50 MG tablet TAKE 1 TABLET BY MOUTH THREE TIMES DAILY Yes Sheryl Tellez PA-C   lubiprostone (AMITIZA) 24 MCG capsule Take 1 capsule by mouth daily Yes Sheryl Tellez PA-C   colchicine (COLCRYS) 0.6 MG tablet Take for gout flare, 1 tablet daily during flare. Yes Sheryl Tellez PA-C   metFORMIN (GLUCOPHAGE) 500 MG tablet TAKE 1 TABLET BY MOUTH TWICE DAILY WITH MEALS Yes Ab Gallardo MD   NIFEdipine (PROCARDIA XL) 60 MG extended release tablet Take 60 mg by mouth daily Yes Historical Provider, MD    MG capsule TK ONE C PO  BID Yes Historical Provider, MD   Lancets MISC 1 each by Does not apply route daily Dx E11.9 Yes Sheryl Tellez PA-C   blood glucose monitor strips Patient to test daily. Dx: E11.9. Please dispense the brand that is covered by insurance.  Yes Sheryl Tellez PA-C   Alcohol Swabs (ALCOHOL PREP) 70 % PADS Patient to test once daily E11.9 Yes Sheryl Tellez PA-C   glucose monitoring kit (FREESTYLE) monitoring kit 1 kit by Does not apply route daily Yes Sheryl Tellez PA-C   pravastatin (PRAVACHOL) 40 MG tablet TAKE 1 TABLET BY MOUTH EVERY EVENING Yes Sheryl Tellez PA-C   allopurinol (ZYLOPRIM) 100 MG tablet TAKE 1 TABLET BY MOUTH DAILY Yes Sheryl Tellez PA-C   nitroGLYCERIN (NITROSTAT) 0.4 MG SL tablet Place 1 tablet under the tongue every 5 minutes as needed x 3 doses for chest pain.  Yes Sheryl Tellez PA-C   vitamin D (CHOLECALCIFEROL) 25 MCG (1000 UT) TABS tablet Take 1,000 Units by mouth daily Yes Historical Provider, MD   tamsulosin (FLOMAX) 0.4 MG capsule TAKE 1 CAPSULE DAILY AT BEDTIME Yes Historical Provider, MD   CPAP Machine MISC by NOT APPLICABLE route Yes Historical Provider, MD   SYRINGE-NEEDLE, DISP, 3 ML (B-D INTEGRA SYRINGE) 22G X 1-1/2\" 3 ML MISC 1 each by Does not apply route daily Yes Jonathan Damian MD   aspirin 81 MG EC tablet Take 1 tablet by mouth daily Yes Sheryl Tellez PA-C   testosterone cypionate (DEPOTESTOTERONE CYPIONATE) 200 MG/ML injection INJECT 0.5 M ML IN THE MUSCLE EVERY 2 Lucy Bravo MD         Past Medical History:   Diagnosis Date    Chronic back pain     Coronary artery disease 2002    [facility] Jan[facility] Esophageal ulcer 3/10/2014    Dr. Janine Escobar Gastric ulcer 3/10/2014    Dr. Janine Escobar Gout     Hiatal hernia 3/10/2014    Dr. Janine Escobar Hyperlipidemia     Hypertension     Impotence 10/16/2020    MI [facility]) [facility]) 2005    Dr. Michael Lui Peripheral vascular disease ([facility] [address]     Prediabetes     Schizo-affective schizophrenia, in remission ([facility] [address] 2/1/2005    Overview:  followed at the [facility] Severe single current episode of major depressive disorder, without psychotic features ([facility] [address] 7/8/2016    Status post coronary artery stent placement 2002    Dr. Janie Mayer       Past Surgical History:   Procedure Laterality Date    APPENDECTOMY  1970    BACK SURGERY  01/03/2020    Dr. Roque Alcala Right 6/4/2019    RIGHT WRIST CARPAL TUNNEL RELEASE, SUPINE, PAT AT PCP performed by Toni Cheatham MD at [address] [place] [address]  3/10/14    DR Estella Rand  2002    Dr. Shen Ar GRAFT  10/17/2017    KNEE ARTHROSCOPY Left 2002    Dr. Anna Ball  12/19/13    CAUDAL BLOCKS DONE BY DR Kavin Fernandez    OTHER SURGICAL HISTORY  04/2020    urolift      SPINE SURGERY  9/2009    Dr. Salina Sprague  2008    Dr. Wilson Foot  3/10/14    BX, DILATION, DR Elvia Narvaez         Family History   Problem Relation Age of Onset    High Blood Pressure Mother     Arthritis Mother     Cancer Father         throat    Arthritis Father        CareTeam (Including outside providers/suppliers regularly involved in providing care):   Patient Care Team:  Kiki Mata PA-C as PCP - General (Family Medicine)  Kiki Mata PA-C as PCP - [facility] Empaneled Provider  Kamlesh Bowen DO (Physical Medicine and Rehab)  Elissa Lawson MD (Cardiology)  Guilherme Garcia MD (Endocrinology)  Mumtaz Saab MD (Gastroenterology)  Candida Valencia MD as Cardiologist (Cardiology)    Wt Readings from Last 3 Encounters:   03/18/21 168 lb (76.2 kg)   03/18/21 168 lb (76.2 kg)   01/15/21 165 lb (74.8 kg)     Vitals:    03/18/21 0936   BP: 126/80   Pulse: 61   Resp: 18   Temp: 97.2 °F (36.2 °C)   TempSrc: Temporal   SpO2: 98%   Weight: 168 lb (76.2 kg)   Height: 5' 7\" (1.702 m)     Body mass index is 26.31 kg/m². Based upon direct observation of the patient, evaluation of cognition reveals recent and remote memory intact. Patient's complete Health Risk Assessment and screening values have been reviewed and are found in Flowsheets.  The following problems were reviewed today and where indicated follow up appointments were made and/or referrals ordered. Positive Risk Factor Screenings with Interventions:            General Health and ACP:  General  In general, how would you say your health is?: Good  In the past 7 days, have you experienced any of the following? New or Increased Pain, New or Increased Fatigue, Loneliness, Social Isolation, Stress or Anger?: None of These  Do you get the social and emotional support that you need?: Yes  Do you have a Living Will?: Yes  Advance Directives     Power of [facility] Will ACP-Advance Directive ACP-Power of     Not on File Not on File Not on File Not on File      General Health Risk Interventions:  · No Living Will: Patient declines ACP discussion/assistance    Health Habits/Nutrition:  Health Habits/Nutrition  Do you exercise for at least 20 minutes 2-3 times per week?: Yes  Have you lost any weight without trying in the past 3 months?: No  Do you eat only one meal per day?: (!) Yes  Have you seen the dentist within the past year?: (!) No  Body mass index: (!) 26.31  Health Habits/Nutrition Interventions:  · Inadequate physical activity:  patient is not ready to increase his/her physical activity level at this time, tries to walk daily for exercises, limited with chronic back pain  · Nutritional issues:  weight is stable.     Hearing/Vision:  No exam data present  Hearing/Vision  Do you or your family notice any trouble with your hearing that hasn't been managed with hearing aids?: No  Do you have difficulty driving, watching TV, or doing any of your daily activities because of your eyesight?: No  Have you had an eye exam within the past year?: (!) No  Hearing/Vision Interventions:  · Vision concerns:  patient encouraged to make appointment with his/her eye specialist      Personalized Preventive Plan   Current Health Maintenance Status  Immunization History   Administered Date(s) Administered    Influenza Vaccine, unspecified formulation 10/25/2013, 10/20/2014, 11/04/2016, 10/02/2018    Influenza Virus Vaccine 09/16/2015    Influenza, High Dose (Fluzone 65 yrs and older) 09/20/2017, 10/03/2018, 11/17/2019    Influenza, Quadv, adjuvanted, 65 yrs +, IM, PF (Fluad) 09/24/2020    Pneumococcal Conjugate 13-valent ([id number]) 12/08/2016    Pneumococcal Polysaccharide ([id number]) 03/06/2018    Zoster Live (Zostavax) 12/09/2014, 10/02/2018    Zoster Recombinant (Shingrix) 10/03/2018, 01/23/2019        Health Maintenance   Topic Date Due    Hepatitis C screen  Never done    COVID-19 Vaccine (1) Never done   [employer] Visit (AWV)  Never done    DTaP/Tdap/Td vaccine (1 - Tdap) 06/24/2021 (Originally 12/6/1970)    A1C test (Diabetic or Prediabetic)  11/16/2021    Lipid screen  11/16/2021    Creatinine monitoring  11/16/2021    Potassium monitoring  12/15/2021    Colon cancer screen colonoscopy  03/10/2024    Flu vaccine  Completed    Shingles Vaccine  Completed    Pneumococcal 65+ years Vaccine  Completed    Hepatitis A vaccine  Aged Out    Hepatitis B vaccine  Aged Out    Hib vaccine  Aged Out    Meningococcal (ACWY) vaccine  Aged Out     Recommendations for [employer] Due: see orders and patient instructions/AVS.  . Recommended screening schedule for the next 5-10 years is provided to the patient in written form: see Patient Instructions/AVS.    Hitesh Grant was seen today for medicare awv. Diagnoses and all orders for this visit:    Routine general medical examination at a health care facility           Jocelyn Tellez PA-C

## 2021-03-18 NOTE — PATIENT INSTRUCTIONS
Personalized Preventive Plan for Thompson Durán - 3/18/2021  Medicare offers a range of preventive health benefits. Some of the tests and screenings are paid in full while other may be subject to a deductible, co-insurance, and/or copay. Some of these benefits include a comprehensive review of your medical history including lifestyle, illnesses that may run in your family, and various assessments and screenings as appropriate. After reviewing your medical record and screening and assessments performed today your provider may have ordered immunizations, labs, imaging, and/or referrals for you. A list of these orders (if applicable) as well as your Preventive Care list are included within your After Visit Summary for your review. Other Preventive Recommendations:    · A preventive eye exam performed by an eye specialist is recommended every 1-2 years to screen for glaucoma; cataracts, macular degeneration, and other eye disorders. · A preventive dental visit is recommended every 6 months. · Try to get at least 150 minutes of exercise per week or 10,000 steps per day on a pedometer . · Order or download the FREE \"Exercise & Physical Activity: Your Everyday Guide\" from [employer]. Call 7-887.698.2784 or search [employer] online. · You need 0169-9299 mg of calcium and 5346-0465 IU of vitamin D per day. It is possible to meet your calcium requirement with diet alone, but a vitamin D supplement is usually necessary to meet this goal.  · When exposed to the sun, use a sunscreen that protects against both UVA and UVB radiation with an SPF of 30 or greater. Reapply every 2 to 3 hours or after sweating, drying off with a towel, or swimming. · Always wear a seat belt when traveling in a car. Always wear a helmet when riding a bicycle or motorcycle.
13-Jan-2019 21:12

## 2021-03-21 PROBLEM — R41.3 MEMORY CHANGE: Status: RESOLVED | Noted: 2020-10-26 | Resolved: 2021-03-21

## 2021-03-21 PROBLEM — R00.1 BRADYCARDIA: Status: ACTIVE | Noted: 2021-03-21

## 2021-03-21 PROBLEM — R35.0 URINARY FREQUENCY: Status: RESOLVED | Noted: 2020-09-24 | Resolved: 2021-03-21

## 2021-03-21 ASSESSMENT — ENCOUNTER SYMPTOMS
SHORTNESS OF BREATH: 0
WHEEZING: 0
BLOOD IN STOOL: 0
VOMITING: 0
COLOR CHANGE: 0
BACK PAIN: 1
ABDOMINAL PAIN: 0
DIARRHEA: 0
CHEST TIGHTNESS: 0
ABDOMINAL DISTENTION: 0
CONSTIPATION: 0
RECTAL PAIN: 0

## 2021-03-30 ENCOUNTER — OFFICE VISIT (OUTPATIENT)
Dept: ENDOCRINOLOGY | Age: 71
End: 2021-03-30
Payer: MEDICARE

## 2021-03-30 VITALS
HEART RATE: 67 BPM | SYSTOLIC BLOOD PRESSURE: 134 MMHG | HEIGHT: 67 IN | OXYGEN SATURATION: 97 % | DIASTOLIC BLOOD PRESSURE: 74 MMHG | BODY MASS INDEX: 26.06 KG/M2 | WEIGHT: 166 LBS

## 2021-03-30 DIAGNOSIS — N53.19 ANEJACULATION: ICD-10-CM

## 2021-03-30 DIAGNOSIS — E29.1 HYPOGONADISM MALE: ICD-10-CM

## 2021-03-30 DIAGNOSIS — E29.1 HYPOGONADISM MALE: Primary | ICD-10-CM

## 2021-03-30 DIAGNOSIS — R73.03 PREDIABETES: ICD-10-CM

## 2021-03-30 PROBLEM — E11.9 TYPE 2 DIABETES MELLITUS, WITHOUT LONG-TERM CURRENT USE OF INSULIN (HCC): Status: ACTIVE | Noted: 2021-03-30

## 2021-03-30 LAB
CHP ED QC CHECK: NORMAL
GLUCOSE BLD-MCNC: 142 MG/DL
PROSTATE SPECIFIC ANTIGEN: 2.36 NG/ML (ref 0–6.22)

## 2021-03-30 PROCEDURE — 82962 GLUCOSE BLOOD TEST: CPT | Performed by: INTERNAL MEDICINE

## 2021-03-30 PROCEDURE — 99213 OFFICE O/P EST LOW 20 MIN: CPT | Performed by: INTERNAL MEDICINE

## 2021-03-30 RX ORDER — IMIPRAMINE HCL 25 MG
25 TABLET ORAL NIGHTLY
Qty: 30 TABLET | Refills: 3 | Status: SHIPPED | OUTPATIENT
Start: 2021-03-30 | End: 2021-07-13 | Stop reason: SDUPTHER

## 2021-03-30 RX ORDER — TESTOSTERONE CYPIONATE 200 MG/ML
INJECTION INTRAMUSCULAR
Qty: 10 ML | Refills: 1 | Status: SHIPPED | OUTPATIENT
Start: 2021-03-30 | End: 2021-04-30

## 2021-03-30 RX ORDER — NEEDLES, FILTER 19GX1 1/2"
1 NEEDLE, DISPOSABLE MISCELLANEOUS DAILY
Qty: 20 EACH | Refills: 6 | Status: SHIPPED | OUTPATIENT
Start: 2021-03-30 | End: 2022-05-02

## 2021-03-30 NOTE — PROGRESS NOTES
haaSubjective:      Patient ID: Kaitlynn Damon is a 72 y.o. male. Follow-up on hypogonadism prediabetes patient is complaining of ejaculation glucose with diet medication has had procedure done for BPH UroLift procedure at [facility] labs done recently A1c was 6.4 patient is on Metformin testosterone level was low currently on testosterone injection 100 mg every 2 weeks  Other  This is a chronic (Hypogonadism) problem. The current episode started more than 1 year ago. The problem occurs intermittently. The problem has been waxing and waning. Associated symptoms include fatigue. Nothing aggravates the symptoms. Treatments tried: Testosterone. The treatment provided moderate relief. Prediabetes on Metformin A1c 6.4    Results for Tim Ek (MRN [id number]) as of 4/5/2021 07:07   Ref.  Range 3/18/2021 10:08 3/30/2021 09:42 3/30/2021 10:09   Sodium Latest Ref Range: 135 - 144 mEq/L 139     Potassium Latest Ref Range: 3.4 - 4.9 mEq/L 5.3 (H)     Chloride Latest Ref Range: 95 - 107 mEq/L 96     CO2 Latest Ref Range: 20 - 31 mEq/L 31     BUN Latest Ref Range: 8 - 23 mg/dL 16     Creatinine Latest Ref Range: 0.70 - 1.20 mg/dL 0.93     Anion Gap Latest Ref Range: 9 - 15 mEq/L 12     GFR Non- Latest Ref Range: >60  >60.0     GFR African American Latest Ref Range: >60  >60.0     Glucose Latest Units: mg/dL 99 142    Calcium Latest Ref Range: 8.5 - 9.9 mg/dL 10.2 (H)     Total Protein Latest Ref Range: 6.3 - 8.0 g/dL 7.9     Uric Acid, Serum Latest Ref Range: 3.4 - 7.0 mg/dL 5.8     Albumin Latest Ref Range: 3.5 - 4.6 g/dL 4.6     Globulin Latest Ref Range: 2.3 - 3.5 g/dL 3.3     Alk Phos Latest Ref Range: 35 - 104 U/L 73     ALT Latest Ref Range: 0 - 41 U/L 21     AST Latest Ref Range: 0 - 40 U/L 27     Bilirubin Latest Ref Range: 0.2 - 0.7 mg/dL 0.5     Hemoglobin A1C Latest Ref Range: 4.8 - 5.9 % 6.4 (H)     Sex Hormone Binding Latest Ref Range: 11 - 80 nmol/L   50   Testosterone Free, Calc Latest Ref Range: 47 - 244 pg/mL   28 (L)   Total Testosterone Latest Ref Range: 300 - 720 ng/dL   209 (L)   Testosterone % Free Latest Ref Range: 1.6 - 2.9 %   1.4 (L)   WBC Latest Ref Range: 4.8 - 10.8 K/uL 5.1     RBC Latest Ref Range: 4.70 - 6.10 M/uL 4.90     Hemoglobin Quant Latest Ref Range: 14.0 - 18.0 g/dL 15.8     Hematocrit Latest Ref Range: 42.0 - 52.0 % 46.8     MCV Latest Ref Range: 80.0 - 100.0 fL 95.4     MCH Latest Ref Range: 27.0 - 31.3 pg 32.3 (H)     MCHC Latest Ref Range: 33.0 - 37.0 % 33.9     RDW Latest Ref Range: 11.5 - 14.5 % 15.0 (H)     Platelet Count Latest Ref Range: 130 - 400 K/uL 266     Neutrophils % Latest Units: % 65.8     Lymphocyte % Latest Units: % 21.7     Monocytes % Latest Units: % 10.6     Eosinophils % Latest Units: % 1.1     Basophils % Latest Units: % 0.8     Neutrophils Absolute Latest Ref Range: 1.4 - 6.5 K/uL 3.4     Lymphocytes Absolute Latest Ref Range: 1.0 - 4.8 K/uL 1.1     Monocytes Absolute Latest Ref Range: 0.2 - 0.8 K/uL 0.5     Eosinophils Absolute Latest Ref Range: 0.0 - 0.7 K/uL 0.1     Basophils Absolute Latest Ref Range: 0.0 - 0.2 K/uL 0.0     Prostatic Specific Ag Latest Ref Range: 0.00 - 6.22 ng/mL   2.36     Results for Theresa Smith (MRN [id number]) as of 3/30/2021 09:53   Ref. Range 11/22/2019 09:31 3/2/2020 08:22 6/11/2020 08:33 9/25/2020 07:49 11/16/2020 08:15   Total Testosterone Latest Ref Range: 300 - 720 ng/dL 856 (H)  (H) 976 (H)         Results for Theresa Smith (MRN [id number]) as of 3/30/2021 09:53   Ref.  Range 3/18/2021 10:08 3/30/2021 09:42   Sodium Latest Ref Range: 135 - 144 mEq/L 139    Potassium Latest Ref Range: 3.4 - 4.9 mEq/L 5.3 (H)    Chloride Latest Ref Range: 95 - 107 mEq/L 96    CO2 Latest Ref Range: 20 - 31 mEq/L 31    BUN Latest Ref Range: 8 - 23 mg/dL 16    Creatinine Latest Ref Range: 0.70 - 1.20 mg/dL 0.93    Anion Gap Latest Ref Range: 9 - 15 mEq/L 12    GFR Non- Latest Ref Range: >60  >60.0 GFR  Latest Ref Range: >60  >60.0    Glucose Latest Units: mg/dL 99 142   Calcium Latest Ref Range: 8.5 - 9.9 mg/dL 10.2 (H)    Total Protein Latest Ref Range: 6.3 - 8.0 g/dL 7.9    Uric Acid, Serum Latest Ref Range: 3.4 - 7.0 mg/dL 5.8    Albumin Latest Ref Range: 3.5 - 4.6 g/dL 4.6    Globulin Latest Ref Range: 2.3 - 3.5 g/dL 3.3    Alk Phos Latest Ref Range: 35 - 104 U/L 73    ALT Latest Ref Range: 0 - 41 U/L 21    AST Latest Ref Range: 0 - 40 U/L 27    Bilirubin Latest Ref Range: 0.2 - 0.7 mg/dL 0.5    Hemoglobin A1C Latest Ref Range: 4.8 - 5.9 % 6.4 (H)    WBC Latest Ref Range: 4.8 - 10.8 K/uL 5.1    RBC Latest Ref Range: 4.70 - 6.10 M/uL 4.90    Hemoglobin Quant Latest Ref Range: 14.0 - 18.0 g/dL 15.8    Hematocrit Latest Ref Range: 42.0 - 52.0 % 46.8    MCV Latest Ref Range: 80.0 - 100.0 fL 95.4    MCH Latest Ref Range: 27.0 - 31.3 pg 32.3 (H)    MCHC Latest Ref Range: 33.0 - 37.0 % 33.9    RDW Latest Ref Range: 11.5 - 14.5 % 15.0 (H)    Platelet Count Latest Ref Range: 130 - 400 K/uL 266    Neutrophils % Latest Units: % 65.8    Lymphocyte % Latest Units: % 21.7    Monocytes % Latest Units: % 10.6    Eosinophils % Latest Units: % 1.1    Basophils % Latest Units: % 0.8    Neutrophils Absolute Latest Ref Range: 1.4 - 6.5 K/uL 3.4    Lymphocytes Absolute Latest Ref Range: 1.0 - 4.8 K/uL 1.1    Monocytes Absolute Latest Ref Range: 0.2 - 0.8 K/uL 0.5    Eosinophils Absolute Latest Ref Range: 0.0 - 0.7 K/uL 0.1    Basophils Absolute Latest Ref Range: 0.0 - 0.2 K/uL 0.0      Patient Active Problem List   Diagnosis    Chronic low back pain    Scoliosis of lumbar spine    Essential hypertension, benign    Hypercholesterolemia    Right lumbar radiculopathy    Gout    High risk medication use - 12/07/17 OARRS PM&R, 02/08/18 [facility]AR[facility] PM&R, 10/05/17 Urine Drug Screen: negative PM&R, MED CONTRACT 1/17/17    Low back pain with sciatica    Myalgia    Synovitis and tenosynovitis    Thoracic and lumbosacral neuritis    Vitamin D deficiency    Prediabetes    Hyperparathyroidism ([facility] [address]    Osteopenia    C6 radiculopathy    DJD (degenerative joint disease), cervical    Angina pectoris (HCC)    PRASAD (obstructive sleep apnea)    Hyperkalemia    Chronic pain of both shoulders    Hypogonadism in male   [facility] Therapeutic opioid-induced constipation (OIC)    Bilateral leg pain    Other specified symptoms and signs involving the circulatory and respiratory systems     Myelopathy (HCC)    Coronary artery disease of native artery of native heart with stable angina pectoris (HCC)    Bilateral carotid bruits    Spinal cord disease (HCC)    Lower urinary tract symptoms (LUTS)    Impotence    Trouble getting to sleep    Bradycardia    Type 2 diabetes mellitus, without long-term current use of insulin (HCC)     No Known Allergies      Current Outpatient Medications:     SYRINGE-NEEDLE, DISP, 3 ML (B-D INTEGRA SYRINGE) 22G X 1-1/2\" 3 ML MISC, 1 each by Does not apply route daily, Disp: 20 each, Rfl: 6    testosterone cypionate (DEPOTESTOTERONE CYPIONATE) 200 MG/ML injection, INJECT 0.5 M ML IN THE MUSCLE EVERY 2 WEEKS, Disp: 10 mL, Rfl: 1    imipramine (TOFRANIL) 25 MG tablet, Take 1 tablet by mouth nightly, Disp: 30 tablet, Rfl: 3    metoprolol tartrate (LOPRESSOR) 50 MG tablet, TAKE 1/2 TABLET BY MOUTH twice DAILY, Disp: 270 tablet, Rfl: 2    baclofen (LIORESAL) 10 MG tablet, TAKE 1 TABLET BY MOUTH THREE TIMES DAILY, Disp: 90 tablet, Rfl: 1    oxyCODONE-acetaminophen (PERCOCET) 7.5-325 MG per tablet, Take 1 tablet by mouth every 4 hours as needed for Pain (Max of 90 tablets per month) for up to 30 days.  Intended supply: 30 days, Disp: 90 tablet, Rfl: 0    gabapentin (NEURONTIN) 300 MG capsule, One capsule in the am, 2 in hs Intended supply: 30 days, Disp: 270 capsule, Rfl: 0    lubiprostone (AMITIZA) 24 MCG capsule, Take 1 capsule by mouth daily, Disp: 60 capsule, Rfl: 2    colchicine (COLCRYS) 0.6 MG tablet, Take for gout flare, 1 tablet daily during flare., Disp: 30 tablet, Rfl: 0    metFORMIN (GLUCOPHAGE) 500 MG tablet, TAKE 1 TABLET BY MOUTH TWICE DAILY WITH MEALS, Disp: 180 tablet, Rfl: 3    NIFEdipine (PROCARDIA XL) 60 MG extended release tablet, Take 60 mg by mouth daily, Disp: , Rfl:      MG capsule, TK ONE C PO  BID, Disp: , Rfl:     Lancets MISC, 1 each by Does not apply route daily Dx E11.9, Disp: 100 each, Rfl: 11    blood glucose monitor strips, Patient to test daily. Dx: E11.9. Please dispense the brand that is covered by insurance., Disp: 100 strip, Rfl: 11    Alcohol Swabs (ALCOHOL PREP) 70 % PADS, Patient to test once daily E11.9, Disp: 100 each, Rfl: 11    glucose monitoring kit (FREESTYLE) monitoring kit, 1 kit by Does not apply route daily, Disp: 1 kit, Rfl: 0    pravastatin (PRAVACHOL) 40 MG tablet, TAKE 1 TABLET BY MOUTH EVERY EVENING, Disp: 90 tablet, Rfl: 3    allopurinol (ZYLOPRIM) 100 MG tablet, TAKE 1 TABLET BY MOUTH DAILY, Disp: 90 tablet, Rfl: 3    nitroGLYCERIN (NITROSTAT) 0.4 MG SL tablet, Place 1 tablet under the tongue every 5 minutes as needed x 3 doses for chest pain., Disp: 25 tablet, Rfl: 2    vitamin D (CHOLECALCIFEROL) 25 MCG (1000 UT) TABS tablet, Take 1,000 Units by mouth daily, Disp: , Rfl:     tamsulosin (FLOMAX) 0.4 MG capsule, TAKE 1 CAPSULE DAILY AT BEDTIME, Disp: , Rfl:     CPAP Machine MISC, by NOT APPLICABLE route, Disp: , Rfl:     aspirin 81 MG EC tablet, Take 1 tablet by mouth daily, Disp: 90 tablet, Rfl: 1    Review of Systems   Constitutional: Positive for fatigue. Endocrine: Negative. Genitourinary: Negative. Vitals:    03/30/21 0940   BP: 134/74   Pulse: 67   SpO2: 97%   Weight: 166 lb (75.3 kg)   Height: 5' 7\" (1.702 m)       Objective:   Physical Exam  Vitals signs reviewed. Constitutional:       Appearance: Normal appearance. HENT:      Head: Normocephalic and atraumatic.       Nose: Nose normal.   Eyes: Extraocular Movements: Extraocular movements intact. Neck:      Musculoskeletal: Normal range of motion and neck supple. Cardiovascular:      Rate and Rhythm: Normal rate. Musculoskeletal: Normal range of motion. Neurological:      General: No focal deficit present. Mental Status: He is alert. Psychiatric:         Mood and Affect: Mood normal.         Assessment:       Diagnosis Orders   1. Hypogonadism male  SYRINGE-NEEDLE, DISP, 3 ML (B-D INTEGRA SYRINGE) 22G X 1-1/2\" 3 ML MISC    testosterone cypionate (DEPOTESTOTERONE CYPIONATE) 200 MG/ML injection    Testosterone Free And Total Male    PSA Screening   2. Kash Ward MD, Urology, [place]   3.  Prediabetes             Plan:      Orders Placed This Encounter   Procedures    Testosterone Free And Total Male     Standing Status:   Future     Number of Occurrences:   1     Standing Expiration Date:   3/30/2022    PSA Screening     Standing Status:   Future     Number of Occurrences:   1     Standing Expiration Date:   3/30/2022   Ela Bello MD, Urology, [place]     Referral Priority:   Routine     Referral Type:   Eval and Treat     Referral Reason:   Specialty Services Required     Referred to Provider:   Blas Todd MD     Requested Specialty:   Urology     Number of Visits Requested:   1    POCT Glucose     Orders Placed This Encounter   Medications    SYRINGE-NEEDLE, DISP, 3 ML (B-D INTEGRA SYRINGE) 22G X 1-/2\" 3 ML MISC     Si each by Does not apply route daily     Dispense:  20 each     Refill:  6    testosterone cypionate (DEPOTESTOTERONE CYPIONATE) 200 MG/ML injection     Sig: INJECT 0.5 M ML IN THE MUSCLE EVERY 2 WEEKS     Dispense:  10 mL     Refill:  1    imipramine (TOFRANIL) 25 MG tablet     Sig: Take 1 tablet by mouth nightly     Dispense:  30 tablet     Refill:  3     Continue Metformin continue testosterone start on imipramine   refer to urology          Lulu Inman MD

## 2021-04-01 LAB
SEX HORMONE BINDING GLOBULIN: 50 NMOL/L (ref 11–80)
TESTOSTERONE FREE PERCENT: 1.4 % (ref 1.6–2.9)
TESTOSTERONE FREE, CALC: 28 PG/ML (ref 47–244)
TESTOSTERONE TOTAL-MALE: 209 NG/DL (ref 300–720)

## 2021-04-05 ENCOUNTER — PROCEDURE VISIT (OUTPATIENT)
Dept: PHYSICAL MEDICINE AND REHAB | Age: 71
End: 2021-04-05
Payer: MEDICARE

## 2021-04-05 DIAGNOSIS — M79.10 MYALGIA: Primary | ICD-10-CM

## 2021-04-05 PROCEDURE — 96372 THER/PROPH/DIAG INJ SC/IM: CPT | Performed by: PHYSICAL MEDICINE & REHABILITATION

## 2021-04-05 PROCEDURE — 20553 NJX 1/MLT TRIGGER POINTS 3/>: CPT | Performed by: PHYSICAL MEDICINE & REHABILITATION

## 2021-04-05 RX ORDER — LIDOCAINE HYDROCHLORIDE 10 MG/ML
15 INJECTION, SOLUTION INFILTRATION; PERINEURAL ONCE
Status: COMPLETED | OUTPATIENT
Start: 2021-04-05 | End: 2021-04-05

## 2021-04-05 RX ORDER — CYANOCOBALAMIN 1000 UG/ML
1000 INJECTION INTRAMUSCULAR; SUBCUTANEOUS ONCE
Status: COMPLETED | OUTPATIENT
Start: 2021-04-05 | End: 2021-04-05

## 2021-04-05 RX ADMIN — LIDOCAINE HYDROCHLORIDE 15 ML: 10 INJECTION, SOLUTION INFILTRATION; PERINEURAL at 15:08

## 2021-04-05 RX ADMIN — CYANOCOBALAMIN 1000 MCG: 1000 INJECTION INTRAMUSCULAR; SUBCUTANEOUS at 15:08

## 2021-04-08 DIAGNOSIS — G89.29 CHRONIC RIGHT-SIDED LOW BACK PAIN WITH RIGHT-SIDED SCIATICA: ICD-10-CM

## 2021-04-08 DIAGNOSIS — M54.41 CHRONIC RIGHT-SIDED LOW BACK PAIN WITH RIGHT-SIDED SCIATICA: ICD-10-CM

## 2021-04-08 DIAGNOSIS — M54.17 THORACIC AND LUMBOSACRAL NEURITIS: ICD-10-CM

## 2021-04-08 DIAGNOSIS — M54.16 RIGHT LUMBAR RADICULOPATHY: ICD-10-CM

## 2021-04-08 DIAGNOSIS — M54.14 THORACIC AND LUMBOSACRAL NEURITIS: ICD-10-CM

## 2021-04-08 DIAGNOSIS — Z79.899 HIGH RISK MEDICATION USE: ICD-10-CM

## 2021-04-08 DIAGNOSIS — M41.9 SCOLIOSIS OF LUMBAR SPINE, UNSPECIFIED SCOLIOSIS TYPE: ICD-10-CM

## 2021-04-08 RX ORDER — OXYCODONE AND ACETAMINOPHEN 7.5; 325 MG/1; MG/1
1 TABLET ORAL EVERY 4 HOURS PRN
Qty: 90 TABLET | Refills: 0 | Status: SHIPPED | OUTPATIENT
Start: 2021-04-08 | End: 2021-05-11 | Stop reason: SDUPTHER

## 2021-04-14 ENCOUNTER — OFFICE VISIT (OUTPATIENT)
Dept: PHYSICAL MEDICINE AND REHAB | Age: 71
End: 2021-04-14
Payer: MEDICARE

## 2021-04-14 VITALS
DIASTOLIC BLOOD PRESSURE: 64 MMHG | HEIGHT: 67 IN | WEIGHT: 166 LBS | SYSTOLIC BLOOD PRESSURE: 142 MMHG | BODY MASS INDEX: 26.06 KG/M2

## 2021-04-14 DIAGNOSIS — M85.811 OSTEOPENIA OF BOTH SHOULDERS: ICD-10-CM

## 2021-04-14 DIAGNOSIS — M85.812 OSTEOPENIA OF BOTH SHOULDERS: ICD-10-CM

## 2021-04-14 DIAGNOSIS — M54.14 THORACIC AND LUMBOSACRAL NEURITIS: ICD-10-CM

## 2021-04-14 DIAGNOSIS — M41.46 NEUROMUSCULAR SCOLIOSIS OF LUMBAR REGION: ICD-10-CM

## 2021-04-14 DIAGNOSIS — M79.10 MYALGIA: Primary | ICD-10-CM

## 2021-04-14 DIAGNOSIS — Z79.899 HIGH RISK MEDICATION USE: ICD-10-CM

## 2021-04-14 DIAGNOSIS — E55.9 VITAMIN D DEFICIENCY: ICD-10-CM

## 2021-04-14 DIAGNOSIS — M54.12 C6 RADICULOPATHY: ICD-10-CM

## 2021-04-14 DIAGNOSIS — M54.17 THORACIC AND LUMBOSACRAL NEURITIS: ICD-10-CM

## 2021-04-14 PROCEDURE — 99214 OFFICE O/P EST MOD 30 MIN: CPT | Performed by: PHYSICAL MEDICINE & REHABILITATION

## 2021-04-14 RX ORDER — GABAPENTIN 300 MG/1
CAPSULE ORAL
Qty: 270 CAPSULE | Refills: 0 | Status: SHIPPED | OUTPATIENT
Start: 2021-04-14 | End: 2021-06-04 | Stop reason: SDUPTHER

## 2021-04-14 ASSESSMENT — ENCOUNTER SYMPTOMS
BOWEL INCONTINENCE: 0
VOMITING: 0
WHEEZING: 0
COUGH: 0
BLOOD IN STOOL: 0
DIARRHEA: 0
SHORTNESS OF BREATH: 1
EYE REDNESS: 0
EYE PAIN: 0
BACK PAIN: 1
SORE THROAT: 0
NAUSEA: 0
CONSTIPATION: 1
STRIDOR: 0
TROUBLE SWALLOWING: 0
ABDOMINAL PAIN: 0
VISUAL CHANGE: 0
PHOTOPHOBIA: 0

## 2021-04-14 NOTE — PROGRESS NOTES
coronary artery stent placement 2002    Dr. Teri Webster     Past Surgical History:   Procedure Laterality Date    [address]  01/03/2020    Dr. Lois Le Right 6/4/2019    RIGHT WRIST CARPAL TUNNEL RELEASE, SUPINE, PAT AT PCP performed by Jessy Roberto MD at [address] [place] [address]  3/10/14    DR Devora Bean  2002    Dr. Morris 84 GRAFT  10/17/2017    KNEE ARTHROSCOPY Left 2002    Dr. Kylee Sheridan HISTORY  12/19/13    CAUDAL BLOCKS DONE BY DR Laury Chun    OTHER SURGICAL HISTORY  04/2020    urolift      SPINE SURGERY  9/2009    Dr. Nona Parks  2008    Dr. Erika Watters  3/10/14    Edilia Goodpasture, DR Cyril Eaton     Social History     Socioeconomic History    Marital status:      Spouse name: Not on file    Number of children: Not on file    Years of education: Not on file    Highest education level: Not on file   Occupational History    Occupation: disability    Social Needs    Financial resource strain: Not hard at all   [employer] insecurity     Worry: Not on file     Inability: Not on file   [employer] needs     Medical: Not on file     Non-medical: Not on file   Tobacco Use    Smoking status: Never Smoker    Smokeless tobacco: Never Used   Substance and Sexual Activity    Alcohol use: No     Alcohol/week: 0.0 standard drinks     Comment: Stopped years ago.  Drug use: No     Comment: YEARS AGO    Sexual activity: Yes     Partners: Female     Comment: monogomous sexual partner, wife.    Lifestyle    Physical activity     Days per week: Not on file     Minutes per session: Not on file    Stress: Not on file   Relationships    Social connections     Talks on phone: Not on file     Gets together: Not on file     Attends Worship service: Not on file     Active member of club or organization: Not on file     Attends meetings of clubs or organizations: Not on file     Relationship status: Not on file    Intimate partner violence     Fear of current or ex partner: Not on file     Emotionally abused: Not on file     Physically abused: Not on file     Forced sexual activity: Not on file   Other Topics Concern    Not on file   Social History Narrative    Tobacco -- never smoked or chewed. Alcohol -- have not used for past 10 years, prior to had consumed 6 pack of beer daily. Drugs -- tried marijuana and cocaine 14 years ago occasionally used for 3-4 years and then stopped completely 10 years ago. Education -- completed 11th grade in [place].    Occupation -- [employer]. work,  at [employer].    Marital status --  44 years, 2 grown sons. Family History   Problem Relation Age of Onset    High Blood Pressure Mother     Arthritis Mother     Cancer Father         throat    Arthritis Father        No Known Allergies    Review of Systems   Constitutional: Positive for activity change and fatigue. Negative for chills, diaphoresis, fever and unexpected weight change. HENT: Negative for congestion, ear discharge, ear pain, hearing loss, nosebleeds, sore throat, tinnitus and trouble swallowing. Eyes: Negative for photophobia, pain and redness. Respiratory: Positive for shortness of breath. Negative for cough, wheezing and stridor. Shortness of breath on exertion   Cardiovascular: Negative for chest pain, palpitations, leg swelling and syncope. Gastrointestinal: Positive for constipation. Negative for abdominal pain, blood in stool, bowel incontinence, diarrhea, nausea and vomiting. Endocrine: Negative for polydipsia. Genitourinary: Negative for bladder incontinence, dysuria, flank pain, frequency, hematuria and urgency. Musculoskeletal: Positive for back pain, gait problem and myalgias.  Negative for neck pain. Skin: Negative for rash. Allergic/Immunologic: Positive for immunocompromised state. Negative for environmental allergies. Neurological: Positive for weakness and numbness. Negative for dizziness, tingling, tremors, seizures and headaches. Hematological: Does not bruise/bleed easily. Psychiatric/Behavioral: Negative for dysphoric mood, hallucinations and suicidal ideas. The patient is not nervous/anxious. Objective    There were no vitals filed for this visit. No data recorded     Physical Exam  Vitals signs reviewed. Constitutional:       General: He is not in acute distress. Appearance: He is well-developed. He is not ill-appearing, toxic-appearing or diaphoretic. Comments:     HENT:      Head: Normocephalic and atraumatic. Right Ear: Hearing normal.      Left Ear: Hearing normal.      Nose: Nose normal.      Mouth/Throat:      Mouth: No oral lesions. Dentition: Normal dentition. Pharynx: No oropharyngeal exudate. Eyes:      General: No scleral icterus. Right eye: No discharge. Left eye: No discharge. Conjunctiva/sclera: Conjunctivae normal.      Right eye: No chemosis or exudate. Left eye: No chemosis or exudate. Neck:      Musculoskeletal: Neck supple. Spinous process tenderness and muscular tenderness present. No edema or neck rigidity. Thyroid: No thyromegaly. Vascular: No JVD. Trachea: No tracheal tenderness or tracheal deviation. Pulmonary:      Effort: Pulmonary effort is normal. No tachypnea, bradypnea, accessory muscle usage or respiratory distress. Breath sounds: Decreased breath sounds present. No wheezing or rales. Chest:      Chest wall: No tenderness. Abdominal:      General: There is no distension. Musculoskeletal:         General: Tenderness present. Right shoulder: He exhibits decreased range of motion, tenderness and bony tenderness.       Left shoulder: He exhibits decreased range of motion, tenderness and bony tenderness. Right elbow: Normal.     Left elbow: Normal.      Right wrist: Normal.      Left wrist: Normal.      Right hip: Normal.      Left hip: Normal.      Right knee: Normal.      Left knee: Normal.      Right ankle: Normal. Achilles tendon normal.      Left ankle: Normal. Achilles tendon normal.      Cervical back: He exhibits decreased range of motion, tenderness, bony tenderness, laceration, pain and spasm. He exhibits no swelling, no edema and no deformity. Thoracic back: He exhibits decreased range of motion, tenderness, bony tenderness, deformity, pain and spasm. Lumbar back: He exhibits decreased range of motion, tenderness, bony tenderness and pain. He exhibits no swelling, no edema, no deformity, no laceration and normal pulse. Back:       Right upper arm: Normal.      Left upper arm: Normal.      Right forearm: Normal.      Left forearm: Normal.        Arms:       Right hand: He exhibits decreased range of motion and bony tenderness. He exhibits normal capillary refill, no deformity, no laceration and no swelling. Decreased sensation noted. Decreased sensation is present in the ulnar distribution, is present in the medial distribution and is present in the radial distribution. Decreased strength noted. He exhibits finger abduction, thumb/finger opposition and wrist extension trouble. Left hand: He exhibits decreased range of motion and bony tenderness. Decreased sensation noted. Decreased sensation is present in the ulnar distribution and is present in the radial distribution. Decreased strength noted. He exhibits finger abduction and wrist extension trouble.       Right upper leg: Normal.      Left upper leg: Normal.      Right lower leg: Normal.      Left lower leg: Normal.        Legs:       Right foot: Normal.      Left foot: Normal.      Comments: Tender areas are indicated by numbered spot         Skin:     General: Skin is warm and dry.      Coloration: Skin is not pale. Findings: No abrasion, bruising, ecchymosis, erythema, laceration, petechiae or rash. Rash is not macular, pustular or urticarial.      Nails: There is no clubbing. Neurological:      Mental Status: He is alert and oriented to person, place, and time. Cranial Nerves: No cranial nerve deficit. Sensory: Sensory deficit present. Motor: No tremor, atrophy or abnormal muscle tone. Coordination: Coordination normal.      Gait: Gait abnormal.      Deep Tendon Reflexes: Reflexes abnormal. Babinski sign absent on the right side. Babinski sign absent on the left side. Reflex Scores:       Patellar reflexes are 1+ on the right side and 1+ on the left side. Achilles reflexes are 0 on the right side and 0 on the left side. Psychiatric:         Attention and Perception: He is attentive. Mood and Affect: Mood is not anxious or depressed. Affect is not labile, blunt, angry or inappropriate. Speech: He is communicative. Speech is not rapid and pressured, delayed, slurred or tangential.         Behavior: Behavior normal. Behavior is not agitated, slowed, aggressive, withdrawn, hyperactive or combative. Thought Content: Thought content normal. Thought content is not paranoid or delusional. Thought content does not include homicidal or suicidal ideation. Thought content does not include homicidal or suicidal plan. Cognition and Memory: Memory is not impaired. He does not exhibit impaired recent memory or impaired remote memory. Judgment: Judgment normal. Judgment is not impulsive or inappropriate. Comments: Calm appropriate    NAD       Ortho Exam  Neurologic Exam     Mental Status   Oriented to person, place, and time.    Speech: not slurred     Gait, Coordination, and Reflexes     Reflexes   Right patellar: 1+  Left patellar: 1+  Right achilles: 0  Left achilles: 0        After a thorough review and discussion Chronic Pain > 80 MEDD -               Patient is currently taking:       I am having Juan Antonio Lord maintain his aspirin, CPAP Machine, tamsulosin, vitamin D, nitroGLYCERIN, pravastatin, allopurinol, glucose monitoring kit, Lancets, blood glucose test strips, Alcohol Prep, DOK, NIFEdipine, metFORMIN, colchicine, lubiprostone, baclofen, metoprolol tartrate, B-D INTEGRA SYRINGE, testosterone cypionate, imipramine, oxyCODONE-acetaminophen, and gabapentin. I also recommend the following Medications:    Orders Placed This Encounter   Medications    gabapentin (NEURONTIN) 300 MG capsule     Sig: One capsule in the am, 2 in hs Intended supply: 30 days     Dispense:  270 capsule     Refill:  0        -which helps with pain and function. Otherwise, continue the current pain medications that I have prescibed. Radiologic:   Old  unique films results reviewed  ,     I discussed results with patients. see Follow up plans below  For any new studies. Unique source and Care Everywhere Updates:  prior external notes requested and reviewed. No new issues noted. Unique History obtained by caregiver/independent historian-and verified with SOAPPR. Electrodiagnostic:  Previous studies requested,     I discussed results with patient. See follow-up plans for new studies.         Labs:  Previous labs reviewed     Lab Results   Component Value Date     03/18/2021    K 5.3 03/18/2021    CL 96 03/18/2021    CO2 31 03/18/2021    BUN 16 03/18/2021    CREATININE 0.93 03/18/2021    CALCIUM 10.2 03/18/2021    LABALBU 4.6 03/18/2021    LABALBU 4.4 10/27/2020    BILITOT 0.5 03/18/2021    ALKPHOS 73 03/18/2021    AST 27 03/18/2021    ALT 21 03/18/2021     Lab Results   Component Value Date    WBC 5.1 03/18/2021    RBC 4.90 03/18/2021    HGB 15.8 03/18/2021    HCT 46.8 03/18/2021    MCV 95.4 03/18/2021    MCH 32.3 03/18/2021    MCHC 33.9 03/18/2021    RDW 15.0 03/18/2021     03/18/2021 MPV 9.9 09/15/2015       Lab Results   Component Value Date    LABAMPH Neg 03/12/2020    BARBSCNU Neg 03/12/2020    LABBENZ Neg 03/12/2020    CANSU Neg 03/12/2020    COCAIMETSCRU Neg 03/12/2020    PHENCYCLIDINESCREENURINE Neg 41/00/8487    TRICYCLIC Negative 65/76/9954    DSCOMMENT see below 03/12/2020       Lab Results   Component Value Date    CODEINE Not Detected 09/16/2016    MORPHINE Not Detected 09/16/2016    ACETYLMORPHI Not Detected 09/16/2016    OXYCODONE Present 09/16/2016    NOROXYCODONE Present 09/16/2016    NOROXYMU Present 09/16/2016    HYDRCO Not Detected 09/16/2016    NORHYDU Not Detected 09/16/2016    HYDROMO Not Detected 09/16/2016    BUPREN Not Detected 09/16/2016    NORBUPRNOR Not Detected 09/16/2016    FENTA Not Detected 09/16/2016    NORFENT Not Detected 09/16/2016    MEPERIDINE Not Detected 09/16/2016    TAPENU Not Detected 09/16/2016    TAPOSULFUR Not Detected 09/16/2016    METHADONE Not Detected 09/16/2016    LABPROP Not Detected 09/16/2016    TRAM Not Detected 09/16/2016    AMPH Not Detected 09/16/2016    METHAMP Not Detected 09/16/2016    MDMA Not Detected 09/16/2016    ECMDA Not Detected 09/16/2016       Lab Results   Component Value Date    PHENTERMINE Not Detected 09/16/2016    BENZOYL Not Detected 09/16/2016    Vermell Knee Not Detected 09/16/2016    ALPHAOHALPRA Not Detected 09/16/2016    CLONAZEPAM Not Detected 09/16/2016    Jewell Charter Not Detected 09/16/2016    DIAZEP Not Detected 09/16/2016    SAFIA Not Detected 09/16/2016    OXAZ Not Detected 09/16/2016    Ivis Barnacle Not Detected 09/16/2016    LORAZEPAM Not Detected 09/16/2016    MIDAZOLAM Not Detected 09/16/2016    ZOLPIDEM Not Detected 09/16/2016    REG Not Detected 09/16/2016    ETG Not Detected 09/16/2016    MARIJMET Not Detected 09/16/2016    PCP Not Detected 09/16/2016    PAINMGTDRUGP See Below 09/16/2016    EERPAINMGTPA See Note 09/16/2016    LABCREA 199.2 03/02/2020         , I discussed results with patient.   See follow-up plans for new studies. Therapies:  HEP-gentle stretching and relaxation techniques-demonstrated with patient-they are to do them twice a day. They are also advised to make the following lifestyle changes:  Goals      Exercise 3x per week (30 min per time)      SOAPP-R GOAL LESS THAN 9      09/16/16 SCORE: 33-HIGH RISK   11/11/16 score: 27-high risk     01/13/17 score: 16-moderate risk   03/13/17 Score: 11-moderate risk   06/01/17 score: 15-moderate risk  08/03/17 score: 15-moderate risk  10/04/17 score: 18-moderate risk  12/08/17 score: 11-moderate risk  02/09/18 score: 12-moderate risk  04/13/18 score: 14-moderate risk  7/12/18 score 10-moderate risk  11/29/18 score 17- moderate risk  01/28/19 score  16-moderate risk  6/10/19 score: 11- moderate risk  8/15/19 score: 17- moderate risk   4/8/19 score  7- low risk  3/11/20 score: 17- moderate risk  5- score- 16 - moderate risk   7/20/20 score: 21- high risk  10/16/20 score: 16- moderate risk  1/15/21 score: 16- moderate risk  4/14/21 score: 22- high risk            Injections or Epidurals:  Injection options were discussed. Trigger points I will add vitamin B12 to enhance energy level mood and wellbeing. Patient gave verbal consent to ordered injections. See follow-up plans for planned injections. Supplements:  Vitamin D with increased dosing during the rainy months,   Education was given on:   Dietary and Fitness--daily stretches and low carb diet-in chair Yoga when possible             Follow up with Primary Care Physician regarding their general medical needs. Stressed the importance of following up with PCP and specialists for his/her chronic diseases, health, CV, and cancer screening and continued care. Will follow disease activity/progression and adjust therapeutic regimen to disease activity and severity. Discussed medication dosage, usage, goals of therapy, and side effects.   Available test results were reviewed -Discussed findings, impression and plan with patient. An additional 2-4 minutes were spent outside of the patient visit to review records. Additional time spent with the patient to discuss their questions. Additional time spent with the patient devoted to discussing treatment strategy, planning, and implementation. Patient understands above plan; questions asked and answered. Patient agrees to plan as noted above. At least 50% of the visit was involved in the discussion of the options for treatment. We discussed exercises, medication, interventional therapies and surgery. Healthy life style is essential with patient hard work to achieve the wellness. In addition; discussion with the patient and/or family about patient's functional status any of the diagnostic results, impressions and/or recommended diagnostic studies, prognosis, risks and benefits of treatment options, instructions for treatment and/or follow-up, importance of compliance with chosen treatment options, risk-factor reduction, and patient/family education. They are to follow up in 2-2 1/2 months to review medication, efficacy of injections, pill counts, OARRS check, high risk med review re intensive monitoring for toxicity and addiction, SOAPPR assessment, review diagnostics, to review previous and future treatment plans and assess appropriateness for continued therapy.         New Diagnostics  Orders Placed This Encounter   Procedures    Urine Drug Screen    VT INJECT TRIGGER POINTS, 3 OR GREATER    VT INJECT TRIGGER POINTS, 3 OR GREATER    VT INJECT TRIGGER POINTS, 3 OR GREATER       Brenda Stubbs DO

## 2021-04-16 DIAGNOSIS — Z79.899 HIGH RISK MEDICATION USE: ICD-10-CM

## 2021-04-27 ENCOUNTER — OFFICE VISIT (OUTPATIENT)
Dept: UROLOGY | Age: 71
End: 2021-04-27
Payer: MEDICARE

## 2021-04-27 VITALS
DIASTOLIC BLOOD PRESSURE: 70 MMHG | WEIGHT: 160 LBS | HEART RATE: 80 BPM | BODY MASS INDEX: 25.11 KG/M2 | HEIGHT: 67 IN | OXYGEN SATURATION: 99 % | SYSTOLIC BLOOD PRESSURE: 118 MMHG

## 2021-04-27 DIAGNOSIS — R35.0 URINARY FREQUENCY: Primary | ICD-10-CM

## 2021-04-27 LAB
BILIRUBIN, POC: NORMAL
BLOOD URINE, POC: NORMAL
CLARITY, POC: CLEAR
COLOR, POC: NORMAL
GLUCOSE URINE, POC: NORMAL
KETONES, POC: NORMAL
LEUKOCYTE EST, POC: NORMAL
NITRITE, POC: NORMAL
PH, POC: 5.5
PROTEIN, POC: NORMAL
SPECIFIC GRAVITY, POC: 1.02
UROBILINOGEN, POC: 0.2

## 2021-04-27 PROCEDURE — 81003 URINALYSIS AUTO W/O SCOPE: CPT | Performed by: UROLOGY

## 2021-04-27 PROCEDURE — 99203 OFFICE O/P NEW LOW 30 MIN: CPT | Performed by: UROLOGY

## 2021-04-27 NOTE — PROGRESS NOTES
[facility] EVALUATION NOTE                                                 H&P                                                                                                                                                 Reason for Visit  Sexual dysfunction/retrograde ejaculation    History of Present Illness  71-year-old male with complex urologic history  Status post UroLift at [facility] in 2020  Patient continues to take tamsulosin for voiding dysfunction despite having the UroLift  Patient also had a penile prosthesis placed  Patient's current issue is retrograde ejaculation/lack of euphoria with ejaculation and sexual activity  Patient has chronic pain and is currently on Percocet and various other pain control medications  He is also diabetic  He is able to use his penile prosthesis adequately however the euphoria that comes with orgasm is poor      Urologic Review of Systems/Symptoms  As above    Review of Systems  Hospitalization: None recent  All 14 categories of Review of Systems otherwise reviewed no other findings reported.     Past Medical History:   Diagnosis Date    Chronic back pain     Coronary artery disease 2002    Dr. Preston Rios at [facility] Esophageal ulcer 3/10/2014    Dr. Nhung Cabello    Gastric ulcer 3/10/2014    Dr. Nhung Cabello    Gout     Hiatal hernia 3/10/2014    Dr. Dennis Verdugo Hyperlipidemia     Hypertension     Impotence 10/16/2020    MI (myocardial infarction) [facility]) 2005    Dr. Vaughn Machuca Peripheral vascular disease ([address]     Prediabetes     Schizo-affective schizophrenia, in remission ([address] 2/1/2005    Overview:  followed at the [facility] Severe single current episode of major depressive disorder, without psychotic features ([address] 7/8/2016    Status post coronary artery stent placement 2002    Dr. Preston Rios     Past Surgical History:   Procedure Laterality Date   [address]  01/03/2020     [facility]    CARDIAC SURGERY      CARPAL TUNNEL RELEASE Right 6/4/2019    RIGHT WRIST CARPAL TUNNEL RELEASE, SUPINE, PAT AT PCP performed by Francesco Tinajero MD at [address] [place] [address]  3/10/14    DR Nae Taylor  2002    Dr. Rosa Jaramillo GRAFT  10/17/2017    KNEE ARTHROSCOPY Left 2002    Dr. Gauri Quigley  12/19/13    CAUDAL BLOCKS DONE BY DR Ramsey Rubio    OTHER SURGICAL HISTORY  04/2020    urolift      SPINE SURGERY  9/2009    Dr. Stephon Matos  2008    Dr. Regan Sheehan  3/10/14    Ulises Brand, DR Yuridia Ervin     Social History     Socioeconomic History    Marital status:      Spouse name: None    Number of children: None    Years of education: None    Highest education level: None   Occupational History    Occupation: disability    Social Needs    Financial resource strain: Not hard at all   [employer] insecurity     Worry: None     Inability: None    Transportation needs     Medical: None     Non-medical: None   Tobacco Use    Smoking status: Never Smoker    Smokeless tobacco: Never Used   Substance and Sexual Activity    Alcohol use: No     Alcohol/week: 0.0 standard drinks     Comment: Stopped years ago.  Drug use: No     Comment: YEARS AGO    Sexual activity: Yes     Partners: Female     Comment: monogomous sexual partner, wife.    Lifestyle    Physical activity     Days per week: None     Minutes per session: None    Stress: None   Relationships    Social connections     Talks on phone: None     Gets together: None     Attends Anabaptism service: None     Active member of club or organization: None     Attends meetings of clubs or organizations: None     Relationship status: None    Intimate partner violence     Fear of current or ex partner: None     Emotionally abused: None     Physically abused: None tablet 3    NIFEdipine (PROCARDIA XL) 60 MG extended release tablet Take 60 mg by mouth daily       MG capsule TK ONE C PO  BID      Lancets MISC 1 each by Does not apply route daily Dx E11.9 100 each 11    blood glucose monitor strips Patient to test daily. Dx: E11.9. Please dispense the brand that is covered by insurance. 100 strip 11    Alcohol Swabs (ALCOHOL PREP) 70 % PADS Patient to test once daily E11.9 100 each 11    glucose monitoring kit (FREESTYLE) monitoring kit 1 kit by Does not apply route daily 1 kit 0    nitroGLYCERIN (NITROSTAT) 0.4 MG SL tablet Place 1 tablet under the tongue every 5 minutes as needed x 3 doses for chest pain. 25 tablet 2    vitamin D (CHOLECALCIFEROL) 25 MCG (1000 UT) TABS tablet Take 1,000 Units by mouth daily      tamsulosin (FLOMAX) 0.4 MG capsule TAKE 1 CAPSULE DAILY AT BEDTIME      CPAP Machine MISC by NOT APPLICABLE route      aspirin 81 MG EC tablet Take 1 tablet by mouth daily 90 tablet 1    imipramine (TOFRANIL) 25 MG tablet Take 1 tablet by mouth nightly (Patient not taking: Reported on 4/27/2021) 30 tablet 3     No current facility-administered medications for this visit. Patient has no known allergies. All reviewed and verified by Dr Mirlande Medel on today's visit    PSA   Date Value Ref Range Status   03/30/2021 2.36 0.00 - 6.22 ng/mL Final     Comment:     When the Total PSA is between 3.00 and 10.00 ng/mL, consider  requesting a Free PSA to aid in diagnosis. 02/03/2020 2.89 0.00 - 5.40 ng/mL Final     Comment:     When the Total PSA is between 3.00 and 10.00 ng/mL, consider  requesting a Free PSA to aid in diagnosis.      11/11/2019 2.22 0.00 - 5.40 ng/mL Final   08/09/2019 1.84 0.00 - 5.40 ng/mL Final   11/18/2016 1.26 0.00 - 5.40 ng/mL Final     Diagnostic Psa   Date Value Ref Range Status   10/06/2014 1.11 0.00 - 5.40 ng/mL Final     Results for POC orders placed in visit on 04/27/21   POCT Urinalysis No Micro (Auto)   Result Value Ref Range    Color, UA tellow     Clarity, UA clear     Glucose, UA POC neg     Bilirubin, UA neg     Ketones, UA neg     Spec Grav, UA 1.025     Blood, UA POC neg     pH, UA 5.5     Protein, UA POC neg     Urobilinogen, UA 0.2     Leukocytes, UA neg     Nitrite, UA neg        Physical Exam  Vitals:    04/27/21 0955   BP: 118/70   Pulse: 80   SpO2: 99%   Weight: 160 lb (72.6 kg)   Height: 5' 7\" (1.702 m)     Constitutional: Not in distress  Extensive medical records reviewed  Physical exam otherwise unremarkable. Assessment/Medical Necessity-Decision Making  Long conversation regarding retrograde ejaculation which could be secondary to UroLift exacerbated by tamsulosin  Patient feels the need to continue tamsulosin due to worsening voiding symptoms once he gets off the medication  Also had an extensive conversation regarding lack of euphoria following ejaculation/orgasm/climax which is most likely a brain chemistry issue which could be made worse by medication such as Neurontin and chronic opioid use for chronic pain  Plan  No further recommendations other than trial of stopping tamsulosin to see whether or not he can ejaculate appropriately without it  Follow-up as needed  Greater than 50% of 35 minutes spent consulting patient face-to-face  Orders Placed This Encounter   Procedures    POCT Urinalysis No Micro (Auto)     No orders of the defined types were placed in this encounter. Maadn Patel MD       Please note this report has been partially produced using speech recognition software  And may cause contain errors related to that system including grammar, punctuation and spelling as well as words and phrases that may seem inappropriate. If there are questions or concerns please feel free to contact me to clarify.

## 2021-04-30 DIAGNOSIS — E29.1 HYPOGONADISM MALE: ICD-10-CM

## 2021-04-30 RX ORDER — TESTOSTERONE CYPIONATE 200 MG/ML
INJECTION INTRAMUSCULAR
Qty: 10 ML | Refills: 2 | Status: SHIPPED | OUTPATIENT
Start: 2021-04-30 | End: 2021-11-15

## 2021-05-06 ENCOUNTER — PROCEDURE VISIT (OUTPATIENT)
Dept: PHYSICAL MEDICINE AND REHAB | Age: 71
End: 2021-05-06
Payer: MEDICARE

## 2021-05-06 DIAGNOSIS — M79.605 BILATERAL LEG PAIN: ICD-10-CM

## 2021-05-06 DIAGNOSIS — M79.10 MYALGIA: ICD-10-CM

## 2021-05-06 DIAGNOSIS — M79.604 BILATERAL LEG PAIN: ICD-10-CM

## 2021-05-06 DIAGNOSIS — M62.838 SPASM OF MUSCLE: ICD-10-CM

## 2021-05-06 PROCEDURE — 96372 THER/PROPH/DIAG INJ SC/IM: CPT | Performed by: PHYSICAL MEDICINE & REHABILITATION

## 2021-05-06 PROCEDURE — 20553 NJX 1/MLT TRIGGER POINTS 3/>: CPT | Performed by: PHYSICAL MEDICINE & REHABILITATION

## 2021-05-06 RX ORDER — BACLOFEN 10 MG/1
TABLET ORAL
Qty: 90 TABLET | Refills: 1 | OUTPATIENT
Start: 2021-05-06

## 2021-05-06 RX ADMIN — CYANOCOBALAMIN 1000 MCG: 1000 INJECTION INTRAMUSCULAR; SUBCUTANEOUS at 10:12

## 2021-05-06 RX ADMIN — LIDOCAINE HYDROCHLORIDE 15 ML: 10 INJECTION, SOLUTION INFILTRATION; PERINEURAL at 10:13

## 2021-05-07 ENCOUNTER — OFFICE VISIT (OUTPATIENT)
Dept: CARDIOLOGY CLINIC | Age: 71
End: 2021-05-07
Payer: MEDICARE

## 2021-05-07 VITALS
OXYGEN SATURATION: 99 % | WEIGHT: 168 LBS | BODY MASS INDEX: 26.37 KG/M2 | DIASTOLIC BLOOD PRESSURE: 73 MMHG | HEIGHT: 67 IN | HEART RATE: 74 BPM | SYSTOLIC BLOOD PRESSURE: 138 MMHG | RESPIRATION RATE: 16 BRPM

## 2021-05-07 DIAGNOSIS — I25.118 CORONARY ARTERY DISEASE OF NATIVE ARTERY OF NATIVE HEART WITH STABLE ANGINA PECTORIS (HCC): Primary | ICD-10-CM

## 2021-05-07 DIAGNOSIS — E78.00 HYPERCHOLESTEROLEMIA: ICD-10-CM

## 2021-05-07 DIAGNOSIS — I10 ESSENTIAL HYPERTENSION, BENIGN: ICD-10-CM

## 2021-05-07 PROCEDURE — 99214 OFFICE O/P EST MOD 30 MIN: CPT | Performed by: INTERNAL MEDICINE

## 2021-05-07 RX ORDER — LIDOCAINE HYDROCHLORIDE 10 MG/ML
15 INJECTION, SOLUTION INFILTRATION; PERINEURAL ONCE
Status: COMPLETED | OUTPATIENT
Start: 2021-05-06 | End: 2021-05-06

## 2021-05-07 RX ORDER — CYANOCOBALAMIN 1000 UG/ML
1000 INJECTION INTRAMUSCULAR; SUBCUTANEOUS ONCE
Status: COMPLETED | OUTPATIENT
Start: 2021-05-06 | End: 2021-05-06

## 2021-05-07 RX ORDER — BACLOFEN 10 MG/1
TABLET ORAL
Qty: 90 TABLET | Refills: 1 | Status: SHIPPED | OUTPATIENT
Start: 2021-05-07 | End: 2021-07-14 | Stop reason: SDUPTHER

## 2021-05-07 ASSESSMENT — ENCOUNTER SYMPTOMS
CHEST TIGHTNESS: 0
EYES NEGATIVE: 1
NAUSEA: 0
RESPIRATORY NEGATIVE: 1
STRIDOR: 0
BLOOD IN STOOL: 0
COUGH: 0
BACK PAIN: 1
WHEEZING: 0
GASTROINTESTINAL NEGATIVE: 1
SHORTNESS OF BREATH: 0

## 2021-05-07 NOTE — PROGRESS NOTES
Subsequent Progress Note  Patient: Susanne Fabry  YOB: 1950  MRN: [id number]    Chief Complaint: htn cad lipid preop back   Chief Complaint   Patient presents with    Follow-up     4 month    Coronary Artery Disease    Hypertension       CV Data:  Prior CAD/Stentsx 2  10/17/2017 CABG x2 EF 55  10/2018  Stress echo EF 55  Persistent HyperK  7/2020 CUS mild   12/2020 GXT negative     Subjective/HPI: no cp no osb no falls noblled has back arthritis getting shots with some releif. 10/25/2019 No cp no sob no falls no bleed. Takes meds. Eating well. 2/26/2020 no cp no sob. Had extensive back SX. Did well. 6/26/2020 no cp no osb no falls no bleed. Takes meds. Walks and active no bleed    10/27/2020 pt will have ED surgery at [facility] next week. He is active and has no CP no SOB no falls no bleed. 1/6/21 no cp no sob no falls no bleed    5/7/21 no cp no sob no falls nob leed no edema K is always high 5.3 recently. Recently HR 40s and Metoprolol was backed off to Bid from prior tid.       EKG: SR    Past Medical History:   Diagnosis Date    Chronic back pain     Coronary artery disease 2002    [facility] Tarik [facility]indo [facility] Esophageal ulcer 3/10/2014    Dr. Mabel Skaggs    Gastric ulcer 3/10/2014    Dr. Mabel Skaggs    Gout     Hiatal hernia 3/10/2014    Dr. Natali Goldberg Hyperlipidemia     Hypertension     Impotence 10/16/2020    MI ([facility] infarction) [facility]) 2005    Dr. Mukund Morgan Peripheral vascular disease ([facility]     Prediabetes     Schizo-affective schizophrenia, in remission ([address] 2/1/2005    Overview:  followed at the [facility] Severe single current episode of major depressive disorder, without psychotic features ([facility] 7/8/2016    Status post coronary artery stent placement 2002    Dr. Tarik Galindo       Past Surgical History:   Procedure Laterality Date   [address]  01/03/2020    Dr. Ghazal Garcia CARDIAC SURGERY      CARPAL TUNNEL RELEASE Right 6/4/2019    RIGHT WRIST CARPAL TUNNEL RELEASE, SUPINE, PAT AT PCP performed by Yahir Varma MD at [address]  3/10/14    DR Radha Robb  2002    Dr. [address] 84 GRAFT  10/17/2017    KNEE ARTHROSCOPY Left 2002    Dr. Lissette Pena  12/19/13    CAUDAL BLOCKS DONE BY DR Reza Ferreira    OTHER SURGICAL HISTORY  04/2020    urolift      SPINE SURGERY  9/2009    Dr. Baltazar Lou  2008    Dr. Ovidio Bach  3/10/14    BX, DILATION, DR Daphne Mustafa       Family History   Problem Relation Age of Onset    High Blood Pressure Mother     Arthritis Mother     Cancer Father         throat    Arthritis Father        Social History     Socioeconomic History    Marital status:      Spouse name: None    Number of children: None    Years of education: None    Highest education level: None   Occupational History    Occupation: disability    Social Needs    Financial resource strain: Not hard at all   [employer] insecurity     Worry: None     Inability: None    Transportation needs     Medical: None     Non-medical: None   Tobacco Use    Smoking status: Never Smoker    Smokeless tobacco: Never Used   Substance and Sexual Activity    Alcohol use: No     Alcohol/week: 0.0 standard drinks     Comment: Stopped years ago.  Drug use: No     Comment: YEARS AGO    Sexual activity: Yes     Partners: Female     Comment: monogomous sexual partner, wife.    Lifestyle    Physical activity     Days per week: None     Minutes per session: None    Stress: None   Relationships    Social connections     Talks on phone: None     Gets together: None     Attends Anabaptist service: None     Active member of club or organization: None     Attends meetings of clubs or organizations: None Relationship status: None    Intimate partner violence     Fear of current or ex partner: None     Emotionally abused: None     Physically abused: None     Forced sexual activity: None   Other Topics Concern    None   Social History Narrative    Tobacco -- never smoked or chewed. Alcohol -- have not used for past 10 years, prior to had consumed 6 pack of beer daily. Drugs -- tried marijuana and cocaine 14 years ago occasionally used for 3-4 years and then stopped completely 10 years ago. Education -- completed 11th grade in [employer].    Occupation -- [employer] work,  at [employer].    Marital status --  44 years, 2 grown sons. No Known Allergies    Current Outpatient Medications   Medication Sig Dispense Refill    baclofen (LIORESAL) 10 MG tablet TAKE 1 TABLET BY MOUTH THREE TIMES DAILY 90 tablet 1    testosterone cypionate (DEPOTESTOTERONE CYPIONATE) 200 MG/ML injection INJECT 0.5 ML INTO THE MUSCLE EVERY 2 WEEKS 10 mL 2    allopurinol (ZYLOPRIM) 100 MG tablet TAKE 1 TABLET BY MOUTH DAILY 90 tablet 3    pravastatin (PRAVACHOL) 40 MG tablet TAKE 1 TABLET BY MOUTH EVERY EVENING 90 tablet 3    gabapentin (NEURONTIN) 300 MG capsule One capsule in the am, 2 in hs Intended supply: 30 days 270 capsule 0    oxyCODONE-acetaminophen (PERCOCET) 7.5-325 MG per tablet Take 1 tablet by mouth every 4 hours as needed for Pain (Max of 90 tablets per month) for up to 30 days. Intended supply: 30 days 90 tablet 0    SYRINGE-NEEDLE, DISP, 3 ML (B-D INTEGRA SYRINGE) 22G X 1-1/2\" 3 ML MISC 1 each by Does not apply route daily 20 each 6    imipramine (TOFRANIL) 25 MG tablet Take 1 tablet by mouth nightly 30 tablet 3    metoprolol tartrate (LOPRESSOR) 50 MG tablet TAKE 1/2 TABLET BY MOUTH twice DAILY 270 tablet 2    colchicine (COLCRYS) 0.6 MG tablet Take for gout flare, 1 tablet daily during flare.  30 tablet 0    metFORMIN (GLUCOPHAGE) 500 MG tablet TAKE 1 TABLET BY MOUTH TWICE DAILY WITH MEALS 180 tablet 3    NIFEdipine (PROCARDIA XL) 60 MG extended release tablet Take 60 mg by mouth daily       MG capsule TK ONE C PO  BID      Lancets MISC 1 each by Does not apply route daily Dx E11.9 100 each 11    blood glucose monitor strips Patient to test daily. Dx: E11.9. Please dispense the brand that is covered by insurance. 100 strip 11    glucose monitoring kit (FREESTYLE) monitoring kit 1 kit by Does not apply route daily 1 kit 0    nitroGLYCERIN (NITROSTAT) 0.4 MG SL tablet Place 1 tablet under the tongue every 5 minutes as needed x 3 doses for chest pain. 25 tablet 2    vitamin D (CHOLECALCIFEROL) 25 MCG (1000 UT) TABS tablet Take 1,000 Units by mouth daily      tamsulosin (FLOMAX) 0.4 MG capsule TAKE 1 CAPSULE DAILY AT BEDTIME      CPAP Machine MISC by NOT APPLICABLE route      aspirin 81 MG EC tablet Take 1 tablet by mouth daily 90 tablet 1    Alcohol Swabs (ALCOHOL PREP) 70 % PADS Patient to test once daily E11.9 100 each 11     No current facility-administered medications for this visit. Review of Systems:   Review of Systems   Constitutional: Negative. Negative for diaphoresis and fatigue. HENT: Negative. Eyes: Negative. Respiratory: Negative. Negative for cough, chest tightness, shortness of breath, wheezing and stridor. Cardiovascular: Negative. Negative for chest pain, palpitations and leg swelling. Gastrointestinal: Negative. Negative for blood in stool and nausea. Genitourinary: Negative. Musculoskeletal: Positive for arthralgias and back pain. Skin: Negative. Neurological: Negative. Negative for dizziness, syncope, weakness and light-headedness. Hematological: Negative. Psychiatric/Behavioral: Negative.           Physical Examination:    /73 (Site: Left Upper Arm, Position: Sitting, Cuff Size: Medium Adult)   Pulse 74   Resp 16   Ht 5' 7\" (1.702 m)   Wt 168 lb (76.2 kg)   SpO2 99%   BMI 26.31 kg/m²    Physical Exam Constitutional: He appears healthy. No distress. HENT:   Normal cephalic and Atraumatic   Eyes: Pupils are equal, round, and reactive to light. Neck: Normal range of motion and thyroid normal. Neck supple. No JVD present. No neck adenopathy. No thyromegaly present. Cardiovascular: Normal rate, regular rhythm, intact distal pulses and normal pulses. Murmur heard. Pulmonary/Chest: Effort normal and breath sounds normal. He has no wheezes. He has no rales. He exhibits no tenderness. Abdominal: Soft. Bowel sounds are normal. There is no abdominal tenderness. Musculoskeletal: Normal range of motion. General: No tenderness or edema. Neurological: He is alert and oriented to person, place, and time. Skin: Skin is warm. No cyanosis. Nails show no clubbing.        LABS:  CBC:   Lab Results   Component Value Date    WBC 5.1 03/18/2021    RBC 4.90 03/18/2021    HGB 15.8 03/18/2021    HCT 46.8 03/18/2021    MCV 95.4 03/18/2021    MCH 32.3 03/18/2021    MCHC 33.9 03/18/2021    RDW 15.0 03/18/2021     03/18/2021    MPV 9.9 09/15/2015     Lipids:  Lab Results   Component Value Date    CHOL 150 11/16/2020    CHOL 152 03/12/2018    CHOL 271 (H) 09/05/2017     Lab Results   Component Value Date    TRIG 96 11/16/2020    TRIG 157 03/12/2018    TRIG 154 09/05/2017     Lab Results   Component Value Date    HDL 45 11/16/2020    HDL 42 05/24/2019    HDL 45 03/12/2018     Lab Results   Component Value Date    LDLCALC 86 11/16/2020    LDLCALC 86 05/24/2019    LDLCALC 76 03/12/2018     No results found for: LABVLDL, VLDL  No results found for: CHOLHDLRATIO  CMP:    Lab Results   Component Value Date     03/18/2021    K 5.3 03/18/2021    CL 96 03/18/2021    CO2 31 03/18/2021    BUN 16 03/18/2021    CREATININE 0.93 03/18/2021    GFRAA >60.0 03/18/2021    LABGLOM >60.0 03/18/2021    GLUCOSE 142 03/30/2021    GLUCOSE 115 10/27/2020    PROT 7.9 03/18/2021    LABALBU 4.6 03/18/2021    LABALBU 4.4 10/27/2020 CALCIUM 10.2 03/18/2021    BILITOT 0.5 03/18/2021    ALKPHOS 73 03/18/2021    AST 27 03/18/2021    ALT 21 03/18/2021     BMP:    Lab Results   Component Value Date     03/18/2021    K 5.3 03/18/2021    CL 96 03/18/2021    CO2 31 03/18/2021    BUN 16 03/18/2021    LABALBU 4.6 03/18/2021    LABALBU 4.4 10/27/2020    CREATININE 0.93 03/18/2021    CALCIUM 10.2 03/18/2021    GFRAA >60.0 03/18/2021    LABGLOM >60.0 03/18/2021    GLUCOSE 142 03/30/2021    GLUCOSE 115 10/27/2020     Magnesium:    Lab Results   Component Value Date    MG 2.07 01/06/2020     TSH:  Lab Results   Component Value Date    TSH 4.110 05/06/2016       Patient Active Problem List   Diagnosis    Chronic low back pain    Scoliosis of lumbar spine    Essential hypertension, benign    Hypercholesterolemia    Right lumbar radiculopathy    Gout    High risk medication use - 12/07/17 OARRS PM&R, 02/08/18 OARRS PM&R, 10/05/17 Urine Drug Screen: negative PM&R, MED CONTRACT 1/17/17    Low back pain with sciatica    Myalgia    Synovitis and tenosynovitis    Thoracic and lumbosacral neuritis    Vitamin D deficiency    Prediabetes    Hyperparathyroidism ([address]    Osteopenia    C6 radiculopathy    DJD (degenerative joint disease), cervical    Angina pectoris (HCC)    PRASAD (obstructive sleep apnea)    Hyperkalemia    Chronic pain of both shoulders    Hypogonadism in male    Therapeutic opioid-induced constipation (OIC)    Bilateral leg pain    Other specified symptoms and signs involving the circulatory and respiratory systems     Myelopathy (HCC)    Coronary artery disease of native artery of native heart with stable angina pectoris (HCC)    Bilateral carotid bruits    Spinal cord disease (HCC)    Lower urinary tract symptoms (LUTS)    Impotence    Trouble getting to sleep    Bradycardia    Type 2 diabetes mellitus, without long-term current use of insulin (HCC)       Medications Discontinued During This Encounter   Medication Reason    lubiprostone (AMITIZA) 24 MCG capsule DISCONTINUED BY ANOTHER CLINICIAN       Modified Medications    No medications on file       No orders of the defined types were placed in this encounter. Assessment/Plan:    1. Hypercholesterolemia  Statin     2. Essential hypertension, benign   stable     3. Coronary artery disease with hx of myocardial infarct w/o hx of CABG  No angina     4. Carotid Bruits- CUS - was cancelled due to COVID-19. Will reschedule. -- stable with mild plaque. 5. Preop ED - implant at [facility]- pt is an acceptable candidate for planned procedure. - did well. Counseling:  Heart Healthy Lifestyle, Low Salt Diet, Take Precautions to Prevent Falls and Walk Daily    Return in about 4 months (around 9/7/2021).       Electronically signed by Juan Antonio Grimaldo MD on 5/7/2021 at 1:13 PM

## 2021-05-10 DIAGNOSIS — M54.41 CHRONIC RIGHT-SIDED LOW BACK PAIN WITH RIGHT-SIDED SCIATICA: ICD-10-CM

## 2021-05-10 DIAGNOSIS — G89.29 CHRONIC RIGHT-SIDED LOW BACK PAIN WITH RIGHT-SIDED SCIATICA: ICD-10-CM

## 2021-05-10 DIAGNOSIS — M54.17 THORACIC AND LUMBOSACRAL NEURITIS: ICD-10-CM

## 2021-05-10 DIAGNOSIS — M41.9 SCOLIOSIS OF LUMBAR SPINE, UNSPECIFIED SCOLIOSIS TYPE: ICD-10-CM

## 2021-05-10 DIAGNOSIS — Z79.899 HIGH RISK MEDICATION USE: ICD-10-CM

## 2021-05-10 DIAGNOSIS — M54.14 THORACIC AND LUMBOSACRAL NEURITIS: ICD-10-CM

## 2021-05-10 DIAGNOSIS — M54.16 RIGHT LUMBAR RADICULOPATHY: ICD-10-CM

## 2021-05-11 RX ORDER — OXYCODONE AND ACETAMINOPHEN 7.5; 325 MG/1; MG/1
1 TABLET ORAL EVERY 4 HOURS PRN
Qty: 90 TABLET | Refills: 0 | Status: SHIPPED | OUTPATIENT
Start: 2021-05-11 | End: 2021-06-08 | Stop reason: SDUPTHER

## 2021-06-04 RX ORDER — GABAPENTIN 300 MG/1
CAPSULE ORAL
Qty: 270 CAPSULE | Refills: 0 | Status: SHIPPED | OUTPATIENT
Start: 2021-06-04 | End: 2021-10-14 | Stop reason: SDUPTHER

## 2021-06-08 DIAGNOSIS — M54.16 RIGHT LUMBAR RADICULOPATHY: ICD-10-CM

## 2021-06-08 DIAGNOSIS — Z79.899 HIGH RISK MEDICATION USE: ICD-10-CM

## 2021-06-08 DIAGNOSIS — M54.41 CHRONIC RIGHT-SIDED LOW BACK PAIN WITH RIGHT-SIDED SCIATICA: ICD-10-CM

## 2021-06-08 DIAGNOSIS — M54.17 THORACIC AND LUMBOSACRAL NEURITIS: ICD-10-CM

## 2021-06-08 DIAGNOSIS — M41.9 SCOLIOSIS OF LUMBAR SPINE, UNSPECIFIED SCOLIOSIS TYPE: ICD-10-CM

## 2021-06-08 DIAGNOSIS — G89.29 CHRONIC RIGHT-SIDED LOW BACK PAIN WITH RIGHT-SIDED SCIATICA: ICD-10-CM

## 2021-06-08 DIAGNOSIS — M54.14 THORACIC AND LUMBOSACRAL NEURITIS: ICD-10-CM

## 2021-06-08 RX ORDER — OXYCODONE AND ACETAMINOPHEN 7.5; 325 MG/1; MG/1
1 TABLET ORAL EVERY 4 HOURS PRN
Qty: 90 TABLET | Refills: 0 | Status: SHIPPED | OUTPATIENT
Start: 2021-06-09 | End: 2021-07-07 | Stop reason: SDUPTHER

## 2021-06-10 ENCOUNTER — PROCEDURE VISIT (OUTPATIENT)
Dept: PHYSICAL MEDICINE AND REHAB | Age: 71
End: 2021-06-10
Payer: MEDICARE

## 2021-06-10 DIAGNOSIS — M79.10 MYALGIA: ICD-10-CM

## 2021-06-10 PROCEDURE — 20553 NJX 1/MLT TRIGGER POINTS 3/>: CPT | Performed by: PHYSICAL MEDICINE & REHABILITATION

## 2021-06-10 PROCEDURE — 96372 THER/PROPH/DIAG INJ SC/IM: CPT | Performed by: PHYSICAL MEDICINE & REHABILITATION

## 2021-06-10 RX ORDER — LIDOCAINE HYDROCHLORIDE 10 MG/ML
15 INJECTION, SOLUTION INFILTRATION; PERINEURAL ONCE
Status: COMPLETED | OUTPATIENT
Start: 2021-06-10 | End: 2021-06-10

## 2021-06-10 RX ORDER — CYANOCOBALAMIN 1000 UG/ML
1000 INJECTION INTRAMUSCULAR; SUBCUTANEOUS ONCE
Status: COMPLETED | OUTPATIENT
Start: 2021-06-10 | End: 2021-06-10

## 2021-06-10 RX ADMIN — LIDOCAINE HYDROCHLORIDE 15 ML: 10 INJECTION, SOLUTION INFILTRATION; PERINEURAL at 15:09

## 2021-06-10 RX ADMIN — CYANOCOBALAMIN 1000 MCG: 1000 INJECTION INTRAMUSCULAR; SUBCUTANEOUS at 15:08

## 2021-07-07 DIAGNOSIS — G89.29 CHRONIC RIGHT-SIDED LOW BACK PAIN WITH RIGHT-SIDED SCIATICA: ICD-10-CM

## 2021-07-07 DIAGNOSIS — Z79.899 HIGH RISK MEDICATION USE: ICD-10-CM

## 2021-07-07 DIAGNOSIS — M41.9 SCOLIOSIS OF LUMBAR SPINE, UNSPECIFIED SCOLIOSIS TYPE: ICD-10-CM

## 2021-07-07 DIAGNOSIS — M54.16 RIGHT LUMBAR RADICULOPATHY: ICD-10-CM

## 2021-07-07 DIAGNOSIS — M54.41 CHRONIC RIGHT-SIDED LOW BACK PAIN WITH RIGHT-SIDED SCIATICA: ICD-10-CM

## 2021-07-07 DIAGNOSIS — M54.14 THORACIC AND LUMBOSACRAL NEURITIS: ICD-10-CM

## 2021-07-07 DIAGNOSIS — M54.17 THORACIC AND LUMBOSACRAL NEURITIS: ICD-10-CM

## 2021-07-07 RX ORDER — OXYCODONE AND ACETAMINOPHEN 7.5; 325 MG/1; MG/1
1 TABLET ORAL EVERY 4 HOURS PRN
Qty: 90 TABLET | Refills: 0 | Status: SHIPPED | OUTPATIENT
Start: 2021-07-08 | End: 2021-08-04 | Stop reason: SDUPTHER

## 2021-07-08 PROBLEM — Q76.49 SPINAL DEFORMITY: Status: ACTIVE | Noted: 2021-07-08

## 2021-07-08 PROBLEM — R39.12 WEAK URINARY STREAM: Status: ACTIVE | Noted: 2021-07-08

## 2021-07-12 ENCOUNTER — PROCEDURE VISIT (OUTPATIENT)
Dept: PHYSICAL MEDICINE AND REHAB | Age: 71
End: 2021-07-12
Payer: MEDICARE

## 2021-07-12 DIAGNOSIS — M79.10 MYALGIA: ICD-10-CM

## 2021-07-12 PROCEDURE — 20553 NJX 1/MLT TRIGGER POINTS 3/>: CPT | Performed by: PHYSICAL MEDICINE & REHABILITATION

## 2021-07-12 PROCEDURE — 96372 THER/PROPH/DIAG INJ SC/IM: CPT | Performed by: PHYSICAL MEDICINE & REHABILITATION

## 2021-07-12 RX ORDER — LIDOCAINE HYDROCHLORIDE 10 MG/ML
15 INJECTION, SOLUTION INFILTRATION; PERINEURAL ONCE
Status: COMPLETED | OUTPATIENT
Start: 2021-07-12 | End: 2021-07-12

## 2021-07-12 RX ORDER — CYANOCOBALAMIN 1000 UG/ML
1000 INJECTION INTRAMUSCULAR; SUBCUTANEOUS ONCE
Status: COMPLETED | OUTPATIENT
Start: 2021-07-12 | End: 2021-07-12

## 2021-07-12 RX ADMIN — LIDOCAINE HYDROCHLORIDE 15 ML: 10 INJECTION, SOLUTION INFILTRATION; PERINEURAL at 14:45

## 2021-07-12 RX ADMIN — CYANOCOBALAMIN 1000 MCG: 1000 INJECTION INTRAMUSCULAR; SUBCUTANEOUS at 14:45

## 2021-07-13 ENCOUNTER — OFFICE VISIT (OUTPATIENT)
Dept: ENDOCRINOLOGY | Age: 71
End: 2021-07-13
Payer: MEDICARE

## 2021-07-13 VITALS
WEIGHT: 164 LBS | HEART RATE: 97 BPM | HEIGHT: 67 IN | SYSTOLIC BLOOD PRESSURE: 118 MMHG | OXYGEN SATURATION: 97 % | BODY MASS INDEX: 25.74 KG/M2 | DIASTOLIC BLOOD PRESSURE: 69 MMHG

## 2021-07-13 DIAGNOSIS — E29.1 HYPOGONADISM MALE: Primary | ICD-10-CM

## 2021-07-13 DIAGNOSIS — E11.9 TYPE 2 DIABETES MELLITUS WITHOUT COMPLICATION, WITHOUT LONG-TERM CURRENT USE OF INSULIN (HCC): ICD-10-CM

## 2021-07-13 DIAGNOSIS — N53.19 ANEJACULATION: ICD-10-CM

## 2021-07-13 DIAGNOSIS — B35.1 ONYCHOMYCOSIS: ICD-10-CM

## 2021-07-13 LAB
CHP ED QC CHECK: NORMAL
GLUCOSE BLD-MCNC: 142 MG/DL
HBA1C MFR BLD: 6.2 %

## 2021-07-13 PROCEDURE — 99213 OFFICE O/P EST LOW 20 MIN: CPT | Performed by: INTERNAL MEDICINE

## 2021-07-13 PROCEDURE — 83036 HEMOGLOBIN GLYCOSYLATED A1C: CPT | Performed by: INTERNAL MEDICINE

## 2021-07-13 PROCEDURE — 82962 GLUCOSE BLOOD TEST: CPT | Performed by: INTERNAL MEDICINE

## 2021-07-13 RX ORDER — IMIPRAMINE HCL 25 MG
25 TABLET ORAL NIGHTLY
Qty: 30 TABLET | Refills: 3 | Status: SHIPPED | OUTPATIENT
Start: 2021-07-13 | End: 2021-07-19

## 2021-07-13 RX ORDER — IMIPRAMINE HCL 25 MG
TABLET ORAL
Qty: 30 TABLET | Refills: 3 | Status: SHIPPED | OUTPATIENT
Start: 2021-07-13 | End: 2021-07-19

## 2021-07-13 NOTE — PROGRESS NOTES
7/13/2021    Assessment:       Diagnosis Orders   1. Hypogonadism male  Testosterone Free and Total Male   2. Type 2 diabetes mellitus without complication, without long-term current use of insulin ([facility])  POCT Glucose    POCT glycosylated hemoglobin (Hb A1C)     DIABETES FOOT EXAM    Hemoglobin P8E    Basic Metabolic Panel    Microalbumin / Creatinine Urine Ratio   3. Anejaculation  imipramine (TOFRANIL) 25 MG tablet         PLAN:     Continue testosterone 100 mg every 2 weeks intramuscular follow-up in 3 to 6 months time  Orders Placed This Encounter   Medications    imipramine (TOFRANIL) 25 MG tablet     Sig: Take 1 tablet by mouth nightly     Dispense:  30 tablet     Refill:  3    metFORMIN (GLUCOPHAGE) 500 MG tablet     Sig: TAKE 1 TABLET BY MOUTH TWICE DAILY WITH MEALS     Dispense:  180 tablet     Refill:  3     **Patient requests 90 days supply**     Orders Placed This Encounter   Procedures    Hemoglobin A1C     Standing Status:   Future     Standing Expiration Date:   7/13/2022    Basic Metabolic Panel     Standing Status:   Future     Standing Expiration Date:   7/13/2022    Testosterone Free and Total Male     Standing Status:   Future     Standing Expiration Date:   7/13/2022    Microalbumin / Creatinine Urine Ratio     Standing Status:   Future     Standing Expiration Date:   7/13/2022    [facility] - Delio Hensley DPM, Podiatry, [place]     Referral Priority:   Routine     Referral Type:   Eval and Treat     Referral Reason:   Specialty Services Required     Referred to Provider:   Alexa Fontanez DPM     Requested Specialty:   Podiatry     Number of Visits Requested:   1    POCT Glucose    POCT glycosylated hemoglobin (Hb A1C)     DIABETES FOOT EXAM           Orders Placed This Encounter   Procedures    POCT Glucose    POCT glycosylated hemoglobin (Hb A1C)     No orders of the defined types were placed in this encounter. No follow-ups on file.   Subjective:     Chief Complaint   Patient presents with    Hypogonadism     male    Diabetes     Vitals:    07/13/21 1034   BP: 118/69   Pulse: 97   SpO2: 97%   Weight: 164 lb (74.4 kg)   Height: 5' 7\" (1.702 m)     Wt Readings from Last 3 Encounters:   07/13/21 164 lb (74.4 kg)   05/07/21 168 lb (76.2 kg)   04/27/21 160 lb (72.6 kg)     BP Readings from Last 3 Encounters:   07/13/21 118/69   05/07/21 138/73   04/27/21 118/70     Follow-up on type 2 diabetes hypogonadism history of ejaculatory dysfunction labs were reviewed done recently  Hemoglobin A1c was 6.2 last testosterone level was on the low side in the 300 range    Diabetes  He presents for his follow-up diabetic visit. He has type 2 diabetes mellitus. Current diabetic treatment includes oral agent (monotherapy) (Metformin). His overall blood glucose range is 110-130 mg/dl.  (Lab Results       Component                Value               Date                       LABA1C                   6.2                 07/13/2021            )     Past Medical History:   Diagnosis Date    Chronic back pain     Coronary artery disease 2002    [facility] Adrienne [facility] Esophageal ulcer 3/10/2014    Dr. Blount Counts    Gastric ulcer 3/10/2014    Dr. Blount Counts    Gout     Hiatal hernia 3/10/2014    Dr. Med Johansen Hyperlipidemia     Hypertension     Impotence 10/16/2020    MI ([facility]ocardial infarction) [facility]) 2005    Dr. Nolasco Due Peripheral vascular disease ([facility]     Prediabetes     Schizo-affective schizophrenia, in remission ([address] 2/1/2005    Overview:  followed at the [facility] Severe single current episode of major depressive disorder, without psychotic features ([address] 7/8/2016    Status post coronary artery stent placement 2002    Dr. Adrienne Sher     Past Surgical History:   Procedure Laterality Date   [address]  01/03/2020    Dr. Bea Caicedo Right 6/4/2019    RIGHT WRIST CARPAL TUNNEL RELEASE, SUPINE, PAT AT PCP performed by Dary Mcclure MD at [address]  3/10/14    DR Madonna Boss  2002    Dr. Jaky SINCLAIR  10/17/2017    KNEE ARTHROSCOPY Left 2002    Dr. Destin Domingo  12/19/13    CAUDAL BLOCKS DONE BY DR Ana Maria Chinchilla    OTHER SURGICAL HISTORY  04/2020    [employer]      SPINE SURGERY  9/2009    Dr. Thomas Raman  2008    Dr. Stefany York  3/10/14    Koko Oswald, DR Edi Hicks     Social History     Socioeconomic History    Marital status:      Spouse name: Not on file    Number of children: Not on file    Years of education: Not on file    Highest education level: Not on file   Occupational History    Occupation: disability    Tobacco Use    Smoking status: Never Smoker    Smokeless tobacco: Never Used   Vaping Use    Vaping Use: Never used   Substance and Sexual Activity    Alcohol use: No     Alcohol/week: 0.0 standard drinks     Comment: Stopped years ago.  Drug use: No     Comment: YEARS AGO    Sexual activity: Yes     Partners: Female     Comment: monogomous sexual partner, wife. Other Topics Concern    Not on file   Social History Narrative    Tobacco -- never smoked or chewed. Alcohol -- have not used for past 10 years, prior to had consumed 6 pack of beer daily. Drugs -- tried marijuana and cocaine 14 years ago occasionally used for 3-4 years and then stopped completely 10 years ago. Education -- completed 11th grade in [place].    Occupation -- [address] work,  at [employer].    Marital status --  44 years, 2 grown sons.      Social Determinants of Health     Financial Resource Strain: Low Risk     Difficulty of Paying Living Expenses: Not hard at all   Food Insecurity:     Worried About [address] in the Last Year:     Ran Out of Food in the Last Year:    Transportation Needs:     Lack of Transportation (Medical):  Lack of Transportation (Non-Medical):    Physical Activity:     Days of Exercise per Week:     Minutes of Exercise per Session:    Stress:     Feeling of Stress :    Social Connections:     Frequency of Communication with Friends and Family:     Frequency of Social Gatherings with Friends and Family:     Attends [employer] Services:     Active Member of Clubs or Organizations:     Attends Club or Organization Meetings:     Marital Status:    Intimate Partner Violence:     Fear of Current or Ex-Partner:     Emotionally Abused:     Physically Abused:     Sexually Abused:      Family History   Problem Relation Age of Onset    High Blood Pressure Mother     Arthritis Mother     Cancer Father         throat    Arthritis Father      No Known Allergies    Current Outpatient Medications:     oxyCODONE-acetaminophen (PERCOCET) 7.5-325 MG per tablet, Take 1 tablet by mouth every 4 hours as needed for Pain (Max of 90 tablets per month) for up to 30 days.  Intended supply: 30 days, Disp: 90 tablet, Rfl: 0    gabapentin (NEURONTIN) 300 MG capsule, One capsule in the am, 2 in hs Intended supply: 30 days, Disp: 270 capsule, Rfl: 0    baclofen (LIORESAL) 10 MG tablet, TAKE 1 TABLET BY MOUTH THREE TIMES DAILY, Disp: 90 tablet, Rfl: 1    testosterone cypionate (DEPOTESTOTERONE CYPIONATE) 200 MG/ML injection, INJECT 0.5 ML INTO THE MUSCLE EVERY 2 WEEKS, Disp: 10 mL, Rfl: 2    allopurinol (ZYLOPRIM) 100 MG tablet, TAKE 1 TABLET BY MOUTH DAILY, Disp: 90 tablet, Rfl: 3    pravastatin (PRAVACHOL) 40 MG tablet, TAKE 1 TABLET BY MOUTH EVERY EVENING, Disp: 90 tablet, Rfl: 3    SYRINGE-NEEDLE, DISP, 3 ML (B-D INTEGRA SYRINGE) 22G X 1-1/2\" 3 ML MISC, 1 each by Does not apply route daily, Disp: 20 each, Rfl: 6    imipramine (TOFRANIL) 25 MG tablet, Take 1 tablet by mouth nightly, Disp: 30 tablet, Rfl: 3   metoprolol tartrate (LOPRESSOR) 50 MG tablet, TAKE 1/2 TABLET BY MOUTH twice DAILY, Disp: 270 tablet, Rfl: 2    colchicine (COLCRYS) 0.6 MG tablet, Take for gout flare, 1 tablet daily during flare., Disp: 30 tablet, Rfl: 0    metFORMIN (GLUCOPHAGE) 500 MG tablet, TAKE 1 TABLET BY MOUTH TWICE DAILY WITH MEALS, Disp: 180 tablet, Rfl: 3    NIFEdipine (PROCARDIA XL) 60 MG extended release tablet, Take 60 mg by mouth daily, Disp: , Rfl:      MG capsule, TK ONE C PO  BID, Disp: , Rfl:     Lancets MISC, 1 each by Does not apply route daily Dx E11.9, Disp: 100 each, Rfl: 11    blood glucose monitor strips, Patient to test daily. Dx: E11.9.  Please dispense the brand that is covered by insurance., Disp: 100 strip, Rfl: 11    Alcohol Swabs (ALCOHOL PREP) 70 % PADS, Patient to test once daily E11.9, Disp: 100 each, Rfl: 11    glucose monitoring kit (FREESTYLE) monitoring kit, 1 kit by Does not apply route daily, Disp: 1 kit, Rfl: 0    nitroGLYCERIN (NITROSTAT) 0.4 MG SL tablet, Place 1 tablet under the tongue every 5 minutes as needed x 3 doses for chest pain., Disp: 25 tablet, Rfl: 2    vitamin D (CHOLECALCIFEROL) 25 MCG (1000 UT) TABS tablet, Take 1,000 Units by mouth daily, Disp: , Rfl:     tamsulosin (FLOMAX) 0.4 MG capsule, TAKE 1 CAPSULE DAILY AT BEDTIME, Disp: , Rfl:     CPAP Machine MISC, by NOT APPLICABLE route, Disp: , Rfl:     aspirin 81 MG EC tablet, Take 1 tablet by mouth daily, Disp: 90 tablet, Rfl: 1  Lab Results   Component Value Date     03/18/2021    K 5.3 (H) 03/18/2021    CL 96 03/18/2021    CO2 31 03/18/2021    BUN 16 03/18/2021    CREATININE 0.93 03/18/2021    GLUCOSE 142 07/13/2021    CALCIUM 10.2 (H) 03/18/2021    PROT 7.9 03/18/2021    LABALBU 4.6 03/18/2021    BILITOT 0.5 03/18/2021    ALKPHOS 73 03/18/2021    AST 27 03/18/2021    ALT 21 03/18/2021    LABGLOM >60.0 03/18/2021    GFRAA >60.0 03/18/2021    GLOB 3.3 03/18/2021     Lab Results   Component Value Date    WBC 5.1 03/18/2021    HGB 15.8 03/18/2021    HCT 46.8 03/18/2021    MCV 95.4 03/18/2021     03/18/2021     Lab Results   Component Value Date    LABA1C 6.2 07/13/2021    LABA1C 6.4 (H) 03/18/2021    LABA1C 6.2 (H) 11/16/2020     Lab Results   Component Value Date    CHOLFAST 148 05/24/2019    TRIGLYCFAST 98 05/24/2019    HDL 45 11/16/2020    HDL 42 05/24/2019    HDL 45 03/12/2018    LDLCALC 86 11/16/2020    LDLCALC 86 05/24/2019    LDLCALC 76 03/12/2018    CHOL 150 11/16/2020    CHOL 152 03/12/2018    CHOL 271 (H) 09/05/2017    TRIG 96 11/16/2020    TRIG 157 03/12/2018    TRIG 154 09/05/2017     Lab Results   Component Value Date    TESTM 209 (L) 03/30/2021    TESTM 976 (H) 11/16/2020    TESTM 1015 (H) 09/25/2020     Lab Results   Component Value Date    TSH 4.110 05/06/2016    TSH 2.153 09/09/2013    TSHREFLEX 2.730 12/15/2020       Review of Systems   Endocrine: Negative. Genitourinary: Negative. All other systems reviewed and are negative. Objective:   Physical Exam  Vitals reviewed. Constitutional:       Appearance: Normal appearance. He is normal weight. HENT:      Head: Normocephalic and atraumatic. Hair is normal.      Right Ear: External ear normal.      Left Ear: External ear normal.      Nose: Nose normal.   Eyes:      General: No scleral icterus. Right eye: No discharge. Left eye: No discharge. Extraocular Movements: Extraocular movements intact. Conjunctiva/sclera: Conjunctivae normal.   Neck:      Trachea: Trachea normal.   Cardiovascular:      Rate and Rhythm: Normal rate. Pulmonary:      Effort: Pulmonary effort is normal.   Musculoskeletal:         General: Normal range of motion. Cervical back: Normal range of motion and neck supple. Feet:    Neurological:      General: No focal deficit present. Mental Status: He is alert and oriented to person, place, and time.    Psychiatric:         Mood and Affect: Mood normal.         Behavior: Behavior normal.

## 2021-07-14 ENCOUNTER — OFFICE VISIT (OUTPATIENT)
Dept: PHYSICAL MEDICINE AND REHAB | Age: 71
End: 2021-07-14
Payer: MEDICARE

## 2021-07-14 VITALS
DIASTOLIC BLOOD PRESSURE: 64 MMHG | HEIGHT: 67 IN | BODY MASS INDEX: 25.74 KG/M2 | WEIGHT: 164 LBS | SYSTOLIC BLOOD PRESSURE: 120 MMHG

## 2021-07-14 DIAGNOSIS — M79.10 MYALGIA: ICD-10-CM

## 2021-07-14 DIAGNOSIS — M54.17 THORACIC AND LUMBOSACRAL NEURITIS: Primary | ICD-10-CM

## 2021-07-14 DIAGNOSIS — M54.12 C6 RADICULOPATHY: ICD-10-CM

## 2021-07-14 DIAGNOSIS — Z79.899 HIGH RISK MEDICATION USE: ICD-10-CM

## 2021-07-14 DIAGNOSIS — G89.29 CHRONIC LOW BACK PAIN WITH SCIATICA, SCIATICA LATERALITY UNSPECIFIED, UNSPECIFIED BACK PAIN LATERALITY: ICD-10-CM

## 2021-07-14 DIAGNOSIS — M54.14 THORACIC AND LUMBOSACRAL NEURITIS: Primary | ICD-10-CM

## 2021-07-14 DIAGNOSIS — M47.892 OTHER OSTEOARTHRITIS OF SPINE, CERVICAL REGION: ICD-10-CM

## 2021-07-14 DIAGNOSIS — E55.9 VITAMIN D DEFICIENCY: ICD-10-CM

## 2021-07-14 DIAGNOSIS — M62.838 SPASM OF MUSCLE: ICD-10-CM

## 2021-07-14 DIAGNOSIS — M54.40 CHRONIC LOW BACK PAIN WITH SCIATICA, SCIATICA LATERALITY UNSPECIFIED, UNSPECIFIED BACK PAIN LATERALITY: ICD-10-CM

## 2021-07-14 DIAGNOSIS — M79.605 BILATERAL LEG PAIN: ICD-10-CM

## 2021-07-14 DIAGNOSIS — M79.604 BILATERAL LEG PAIN: ICD-10-CM

## 2021-07-14 PROCEDURE — 99214 OFFICE O/P EST MOD 30 MIN: CPT | Performed by: PHYSICAL MEDICINE & REHABILITATION

## 2021-07-14 RX ORDER — BACLOFEN 10 MG/1
TABLET ORAL
Qty: 90 TABLET | Refills: 1 | Status: SHIPPED | OUTPATIENT
Start: 2021-07-14 | End: 2021-09-09

## 2021-07-14 ASSESSMENT — ENCOUNTER SYMPTOMS
WHEEZING: 0
BOWEL INCONTINENCE: 0
VOMITING: 0
NAUSEA: 0
COUGH: 0
SORE THROAT: 0
BLOOD IN STOOL: 0
VISUAL CHANGE: 0
SHORTNESS OF BREATH: 1
PHOTOPHOBIA: 0
BACK PAIN: 1
CONSTIPATION: 1
EYE PAIN: 0
EYE REDNESS: 0
STRIDOR: 0
ABDOMINAL PAIN: 0
TROUBLE SWALLOWING: 0
DIARRHEA: 0

## 2021-07-14 NOTE — PROGRESS NOTES
Loyd, 72 y.o. male presents today with:     Back Pain (Trigger point injections 5/6/21, 6/10/21 & 7/12/21 with 100% reduction in pain lasting 1 month)   Shoulder Pain (Right)   Leg Pain (Bilateral)   Foot Pain (Bilateral)   Medication Refill (Percocet, Gabapentin, Baclofen, Vit D refill & pill count today)     He is more functional on the opiate meds--able to do yard work and interact with friends and family in a positive friendly manner. Right shoulder is flared up-  But recent injections helped quite a bit. Injections still help very much. He would like to schedule monthly trigger point and sciatic injection in between times. Back Pain  This is a chronic problem. The current episode started more than 1 year ago. The problem occurs daily. The problem is unchanged. The pain is present in the lumbar spine. The quality of the pain is described as aching, cramping, shooting and stabbing. The pain radiates to the right foot, right knee and right thigh. The pain is at a severity of 5/10. The pain is severe. The pain is worse during the day. The symptoms are aggravated by bending, lying down, position, sitting, standing and twisting. Stiffness is present in the morning. Associated symptoms include leg pain, numbness and weakness. Pertinent negatives include no abdominal pain, bladder incontinence, bowel incontinence, chest pain, dysuria, fever, headaches, paresis, perianal numbness or tingling. Risk factors include lack of exercise and sedentary lifestyle. He has tried analgesics, home exercises, NSAIDs, muscle relaxant, heat and walking (SI injections which help, trigger point injections, OXY-IR ) for the symptoms. The treatment provided moderate relief. Hand Pain   The incident occurred more than 1 week ago. The incident occurred at home. There was no injury mechanism. The pain is present in the right wrist and right hand. The quality of the pain is described as cramping.  The pain does not radiate. The pain is at a severity of 4/10. The pain is moderate. The pain has been intermittent since the incident. Associated symptoms include numbness. Pertinent negatives include no chest pain, muscle weakness or tingling. The symptoms are aggravated by movement. He has tried ice, immobilization, rest, NSAIDs, acetaminophen, heat and elevation for the symptoms. The treatment provided significant relief. Neck Pain   This is a recurrent problem. The current episode started more than 1 month ago. The problem occurs constantly. The problem has been gradually worsening. The pain is present in the occipital region. The quality of the pain is described as aching. The pain is at a severity of 5/10. The pain is moderate. The symptoms are aggravated by twisting. Stiffness is present in the morning. Associated symptoms include leg pain, numbness and weakness. Pertinent negatives include no chest pain, fever, headaches, pain with swallowing, paresis, photophobia, syncope, tingling, trouble swallowing or visual change. He has tried heat, acetaminophen, bed rest, home exercises, muscle relaxants, neck support, ice, NSAIDs and oral narcotics for the symptoms. The treatment provided moderate relief.        Past Medical History:   Diagnosis Date    Chronic back pain     Coronary artery disease 2002    [facility] Ver[facility] Esophageal ulcer 3/10/2014    Dr. Marcus Reyes    Gastric ulcer 3/10/2014    Dr. Marcus Reyes    Gout     Hiatal hernia 3/10/2014    Dr. Lacey Mays Hyperlipidemia     Hypertension     Impotence 10/16/2020    MI (myocardial infarction) [facility]) 2005    Dr. Lizbeth Man Peripheral vascular disease ([facility]     Prediabetes     Schizo-affective schizophrenia, in remission ([facility] 2/1/2005    Overview:  followed at the [facility] Severe single current episode of major depressive disorder, without psychotic features ([address] 7/8/2016    Status post coronary artery stent placement 2002    Dr. Dre Angel     Past Surgical History:   Procedure Laterality Date    [address]  01/03/2020    Dr. Sylvia Tony Right 6/4/2019    RIGHT WRIST CARPAL TUNNEL RELEASE, SUPINE, PAT AT PCP performed by Abi Ledesma MD at [facility]  3/10/14    DR Caitlin Mack  2002    Dr. Tamra So GRAFT  10/17/2017    KNEE ARTHROSCOPY Left 2002    Dr. Lucía Reddy  12/19/13    CAUDAL BLOCKS DONE BY DR Mani Brody    OTHER SURGICAL HISTORY  04/2020    [facility]      SPINE SURGERY  9/2009    Dr. Peter Kwon  2008    Dr. Multani Flight  3/10/14    [facility], DR Sachi Hawley [address]     Social History     Socioeconomic History    Marital status:      Spouse name: None    Number of children: None    Years of education: None    Highest education level: None   Occupational History    Occupation: disability    Tobacco Use    Smoking status: Never Smoker    Smokeless tobacco: Never Used   Vaping Use    Vaping Use: Never used   Substance and Sexual Activity    Alcohol use: No     Alcohol/week: 0.0 standard drinks     Comment: Stopped years ago.  Drug use: No     Comment: YEARS AGO    Sexual activity: Yes     Partners: Female     Comment: monogomous sexual partner, wife. Other Topics Concern    None   Social History Narrative    Tobacco -- never smoked or chewed. Alcohol -- have not used for past 10 years, prior to had consumed 6 pack of beer daily. Drugs -- tried marijuana and cocaine 14 years ago occasionally used for 3-4 years and then stopped completely 10 years ago.     Education -- completed 11th grade in [place].    Occupation -- [address] work,  at [employer].    Marital status --  44 years, 2 grown sons. Social Determinants of Health     Financial Resource Strain: Low Risk     Difficulty of Paying Living Expenses: Not hard at all   Food Insecurity:     Worried About [address] in the Last Year:     [address] in the Last Year:    Transportation Needs:     Lack of Transportation (Medical):  Lack of Transportation (Non-Medical):    Physical Activity:     Days of Exercise per Week:     Minutes of Exercise per Session:    Stress:     Feeling of Stress :    Social Connections:     Frequency of Communication with Friends and Family:     Frequency of Social Gatherings with Friends and Family:     Attends Temple Services:     Active Member of Clubs or Organizations:     Attends Club or Organization Meetings:     Marital Status:    Intimate Partner Violence:     Fear of Current or Ex-Partner:     Emotionally Abused:     Physically Abused:     Sexually Abused:      Family History   Problem Relation Age of Onset    High Blood Pressure Mother     Arthritis Mother     Cancer Father         throat    Arthritis Father        No Known Allergies    Review of Systems   Constitutional: Positive for activity change and fatigue. Negative for chills, diaphoresis, fever and unexpected weight change. HENT: Negative for congestion, ear discharge, ear pain, hearing loss, nosebleeds, sore throat, tinnitus and trouble swallowing. Eyes: Negative for photophobia, pain and redness. Respiratory: Positive for shortness of breath. Negative for cough, wheezing and stridor. Shortness of breath on exertion   Cardiovascular: Negative for chest pain, palpitations, leg swelling and syncope. Gastrointestinal: Positive for constipation. Negative for abdominal pain, blood in stool, bowel incontinence, diarrhea, nausea and vomiting. Endocrine: Negative for polydipsia. Genitourinary: Negative for bladder incontinence, dysuria, flank pain, frequency, hematuria and urgency. Musculoskeletal: Positive for back pain, gait problem and myalgias. Negative for neck pain. Skin: Negative for rash. Allergic/Immunologic: Positive for immunocompromised state. Negative for environmental allergies. Neurological: Positive for weakness and numbness. Negative for dizziness, tingling, tremors, seizures and headaches. Hematological: Does not bruise/bleed easily. Psychiatric/Behavioral: Negative for dysphoric mood, hallucinations and suicidal ideas. The patient is not nervous/anxious. Objective    Vitals:    07/14/21 1004   BP: 120/64   Site: Left Upper Arm   Position: Sitting   Cuff Size: Small Adult   Weight: 164 lb (74.4 kg)   Height: 5' 7\" (1.702 m)     Pain Score: NINE (With medication)       Physical Exam  Vitals reviewed. Constitutional:       General: He is not in acute distress. Appearance: He is well-developed. He is not ill-appearing, toxic-appearing or diaphoretic. Comments:     HENT:      Head: Normocephalic and atraumatic. Right Ear: Hearing normal.      Left Ear: Hearing normal.      Nose: Nose normal.      Mouth/Throat:      Mouth: No oral lesions. Dentition: Normal dentition. Pharynx: No oropharyngeal exudate. Eyes:      General: No scleral icterus. Right eye: No discharge. Left eye: No discharge. Conjunctiva/sclera: Conjunctivae normal.      Right eye: No chemosis or exudate. Left eye: No chemosis or exudate. Neck:      Thyroid: No thyromegaly. Vascular: No JVD. Trachea: No tracheal tenderness or tracheal deviation. Pulmonary:      Effort: Pulmonary effort is normal. No tachypnea, bradypnea, accessory muscle usage or respiratory distress. Breath sounds: Decreased breath sounds present. No wheezing or rales. Chest:      Chest wall: No tenderness. Abdominal:      General: There is no distension. Musculoskeletal:         General: Tenderness present.       Right shoulder: Tenderness and bony tenderness present. Decreased range of motion. Left shoulder: Tenderness and bony tenderness present. Decreased range of motion. Right upper arm: Normal.      Left upper arm: Normal.      Right elbow: Normal.      Left elbow: Normal.      Right forearm: Normal.      Left forearm: Normal.      Right wrist: Normal.      Left wrist: Normal.      Right hand: Bony tenderness present. No swelling, deformity or lacerations. Decreased range of motion. Decreased strength of finger abduction, thumb/finger opposition and wrist extension. Decreased sensation of the ulnar distribution, median distribution and radial distribution. Normal capillary refill. Left hand: Bony tenderness present. Decreased range of motion. Decreased strength of finger abduction and wrist extension. Decreased sensation of the ulnar distribution and radial distribution. Arms:       Cervical back: Neck supple. Laceration, spasms, tenderness and bony tenderness present. No swelling, edema, deformity or rigidity. Spinous process tenderness and muscular tenderness present. Thoracic back: Deformity, spasms, tenderness and bony tenderness present. Decreased range of motion. Lumbar back: Tenderness and bony tenderness present. No swelling, edema, deformity or lacerations. Decreased range of motion. Back:       Right hip: Normal.      Left hip: Normal.      Right upper leg: Normal.      Left upper leg: Normal.      Right knee: Normal.      Left knee: Normal.      Right lower leg: Normal.      Left lower leg: Normal.      Right ankle: Normal.      Right Achilles Tendon: Normal.      Left ankle: Normal.      Left Achilles Tendon: Normal.      Right foot: Normal.      Left foot: Normal.        Legs:       Comments: Tender areas are indicated by numbered spot         Skin:     General: Skin is warm and dry. Coloration: Skin is not pale.       Findings: No abrasion, bruising, ecchymosis, erythema, laceration, petechiae or rash. Rash is not macular, pustular or urticarial.      Nails: There is no clubbing. Neurological:      Mental Status: He is alert and oriented to person, place, and time. Cranial Nerves: No cranial nerve deficit. Sensory: Sensory deficit present. Motor: No tremor, atrophy or abnormal muscle tone. Coordination: Coordination normal.      Gait: Gait abnormal.      Deep Tendon Reflexes: Reflexes abnormal. Babinski sign absent on the right side. Babinski sign absent on the left side. Reflex Scores:       Patellar reflexes are 1+ on the right side and 1+ on the left side. Achilles reflexes are 0 on the right side and 0 on the left side. Psychiatric:         Attention and Perception: He is attentive. Mood and Affect: Mood is not anxious or depressed. Affect is not labile, blunt, angry or inappropriate. Speech: He is communicative. Speech is not rapid and pressured, delayed, slurred or tangential.         Behavior: Behavior normal. Behavior is not agitated, slowed, aggressive, withdrawn, hyperactive or combative. Thought Content: Thought content normal. Thought content is not paranoid or delusional. Thought content does not include homicidal or suicidal ideation. Thought content does not include homicidal or suicidal plan. Cognition and Memory: Memory is not impaired. He does not exhibit impaired recent memory or impaired remote memory. Judgment: Judgment normal. Judgment is not impulsive or inappropriate. Comments: Calm appropriate    NAD       Ortho Exam  Neurologic Exam     Mental Status   Oriented to person, place, and time.    Speech: not slurred     Gait, Coordination, and Reflexes     Reflexes   Right patellar: 1+  Left patellar: 1+  Right achilles: 0  Left achilles: 0      After a thorough review and discussion of the previous medical records, patient comprehensive medical, surgical, and family and social history, Review of Systems, their OARRS, their Screener and Opioid Assessment for Patients with Pain (SOAPP®-R), recent diagnostics, and symptomatic results to previous treatment, it is my impression that the patients is suffering with progressive and severe:     Diagnosis Orders   1. Thoracic and lumbosacral neuritis     2. Myalgia  MA INJECT TRIGGER POINTS, 3 OR GREATER    MA INJECT TRIGGER POINTS, 3 OR GREATER    MA INJECT TRIGGER POINTS, 3 OR GREATER   3. Chronic low back pain with sciatica, sciatica laterality unspecified, unspecified back pain laterality     4. High risk medication use - 12/07/17 OARRS PM&R, 02/08/18 OARRS PM&R, 10/05/17 Urine Drug Screen: negative PM&R, MED CONTRACT 1/17/17     5. Vitamin D deficiency     6. C6 radiculopathy     7. Other osteoarthritis of spine, cervical region     8. Bilateral leg pain  baclofen (LIORESAL) 10 MG tablet   9.  Spasm of muscle  baclofen (LIORESAL) 10 MG tablet       I am also concerned by lifestyle and mood issues including:    Past Medical History:   Diagnosis Date    Chronic back pain     Coronary artery disease 2002    [facility]. Peterson Plan [facility] Esophageal ulcer 3/10/2014    Dr. Lulu Salvador Gastric ulcer 3/10/2014    Dr. Sabra Villaseñor    Gout     Hiatal hernia 3/10/2014    Dr. Lulu Salvador Hyperlipidemia     Hypertension     Impotence 10/16/2020    MI (myocardial infarction) [facility]) 2005    Dr. Emily Rooney Peripheral vascular disease ([address]     Prediabetes     Schizo-affective schizophrenia, in remission ([address] 2/1/2005    Overview:  followed at the [facility] Severe single current episode of major depressive disorder, without psychotic features ([address] 7/8/2016    Status post coronary artery stent placement 2002    Dr. Peterson Plan           Given their medication, chronic pain and lifestyle and medications they are at risk for :    Falls, constipation, addiction, toxicity on opiates  Loss of livelyhood due to severe pain, debility, weight confirmed \"Consent for Opioid Use\" on file. Chronic Pain > 80 MEDD -               Patient is currently taking:       I am having Esme Love maintain his aspirin, CPAP Machine, tamsulosin, vitamin D, nitroGLYCERIN, glucose monitoring, Lancets, blood glucose test strips, Alcohol Prep, DOK, NIFEdipine, colchicine, metoprolol tartrate, B-D INTEGRA SYRINGE, allopurinol, pravastatin, testosterone cypionate, gabapentin, oxyCODONE-acetaminophen, imipramine, imipramine, metFORMIN, and baclofen. I also recommend the following Medications:    Orders Placed This Encounter   Medications    baclofen (LIORESAL) 10 MG tablet     Sig: TAKE 1 TABLET BY MOUTH THREE TIMES DAILY     Dispense:  90 tablet     Refill:  1        -which helps with pain and function. Otherwise, continue the current pain medications that I have prescibed.       Radiologic:   Old  unique films results reviewed-  CAROTID DUPLEX ULTRASONOGRAPHY       CLINICAL HISTORY:  CAROTID BRUITS       COMPARISONS:  July 28, 2007       TECHNIQUE:  B-mode, color flow and spectral Doppler       FINDINGS:         ARTERIAL BLOOD FLOW VELOCITY       RIGHT PS                                                      Prox CCA    118 cm/s                Mid CCA     88 cm/s                 Dist CCA    91 cm/s                 Prox ICA    90 cm/s                  Mid ICA     78 cm/s               Dist ICA    73 cm/s                Prox ECA    118 cm/s                Prox VERT   81 cm/s                     ICA/CCA     1.0                               LEFT PS       Prox CCA    106 cm/s   Mid CCA     103 cm/s   Dist CCA    109 cm/s   Prox ICA    87 cm/s   Mid ICA     77 cm/s   Dist ICA    78 cm/s   Prox ECA    150 cm/s   Prox VERT   66 cm/s       ICA/CCA     0.8           Impression   MILD ATHEROSCLEROSIS THROUGHOUT THE CAROTID TREE WITH MINIMAL INTIMAL THICKENING.       BILATERAL ICA LESS THAN 50% STENOSIS.       BILATERAL ANTEGRADE VERTEBRAL FLOW.       ,     I Detected 09/16/2016    METHADONE Not Detected 09/16/2016    LABPROP Not Detected 09/16/2016    TRAM Not Detected 09/16/2016    AMPH Not Detected 09/16/2016    METHAMP Not Detected 09/16/2016    MDMA Not Detected 09/16/2016    ECMDA Not Detected 09/16/2016       Lab Results   Component Value Date    PHENTERMINE Not Detected 09/16/2016    BENZOYL Not Detected 09/16/2016    [place]  Not Detected 09/16/2016    ALPHAOHALPRA Not Detected 09/16/2016    CLONAZEPAM Not Detected 09/16/2016    Jevon Shires Not Detected 09/16/2016    DIAZEP Not Detected 09/16/2016    SAFIA Not Detected 09/16/2016    OXAZ Not Detected 09/16/2016    Lanney Stack Not Detected 09/16/2016    LORAZEPAM Not Detected 09/16/2016    MIDAZOLAM Not Detected 09/16/2016    ZOLPIDEM Not Detected 09/16/2016    REG Not Detected 09/16/2016    ETG Not Detected 09/16/2016    MARIJMET Not Detected 09/16/2016    PCP Not Detected 09/16/2016    PAINMGTDRUGP See Below 09/16/2016    EERPAINMGTPA See Note 09/16/2016    LABCREA 199.2 03/02/2020         , I discussed results with patient. See follow-up plans for new studies. Therapies:  HEP-gentle stretching and relaxation techniques-demonstrated with patient-they are to do them twice a day.   They are also advised to make the following lifestyle changes:  Goals      Exercise 3x per week (30 min per time)      SOAPP-R GOAL LESS THAN 9      09/16/16 SCORE: 33-HIGH RISK   11/11/16 score: 27-high risk     01/13/17 score: 16-moderate risk   03/13/17 Score: 11-moderate risk   06/01/17 score: 15-moderate risk  08/03/17 score: 15-moderate risk  10/04/17 score: 18-moderate risk  12/08/17 score: 11-moderate risk  02/09/18 score: 12-moderate risk  04/13/18 score: 14-moderate risk  7/12/18 score 10-moderate risk  11/29/18 score 17- moderate risk  01/28/19 score  16-moderate risk  6/10/19 score: 11- moderate risk  8/15/19 score: 17- moderate risk   4/8/19 score  7- low risk  3/11/20 score: 17- moderate risk  5- score- 16 - moderate risk   7/20/20 score: 21- high risk  10/16/20 score: 16- moderate risk  1/15/21 score: 16- moderate risk  4/14/21 score: 22- high risk  7/14/21 score: 14- moderate risk            Injections or Epidurals:  Injection options were discussed. Patient gave verbal consent to ordered injections. See follow-up plans for planned injections. Supplements:  Vitamin D with increased dosing during the rainy months,   Education was given on:   Dietary and Fitness--daily stretches and low carb diet-in chair Yoga when possible             Follow up with Primary Care Physician regarding their general medical needs. Stressed the importance of following up with PCP and specialists for his/her chronic diseases, health, CV, and cancer screening and continued care. Will follow disease activity/progression and adjust therapeutic regimen to disease activity and severity. Discussed medication dosage, usage, goals of therapy, and side effects. Available test results were reviewed -Discussed findings, impression and plan with patient. An additional  7 minutes were spent outside of the patient visit to review records. Additional time spent with the patient to discuss their questions. Additional time spent with the patient devoted to discussing treatment strategy, planning, and implementation. Patient understands above plan; questions asked and answered. Patient agrees to plan as noted above. At least 50% of the visit was involved in the discussion of the options for treatment. We discussed exercises, medication, interventional therapies and surgery. Healthy life style is essential with patient hard work to achieve the wellness.  In addition; discussion with the patient and/or family about patient's functional status any of the diagnostic results, impressions and/or recommended diagnostic studies, prognosis, risks and benefits of treatment options, instructions for treatment and/or follow-up, importance of compliance with chosen treatment options, risk-factor reduction, and patient/family education. They are to follow up in 2 1/2 months to review medication, efficacy of injections, pill counts, OARRS check, high risk med review re intensive monitoring for toxicity and addiction, SOAPPR assessment, review diagnostics, to review previous and future treatment plans and assess appropriateness for continued therapy.         New Diagnostics  Orders Placed This Encounter   Procedures    TX INJECT TRIGGER POINTS, 3 OR GREATER    TX INJECT TRIGGER POINTS, 3 OR GREATER    TX INJECT TRIGGER POINTS, 3 OR GREATER       Brenda Enoc, DO

## 2021-07-19 ENCOUNTER — OFFICE VISIT (OUTPATIENT)
Dept: FAMILY MEDICINE CLINIC | Age: 71
End: 2021-07-19
Payer: MEDICARE

## 2021-07-19 VITALS
RESPIRATION RATE: 16 BRPM | OXYGEN SATURATION: 98 % | SYSTOLIC BLOOD PRESSURE: 130 MMHG | WEIGHT: 159.2 LBS | HEIGHT: 67 IN | HEART RATE: 68 BPM | BODY MASS INDEX: 24.99 KG/M2 | DIASTOLIC BLOOD PRESSURE: 70 MMHG | TEMPERATURE: 97.7 F

## 2021-07-19 DIAGNOSIS — M1A.9XX0 CHRONIC GOUT WITHOUT TOPHUS, UNSPECIFIED CAUSE, UNSPECIFIED SITE: ICD-10-CM

## 2021-07-19 DIAGNOSIS — K59.03 THERAPEUTIC OPIOID-INDUCED CONSTIPATION (OIC): ICD-10-CM

## 2021-07-19 DIAGNOSIS — E11.9 TYPE 2 DIABETES MELLITUS WITHOUT COMPLICATION, WITHOUT LONG-TERM CURRENT USE OF INSULIN (HCC): ICD-10-CM

## 2021-07-19 DIAGNOSIS — T40.2X5A THERAPEUTIC OPIOID-INDUCED CONSTIPATION (OIC): ICD-10-CM

## 2021-07-19 DIAGNOSIS — G89.29 CHRONIC LOW BACK PAIN WITH SCIATICA, SCIATICA LATERALITY UNSPECIFIED, UNSPECIFIED BACK PAIN LATERALITY: ICD-10-CM

## 2021-07-19 DIAGNOSIS — M41.46 NEUROMUSCULAR SCOLIOSIS OF LUMBAR REGION: ICD-10-CM

## 2021-07-19 DIAGNOSIS — E87.5 HYPERKALEMIA: ICD-10-CM

## 2021-07-19 DIAGNOSIS — I10 ESSENTIAL HYPERTENSION, BENIGN: ICD-10-CM

## 2021-07-19 DIAGNOSIS — M54.40 CHRONIC LOW BACK PAIN WITH SCIATICA, SCIATICA LATERALITY UNSPECIFIED, UNSPECIFIED BACK PAIN LATERALITY: ICD-10-CM

## 2021-07-19 DIAGNOSIS — I20.9 ANGINA PECTORIS (HCC): ICD-10-CM

## 2021-07-19 DIAGNOSIS — L60.3 ONYCHODYSTROPHY: ICD-10-CM

## 2021-07-19 DIAGNOSIS — I10 ESSENTIAL HYPERTENSION, BENIGN: Primary | ICD-10-CM

## 2021-07-19 DIAGNOSIS — E29.1 HYPOGONADISM IN MALE: ICD-10-CM

## 2021-07-19 LAB
ANION GAP SERPL CALCULATED.3IONS-SCNC: 9 MEQ/L (ref 9–15)
BUN BLDV-MCNC: 14 MG/DL (ref 8–23)
CALCIUM SERPL-MCNC: 9.3 MG/DL (ref 8.5–9.9)
CHLORIDE BLD-SCNC: 100 MEQ/L (ref 95–107)
CO2: 31 MEQ/L (ref 20–31)
CREAT SERPL-MCNC: 0.93 MG/DL (ref 0.7–1.2)
GFR AFRICAN AMERICAN: >60
GFR NON-AFRICAN AMERICAN: >60
GLUCOSE BLD-MCNC: 105 MG/DL (ref 70–99)
HCT VFR BLD CALC: 42.1 % (ref 42–52)
HEMOGLOBIN: 14.2 G/DL (ref 14–18)
MCH RBC QN AUTO: 33.5 PG (ref 27–31.3)
MCHC RBC AUTO-ENTMCNC: 33.6 % (ref 33–37)
MCV RBC AUTO: 99.6 FL (ref 80–100)
PDW BLD-RTO: 14.4 % (ref 11.5–14.5)
PLATELET # BLD: 251 K/UL (ref 130–400)
POTASSIUM SERPL-SCNC: 4.9 MEQ/L (ref 3.4–4.9)
RBC # BLD: 4.23 M/UL (ref 4.7–6.1)
SODIUM BLD-SCNC: 140 MEQ/L (ref 135–144)
WBC # BLD: 4.6 K/UL (ref 4.8–10.8)

## 2021-07-19 PROCEDURE — 99214 OFFICE O/P EST MOD 30 MIN: CPT | Performed by: PHYSICIAN ASSISTANT

## 2021-07-19 NOTE — PROGRESS NOTES
[facility], 72 y.o. male presents today with:  Chief Complaint   Patient presents with    Follow-up       HPI  Zenaida Jeff is in the office today for routine follow up. Last OV with me: 3/18/2021  Overall, doing very well. Feeling \"GREAT\". Has been gardening outside, active at home. Bradycardia/CAD/HTN. Denies CP, GUILLORY, SOB. Currently taking 50 mg metoprolol BID. Patient had exercise stress test on 12/30/2020--had a peak HR of 155 BPM.    EKG 10/2020 showed rate of 66 BPM.  Last OV with Dr. Jenkins Party was 5/7/21. No changes to medications made. Instructed to follow up in 4 months. Low testosterone/prediabetes. Managed by endocrinology. Continue with testosterone q 14 days. No side effects from medication. Checks blood sugars 1-2 times/day. Fasting 100 or less. No hypo/hyperglycemic episodes.      Gout. Stable on allopurinol. Denies any recent flares or unilateral joint swelling/erythema.      Opioid induced constipation. Manageable at this time with regimen of warm miralax and prune juice. BM every other day, soft, well-formed. Denies abdominal bloating/pain. This has improved.      Chronic back pain. Followed by PMR. Receives injections with Dr. Scott Fraga. No fall/injury/trauma. Pain is controlled with injections, medication.      Toenail dystrophy. C/o thickened toenails and discoloration. Would like to discuss medication and management. Denies pain/irritation. Review of Systems   Constitutional: Negative for activity change, appetite change, chills, diaphoresis and fatigue. HENT: Negative for congestion. Respiratory: Negative for chest tightness, shortness of breath and wheezing. Cardiovascular: Negative for chest pain, palpitations and leg swelling. Gastrointestinal: Negative for abdominal distention, abdominal pain, blood in stool, constipation (improved), diarrhea, rectal pain and vomiting.    Musculoskeletal: Positive for arthralgias and back pain (improved since surgery). Negative for gait problem and myalgias. Skin: Positive for color change (toenail). Negative for rash. Neurological: Negative for dizziness, weakness, light-headedness, numbness and headaches. Psychiatric/Behavioral: Negative for dysphoric mood and sleep disturbance. The patient is not nervous/anxious.       Past Medical History:   Diagnosis Date    Chronic back pain     Coronary artery disease 2002    [facility] Peter [facility] Esophageal ulcer 3/10/2014    Dr. Angela Gonzalez Gastric ulcer 3/10/2014    Dr. Rena Estes    Gout     Hiatal hernia 3/10/2014    Dr. Angela Gonzalez Hyperlipidemia     Hypertension     Impotence 10/16/2020    MI ([facility]ocardial infarction) [facility]) 2005    Dr. Sridevi Lucia Peripheral vascular disease ([facility]     Prediabetes     Schizo-affective schizophrenia, in remission ([facility] 2/1/2005    Overview:  followed at the [facility] Severe single current episode of major depressive disorder, without psychotic features ([facility] 7/8/2016    Status post coronary artery stent placement 2002    Dr. Peter Oreilly     Past Surgical History:   Procedure Laterality Date    [address]  01/03/2020    Dr. Tavia Quiñones Right 6/4/2019    RIGHT WRIST CARPAL TUNNEL RELEASE, SUPINE, PAT AT PCP performed by Jerald Szymnaski MD at [address] [place] [address]  3/10/14    DR Medina April 2002    Dr. Pruitt Bar GRAFT  10/17/2017    KNEE ARTHROSCOPY Left 2002    Dr. Luisa Hayden  12/19/13    CAUDAL BLOCKS DONE BY DR Rose Crabtree. OTHER SURGICAL HISTORY  04/2020    [facility]      SPINE SURGERY  9/2009    Dr. Megan Peace  2008    Dr. Marino Peng  3/10/14    Windy Fernandez, DR Lui Boss     Social History Socioeconomic History    Marital status:      Spouse name: Not on file    Number of children: Not on file    Years of education: Not on file    Highest education level: Not on file   Occupational History    Occupation: disability    Tobacco Use    Smoking status: Never Smoker    Smokeless tobacco: Never Used   Vaping Use    Vaping Use: Never used   Substance and Sexual Activity    Alcohol use: No     Alcohol/week: 0.0 standard drinks     Comment: Stopped years ago.  Drug use: No     Comment: YEARS AGO    Sexual activity: Yes     Partners: Female     Comment: monogomous sexual partner, wife. Other Topics Concern    Not on file   Social History Narrative    Tobacco -- never smoked or chewed. Alcohol -- have not used for past 10 years, prior to had consumed 6 pack of beer daily. Drugs -- tried marijuana and cocaine 14 years ago occasionally used for 3-4 years and then stopped completely 10 years ago. Education -- completed 11th grade in [employer].    Occupation -- [employer] work,  at [employer].    Marital status --  44 years, 2 grown sons. Social Determinants of Health     Financial Resource Strain: Low Risk     Difficulty of Paying Living Expenses: Not hard at all   Food Insecurity:     Worried About [address] in the Last Year:     [address] in the Last Year:    Transportation Needs:     Lack of Transportation (Medical):      Lack of Transportation (Non-Medical):    Physical Activity:     Days of Exercise per Week:     Minutes of Exercise per Session:    Stress:     Feeling of Stress :    Social Connections:     Frequency of Communication with Friends and Family:     Frequency of Social Gatherings with Friends and Family:     Attends Orthodox Services:     Active Member of Clubs or Organizations:     Attends Club or Organization Meetings:     Marital Status:    Intimate Partner Violence:     Fear of Current or Ex-Partner:  Emotionally Abused:     Physically Abused:     Sexually Abused:      Family History   Problem Relation Age of Onset    High Blood Pressure Mother     Arthritis Mother     Cancer Father         throat    Arthritis Father      No Known Allergies  Current Outpatient Medications   Medication Sig Dispense Refill    MAGNESIUM HYDROXIDE PO Take by mouth      colchicine (COLCRYS) 0.6 MG tablet TAKE FOR GOUT FLARE, 1 TABLET DAILY DURING FLARE 30 tablet 0    baclofen (LIORESAL) 10 MG tablet TAKE 1 TABLET BY MOUTH THREE TIMES DAILY 90 tablet 1    metFORMIN (GLUCOPHAGE) 500 MG tablet TAKE 1 TABLET BY MOUTH TWICE DAILY WITH MEALS 180 tablet 3    oxyCODONE-acetaminophen (PERCOCET) 7.5-325 MG per tablet Take 1 tablet by mouth every 4 hours as needed for Pain (Max of 90 tablets per month) for up to 30 days. Intended supply: 30 days 90 tablet 0    gabapentin (NEURONTIN) 300 MG capsule One capsule in the am, 2 in hs Intended supply: 30 days 270 capsule 0    testosterone cypionate (DEPOTESTOTERONE CYPIONATE) 200 MG/ML injection INJECT 0.5 ML INTO THE MUSCLE EVERY 2 WEEKS 10 mL 2    allopurinol (ZYLOPRIM) 100 MG tablet TAKE 1 TABLET BY MOUTH DAILY 90 tablet 3    pravastatin (PRAVACHOL) 40 MG tablet TAKE 1 TABLET BY MOUTH EVERY EVENING 90 tablet 3    SYRINGE-NEEDLE, DISP, 3 ML (B-D INTEGRA SYRINGE) 22G X 1-1/2\" 3 ML MISC 1 each by Does not apply route daily 20 each 6    metoprolol tartrate (LOPRESSOR) 50 MG tablet TAKE 1/2 TABLET BY MOUTH twice DAILY 270 tablet 2    NIFEdipine (PROCARDIA XL) 60 MG extended release tablet Take 60 mg by mouth daily       MG capsule TK ONE C PO  BID      Lancets MISC 1 each by Does not apply route daily Dx E11.9 100 each 11    blood glucose monitor strips Patient to test daily. Dx: E11.9. Please dispense the brand that is covered by insurance.  100 strip 11    Alcohol Swabs (ALCOHOL PREP) 70 % PADS Patient to test once daily E11.9 100 each 11    glucose monitoring kit guarding. Hernia: No hernia is present. Musculoskeletal:         General: No swelling or tenderness. Feet:      Right foot:      Toenail Condition: Right toenails are abnormally thick and long. Fungal disease present. Left foot:      Toenail Condition: Left toenails are abnormally thick and long. Fungal disease present. Skin:     General: Skin is warm and dry. Neurological:      General: No focal deficit present. Mental Status: He is alert and oriented to person, place, and time. Psychiatric:         Mood and Affect: Mood normal.         Behavior: Behavior normal.         Thought Content: Thought content normal.         Judgment: Judgment normal.       Assessment & Plan   Alysa ACUÑA was seen today for follow-up. Diagnoses and all orders for this visit:    Essential hypertension, benign  -     CBC; Future  -     Basic Metabolic Panel; Future    Angina pectoris (HCC)    Therapeutic opioid-induced constipation (OIC)    Hypogonadism in male    Type 2 diabetes mellitus without complication, without long-term current use of insulin (HCC)    Neuromuscular scoliosis of lumbar region    Chronic low back pain with sciatica, sciatica laterality unspecified, unspecified back pain laterality    Chronic gout without tophus, unspecified cause, unspecified site    Hyperkalemia    Overall, doing well. Continue current medications. Has seen podiatry in the past, will make follow up appointment. Discussed foot care with patient today. Repeat/routine labs. 6 month follow up with me. Aware to contact office with any questions or concerns. Orders Placed This Encounter   Procedures    CBC     Standing Status:   Future     Number of Occurrences:   1     Standing Expiration Date:   7/19/2022    Basic Metabolic Panel     Standing Status:   Future     Number of Occurrences:   1     Standing Expiration Date:   7/19/2022     No orders of the defined types were placed in this encounter.     Medications Discontinued During This Encounter   Medication Reason    imipramine (TOFRANIL) 25 MG tablet LIST CLEANUP    imipramine (TOFRANIL) 25 MG tablet LIST CLEANUP     No follow-ups on file. Reviewed with the patient: current clinical status, medications, activities and diet. Side effects, adverse effects of the medication prescribed today, as well as treatment plan/ rationale and result expectations have been discussed with the patient who expresses understanding and desires to proceed. Close follow up to evaluate treatment results and for coordination of care. I have reviewed the patient's medical history in detail and updated the computerized patient record.     Sheryl Tellez PA-C

## 2021-07-21 PROBLEM — Q76.49 SPINAL DEFORMITY: Status: RESOLVED | Noted: 2021-07-08 | Resolved: 2021-07-21

## 2021-07-21 PROBLEM — L60.3 ONYCHODYSTROPHY: Status: ACTIVE | Noted: 2021-07-21

## 2021-07-21 ASSESSMENT — ENCOUNTER SYMPTOMS
COLOR CHANGE: 1
BACK PAIN: 1
ABDOMINAL PAIN: 0
RECTAL PAIN: 0
DIARRHEA: 0
VOMITING: 0
ABDOMINAL DISTENTION: 0
SHORTNESS OF BREATH: 0
CHEST TIGHTNESS: 0
CONSTIPATION: 0
WHEEZING: 0
BLOOD IN STOOL: 0

## 2021-08-04 DIAGNOSIS — M54.16 RIGHT LUMBAR RADICULOPATHY: ICD-10-CM

## 2021-08-04 DIAGNOSIS — M41.9 SCOLIOSIS OF LUMBAR SPINE, UNSPECIFIED SCOLIOSIS TYPE: ICD-10-CM

## 2021-08-04 DIAGNOSIS — M54.41 CHRONIC RIGHT-SIDED LOW BACK PAIN WITH RIGHT-SIDED SCIATICA: ICD-10-CM

## 2021-08-04 DIAGNOSIS — G89.29 CHRONIC RIGHT-SIDED LOW BACK PAIN WITH RIGHT-SIDED SCIATICA: ICD-10-CM

## 2021-08-04 DIAGNOSIS — M54.17 THORACIC AND LUMBOSACRAL NEURITIS: ICD-10-CM

## 2021-08-04 DIAGNOSIS — M54.14 THORACIC AND LUMBOSACRAL NEURITIS: ICD-10-CM

## 2021-08-04 DIAGNOSIS — Z79.899 HIGH RISK MEDICATION USE: ICD-10-CM

## 2021-08-05 RX ORDER — OXYCODONE AND ACETAMINOPHEN 7.5; 325 MG/1; MG/1
1 TABLET ORAL EVERY 4 HOURS PRN
Qty: 90 TABLET | Refills: 0 | Status: SHIPPED | OUTPATIENT
Start: 2021-08-06 | End: 2021-09-03 | Stop reason: SDUPTHER

## 2021-08-12 ENCOUNTER — PROCEDURE VISIT (OUTPATIENT)
Dept: PHYSICAL MEDICINE AND REHAB | Age: 71
End: 2021-08-12
Payer: MEDICARE

## 2021-08-12 DIAGNOSIS — M79.10 MYALGIA: ICD-10-CM

## 2021-08-12 PROCEDURE — 20553 NJX 1/MLT TRIGGER POINTS 3/>: CPT | Performed by: PHYSICAL MEDICINE & REHABILITATION

## 2021-08-12 RX ORDER — CYANOCOBALAMIN 1000 UG/ML
1000 INJECTION INTRAMUSCULAR; SUBCUTANEOUS ONCE
Status: COMPLETED | OUTPATIENT
Start: 2021-08-12 | End: 2021-08-12

## 2021-08-12 RX ORDER — LIDOCAINE HYDROCHLORIDE 10 MG/ML
15 INJECTION, SOLUTION INFILTRATION; PERINEURAL ONCE
Status: COMPLETED | OUTPATIENT
Start: 2021-08-12 | End: 2021-08-12

## 2021-08-12 RX ADMIN — LIDOCAINE HYDROCHLORIDE 15 ML: 10 INJECTION, SOLUTION INFILTRATION; PERINEURAL at 12:26

## 2021-08-12 RX ADMIN — CYANOCOBALAMIN 1000 MCG: 1000 INJECTION INTRAMUSCULAR; SUBCUTANEOUS at 12:25

## 2021-09-03 ENCOUNTER — OFFICE VISIT (OUTPATIENT)
Dept: FAMILY MEDICINE CLINIC | Age: 71
End: 2021-09-03
Payer: MEDICARE

## 2021-09-03 VITALS
OXYGEN SATURATION: 97 % | RESPIRATION RATE: 16 BRPM | BODY MASS INDEX: 26.06 KG/M2 | SYSTOLIC BLOOD PRESSURE: 100 MMHG | DIASTOLIC BLOOD PRESSURE: 60 MMHG | TEMPERATURE: 96.7 F | HEIGHT: 67 IN | WEIGHT: 166 LBS | HEART RATE: 71 BPM

## 2021-09-03 DIAGNOSIS — M54.14 THORACIC AND LUMBOSACRAL NEURITIS: ICD-10-CM

## 2021-09-03 DIAGNOSIS — R00.1 BRADYCARDIA: ICD-10-CM

## 2021-09-03 DIAGNOSIS — E11.9 TYPE 2 DIABETES MELLITUS WITHOUT COMPLICATION, WITHOUT LONG-TERM CURRENT USE OF INSULIN (HCC): ICD-10-CM

## 2021-09-03 DIAGNOSIS — M54.17 THORACIC AND LUMBOSACRAL NEURITIS: ICD-10-CM

## 2021-09-03 DIAGNOSIS — E29.1 HYPOGONADISM IN MALE: ICD-10-CM

## 2021-09-03 DIAGNOSIS — T40.2X5A THERAPEUTIC OPIOID-INDUCED CONSTIPATION (OIC): ICD-10-CM

## 2021-09-03 DIAGNOSIS — I10 ESSENTIAL HYPERTENSION, BENIGN: Primary | ICD-10-CM

## 2021-09-03 DIAGNOSIS — E87.5 HYPERKALEMIA: ICD-10-CM

## 2021-09-03 DIAGNOSIS — M41.9 SCOLIOSIS OF LUMBAR SPINE, UNSPECIFIED SCOLIOSIS TYPE: ICD-10-CM

## 2021-09-03 DIAGNOSIS — M54.41 CHRONIC RIGHT-SIDED LOW BACK PAIN WITH RIGHT-SIDED SCIATICA: ICD-10-CM

## 2021-09-03 DIAGNOSIS — G89.29 CHRONIC RIGHT-SIDED LOW BACK PAIN WITH RIGHT-SIDED SCIATICA: ICD-10-CM

## 2021-09-03 DIAGNOSIS — I25.118 CORONARY ARTERY DISEASE OF NATIVE ARTERY OF NATIVE HEART WITH STABLE ANGINA PECTORIS (HCC): ICD-10-CM

## 2021-09-03 DIAGNOSIS — Z79.899 HIGH RISK MEDICATION USE: ICD-10-CM

## 2021-09-03 DIAGNOSIS — K59.03 THERAPEUTIC OPIOID-INDUCED CONSTIPATION (OIC): ICD-10-CM

## 2021-09-03 DIAGNOSIS — M54.16 RIGHT LUMBAR RADICULOPATHY: ICD-10-CM

## 2021-09-03 PROCEDURE — 99214 OFFICE O/P EST MOD 30 MIN: CPT | Performed by: PHYSICIAN ASSISTANT

## 2021-09-03 RX ORDER — OXYCODONE AND ACETAMINOPHEN 7.5; 325 MG/1; MG/1
1 TABLET ORAL EVERY 4 HOURS PRN
Qty: 90 TABLET | Refills: 0 | Status: SHIPPED | OUTPATIENT
Start: 2021-09-04 | End: 2021-10-01 | Stop reason: SDUPTHER

## 2021-09-03 ASSESSMENT — ENCOUNTER SYMPTOMS
BLOOD IN STOOL: 0
BACK PAIN: 1
COLOR CHANGE: 0
ABDOMINAL DISTENTION: 0
WHEEZING: 0
ABDOMINAL PAIN: 0
CHEST TIGHTNESS: 0
RECTAL PAIN: 0
DIARRHEA: 0
CONSTIPATION: 0
SHORTNESS OF BREATH: 0
VOMITING: 0

## 2021-09-03 NOTE — PROGRESS NOTES
33.6 33.0 - 37.0 %    RDW 14.4 11.5 - 14.5 %    Platelets 937 100 - 367 K/uL     Review of Systems   Constitutional: Negative for activity change, appetite change, chills, diaphoresis and fatigue. HENT: Negative for congestion. Eyes: Negative for visual disturbance. Respiratory: Negative for chest tightness, shortness of breath and wheezing. Cardiovascular: Negative for chest pain, palpitations and leg swelling. Gastrointestinal: Negative for abdominal distention, abdominal pain, blood in stool, constipation (improved), diarrhea, rectal pain and vomiting. Musculoskeletal: Positive for arthralgias and back pain (improved since surgery). Negative for gait problem and myalgias. Skin: Negative for color change, rash and wound. Neurological: Negative for dizziness, weakness, light-headedness, numbness and headaches. Psychiatric/Behavioral: Negative for dysphoric mood and sleep disturbance. The patient is not nervous/anxious.       Past Medical History:   Diagnosis Date    Chronic back pain     Coronary artery disease 2002    [facility]. Janell Garnica at [facility] Esophageal ulcer 3/10/2014    Dr. Ballesteros Cea    Gastric ulcer 3/10/2014    Dr. Lesli Liu    Gout     Hiatal hernia 3/10/2014    Dr. Morris Tolliver Hyperlipidemia     Hypertension     Impotence 10/16/2020    MI (myocardial infarction) [facility]) 2005    Dr. Ghulam Webster Peripheral vascular disease ([address]     Prediabetes     Schizo-affective schizophrenia, in remission ([address] 2/1/2005    Overview:  followed at the [facility] Severe single current episode of major depressive disorder, without psychotic features ([address] 7/8/2016    Status post coronary artery stent placement 2002    Dr. Janell Garnica     Past Surgical History:   Procedure Laterality Date   [address]  01/03/2020    Dr. Arriola Old Right 6/4/2019    RIGHT WRIST CARPAL TUNNEL RELEASE, SUPINE, PAT AT PCP performed by Lonell Lesch, MD at [facility]  3/10/14    DR Rosalind Valdes  2002    Dr. Al Fam GRAFT  10/17/2017    KNEE ARTHROSCOPY Left 2002    Dr. Kenney Galdamez  12/19/13    CAUDAL BLOCKS DONE BY DR Lisandra José    OTHER SURGICAL HISTORY  04/2020    [facility]rolift      SPINE SURGERY  9/2009    Dr. Marie Keyes  2008    Dr. Jose Landaverde  3/10/14    [facility], DR Naomi Metz     Social History     Socioeconomic History    Marital status:      Spouse name: Not on file    Number of children: Not on file    Years of education: Not on file    Highest education level: Not on file   Occupational History    Occupation: disability    Tobacco Use    Smoking status: Never Smoker    Smokeless tobacco: Never Used   Vaping Use    Vaping Use: Never used   Substance and Sexual Activity    Alcohol use: No     Alcohol/week: 0.0 standard drinks     Comment: Stopped years ago.  Drug use: No     Comment: YEARS AGO    Sexual activity: Yes     Partners: Female     Comment: monogomous sexual partner, wife. Other Topics Concern    Not on file   Social History Narrative    Tobacco -- never smoked or chewed. Alcohol -- have not used for past 10 years, prior to had consumed 6 pack of beer daily. Drugs -- tried marijuana and cocaine 14 years ago occasionally used for 3-4 years and then stopped completely 10 years ago. Education -- completed 11th grade in [place].    Occupation -- [address] work,  at [employer].    Marital status --  44 years, 2 grown sons.      Social Determinants of Health     Financial Resource Strain: Low Risk     Difficulty of Paying Living Expenses: Not hard at all   Food Insecurity:     Worried About [address] in the Last Year:  Ran Out of Food in the Last Year:    Transportation Needs:     Lack of Transportation (Medical):      Lack of Transportation (Non-Medical):    Physical Activity:     Days of Exercise per Week:     Minutes of Exercise per Session:    Stress:     Feeling of Stress :    Social Connections:     Frequency of Communication with Friends and Family:     Frequency of Social Gatherings with Friends and Family:     Attends [employer] Services:     Active Member of Clubs or Organizations:     Attends Club or Organization Meetings:     Marital Status:    Intimate Partner Violence:     Fear of Current or Ex-Partner:     Emotionally Abused:     Physically Abused:     Sexually Abused:      Family History   Problem Relation Age of Onset    High Blood Pressure Mother     Arthritis Mother     Cancer Father         throat    Arthritis Father      No Known Allergies  Current Outpatient Medications   Medication Sig Dispense Refill    colchicine (COLCRYS) 0.6 MG tablet TAKE 1 TABLET BY MOUTH DAILY DURING FLARE FOR GOUT FLARE 30 tablet 0    MAGNESIUM HYDROXIDE PO Take by mouth      baclofen (LIORESAL) 10 MG tablet TAKE 1 TABLET BY MOUTH THREE TIMES DAILY 90 tablet 1    metFORMIN (GLUCOPHAGE) 500 MG tablet TAKE 1 TABLET BY MOUTH TWICE DAILY WITH MEALS 180 tablet 3    gabapentin (NEURONTIN) 300 MG capsule One capsule in the am, 2 in hs Intended supply: 30 days 270 capsule 0    testosterone cypionate (DEPOTESTOTERONE CYPIONATE) 200 MG/ML injection INJECT 0.5 ML INTO THE MUSCLE EVERY 2 WEEKS 10 mL 2    allopurinol (ZYLOPRIM) 100 MG tablet TAKE 1 TABLET BY MOUTH DAILY 90 tablet 3    pravastatin (PRAVACHOL) 40 MG tablet TAKE 1 TABLET BY MOUTH EVERY EVENING 90 tablet 3    SYRINGE-NEEDLE, DISP, 3 ML (B-D INTEGRA SYRINGE) 22G X 1-1/2\" 3 ML MISC 1 each by Does not apply route daily 20 each 6    metoprolol tartrate (LOPRESSOR) 50 MG tablet TAKE 1/2 TABLET BY MOUTH twice DAILY 270 tablet 2    NIFEdipine (PROCARDIA XL) 60 MG extended release tablet Take 60 mg by mouth daily       MG capsule TK ONE C PO  BID      Lancets MISC 1 each by Does not apply route daily Dx E11.9 100 each 11    blood glucose monitor strips Patient to test daily. Dx: E11.9. Please dispense the brand that is covered by insurance. 100 strip 11    Alcohol Swabs (ALCOHOL PREP) 70 % PADS Patient to test once daily E11.9 100 each 11    glucose monitoring kit (FREESTYLE) monitoring kit 1 kit by Does not apply route daily 1 kit 0    nitroGLYCERIN (NITROSTAT) 0.4 MG SL tablet Place 1 tablet under the tongue every 5 minutes as needed x 3 doses for chest pain. 25 tablet 2    vitamin D (CHOLECALCIFEROL) 25 MCG (1000 UT) TABS tablet Take 1,000 Units by mouth daily      tamsulosin (FLOMAX) 0.4 MG capsule TAKE 1 CAPSULE DAILY AT BEDTIME      CPAP Machine MISC by NOT APPLICABLE route      aspirin 81 MG EC tablet Take 1 tablet by mouth daily 90 tablet 1    [START ON 9/4/2021] oxyCODONE-acetaminophen (PERCOCET) 7.5-325 MG per tablet Take 1 tablet by mouth every 4 hours as needed for Pain (Max of 90 tablets per month) for up to 30 days. Intended supply: 30 days 90 tablet 0     No current facility-administered medications for this visit. PMH, Surgical Hx, Family Hx, and Social Hx reviewed and updated. Health Maintenance reviewed. Objective  Vitals:    09/03/21 1122   BP: 100/60   Site: Left Upper Arm   Position: Sitting   Cuff Size: Large Adult   Pulse: 71   Resp: 16   Temp: 96.7 °F (35.9 °C)   TempSrc: Temporal   SpO2: 97%   Weight: 166 lb (75.3 kg)   Height: 5' 7\" (1.702 m)     BP Readings from Last 3 Encounters:   09/03/21 100/60   07/19/21 130/70   07/14/21 120/64     Wt Readings from Last 3 Encounters:   09/03/21 166 lb (75.3 kg)   07/19/21 159 lb 3.2 oz (72.2 kg)   07/14/21 164 lb (74.4 kg)     Physical Exam  Vitals reviewed. Constitutional:       General: He is not in acute distress. Appearance: Normal appearance. He is normal weight.  He is not ill-appearing or toxic-appearing. HENT:      Head: Normocephalic and atraumatic. Right Ear: External ear normal.      Left Ear: External ear normal.      Nose: Nose normal.   Eyes:      Conjunctiva/sclera: Conjunctivae normal.   Cardiovascular:      Rate and Rhythm: Normal rate and regular rhythm. Pulmonary:      Effort: Pulmonary effort is normal.      Breath sounds: Normal breath sounds. Abdominal:      General: There is no distension. Palpations: Abdomen is soft. There is no mass. Tenderness: There is no abdominal tenderness. There is no guarding. Hernia: No hernia is present. Musculoskeletal:         General: No swelling or tenderness. Skin:     General: Skin is warm and dry. Neurological:      General: No focal deficit present. Mental Status: He is alert and oriented to person, place, and time. Psychiatric:         Mood and Affect: Mood normal.         Behavior: Behavior normal.         Thought Content: Thought content normal.         Judgment: Judgment normal.       Assessment & Plan   Merlyn ACUÑA was seen today for hypertension. Diagnoses and all orders for this visit:    Essential hypertension, benign    Coronary artery disease of native artery of native heart with stable angina pectoris (HCC)    Bradycardia    Therapeutic opioid-induced constipation (OIC)    Hypogonadism in male    Type 2 diabetes mellitus without complication, without long-term current use of insulin (HCC)    Hyperkalemia    Doing well on medications. Feeling well. Chronic medical conditions, stable at this time. 4 month follow up with me. Contact office with any questions or concerns. No orders of the defined types were placed in this encounter. No orders of the defined types were placed in this encounter. There are no discontinued medications. No follow-ups on file. Reviewed with the patient: current clinical status, medications, activities and diet.      Side effects, adverse effects of the medication prescribed today, as well as treatment plan/ rationale and result expectations have been discussed with the patient who expresses understanding and desires to proceed. Close follow up to evaluate treatment results and for coordination of care. I have reviewed the patient's medical history in detail and updated the computerized patient record.     Sheryl Tellez PA-C

## 2021-09-09 DIAGNOSIS — M79.604 BILATERAL LEG PAIN: ICD-10-CM

## 2021-09-09 DIAGNOSIS — M79.605 BILATERAL LEG PAIN: ICD-10-CM

## 2021-09-09 DIAGNOSIS — M62.838 SPASM OF MUSCLE: ICD-10-CM

## 2021-09-09 RX ORDER — BACLOFEN 10 MG/1
TABLET ORAL
Qty: 90 TABLET | Refills: 1 | Status: SHIPPED | OUTPATIENT
Start: 2021-09-09 | End: 2021-11-04

## 2021-09-10 ENCOUNTER — OFFICE VISIT (OUTPATIENT)
Dept: CARDIOLOGY CLINIC | Age: 71
End: 2021-09-10
Payer: MEDICARE

## 2021-09-10 VITALS
DIASTOLIC BLOOD PRESSURE: 70 MMHG | OXYGEN SATURATION: 98 % | HEIGHT: 67 IN | HEART RATE: 89 BPM | BODY MASS INDEX: 26.21 KG/M2 | WEIGHT: 167 LBS | SYSTOLIC BLOOD PRESSURE: 122 MMHG | RESPIRATION RATE: 16 BRPM

## 2021-09-10 DIAGNOSIS — I10 ESSENTIAL HYPERTENSION, BENIGN: ICD-10-CM

## 2021-09-10 DIAGNOSIS — R09.89 BILATERAL CAROTID BRUITS: ICD-10-CM

## 2021-09-10 DIAGNOSIS — I25.118 CORONARY ARTERY DISEASE OF NATIVE ARTERY OF NATIVE HEART WITH STABLE ANGINA PECTORIS (HCC): Primary | ICD-10-CM

## 2021-09-10 DIAGNOSIS — E78.00 HYPERCHOLESTEROLEMIA: ICD-10-CM

## 2021-09-10 PROCEDURE — 99214 OFFICE O/P EST MOD 30 MIN: CPT | Performed by: INTERNAL MEDICINE

## 2021-09-10 RX ORDER — PRAVASTATIN SODIUM 40 MG
TABLET ORAL
Qty: 90 TABLET | Refills: 3 | Status: SHIPPED | OUTPATIENT
Start: 2021-09-10 | End: 2022-01-18

## 2021-09-10 ASSESSMENT — ENCOUNTER SYMPTOMS
COUGH: 0
WHEEZING: 0
BACK PAIN: 1
STRIDOR: 0
RESPIRATORY NEGATIVE: 1
NAUSEA: 0
BLOOD IN STOOL: 0
GASTROINTESTINAL NEGATIVE: 1
EYES NEGATIVE: 1
CHEST TIGHTNESS: 0
SHORTNESS OF BREATH: 0

## 2021-09-10 NOTE — PROGRESS NOTES
APPENDECTOMY  1969    BACK SURGERY  01/03/2020    Dr. Heather Perera Right 6/4/2019    RIGHT WRIST CARPAL TUNNEL RELEASE, SUPINE, PAT AT PCP performed by Trace Salvador MD at [address] [place] [address]  3/10/14    DR Chanelle Ramos  2002    Dr. Maryann Lawrence GRAFT  10/17/2017    KNEE ARTHROSCOPY Left 2002    Dr. Damico Congress  12/19/13    CAUDAL BLOCKS DONE BY DR Baltazar Gauthier    OTHER SURGICAL HISTORY  04/2020    [facility]      SPINE SURGERY  9/2009    Dr. Martin Riley  2008    Dr. Jeanmarie Purvis  3/10/14    BX, DILATION, DR Praveena Grier       Family History   Problem Relation Age of Onset    High Blood Pressure Mother     Arthritis Mother     Cancer Father         throat    Arthritis Father        Social History     Socioeconomic History    Marital status:      Spouse name: None    Number of children: None    Years of education: None    Highest education level: None   Occupational History    Occupation: disability    Tobacco Use    Smoking status: Never Smoker    Smokeless tobacco: Never Used   Vaping Use    Vaping Use: Never used   Substance and Sexual Activity    Alcohol use: No     Alcohol/week: 0.0 standard drinks     Comment: Stopped years ago.  Drug use: No     Comment: YEARS AGO    Sexual activity: Yes     Partners: Female     Comment: monogomous sexual partner, wife. Other Topics Concern    None   Social History Narrative    Tobacco -- never smoked or chewed. Alcohol -- have not used for past 10 years, prior to had consumed 6 pack of beer daily. Drugs -- tried marijuana and cocaine 14 years ago occasionally used for 3-4 years and then stopped completely 10 years ago.     Education -- completed 11th grade in [place].    Occupation -- [address] work, general  at [employer].    Marital status --  44 years, 2 grown sons. Social Determinants of Health     Financial Resource Strain: Low Risk     Difficulty of Paying Living Expenses: Not hard at all   Food Insecurity:     Worried About [address] in the Last Year:     [address] in the Last Year:    Transportation Needs:     Lack of Transportation (Medical):  Lack of Transportation (Non-Medical):    Physical Activity:     Days of Exercise per Week:     Minutes of Exercise per Session:    Stress:     Feeling of Stress :    Social Connections:     Frequency of Communication with Friends and Family:     Frequency of Social Gatherings with Friends and Family:     Attends Alevism Services:     Active Member of Clubs or Organizations:     Attends Club or Organization Meetings:     Marital Status:    Intimate Partner Violence:     Fear of Current or Ex-Partner:     Emotionally Abused:     Physically Abused:     Sexually Abused:        No Known Allergies    Current Outpatient Medications   Medication Sig Dispense Refill    pravastatin (PRAVACHOL) 40 MG tablet 1 po QD 90 tablet 3    baclofen (LIORESAL) 10 MG tablet TAKE 1 TABLET BY MOUTH THREE TIMES DAILY 90 tablet 1    oxyCODONE-acetaminophen (PERCOCET) 7.5-325 MG per tablet Take 1 tablet by mouth every 4 hours as needed for Pain (Max of 90 tablets per month) for up to 30 days.  Intended supply: 30 days 90 tablet 0    colchicine (COLCRYS) 0.6 MG tablet TAKE 1 TABLET BY MOUTH DAILY DURING FLARE FOR GOUT FLARE 30 tablet 0    MAGNESIUM HYDROXIDE PO Take by mouth      metFORMIN (GLUCOPHAGE) 500 MG tablet TAKE 1 TABLET BY MOUTH TWICE DAILY WITH MEALS 180 tablet 3    allopurinol (ZYLOPRIM) 100 MG tablet TAKE 1 TABLET BY MOUTH DAILY 90 tablet 3    SYRINGE-NEEDLE, DISP, 3 ML (B-D INTEGRA SYRINGE) 22G X 1-1/2\" 3 ML MISC 1 each by Does not apply route daily 20 each 6    metoprolol tartrate (LOPRESSOR) 50 MG tablet TAKE 1/2 TABLET BY MOUTH twice DAILY 270 tablet 2    NIFEdipine (PROCARDIA XL) 60 MG extended release tablet Take 60 mg by mouth daily       MG capsule TK ONE C PO  BID      Lancets MISC 1 each by Does not apply route daily Dx E11.9 100 each 11    blood glucose monitor strips Patient to test daily. Dx: E11.9. Please dispense the brand that is covered by insurance. 100 strip 11    Alcohol Swabs (ALCOHOL PREP) 70 % PADS Patient to test once daily E11.9 100 each 11    glucose monitoring kit (FREESTYLE) monitoring kit 1 kit by Does not apply route daily 1 kit 0    nitroGLYCERIN (NITROSTAT) 0.4 MG SL tablet Place 1 tablet under the tongue every 5 minutes as needed x 3 doses for chest pain. 25 tablet 2    vitamin D (CHOLECALCIFEROL) 25 MCG (1000 UT) TABS tablet Take 1,000 Units by mouth daily      tamsulosin (FLOMAX) 0.4 MG capsule TAKE 1 CAPSULE DAILY AT BEDTIME      CPAP Machine MISC by NOT APPLICABLE route      aspirin 81 MG EC tablet Take 1 tablet by mouth daily 90 tablet 1    gabapentin (NEURONTIN) 300 MG capsule One capsule in the am, 2 in hs Intended supply: 30 days 270 capsule 0    testosterone cypionate (DEPOTESTOTERONE CYPIONATE) 200 MG/ML injection INJECT 0.5 ML INTO THE MUSCLE EVERY 2 WEEKS 10 mL 2     No current facility-administered medications for this visit. Review of Systems:   Review of Systems   Constitutional: Negative. Negative for diaphoresis and fatigue. HENT: Negative. Eyes: Negative. Respiratory: Negative. Negative for cough, chest tightness, shortness of breath, wheezing and stridor. Cardiovascular: Negative. Negative for chest pain, palpitations and leg swelling. Gastrointestinal: Negative. Negative for blood in stool and nausea. Genitourinary: Negative. Musculoskeletal: Positive for arthralgias and back pain. Skin: Negative. Neurological: Negative. Negative for dizziness, syncope, weakness and light-headedness. Hematological: Negative. Psychiatric/Behavioral: Negative. Physical Examination:    /70 (Site: Left Upper Arm, Position: Sitting, Cuff Size: Medium Adult)   Pulse 89   Resp 16   Ht 5' 7\" (1.702 m)   Wt 167 lb (75.8 kg)   SpO2 98%   BMI 26.16 kg/m²    Physical Exam   Constitutional: He appears healthy. No distress. HENT:   Normal cephalic and Atraumatic   Eyes: Pupils are equal, round, and reactive to light. Neck: Thyroid normal. No JVD present. No neck adenopathy. No thyromegaly present. Cardiovascular: Normal rate, regular rhythm, intact distal pulses and normal pulses. Murmur heard. Pulmonary/Chest: Effort normal and breath sounds normal. He has no wheezes. He has no rales. He exhibits no tenderness. Abdominal: Soft. Bowel sounds are normal. There is no abdominal tenderness. Musculoskeletal:         General: No tenderness or edema. Normal range of motion. Cervical back: Normal range of motion and neck supple. Neurological: He is alert and oriented to person, place, and time. Skin: Skin is warm. No cyanosis. Nails show no clubbing.        LABS:  CBC:   Lab Results   Component Value Date    WBC 4.6 07/19/2021    RBC 4.23 07/19/2021    HGB 14.2 07/19/2021    HCT 42.1 07/19/2021    MCV 99.6 07/19/2021    MCH 33.5 07/19/2021    MCHC 33.6 07/19/2021    RDW 14.4 07/19/2021     07/19/2021    MPV 9.9 09/15/2015     Lipids:  Lab Results   Component Value Date    CHOL 150 11/16/2020    CHOL 152 03/12/2018    CHOL 271 (H) 09/05/2017     Lab Results   Component Value Date    TRIG 96 11/16/2020    TRIG 157 03/12/2018    TRIG 154 09/05/2017     Lab Results   Component Value Date    HDL 45 11/16/2020    HDL 42 05/24/2019    HDL 45 03/12/2018     Lab Results   Component Value Date    LDLCALC 86 11/16/2020    LDLCALC 86 05/24/2019    LDLCALC 76 03/12/2018     No results found for: LABVLDL, VLDL  No results found for: CHOLHDLRATIO  CMP:    Lab Results   Component Value Date     07/19/2021    K 4.9 07/19/2021     07/19/2021    CO2 31 07/19/2021    BUN 14 07/19/2021    CREATININE 0.93 07/19/2021    GFRAA >60.0 07/19/2021    LABGLOM >60.0 07/19/2021    GLUCOSE 105 07/19/2021    GLUCOSE 115 10/27/2020    PROT 7.9 03/18/2021    LABALBU 4.6 03/18/2021    LABALBU 4.4 10/27/2020    CALCIUM 9.3 07/19/2021    BILITOT 0.5 03/18/2021    ALKPHOS 73 03/18/2021    AST 27 03/18/2021    ALT 21 03/18/2021     BMP:    Lab Results   Component Value Date     07/19/2021    K 4.9 07/19/2021     07/19/2021    CO2 31 07/19/2021    BUN 14 07/19/2021    LABALBU 4.6 03/18/2021    LABALBU 4.4 10/27/2020    CREATININE 0.93 07/19/2021    CALCIUM 9.3 07/19/2021    GFRAA >60.0 07/19/2021    LABGLOM >60.0 07/19/2021    GLUCOSE 105 07/19/2021    GLUCOSE 115 10/27/2020     Magnesium:    Lab Results   Component Value Date    MG 2.07 01/06/2020     TSH:  Lab Results   Component Value Date    TSH 4.110 05/06/2016       Patient Active Problem List   Diagnosis    Chronic low back pain    Scoliosis of lumbar spine    Essential hypertension, benign    Hypercholesterolemia    Right lumbar radiculopathy    Gout    High risk medication use - 12/07/17 OARRS PM&R, 02/08/18 OARRS PM&R, 10/05/17 Urine Drug Screen: negative PM&R, MED CONTRACT 1/17/17    Low back pain with sciatica    Myalgia    Synovitis and tenosynovitis    Thoracic and lumbosacral neuritis    Vitamin D deficiency    Hyperparathyroidism ([address]    Osteopenia    C6 radiculopathy    DJD (degenerative joint disease), cervical    Angina pectoris (HCC)    PRASAD (obstructive sleep apnea)    Hyperkalemia    Chronic pain of both shoulders    Hypogonadism in male    Therapeutic opioid-induced constipation (OIC)    Bilateral leg pain    Other specified symptoms and signs involving the circulatory and respiratory systems     Myelopathy (HCC)    Coronary artery disease of native artery of native heart with stable angina pectoris (HCC)    Bilateral carotid bruits  Spinal cord disease ([facility]    Lower urinary tract symptoms (LUTS)    Impotence    Trouble getting to sleep    Bradycardia    Type 2 diabetes mellitus, without long-term current use of insulin (HCC)    Weak urinary stream    Onychodystrophy       Medications Discontinued During This Encounter   Medication Reason    pravastatin (PRAVACHOL) 40 MG tablet REORDER       Modified Medications    Modified Medication Previous Medication    PRAVASTATIN (PRAVACHOL) 40 MG TABLET pravastatin (PRAVACHOL) 40 MG tablet       1 po QD    TAKE 1 TABLET BY MOUTH EVERY EVENING       Orders Placed This Encounter   Medications    pravastatin (PRAVACHOL) 40 MG tablet     Si po QD     Dispense:  90 tablet     Refill:  3       Assessment/Plan:    1. Hypercholesterolemia  Statin - cotninue. Labs reviewed. Low fat diet    2. Essential hypertension, benign   stable - continue meds. Low salt diet    3. Coronary artery disease with hx of myocardial infarct w/o hx of CABG  No angina - continue CV meds. 4. Carotid Bruits- CUS - was cancelled due to COVID-19. Will reschedule. -- stable with mild plaque - will continue surveillance    5. Preop ED - implant at [facility]- pt is an acceptable candidate for planned procedure. - did well. Counseling:  Heart Healthy Lifestyle, Low Salt Diet, Take Precautions to Prevent Falls and Walk Daily    Return in about 4 months (around 1/10/2022).       Electronically signed by Ree Whiteside MD on 9/10/2021 at 1:40 PM

## 2021-09-14 ENCOUNTER — PROCEDURE VISIT (OUTPATIENT)
Dept: PHYSICAL MEDICINE AND REHAB | Age: 71
End: 2021-09-14
Payer: MEDICARE

## 2021-09-14 DIAGNOSIS — M79.10 MYALGIA: ICD-10-CM

## 2021-09-14 PROCEDURE — 20553 NJX 1/MLT TRIGGER POINTS 3/>: CPT | Performed by: PHYSICAL MEDICINE & REHABILITATION

## 2021-09-14 PROCEDURE — 96372 THER/PROPH/DIAG INJ SC/IM: CPT | Performed by: PHYSICAL MEDICINE & REHABILITATION

## 2021-09-14 RX ORDER — LIDOCAINE HYDROCHLORIDE 10 MG/ML
23 INJECTION, SOLUTION INFILTRATION; PERINEURAL ONCE
Status: COMPLETED | OUTPATIENT
Start: 2021-09-14 | End: 2021-09-14

## 2021-09-14 RX ORDER — CYANOCOBALAMIN 1000 UG/ML
1000 INJECTION INTRAMUSCULAR; SUBCUTANEOUS ONCE
Status: COMPLETED | OUTPATIENT
Start: 2021-09-14 | End: 2021-09-14

## 2021-09-14 RX ADMIN — CYANOCOBALAMIN 1000 MCG: 1000 INJECTION INTRAMUSCULAR; SUBCUTANEOUS at 14:21

## 2021-09-14 RX ADMIN — LIDOCAINE HYDROCHLORIDE 23 ML: 10 INJECTION, SOLUTION INFILTRATION; PERINEURAL at 14:21

## 2021-09-23 PROBLEM — I25.2 OLD MYOCARDIAL INFARCTION: Status: ACTIVE | Noted: 2020-11-06

## 2021-09-23 PROBLEM — G89.29 OTHER CHRONIC PAIN: Status: ACTIVE | Noted: 2020-09-03

## 2021-09-23 PROBLEM — Z95.1 PRESENCE OF AORTOCORONARY BYPASS GRAFT: Status: ACTIVE | Noted: 2020-11-06

## 2021-09-23 PROBLEM — Z79.82 LONG TERM (CURRENT) USE OF ASPIRIN: Status: ACTIVE | Noted: 2020-11-06

## 2021-10-01 DIAGNOSIS — G89.29 CHRONIC RIGHT-SIDED LOW BACK PAIN WITH RIGHT-SIDED SCIATICA: ICD-10-CM

## 2021-10-01 DIAGNOSIS — M54.41 CHRONIC RIGHT-SIDED LOW BACK PAIN WITH RIGHT-SIDED SCIATICA: ICD-10-CM

## 2021-10-01 DIAGNOSIS — M54.14 THORACIC AND LUMBOSACRAL NEURITIS: ICD-10-CM

## 2021-10-01 DIAGNOSIS — M54.16 RIGHT LUMBAR RADICULOPATHY: ICD-10-CM

## 2021-10-01 DIAGNOSIS — M54.17 THORACIC AND LUMBOSACRAL NEURITIS: ICD-10-CM

## 2021-10-01 DIAGNOSIS — Z79.899 HIGH RISK MEDICATION USE: ICD-10-CM

## 2021-10-01 DIAGNOSIS — M41.9 SCOLIOSIS OF LUMBAR SPINE, UNSPECIFIED SCOLIOSIS TYPE: ICD-10-CM

## 2021-10-01 RX ORDER — OXYCODONE AND ACETAMINOPHEN 7.5; 325 MG/1; MG/1
1 TABLET ORAL EVERY 4 HOURS PRN
Qty: 90 TABLET | Refills: 0 | Status: SHIPPED | OUTPATIENT
Start: 2021-10-03 | End: 2021-11-01 | Stop reason: SDUPTHER

## 2021-10-03 DIAGNOSIS — R73.03 PREDIABETES: ICD-10-CM

## 2021-10-03 DIAGNOSIS — R42 DIZZINESS: ICD-10-CM

## 2021-10-03 NOTE — TELEPHONE ENCOUNTER
Rx request   Requested Prescriptions     Pending Prescriptions Disp Refills    FreeStyle Lancets [facility] [Pharmacy Med Name: FREESTYLE LANCETS 100] 100 each 11     Sig: PATIENT TO TEST ONCE DAILY     LOV 9/3/2021  Next Visit Date:  Future Appointments   Date Time Provider Alexis Arango   10/10/2021 10:00 AM [place] [facility] 2 [facility] Fac RAD   10/12/2021 11:00 AM Ab Hartmann MD [address] [place] [address]   10/14/2021 10:30 AM DO [facility] [place]   10/20/2021 10:15 AM [facility] EMERGENC[facility] [place]   1/7/2022 10:30 AM LUIS Perez  [facility]   1/17/2022 12:15 PM Carmelo Martins MD [address] [place] [address]

## 2021-10-04 RX ORDER — LANCETS 28 GAUGE
EACH MISCELLANEOUS
Qty: 100 EACH | Refills: 11 | Status: SHIPPED | OUTPATIENT
Start: 2021-10-04

## 2021-10-05 RX ORDER — BLOOD-GLUCOSE METER
KIT MISCELLANEOUS
Qty: 100 STRIP | Refills: 11 | Status: SHIPPED | OUTPATIENT
Start: 2021-10-05

## 2021-10-05 NOTE — TELEPHONE ENCOUNTER
Rx request   Requested Prescriptions     Pending Prescriptions Disp Refills    blood glucose test strips (FREESTYLE LITE) strip [Pharmacy Med Name: FREESTYLE LITE BLOOD GLUCSTR 50S] 100 strip 11     Sig: PATIENT TO TEST ONCE DAILY     LOV 9/3/2021  Next Visit Date:  Future Appointments   Date Time Provider Alexis Conchita   10/10/2021 10:00 AM [facility] [facility]TR[facility] 2 [facility] Fac RAD   10/12/2021 11:00 AM Ab Vidales MD [address] [place] [address]   10/14/2021 10:30 AM DO Jason Verdugo [facility] [place]   10/20/2021 10:15 AM [facility] [place]   1/7/2022 10:30 AM LUIS Retana   1/17/2022 12:15 PM Scotty Barragan MD [facility]

## 2021-10-10 ENCOUNTER — HOSPITAL ENCOUNTER (OUTPATIENT)
Dept: ULTRASOUND IMAGING | Age: 71
Discharge: HOME | End: 2021-10-12
Payer: MEDICARE

## 2021-10-10 DIAGNOSIS — R09.89 BILATERAL CAROTID BRUITS: ICD-10-CM

## 2021-10-10 PROCEDURE — 93880 EXTRACRANIAL BILAT STUDY: CPT

## 2021-10-12 ENCOUNTER — OFFICE VISIT (OUTPATIENT)
Dept: ENDOCRINOLOGY | Age: 71
End: 2021-10-12
Payer: MEDICARE

## 2021-10-12 VITALS
OXYGEN SATURATION: 96 % | HEART RATE: 66 BPM | SYSTOLIC BLOOD PRESSURE: 124 MMHG | DIASTOLIC BLOOD PRESSURE: 69 MMHG | WEIGHT: 165 LBS | HEIGHT: 67 IN | BODY MASS INDEX: 25.9 KG/M2

## 2021-10-12 DIAGNOSIS — E29.1 HYPOGONADISM MALE: ICD-10-CM

## 2021-10-12 DIAGNOSIS — E11.9 TYPE 2 DIABETES MELLITUS WITHOUT COMPLICATION, WITHOUT LONG-TERM CURRENT USE OF INSULIN (HCC): Primary | ICD-10-CM

## 2021-10-12 LAB
CHP ED QC CHECK: NORMAL
GLUCOSE BLD-MCNC: 116 MG/DL

## 2021-10-12 PROCEDURE — 99213 OFFICE O/P EST LOW 20 MIN: CPT | Performed by: INTERNAL MEDICINE

## 2021-10-12 PROCEDURE — 93880 EXTRACRANIAL BILAT STUDY: CPT | Performed by: INTERNAL MEDICINE

## 2021-10-12 PROCEDURE — 82962 GLUCOSE BLOOD TEST: CPT | Performed by: INTERNAL MEDICINE

## 2021-10-12 NOTE — PROGRESS NOTES
10/12/2021    Assessment:       Diagnosis Orders   1. Type 2 diabetes mellitus without complication, without long-term current use of insulin (Formerly [facility])  POCT Glucose    Hemoglobin X5D    Basic Metabolic Panel, Fasting   2. Hypogonadism male  Testosterone Free and Total Male         PLAN:     Orders Placed This Encounter   Procedures    Testosterone Free and Total Male     Standing Status:   Future     Standing Expiration Date:   10/12/2022    Hemoglobin A1C     Standing Status:   Future     Standing Expiration Date:   10/12/2022    Basic Metabolic Panel, Fasting     Standing Status:   Future     Standing Expiration Date:   10/12/2022    POCT Glucose     Continue Metformin testosterone at current dose follow-up in 3 to 6 months time      Orders Placed This Encounter   Procedures    POCT Glucose       Subjective:     Chief Complaint   Patient presents with    Hypogonadism    Diabetes     Vitals:    10/12/21 1108   BP: 124/69   Pulse: 66   SpO2: 96%   Weight: 165 lb (74.8 kg)   Height: 5' 7\" (1.702 m)     Wt Readings from Last 3 Encounters:   10/12/21 165 lb (74.8 kg)   09/10/21 167 lb (75.8 kg)   09/03/21 166 lb (75.3 kg)     BP Readings from Last 3 Encounters:   10/12/21 124/69   09/10/21 122/70   09/03/21 100/60     Follow-up on type 2 diabetes history of hypogonadism labs reviewed testosterone levels are stable hemoglobin A1c was 6.3    Diabetes  He presents for his follow-up diabetic visit. He has type 2 diabetes mellitus. Associated symptoms include fatigue. Current diabetic treatment includes oral agent (monotherapy). His overall blood glucose range is 130-140 mg/dl. (Lab Results       Component                Value               Date                       LABA1C                   6.3 (H)             10/11/2021              )            Results for Sofie Romberg (MRN [id number]) as of 10/12/2021 11:15   Ref.  Range 7/13/2021 10:44 7/19/2021 11:13 10/10/2021 10:24 10/11/2021 09:07 10/12/2021 11:07   Sodium Latest Ref Range: 135 - 144 mEq/L  140      Potassium Latest Ref Range: 3.4 - 4.9 mEq/L  4.9      Chloride Latest Ref Range: 95 - 107 mEq/L  100      CO2 Latest Ref Range: 20 - 31 mEq/L  31      BUN Latest Ref Range: 8 - 23 mg/dL  14      Creatinine Latest Ref Range: 0.70 - 1.20 mg/dL  0.93      Anion Gap Latest Ref Range: 9 - 15 mEq/L  9      GFR Non- Latest Ref Range: >60   >60.0      GFR African American Latest Ref Range: >60   >60.0      Glucose Latest Units: mg/dL  105 (H)   116   Calcium Latest Ref Range: 8.5 - 9.9 mg/dL  9.3      Hemoglobin A1C Latest Ref Range: 4.8 - 5.9 % 6.2   6.3 (H)    WBC Latest Ref Range: 4.8 - 10.8 K/uL  4.6 (L)      RBC Latest Ref Range: 4.70 - 6.10 M/uL  4.23 (L)      Hemoglobin Quant Latest Ref Range: 14.0 - 18.0 g/dL  14.2      Hematocrit Latest Ref Range: 42.0 - 52.0 %  42.1      MCV Latest Ref Range: 80.0 - 100.0 fL  99.6      MCH Latest Ref Range: 27.0 - 31.3 pg  33.5 (H)      MCHC Latest Ref Range: 33.0 - 37.0 %  33.6      RDW Latest Ref Range: 11.5 - 14.5 %  14.4      Platelet Count Latest Ref Range: 130 - 400 K/uL  251      Creatinine, Ur Latest Ref Range: Not Established mg/dL    184.2    Microalbumin Creatinine Ratio Latest Ref Range: 0.0 - 30.0 mg/G    see below    Microalbumin, Random Urine Latest Ref Range: Not Established mg/dL    <1.20        Past Medical History:   Diagnosis Date    Chronic back pain     Coronary artery disease 2002    Dr. Pattie Mays at [address] Esophageal ulcer 3/10/2014    Dr. Merlinda Mola    Gastric ulcer 3/10/2014    Dr. Merlinda Mola    Gout     Hiatal hernia 3/10/2014    Dr. Merlinda Mola    Hyperlipidemia     Hypertension     Impotence 10/16/2020    MI [facility]myocardial infarction) [facility] 2005    Dr. Bertha Wood Peripheral vascular disease ([facility] [address]     Prediabetes     Schizo-affective schizophrenia, in remission ([facility] [address] 2/1/2005    Overview:  followed at [facility] Severe single current episode of major depressive disorder, without psychotic features ([facility] [address] 7/8/2016    Status post coronary artery stent placement 2002    Dr. Adryan Combs     Past Surgical History:   Procedure Laterality Date    [address]  01/03/2020    Dr. Salina Garnett Right 6/4/2019    RIGHT WRIST CARPAL TUNNEL RELEASE, SUPINE, PAT AT PCP performed by Mike Rea MD at [address] [facility]  3/10/14    DR Sonia Galvez  2002    Dr. Morris [address]  10/17/2017    KNEE ARTHROSCOPY Left 2002    Dr. Princess Nolan  12/19/13    CAUDAL BLOCKS DONE BY DR Yung Babcock    OTHER SURGICAL HISTORY  04/2020    [employer]      SPINE SURGERY  9/2009    Dr. Trae Dupree  2008    Dr. Sonia Kwon  3/10/14    Nilsa Nelson, DR Cesar Jang     Social History     Socioeconomic History    Marital status:      Spouse name: Not on file    Number of children: Not on file    Years of education: Not on file    Highest education level: Not on file   Occupational History    Occupation: disability    Tobacco Use    Smoking status: Never Smoker    Smokeless tobacco: Never Used   Vaping Use    Vaping Use: Never used   Substance and Sexual Activity    Alcohol use: No     Alcohol/week: 0.0 standard drinks     Comment: Stopped years ago.  Drug use: No     Comment: YEARS AGO    Sexual activity: Yes     Partners: Female     Comment: monogomous sexual partner, wife. Other Topics Concern    Not on file   Social History Narrative    Tobacco -- never smoked or chewed. Alcohol -- have not used for past 10 years, prior to had consumed 6 pack of beer daily. Drugs -- tried marijuana and cocaine 14 years ago occasionally used for 3-4 years and then stopped completely 10 years ago.     Education -- completed 11th grade in [employer].    Occupation -- [employer] 82. work,  at [employer].    Marital status --  44 years, 2 grown sons. Social Determinants of Health     Financial Resource Strain: Low Risk     Difficulty of Paying Living Expenses: Not hard at all   Food Insecurity:     Worried About [address] in the Last Year:     [address] in the Last Year:    Transportation Needs:     Lack of Transportation (Medical):      Lack of Transportation (Non-Medical):    Physical Activity:     Days of Exercise per Week:     Minutes of Exercise per Session:    Stress:     Feeling of Stress :    Social Connections:     Frequency of Communication with Friends and Family:     Frequency of Social Gatherings with Friends and Family:     Attends Jainism Services:     Active Member of Clubs or Organizations:     Attends Club or Organization Meetings:     Marital Status:    Intimate Partner Violence:     Fear of Current or Ex-Partner:     Emotionally Abused:     Physically Abused:     Sexually Abused:      Family History   Problem Relation Age of Onset    High Blood Pressure Mother     Arthritis Mother     Cancer Father         throat    Arthritis Father      No Known Allergies    Current Outpatient Medications:     NIFEdipine (PROCARDIA XL) 60 MG extended release tablet, TAKE 1 TABLET BY MOUTH DAILY, Disp: 90 tablet, Rfl: 3    metoprolol tartrate (LOPRESSOR) 50 MG tablet, TAKE 1 TABLET BY MOUTH THREE TIMES DAILY, Disp: 270 tablet, Rfl: 2    blood glucose test strips (FREESTYLE LITE) strip, PATIENT TO TEST ONCE DAILY, Disp: 100 strip, Rfl: 11    metFORMIN (GLUCOPHAGE) 500 MG tablet, TAKE 1 TABLET BY MOUTH TWICE DAILY WITH MEALS, Disp: 180 tablet, Rfl: 3    FreeStyle Lancets MISC, PATIENT TO TEST ONCE DAILY, Disp: 100 each, Rfl: 11    oxyCODONE-acetaminophen (PERCOCET) 7.5-325 MG per tablet, Take 1 tablet by mouth every 4 hours as needed for Pain (Max of 90 tablets per month) for up to 30 days.  Intended supply: 30 days, Disp: 90 tablet, Rfl: 0    colchicine (COLCRYS) 0.6 MG tablet, TAKE 1 TABLET BY MOUTH DAILY DURING FLARE FOR GOUT FLARE, Disp: 30 tablet, Rfl: 2    pravastatin (PRAVACHOL) 40 MG tablet, 1 po QD, Disp: 90 tablet, Rfl: 3    baclofen (LIORESAL) 10 MG tablet, TAKE 1 TABLET BY MOUTH THREE TIMES DAILY, Disp: 90 tablet, Rfl: 1    MAGNESIUM HYDROXIDE PO, Take by mouth, Disp: , Rfl:     allopurinol (ZYLOPRIM) 100 MG tablet, TAKE 1 TABLET BY MOUTH DAILY, Disp: 90 tablet, Rfl: 3    SYRINGE-NEEDLE, DISP, 3 ML (B-D INTEGRA SYRINGE) 22G X 1-1/2\" 3 ML MISC, 1 each by Does not apply route daily, Disp: 20 each, Rfl: 6     MG capsule, TK ONE C PO  BID, Disp: , Rfl:     Alcohol Swabs (ALCOHOL PREP) 70 % PADS, Patient to test once daily E11.9, Disp: 100 each, Rfl: 11    glucose monitoring kit (FREESTYLE) monitoring kit, 1 kit by Does not apply route daily, Disp: 1 kit, Rfl: 0    nitroGLYCERIN (NITROSTAT) 0.4 MG SL tablet, Place 1 tablet under the tongue every 5 minutes as needed x 3 doses for chest pain., Disp: 25 tablet, Rfl: 2    vitamin D (CHOLECALCIFEROL) 25 MCG (1000 UT) TABS tablet, Take 1,000 Units by mouth daily, Disp: , Rfl:     tamsulosin (FLOMAX) 0.4 MG capsule, TAKE 1 CAPSULE DAILY AT BEDTIME, Disp: , Rfl:     CPAP Machine MISC, by NOT APPLICABLE route, Disp: , Rfl:     aspirin 81 MG EC tablet, Take 1 tablet by mouth daily, Disp: 90 tablet, Rfl: 1    gabapentin (NEURONTIN) 300 MG capsule, One capsule in the am, 2 in hs Intended supply: 30 days, Disp: 270 capsule, Rfl: 0    testosterone cypionate (DEPOTESTOTERONE CYPIONATE) 200 MG/ML injection, INJECT 0.5 ML INTO THE MUSCLE EVERY 2 WEEKS, Disp: 10 mL, Rfl: 2  Lab Results   Component Value Date     07/19/2021    K 4.9 07/19/2021     07/19/2021    CO2 31 07/19/2021    BUN 14 07/19/2021    CREATININE 0.93 07/19/2021    GLUCOSE 116 10/12/2021    CALCIUM 9.3 07/19/2021    PROT 7.9 03/18/2021    LABALBU 4.6 03/18/2021    BILITOT 0.5 03/18/2021    ALKPHOS 73 03/18/2021    AST 27 03/18/2021    ALT 21 03/18/2021    LABGLOM >60.0 07/19/2021    GFRAA >60.0 07/19/2021    GLOB 3.3 03/18/2021     Lab Results   Component Value Date    WBC 4.6 (L) 07/19/2021    HGB 14.2 07/19/2021    HCT 42.1 07/19/2021    MCV 99.6 07/19/2021     07/19/2021     Lab Results   Component Value Date    LABA1C 6.3 (H) 10/11/2021    LABA1C 6.2 07/13/2021    LABA1C 6.4 (H) 03/18/2021     Lab Results   Component Value Date    CHOLFAST 148 05/24/2019    TRIGLYCFAST 98 05/24/2019    HDL 45 11/16/2020    HDL 42 05/24/2019    HDL 45 03/12/2018    LDLCALC 86 11/16/2020    LDLCALC 86 05/24/2019    LDLCALC 76 03/12/2018    CHOL 150 11/16/2020    CHOL 152 03/12/2018    CHOL 271 (H) 09/05/2017    TRIG 96 11/16/2020    TRIG 157 03/12/2018    TRIG 154 09/05/2017     Lab Results   Component Value Date    TESTM 209 (L) 03/30/2021    TESTM 976 (H) 11/16/2020    TESTM 1015 (H) 09/25/2020     Lab Results   Component Value Date    TSH 4.110 05/06/2016    TSH 2.153 09/09/2013    TSHREFLEX 2.730 12/15/2020     No results found for: TPOABS    Review of Systems   Constitutional: Positive for fatigue. Negative for appetite change. Cardiovascular: Negative. Endocrine: Negative for cold intolerance and heat intolerance. Genitourinary: Negative. Objective:   Physical Exam  Vitals reviewed. Constitutional:       General: He is not in acute distress. Appearance: Normal appearance. He is normal weight. HENT:      Head: Normocephalic and atraumatic. Hair is normal.      Right Ear: External ear normal.      Left Ear: External ear normal.      Nose: Nose normal.   Eyes:      General: No scleral icterus. Right eye: No discharge. Left eye: No discharge. Extraocular Movements: Extraocular movements intact.       Conjunctiva/sclera: Conjunctivae normal.   Neck:      Trachea: Trachea normal.   Cardiovascular: Rate and Rhythm: Normal rate. Pulmonary:      Effort: Pulmonary effort is normal.   Musculoskeletal:         General: Normal range of motion. Cervical back: Normal range of motion and neck supple. Neurological:      General: No focal deficit present. Mental Status: He is alert and oriented to person, place, and time.    Psychiatric:         Mood and Affect: Mood normal.         Behavior: Behavior normal.

## 2021-10-14 ENCOUNTER — PROCEDURE VISIT (OUTPATIENT)
Dept: PHYSICAL MEDICINE AND REHAB | Age: 71
End: 2021-10-14
Payer: MEDICARE

## 2021-10-14 DIAGNOSIS — M79.10 MYALGIA: Primary | ICD-10-CM

## 2021-10-14 PROCEDURE — 96372 THER/PROPH/DIAG INJ SC/IM: CPT | Performed by: PHYSICAL MEDICINE & REHABILITATION

## 2021-10-14 PROCEDURE — 20553 NJX 1/MLT TRIGGER POINTS 3/>: CPT | Performed by: PHYSICAL MEDICINE & REHABILITATION

## 2021-10-14 RX ORDER — LIDOCAINE HYDROCHLORIDE 10 MG/ML
15 INJECTION, SOLUTION INFILTRATION; PERINEURAL ONCE
Status: COMPLETED | OUTPATIENT
Start: 2021-10-14 | End: 2021-10-14

## 2021-10-14 RX ORDER — CYANOCOBALAMIN 1000 UG/ML
1000 INJECTION INTRAMUSCULAR; SUBCUTANEOUS ONCE
Status: COMPLETED | OUTPATIENT
Start: 2021-10-14 | End: 2021-10-14

## 2021-10-14 RX ORDER — GABAPENTIN 300 MG/1
CAPSULE ORAL
Qty: 270 CAPSULE | Refills: 0 | Status: SHIPPED | OUTPATIENT
Start: 2021-10-14 | End: 2022-01-12

## 2021-10-14 RX ADMIN — CYANOCOBALAMIN 1000 MCG: 1000 INJECTION INTRAMUSCULAR; SUBCUTANEOUS at 16:58

## 2021-10-14 RX ADMIN — LIDOCAINE HYDROCHLORIDE 15 ML: 10 INJECTION, SOLUTION INFILTRATION; PERINEURAL at 16:59

## 2021-10-20 ENCOUNTER — OFFICE VISIT (OUTPATIENT)
Dept: PHYSICAL MEDICINE AND REHAB | Age: 71
End: 2021-10-20
Payer: MEDICARE

## 2021-10-20 VITALS
WEIGHT: 165 LBS | BODY MASS INDEX: 25.9 KG/M2 | HEIGHT: 67 IN | DIASTOLIC BLOOD PRESSURE: 76 MMHG | SYSTOLIC BLOOD PRESSURE: 144 MMHG

## 2021-10-20 DIAGNOSIS — M79.605 BILATERAL LEG PAIN: ICD-10-CM

## 2021-10-20 DIAGNOSIS — G47.33 OSA (OBSTRUCTIVE SLEEP APNEA): ICD-10-CM

## 2021-10-20 DIAGNOSIS — E29.1 HYPOGONADISM IN MALE: ICD-10-CM

## 2021-10-20 DIAGNOSIS — Z79.899 HIGH RISK MEDICATION USE: ICD-10-CM

## 2021-10-20 DIAGNOSIS — G95.9 MYELOPATHY (HCC): ICD-10-CM

## 2021-10-20 DIAGNOSIS — M10.011 IDIOPATHIC GOUT OF RIGHT SHOULDER, UNSPECIFIED CHRONICITY: ICD-10-CM

## 2021-10-20 DIAGNOSIS — M79.604 BILATERAL LEG PAIN: ICD-10-CM

## 2021-10-20 DIAGNOSIS — M41.56 OTHER SECONDARY SCOLIOSIS, LUMBAR REGION: ICD-10-CM

## 2021-10-20 DIAGNOSIS — M54.40 CHRONIC MIDLINE LOW BACK PAIN WITH SCIATICA, SCIATICA LATERALITY UNSPECIFIED: Primary | ICD-10-CM

## 2021-10-20 DIAGNOSIS — M54.16 RIGHT LUMBAR RADICULOPATHY: ICD-10-CM

## 2021-10-20 DIAGNOSIS — G89.29 CHRONIC MIDLINE LOW BACK PAIN WITH SCIATICA, SCIATICA LATERALITY UNSPECIFIED: Primary | ICD-10-CM

## 2021-10-20 DIAGNOSIS — M79.10 MYALGIA: ICD-10-CM

## 2021-10-20 PROCEDURE — 99214 OFFICE O/P EST MOD 30 MIN: CPT | Performed by: PHYSICAL MEDICINE & REHABILITATION

## 2021-10-20 ASSESSMENT — ENCOUNTER SYMPTOMS
PHOTOPHOBIA: 0
STRIDOR: 0
WHEEZING: 0
COUGH: 0
SHORTNESS OF BREATH: 1
SORE THROAT: 0
NAUSEA: 0
BOWEL INCONTINENCE: 0
ABDOMINAL PAIN: 0
VISUAL CHANGE: 0
BLOOD IN STOOL: 0
CONSTIPATION: 1
BACK PAIN: 1
VOMITING: 0
EYE REDNESS: 0
DIARRHEA: 0
TROUBLE SWALLOWING: 0
EYE PAIN: 0

## 2021-10-20 NOTE — PROGRESS NOTES
Loyd, 72 y.o. male presents today with:     Back Pain (Trigger point injections 8/12/21, 9/14/21 and 10/14/21 with 100% reduction in pain lasting 1 month)   Shoulder Pain (Right)   Leg Pain (Bilateral)   Foot Pain (Bilateral)   Medication Refill (Percocet, Gabapentin, Baclofen, Vit D refill & pill count today)     He is more functional on the opiate meds--able to do yard work and interact with friends and family in a positive friendly manner. recent injections helped quite a bit. Injections still help very much. He would like to schedule monthly trigger point and sciatic injection in between times. Back Pain  This is a chronic problem. The current episode started more than 1 year ago. The problem occurs daily. The problem is unchanged. The pain is present in the lumbar spine. The quality of the pain is described as aching, cramping, shooting and stabbing. The pain radiates to the right foot, right knee and right thigh. The pain is at a severity of 5/10. The pain is severe. The pain is worse during the day. The symptoms are aggravated by bending, lying down, position, sitting, standing and twisting. Stiffness is present in the morning. Associated symptoms include leg pain, numbness and weakness. Pertinent negatives include no abdominal pain, bladder incontinence, bowel incontinence, chest pain, dysuria, fever, headaches, paresis, perianal numbness or tingling. Risk factors include lack of exercise and sedentary lifestyle. He has tried analgesics, home exercises, NSAIDs, muscle relaxant, heat and walking (SI injections which help, trigger point injections, OXY-IR ) for the symptoms. The treatment provided moderate relief. Hand Pain   The incident occurred more than 1 week ago. The incident occurred at home. There was no injury mechanism. The pain is present in the right wrist and right hand. The quality of the pain is described as cramping. The pain does not radiate.  The pain is at a severity of 4/10. The pain is moderate. The pain has been intermittent since the incident. Associated symptoms include numbness. Pertinent negatives include no chest pain, muscle weakness or tingling. The symptoms are aggravated by movement. He has tried ice, immobilization, rest, NSAIDs, acetaminophen, heat and elevation for the symptoms. The treatment provided significant relief. Neck Pain   This is a recurrent problem. The current episode started more than 1 month ago. The problem occurs constantly. The problem has been gradually worsening. The pain is present in the occipital region. The quality of the pain is described as aching. The pain is at a severity of 5/10. The pain is moderate. The symptoms are aggravated by twisting. Stiffness is present in the morning. Associated symptoms include leg pain, numbness and weakness. Pertinent negatives include no chest pain, fever, headaches, pain with swallowing, paresis, photophobia, syncope, tingling, trouble swallowing or visual change. He has tried heat, acetaminophen, bed rest, home exercises, muscle relaxants, neck support, ice, NSAIDs and oral narcotics for the symptoms. The treatment provided moderate relief.        Past Medical History:   Diagnosis Date    Chronic back pain     Coronary artery disease 2002    Dr. Pastor [facility]yer at [facility] Esophageal ulcer 3/10/2014    Dr. [facility]    Gastric ulcer 3/10/2014    Dr. [facility]    Gout     Hiatal hernia 3/10/2014    Dr. Darylene Sark Hyperlipidemia     Hypertension     Impotence 10/16/2020    MI ([facility]) [facility]) 2005    Dr. Renata Oneil Peripheral vascular disease ([facility] [address]     Prediabetes     Schizo-affective schizophrenia, in remission ([address] 2/1/2005    Overview:  followed at the [facility] Severe single current episode of major depressive disorder, without psychotic features ([address] 7/8/2016    Status post coronary artery stent placement 2002     Marika     Past Surgical History:   Procedure Laterality Date    APPENDECTOMY  1970    BACK SURGERY  01/03/2020    Dr. Gutierrez Solid Right 6/4/2019    RIGHT WRIST CARPAL TUNNEL RELEASE, SUPINE, PAT AT PCP performed by Abdulaziz Beltre MD at [address] [place] [address]  3/10/14    DR Dolly Coe  2002    Dr. Liza SINCLAIR  10/17/2017    KNEE ARTHROSCOPY Left 2002    Dr. Heriberto Musa  12/19/13    CAUDAL BLOCKS DONE BY DR Mickie Quigley    OTHER SURGICAL HISTORY  04/2020    [facility]      SPINE SURGERY  9/2009    Dr. Filomena Bowens  2008    Dr. Terrence Murphy  3/10/14    Sharon Maza, DR Mellisa Bergeron     Social History     Socioeconomic History    Marital status:      Spouse name: None    Number of children: None    Years of education: None    Highest education level: None   Occupational History    Occupation: disability    Tobacco Use    Smoking status: Never Smoker    Smokeless tobacco: Never Used   Vaping Use    Vaping Use: Never used   Substance and Sexual Activity    Alcohol use: No     Alcohol/week: 0.0 standard drinks     Comment: Stopped years ago.  Drug use: No     Comment: YEARS AGO    Sexual activity: Yes     Partners: Female     Comment: monogomous sexual partner, wife. Other Topics Concern    None   Social History Narrative    Tobacco -- never smoked or chewed. Alcohol -- have not used for past 10 years, prior to had consumed 6 pack of beer daily. Drugs -- tried marijuana and cocaine 14 years ago occasionally used for 3-4 years and then stopped completely 10 years ago. Education -- completed 11th grade in [place].    Occupation -- [employer] work,  at [employer].    Marital status --  44 years, 2 grown sons.      Social Determinants of Health     Financial Resource Strain: Low Risk     Difficulty of Paying Living Expenses: Not hard at all   Food Insecurity:     Worried About [address] in the Last Year:     [address] [facility] [address] in the Last Year:    Transportation Needs:     Lack of Transportation (Medical):  Lack of Transportation (Non-Medical):    Physical Activity:     Days of Exercise per Week:     Minutes of Exercise per Session:    Stress:     Feeling of Stress :    Social Connections:     Frequency of Communication with Friends and Family:     Frequency of Social Gatherings with Friends and Family:     Attends Gnosticism Services:     Active Member of Clubs or Organizations:     Attends Club or Organization Meetings:     Marital Status:    Intimate Partner Violence:     Fear of Current or Ex-Partner:     Emotionally Abused:     Physically Abused:     Sexually Abused:      Family History   Problem Relation Age of Onset    High Blood Pressure Mother     Arthritis Mother     Cancer Father         throat    Arthritis Father        No Known Allergies    Review of Systems   Constitutional: Positive for activity change and fatigue. Negative for chills, diaphoresis, fever and unexpected weight change. HENT: Negative for congestion, ear discharge, ear pain, hearing loss, nosebleeds, sore throat, tinnitus and trouble swallowing. Eyes: Negative for photophobia, pain and redness. Respiratory: Positive for shortness of breath. Negative for cough, wheezing and stridor. Shortness of breath on exertion   Cardiovascular: Negative for chest pain, palpitations, leg swelling and syncope. Gastrointestinal: Positive for constipation. Negative for abdominal pain, blood in stool, bowel incontinence, diarrhea, nausea and vomiting. Endocrine: Negative for polydipsia. Genitourinary: Negative for bladder incontinence, dysuria, flank pain, frequency, hematuria and urgency.    Musculoskeletal: Positive for arthralgias, back pain, gait problem, myalgias and neck stiffness. Negative for neck pain. Skin: Negative for rash. Allergic/Immunologic: Positive for immunocompromised state. Negative for environmental allergies. Neurological: Positive for weakness and numbness. Negative for dizziness, tingling, tremors, seizures and headaches. Hematological: Does not bruise/bleed easily. Psychiatric/Behavioral: Negative for dysphoric mood, hallucinations and suicidal ideas. The patient is not nervous/anxious. Objective    Vitals:    10/20/21 1022   Weight: 165 lb (74.8 kg)   Height: 5' 7\" (1.702 m)     Pain Score: Two (With medication)       Physical Exam  Vitals reviewed. Constitutional:       General: He is not in acute distress. Appearance: He is well-developed. He is not ill-appearing, toxic-appearing or diaphoretic. Comments:     HENT:      Head: Normocephalic and atraumatic. Right Ear: Hearing normal.      Left Ear: Hearing normal.      Nose: Nose normal.      Mouth/Throat:      Mouth: No oral lesions. Dentition: Normal dentition. Pharynx: No oropharyngeal exudate. Eyes:      General: No scleral icterus. Right eye: No discharge. Left eye: No discharge. Conjunctiva/sclera: Conjunctivae normal.      Right eye: No chemosis or exudate. Left eye: No chemosis or exudate. Neck:      Thyroid: No thyromegaly. Vascular: No JVD. Trachea: No tracheal tenderness or tracheal deviation. Pulmonary:      Effort: Pulmonary effort is normal. No tachypnea, bradypnea, accessory muscle usage or respiratory distress. Breath sounds: Decreased breath sounds present. No wheezing or rales. Chest:      Chest wall: No tenderness. Abdominal:      General: There is no distension. Musculoskeletal:         General: Tenderness present. Right shoulder: Tenderness and bony tenderness present. Decreased range of motion.       Left shoulder: Tenderness and bony Neurological:      Mental Status: He is alert and oriented to person, place, and time. Cranial Nerves: No cranial nerve deficit. Sensory: Sensory deficit present. Motor: No tremor, atrophy or abnormal muscle tone. Coordination: Coordination normal.      Gait: Gait abnormal.      Deep Tendon Reflexes: Reflexes abnormal. Babinski sign absent on the right side. Babinski sign absent on the left side. Reflex Scores:       Patellar reflexes are 1+ on the right side and 1+ on the left side. Achilles reflexes are 0 on the right side and 0 on the left side. Psychiatric:         Attention and Perception: He is attentive. Mood and Affect: Mood is not anxious or depressed. Affect is not labile, blunt, angry or inappropriate. Speech: He is communicative. Speech is not rapid and pressured, delayed, slurred or tangential.         Behavior: Behavior normal. Behavior is not agitated, slowed, aggressive, withdrawn, hyperactive or combative. Thought Content: Thought content normal. Thought content is not paranoid or delusional. Thought content does not include homicidal or suicidal ideation. Thought content does not include homicidal or suicidal plan. Cognition and Memory: Memory is not impaired. He does not exhibit impaired recent memory or impaired remote memory. Judgment: Judgment normal. Judgment is not impulsive or inappropriate. Comments: Calm appropriate    NAD       Ortho Exam  Neurologic Exam     Mental Status   Oriented to person, place, and time.    Speech: not slurred     Gait, Coordination, and Reflexes     Reflexes   Right patellar: 1+  Left patellar: 1+  Right achilles: 0  Left achilles: 0          After a thorough review and discussion of the previous medical records, patient comprehensive medical, surgical, and family and social history, Review of Systems, their OARRS, their Screener and Opioid Assessment for Patients with Pain (SOAPP®-R), recent diagnostics, and symptomatic results to previous treatment, it is my impression that the patients is suffering with progressive and severe:     Diagnosis Orders   1. Chronic midline low back pain with sciatica, sciatica laterality unspecified     2. High risk medication use - 12/07/17 OARRS PM&R, 02/08/18 OARRS PM&R, 10/05/17 Urine Drug Screen: negative PM&R, MED CONTRACT 1/17/17     3. Other secondary scoliosis, lumbar region     4. Idiopathic gout of right shoulder, unspecified chronicity     5. Right lumbar radiculopathy     6. PRASAD (obstructive sleep apnea)     7. Myelopathy ([address]     8. Bilateral leg pain     9. Hypogonadism in male     8.  Myalgia  [place] INJECT TRIGGER POINTS, 3 OR GREATER    [place] INJECT TRIGGER POINTS, 3 OR GREATER    [place] INJECT TRIGGER POINTS, 3 OR GREATER       I am also concerned by lifestyle and mood issues including:    Past Medical History:   Diagnosis Date    Chronic back pain     Coronary artery disease 2002    [facility] Linda Marquez [facility] Esophageal ulcer 3/10/2014    Dr. Negrito Avery Gastric ulcer 3/10/2014    Dr. Pura Strange    Gout     Hiatal hernia 3/10/2014    Dr. Pura Strange    Hyperlipidemia     Hypertension     Impotence 10/16/2020    MI ([facility] infarction) [facility]) 2005    Dr. Josi Guardado Peripheral vascular disease ([facility] [address]     Prediabetes     Schizo-affective schizophrenia, in remission ([facility] [address] 2/1/2005    Overview:  followed at the [facility] Severe single current episode of major depressive disorder, without psychotic features ([facility] [address] 7/8/2016    Status post coronary artery stent placement 2002    Dr. Linda Marquez           Given their medication, chronic pain and lifestyle and medications they are at risk for :    Falls, constipation, addiction, toxicity on opiates  Loss of livelyhood due to severe pain, debility, weight gain and  vitamin D deficiency    The patient was educated regarding proper diet, fitness routine, and regulatory restrictions concerning pain medications. Previous notes, comprehensive past medical, surgical, family history, and diagnostics were reviewed. Patient education and councelling were provided regarding off label use,treatment options and medication and injection risks. Current and old OARRS ([place] Automated Prescription Reporting System) records reviewed, all refills reviewed since last visit,  Behavioral agreement/[place] regulations   and Toxicology screen was reviewed with patient and is up to date. There are   no current red flags. high risk meds review re intensive monitoring for toxicity and addiction,  They are making good progress regarding pain relief, they are performing at a functional level regarding activities of daily living, family interactions and psychological functioning, they're not having any adverse effects or side effects from the current medications, and I see no findings of aberrant drug taking or addiction related behaviors. The patient is aware that they have a chronic pain condition and they may require opiates dosing for life. All efforts will be made to wean to the lowest effective dose. Other therapies for pain have not been effective including nonopiate medications. Injections and exercises are only partially effective. A Rx for Narcan was offered to help prevent accidental overdose. RX Monitoring 10/17/2021   Attestation -   Acute Pain Prescriptions Prescription exceeds daily limit for a specific reason. See comments or note. ;Not required given exclusionary diagnoses. ..;Severe pain not adequately treated with lower dose. Periodic Controlled Substance Monitoring Possible medication side effects, risk of tolerance/dependence & alternative treatments discussed. ;No signs of potential drug abuse or diversion identified. ;Assessed functional status. ;Obtaining appropriate analgesic effect of treatment.    Chronic Pain > 50 MEDD -   Chronic Pain > 80 MEDD - Periodic Controlled Substance Monitoring: Possible medication side effects, risk of tolerance/dependence & alternative treatments discussed., No signs of potential drug abuse or diversion identified. , Assessed functional status., Obtaining appropriate analgesic effect of treatment. (Yadira Vargas, DO)       Patient is currently taking:       I am having Nura Lyn maintain his aspirin, CPAP Machine, tamsulosin, vitamin D, nitroGLYCERIN, glucose monitoring, Alcohol Prep, DOK, B-D INTEGRA SYRINGE, allopurinol, testosterone cypionate, MAGNESIUM HYDROXIDE PO, baclofen, colchicine, pravastatin, oxyCODONE-acetaminophen, NIFEdipine, metFORMIN, metoprolol tartrate, FreeStyle Lancets, FREESTYLE LITE, and gabapentin. I also recommend the following Medications:    No orders of the defined types were placed in this encounter.       -which helps with pain and function. Otherwise, continue the current pain medications that I have prescibed. Radiologic:   Old  unique films results reviewed   SEVERE DEXTROSCOLIOSIS LUMBAR SPINE. STATUS POST POSTERIOR FUSION AT THE L3-L5 LEVELS.       NO FRACTURE NOR MALALIGNMENT WITH MULTILEVEL DEGENERATIVE CHANGES, SEE INDIVIDUAL LEVELS ABOVE.       POSTERIOR AND RIGHT PARACENTRAL PROTRUSION L1-L2 DISC WITH SOME NARROWING OF THE RIGHT LATERAL RECESS AND RIGHT NEURAL FORAMEN. MILD NARROWING CENTRAL CANAL THIS LEVEL.       NARROWED AND DESICCATED L5-S1 DISC WITH RIGHT POSTERIOR LATERAL BONY SPURRING AND RIGHT POSTERIOR LATERAL PROTRUSION OF [address]. RIGHT NEURAL FORAMEN IS NOT WELL VISUALIZED, SUSPECTED SIGNIFICANT STENOSIS.       HYPERTROPHIED FACET JOINTS AT THE T12-L1, L1-L2, L2-L3 AND L5-S1 LEVELS.       NARROWING OF THE LOWER 4 LUMBAR DISC LEVELS.         ,Lumbar fusion, scoliosis. COMPARISON:   Radiographs 02/13/2020 and 09/16/2019.      ACCESSION NUMBER(S):   [id number]     ORDERING CLINICIAN:   Susan Cruz     FINDINGS:   Fused PA and lateral images of spine were obtained utilizing   individual PA and lateral images of the whole spine. Redemonstration of posterior spinal instrumented fusion changes noted   from T9 to bilateral iliac bones. Interbody fusion changes noted at   L5-S1. Additional disc spacers are again noted in the mid lumbar spine. No perihardware fractures or lucencies. No change in alignment when   compared with the radiograph from 02/13/2020. There is residual dextroscoliosis centered in the upper lumbar   region. There is straightening of normal lumbar lordosis and thoracic   kyphosis. I discussed results with patients. see Follow up plans below  For any new studies. Unique source and Care Everywhere Updates:  prior external notes requested and reviewed. [facility]  X-RAYS: X-rays taken show some mild degenerative changes to patellofemoral joint on the right, but still definite joint space. The left knee has some calcification, some early degenerative changes, interesting that his right knee. PLAN: He has a history of gout. I think this is some synovitis, but it does not really seem to be like a gout attack, so would like to go ahead with an aspiration and injection. I am going to send the fluid for analysis to see if it does have any evidence of any crystalline disease. I am going to see him back in a couple of weeks. I told him about taking it easy with this, using ice if he has some burning in that. No new issues noted. Unique History obtained by caregiver/independent historian-and verified with SOAPPR. Electrodiagnostic:  Previous studies requested,     I discussed results with patient. See follow-up plans for new studies.         Labs:  Previous labs reviewed     Lab Results   Component Value Date     07/19/2021    K 4.9 07/19/2021     07/19/2021    CO2 31 07/19/2021    BUN 14 07/19/2021    CREATININE 0.93 07/19/2021    CALCIUM 9.3 07/19/2021    LABALBU 4.6 03/18/2021    LABALBU 4.4 10/27/2020    BILITOT 0.5 03/18/2021    ALKPHOS 73 03/18/2021    AST 27 03/18/2021    ALT 21 03/18/2021     Lab Results   Component Value Date    WBC 4.6 07/19/2021    RBC 4.23 07/19/2021    HGB 14.2 07/19/2021    HCT 42.1 07/19/2021    MCV 99.6 07/19/2021    MCH 33.5 07/19/2021    MCHC 33.6 07/19/2021    RDW 14.4 07/19/2021     07/19/2021    MPV 9.9 09/15/2015       Lab Results   Component Value Date    LABAMPH Neg 04/16/2021    BARBSCNU Neg 04/16/2021    LABBENZ Neg 04/16/2021    CANSU Neg 04/16/2021    COCAIMETSCRU Neg 04/16/2021    PHENCYCLIDINESCREENURINE Neg 00/07/7225    TRICYCLIC Negative 55/47/2228    DSCOMMENT see below 04/16/2021       Lab Results   Component Value Date    CODEINE Not Detected 09/16/2016    MORPHINE Not Detected 09/16/2016    ACETYLMORPHI Not Detected 09/16/2016    OXYCODONE Present 09/16/2016    NOROXYCODONE Present 09/16/2016    NOROXYMU Present 09/16/2016    HYDRCO Not Detected 09/16/2016    NORHYDU Not Detected 09/16/2016    HYDROMO Not Detected 09/16/2016    Debi Eliezer Not Detected 09/16/2016    Ying Stains Not Detected 09/16/2016    FENTA Not Detected 09/16/2016    NORFENT Not Detected 09/16/2016    MEPERIDINE Not Detected 09/16/2016    TAPENU Not Detected 09/16/2016    TAPOSULFUR Not Detected 09/16/2016    METHADONE Not Detected 09/16/2016    LABPROP Not Detected 09/16/2016    TRAM Not Detected 09/16/2016    AMPH Not Detected 09/16/2016    METHAMP Not Detected 09/16/2016    MDMA Not Detected 09/16/2016    ECMDA Not Detected 09/16/2016       Lab Results   Component Value Date    PHENTERMINE Not Detected 09/16/2016    BENZOYL Not Detected 09/16/2016    [place] Budge Not Detected 09/16/2016    Gonzalez Snowman Not Detected 09/16/2016    CLONAZEPAM Not Detected 09/16/2016    Milind Diaz Not Detected 09/16/2016    DIAZEP Not Detected 09/16/2016    SAFIA Not Detected 09/16/2016    OXAZ Not Detected 09/16/2016    Lady Moles Not Detected 09/16/2016    LORAZEPAM Not Detected 09/16/2016    MIDAZOLAM Not Detected 09/16/2016    ZOLPIDEM Not Detected 09/16/2016    REG Not Detected 09/16/2016    ETG Not Detected 09/16/2016    MARIJMET Not Detected 09/16/2016    PCP Not Detected 09/16/2016    PAINMGTDRUGP See Below 09/16/2016    EERPAINMGTPA See Note 09/16/2016    LABCREA 184.2 10/11/2021         , I discussed results with patient. See follow-up plans for new studies. Therapies:  HEP-gentle stretching and relaxation techniques-demonstrated with patient-they are to do them twice a day. They are also advised to make the following lifestyle changes:  Goals      Exercise 3x per week (30 min per time)      SOAPP-R GOAL LESS THAN 9      09/16/16 SCORE: 33-HIGH RISK   11/11/16 score: 27-high risk     01/13/17 score: 16-moderate risk   03/13/17 Score: 11-moderate risk   06/01/17 score: 15-moderate risk  08/03/17 score: 15-moderate risk  10/04/17 score: 18-moderate risk  12/08/17 score: 11-moderate risk  02/09/18 score: 12-moderate risk  04/13/18 score: 14-moderate risk  7/12/18 score 10-moderate risk  11/29/18 score 17- moderate risk  01/28/19 score  16-moderate risk  6/10/19 score: 11- moderate risk  8/15/19 score: 17- moderate risk   4/8/19 score  7- low risk  3/11/20 score: 17- moderate risk  5- score- 16 - moderate risk   7/20/20 score: 21- high risk  10/16/20 score: 16- moderate risk  1/15/21 score: 16- moderate risk  4/14/21 score: 22- high risk  7/14/21 score: 14- moderate risk  10/20/21 score: 12- moderate risk            Injections or Epidurals:  Injection options were discussed. Patient gave verbal consent to ordered injections. See follow-up plans for planned injections. Supplements:  Vitamin D with increased dosing during the rainy months,   Education was given on:   Dietary and Fitness--daily stretches and low carb diet-in chair Yoga when possible             Follow up with Primary Care Physician regarding their general medical needs.      Stressed the importance of following up with PCP and specialists for his/her chronic diseases, health, CV, and cancer screening and continued care. Will follow disease activity/progression and adjust therapeutic regimen to disease activity and severity. Discussed medication dosage, usage, goals of therapy, and side effects. Available test results were reviewed -Discussed findings, impression and plan with patient. An additional 4 minutes were spent outside of the patient visit to review records. Additional time spent with the patient to discuss their questions. Additional time spent with the patient devoted to discussing treatment strategy, planning, and implementation. Patient understands above plan; questions asked and answered. Patient agrees to plan as noted above. At least 50% of the visit was involved in the discussion of the options for treatment. We discussed exercises, medication, interventional therapies and surgery. Healthy life style is essential with patient hard work to achieve the wellness. In addition; discussion with the patient and/or family about patient's functional status any of the diagnostic results, impressions and/or recommended diagnostic studies, prognosis, risks and benefits of treatment options, instructions for treatment and/or follow-up, importance of compliance with chosen treatment options, risk-factor reduction, and patient/family education. They are to follow up in 2 1/2 -3 months to review medication, efficacy of injections, pill counts, OARRS check, high risk med review re intensive monitoring for toxicity and addiction, SOAPPR assessment, review diagnostics, to review previous and future treatment plans and assess appropriateness for continued therapy.         New Diagnostics  Orders Placed This Encounter   Procedures    NV INJECT TRIGGER POINTS, 3 OR GREATER    NV INJECT TRIGGER POINTS, 3 OR GREATER    NV INJECT TRIGGER POINTS, 3 OR GREATER       Brenda Stubbs,

## 2021-11-01 DIAGNOSIS — M54.41 CHRONIC RIGHT-SIDED LOW BACK PAIN WITH RIGHT-SIDED SCIATICA: ICD-10-CM

## 2021-11-01 DIAGNOSIS — M41.9 SCOLIOSIS OF LUMBAR SPINE, UNSPECIFIED SCOLIOSIS TYPE: ICD-10-CM

## 2021-11-01 DIAGNOSIS — G89.29 CHRONIC RIGHT-SIDED LOW BACK PAIN WITH RIGHT-SIDED SCIATICA: ICD-10-CM

## 2021-11-01 DIAGNOSIS — M54.14 THORACIC AND LUMBOSACRAL NEURITIS: ICD-10-CM

## 2021-11-01 DIAGNOSIS — Z79.899 HIGH RISK MEDICATION USE: ICD-10-CM

## 2021-11-01 DIAGNOSIS — M54.17 THORACIC AND LUMBOSACRAL NEURITIS: ICD-10-CM

## 2021-11-01 DIAGNOSIS — M54.16 RIGHT LUMBAR RADICULOPATHY: ICD-10-CM

## 2021-11-02 DIAGNOSIS — M79.605 BILATERAL LEG PAIN: ICD-10-CM

## 2021-11-02 DIAGNOSIS — M62.838 SPASM OF MUSCLE: ICD-10-CM

## 2021-11-02 DIAGNOSIS — M79.604 BILATERAL LEG PAIN: ICD-10-CM

## 2021-11-02 RX ORDER — OXYCODONE AND ACETAMINOPHEN 7.5; 325 MG/1; MG/1
1 TABLET ORAL EVERY 4 HOURS PRN
Qty: 90 TABLET | Refills: 0 | Status: SHIPPED | OUTPATIENT
Start: 2021-11-02 | End: 2021-12-03 | Stop reason: SDUPTHER

## 2021-11-04 RX ORDER — BACLOFEN 10 MG/1
TABLET ORAL
Qty: 90 TABLET | Refills: 1 | Status: SHIPPED | OUTPATIENT
Start: 2021-11-04 | End: 2022-01-10

## 2021-11-14 DIAGNOSIS — E29.1 HYPOGONADISM MALE: ICD-10-CM

## 2021-11-15 ENCOUNTER — PROCEDURE VISIT (OUTPATIENT)
Dept: PHYSICAL MEDICINE AND REHAB | Age: 71
End: 2021-11-15
Payer: MEDICARE

## 2021-11-15 DIAGNOSIS — M79.10 MYALGIA: ICD-10-CM

## 2021-11-15 PROCEDURE — 20553 NJX 1/MLT TRIGGER POINTS 3/>: CPT | Performed by: PHYSICAL MEDICINE & REHABILITATION

## 2021-11-15 PROCEDURE — 96372 THER/PROPH/DIAG INJ SC/IM: CPT | Performed by: PHYSICAL MEDICINE & REHABILITATION

## 2021-11-15 RX ORDER — LIDOCAINE HYDROCHLORIDE 10 MG/ML
15 INJECTION, SOLUTION INFILTRATION; PERINEURAL ONCE
Status: COMPLETED | OUTPATIENT
Start: 2021-11-15 | End: 2021-11-15

## 2021-11-15 RX ORDER — CYANOCOBALAMIN 1000 UG/ML
1000 INJECTION INTRAMUSCULAR; SUBCUTANEOUS ONCE
Status: COMPLETED | OUTPATIENT
Start: 2021-11-15 | End: 2021-11-15

## 2021-11-15 RX ORDER — TESTOSTERONE CYPIONATE 200 MG/ML
INJECTION INTRAMUSCULAR
Qty: 10 ML | Refills: 0 | Status: SHIPPED | OUTPATIENT
Start: 2021-11-15 | End: 2022-01-11 | Stop reason: SDUPTHER

## 2021-11-15 RX ADMIN — CYANOCOBALAMIN 1000 MCG: 1000 INJECTION INTRAMUSCULAR; SUBCUTANEOUS at 14:57

## 2021-11-15 RX ADMIN — LIDOCAINE HYDROCHLORIDE 15 ML: 10 INJECTION, SOLUTION INFILTRATION; PERINEURAL at 14:58

## 2021-12-02 DIAGNOSIS — M54.41 CHRONIC RIGHT-SIDED LOW BACK PAIN WITH RIGHT-SIDED SCIATICA: ICD-10-CM

## 2021-12-02 DIAGNOSIS — N53.19 ANEJACULATION: ICD-10-CM

## 2021-12-02 DIAGNOSIS — G89.29 CHRONIC RIGHT-SIDED LOW BACK PAIN WITH RIGHT-SIDED SCIATICA: ICD-10-CM

## 2021-12-02 DIAGNOSIS — M54.17 THORACIC AND LUMBOSACRAL NEURITIS: ICD-10-CM

## 2021-12-02 DIAGNOSIS — M54.16 RIGHT LUMBAR RADICULOPATHY: ICD-10-CM

## 2021-12-02 DIAGNOSIS — M54.14 THORACIC AND LUMBOSACRAL NEURITIS: ICD-10-CM

## 2021-12-02 DIAGNOSIS — Z79.899 HIGH RISK MEDICATION USE: ICD-10-CM

## 2021-12-02 DIAGNOSIS — M41.9 SCOLIOSIS OF LUMBAR SPINE, UNSPECIFIED SCOLIOSIS TYPE: ICD-10-CM

## 2021-12-02 RX ORDER — IMIPRAMINE HCL 25 MG
TABLET ORAL
Qty: 30 TABLET | Refills: 3 | Status: SHIPPED | OUTPATIENT
Start: 2021-12-02

## 2021-12-02 NOTE — TELEPHONE ENCOUNTER
Patient  requesting medication refill. Please approve or deny this request.    Rx requested:  Requested Prescriptions     Pending Prescriptions Disp Refills    oxyCODONE-acetaminophen (PERCOCET) 7.5-325 MG per tablet 90 tablet 0     Sig: Take 1 tablet by mouth every 4 hours as needed for Pain (Max of 90 tablets per month) for up to 30 days.  Intended supply: 30 days         Last Office Visit:   11/15/2021      Next Visit Date:  Future Appointments   Date Time Provider Alexis Arango   12/16/2021 11:30 AM Karl Pabon, DO [address]   1/7/2022 10:30 AM Suki Hill PA-C [address]   1/11/2022 10:45 AM Jose Beth MD [facility]   1/17/2022 11:30 AM Karl Pabon, DO [address]   1/17/2022 12:15 PM Lenny Oneil MD [facility]   1/20/2022 11:15 AM Karl Pabon, DO [address]

## 2021-12-03 RX ORDER — OXYCODONE AND ACETAMINOPHEN 7.5; 325 MG/1; MG/1
1 TABLET ORAL EVERY 4 HOURS PRN
Qty: 90 TABLET | Refills: 0 | Status: SHIPPED | OUTPATIENT
Start: 2021-12-03 | End: 2022-01-31 | Stop reason: SDUPTHER

## 2021-12-16 ENCOUNTER — PROCEDURE VISIT (OUTPATIENT)
Dept: PHYSICAL MEDICINE AND REHAB | Age: 71
End: 2021-12-16
Payer: MEDICARE

## 2021-12-16 DIAGNOSIS — M79.10 MYALGIA: ICD-10-CM

## 2021-12-16 PROCEDURE — 96372 THER/PROPH/DIAG INJ SC/IM: CPT | Performed by: PHYSICAL MEDICINE & REHABILITATION

## 2021-12-16 PROCEDURE — 20553 NJX 1/MLT TRIGGER POINTS 3/>: CPT | Performed by: PHYSICAL MEDICINE & REHABILITATION

## 2021-12-16 RX ORDER — CYANOCOBALAMIN 1000 UG/ML
1000 INJECTION INTRAMUSCULAR; SUBCUTANEOUS ONCE
Status: COMPLETED | OUTPATIENT
Start: 2021-12-16 | End: 2021-12-16

## 2021-12-16 RX ORDER — LIDOCAINE HYDROCHLORIDE 10 MG/ML
23 INJECTION, SOLUTION INFILTRATION; PERINEURAL ONCE
Status: COMPLETED | OUTPATIENT
Start: 2021-12-16 | End: 2021-12-16

## 2021-12-16 RX ADMIN — LIDOCAINE HYDROCHLORIDE 23 ML: 10 INJECTION, SOLUTION INFILTRATION; PERINEURAL at 17:02

## 2021-12-16 RX ADMIN — CYANOCOBALAMIN 1000 MCG: 1000 INJECTION INTRAMUSCULAR; SUBCUTANEOUS at 17:02

## 2022-01-01 DIAGNOSIS — M79.605 BILATERAL LEG PAIN: ICD-10-CM

## 2022-01-01 DIAGNOSIS — M62.838 SPASM OF MUSCLE: ICD-10-CM

## 2022-01-01 DIAGNOSIS — M79.604 BILATERAL LEG PAIN: ICD-10-CM

## 2022-01-07 ENCOUNTER — OFFICE VISIT (OUTPATIENT)
Dept: FAMILY MEDICINE CLINIC | Age: 72
End: 2022-01-07
Payer: MEDICARE

## 2022-01-07 VITALS
DIASTOLIC BLOOD PRESSURE: 82 MMHG | HEART RATE: 71 BPM | TEMPERATURE: 97 F | OXYGEN SATURATION: 96 % | SYSTOLIC BLOOD PRESSURE: 158 MMHG | WEIGHT: 169.4 LBS | RESPIRATION RATE: 18 BRPM | BODY MASS INDEX: 26.59 KG/M2 | HEIGHT: 67 IN

## 2022-01-07 DIAGNOSIS — E78.00 HYPERCHOLESTEROLEMIA: ICD-10-CM

## 2022-01-07 DIAGNOSIS — I20.9 ANGINA PECTORIS (HCC): ICD-10-CM

## 2022-01-07 DIAGNOSIS — I10 ESSENTIAL HYPERTENSION, BENIGN: Primary | ICD-10-CM

## 2022-01-07 DIAGNOSIS — Z11.59 NEED FOR HEPATITIS C SCREENING TEST: ICD-10-CM

## 2022-01-07 DIAGNOSIS — I25.118 CORONARY ARTERY DISEASE OF NATIVE ARTERY OF NATIVE HEART WITH STABLE ANGINA PECTORIS (HCC): ICD-10-CM

## 2022-01-07 DIAGNOSIS — E11.9 TYPE 2 DIABETES MELLITUS WITHOUT COMPLICATION, WITHOUT LONG-TERM CURRENT USE OF INSULIN (HCC): ICD-10-CM

## 2022-01-07 PROCEDURE — 99213 OFFICE O/P EST LOW 20 MIN: CPT | Performed by: PHYSICIAN ASSISTANT

## 2022-01-07 RX ORDER — OXYCODONE AND ACETAMINOPHEN 7.5; 325 MG/1; MG/1
TABLET ORAL
COMMUNITY
Start: 2022-01-06 | End: 2022-09-23 | Stop reason: SDUPTHER

## 2022-01-07 ASSESSMENT — ENCOUNTER SYMPTOMS
COLOR CHANGE: 0
RECTAL PAIN: 0
DIARRHEA: 0
ABDOMINAL DISTENTION: 0
CONSTIPATION: 0
BLOOD IN STOOL: 0
CHEST TIGHTNESS: 0
WHEEZING: 0
SHORTNESS OF BREATH: 0
ABDOMINAL PAIN: 0
BACK PAIN: 1
VOMITING: 0

## 2022-01-07 NOTE — PROGRESS NOTES
2022    TELEHEALTH EVALUATION -- Audio/Visual (During EOYOM-40 public health emergency)    Due to COVID 19 outbreak, patient's office visit was converted to a virtual visit. Patient was contacted and agreed to proceed with a virtual visit via Telephone Visit  The risks and benefits of converting to a virtual visit were discussed in light of the current infectious disease epidemic. Patient also understood that insurance coverage and co-pays are up to their individual insurance plans. HPI:    Julitozaina Usha (:  1950) has requested an audio/video evaluation for the following concern(s):    Bradycardia/CAD/HTN. Denies CP, GUILLORY, SOB. Currently taking 50 mg metoprolol BID. Patient had exercise stress test on 2020--had a peak HR of 155 BPM.    EKG 10/2020 showed rate of 66 BPM.     Low testosterone/prediabetes. Managed by endocrinology. Continue with testosterone q 14 days. No side effects from medication.    Checks blood sugars 1-2 times/day. Fasting 100 or less. No hypo/hyperglycemic episodes.   Has upcoming follow up with Dr. Juju Denton.      Gout. Stable on allopurinol. Denies any recent flares or unilateral joint swelling/erythema.      Opioid induced constipation. Manageable at this time with regimen of warm milk of mag and stool softener.    BM every other day, soft, well-formed.    Denies abdominal bloating/pain.    This has improved.      Chronic back pain. Followed by PMR. Receives injections with Dr. Franny Garcia. No fall/injury/trauma. Pain is controlled with injections, medication.      Review of Systems   Constitutional: Negative for activity change, appetite change, chills, diaphoresis and fatigue. HENT: Negative for congestion. Eyes: Negative for visual disturbance. Respiratory: Negative for chest tightness, shortness of breath and wheezing. Cardiovascular: Negative for chest pain, palpitations and leg swelling.    Gastrointestinal: Negative for abdominal distention, abdominal pain, blood in stool, constipation (improved), diarrhea, rectal pain and vomiting. Musculoskeletal: Positive for arthralgias and back pain (improved since surgery). Negative for gait problem and myalgias. Skin: Negative for color change, rash and wound. Neurological: Negative for dizziness, weakness, light-headedness, numbness and headaches. Psychiatric/Behavioral: Negative for dysphoric mood and sleep disturbance. The patient is not nervous/anxious. Prior to Visit Medications    Medication Sig Taking?  Authorizing Provider   oxyCODONE-acetaminophen (PERCOCET) 7.5-325 MG per tablet  Yes Historical Provider, MD   imipramine (TOFRANIL) 25 MG tablet TAKE 1 TABLET BY MOUTH EVERY NIGHT Yes Ana Billings MD   testosterone cypionate (DEPOTESTOTERONE CYPIONATE) 200 MG/ML injection INJECT 0.5MLS INTO THE MUSCLE EVERY 2 WEEKS Yes BC Ramos   baclofen (LIORESAL) 10 MG tablet TAKE 1 TABLET BY MOUTH THREE TIMES DAILY Yes Brenda Stubbs DO   gabapentin (NEURONTIN) 300 MG capsule One capsule in the am, 2 in hs Intended supply: 30 days Yes Brenda Stubbs DO   NIFEdipine (PROCARDIA XL) 60 MG extended release tablet TAKE 1 TABLET BY MOUTH DAILY Yes Sal Rousseau MD   metoprolol tartrate (LOPRESSOR) 50 MG tablet TAKE 1 TABLET BY MOUTH THREE TIMES DAILY Yes Sal Rousseau MD   blood glucose test strips (FREESTYLE LITE) strip PATIENT TO TEST ONCE DAILY Yes Sal Rousseau MD   metFORMIN (GLUCOPHAGE) 500 MG tablet TAKE 1 TABLET BY MOUTH TWICE DAILY WITH MEALS Yes Ana Billings MD   FreeStyle Lancets MISC PATIENT TO TEST ONCE DAILY Yes Sal Rousseau MD   colchicine (COLCRYS) 0.6 MG tablet TAKE 1 TABLET BY MOUTH DAILY DURING FLARE FOR GOUT FLARE Yes Sheryl Tellez PA-C   pravastatin (PRAVACHOL) 40 MG tablet 1 po QD Yes Kiran Bashir MD   allopurinol (ZYLOPRIM) 100 MG tablet TAKE 1 TABLET BY MOUTH DAILY Yes Sheryl Tellez PA-C   SYRINGE-NEEDLE, DISP, 3 ML (B-D INTEGRA SYRINGE) 22G X 1-1/2\" 3 ML MISC 1 each by Does not apply route daily Yes Shruthi Zambrano MD    MG capsule TK ONE C PO  BID Yes Historical Provider, MD   Alcohol Swabs (ALCOHOL PREP) 70 % PADS Patient to test once daily E11.9 Yes Sheryl Tellez PA-C   glucose monitoring kit (FREESTYLE) monitoring kit 1 kit by Does not apply route daily Yes Sheryl Tellez PA-C   nitroGLYCERIN (NITROSTAT) 0.4 MG SL tablet Place 1 tablet under the tongue every 5 minutes as needed x 3 doses for chest pain. Yes Sheryl Tellez PA-C   vitamin D (CHOLECALCIFEROL) 25 MCG (1000 UT) TABS tablet Take 1,000 Units by mouth daily Yes Historical Provider, MD   tamsulosin (FLOMAX) 0.4 MG capsule TAKE 1 CAPSULE DAILY AT BEDTIME Yes Historical Provider, MD   CPAP Machine MISC by NOT APPLICABLE route Yes Historical Provider, MD   aspirin 81 MG EC tablet Take 1 tablet by mouth daily Yes Sheryl Tellez PA-C       Social History     Tobacco Use    Smoking status: Never Smoker    Smokeless tobacco: Never Used   Vaping Use    Vaping Use: Never used   Substance Use Topics    Alcohol use: No     Alcohol/week: 0.0 standard drinks     Comment: Stopped years ago.     Drug use: No     Comment: YEARS AGO        No Known Allergies,   Past Medical History:   Diagnosis Date    Chronic back pain     Coronary artery disease 2002    Dr. Zabrina Ramirez at [facility] Esophageal ulcer 3/10/2014    Dr. Dong Molina Gastric ulcer 3/10/2014    Dr. Dong Molina Gout     Hiatal hernia 3/10/2014    Dr. Dong Moilna Hyperlipidemia     Hypertension     Impotence 10/16/2020    MI [facility]) 2005    Dr. Ada Watson Peripheral vascular disease ([facility] [address] [address]     Prediabetes     Schizo-affective schizophrenia, in remission ([facility] [address] 2/1/2005    Overview:  followed at the [facility] Severe single current episode of major depressive disorder, without psychotic features ([facility] [address] 7/8/2016    Status post coronary artery stent placement 2002    Dr. Zabrina Ramirez   , Past Surgical History:   Procedure Laterality Date    APPENDECTOMY  1970    BACK SURGERY  01/03/2020    Dr. Brito Ee Right 6/4/2019    RIGHT WRIST CARPAL TUNNEL RELEASE, SUPINE, PAT AT PCP performed by Sully Camarillo MD at [facility]  3/10/14    DR Denia Brantley  2002    Dr. Aaron Galindo GRAFT  10/17/2017    KNEE ARTHROSCOPY Left 2002    Dr. Renetta Redd  12/19/13    CAUDAL BLOCKS DONE BY DR Rose Crabtree. OTHER SURGICAL HISTORY  04/2020    urolift      SPINE SURGERY  9/2009    Dr. Ming Velázquez  2008    Dr. Arlie Pallas ENDOSCOPY  3/10/14    BX, DILATION, DR Angelica Acosta   ,   Social History     Tobacco Use    Smoking status: Never Smoker    Smokeless tobacco: Never Used   Vaping Use    Vaping Use: Never used   Substance Use Topics    Alcohol use: No     Alcohol/week: 0.0 standard drinks     Comment: Stopped years ago.     Drug use: No     Comment: YEARS AGO   ,   Family History   Problem Relation Age of Onset    High Blood Pressure Mother     Arthritis Mother     Cancer Father         throat    Arthritis Father    ,   Immunization History   Administered Date(s) Administered    COVID-19, Pfizer, PF, 30mcg/0.3mL 03/18/2021, 04/08/2021, 01/04/2022    Influenza Vaccine, unspecified formulation 10/25/2013, 10/20/2014, 11/04/2016, 10/02/2018    Influenza Virus Vaccine 09/16/2015    Influenza, High Dose (Fluzone 65 yrs and older) 09/20/2017, 10/03/2018, 11/17/2019    Influenza, Quadv, adjuvanted, 65 yrs +, IM, PF (Fluad) 09/24/2020    Pneumococcal Conjugate 13-valent ([id number]) 12/08/2016    Pneumococcal Polysaccharide ([id number]) 03/06/2018    Zoster Live (Zostavax) 12/09/2014, 10/02/2018    Zoster Recombinant (Shingrix) 10/03/2018, 01/23/2019   ,   [facility] Maintenance   Topic Date Due    Hepatitis C screen  Never done    Diabetic retinal exam  Never done    DTaP/Tdap/Td vaccine (1 - Tdap) Never done    Lipid screen  11/16/2021    Depression Screen  03/18/2022    Annual Wellness Visit (AWV)  03/19/2022    Diabetic foot exam  07/13/2022    Potassium monitoring  07/19/2022    Creatinine monitoring  07/19/2022    A1C test (Diabetic or Prediabetic)  10/11/2022    Diabetic microalbuminuria test  10/11/2022    Colon cancer screen colonoscopy  03/10/2024    Flu vaccine  Completed    Shingles Vaccine  Completed    Pneumococcal 65+ years Vaccine  Completed    COVID-19 Vaccine  Completed    Hepatitis A vaccine  Aged Out    Hib vaccine  Aged Out    Meningococcal (ACWY) vaccine  Aged Out       PHYSICAL EXAMINATION:  [ INSTRUCTIONS:  \"[x]\" Indicates a positive item  \"[]\" Indicates a negative item  -- DELETE ALL ITEMS NOT EXAMINED]  [x] Alert  [x] Oriented to person/place/time    [x] No apparent distress  [] Toxic appearing    [] Face flushed appearing [] Sclera clear  [] Lips are cyanotic      [x] Breathing appears normal  [] Appears tachypneic      [] Rash on visible skin    [] Cranial Nerves II-XII grossly intact    [] Motor grossly intact in visible upper extremities    [] Motor grossly intact in visible lower extremities    [x] Normal Mood  [] Anxious appearing    [] Depressed appearing  [] Confused appearing      [] Poor short term memory  [] Poor long term memory    [] OTHER:      Due to this being a TeleHealth encounter, evaluation of the following organ systems is limited: Vitals/Constitutional/EENT/Resp/CV/GI//MS/Neuro/Skin/Heme-Lymph-Imm. ASSESSMENT/PLAN:  1. Essential hypertension, benign    - CBC With Auto Differential; Future    2. Hypercholesterolemia    - Lipid Panel    3. Need for hepatitis C screening test    - Hepatitis C Antibody; Future    4. Angina pectoris ([address]      5.  Coronary artery disease of native artery of native heart with stable angina pectoris ([facility] [address] [facility]      6. Type 2 diabetes mellitus without complication, without long-term current use of insulin ([facility] [address]    Doing very well. Stable on current medications. 6 month follow up with me. No follow-ups on file. An  electronic signature was used to authenticate this note. --Mayra Murillo PA-C on 1/7/2022 at 1:22 PM        Pursuant to the emergency declaration under the [address] [place] [address], [facility] waiver authority and the [employer] Act, this Virtual  Visit was conducted, with patient's consent, to reduce the patient's risk of exposure to COVID-19 and provide continuity of care for an established patient. Services were provided through a video synchronous discussion virtually to substitute for in-person clinic visit.

## 2022-01-07 NOTE — PROGRESS NOTES
[facility], 80 y.o. male presents today with:  Chief Complaint   Patient presents with    Hypertension     4 month follow up      HPI  Gale Gutierrez is in the office for follow up.       Review of Systems    Past Medical History:   Diagnosis Date    Chronic back pain     Coronary artery disease 2002    Dr. Bette Rivas at [facility] Esophageal ulcer 3/10/2014    Dr. Hi Overcast Gastric ulcer 3/10/2014    Dr. Len Armas    Gout     Hiatal hernia 3/10/2014    Dr. Hi Overcast Hyperlipidemia     Hypertension     Impotence 10/16/2020    MI ([facility]ocardial infarction) [facility]) 2005    Dr. Trever Bourne Peripheral vascular disease ([facility] [address]     Prediabetes     Schizo-affective schizophrenia, in remission ([address] 2/1/2005    Overview:  followed at the [facility] Severe single current episode of major depressive disorder, without psychotic features ([facility] [address] 7/8/2016    Status post coronary artery stent placement 2002    Dr. Bette Rivas     Past Surgical History:   Procedure Laterality Date   [address]  01/03/2020    Dr. Magalie Gibson Right 6/4/2019    RIGHT WRIST CARPAL TUNNEL RELEASE, SUPINE, PAT AT PCP performed by Niki Childers MD at [address] [place] [address]  3/10/14    DR Adam Xie  2002    Dr. Jacquie Lombard GRAFT  10/17/2017    KNEE ARTHROSCOPY Left 2002    Dr. April Cristobal  12/19/13    CAUDAL BLOCKS DONE BY DR Rose Crabtree. OTHER SURGICAL HISTORY  04/2020    urolift      SPINE SURGERY  9/2009    Dr. Logan Machuca  2008    Dr. Swathi Kuos  3/10/14    Kay Kimbrough, DR Kishan Eaton     Social History     Socioeconomic History    Marital status:      Spouse name: Not on file    Number of children: Not on file  Years of education: Not on file    Highest education level: Not on file   Occupational History    Occupation: disability    Tobacco Use    Smoking status: Never Smoker    Smokeless tobacco: Never Used   Vaping Use    Vaping Use: Never used   Substance and Sexual Activity    Alcohol use: No     Alcohol/week: 0.0 standard drinks     Comment: Stopped years ago.  Drug use: No     Comment: YEARS AGO    Sexual activity: Yes     Partners: Female     Comment: monogomous sexual partner, wife. Other Topics Concern    Not on file   Social History Narrative    Tobacco -- never smoked or chewed. Alcohol -- have not used for past 10 years, prior to had consumed 6 pack of beer daily. Drugs -- tried marijuana and cocaine 14 years ago occasionally used for 3-4 years and then stopped completely 10 years ago. Education -- completed 11th grade in [place].    Occupation -- [employer]. work,  at [employer].    Marital status --  44 years, 2 grown sons. Social Determinants of Health     Financial Resource Strain: Low Risk     Difficulty of Paying Living Expenses: Not hard at all   Food Insecurity:     Worried About Running Out of Food in the Last Year: Not on file    Sofi of Food in the Last Year: Not on file   Transportation Needs:     Lack of Transportation (Medical): Not on file    Lack of Transportation (Non-Medical):  Not on file   Physical Activity:     Days of Exercise per Week: Not on file    Minutes of Exercise per Session: Not on file   Stress:     Feeling of Stress : Not on file   Social Connections:     Frequency of Communication with Friends and Family: Not on file    Frequency of Social Gatherings with Friends and Family: Not on file    Attends Caodaism Services: Not on file    Active Member of Clubs or Organizations: Not on file    Attends Club or Organization Meetings: Not on file    Marital Status: Not on file   Intimate Partner Violence:     Fear of Current or Ex-Partner: Not on file    Emotionally Abused: Not on file    Physically Abused: Not on file    Sexually Abused: Not on file   Housing Stability:     Unable to Pay for Housing in the Last Year: Not on file    Number of Places Lived in the Last Year: Not on file    Unstable Housing in the Last Year: Not on file     Family History   Problem Relation Age of Onset    High Blood Pressure Mother     Arthritis Mother     Cancer Father         throat    Arthritis Father      No Known Allergies  Current Outpatient Medications   Medication Sig Dispense Refill    oxyCODONE-acetaminophen (PERCOCET) 7.5-325 MG per tablet       imipramine (TOFRANIL) 25 MG tablet TAKE 1 TABLET BY MOUTH EVERY NIGHT 30 tablet 3    testosterone cypionate (DEPOTESTOTERONE CYPIONATE) 200 MG/ML injection INJECT 0.5MLS INTO THE MUSCLE EVERY 2 WEEKS 10 mL 0    baclofen (LIORESAL) 10 MG tablet TAKE 1 TABLET BY MOUTH THREE TIMES DAILY 90 tablet 1    gabapentin (NEURONTIN) 300 MG capsule One capsule in the am, 2 in hs Intended supply: 30 days 270 capsule 0    NIFEdipine (PROCARDIA XL) 60 MG extended release tablet TAKE 1 TABLET BY MOUTH DAILY 90 tablet 3    metoprolol tartrate (LOPRESSOR) 50 MG tablet TAKE 1 TABLET BY MOUTH THREE TIMES DAILY 270 tablet 2    blood glucose test strips (FREESTYLE LITE) strip PATIENT TO TEST ONCE DAILY 100 strip 11    metFORMIN (GLUCOPHAGE) 500 MG tablet TAKE 1 TABLET BY MOUTH TWICE DAILY WITH MEALS 180 tablet 3    FreeStyle Lancets MISC PATIENT TO TEST ONCE DAILY 100 each 11    colchicine (COLCRYS) 0.6 MG tablet TAKE 1 TABLET BY MOUTH DAILY DURING FLARE FOR GOUT FLARE 30 tablet 2    pravastatin (PRAVACHOL) 40 MG tablet 1 po QD 90 tablet 3    allopurinol (ZYLOPRIM) 100 MG tablet TAKE 1 TABLET BY MOUTH DAILY 90 tablet 3    SYRINGE-NEEDLE, DISP, 3 ML (B-D INTEGRA SYRINGE) 22G X 1-1/2\" 3 ML MISC 1 each by Does not apply route daily 20 each 6     MG capsule TK ONE C PO  BID      Alcohol Swabs (ALCOHOL PREP) 70 % PADS Patient to test once daily E11.9 100 each 11    glucose monitoring kit (FREESTYLE) monitoring kit 1 kit by Does not apply route daily 1 kit 0    nitroGLYCERIN (NITROSTAT) 0.4 MG SL tablet Place 1 tablet under the tongue every 5 minutes as needed x 3 doses for chest pain. 25 tablet 2    vitamin D (CHOLECALCIFEROL) 25 MCG (1000 UT) TABS tablet Take 1,000 Units by mouth daily      tamsulosin (FLOMAX) 0.4 MG capsule TAKE 1 CAPSULE DAILY AT BEDTIME      CPAP Machine MISC by NOT APPLICABLE route      aspirin 81 MG EC tablet Take 1 tablet by mouth daily 90 tablet 1     No current facility-administered medications for this visit. PMH, Surgical Hx, Family Hx, and Social Hx reviewed and updated. Health Maintenance reviewed. Objective  Vitals:    01/07/22 1029 01/07/22 1034   BP: (!) 180/82 (!) 158/82   Site: Left Upper Arm Left Upper Arm   Position: Sitting Sitting   Cuff Size: Large Adult Medium Adult   Pulse: 71    Resp: 18    Temp: 97 °F (36.1 °C)    TempSrc: Temporal    SpO2: 96%    Weight: 169 lb 6.4 oz (76.8 kg)    Height: 5' 7\" (1.702 m)      BP Readings from Last 3 Encounters:   01/07/22 (!) 158/82   10/20/21 (!) 144/76   10/12/21 124/69     Wt Readings from Last 3 Encounters:   01/07/22 169 lb 6.4 oz (76.8 kg)   10/20/21 165 lb (74.8 kg)   10/12/21 165 lb (74.8 kg)     Physical Exam  Assessment & Plan   There are no diagnoses linked to this encounter. No orders of the defined types were placed in this encounter. No orders of the defined types were placed in this encounter. Medications Discontinued During This Encounter   Medication Reason    MAGNESIUM HYDROXIDE PO LIST CLEANUP     No follow-ups on file. Reviewed with the patient: current clinical status, medications, activities and diet.      Side effects, adverse effects of the medication prescribed today, as well as treatment plan/ rationale and result expectations have been discussed with the patient who expresses understanding and desires to proceed. Close follow up to evaluate treatment results and for coordination of care. I have reviewed the patient's medical history in detail and updated the computerized patient record.     Sheryl Tellez PA-C

## 2022-01-10 RX ORDER — BACLOFEN 10 MG/1
TABLET ORAL
Qty: 90 TABLET | Refills: 1 | Status: SHIPPED | OUTPATIENT
Start: 2022-01-10 | End: 2022-02-21

## 2022-01-11 ENCOUNTER — OFFICE VISIT (OUTPATIENT)
Dept: ENDOCRINOLOGY | Age: 72
End: 2022-01-11
Payer: MEDICARE

## 2022-01-11 VITALS
WEIGHT: 169 LBS | BODY MASS INDEX: 26.53 KG/M2 | HEART RATE: 70 BPM | SYSTOLIC BLOOD PRESSURE: 134 MMHG | DIASTOLIC BLOOD PRESSURE: 78 MMHG | HEIGHT: 67 IN | OXYGEN SATURATION: 98 %

## 2022-01-11 DIAGNOSIS — E11.9 TYPE 2 DIABETES MELLITUS WITHOUT COMPLICATION, WITHOUT LONG-TERM CURRENT USE OF INSULIN (HCC): ICD-10-CM

## 2022-01-11 DIAGNOSIS — E29.1 HYPOGONADISM MALE: ICD-10-CM

## 2022-01-11 DIAGNOSIS — E29.1 HYPOGONADISM MALE: Primary | ICD-10-CM

## 2022-01-11 LAB
ANION GAP SERPL CALCULATED.3IONS-SCNC: 11 MEQ/L (ref 9–15)
BUN BLDV-MCNC: 15 MG/DL (ref 8–23)
CALCIUM SERPL-MCNC: 9.8 MG/DL (ref 8.5–9.9)
CHLORIDE BLD-SCNC: 100 MEQ/L (ref 95–107)
CHP ED QC CHECK: NORMAL
CO2: 31 MEQ/L (ref 20–31)
CREAT SERPL-MCNC: 0.88 MG/DL (ref 0.7–1.2)
GFR AFRICAN AMERICAN: >60
GFR NON-AFRICAN AMERICAN: >60
GLUCOSE BLD-MCNC: 109 MG/DL
GLUCOSE FASTING: 123 MG/DL (ref 70–99)
HBA1C MFR BLD: 6.1 % (ref 4.8–5.9)
POTASSIUM SERPL-SCNC: 4.9 MEQ/L (ref 3.4–4.9)
SODIUM BLD-SCNC: 142 MEQ/L (ref 135–144)

## 2022-01-11 PROCEDURE — 82962 GLUCOSE BLOOD TEST: CPT | Performed by: INTERNAL MEDICINE

## 2022-01-11 PROCEDURE — 99213 OFFICE O/P EST LOW 20 MIN: CPT | Performed by: INTERNAL MEDICINE

## 2022-01-11 RX ORDER — TESTOSTERONE CYPIONATE 200 MG/ML
INJECTION INTRAMUSCULAR
Qty: 10 ML | Refills: 0 | Status: SHIPPED | OUTPATIENT
Start: 2022-01-11 | End: 2022-07-12 | Stop reason: SDUPTHER

## 2022-01-11 NOTE — PROGRESS NOTES
1/11/2022    Assessment:       Diagnosis Orders   1. Hypogonadism male     2. Type 2 diabetes mellitus without complication, without long-term current use of insulin ([facility])  POCT Glucose         PLAN:     Continue current dose of medication for diabetes and testosterone  100 mg every 2 weeks    Orders Placed This Encounter   Procedures    POCT Glucose       Subjective:     Chief Complaint   Patient presents with    Diabetes    Hypogonadism     Vitals:    01/11/22 1045   BP: 134/78   Pulse: 70   SpO2: 98%   Weight: 169 lb (76.7 kg)   Height: 5' 7\" (1.702 m)     Wt Readings from Last 3 Encounters:   01/11/22 169 lb (76.7 kg)   01/07/22 169 lb 6.4 oz (76.8 kg)   10/20/21 165 lb (74.8 kg)     BP Readings from Last 3 Encounters:   01/11/22 134/78   01/07/22 (!) 158/82   10/20/21 (!) 144/76     Follow-up on type 2 diabetes patient on metformin for diabetes   Hemoglobin A1c has been stable history of hypogonadism patient on testosterone injections testosterone levels have been stable  Hemoglobin A1c was 6.1  Last testosterone level was more than 1000    Diabetes  He presents for his follow-up diabetic visit. He has type 2 diabetes mellitus. His disease course has been stable. There are no hypoglycemic complications. Diabetic complications include peripheral neuropathy. Current diabetic treatment includes oral agent (monotherapy). His overall blood glucose range is 110-130 mg/dl. (Lab Results       Component                Value               Date                       LABA1C                   6.1 (H)             01/11/2022              )        Results for Demarcus Meza (MRN [id number]) as of 1/11/2022 11:01   Ref.  Range 10/11/2021 09:07 10/12/2021 11:07 11/24/2021 00:00 1/11/2022 08:44 1/11/2022 10:47   Sodium Latest Ref Range: 135 - 144 mEq/L    142    Potassium Latest Ref Range: 3.4 - 4.9 mEq/L    4.9    Chloride Latest Ref Range: 95 - 107 mEq/L    100    CO2 Latest Ref Range: 20 - 31 mEq/L    31    BUN Latest Ref Range: 8 - 23 mg/dL    15    Creatinine Latest Ref Range: 0.70 - 1.20 mg/dL    0.88    Anion Gap Latest Ref Range: 9 - 15 mEq/L    11    GFR Non- Latest Ref Range: >60     >60.0    GFR African American Latest Ref Range: >60     >60.0    Glucose Latest Units: mg/dL  116   109   CALCIUM, SERUM, [id number] Latest Ref Range: 8.5 - 9.9 mg/dL    9.8    Glucose, Fasting Latest Ref Range: 70 - 99 mg/dL    123 (H)    Hemoglobin A1C Latest Ref Range: 4.8 - 5.9 % 6.3 (H)       Sex Hormone Binding Latest Ref Range: 11 - 80 nmol/L 41       Testosterone Free, Calc Latest Ref Range: 47 - 244 pg/mL 142       Total Testosterone Latest Ref Range: 300 - 720 ng/dL 787 (H)       Testosterone % Free Latest Ref Range: 1.6 - 2.9 % 1.8       Creatinine, Ur Latest Ref Range: Not Established mg/dL 184.2       Microalbumin Creatinine Ratio Latest Ref Range: 0.0 - 30.0 mg/G see below       Microalbumin, Random Urine Latest Ref Range: Not Established mg/dL <1.20       Diabetic Retinopathy Unknown   Negative         Past Medical History:   Diagnosis Date    Chronic back pain     Coronary artery disease 2002    Dr. Percy Hinson at [address] Esophageal ulcer 3/10/2014    Dr. Charles Santoro    Gastric ulcer 3/10/2014    Dr. Charles Santoro    Gout     Hiatal hernia 3/10/2014    Dr. Charles Santoro    Hyperlipidemia     Hypertension     Impotence 10/16/2020    MI (myocardial infarction) [facility]) 2005    Dr. Penny Mantilla Peripheral vascular disease ([address]     Prediabetes     Schizo-affective schizophrenia, in remission ([address] 2/1/2005    Overview:  followed [facility] Severe single current episode of major depressive disorder, without psychotic features ([address] 7/8/2016    Status post coronary artery stent placement 2002    Dr. Percy Hinson     Past Surgical History:   Procedure Laterality Date    APPENDECTOMY  1970    BACK SURGERY  01/03/2020    Dr. Yuliet Laura TUNNEL RELEASE Right 6/4/2019    RIGHT WRIST CARPAL TUNNEL RELEASE, SUPINE, PAT AT PCP performed by Norah Self MD at [address] [facility]  3/10/14    DR Kasi Gómez  2002    Dr. Maya Heady GRAFT  10/17/2017    KNEE ARTHROSCOPY Left 2002    Dr. Oscar Fuentes  12/19/13    CAUDAL BLOCKS DONE BY DR Juan Gil    OTHER SURGICAL HISTORY  04/2020    [employer]      SPINE SURGERY  9/2009    Dr. Rebeca Riedel  2008    Dr. Shayy Varela UPPER GASTROINTESTINAL ENDOSCOPY  3/10/14    Levonia Osler, DR Kasie Dash     Social History     Socioeconomic History    Marital status:      Spouse name: Not on file    Number of children: Not on file    Years of education: Not on file    Highest education level: Not on file   Occupational History    Occupation: disability    Tobacco Use    Smoking status: Never Smoker    Smokeless tobacco: Never Used   Vaping Use    Vaping Use: Never used   Substance and Sexual Activity    Alcohol use: No     Alcohol/week: 0.0 standard drinks     Comment: Stopped years ago.  Drug use: No     Comment: YEARS AGO    Sexual activity: Yes     Partners: Female     Comment: monogomous sexual partner, wife. Other Topics Concern    Not on file   Social History Narrative    Tobacco -- never smoked or chewed. Alcohol -- have not used for past 10 years, prior to had consumed 6 pack of beer daily. Drugs -- tried marijuana and cocaine 14 years ago occasionally used for 3-4 years and then stopped completely 10 years ago. Education -- completed 11th grade in [place].    Occupation -- [address] work,  at [employer].    Marital status --  44 years, 2 grown sons.      Social Determinants of Health     Financial Resource Strain: Low Risk     Difficulty of Paying Living Expenses: Not hard at all   Food Insecurity:     Worried About [facility] in the Last Year: Not on file    Sofi of Food in the Last Year: Not on file   Transportation Needs:     Lack of Transportation (Medical): Not on file    Lack of Transportation (Non-Medical):  Not on file   Physical Activity:     Days of Exercise per Week: Not on file    Minutes of Exercise per Session: Not on file   Stress:     Feeling of Stress : Not on file   Social Connections:     Frequency of Communication with Friends and Family: Not on file    Frequency of Social Gatherings with Friends and Family: Not on file    Attends Jainism Services: Not on file    Active Member of Clubs or Organizations: Not on file    Attends Club or Organization Meetings: Not on file    Marital Status: Not on file   Intimate Partner Violence:     Fear of Current or Ex-Partner: Not on file    Emotionally Abused: Not on file    Physically Abused: Not on file    Sexually Abused: Not on file   Housing Stability:     Unable to Pay for Housing in the Last Year: Not on file    Number of Places Lived in the Last Year: Not on file    Unstable Housing in the Last Year: Not on file     Family History   Problem Relation Age of Onset    High Blood Pressure Mother     Arthritis Mother     Cancer Father         throat    Arthritis Father      No Known Allergies    Current Outpatient Medications:     colchicine (COLCRYS) 0.6 MG tablet, TAKE 1 TABLET BY MOUTH DAILY DURING FLARE FOR GOUT FLARE, Disp: 30 tablet, Rfl: 2    baclofen (LIORESAL) 10 MG tablet, TAKE 1 TABLET BY MOUTH THREE TIMES DAILY, Disp: 90 tablet, Rfl: 1    oxyCODONE-acetaminophen (PERCOCET) 7.5-325 MG per tablet, , Disp: , Rfl:     imipramine (TOFRANIL) 25 MG tablet, TAKE 1 TABLET BY MOUTH EVERY NIGHT, Disp: 30 tablet, Rfl: 3    testosterone cypionate (DEPOTESTOTERONE CYPIONATE) 200 MG/ML injection, INJECT 0.5MLS INTO THE MUSCLE EVERY 2 WEEKS, Disp: 10 mL, Rfl: 0    gabapentin (NEURONTIN) 300 MG capsule, One capsule in the am, 2 in hs Intended supply: 30 days, Disp: 270 capsule, Rfl: 0    NIFEdipine (PROCARDIA XL) 60 MG extended release tablet, TAKE 1 TABLET BY MOUTH DAILY, Disp: 90 tablet, Rfl: 3    metoprolol tartrate (LOPRESSOR) 50 MG tablet, TAKE 1 TABLET BY MOUTH THREE TIMES DAILY, Disp: 270 tablet, Rfl: 2    blood glucose test strips (FREESTYLE LITE) strip, PATIENT TO TEST ONCE DAILY, Disp: 100 strip, Rfl: 11    metFORMIN (GLUCOPHAGE) 500 MG tablet, TAKE 1 TABLET BY MOUTH TWICE DAILY WITH MEALS, Disp: 180 tablet, Rfl: 3    FreeStyle Lancets MISC, PATIENT TO TEST ONCE DAILY, Disp: 100 each, Rfl: 11    pravastatin (PRAVACHOL) 40 MG tablet, 1 po QD, Disp: 90 tablet, Rfl: 3    allopurinol (ZYLOPRIM) 100 MG tablet, TAKE 1 TABLET BY MOUTH DAILY, Disp: 90 tablet, Rfl: 3    SYRINGE-NEEDLE, DISP, 3 ML (B-D INTEGRA SYRINGE) 22G X 1-1/2\" 3 ML MISC, 1 each by Does not apply route daily, Disp: 20 each, Rfl: 6     MG capsule, TK ONE C PO  BID, Disp: , Rfl:     Alcohol Swabs (ALCOHOL PREP) 70 % PADS, Patient to test once daily E11.9, Disp: 100 each, Rfl: 11    glucose monitoring kit (FREESTYLE) monitoring kit, 1 kit by Does not apply route daily, Disp: 1 kit, Rfl: 0    nitroGLYCERIN (NITROSTAT) 0.4 MG SL tablet, Place 1 tablet under the tongue every 5 minutes as needed x 3 doses for chest pain., Disp: 25 tablet, Rfl: 2    vitamin D (CHOLECALCIFEROL) 25 MCG (1000 UT) TABS tablet, Take 1,000 Units by mouth daily, Disp: , Rfl:     tamsulosin (FLOMAX) 0.4 MG capsule, TAKE 1 CAPSULE DAILY AT BEDTIME, Disp: , Rfl:     CPAP Machine MISC, by NOT APPLICABLE route, Disp: , Rfl:     aspirin 81 MG EC tablet, Take 1 tablet by mouth daily, Disp: 90 tablet, Rfl: 1  Lab Results   Component Value Date     01/11/2022    K 4.9 01/11/2022     01/11/2022    CO2 31 01/11/2022    BUN 15 01/11/2022    CREATININE 0.88 01/11/2022    GLUCOSE 109 01/11/2022    CALCIUM 9.8 01/11/2022    PROT 7.9 03/18/2021    LABALBU 4.6 03/18/2021 BILITOT 0.5 03/18/2021    ALKPHOS 73 03/18/2021    AST 27 03/18/2021    ALT 21 03/18/2021    LABGLOM >60.0 01/11/2022    GFRAA >60.0 01/11/2022    GLOB 3.3 03/18/2021     Lab Results   Component Value Date    WBC 4.6 (L) 07/19/2021    HGB 14.2 07/19/2021    HCT 42.1 07/19/2021    MCV 99.6 07/19/2021     07/19/2021     Lab Results   Component Value Date    LABA1C 6.3 (H) 10/11/2021    LABA1C 6.2 07/13/2021    LABA1C 6.4 (H) 03/18/2021     Lab Results   Component Value Date    CHOLFAST 148 05/24/2019    TRIGLYCFAST 98 05/24/2019    HDL 45 11/16/2020    HDL 42 05/24/2019    HDL 45 03/12/2018    LDLCALC 86 11/16/2020    LDLCALC 86 05/24/2019    LDLCALC 76 03/12/2018    CHOL 150 11/16/2020    CHOL 152 03/12/2018    CHOL 271 (H) 09/05/2017    TRIG 96 11/16/2020    TRIG 157 03/12/2018    TRIG 154 09/05/2017     Lab Results   Component Value Date    TESTM 787 (H) 10/11/2021    TESTM 209 (L) 03/30/2021    TESTM 976 (H) 11/16/2020     Lab Results   Component Value Date    TSH 4.110 05/06/2016    TSH 2.153 09/09/2013    TSHREFLEX 2.730 12/15/2020     No results found for: TPOABS    Review of Systems    Objective:   Physical Exam  Vitals reviewed. Constitutional:       Appearance: Normal appearance. HENT:      Head: Normocephalic and atraumatic. Hair is normal.      Right Ear: External ear normal.      Left Ear: External ear normal.      Nose: Nose normal.   Eyes:      General: No scleral icterus. Right eye: No discharge. Left eye: No discharge. Extraocular Movements: Extraocular movements intact. Conjunctiva/sclera: Conjunctivae normal.   Neck:      Trachea: Trachea normal.   Cardiovascular:      Rate and Rhythm: Normal rate. Musculoskeletal:         General: Normal range of motion. Cervical back: Normal range of motion and neck supple. Neurological:      General: No focal deficit present. Mental Status: He is alert and oriented to person, place, and time.    Psychiatric: Mood and Affect: Mood normal.         Behavior: Behavior normal.

## 2022-01-13 LAB
SEX HORMONE BINDING GLOBULIN: 44 NMOL/L (ref 11–80)
TESTOSTERONE FREE PERCENT: 1.9 % (ref 1.6–2.9)
TESTOSTERONE FREE, CALC: 197 PG/ML (ref 47–244)
TESTOSTERONE TOTAL-MALE: 1063 NG/DL (ref 300–720)

## 2022-01-13 RX ORDER — GABAPENTIN 300 MG/1
CAPSULE ORAL
Qty: 270 CAPSULE | Refills: 0 | Status: SHIPPED | OUTPATIENT
Start: 2022-01-13 | End: 2022-05-26

## 2022-01-14 DIAGNOSIS — I10 ESSENTIAL HYPERTENSION, BENIGN: ICD-10-CM

## 2022-01-14 DIAGNOSIS — Z11.59 NEED FOR HEPATITIS C SCREENING TEST: ICD-10-CM

## 2022-01-14 LAB
BASOPHILS ABSOLUTE: 0 K/UL (ref 0–0.2)
BASOPHILS RELATIVE PERCENT: 0.9 %
CHOLESTEROL, TOTAL: 185 MG/DL (ref 0–199)
EOSINOPHILS ABSOLUTE: 0.1 K/UL (ref 0–0.7)
EOSINOPHILS RELATIVE PERCENT: 1.1 %
HCT VFR BLD CALC: 46.3 % (ref 42–52)
HDLC SERPL-MCNC: 45 MG/DL (ref 40–59)
HEMOGLOBIN: 15.3 G/DL (ref 14–18)
LDL CHOLESTEROL CALCULATED: 116 MG/DL (ref 0–129)
LYMPHOCYTES ABSOLUTE: 1.2 K/UL (ref 1–4.8)
LYMPHOCYTES RELATIVE PERCENT: 21.5 %
MCH RBC QN AUTO: 32.1 PG (ref 27–31.3)
MCHC RBC AUTO-ENTMCNC: 33 % (ref 33–37)
MCV RBC AUTO: 97.1 FL (ref 80–100)
MONOCYTES ABSOLUTE: 0.6 K/UL (ref 0.2–0.8)
MONOCYTES RELATIVE PERCENT: 11.6 %
NEUTROPHILS ABSOLUTE: 3.5 K/UL (ref 1.4–6.5)
NEUTROPHILS RELATIVE PERCENT: 64.9 %
PDW BLD-RTO: 14.5 % (ref 11.5–14.5)
PLATELET # BLD: 272 K/UL (ref 130–400)
RBC # BLD: 4.76 M/UL (ref 4.7–6.1)
TRIGL SERPL-MCNC: 122 MG/DL (ref 0–150)
WBC # BLD: 5.4 K/UL (ref 4.8–10.8)

## 2022-01-15 LAB — HEPATITIS C ANTIBODY: NONREACTIVE

## 2022-01-17 ENCOUNTER — PROCEDURE VISIT (OUTPATIENT)
Dept: PHYSICAL MEDICINE AND REHAB | Age: 72
End: 2022-01-17
Payer: MEDICARE

## 2022-01-17 DIAGNOSIS — M79.10 MYALGIA: ICD-10-CM

## 2022-01-17 PROCEDURE — 20553 NJX 1/MLT TRIGGER POINTS 3/>: CPT | Performed by: PHYSICAL MEDICINE & REHABILITATION

## 2022-01-17 PROCEDURE — 96372 THER/PROPH/DIAG INJ SC/IM: CPT | Performed by: PHYSICAL MEDICINE & REHABILITATION

## 2022-01-17 RX ORDER — CYANOCOBALAMIN 1000 UG/ML
1000 INJECTION INTRAMUSCULAR; SUBCUTANEOUS ONCE
Status: COMPLETED | OUTPATIENT
Start: 2022-01-17 | End: 2022-01-17

## 2022-01-17 RX ORDER — LIDOCAINE HYDROCHLORIDE 10 MG/ML
23 INJECTION, SOLUTION INFILTRATION; PERINEURAL ONCE
Status: COMPLETED | OUTPATIENT
Start: 2022-01-17 | End: 2022-01-17

## 2022-01-17 RX ADMIN — CYANOCOBALAMIN 1000 MCG: 1000 INJECTION INTRAMUSCULAR; SUBCUTANEOUS at 11:59

## 2022-01-17 RX ADMIN — LIDOCAINE HYDROCHLORIDE 23 ML: 10 INJECTION, SOLUTION INFILTRATION; PERINEURAL at 12:01

## 2022-01-18 ENCOUNTER — OFFICE VISIT (OUTPATIENT)
Dept: CARDIOLOGY CLINIC | Age: 72
End: 2022-01-18
Payer: MEDICARE

## 2022-01-18 VITALS
SYSTOLIC BLOOD PRESSURE: 132 MMHG | BODY MASS INDEX: 26.68 KG/M2 | DIASTOLIC BLOOD PRESSURE: 62 MMHG | RESPIRATION RATE: 99 BRPM | HEART RATE: 80 BPM | WEIGHT: 170 LBS | HEIGHT: 67 IN

## 2022-01-18 DIAGNOSIS — I10 ESSENTIAL HYPERTENSION, BENIGN: ICD-10-CM

## 2022-01-18 DIAGNOSIS — R09.89 BILATERAL CAROTID BRUITS: ICD-10-CM

## 2022-01-18 DIAGNOSIS — E78.00 HYPERCHOLESTEROLEMIA: ICD-10-CM

## 2022-01-18 DIAGNOSIS — I25.118 CORONARY ARTERY DISEASE OF NATIVE ARTERY OF NATIVE HEART WITH STABLE ANGINA PECTORIS (HCC): Primary | ICD-10-CM

## 2022-01-18 PROCEDURE — 93000 ELECTROCARDIOGRAM COMPLETE: CPT | Performed by: INTERNAL MEDICINE

## 2022-01-18 PROCEDURE — 99214 OFFICE O/P EST MOD 30 MIN: CPT | Performed by: INTERNAL MEDICINE

## 2022-01-18 RX ORDER — ATORVASTATIN CALCIUM 40 MG/1
40 TABLET, FILM COATED ORAL DAILY
Qty: 90 TABLET | Refills: 3 | Status: SHIPPED | OUTPATIENT
Start: 2022-01-18 | End: 2022-05-10 | Stop reason: SDUPTHER

## 2022-01-18 ASSESSMENT — ENCOUNTER SYMPTOMS
STRIDOR: 0
COUGH: 0
BACK PAIN: 1
EYES NEGATIVE: 1
WHEEZING: 0
CHEST TIGHTNESS: 0
SHORTNESS OF BREATH: 0
NAUSEA: 0
GASTROINTESTINAL NEGATIVE: 1
BLOOD IN STOOL: 0
RESPIRATORY NEGATIVE: 1

## 2022-01-18 NOTE — PROGRESS NOTES
Subsequent Progress Note  Patient: Eric Fernández  YOB: 1950  MRN: [id number]    Chief Complaint: htn cad lipid preop back   Chief Complaint   Patient presents with    Follow-up     4 month    Coronary Artery Disease    Hypertension       CV Data:  Prior CAD/Stentsx 2  10/17/2017 CABG x2 EF 55  10/2018  Stress echo EF 55  Persistent HyperK  7/2020 CUS mild   12/2020 GXT negative   10/21 CUS mild     Subjective/HPI: no cp no osb no falls noblled has back arthritis getting shots with some releif. 10/25/2019 No cp no sob no falls no bleed. Takes meds. Eating well. 2/26/2020 no cp no sob. Had extensive back SX. Did well. 6/26/2020 no cp no osb no falls no bleed. Takes meds. Walks and active no bleed    10/27/2020 pt will have ED surgery at [facility] next week. He is active and has no CP no SOB no falls no bleed. 1/6/21 no cp no sob no falls no bleed    5/7/21 no cp no sob no falls nob leed no edema K is always high 5.3 recently. Recently HR 40s and Metoprolol was backed off to Bid from prior tid.     9/10/21 doing well no cp no sob active taking meds. No falls. No bleed. 1/18/22 doing well no  Cp no spb not dizzy no bleed. Takes meds. Thelma Solorzano  active         EKG: SR 71 RBBB    Past Medical History:   Diagnosis Date    Chronic back pain     Coronary artery disease 2002    Dr. Caden Basilio at [facility] Esophageal ulcer 3/10/2014    Dr. Anna Mesa    Gastric ulcer 3/10/2014    Dr. Anna Mesa    Gout     Hiatal hernia 3/10/2014    Dr. Beni Lopez Hyperlipidemia     Hypertension     Impotence 10/16/2020    MI (myocardial infarction) [facility]) 2005    Dr. Sweta Gold Peripheral vascular disease ([facility] [address]     Prediabetes     Schizo-affective schizophrenia, in remission ([facility] [address] 2/1/2005    Overview:  followed at the [facility] Severe single current episode of major depressive disorder, without psychotic features ([address] 7/8/2016    Status post coronary artery stent placement 2002    Dr. Tiara Cifuentes       Past Surgical History:   Procedure Laterality Date   [address]  01/03/2020    Dr. Kim Andrewter Right 6/4/2019    RIGHT WRIST CARPAL TUNNEL RELEASE, SUPINE, PAT AT PCP performed by Dar Blanton MD at [address] [facility]  3/10/14    DR Concepción Stallings  2002    Dr. Noretta Rubinstein GRAFT  10/17/2017    KNEE ARTHROSCOPY Left 2002    Dr. Sridevi Rosenthal  12/19/13    CAUDAL BLOCKS DONE BY DR Riya Guillaume    OTHER SURGICAL HISTORY  04/2020    [facility]rolift      SPINE SURGERY  9/2009    Dr. Jeremías Dinh  2008    Dr. Nhi Peterson  3/10/14    BX, DILATION, DR Kwaku Vivas       Family History   Problem Relation Age of Onset    High Blood Pressure Mother     Arthritis Mother     Cancer Father         throat    Arthritis Father        Social History     Socioeconomic History    Marital status:      Spouse name: None    Number of children: None    Years of education: None    Highest education level: None   Occupational History    Occupation: disability    Tobacco Use    Smoking status: Never Smoker    Smokeless tobacco: Never Used   Vaping Use    Vaping Use: Never used   Substance and Sexual Activity    Alcohol use: No     Alcohol/week: 0.0 standard drinks     Comment: Stopped years ago.  Drug use: No     Comment: YEARS AGO    Sexual activity: Yes     Partners: Female     Comment: monogomous sexual partner, wife. Other Topics Concern    None   Social History Narrative    Tobacco -- never smoked or chewed. Alcohol -- have not used for past 10 years, prior to had consumed 6 pack of beer daily.     Drugs -- tried marijuana and cocaine 14 years ago occasionally used for 3-4 years and then stopped completely 10 years ago.    Education -- completed 11th grade in [place].    Occupation -- [employer] work,  at [employer].    Marital status --  44 years, 2 grown sons. Social Determinants of Health     Financial Resource Strain: Low Risk     Difficulty of Paying Living Expenses: Not hard at all   Food Insecurity:     Worried About Running Out of Food in the Last Year: Not on file    Sofi of Food in the Last Year: Not on file   Transportation Needs:     Lack of Transportation (Medical): Not on file    Lack of Transportation (Non-Medical):  Not on file   Physical Activity:     Days of Exercise per Week: Not on file    Minutes of Exercise per Session: Not on file   Stress:     Feeling of Stress : Not on file   Social Connections:     Frequency of Communication with Friends and Family: Not on file    Frequency of Social Gatherings with Friends and Family: Not on file    Attends Jain Services: Not on file    Active Member of [address] [place] [address] [employer] or Organizations: Not on file    Attends Club or Organization Meetings: Not on file    Marital Status: Not on file   Intimate Partner Violence:     Fear of Current or Ex-Partner: Not on file    Emotionally Abused: Not on file    Physically Abused: Not on file    Sexually Abused: Not on file   Housing Stability:     Unable to Pay for Housing in the Last Year: Not on file    Number of [place] in the Last Year: Not on file    Unstable Housing in the Last Year: Not on file       No Known Allergies    Current Outpatient Medications   Medication Sig Dispense Refill    atorvastatin (LIPITOR) 40 MG tablet Take 1 tablet by mouth daily 90 tablet 3    gabapentin (NEURONTIN) 300 MG capsule TAKE 1 CAPSULE BY MOUTH EVERY MORNING THEN TAKE 2 CAPSULES BY MOUTH AT BEDTIME 270 capsule 0    testosterone cypionate (DEPOTESTOTERONE CYPIONATE) 200 MG/ML injection INJECT 0.5MLS INTO THE MUSCLE EVERY 2 WEEKS 10 mL 0    colchicine (COLCRYS) 0.6 MG tablet TAKE 1 TABLET BY MOUTH DAILY DURING FLARE FOR GOUT FLARE 30 tablet 2    baclofen (LIORESAL) 10 MG tablet TAKE 1 TABLET BY MOUTH THREE TIMES DAILY 90 tablet 1    oxyCODONE-acetaminophen (PERCOCET) 7.5-325 MG per tablet       imipramine (TOFRANIL) 25 MG tablet TAKE 1 TABLET BY MOUTH EVERY NIGHT 30 tablet 3    NIFEdipine (PROCARDIA XL) 60 MG extended release tablet TAKE 1 TABLET BY MOUTH DAILY 90 tablet 3    metoprolol tartrate (LOPRESSOR) 50 MG tablet TAKE 1 TABLET BY MOUTH THREE TIMES DAILY 270 tablet 2    blood glucose test strips (FREESTYLE LITE) strip PATIENT TO TEST ONCE DAILY 100 strip 11    metFORMIN (GLUCOPHAGE) 500 MG tablet TAKE 1 TABLET BY MOUTH TWICE DAILY WITH MEALS 180 tablet 3    FreeStyle Lancets MISC PATIENT TO TEST ONCE DAILY 100 each 11    allopurinol (ZYLOPRIM) 100 MG tablet TAKE 1 TABLET BY MOUTH DAILY 90 tablet 3    SYRINGE-NEEDLE, DISP, 3 ML (B-D [employer] SYRINGE) 22G X 1-1/2\" 3 ML MISC 1 each by Does not apply route daily 20 each 6     MG capsule TK ONE C PO  BID      Alcohol Swabs (ALCOHOL PREP) 70 % PADS Patient to test once daily E11.9 100 each 11    glucose monitoring kit (FREESTYLE) monitoring kit 1 kit by Does not apply route daily 1 kit 0    nitroGLYCERIN (NITROSTAT) 0.4 MG SL tablet Place 1 tablet under the tongue every 5 minutes as needed x 3 doses for chest pain. 25 tablet 2    vitamin D (CHOLECALCIFEROL) 25 MCG (1000 UT) TABS tablet Take 1,000 Units by mouth daily      tamsulosin (FLOMAX) 0.4 MG capsule TAKE 1 CAPSULE DAILY AT BEDTIME      CPAP Machine MISC by NOT APPLICABLE route      aspirin 81 MG EC tablet Take 1 tablet by mouth daily 90 tablet 1     No current facility-administered medications for this visit. Review of Systems:   Review of Systems   Constitutional: Negative. Negative for diaphoresis and fatigue. HENT: Negative. Eyes: Negative. Respiratory: Negative. Negative for cough, chest tightness, shortness of breath, wheezing and stridor. Cardiovascular: Negative. Negative for chest pain, palpitations and leg swelling. Gastrointestinal: Negative. Negative for blood in stool and nausea. Genitourinary: Negative. Musculoskeletal: Positive for arthralgias and back pain. Skin: Negative. Neurological: Negative. Negative for dizziness, syncope, weakness and light-headedness. Hematological: Negative. Psychiatric/Behavioral: Negative. Physical Examination:    /62 (Site: Left Upper Arm, Position: Sitting, Cuff Size: Medium Adult)   Pulse 80   Resp (!) 99   Ht 5' 7\" (1.702 m)   Wt 170 lb (77.1 kg)   BMI 26.63 kg/m²    Physical Exam   Constitutional: He appears healthy. No distress. HENT:   Normal cephalic and Atraumatic   Eyes: Pupils are equal, round, and reactive to light. Neck: Thyroid normal. No JVD present. No neck adenopathy. No thyromegaly present. Cardiovascular: Normal rate, regular rhythm, intact distal pulses and normal pulses. Murmur heard. Pulmonary/Chest: Effort normal and breath sounds normal. He has no wheezes. He has no rales. He exhibits no tenderness. Abdominal: Soft. Bowel sounds are normal. There is no abdominal tenderness. Musculoskeletal:         General: No tenderness or edema. Normal range of motion. Cervical back: Normal range of motion and neck supple. Neurological: He is alert and oriented to person, place, and time. Skin: Skin is warm. No cyanosis. Nails show no clubbing.        LABS:  CBC:   Lab Results   Component Value Date    WBC 5.4 01/14/2022    RBC 4.76 01/14/2022    HGB 15.3 01/14/2022    HCT 46.3 01/14/2022    MCV 97.1 01/14/2022    MCH 32.1 01/14/2022    MCHC 33.0 01/14/2022    RDW 14.5 01/14/2022     01/14/2022    MPV 9.9 09/15/2015     Lipids:  Lab Results   Component Value Date    CHOL 185 01/14/2022    CHOL 150 11/16/2020    CHOL 152 03/12/2018     Lab Results   Component Value Date    TRIG 122 01/14/2022    TRIG 96 11/16/2020    TRIG 157 03/12/2018 Lab Results   Component Value Date    HDL 45 01/14/2022    HDL 45 11/16/2020    HDL 42 05/24/2019     Lab Results   Component Value Date    LDLCALC 116 01/14/2022    LDLCALC 86 11/16/2020    LDLCALC 86 05/24/2019     No results found for: LABVLDL, VLDL  No results found for: CHOLHDLRATIO  CMP:    Lab Results   Component Value Date     01/11/2022    K 4.9 01/11/2022     01/11/2022    CO2 31 01/11/2022    BUN 15 01/11/2022    CREATININE 0.88 01/11/2022    GFRAA >60.0 01/11/2022    LABGLOM >60.0 01/11/2022    GLUCOSE 109 01/11/2022    GLUCOSE 115 10/27/2020    PROT 7.9 03/18/2021    LABALBU 4.6 03/18/2021    LABALBU 4.4 10/27/2020    CALCIUM 9.8 01/11/2022    BILITOT 0.5 03/18/2021    ALKPHOS 73 03/18/2021    AST 27 03/18/2021    ALT 21 03/18/2021     BMP:    Lab Results   Component Value Date     01/11/2022    K 4.9 01/11/2022     01/11/2022    CO2 31 01/11/2022    BUN 15 01/11/2022    LABALBU 4.6 03/18/2021    LABALBU 4.4 10/27/2020    CREATININE 0.88 01/11/2022    CALCIUM 9.8 01/11/2022    GFRAA >60.0 01/11/2022    LABGLOM >60.0 01/11/2022    GLUCOSE 109 01/11/2022    GLUCOSE 115 10/27/2020     Magnesium:    Lab Results   Component Value Date    MG 2.07 01/06/2020     TSH:  Lab Results   Component Value Date    TSH 4.110 05/06/2016       Patient Active Problem List   Diagnosis    Chronic low back pain    Scoliosis of lumbar spine    Essential hypertension, benign    Hypercholesterolemia    Right lumbar radiculopathy    Gout    High risk medication use - 12/07/17 [facility] PM&R, 02/08/18 [facility] PM&R, 10/05/17 Urine Drug Screen: negative PM&R, MED CONTRACT 1/17/17    Low back pain with sciatica    Myalgia    Synovitis and tenosynovitis    Thoracic and lumbosacral neuritis    Vitamin D deficiency    Hyperparathyroidism ([address]    Osteopenia    C6 radiculopathy    DJD (degenerative joint disease), cervical    Angina pectoris (HCC)    PRASAD (obstructive sleep apnea)    Hyperkalemia  Chronic pain of both shoulders    Hypogonadism in male    Therapeutic opioid-induced constipation ([facility]IC)    Bilateral leg pain    Other specified symptoms and signs involving the circulatory and respiratory systems     Myelopathy (HCC)    Coronary artery disease of native artery of native heart with stable angina pectoris (HCC)    Bilateral carotid bruits    Spinal cord disease (HCC)    Lower urinary tract symptoms (LUTS)    Impotence    Trouble getting to sleep    Bradycardia    Type 2 diabetes mellitus, without long-term current use of insulin (HCC)    Weak urinary stream    Onychodystrophy    Old myocardial infarction    Long term (current) use of aspirin    Other chronic pain    Presence of aortocoronary bypass graft       Medications Discontinued During This Encounter   Medication Reason    pravastatin (PRAVACHOL) 40 MG tablet        Modified Medications    No medications on file       Orders Placed This Encounter   Medications    atorvastatin (LIPITOR) 40 MG tablet     Sig: Take 1 tablet by mouth daily     Dispense:  90 tablet     Refill:  3       Assessment/Plan:    1. Hypercholesterolemia  Statin - cotninue. Labs reviewed. Low fat diet. .. LDL not to goa. adelina dc Prava and stat Atorva 40 qd.     2. Essential hypertension, benign   stable - continue meds. Low salt diet    3. Coronary artery disease with hx of myocardial infarct w/o hx of CABG  No angina - continue CV meds. 4. Carotid Bruits- CUS - was cancelled due to COVID-19. Will reschedule. -- stable with mild plaque - will continue surveillance    5. Preop ED - implant at [facility]- pt is an acceptable candidate for planned procedure. - did well. Counseling:  Heart Healthy Lifestyle, Low Salt Diet, Take Precautions to Prevent Falls and Walk Daily    Return in about 4 months (around 5/18/2022).       Electronically signed by Luiz Mirza MD on 1/18/2022 at 1:44 PM

## 2022-01-20 ENCOUNTER — OFFICE VISIT (OUTPATIENT)
Dept: PHYSICAL MEDICINE AND REHAB | Age: 72
End: 2022-01-20
Payer: MEDICARE

## 2022-01-20 VITALS
HEIGHT: 67 IN | DIASTOLIC BLOOD PRESSURE: 64 MMHG | BODY MASS INDEX: 26.68 KG/M2 | SYSTOLIC BLOOD PRESSURE: 120 MMHG | WEIGHT: 170 LBS

## 2022-01-20 DIAGNOSIS — M54.40 BILATERAL LOW BACK PAIN WITH SCIATICA, SCIATICA LATERALITY UNSPECIFIED, UNSPECIFIED CHRONICITY: Primary | ICD-10-CM

## 2022-01-20 DIAGNOSIS — G95.9 MYELOPATHY (HCC): ICD-10-CM

## 2022-01-20 DIAGNOSIS — Z79.899 HIGH RISK MEDICATION USE: ICD-10-CM

## 2022-01-20 DIAGNOSIS — M79.604 BILATERAL LEG PAIN: ICD-10-CM

## 2022-01-20 DIAGNOSIS — M54.16 RIGHT LUMBAR RADICULOPATHY: ICD-10-CM

## 2022-01-20 DIAGNOSIS — M79.605 BILATERAL LEG PAIN: ICD-10-CM

## 2022-01-20 DIAGNOSIS — M79.10 MYALGIA: ICD-10-CM

## 2022-01-20 PROCEDURE — 99214 OFFICE O/P EST MOD 30 MIN: CPT | Performed by: PHYSICAL MEDICINE & REHABILITATION

## 2022-01-20 ASSESSMENT — ENCOUNTER SYMPTOMS
WHEEZING: 0
ABDOMINAL PAIN: 0
NAUSEA: 0
SORE THROAT: 0
BOWEL INCONTINENCE: 0
CONSTIPATION: 1
TROUBLE SWALLOWING: 0
VISUAL CHANGE: 0
BACK PAIN: 1
PHOTOPHOBIA: 0
BLOOD IN STOOL: 0
EYE REDNESS: 0
COUGH: 0
VOMITING: 0
SHORTNESS OF BREATH: 1
STRIDOR: 0
DIARRHEA: 0
EYE PAIN: 0

## 2022-01-20 NOTE — PROGRESS NOTES
Loyd, 80 y.o. male presents today with:      Back Pain (Trigger point injections 11/15/21, 12/16/21, 1/17/21 with 100% reduction in pain lasting 1 month)   Shoulder Pain (Right)   Leg Pain (Bilateral)--->Foot Pain (Bilateral)   Medication Refill (Percocet, Gabapentin, Baclofen, Vit D refill today)       He is more functional on the opiate meds--able to do yard work and interact with friends and family in a positive friendly manner. recent injections helped quite a bit. Injections still help very much. He would like to schedule monthly trigger point and sciatic injection in between times. Back Pain  This is a chronic problem. The current episode started more than 1 year ago. The problem occurs daily. The problem is unchanged. The pain is present in the lumbar spine. The quality of the pain is described as aching, cramping, shooting and stabbing. The pain radiates to the right foot, right knee and right thigh. The pain is at a severity of 5/10. The pain is severe. The pain is worse during the day. The symptoms are aggravated by bending, lying down, position, sitting, standing and twisting. Stiffness is present in the morning. Associated symptoms include leg pain, numbness and weakness. Pertinent negatives include no abdominal pain, bladder incontinence, bowel incontinence, chest pain, dysuria, fever, headaches, paresis, perianal numbness or tingling. Risk factors include lack of exercise and sedentary lifestyle. He has tried analgesics, home exercises, NSAIDs, muscle relaxant, heat and walking (SI injections which help, trigger point injections, OXY-IR ) for the symptoms. The treatment provided moderate relief. Hand Pain   The incident occurred more than 1 week ago. The incident occurred at home. There was no injury mechanism. The pain is present in the right wrist and right hand. The quality of the pain is described as cramping. The pain does not radiate.  The pain is at a severity of 4/10. The pain is moderate. The pain has been intermittent since the incident. Associated symptoms include numbness. Pertinent negatives include no chest pain, muscle weakness or tingling. The symptoms are aggravated by movement. He has tried ice, immobilization, rest, NSAIDs, acetaminophen, heat and elevation for the symptoms. The treatment provided significant relief. Neck Pain   This is a recurrent problem. The current episode started more than 1 month ago. The problem occurs constantly. The problem has been gradually worsening. The pain is present in the occipital region. The quality of the pain is described as aching. The pain is at a severity of 5/10. The pain is moderate. The symptoms are aggravated by twisting. Stiffness is present in the morning. Associated symptoms include leg pain, numbness and weakness. Pertinent negatives include no chest pain, fever, headaches, pain with swallowing, paresis, photophobia, syncope, tingling, trouble swallowing or visual change. He has tried heat, acetaminophen, bed rest, home exercises, muscle relaxants, neck support, ice, NSAIDs and oral narcotics for the symptoms. The treatment provided moderate relief.        Past Medical History:   Diagnosis Date    Chronic back pain     Coronary artery disease 2002    [facility]idy [facility] at [facility] Esophageal ulcer 3/10/2014    Dr. Anel Virk    Gastric ulcer 3/10/2014    Dr. Anel Virk    Gout     Hiatal hernia 3/10/2014    Dr. Army Velasquez Hyperlipidemia     Hypertension     Impotence 10/16/2020    MI (myocardial infarction) [facility]) 2005    Dr. Angelo Haynes Peripheral vascular disease ([facility]     Prediabetes     Schizo-affective schizophrenia, in remission ([address] 2/1/2005    Overview:  followed at the [facility] Severe single current episode of major depressive disorder, without psychotic features ([address] 7/8/2016    Status post coronary artery stent placement 2002    Dr. Heidy Wong Past Surgical History:   Procedure Laterality Date    APPENDECTOMY  1970    BACK SURGERY  01/03/2020    Dr. Vernon Drafts Right 6/4/2019    RIGHT WRIST CARPAL TUNNEL RELEASE, SUPINE, PAT AT PCP performed by Shimon Zuleta [facility] at [facility]  3/10/14    DR Amaris Peterson  2002    Dr. Randall Bar GRAFT  10/17/2017    KNEE ARTHROSCOPY Left 2002    Dr. Ashley Jaramillo  12/19/13    CAUDAL BLOCKS DONE BY DR Alyce Jung    OTHER SURGICAL HISTORY  04/2020    [employer]      SPINE SURGERY  9/2009    Dr. Kelsi Musa  2008    Dr. Jason Rodriguez  3/10/14    Lawrence Aguayo, DR Lajean Goodpasture     Social History     Socioeconomic History    Marital status:      Spouse name: None    Number of children: None    Years of education: None    Highest education level: None   Occupational History    Occupation: disability    Tobacco Use    Smoking status: Never Smoker    Smokeless tobacco: Never Used   Vaping Use    Vaping Use: Never used   Substance and Sexual Activity    Alcohol use: No     Alcohol/week: 0.0 standard drinks     Comment: Stopped years ago.  Drug use: No     Comment: YEARS AGO    Sexual activity: Yes     Partners: Female     Comment: monogomous sexual partner, wife. Other Topics Concern    None   Social History Narrative    Tobacco -- never smoked or chewed. Alcohol -- have not used for past 10 years, prior to had consumed 6 pack of beer daily. Drugs -- tried marijuana and cocaine 14 years ago occasionally used for 3-4 years and then stopped completely 10 years ago. Education -- completed 11th grade in [place].    Occupation -- [employer] work,  at [employer].    Marital status --  44 years, 2 grown sons.      Social Determinants of Health     Financial Resource Strain: Low Risk     Difficulty of Paying Living Expenses: Not hard at all   Food Insecurity:     Worried About Running Out of Food in the Last Year: Not on file    Sofi of Food in the Last Year: Not on file   Transportation Needs:     Lack of Transportation (Medical): Not on file    Lack of Transportation (Non-Medical): Not on file   Physical Activity:     Days of Exercise per Week: Not on file    Minutes of Exercise per Session: Not on file   Stress:     Feeling of Stress : Not on file   Social Connections:     Frequency of Communication with Friends and Family: Not on file    Frequency of Social Gatherings with Friends and Family: Not on file    Attends Mandaeism Services: Not on file    Active Member of [address] [place] [address] [employer] or Organizations: Not on file    Attends Club or Organization Meetings: Not on file    Marital Status: Not on file   Intimate Partner Violence:     Fear of Current or Ex-Partner: Not on file    Emotionally Abused: Not on file    Physically Abused: Not on file    Sexually Abused: Not on file   Housing Stability:     Unable to Pay for Housing in the Last Year: Not on file    Number of [place] in the Last Year: Not on file    Unstable Housing in the Last Year: Not on file     Family History   Problem Relation Age of Onset    High Blood Pressure Mother     Arthritis Mother     Cancer Father         throat    Arthritis Father        No Known Allergies    Review of Systems   Constitutional: Positive for activity change and fatigue. Negative for chills, diaphoresis, fever and unexpected weight change. HENT: Negative for congestion, ear discharge, ear pain, hearing loss, nosebleeds, sore throat, tinnitus and trouble swallowing. Eyes: Negative for photophobia, pain and redness. Respiratory: Positive for shortness of breath. Negative for cough, wheezing and stridor.          Shortness of breath on exertion   Cardiovascular: Negative for chest pain, palpitations, leg swelling and syncope. Gastrointestinal: Positive for constipation. Negative for abdominal pain, blood in stool, bowel incontinence, diarrhea, nausea and vomiting. Endocrine: Negative for polydipsia. Genitourinary: Negative for bladder incontinence, dysuria, flank pain, frequency, hematuria and urgency. Musculoskeletal: Positive for arthralgias, back pain, gait problem, myalgias and neck stiffness. Negative for neck pain. Skin: Negative for rash. Allergic/Immunologic: Positive for immunocompromised state. Negative for environmental allergies. Neurological: Positive for weakness and numbness. Negative for dizziness, tingling, tremors, seizures and headaches. Hematological: Does not bruise/bleed easily. Psychiatric/Behavioral: Negative for dysphoric mood, hallucinations and suicidal ideas. The patient is not nervous/anxious. Objective    Vitals:    01/20/22 1130   BP: 120/64   Site: Left Upper Arm   Position: Sitting   Cuff Size: Small Adult   Weight: 170 lb (77.1 kg)   Height: 5' 7\" (1.702 m)     Pain Score: Two (With medication)       Physical Exam  Vitals reviewed. Constitutional:       General: He is not in acute distress. Appearance: He is well-developed. He is not ill-appearing, toxic-appearing or diaphoretic. Comments:     HENT:      Head: Normocephalic and atraumatic. Right Ear: Hearing normal.      Left Ear: Hearing normal.      Nose: Nose normal.      Mouth/Throat:      Mouth: No oral lesions. Dentition: Normal dentition. Pharynx: No oropharyngeal exudate. Eyes:      General: No scleral icterus. Right eye: No discharge. Left eye: No discharge. Conjunctiva/sclera: Conjunctivae normal.      Right eye: No chemosis or exudate. Left eye: No chemosis or exudate. Neck:      Thyroid: No thyromegaly. Vascular: No JVD. Trachea: No tracheal tenderness or tracheal deviation.      Pulmonary:      Effort: Pulmonary effort is normal. No tachypnea, bradypnea, accessory muscle usage or respiratory distress. Breath sounds: Decreased breath sounds present. No wheezing or rales. Chest:      Chest wall: No tenderness. Abdominal:      General: There is no distension. Musculoskeletal:         General: Tenderness present. Right shoulder: Tenderness and bony tenderness present. Decreased range of motion. Left shoulder: Tenderness and bony tenderness present. Decreased range of motion. Right upper arm: Normal.      Left upper arm: Normal.      Right elbow: Normal.      Left elbow: Normal.      Right forearm: Normal.      Left forearm: Normal.      Right wrist: Normal.      Left wrist: Normal.      Right hand: Bony tenderness present. No swelling, deformity or lacerations. Decreased range of motion. Decreased strength of finger abduction, thumb/finger opposition and wrist extension. Decreased sensation of the ulnar distribution, median distribution and radial distribution. Normal capillary refill. Left hand: Bony tenderness present. Decreased range of motion. Decreased strength of finger abduction and wrist extension. Decreased sensation of the ulnar distribution and radial distribution. Arms:       Cervical back: Neck supple. Laceration, spasms, tenderness and bony tenderness present. No swelling, edema, deformity or rigidity. Spinous process tenderness and muscular tenderness present. Thoracic back: Deformity, spasms, tenderness and bony tenderness present. Decreased range of motion. Lumbar back: Tenderness and bony tenderness present. No swelling, edema, deformity or lacerations. Decreased range of motion.         Back:       Right hip: Normal.      Left hip: Normal.      Right upper leg: Normal.      Left upper leg: Normal.      Right knee: Normal.      Left knee: Normal.      Right lower leg: Normal.      Left lower leg: Normal.      Right ankle: Normal.      Right Achilles Tendon: Normal.      Left ankle: Normal.      Left Achilles Tendon: Normal.      Right foot: Normal.      Left foot: Normal.        Legs:       Comments: Tender areas are indicated by numbered spot         Skin:     General: Skin is warm and dry. Coloration: Skin is not pale. Findings: No abrasion, bruising, ecchymosis, erythema, laceration, petechiae or rash. Rash is not macular, pustular or urticarial.      Nails: There is no clubbing. Neurological:      Mental Status: He is alert and oriented to person, place, and time. Cranial Nerves: No cranial nerve deficit. Sensory: Sensory deficit present. Motor: No tremor, atrophy or abnormal muscle tone. Coordination: Coordination normal.      Gait: Gait abnormal.      Deep Tendon Reflexes: Reflexes abnormal. Babinski sign absent on the right side. Babinski sign absent on the left side. Reflex Scores:       Patellar reflexes are 1+ on the right side and 1+ on the left side. Achilles reflexes are 0 on the right side and 0 on the left side. Psychiatric:         Attention and Perception: He is attentive. Mood and Affect: Mood is not anxious or depressed. Affect is not labile, blunt, angry or inappropriate. Speech: He is communicative. Speech is not rapid and pressured, delayed, slurred or tangential.         Behavior: Behavior normal. Behavior is not agitated, slowed, aggressive, withdrawn, hyperactive or combative. Thought Content: Thought content normal. Thought content is not paranoid or delusional. Thought content does not include homicidal or suicidal ideation. Thought content does not include homicidal or suicidal plan. Cognition and Memory: Memory is not impaired. He does not exhibit impaired recent memory or impaired remote memory. Judgment: Judgment normal. Judgment is not impulsive or inappropriate.       Comments: Calm appropriate    NAD       Ortho Exam  Neurologic Exam     Mental Status Oriented to person, place, and time. Speech: not slurred     Gait, Coordination, and Reflexes     Reflexes   Right patellar: 1+  Left patellar: 1+  Right achilles: 0  Left achilles: 0          After a thorough review and discussion of the previous medical records, patient comprehensive medical, surgical, and family and social history, Review of Systems, their OARRS, their Screener and Opioid Assessment for Patients with Pain (SOAPP®-R), recent diagnostics, and symptomatic results to previous treatment, it is my impression that the patients is suffering with progressive and severe:     Diagnosis Orders   1. Bilateral low back pain with sciatica, sciatica laterality unspecified, unspecified chronicity     2. Right lumbar radiculopathy     3. High risk medication use - 12/07/17 OARRS PM&R, 02/08/18 OARRS PM&R, 10/05/17 Urine Drug Screen: negative PM&R, MED CONTRACT 1/17/17     4. Bilateral leg pain     5. Myelopathy ([address]     6.  Myalgia  HI INJECT TRIGGER POINTS, 3 OR GREATER    HI INJECT TRIGGER POINTS, 3 OR GREATER       I am also concerned by lifestyle and mood issues including:    Past Medical History:   Diagnosis Date    Chronic back pain     Coronary artery disease 2002    [facility]. Lacey Soliz at [facility] Esophageal ulcer 3/10/2014    Dr. Julia Ortega Gastric ulcer 3/10/2014    Dr. Julia Ortega Gout     Hiatal hernia 3/10/2014    Dr. Julia Ortega Hyperlipidemia     Hypertension     Impotence 10/16/2020    MI ([facility]ocardial infarction) [facility]) 2005    Dr. Swetha Shukla Peripheral vascular disease ([address]     Prediabetes     Schizo-affective schizophrenia, in remission ([address] 2/1/2005    Overview:  [facility] Severe single current episode of major depressive disorder, without psychotic features ([address] 7/8/2016    Status post coronary artery stent placement 2002    Dr. Lacey Soliz           Given their medication, chronic pain and lifestyle and medications they are at risk for :    Falls, constipation, addiction, toxicity on opiates  Loss of livelyhood due to severe pain, debility, weight gain and  vitamin D deficiency    The patient was educated regarding proper diet, fitness routine, and regulatory restrictions concerning pain medications. Previous notes, comprehensive past medical, surgical, family history, and diagnostics were reviewed. Patient education and councelling were provided regarding off label use,treatment options and medication and injection risks. Current and old OARRS ([place] Automated Prescription Reporting System) records reviewed, all refills reviewed since last visit,  Behavioral agreement/[employer] regulations   and Toxicology screen was reviewed with patient and is up to date. There are   no current red flags. high risk meds review re intensive monitoring for toxicity and addiction,  They are making good progress regarding pain relief, they are performing at a functional level regarding activities of daily living, family interactions and psychological functioning, they're not having any adverse effects or side effects from the current medications, and I see no findings of aberrant drug taking or addiction related behaviors. The patient is aware that they have a chronic pain condition and they may require opiates dosing for life. All efforts will be made to wean to the lowest effective dose. Other therapies for pain have not been effective including nonopiate medications. Injections and exercises are only partially effective. A Rx for Narcan was offered to help prevent accidental overdose. RX Monitoring 10/17/2021   Attestation -   Acute Pain Prescriptions Prescription exceeds daily limit for a specific reason. See comments or note. ;Not required given exclusionary diagnoses. ..;Severe pain not adequately treated with lower dose.    Periodic Controlled Substance Monitoring Possible medication side effects, risk of tolerance/dependence & alternative treatments discussed. ;No signs of potential drug abuse or diversion identified. ;Assessed functional status. ;Obtaining appropriate analgesic effect of treatment. Chronic Pain > 50 MEDD -   Chronic Pain > 80 MEDD -               Patient is currently taking:       I am having Shirley Upton maintain his aspirin, CPAP Machine, tamsulosin, vitamin D, nitroGLYCERIN, glucose monitoring, Alcohol Prep, DOK, B-D INTEGRA SYRINGE, allopurinol, NIFEdipine, metFORMIN, metoprolol tartrate, FreeStyle Lancets, FREESTYLE LITE, imipramine, colchicine, baclofen, oxyCODONE-acetaminophen, gabapentin, testosterone cypionate, and atorvastatin. I also recommend the following Medications:    No orders of the defined types were placed in this encounter.       -which helps with pain and function. Otherwise, continue the current pain medications that I have prescibed. Radiologic:   Old  unique films results reviewed  ,     I discussed results with patients. see Follow up plans below  For any new studies. Unique source and Care Everywhere Updates:  prior external notes requested and reviewed. No new issues noted. Unique History obtained by caregiver/independent historian-and verified with SOAPPR. Electrodiagnostic:  Previous studies requested,     I discussed results with patient. See follow-up plans for new studies.         Labs:  Previous labs reviewed     Lab Results   Component Value Date     01/11/2022    K 4.9 01/11/2022     01/11/2022    CO2 31 01/11/2022    BUN 15 01/11/2022    CREATININE 0.88 01/11/2022    CALCIUM 9.8 01/11/2022    LABALBU 4.6 03/18/2021    LABALBU 4.4 10/27/2020    BILITOT 0.5 03/18/2021    ALKPHOS 73 03/18/2021    AST 27 03/18/2021    ALT 21 03/18/2021     Lab Results   Component Value Date    WBC 5.4 01/14/2022    RBC 4.76 01/14/2022    HGB 15.3 01/14/2022    HCT 46.3 01/14/2022    MCV 97.1 01/14/2022    MCH 32.1 01/14/2022    MCHC 33.0 01/14/2022    RDW 14.5 01/14/2022     01/14/2022    MPV 9.9 09/15/2015       Lab Results   Component Value Date    LABAMPH Neg 04/16/2021    BARBSCNU Neg 04/16/2021    LABBENZ Neg 04/16/2021    CANSU Neg 04/16/2021    COCAIMETSCRU Neg 04/16/2021    PHENCYCLIDINESCREENURINE Neg 36/21/9309    TRICYCLIC Negative 76/67/8492    DSCOMMENT see below 04/16/2021       Lab Results   Component Value Date    CODEINE Not Detected 09/16/2016    MORPHINE Not Detected 09/16/2016    ACETYLMORPHI Not Detected 09/16/2016    OXYCODONE Present 09/16/2016    NOROXYCODONE Present 09/16/2016    NOROXYMU Present 09/16/2016    HYDRCO Not Detected 09/16/2016    NORHYDU Not Detected 09/16/2016    HYDROMO Not Detected 09/16/2016    BUPREN Not Detected 09/16/2016    [employer] Not Detected 09/16/2016    FENTA Not Detected 09/16/2016    NORFENT Not Detected 09/16/2016    MEPERIDINE Not Detected 09/16/2016    TAPENU Not Detected 09/16/2016    TAPOSULFUR Not Detected 09/16/2016    METHADONE Not Detected 09/16/2016    LABPROP Not Detected 09/16/2016    TRAM Not Detected 09/16/2016    AMPH Not Detected 09/16/2016    METHAMP Not Detected 09/16/2016    MDMA Not Detected 09/16/2016    ECMDA Not Detected 09/16/2016       Lab Results   Component Value Date    PHENTERMINE Not Detected 09/16/2016    BENZOYL Not Detected 09/16/2016    Anastasiya [place] Not Detected 09/16/2016    ALPHAOHALPRA Not Detected 09/16/2016    CLONAZEPAM Not Detected 09/16/2016    Nicholes Chol Not Detected 09/16/2016    DIAZEP Not Detected 09/16/2016    SAFIA Not Detected 09/16/2016    OXAZ Not Detected 09/16/2016    Florrie Ruck Not Detected 09/16/2016    LORAZEPAM Not Detected 09/16/2016    MIDAZOLAM Not Detected 09/16/2016    ZOLPIDEM Not Detected 09/16/2016    REG Not Detected 09/16/2016    ETG Not Detected 09/16/2016    MARIJMET Not Detected 09/16/2016    PCP Not Detected 09/16/2016    PAINMGTDRUGP See Below 09/16/2016    EERPAINMGTPA See Note 09/16/2016    ANNMARIE 184.2 10/11/2021         , I discussed results with patient. See follow-up plans for new studies. Therapies:  HEP-gentle stretching and relaxation techniques-demonstrated with patient-they are to do them twice a day. They are also advised to make the following lifestyle changes:  Goals      Exercise 3x per week (30 min per time)      SOAPP-R GOAL LESS THAN 9      09/16/16 SCORE: 33-HIGH RISK   11/11/16 score: 27-high risk     01/13/17 score: 16-moderate risk   03/13/17 Score: 11-moderate risk   06/01/17 score: 15-moderate risk  08/03/17 score: 15-moderate risk  10/04/17 score: 18-moderate risk  12/08/17 score: 11-moderate risk  02/09/18 score: 12-moderate risk  04/13/18 score: 14-moderate risk  7/12/18 score 10-moderate risk  11/29/18 score 17- moderate risk  01/28/19 score  16-moderate risk  6/10/19 score: 11- moderate risk  8/15/19 score: 17- moderate risk   4/8/19 score  7- low risk  3/11/20 score: 17- moderate risk  5- score- 16 - moderate risk   7/20/20 score: 21- high risk  10/16/20 score: 16- moderate risk  1/15/21 score: 16- moderate risk  4/14/21 score: 22- high risk  7/14/21 score: 14- moderate risk  10/20/21 score: 12- moderate risk  1/20/21 score: 20- moderate risk            Injections or Epidurals:  Injection options were discussed. Patient gave verbal consent to ordered injections. See follow-up plans for planned injections. Supplements:  Vitamin D with increased dosing during the rainy months,   Education was given on:   Dietary and Fitness--daily stretches and low carb diet-in chair Yoga when possible             Follow up with Primary Care Physician regarding their general medical needs. Stressed the importance of following up with PCP and specialists for his/her chronic diseases, health, CV, and cancer screening and continued care. Will follow disease activity/progression and adjust therapeutic regimen to disease activity and severity.    Discussed medication dosage, usage, goals of therapy, and side effects. Available test results were reviewed -Discussed findings, impression and plan with patient. An additional 6 minutes were spent outside of the patient visit to review records. Additional time spent with the patient to discuss their questions. Additional time spent with the patient devoted to discussing treatment strategy, planning, and implementation. Patient understands above plan; questions asked and answered. Patient agrees to plan as noted above. At least 50% of the visit was involved in the discussion of the options for treatment. We discussed exercises, medication, interventional therapies and surgery. Healthy life style is essential with patient hard work to achieve the wellness. In addition; discussion with the patient and/or family about patient's functional status any of the diagnostic results, impressions and/or recommended diagnostic studies, prognosis, risks and benefits of treatment options, instructions for treatment and/or follow-up, importance of compliance with chosen treatment options, risk-factor reduction, and patient/family education. They are to follow up in 2 1/2 -3 months to review medication, efficacy of injections, pill counts, OARRS check, high risk med review re intensive monitoring for toxicity and addiction, SOAPPR assessment, review diagnostics, to review previous and future treatment plans and assess appropriateness for continued therapy.         New Diagnostics  Orders Placed This Encounter   Procedures    MI INJECT TRIGGER POINTS, 3 OR GREATER    MI INJECT TRIGGER POINTS, 3 OR GREATER       Brenda Stubbs DO

## 2022-01-31 DIAGNOSIS — M54.16 RIGHT LUMBAR RADICULOPATHY: ICD-10-CM

## 2022-01-31 DIAGNOSIS — G89.29 CHRONIC RIGHT-SIDED LOW BACK PAIN WITH RIGHT-SIDED SCIATICA: ICD-10-CM

## 2022-01-31 DIAGNOSIS — M54.41 CHRONIC RIGHT-SIDED LOW BACK PAIN WITH RIGHT-SIDED SCIATICA: ICD-10-CM

## 2022-01-31 DIAGNOSIS — M54.14 THORACIC AND LUMBOSACRAL NEURITIS: ICD-10-CM

## 2022-01-31 DIAGNOSIS — M54.17 THORACIC AND LUMBOSACRAL NEURITIS: ICD-10-CM

## 2022-01-31 DIAGNOSIS — Z79.899 HIGH RISK MEDICATION USE: ICD-10-CM

## 2022-01-31 DIAGNOSIS — M41.9 SCOLIOSIS OF LUMBAR SPINE, UNSPECIFIED SCOLIOSIS TYPE: ICD-10-CM

## 2022-01-31 RX ORDER — OXYCODONE AND ACETAMINOPHEN 7.5; 325 MG/1; MG/1
1 TABLET ORAL EVERY 4 HOURS PRN
Qty: 90 TABLET | Refills: 0 | Status: SHIPPED | OUTPATIENT
Start: 2022-02-05 | End: 2022-03-01 | Stop reason: SDUPTHER

## 2022-02-17 ENCOUNTER — PROCEDURE VISIT (OUTPATIENT)
Dept: PHYSICAL MEDICINE AND REHAB | Age: 72
End: 2022-02-17
Payer: MEDICARE

## 2022-02-17 DIAGNOSIS — M79.10 MYALGIA: ICD-10-CM

## 2022-02-17 PROCEDURE — 20553 NJX 1/MLT TRIGGER POINTS 3/>: CPT | Performed by: PHYSICAL MEDICINE & REHABILITATION

## 2022-02-17 PROCEDURE — 96372 THER/PROPH/DIAG INJ SC/IM: CPT | Performed by: PHYSICAL MEDICINE & REHABILITATION

## 2022-02-17 RX ORDER — LIDOCAINE HYDROCHLORIDE 10 MG/ML
15 INJECTION, SOLUTION INFILTRATION; PERINEURAL ONCE
Status: COMPLETED | OUTPATIENT
Start: 2022-02-17 | End: 2022-02-17

## 2022-02-17 RX ORDER — CYANOCOBALAMIN 1000 UG/ML
1000 INJECTION INTRAMUSCULAR; SUBCUTANEOUS ONCE
Status: COMPLETED | OUTPATIENT
Start: 2022-02-17 | End: 2022-02-17

## 2022-02-17 RX ADMIN — CYANOCOBALAMIN 1000 MCG: 1000 INJECTION INTRAMUSCULAR; SUBCUTANEOUS at 13:40

## 2022-02-17 RX ADMIN — LIDOCAINE HYDROCHLORIDE 15 ML: 10 INJECTION, SOLUTION INFILTRATION; PERINEURAL at 13:40

## 2022-02-21 DIAGNOSIS — M62.838 SPASM OF MUSCLE: ICD-10-CM

## 2022-02-21 DIAGNOSIS — M79.604 BILATERAL LEG PAIN: ICD-10-CM

## 2022-02-21 DIAGNOSIS — M79.605 BILATERAL LEG PAIN: ICD-10-CM

## 2022-02-21 RX ORDER — BACLOFEN 10 MG/1
TABLET ORAL
Qty: 90 TABLET | Refills: 1 | Status: SHIPPED | OUTPATIENT
Start: 2022-02-21 | End: 2022-04-22

## 2022-03-01 DIAGNOSIS — M41.9 SCOLIOSIS OF LUMBAR SPINE, UNSPECIFIED SCOLIOSIS TYPE: ICD-10-CM

## 2022-03-01 DIAGNOSIS — M54.17 THORACIC AND LUMBOSACRAL NEURITIS: ICD-10-CM

## 2022-03-01 DIAGNOSIS — M54.16 RIGHT LUMBAR RADICULOPATHY: ICD-10-CM

## 2022-03-01 DIAGNOSIS — M54.14 THORACIC AND LUMBOSACRAL NEURITIS: ICD-10-CM

## 2022-03-01 DIAGNOSIS — Z79.899 HIGH RISK MEDICATION USE: ICD-10-CM

## 2022-03-01 DIAGNOSIS — M54.41 CHRONIC RIGHT-SIDED LOW BACK PAIN WITH RIGHT-SIDED SCIATICA: ICD-10-CM

## 2022-03-01 DIAGNOSIS — G89.29 CHRONIC RIGHT-SIDED LOW BACK PAIN WITH RIGHT-SIDED SCIATICA: ICD-10-CM

## 2022-03-02 RX ORDER — OXYCODONE AND ACETAMINOPHEN 7.5; 325 MG/1; MG/1
1 TABLET ORAL EVERY 4 HOURS PRN
Qty: 90 TABLET | Refills: 0 | Status: SHIPPED | OUTPATIENT
Start: 2022-03-06 | End: 2022-04-27 | Stop reason: SDUPTHER

## 2022-03-21 ENCOUNTER — PROCEDURE VISIT (OUTPATIENT)
Dept: PHYSICAL MEDICINE AND REHAB | Age: 72
End: 2022-03-21
Payer: MEDICARE

## 2022-03-21 DIAGNOSIS — M79.10 MYALGIA: ICD-10-CM

## 2022-03-21 PROCEDURE — 96372 THER/PROPH/DIAG INJ SC/IM: CPT | Performed by: PHYSICAL MEDICINE & REHABILITATION

## 2022-03-21 PROCEDURE — 20553 NJX 1/MLT TRIGGER POINTS 3/>: CPT | Performed by: PHYSICAL MEDICINE & REHABILITATION

## 2022-03-21 RX ORDER — LIDOCAINE HYDROCHLORIDE 10 MG/ML
15 INJECTION, SOLUTION INFILTRATION; PERINEURAL ONCE
Status: COMPLETED | OUTPATIENT
Start: 2022-03-21 | End: 2022-03-21

## 2022-03-21 RX ORDER — CYANOCOBALAMIN 1000 UG/ML
1000 INJECTION INTRAMUSCULAR; SUBCUTANEOUS ONCE
Status: COMPLETED | OUTPATIENT
Start: 2022-03-21 | End: 2022-03-21

## 2022-03-21 RX ADMIN — LIDOCAINE HYDROCHLORIDE 15 ML: 10 INJECTION, SOLUTION INFILTRATION; PERINEURAL at 15:06

## 2022-03-21 RX ADMIN — CYANOCOBALAMIN 1000 MCG: 1000 INJECTION INTRAMUSCULAR; SUBCUTANEOUS at 15:05

## 2022-03-21 NOTE — PROGRESS NOTES
Patient here for trigger point injections. Patient taken back to exam room, and placed on drape locking stool. Areas to be injected marked appropriately, and cleansed with alcohol. 15cc of 1% Lidocaine to be injected by provider.

## 2022-03-23 DIAGNOSIS — M1A.9XX0 CHRONIC GOUT WITHOUT TOPHUS, UNSPECIFIED CAUSE, UNSPECIFIED SITE: ICD-10-CM

## 2022-03-24 RX ORDER — ALLOPURINOL 100 MG/1
100 TABLET ORAL DAILY
Qty: 90 TABLET | Refills: 3 | Status: SHIPPED | OUTPATIENT
Start: 2022-03-24

## 2022-04-07 DIAGNOSIS — E29.1 HYPOGONADISM MALE: ICD-10-CM

## 2022-04-07 DIAGNOSIS — E11.9 TYPE 2 DIABETES MELLITUS WITHOUT COMPLICATION, WITHOUT LONG-TERM CURRENT USE OF INSULIN (HCC): ICD-10-CM

## 2022-04-07 LAB
ANION GAP SERPL CALCULATED.3IONS-SCNC: 12 MEQ/L (ref 9–15)
BUN BLDV-MCNC: 17 MG/DL (ref 8–23)
CALCIUM SERPL-MCNC: 9.5 MG/DL (ref 8.5–9.9)
CHLORIDE BLD-SCNC: 100 MEQ/L (ref 95–107)
CO2: 28 MEQ/L (ref 20–31)
CREAT SERPL-MCNC: 0.97 MG/DL (ref 0.7–1.2)
GFR AFRICAN AMERICAN: >60
GFR NON-AFRICAN AMERICAN: >60
GLUCOSE FASTING: 113 MG/DL (ref 70–99)
HBA1C MFR BLD: 6.2 % (ref 4.8–5.9)
POTASSIUM SERPL-SCNC: 4.8 MEQ/L (ref 3.4–4.9)
SEX HORMONE BINDING GLOBULIN: 40 NMOL/L (ref 11–80)
SODIUM BLD-SCNC: 140 MEQ/L (ref 135–144)
TESTOSTERONE FREE-NONMALE: 165.7 PG/ML (ref 47–244)
TESTOSTERONE TOTAL: 809 NG/DL (ref 220–1000)

## 2022-04-22 DIAGNOSIS — M79.605 BILATERAL LEG PAIN: ICD-10-CM

## 2022-04-22 DIAGNOSIS — M10.9 ACUTE GOUT OF LEFT KNEE, UNSPECIFIED CAUSE: ICD-10-CM

## 2022-04-22 DIAGNOSIS — E79.0 HYPERURICEMIA: ICD-10-CM

## 2022-04-22 DIAGNOSIS — M62.838 SPASM OF MUSCLE: ICD-10-CM

## 2022-04-22 DIAGNOSIS — M79.604 BILATERAL LEG PAIN: ICD-10-CM

## 2022-04-22 RX ORDER — BACLOFEN 10 MG/1
TABLET ORAL
Qty: 90 TABLET | Refills: 1 | Status: SHIPPED | OUTPATIENT
Start: 2022-04-22 | End: 2022-06-21

## 2022-04-25 ENCOUNTER — PROCEDURE VISIT (OUTPATIENT)
Dept: PHYSICAL MEDICINE AND REHAB | Age: 72
End: 2022-04-25
Payer: MEDICARE

## 2022-04-25 DIAGNOSIS — M79.10 MYALGIA: Primary | ICD-10-CM

## 2022-04-25 PROCEDURE — 20553 NJX 1/MLT TRIGGER POINTS 3/>: CPT | Performed by: PHYSICAL MEDICINE & REHABILITATION

## 2022-04-25 PROCEDURE — 96372 THER/PROPH/DIAG INJ SC/IM: CPT | Performed by: PHYSICAL MEDICINE & REHABILITATION

## 2022-04-25 RX ORDER — COLCHICINE 0.6 MG/1
TABLET ORAL
Qty: 30 TABLET | Refills: 2 | Status: SHIPPED | OUTPATIENT
Start: 2022-04-25

## 2022-04-25 RX ORDER — LIDOCAINE HYDROCHLORIDE 10 MG/ML
15 INJECTION, SOLUTION INFILTRATION; PERINEURAL ONCE
Status: COMPLETED | OUTPATIENT
Start: 2022-04-25 | End: 2022-04-25

## 2022-04-25 RX ORDER — CYANOCOBALAMIN 1000 UG/ML
1000 INJECTION INTRAMUSCULAR; SUBCUTANEOUS ONCE
Status: COMPLETED | OUTPATIENT
Start: 2022-04-25 | End: 2022-04-25

## 2022-04-25 RX ADMIN — CYANOCOBALAMIN 1000 MCG: 1000 INJECTION INTRAMUSCULAR; SUBCUTANEOUS at 15:54

## 2022-04-25 RX ADMIN — LIDOCAINE HYDROCHLORIDE 15 ML: 10 INJECTION, SOLUTION INFILTRATION; PERINEURAL at 15:56

## 2022-04-25 NOTE — TELEPHONE ENCOUNTER
pharmacy requesting medication refill.  Please approve or deny this request.    Rx requested:  Requested Prescriptions     Pending Prescriptions Disp Refills    colchicine (COLCRYS) 0.6 MG tablet [Pharmacy Med Name: COLCHICINE 0.6MG TABLETS] 30 tablet 2     Sig: TAKE 1 TABLET BY MOUTH DAILY DURING FLARE FOR GOUT FLARE         Last Office Visit:   1/7/2022      Next Visit Date:  Future Appointments   Date Time Provider [facility]   4/27/2022 10:45 AM Dave Roman DO [address] [place] [address]   5/10/2022  1:45 PM Laila Martínez MD [facility]   7/12/2022 11:00 AM Casandra Barry  [address] [facility] [address]

## 2022-04-27 ENCOUNTER — OFFICE VISIT (OUTPATIENT)
Dept: PHYSICAL MEDICINE AND REHAB | Age: 72
End: 2022-04-27
Payer: MEDICARE

## 2022-04-27 VITALS
BODY MASS INDEX: 26.68 KG/M2 | SYSTOLIC BLOOD PRESSURE: 138 MMHG | HEIGHT: 67 IN | DIASTOLIC BLOOD PRESSURE: 76 MMHG | WEIGHT: 170 LBS

## 2022-04-27 DIAGNOSIS — M41.9 SCOLIOSIS OF LUMBAR SPINE, UNSPECIFIED SCOLIOSIS TYPE: ICD-10-CM

## 2022-04-27 DIAGNOSIS — Z79.899 HIGH RISK MEDICATION USE: ICD-10-CM

## 2022-04-27 DIAGNOSIS — M54.14 THORACIC AND LUMBOSACRAL NEURITIS: ICD-10-CM

## 2022-04-27 DIAGNOSIS — M54.12 C6 RADICULOPATHY: ICD-10-CM

## 2022-04-27 DIAGNOSIS — M25.512 CHRONIC PAIN OF BOTH SHOULDERS: Primary | ICD-10-CM

## 2022-04-27 DIAGNOSIS — M54.41 CHRONIC RIGHT-SIDED LOW BACK PAIN WITH RIGHT-SIDED SCIATICA: ICD-10-CM

## 2022-04-27 DIAGNOSIS — M25.511 CHRONIC PAIN OF BOTH SHOULDERS: Primary | ICD-10-CM

## 2022-04-27 DIAGNOSIS — M54.17 THORACIC AND LUMBOSACRAL NEURITIS: ICD-10-CM

## 2022-04-27 DIAGNOSIS — G89.29 CHRONIC PAIN OF BOTH SHOULDERS: Primary | ICD-10-CM

## 2022-04-27 DIAGNOSIS — G95.9 MYELOPATHY (HCC): ICD-10-CM

## 2022-04-27 DIAGNOSIS — M79.10 MYALGIA: ICD-10-CM

## 2022-04-27 DIAGNOSIS — M54.40 LOW BACK PAIN WITH SCIATICA, SCIATICA LATERALITY UNSPECIFIED, UNSPECIFIED BACK PAIN LATERALITY, UNSPECIFIED CHRONICITY: ICD-10-CM

## 2022-04-27 DIAGNOSIS — M54.16 RIGHT LUMBAR RADICULOPATHY: ICD-10-CM

## 2022-04-27 DIAGNOSIS — G89.29 CHRONIC RIGHT-SIDED LOW BACK PAIN WITH RIGHT-SIDED SCIATICA: ICD-10-CM

## 2022-04-27 PROCEDURE — 99214 OFFICE O/P EST MOD 30 MIN: CPT | Performed by: PHYSICAL MEDICINE & REHABILITATION

## 2022-04-27 RX ORDER — OXYCODONE AND ACETAMINOPHEN 7.5; 325 MG/1; MG/1
1 TABLET ORAL EVERY 4 HOURS PRN
Qty: 90 TABLET | Refills: 0 | Status: SHIPPED | OUTPATIENT
Start: 2022-05-04 | End: 2022-05-10

## 2022-04-27 ASSESSMENT — ENCOUNTER SYMPTOMS
NAUSEA: 0
BOWEL INCONTINENCE: 0
EYE PAIN: 0
VISUAL CHANGE: 0
SHORTNESS OF BREATH: 1
CONSTIPATION: 1
BACK PAIN: 1
PHOTOPHOBIA: 0
STRIDOR: 0
TROUBLE SWALLOWING: 0
EYE REDNESS: 0
COUGH: 0
VOMITING: 0
ABDOMINAL PAIN: 0
BLOOD IN STOOL: 0
DIARRHEA: 0
SORE THROAT: 0
WHEEZING: 0

## 2022-04-27 NOTE — PROGRESS NOTES
Loyd, 80 y.o. male presents today with:       Back Pain Trigger point injections 2/17/22, 3/21/22 & 4/25/22 with 100% reduction in pain lasting 1 month     Shoulder Pain Right    Leg Pain Bilateral    Foot Pain Bilateral    Medication Refill Percocet, Gabapentin, Baclofen, Vit D refill & pill count today        He is more functional on the opiate meds--able to do yard work and interact with friends and family in a positive friendly manner. Recent injections helped--medications are not as helpful. Injections still help very much. He would like to schedule monthly trigger point-no longer needing sciatic blocks. Back Pain  This is a chronic problem. The current episode started more than 1 year ago. The problem occurs daily. The problem is unchanged. The pain is present in the lumbar spine. The quality of the pain is described as aching, cramping, shooting and stabbing. The pain radiates to the right foot, right knee and right thigh. The pain is at a severity of 6/10. The pain is severe. The pain is worse during the day. The symptoms are aggravated by bending, lying down, position, sitting, standing and twisting. Stiffness is present in the morning. Associated symptoms include leg pain, numbness and weakness. Pertinent negatives include no abdominal pain, bladder incontinence, bowel incontinence, chest pain, dysuria, fever, headaches, paresis, perianal numbness or tingling. Risk factors include lack of exercise and sedentary lifestyle. He has tried analgesics, home exercises, NSAIDs, muscle relaxant, heat and walking (SI injections which help, trigger point injections, OXY-IR ) for the symptoms. The treatment provided moderate relief. Shoulder Pain   This is a chronic problem. Associated symptoms include numbness. Pertinent negatives include no fever or tingling. The symptoms are aggravated by contact.  He has tried NSAIDS, OTC ointments, OTC pain meds, rest, oral narcotics and heat for the symptoms. The treatment provided mild relief. Leg Pain   Associated symptoms include numbness. Pertinent negatives include no muscle weakness or tingling. Neck Pain   This is a recurrent problem. The current episode started more than 1 month ago. The problem occurs constantly. The problem has been gradually worsening. The pain is present in the occipital region. The quality of the pain is described as aching. The pain is at a severity of 6/10. The pain is moderate. The symptoms are aggravated by twisting. The pain is same all the time. Stiffness is present in the morning. Associated symptoms include leg pain, numbness and weakness. Pertinent negatives include no chest pain, fever, headaches, pain with swallowing, paresis, photophobia, syncope, tingling, trouble swallowing or visual change. He has tried heat, acetaminophen, bed rest, home exercises, muscle relaxants, neck support, ice, NSAIDs and oral narcotics for the symptoms. The treatment provided moderate relief.        Past Medical History:   Diagnosis Date    Chronic back pain     Coronary artery disease 2002    Dr. Hermila [facility] at [facility] Esophageal ulcer 3/10/2014    Dr. Bhargav Davalos    Gastric ulcer 3/10/2014    Dr. Bhargav Davalos    Gout     Hiatal hernia 3/10/2014    Dr. Alejandro Number Hyperlipidemia     Hypertension     Impotence 10/16/2020    MI (myocardial infarction) [facility]) 2005    Dr. Samantha Godoy Peripheral vascular disease ([address]     Prediabetes     Schizo-affective schizophrenia, in remission ([address] 2/1/2005    Overview:  followed at the [facility] Severe single current episode of major depressive disorder, without psychotic features ([address] 7/8/2016    Status post coronary artery stent placement 2002    Dr. Hermila Escalante     Past Surgical History:   Procedure Laterality Date    [address]  01/03/2020    Dr. Daryl Scanlon 6/4/2019    RIGHT WRIST CARPAL TUNNEL RELEASE, SUPINE, PAT AT PCP performed by Ted Marquez MD at [address] [facility]  3/10/14    DR Louie Dawson  2002    Dr. Cherry Pederson GRAFT  10/17/2017    KNEE ARTHROSCOPY Left 2002    Dr. Rod Morrison  12/19/13    CAUDAL BLOCKS DONE BY DR Eve Smith    OTHER SURGICAL HISTORY  04/2020    [employer]      SPINE SURGERY  9/2009    Dr. Brianne Olvera  2008    Dr. Selena Zhou UPPER GASTROINTESTINAL ENDOSCOPY  3/10/14    [facility], DR Brandt Cunningham     Social History     Socioeconomic History    Marital status:      Spouse name: None    Number of children: None    Years of education: None    Highest education level: None   Occupational History    Occupation: disability    Tobacco Use    Smoking status: Never Smoker    Smokeless tobacco: Never Used   Vaping Use    Vaping Use: Never used   Substance and Sexual Activity    Alcohol use: No     Alcohol/week: 0.0 standard drinks     Comment: Stopped years ago.  Drug use: No     Comment: YEARS AGO    Sexual activity: Yes     Partners: Female     Comment: monogomous sexual partner, wife. Other Topics Concern    None   Social History Narrative    Tobacco -- never smoked or chewed. Alcohol -- have not used for past 10 years, prior to had consumed 6 pack of beer daily. Drugs -- tried marijuana and cocaine 14 years ago occasionally used for 3-4 years and then stopped completely 10 years ago. Education -- completed 11th grade in [place].    Occupation -- [address] work,  at [employer].    Marital status --  44 years, 2 grown sons.      Social Determinants of Health     Financial Resource Strain:     Difficulty of Paying Living Expenses: Not on file   Food Insecurity:     Worried About Running Out of Food in the Last Year: Not on file    Ran tenderness. Abdominal:      General: There is no distension. Musculoskeletal:         General: Tenderness present. Right shoulder: Tenderness and bony tenderness present. Decreased range of motion. Left shoulder: Tenderness and bony tenderness present. Decreased range of motion. Right upper arm: Normal.      Left upper arm: Normal.      Right elbow: Normal.      Left elbow: Normal.      Right forearm: Normal.      Left forearm: Normal.      Right wrist: Normal.      Left wrist: Normal.      Right hand: Bony tenderness present. No swelling, deformity or lacerations. Decreased range of motion. Decreased strength of finger abduction, thumb/finger opposition and wrist extension. Decreased sensation of the ulnar distribution, median distribution and radial distribution. Normal capillary refill. Left hand: Bony tenderness present. Decreased range of motion. Decreased strength of finger abduction and wrist extension. Decreased sensation of the ulnar distribution and radial distribution. Arms:       Cervical back: Neck supple. Laceration, spasms, tenderness and bony tenderness present. No swelling, edema, deformity or rigidity. Spinous process tenderness and muscular tenderness present. Thoracic back: Deformity, spasms, tenderness and bony tenderness present. Decreased range of motion. Lumbar back: Tenderness and bony tenderness present. No swelling, edema, deformity or lacerations. Decreased range of motion.         Back:       Right hip: Normal.      Left hip: Normal.      Right upper leg: Normal.      Left upper leg: Normal.      Right knee: Normal.      Left knee: Normal.      Right lower leg: Normal.      Left lower leg: Normal.      Right ankle: Normal.      Right Achilles Tendon: Normal.      Left ankle: Normal.      Left Achilles Tendon: Normal.      Right foot: Normal.      Left foot: Normal.        Legs:       Comments: Tender areas are indicated by numbered spot Skin:     General: Skin is warm and dry. Coloration: Skin is not pale. Findings: No abrasion, bruising, ecchymosis, erythema, laceration, petechiae or rash. Rash is not macular, pustular or urticarial.      Nails: There is no clubbing. Neurological:      Mental Status: He is alert and oriented to person, place, and time. Cranial Nerves: No cranial nerve deficit. Sensory: Sensory deficit present. Motor: No tremor, atrophy or abnormal muscle tone. Coordination: Coordination normal.      Gait: Gait abnormal.      Deep Tendon Reflexes: Reflexes abnormal. Babinski sign absent on the right side. Babinski sign absent on the left side. Reflex Scores:       Patellar reflexes are 1+ on the right side and 1+ on the left side. Achilles reflexes are 0 on the right side and 0 on the left side. Psychiatric:         Attention and Perception: He is attentive. Mood and Affect: Mood is not anxious or depressed. Affect is not labile, blunt, angry or inappropriate. Speech: He is communicative. Speech is not rapid and pressured, delayed, slurred or tangential.         Behavior: Behavior normal. Behavior is not agitated, slowed, aggressive, withdrawn, hyperactive or combative. Thought Content: Thought content normal. Thought content is not paranoid or delusional. Thought content does not include homicidal or suicidal ideation. Thought content does not include homicidal or suicidal plan. Cognition and Memory: Memory is not impaired. He does not exhibit impaired recent memory or impaired remote memory. Judgment: Judgment normal. Judgment is not impulsive or inappropriate. Comments: Calm appropriate    NAD       Ortho Exam  Neurologic Exam     Mental Status   Oriented to person, place, and time. Speech: not slurred   Level of consciousness: alert  Knowledge: good. Able to name object. Able to read. Able to repeat.      Motor Exam   Muscle bulk: decreased  Overall muscle tone: normal    Sensory Exam   Right leg light touch: decreased from ankle  Left leg light touch: decreased from ankle    Gait, Coordination, and Reflexes     Reflexes   Right patellar: 1+  Left patellar: 1+  Right achilles: 0  Left achilles: 0          After a thorough review and discussion of the previous medical records, patient comprehensive medical, surgical, and family and social history, Review of Systems, their OARRS, their Screener and Opioid Assessment for Patients with Pain (SOAPP®-R), recent diagnostics, and symptomatic results to previous treatment, it is my impression that the patients is suffering with progressive and severe:     Diagnosis Orders   1. Chronic pain of both shoulders     2. Myelopathy (HCC)     3. C6 radiculopathy     4. Thoracic and lumbosacral neuritis  oxyCODONE-acetaminophen (PERCOCET) 7.5-325 MG per tablet   5. Low back pain with sciatica, sciatica laterality unspecified, unspecified back pain laterality, unspecified chronicity     6. Right lumbar radiculopathy  oxyCODONE-acetaminophen (PERCOCET) 7.5-325 MG per tablet   7. Chronic right-sided low back pain with right-sided sciatica  oxyCODONE-acetaminophen (PERCOCET) 7.5-325 MG per tablet   8. High risk medication use OARRS RUN 11/10/16  Urine Drug Screen    oxyCODONE-acetaminophen (PERCOCET) 7.5-325 MG per tablet   9. Scoliosis of lumbar spine, unspecified scoliosis type  oxyCODONE-acetaminophen (PERCOCET) 7.5-325 MG per tablet   10.  Myalgia  FL INJECT TRIGGER POINTS, 3 OR GREATER    FL INJECT TRIGGER POINTS, 3 OR GREATER    FL INJECT TRIGGER POINTS, 3 OR GREATER       I am also concerned by lifestyle and mood issues including:    Past Medical History:   Diagnosis Date    Chronic back pain     Coronary artery disease 2002    [facility] Esophageal ulcer 3/10/2014    Dr. Eber Licea    Gastric ulcer 3/10/2014    Dr. Eber Licea    Gout     Hiatal hernia 3/10/2014     [place]    Hyperlipidemia     Hypertension     Impotence 10/16/2020    MI (myocardial infarction) [facility]) 2005    Dr. Derian Mendiola Peripheral vascular disease ([facility]     Prediabetes     Schizo-affective schizophrenia, in remission ([facility] 2/1/2005    Overview:  followed at the [facility] Severe single current episode of major depressive disorder, without psychotic features ([facility] 7/8/2016    Status post coronary artery stent placement 2002    Dr. Amisha Castillo           Given their medication, chronic pain and lifestyle and medications they are at risk for :    Falls, constipation, addiction, toxicity on opiates  Loss of livelyhood due to severe pain, debility, weight gain and  vitamin D deficiency    The patient was educated regarding proper diet, fitness routine, and regulatory restrictions concerning pain medications. Previous notes, comprehensive past medical, surgical, family history, and diagnostics were reviewed. Patient education and councelling were provided regarding off label use,treatment options and medication and injection risks. Current and old OARRS ([place] Automated Prescription Reporting System) records reviewed, all refills reviewed since last visit,  Behavioral agreement/[place] regulations   and Toxicology screen was reviewed with patient and is up to date. There are   no current red flags. high risk meds review re intensive monitoring for toxicity and addiction,  They are making good progress regarding pain relief, they are performing at a functional level regarding activities of daily living, family interactions and psychological functioning, they're not having any adverse effects or side effects from the current medications, and I see no findings of aberrant drug taking or addiction related behaviors. The patient is aware that they have a chronic pain condition and they may require opiates dosing for life.   All efforts will be made to wean to the lowest effective dose. Other therapies for pain have not been effective including nonopiate medications. Injections and exercises are only partially effective. A Rx for Narcan was offered to help prevent accidental overdose. RX Monitoring 10/17/2021   Attestation -   Acute Pain Prescriptions Prescription exceeds daily limit for a specific reason. See comments or note. ;Not required given exclusionary diagnoses. ..;Severe pain not adequately treated with lower dose. Periodic Controlled Substance Monitoring Possible medication side effects, risk of tolerance/dependence & alternative treatments discussed. ;No signs of potential drug abuse or diversion identified. ;Assessed functional status. ;Obtaining appropriate analgesic effect of treatment. Chronic Pain > 50 MEDD -   Chronic Pain > 80 MEDD -               Patient is currently taking:       I am having Bri Leyva maintain his aspirin, CPAP Machine, tamsulosin, vitamin D, nitroGLYCERIN, glucose monitoring, Alcohol Prep, DOK, B-D INTEGRA SYRINGE, NIFEdipine, metFORMIN, metoprolol tartrate, FreeStyle Lancets, FREESTYLE LITE, imipramine, oxyCODONE-acetaminophen, gabapentin, testosterone cypionate, atorvastatin, allopurinol, baclofen, colchicine, and oxyCODONE-acetaminophen. I also recommend the following Medications:    Orders Placed This Encounter   Medications    oxyCODONE-acetaminophen (PERCOCET) 7.5-325 MG per tablet     Sig: Take 1 tablet by mouth every 4 hours as needed for Pain (Max of 90 tablets per month) for up to 30 days. Intended supply: 30 days     Dispense:  90 tablet     Refill:  0     Reduce doses taken as pain becomes manageable        -which helps with pain and function. Otherwise, continue the current pain medications that I have prescibed.       Radiologic:   Old  unique films results reviewed  ,  STUDY:   SPINE, ENTIRE THORACIC/LUMBAR, INCLUDE SKULL, CERVICAL ANSD SACRAL   SPINE WHEN PERFORMED 2 OR 3 VIEW       INDICATION: Lumbar fusion, scoliosis.       COMPARISON:   Radiographs 02/13/2020 and 09/16/2019.       ACCESSION NUMBER(S):   [id number]       ORDERING CLINICIAN:   Kandis Allan       FINDINGS:   Fused PA and lateral images of spine were obtained utilizing   individual PA and lateral images of the whole spine.       Redemonstration of posterior spinal instrumented fusion changes noted   from T9 to bilateral iliac bones. Interbody fusion changes noted at   L5-S1. Additional disc spacers are again noted in the mid lumbar spine. No perihardware fractures or lucencies. No change in alignment when   compared with the radiograph from 02/13/2020. There is residual dextroscoliosis centered in the upper lumbar   region. There is straightening of normal lumbar lordosis and thoracic   kyphosis.       IMPRESSION:        I discussed results with patients. see Follow up plans below  For any new studies. Unique source and Care Everywhere Updates:  prior external notes requested and reviewed. No new issues noted. Unique History obtained by caregiver/independent historian-and verified with SOAPPR. Electrodiagnostic:  Previous studies requested,     I discussed results with patient. See follow-up plans for new studies.         Labs:  Previous labs reviewed     Lab Results   Component Value Date     04/07/2022    K 4.8 04/07/2022     04/07/2022    CO2 28 04/07/2022    BUN 17 04/07/2022    CREATININE 0.97 04/07/2022    CALCIUM 9.5 04/07/2022    LABALBU 4.6 03/18/2021    LABALBU 4.4 10/27/2020    BILITOT 0.5 03/18/2021    ALKPHOS 73 03/18/2021    AST 27 03/18/2021    ALT 21 03/18/2021     Lab Results   Component Value Date    WBC 5.4 01/14/2022    RBC 4.76 01/14/2022    HGB 15.3 01/14/2022    HCT 46.3 01/14/2022    MCV 97.1 01/14/2022    MCH 32.1 01/14/2022    MCHC 33.0 01/14/2022    RDW 14.5 01/14/2022     01/14/2022    MPV 9.9 09/15/2015       Lab Results   Component Value Date    LABAMPH Neg 04/16/2021    BARBSCNU Neg 04/16/2021    LABBENZ Neg 04/16/2021    CANSU Neg 04/16/2021    COCAIMETSCRU Neg 04/16/2021    PHENCYCLIDINESCREENURINE Neg 56/25/6846    TRICYCLIC Negative 91/42/3252    DSCOMMENT see below 04/16/2021       Lab Results   Component Value Date    CODEINE Not Detected 09/16/2016    MORPHINE Not Detected 09/16/2016    ACETYLMORPHI Not Detected 09/16/2016    OXYCODONE Present 09/16/2016    NOROXYCODONE Present 09/16/2016    NOROXYMU Present 09/16/2016    HYDRCO Not Detected 09/16/2016    NORHYDU Not Detected 09/16/2016    HYDROMO Not Detected 09/16/2016    BUPREN Not Detected 09/16/2016    NORBUPRNOR Not Detected 09/16/2016    FENTA Not Detected 09/16/2016    NORFENT Not Detected 09/16/2016    MEPERIDINE Not Detected 09/16/2016    TAPENU Not Detected 09/16/2016    TAPOSULFUR Not Detected 09/16/2016    METHADONE Not Detected 09/16/2016    LABPROP Not Detected 09/16/2016    TRAM Not Detected 09/16/2016    AMPH Not Detected 09/16/2016    METHAMP Not Detected 09/16/2016    MDMA Not Detected 09/16/2016    ECMDA Not Detected 09/16/2016       Lab Results   Component Value Date    PHENTERMINE Not Detected 09/16/2016    BENZOYL Not Detected 09/16/2016    Bean Oka Not Detected 09/16/2016    ALPHAOHALPRA Not Detected 09/16/2016    CLONAZEPAM Not Detected 09/16/2016    Anise Francis Not Detected 09/16/2016    DIAZEP Not Detected 09/16/2016    SAFIA Not Detected 09/16/2016    OXAZ Not Detected 09/16/2016    Floydene Plater Not Detected 09/16/2016    LORAZEPAM Not Detected 09/16/2016    MIDAZOLAM Not Detected 09/16/2016    ZOLPIDEM Not Detected 09/16/2016    REG Not Detected 09/16/2016    ETG Not Detected 09/16/2016    MARIJMET Not Detected 09/16/2016    PCP Not Detected 09/16/2016    PAINMGTDRUGP See Below 09/16/2016    EERPAINMGTPA See Note 09/16/2016    LABCREA 184.2 10/11/2021         , I discussed results with patient. See follow-up plans for new studies.     Therapies:  HEP-gentle stretching and relaxation patient. An additional 7 minutes were spent outside of the patient visit to review records. Additional time spent with the patient to discuss their questions. Additional time spent with the patient devoted to discussing treatment strategy, planning, and implementation. Patient understands above plan; questions asked and answered. Patient agrees to plan as noted above. At least 50% of the visit was involved in the discussion of the options for treatment. We discussed exercises, medication, interventional therapies and surgery. Healthy life style is essential with patient hard work to achieve the wellness. In addition; discussion with the patient and/or family about patient's functional status any of the diagnostic results, impressions and/or recommended diagnostic studies, prognosis, risks and benefits of treatment options, instructions for treatment and/or follow-up, importance of compliance with chosen treatment options, risk-factor reduction, and patient/family education. They are to follow up in 2 1/2 -3 months to review medication, efficacy of injections, pill counts, OARRS check, high risk med review re intensive monitoring for toxicity and addiction, SOAPPR assessment, review diagnostics, to review previous and future treatment plans and assess appropriateness for continued therapy.         New Diagnostics  Orders Placed This Encounter   Procedures    Urine Drug Screen    [place] INJECT TRIGGER POINTS, 3 OR GREATER    [place] INJECT TRIGGER POINTS, 3 OR GREATER    [place] INJECT TRIGGER POINTS, 3 OR GREATER       Brenda Stubbs,

## 2022-04-30 DIAGNOSIS — E29.1 HYPOGONADISM MALE: ICD-10-CM

## 2022-05-02 DIAGNOSIS — M41.9 SCOLIOSIS OF LUMBAR SPINE, UNSPECIFIED SCOLIOSIS TYPE: ICD-10-CM

## 2022-05-02 DIAGNOSIS — M54.14 THORACIC AND LUMBOSACRAL NEURITIS: ICD-10-CM

## 2022-05-02 DIAGNOSIS — Z79.899 HIGH RISK MEDICATION USE: ICD-10-CM

## 2022-05-02 DIAGNOSIS — G89.29 CHRONIC RIGHT-SIDED LOW BACK PAIN WITH RIGHT-SIDED SCIATICA: ICD-10-CM

## 2022-05-02 DIAGNOSIS — M54.17 THORACIC AND LUMBOSACRAL NEURITIS: ICD-10-CM

## 2022-05-02 DIAGNOSIS — M54.41 CHRONIC RIGHT-SIDED LOW BACK PAIN WITH RIGHT-SIDED SCIATICA: ICD-10-CM

## 2022-05-02 DIAGNOSIS — M54.16 RIGHT LUMBAR RADICULOPATHY: ICD-10-CM

## 2022-05-02 RX ORDER — SYRINGE WITH NEEDLE, 1 ML 25GX5/8"
SYRINGE, EMPTY DISPOSABLE MISCELLANEOUS
Qty: 20 EACH | Refills: 6 | Status: SHIPPED | OUTPATIENT
Start: 2022-05-02

## 2022-05-02 NOTE — TELEPHONE ENCOUNTER
Pharmacy requesting medication refill.  Please approve or deny this request.    Rx requested:  Requested Prescriptions     Pending Prescriptions Disp Refills    B-D 3CC LUER-JOSE SYR 29VG8-8/2 22G X 1-1/2\" 3 ML MISC [Pharmacy Med Name: B-D #9574 SYR/NDL 3ML 42CE9-8/2 LL] 20 each 6     Sig: USE EVERY 2 WEEKS WITH TESTOSTERONE         Last Office Visit:   1/11/2022      Next Visit Date:  Future Appointments   Date Time Provider Alexis Arango   5/10/2022  1:45 PM Mainor Medina MD [facility]   7/7/2022 11:00 AM Madan Glass, DO [address]   7/12/2022 11:00 AM Joce Lam MD [facility]   7/27/2022 10:45 AM [facility] Glass[facility] [place]   8/1/2022 10:30 AM Madan Glass, [facility] [place] [facility] [place]   9/6/2022 10:45 AM Madan Glass, DO [address]

## 2022-05-10 ENCOUNTER — OFFICE VISIT (OUTPATIENT)
Dept: CARDIOLOGY CLINIC | Age: 72
End: 2022-05-10
Payer: MEDICARE

## 2022-05-10 VITALS
HEIGHT: 67 IN | OXYGEN SATURATION: 99 % | DIASTOLIC BLOOD PRESSURE: 84 MMHG | BODY MASS INDEX: 25.9 KG/M2 | WEIGHT: 165 LBS | HEART RATE: 70 BPM | SYSTOLIC BLOOD PRESSURE: 140 MMHG

## 2022-05-10 DIAGNOSIS — I25.118 CORONARY ARTERY DISEASE OF NATIVE ARTERY OF NATIVE HEART WITH STABLE ANGINA PECTORIS (HCC): Primary | ICD-10-CM

## 2022-05-10 DIAGNOSIS — E78.00 HYPERCHOLESTEROLEMIA: ICD-10-CM

## 2022-05-10 DIAGNOSIS — R09.89 BILATERAL CAROTID BRUITS: ICD-10-CM

## 2022-05-10 DIAGNOSIS — I10 ESSENTIAL HYPERTENSION, BENIGN: ICD-10-CM

## 2022-05-10 PROCEDURE — 99214 OFFICE O/P EST MOD 30 MIN: CPT | Performed by: INTERNAL MEDICINE

## 2022-05-10 RX ORDER — ATORVASTATIN CALCIUM 40 MG/1
40 TABLET, FILM COATED ORAL DAILY
Qty: 90 TABLET | Refills: 3 | Status: SHIPPED | OUTPATIENT
Start: 2022-05-10 | End: 2022-10-21

## 2022-05-10 ASSESSMENT — ENCOUNTER SYMPTOMS
WHEEZING: 0
COUGH: 0
BACK PAIN: 1
NAUSEA: 0
RESPIRATORY NEGATIVE: 1
SHORTNESS OF BREATH: 0
GASTROINTESTINAL NEGATIVE: 1
EYES NEGATIVE: 1
STRIDOR: 0
BLOOD IN STOOL: 0
CHEST TIGHTNESS: 0

## 2022-05-10 NOTE — PROGRESS NOTES
Subsequent Progress Note  Patient: Nadine Allen  YOB: 1950  MRN: [id number]    Chief Complaint: htn cad lipid preop back   Chief Complaint   Patient presents with    6 Month Follow-Up    Coronary Artery Disease       CV Data:  Prior CAD/Stentsx 2  10/17/2017 CABG x2 EF 55  10/2018  Stress echo EF 55  Persistent HyperK  7/2020 CUS mild   12/2020 GXT negative   10/21 CUS mild     Subjective/HPI: no cp no osb no falls noblled has back arthritis getting shots with some releif. 10/25/2019 No cp no sob no falls no bleed. Takes meds. Eating well. 2/26/2020 no cp no sob. Had extensive back SX. Did well. 6/26/2020 no cp no osb no falls no bleed. Takes meds. Walks and active no bleed    10/27/2020 pt will have ED surgery at [facility] next week. He is active and has no CP no SOB no falls no bleed. 1/6/21 no cp no sob no falls no bleed    5/7/21 no cp no sob no falls nob leed no edema K is always high 5.3 recently. Recently HR 40s and Metoprolol was backed off to Bid from prior tid.     9/10/21 doing well no cp no sob active taking meds. No falls. No bleed. 1/18/22 doing well no  Cp no spb not dizzy no bleed. Takes meds. Rawleigh Billing active     5/10/22 doping well no cp no sob nofalsl no bleed. BP at home good. Last OV BP good as well. Today little elevated- states he rushed In here.      EKG: SR 71 RBBB    Past Medical History:   Diagnosis Date    Chronic back pain     Coronary artery disease 2002    Dr. Cisco Haines at [facility] Esophageal ulcer 3/10/2014    Dr. Naseem Roque    Gastric ulcer 3/10/2014    Dr. Naseem Roque    Gout     Hiatal hernia 3/10/2014    Dr. Naseem Roque    Hyperlipidemia     Hypertension     Impotence 10/16/2020    MI (myocardial infarction) [facility]) 2005    Dr. Sophie Levin Peripheral vascular disease ([facility] [address]     Prediabetes     Schizo-affective schizophrenia, in remission ([facility] [address] 2/1/2005    Overview:  followed at the [facility] Severe single current episode of major depressive disorder, without psychotic features ([facility] [address] 7/8/2016    Status post coronary artery stent placement 2002    Dr. Terrance Kaur       Past Surgical History:   Procedure Laterality Date    [address]  01/03/2020    Dr. Corey Pod Right 6/4/2019    RIGHT WRIST CARPAL TUNNEL RELEASE, SUPINE, PAT AT PCP performed by Hema Mendoza MD at [facility]  3/10/14    DR Dagoberto Chavez  2002    Dr. Morris [address] GRAFT  10/17/2017    KNEE ARTHROSCOPY Left 2002    Dr. Charly Luis  12/19/13    CAUDAL BLOCKS DONE BY DR Daylin Rahman    OTHER SURGICAL HISTORY  04/2020    [facility]      SPINE SURGERY  9/2009    Dr. Fahad Morocho  2008    Dr. Milton Tineo  3/10/14    BX, DILATION, DR Stacy Mascorro       Family History   Problem Relation Age of Onset    High Blood Pressure Mother     Arthritis Mother     Cancer Father         throat    Arthritis Father        Social History     Socioeconomic History    Marital status:      Spouse name: None    Number of children: None    Years of education: None    Highest education level: None   Occupational History    Occupation: disability    Tobacco Use    Smoking status: Never Smoker    Smokeless tobacco: Never Used   Vaping Use    Vaping Use: Never used   Substance and Sexual Activity    Alcohol use: No     Alcohol/week: 0.0 standard drinks     Comment: Stopped years ago.  Drug use: No     Comment: YEARS AGO    Sexual activity: Yes     Partners: Female     Comment: monogomous sexual partner, wife. Other Topics Concern    None   Social History Narrative    Tobacco -- never smoked or chewed. Alcohol -- have not used for past 10 years, prior to had consumed 6 pack of beer daily. Drugs -- tried marijuana and cocaine 14 years ago occasionally used for 3-4 years and then stopped completely 10 years ago. Education -- completed 11th grade in [place].    Occupation -- [employer] work,  at [employer].    Marital status --  44 years, 2 grown sons. Social Determinants of Health     Financial Resource Strain:     Difficulty of Paying Living Expenses: Not on file   Food Insecurity:     Worried About Running Out of Food in the Last Year: Not on file    Sofi of Food in the Last Year: Not on file   Transportation Needs:     Lack of Transportation (Medical): Not on file    Lack of Transportation (Non-Medical):  Not on file   Physical Activity:     Days of Exercise per Week: Not on file    Minutes of Exercise per Session: Not on file   Stress:     Feeling of Stress : Not on file   Social Connections:     Frequency of Communication with Friends and Family: Not on file    Frequency of Social Gatherings with Friends and Family: Not on file    Attends Religion Services: Not on file    Active Member of [address] [place] [address] [employer] or Organizations: Not on file    Attends Club or Organization Meetings: Not on file    Marital Status: Not on file   Intimate Partner Violence:     Fear of Current or Ex-Partner: Not on file    Emotionally Abused: Not on file    Physically Abused: Not on file    Sexually Abused: Not on file   Housing Stability:     Unable to Pay for Housing in the Last Year: Not on file    Number of [place] in the Last Year: Not on file    Unstable Housing in the Last Year: Not on file       No Known Allergies    Current Outpatient Medications   Medication Sig Dispense Refill    atorvastatin (LIPITOR) 40 MG tablet Take 1 tablet by mouth daily 90 tablet 3    B-D 3CC LUER-JOSE SYR [id number]/2 22G X 1-1/2\" 3 ML MISC USE EVERY 2 WEEKS WITH TESTOSTERONE 20 each 6    colchicine (COLCRYS) 0.6 MG tablet TAKE 1 TABLET BY MOUTH DAILY DURING FLARE FOR GOUT FLARE 30 tablet 2    baclofen (LIORESAL) 10 MG tablet TAKE 1 TABLET BY MOUTH THREE TIMES DAILY 90 tablet 1    allopurinol (ZYLOPRIM) 100 MG tablet TAKE 1 TABLET BY MOUTH DAILY 90 tablet 3    oxyCODONE-acetaminophen (PERCOCET) 7.5-325 MG per tablet       imipramine (TOFRANIL) 25 MG tablet TAKE 1 TABLET BY MOUTH EVERY NIGHT 30 tablet 3    NIFEdipine (PROCARDIA XL) 60 MG extended release tablet TAKE 1 TABLET BY MOUTH DAILY 90 tablet 3    metoprolol tartrate (LOPRESSOR) 50 MG tablet TAKE 1 TABLET BY MOUTH THREE TIMES DAILY 270 tablet 2    blood glucose test strips (FREESTYLE LITE) strip PATIENT TO TEST ONCE DAILY 100 strip 11    metFORMIN (GLUCOPHAGE) 500 MG tablet TAKE 1 TABLET BY MOUTH TWICE DAILY WITH MEALS 180 tablet 3    FreeStyle Lancets MISC PATIENT TO TEST ONCE DAILY 100 each 11     MG capsule TK ONE C PO  BID      Alcohol Swabs (ALCOHOL PREP) 70 % PADS Patient to test once daily E11.9 100 each 11    glucose monitoring kit (FREESTYLE) monitoring kit 1 kit by Does not apply route daily 1 kit 0    nitroGLYCERIN (NITROSTAT) 0.4 MG SL tablet Place 1 tablet under the tongue every 5 minutes as needed x 3 doses for chest pain. 25 tablet 2    vitamin D (CHOLECALCIFEROL) 25 MCG (1000 UT) TABS tablet Take 1,000 Units by mouth daily      tamsulosin (FLOMAX) 0.4 MG capsule TAKE 1 CAPSULE DAILY AT BEDTIME      CPAP Machine MISC by NOT APPLICABLE route      aspirin 81 MG EC tablet Take 1 tablet by mouth daily 90 tablet 1    gabapentin (NEURONTIN) 300 MG capsule TAKE 1 CAPSULE BY MOUTH EVERY MORNING THEN TAKE 2 CAPSULES BY MOUTH AT BEDTIME 270 capsule 0    testosterone cypionate (DEPOTESTOTERONE CYPIONATE) 200 MG/ML injection INJECT 0.5MLS INTO THE MUSCLE EVERY 2 WEEKS 10 mL 0     No current facility-administered medications for this visit. Review of Systems:   Review of Systems   Constitutional: Negative. Negative for diaphoresis and fatigue. HENT: Negative. Eyes: Negative.     Respiratory: Negative. Negative for cough, chest tightness, shortness of breath, wheezing and stridor. Cardiovascular: Negative. Negative for chest pain, palpitations and leg swelling. Gastrointestinal: Negative. Negative for blood in stool and nausea. Genitourinary: Negative. Musculoskeletal: Positive for arthralgias and back pain. Skin: Negative. Neurological: Negative. Negative for dizziness, syncope, weakness and light-headedness. Hematological: Negative. Psychiatric/Behavioral: Negative. Physical Examination:    BP (!) 140/84 (Site: Right Upper Arm, Position: Sitting, Cuff Size: Medium Adult)   Pulse 70   Ht 5' 7\" (1.702 m)   Wt 165 lb (74.8 kg)   SpO2 99%   BMI 25.84 kg/m²    Physical Exam   Constitutional: He appears healthy. No distress. HENT:   Normal cephalic and Atraumatic   Eyes: Pupils are equal, round, and reactive to light. Neck: Thyroid normal. No JVD present. No neck adenopathy. No thyromegaly present. Cardiovascular: Normal rate, regular rhythm, intact distal pulses and normal pulses. Murmur heard. Pulmonary/Chest: Effort normal and breath sounds normal. He has no wheezes. He has no rales. He exhibits no tenderness. Abdominal: Soft. Bowel sounds are normal. There is no abdominal tenderness. Musculoskeletal:         General: No tenderness or edema. Normal range of motion. Cervical back: Normal range of motion and neck supple. Neurological: He is alert and oriented to person, place, and time. Skin: Skin is warm. No cyanosis. Nails show no clubbing.        LABS:  CBC:   Lab Results   Component Value Date    WBC 5.4 01/14/2022    RBC 4.76 01/14/2022    HGB 15.3 01/14/2022    HCT 46.3 01/14/2022    MCV 97.1 01/14/2022    MCH 32.1 01/14/2022    MCHC 33.0 01/14/2022    RDW 14.5 01/14/2022     01/14/2022    MPV 9.9 09/15/2015     Lipids:  Lab Results   Component Value Date    CHOL 185 01/14/2022    CHOL 150 11/16/2020    CHOL 152 03/12/2018     Lab Results   Component Value Date    TRIG 122 01/14/2022    TRIG 96 11/16/2020    TRIG 157 03/12/2018     Lab Results   Component Value Date    HDL 45 01/14/2022    HDL 45 11/16/2020    HDL 42 05/24/2019     Lab Results   Component Value Date    LDLCALC 116 01/14/2022    LDLCALC 86 11/16/2020    LDLCALC 86 05/24/2019     No results found for: LABVLDL, VLDL  No results found for: CHOLHDLRATIO  CMP:    Lab Results   Component Value Date     04/07/2022    K 4.8 04/07/2022     04/07/2022    CO2 28 04/07/2022    BUN 17 04/07/2022    CREATININE 0.97 04/07/2022    GFRAA >60.0 04/07/2022    LABGLOM >60.0 04/07/2022    GLUCOSE 109 01/11/2022    GLUCOSE 115 10/27/2020    PROT 7.9 03/18/2021    LABALBU 4.6 03/18/2021    LABALBU 4.4 10/27/2020    CALCIUM 9.5 04/07/2022    BILITOT 0.5 03/18/2021    ALKPHOS 73 03/18/2021    AST 27 03/18/2021    ALT 21 03/18/2021     BMP:    Lab Results   Component Value Date     04/07/2022    K 4.8 04/07/2022     04/07/2022    CO2 28 04/07/2022    BUN 17 04/07/2022    LABALBU 4.6 03/18/2021    LABALBU 4.4 10/27/2020    CREATININE 0.97 04/07/2022    CALCIUM 9.5 04/07/2022    GFRAA >60.0 04/07/2022    LABGLOM >60.0 04/07/2022    GLUCOSE 109 01/11/2022    GLUCOSE 115 10/27/2020     Magnesium:    Lab Results   Component Value Date    MG 2.07 01/06/2020     TSH:  Lab Results   Component Value Date    TSH 4.110 05/06/2016       Patient Active Problem List   Diagnosis    Chronic low back pain    Scoliosis of lumbar spine    Essential hypertension, benign    Hypercholesterolemia    Right lumbar radiculopathy    Gout    High risk medication use - 12/07/17 [facility] PM&R, 02/08/18 [facility] PM&R, 10/05/17 Urine Drug Screen: negative PM&R, MED CONTRACT 1/17/17    Low back pain with sciatica    Myalgia    Synovitis and tenosynovitis    Thoracic and lumbosacral neuritis    Vitamin D deficiency    Hyperparathyroidism ([address]    Osteopenia    C6 radiculopathy    DJD (degenerative joint disease), cervical    Angina pectoris (HCC)    PRASAD (obstructive sleep apnea)    Hyperkalemia    Chronic pain of both shoulders    Hypogonadism in male   [employer] Therapeutic opioid-induced constipation (OIC)    Bilateral leg pain    Other specified symptoms and signs involving the circulatory and respiratory systems     Myelopathy (HCC)    Coronary artery disease of native artery of native heart with stable angina pectoris (HCC)    Bilateral carotid bruits    Spinal cord disease (HCC)    Lower urinary tract symptoms (LUTS)    Impotence    Trouble getting to sleep    Bradycardia    Type 2 diabetes mellitus, without long-term current use of insulin (HCC)    Weak urinary stream    Onychodystrophy    Old myocardial infarction    Long term (current) use of aspirin    Other chronic pain    Presence of aortocoronary bypass graft       Medications Discontinued During This Encounter   Medication Reason    oxyCODONE-acetaminophen (PERCOCET) 7.5-325 MG per tablet LIST CLEANUP    atorvastatin (LIPITOR) 40 MG tablet REORDER       Modified Medications    Modified Medication Previous Medication    ATORVASTATIN (LIPITOR) 40 MG TABLET atorvastatin (LIPITOR) 40 MG tablet       Take 1 tablet by mouth daily    Take 1 tablet by mouth daily       Orders Placed This Encounter   Medications    atorvastatin (LIPITOR) 40 MG tablet     Sig: Take 1 tablet by mouth daily     Dispense:  90 tablet     Refill:  3       Assessment/Plan:    1. Hypercholesterolemia  Statin - cotninue. Labs reviewed. Low fat diet. .. LDL not to goal.  adelina dc Prava and stat Atorva 40 qd.     2. Essential hypertension, benign   stable - continue meds. Low salt diet    3. Coronary artery disease with hx of myocardial infarct w/o hx of CABG  No angina - continue CV meds. 4. Carotid Bruits- CUS - was cancelled due to COVID-19. Will reschedule. -- stable with mild plaque - will continue surveillance    5. Preop ED - implant at -did well. Counseling:  Heart Healthy Lifestyle, Low Salt Diet, Take Precautions to Prevent Falls and Walk Daily    Return in about 4 months (around 9/10/2022).       Electronically signed by Dionne Monaco MD on 5/10/2022 at 2:17 PM

## 2022-05-27 RX ORDER — GABAPENTIN 300 MG/1
CAPSULE ORAL
Qty: 270 CAPSULE | Refills: 0 | Status: SHIPPED | OUTPATIENT
Start: 2022-05-27 | End: 2022-08-22

## 2022-06-02 DIAGNOSIS — M54.16 RIGHT LUMBAR RADICULOPATHY: Primary | ICD-10-CM

## 2022-06-02 DIAGNOSIS — M54.40 LOW BACK PAIN WITH SCIATICA, SCIATICA LATERALITY UNSPECIFIED, UNSPECIFIED BACK PAIN LATERALITY, UNSPECIFIED CHRONICITY: ICD-10-CM

## 2022-06-02 DIAGNOSIS — M54.17 THORACIC AND LUMBOSACRAL NEURITIS: ICD-10-CM

## 2022-06-02 DIAGNOSIS — M54.12 C6 RADICULOPATHY: ICD-10-CM

## 2022-06-02 DIAGNOSIS — G95.9 MYELOPATHY (HCC): ICD-10-CM

## 2022-06-02 DIAGNOSIS — M41.9 SCOLIOSIS OF LUMBAR SPINE, UNSPECIFIED SCOLIOSIS TYPE: ICD-10-CM

## 2022-06-02 DIAGNOSIS — M54.14 THORACIC AND LUMBOSACRAL NEURITIS: ICD-10-CM

## 2022-06-02 RX ORDER — OXYCODONE AND ACETAMINOPHEN 7.5; 325 MG/1; MG/1
1 TABLET ORAL EVERY 4 HOURS PRN
Qty: 90 TABLET | Refills: 0 | Status: SHIPPED | OUTPATIENT
Start: 2022-06-04 | End: 2022-06-30 | Stop reason: SDUPTHER

## 2022-06-18 ENCOUNTER — APPOINTMENT (OUTPATIENT)
Dept: CT IMAGING | Age: 72
End: 2022-06-18
Payer: MEDICARE

## 2022-06-18 ENCOUNTER — HOSPITAL ENCOUNTER (EMERGENCY)
Age: 72
Discharge: HOME | End: 2022-06-19
Attending: EMERGENCY MEDICINE
Payer: MEDICARE

## 2022-06-18 DIAGNOSIS — M43.6 TORTICOLLIS: Primary | ICD-10-CM

## 2022-06-18 LAB
ALBUMIN SERPL-MCNC: 4.8 G/DL (ref 3.5–4.6)
ALP BLD-CCNC: 72 U/L (ref 35–104)
ALT SERPL-CCNC: 20 U/L (ref 0–41)
ANION GAP SERPL CALCULATED.3IONS-SCNC: 13 MEQ/L (ref 9–15)
AST SERPL-CCNC: 29 U/L (ref 0–40)
BASOPHILS ABSOLUTE: 0.1 K/UL (ref 0–0.2)
BASOPHILS RELATIVE PERCENT: 1.1 %
BILIRUB SERPL-MCNC: 0.3 MG/DL (ref 0.2–0.7)
BUN BLDV-MCNC: 16 MG/DL (ref 8–23)
CALCIUM SERPL-MCNC: 10.1 MG/DL (ref 8.5–9.9)
CHLORIDE BLD-SCNC: 100 MEQ/L (ref 95–107)
CO2: 27 MEQ/L (ref 20–31)
CREAT SERPL-MCNC: 1.01 MG/DL (ref 0.7–1.2)
EOSINOPHILS ABSOLUTE: 0 K/UL (ref 0–0.7)
EOSINOPHILS RELATIVE PERCENT: 0.6 %
GFR AFRICAN AMERICAN: >60
GFR NON-AFRICAN AMERICAN: >60
GLOBULIN: 3.1 G/DL (ref 2.3–3.5)
GLUCOSE BLD-MCNC: 91 MG/DL (ref 70–99)
HCT VFR BLD CALC: 45.6 % (ref 42–52)
HEMOGLOBIN: 15.3 G/DL (ref 14–18)
LYMPHOCYTES ABSOLUTE: 1.8 K/UL (ref 1–4.8)
LYMPHOCYTES RELATIVE PERCENT: 20.9 %
MCH RBC QN AUTO: 32.5 PG (ref 27–31.3)
MCHC RBC AUTO-ENTMCNC: 33.6 % (ref 33–37)
MCV RBC AUTO: 96.9 FL (ref 80–100)
MONOCYTES ABSOLUTE: 0.8 K/UL (ref 0.2–0.8)
MONOCYTES RELATIVE PERCENT: 9.6 %
NEUTROPHILS ABSOLUTE: 5.9 K/UL (ref 1.4–6.5)
NEUTROPHILS RELATIVE PERCENT: 67.8 %
PDW BLD-RTO: 14.2 % (ref 11.5–14.5)
PLATELET # BLD: 278 K/UL (ref 130–400)
POTASSIUM SERPL-SCNC: 4.7 MEQ/L (ref 3.4–4.9)
RBC # BLD: 4.71 M/UL (ref 4.7–6.1)
SODIUM BLD-SCNC: 140 MEQ/L (ref 135–144)
TOTAL PROTEIN: 7.9 G/DL (ref 6.3–8)
TROPONIN: <0.01 NG/ML (ref 0–0.01)
WBC # BLD: 8.7 K/UL (ref 4.8–10.8)

## 2022-06-18 PROCEDURE — 85025 COMPLETE CBC W/AUTO DIFF WBC: CPT

## 2022-06-18 PROCEDURE — 80053 COMPREHEN METABOLIC PANEL: CPT

## 2022-06-18 PROCEDURE — 72125 CT NECK SPINE W/O DYE: CPT

## 2022-06-18 PROCEDURE — 93005 ELECTROCARDIOGRAM TRACING: CPT

## 2022-06-18 PROCEDURE — 96374 THER/PROPH/DIAG INJ IV PUSH: CPT

## 2022-06-18 PROCEDURE — 99284 EMERGENCY DEPT VISIT MOD MDM: CPT

## 2022-06-18 PROCEDURE — 6360000002 HC RX W HCPCS: Performed by: EMERGENCY MEDICINE

## 2022-06-18 PROCEDURE — 84484 ASSAY OF TROPONIN QUANT: CPT

## 2022-06-18 PROCEDURE — 36415 COLL VENOUS BLD VENIPUNCTURE: CPT

## 2022-06-18 PROCEDURE — 96375 TX/PRO/DX INJ NEW DRUG ADDON: CPT

## 2022-06-18 RX ORDER — MORPHINE SULFATE 4 MG/ML
4 INJECTION, SOLUTION INTRAMUSCULAR; INTRAVENOUS ONCE
Status: COMPLETED | OUTPATIENT
Start: 2022-06-18 | End: 2022-06-18

## 2022-06-18 RX ORDER — ONDANSETRON 2 MG/ML
4 INJECTION INTRAMUSCULAR; INTRAVENOUS ONCE
Status: COMPLETED | OUTPATIENT
Start: 2022-06-18 | End: 2022-06-18

## 2022-06-18 RX ORDER — LORAZEPAM 2 MG/ML
1 INJECTION INTRAMUSCULAR ONCE
Status: COMPLETED | OUTPATIENT
Start: 2022-06-18 | End: 2022-06-18

## 2022-06-18 RX ORDER — KETOROLAC TROMETHAMINE 15 MG/ML
15 INJECTION, SOLUTION INTRAMUSCULAR; INTRAVENOUS ONCE
Status: COMPLETED | OUTPATIENT
Start: 2022-06-18 | End: 2022-06-18

## 2022-06-18 RX ADMIN — KETOROLAC TROMETHAMINE 15 MG: 15 INJECTION, SOLUTION INTRAMUSCULAR; INTRAVENOUS at 23:14

## 2022-06-18 RX ADMIN — LORAZEPAM 1 MG: 2 INJECTION INTRAMUSCULAR; INTRAVENOUS at 22:11

## 2022-06-18 RX ADMIN — MORPHINE SULFATE 4 MG: 4 INJECTION, SOLUTION INTRAMUSCULAR; INTRAVENOUS at 22:12

## 2022-06-18 RX ADMIN — ONDANSETRON 4 MG: 2 INJECTION INTRAMUSCULAR; INTRAVENOUS at 22:10

## 2022-06-18 ASSESSMENT — ENCOUNTER SYMPTOMS
ABDOMINAL PAIN: 0
CHEST TIGHTNESS: 0
EYE DISCHARGE: 0
SORE THROAT: 0
PHOTOPHOBIA: 0
WHEEZING: 0
COUGH: 0
BACK PAIN: 0
SHORTNESS OF BREATH: 0
ABDOMINAL DISTENTION: 0
VOMITING: 0

## 2022-06-18 ASSESSMENT — PAIN SCALES - GENERAL
PAINLEVEL_OUTOF10: 6
PAINLEVEL_OUTOF10: 8
PAINLEVEL_OUTOF10: 6

## 2022-06-18 ASSESSMENT — PAIN - FUNCTIONAL ASSESSMENT
PAIN_FUNCTIONAL_ASSESSMENT: 0-10

## 2022-06-19 VITALS
RESPIRATION RATE: 17 BRPM | HEIGHT: 67 IN | TEMPERATURE: 97.9 F | BODY MASS INDEX: 25.9 KG/M2 | OXYGEN SATURATION: 94 % | DIASTOLIC BLOOD PRESSURE: 69 MMHG | WEIGHT: 165 LBS | HEART RATE: 65 BPM | SYSTOLIC BLOOD PRESSURE: 136 MMHG

## 2022-06-19 RX ORDER — HYDROCODONE BITARTRATE AND ACETAMINOPHEN 5; 325 MG/1; MG/1
1 TABLET ORAL EVERY 6 HOURS PRN
Qty: 12 TABLET | Refills: 0 | Status: SHIPPED | OUTPATIENT
Start: 2022-06-19 | End: 2022-06-22

## 2022-06-19 RX ORDER — NALOXONE HYDROCHLORIDE 4 MG/.1ML
1 SPRAY NASAL PRN
Qty: 1 EACH | Refills: 5 | Status: SHIPPED | OUTPATIENT
Start: 2022-06-19

## 2022-06-19 ASSESSMENT — PAIN - FUNCTIONAL ASSESSMENT: PAIN_FUNCTIONAL_ASSESSMENT: 0-10

## 2022-06-19 ASSESSMENT — PAIN SCALES - GENERAL: PAINLEVEL_OUTOF10: 4

## 2022-06-19 NOTE — ED PROVIDER NOTES
[address] [facility] ED  eMERGENCY dEPARTMENT eNCOUnter      Pt Name: Lori Ramirez  MRN: [id number]  [place] 1950  Date of evaluation: 6/18/2022  Provider: Slick Velasquez MD    CHIEF COMPLAINT       Chief Complaint   Patient presents with    Chest Pain         HISTORY OF PRESENT ILLNESS   (Location/Symptom, Timing/Onset,Context/Setting, Quality, Duration, Modifying Factors, Severity)  Note limiting factors. Lori Ramirez is a 80 y.o. male who presents to the emergency department for evaluation of right-sided neck pain/torticollis. Patient presents via the front lobby clutching his chest and appeared to be in acute discomfort. He is Japanese-speaking only so he was brought break back to her room assuming he was having chest pain. Through the iPad interview the patient has severe right-sided neck pain into his upper chest.  No chest pressure heaviness. No radiation. Pain is related to movement. He feels like he cannot move his head or has severe pain right lateral neck. No prior history of same. Does have cardiac history. Was began as he was cutting the grass today and he tried to lay down but it became worse. HPI    NursingNotes were reviewed. REVIEW OF SYSTEMS    (2-9 systems for level 4, 10 or more for level 5)     Review of Systems   Constitutional: Negative for chills and diaphoresis. HENT: Negative for congestion, ear pain, mouth sores and sore throat. Eyes: Negative for photophobia and discharge. Respiratory: Negative for cough, chest tightness, shortness of breath and wheezing. Cardiovascular: Positive for chest pain. Negative for palpitations. Gastrointestinal: Negative for abdominal distention, abdominal pain and vomiting. Endocrine: Negative for cold intolerance. Genitourinary: Negative for difficulty urinating. Musculoskeletal: Positive for neck pain. Negative for arthralgias, back pain, gait problem and joint swelling. Skin: Negative for pallor and rash. Allergic/Immunologic: Negative for immunocompromised state. Neurological: Negative for dizziness and syncope. Hematological: Negative for adenopathy. Psychiatric/Behavioral: Negative for agitation and hallucinations. The patient is not nervous/anxious. All other systems reviewed and are negative. Except as noted above the remainder of the review of systems was reviewed and negative.        PAST MEDICAL HISTORY     Past Medical History:   Diagnosis Date    Chronic back pain     Coronary artery disease 2002    Dr. Bay Alaniz [facility] Esophageal ulcer 3/10/2014    Dr. Tim Falk Gastric ulcer 3/10/2014    Dr. Sherron Fraire    Gout     Hiatal hernia 3/10/2014    Dr. Tim Falk Hyperlipidemia     Hypertension     Impotence 10/16/2020    [facility]    Dr. Iva Gallardo Peripheral vascular disease ([facility]     Prediabetes     Schizo-affective schizophrenia, in remission ([facility] 2/1/2005    Overview:  followed at the [facility] Severe single current episode of major depressive disorder, without psychotic features ([facility] [address] 7/8/2016    Status post coronary artery stent placement 2002    Dr. Eileen Veliz       Past Surgical History:   Procedure Laterality Date    APPENDECTOMY  1970    BACK SURGERY  01/03/2020    Dr. Fang Kay Right 6/4/2019    RIGHT WRIST CARPAL TUNNEL RELEASE, SUPINE, PAT AT PCP performed by Lonell Lesch, MD at [address] [place] [address]  3/10/14    DR Rosalind Valdes  2002    Dr. Al Fam GRAFT  10/17/2017    KNEE ARTHROSCOPY Left 2002    Dr. Kenney Galdamez  12/19/13    CAUDAL BLOCKS DONE BY DR Rose Crabtree. OTHER SURGICAL HISTORY  04/2020    urolift      SPINE SURGERY  9/2009    Dr. Jesi Bartlett SURGERY  2008    Dr. Adelita Martinez  3/10/14    [address] MEDICATIONS       Discharge Medication List as of 6/19/2022  1:10 AM      CONTINUE these medications which have NOT CHANGED    Details   !! oxyCODONE-acetaminophen (PERCOCET) 7.5-325 MG per tablet Take 1 tablet by mouth every 4 hours as needed for Pain (Max 90 tablets per month) for up to 30 days.  Intended supply: 30 days, Disp-90 tablet, R-0Normal      gabapentin (NEURONTIN) 300 MG capsule TAKE 1 CAPSULE BY MOUTH EVERY MORNING THEN TAKE 2 CAPSULES BY MOUTH AT BEDTIME, Disp-270 capsule, R-0Normal      atorvastatin (LIPITOR) 40 MG tablet Take 1 tablet by mouth daily, Disp-90 tablet, R-3Normal      B-D 3CC LUER-JOSE SYR 16TV6-7/2 22G X 1-1/2\" 3 ML MISC Disp-20 each, R-6, Normal      colchicine (COLCRYS) 0.6 MG tablet TAKE 1 TABLET BY MOUTH DAILY DURING FLARE FOR GOUT FLARE, Disp-30 tablet, R-2Normal      baclofen (LIORESAL) 10 MG tablet TAKE 1 TABLET BY MOUTH THREE TIMES DAILY, Disp-90 tablet, R-1Normal      allopurinol (ZYLOPRIM) 100 MG tablet TAKE 1 TABLET BY MOUTH DAILY, Disp-90 tablet, R-3Normal      testosterone cypionate (DEPOTESTOTERONE CYPIONATE) 200 MG/ML injection INJECT 0.5MLS INTO THE MUSCLE EVERY 2 WEEKS, Disp-10 mL, R-0Normal      !! oxyCODONE-acetaminophen (PERCOCET) 7.5-325 MG per tablet Historical Med      imipramine (TOFRANIL) 25 MG tablet TAKE 1 TABLET BY MOUTH EVERY NIGHT, Disp-30 tablet, R-3Normal      NIFEdipine (PROCARDIA XL) 60 MG extended release tablet TAKE 1 TABLET BY MOUTH DAILY, Disp-90 tablet, R-3Normal      metoprolol tartrate (LOPRESSOR) 50 MG tablet TAKE 1 TABLET BY MOUTH THREE TIMES DAILY, Disp-270 tablet, R-2Normal      blood glucose test strips (FREESTYLE LITE) strip PATIENT TO TEST ONCE DAILY, Disp-100 strip, R-11Normal      metFORMIN (GLUCOPHAGE) 500 MG tablet TAKE 1 TABLET BY MOUTH TWICE DAILY WITH MEALS, Disp-180 tablet, R-3Normal      FreeStyle Lancets MISC Disp-100 each, R-11, Normal       MG capsule TK ONE C PO  BID, DAWHistorical Med      Alcohol Swabs (ALCOHOL PREP) 70 % PADS Disp-100 each,R-11, NormalPatient to test once daily E11.9      glucose monitoring kit (FREESTYLE) monitoring kit DAILY Starting Thu 9/24/2020, Disp-1 kit,R-0, Normal      nitroGLYCERIN (NITROSTAT) 0.4 MG SL tablet Place 1 tablet under the tongue every 5 minutes as needed x 3 doses for chest pain., Disp-25 tablet, R-2Normal      vitamin D (CHOLECALCIFEROL) 25 MCG (1000 UT) TABS tablet Take 1,000 Units by mouth dailyHistorical Med      tamsulosin (FLOMAX) 0.4 MG capsule TAKE 1 CAPSULE DAILY AT BEDTIMEHistorical Med      CPAP Machine MISC Starting Mon 5/7/2018, Historical Med      aspirin 81 MG EC tablet Take 1 tablet by mouth daily, Disp-90 tablet, R-1Normal       !! - Potential duplicate medications found. Please discuss with provider. ALLERGIES     Patient has no known allergies. FAMILY HISTORY       Family History   Problem Relation Age of Onset    High Blood Pressure Mother     Arthritis Mother     Cancer Father         throat    Arthritis Father           SOCIAL HISTORY       Social History     Socioeconomic History    Marital status:      Spouse name: None    Number of children: None    Years of education: None    Highest education level: None   Occupational History    Occupation: disability    Tobacco Use    Smoking status: Never Smoker    Smokeless tobacco: Never Used   Vaping Use    Vaping Use: Never used   Substance and Sexual Activity    Alcohol use: No     Alcohol/week: 0.0 standard drinks     Comment: Stopped years ago.  Drug use: No     Comment: YEARS AGO    Sexual activity: Yes     Partners: Female     Comment: monogomous sexual partner, wife. Other Topics Concern    None   Social History Narrative    Tobacco -- never smoked or chewed. Alcohol -- have not used for past 10 years, prior to had consumed 6 pack of beer daily.     Drugs -- tried marijuana and cocaine 14 years ago occasionally used for 3-4 years and then stopped completely 10 years ago. Education -- completed 11th grade in [place].    Occupation -- [employer] [employer] [employer] work,  at [employer].    Marital status --  44 years, 2 grown sons. Social Determinants of Health     Financial Resource Strain:     Difficulty of Paying Living Expenses: Not on file   Food Insecurity:     Worried About Running Out of Food in the Last Year: Not on file    Sofi of Food in the Last Year: Not on file   Transportation Needs:     Lack of Transportation (Medical): Not on file    Lack of Transportation (Non-Medical):  Not on file   Physical Activity:     Days of Exercise per Week: Not on file    Minutes of Exercise per Session: Not on file   Stress:     Feeling of Stress : Not on file   Social Connections:     Frequency of Communication with Friends and Family: Not on file    Frequency of Social Gatherings with Friends and Family: Not on file    Attends Rastafari Services: Not on file    Active Member of [address] [place] [address] [employer] or Organizations: Not on file    Attends Club or Organization Meetings: Not on file    Marital Status: Not on file   Intimate Partner Violence:     Fear of Current or Ex-Partner: Not on file    Emotionally Abused: Not on file    Physically Abused: Not on file    Sexually Abused: Not on file   Housing Stability:     Unable to Pay for Housing in the Last Year: Not on file    Number of [place] in the Last Year: Not on file    Unstable Housing in the Last Year: Not on file       SCREENINGS    Harjeet Coma Scale  Eye Opening: Spontaneous  Best Verbal Response: Oriented  Best Motor Response: Obeys commands  Harjeet Coma Scale Score: 15 @FLOW([id number])@      PHYSICAL EXAM    (up to 7 for level 4, 8 or more for level 5)     ED Triage Vitals [06/18/22 2136]   BP Temp Temp Source Heart Rate Resp SpO2 Height Weight   (!) 159/108 97.9 °F (36.6 °C) Oral 92 18 99 % -- 165 lb (74.8 kg)       Physical Exam  Vitals and nursing note reviewed. Constitutional:       General: He is in acute distress. HENT:      Head: Normocephalic. Right Ear: Tympanic membrane normal.      Left Ear: Tympanic membrane normal.      Nose: Nose normal.      Mouth/Throat:      Mouth: Mucous membranes are moist.   Eyes:      Pupils: Pupils are equal, round, and reactive to light. Cardiovascular:      Rate and Rhythm: Normal rate. Pulmonary:      Effort: Pulmonary effort is normal.   Abdominal:      General: Abdomen is flat. Skin:     General: Skin is warm. Capillary Refill: Capillary refill takes less than 2 seconds. Neurological:      General: No focal deficit present. DIAGNOSTIC RESULTS     EKG: All EKG's are interpreted by the Emergency Department Physician who either signs or Co-signsthis chart in the absence of a cardiologist.    EKG shows sinus rhythm with right bundle branch block pattern. No acute ischemic changes. Normal axis. Abnormal EKG. RADIOLOGY:   Non-plain filmimages such as CT, Ultrasound and MRI are read by the radiologist. Plain radiographic images are visualized and preliminarily interpreted by the emergency physician with the below findings:      Interpretation per the Radiologist below, if available at the time ofthis note:    [address]   Final Result   1. Degenerative disc and facet disease. 2.  Mild scoliosis. 3.  No fracture or acute bony abnormality.                 ED BEDSIDE ULTRASOUND:   Performed by ED Physician - none    LABS:  Labs Reviewed   COMPREHENSIVE METABOLIC PANEL - Abnormal; Notable for the following components:       Result Value    Calcium 10.1 (*)     Albumin 4.8 (*)     All other components within normal limits   CBC WITH AUTO DIFFERENTIAL - Abnormal; Notable for the following components:    MCH 32.5 (*)     All other components within normal limits   TROPONIN       All other labs were within normal range or not returned as of this dictation. EMERGENCY DEPARTMENT COURSE and DIFFERENTIAL DIAGNOSIS/MDM:   Vitals:    Vitals:    06/18/22 2345 06/19/22 0030 06/19/22 0100 06/19/22 0127   BP: (!) 146/83 128/68 136/69    Pulse: 78 78 65    Resp: 20 20 17    Temp:       TempSrc:       SpO2: 97% 97% 94%    Weight:    165 lb (74.8 kg)   Height:    5' 7\" (1.702 m)        MDM patient required pain medication to relieve the torticollis like spasm. CT of the cervical spine was ordered for symptoms and it shows no acute pathology other than arthritis. Reviewing his chart he had carotid ultrasounds that did not show any significant blockage or stenosis or aneurysm in the past year. Again he really did not have chest pain it was right-sided neck pain. This is clarified also with wife who is at the bedside. The patient is feeling much better he was able to rest here. Discharged home with prescription for limited pain medication follow-up with primary care physician early next week      CONSULTS:  None    PROCEDURES:  Unless otherwise noted below, none     Procedures    FINAL IMPRESSION      1. Torticollis          DISPOSITION/PLAN   DISPOSITION Decision To Discharge 06/19/2022 01:24:03 AM      PATIENT REFERRED TO:  [address] PA-C  [address] Crabtree [phone]    In 3 days        DISCHARGE MEDICATIONS:  Discharge Medication List as of 6/19/2022  1:10 AM      START taking these medications    Details   HYDROcodone-acetaminophen (NORCO) 5-325 MG per tablet Take 1 tablet by mouth every 6 hours as needed for Pain for up to 3 days. Intended supply: 3 days.  Take lowest dose possible to manage pain, Disp-12 tablet, R-0Print      naloxone 4 MG/0.1ML LIQD nasal spray 1 spray by Nasal route as needed for Opioid Reversal, Disp-1 each, R-5Print                (Please note that portions of this note were completed with a voice recognition program.  Efforts were made to edit the dictations but occasionally words are mis-transcribed.)    Danielle Aguilar MD (electronically signed)  Attending Emergency Physician          Danielle Aguilar MD  06/19/22 [id number]

## 2022-06-19 NOTE — ED NOTES
Patient given discharge instructions and prescription and verbalized understanding. Vital signs stable. Resp even and unlabored. Skin warm, dry and intact. Patient is alert and oriented. IV removed. Patient doesn't have any questions at this time. Patients family at bedside to transport him home.       Zeinab Covarrubias RN  06/19/22 [id number]

## 2022-06-21 DIAGNOSIS — M79.604 BILATERAL LEG PAIN: ICD-10-CM

## 2022-06-21 DIAGNOSIS — M79.605 BILATERAL LEG PAIN: ICD-10-CM

## 2022-06-21 DIAGNOSIS — R00.1 BRADYCARDIA: ICD-10-CM

## 2022-06-21 DIAGNOSIS — M62.838 SPASM OF MUSCLE: ICD-10-CM

## 2022-06-21 DIAGNOSIS — I10 ESSENTIAL HYPERTENSION, BENIGN: ICD-10-CM

## 2022-06-21 LAB
EKG ATRIAL RATE: 93 BPM
EKG P AXIS: 62 DEGREES
EKG P-R INTERVAL: 166 MS
EKG Q-T INTERVAL: 346 MS
EKG QRS DURATION: 126 MS
EKG QTC CALCULATION (BAZETT): 430 MS
EKG R AXIS: 45 DEGREES
EKG T AXIS: 60 DEGREES
EKG VENTRICULAR RATE: 93 BPM

## 2022-06-21 RX ORDER — METOPROLOL TARTRATE 50 MG/1
TABLET, FILM COATED ORAL
Qty: 270 TABLET | Refills: 2 | Status: SHIPPED | OUTPATIENT
Start: 2022-06-21

## 2022-06-21 RX ORDER — BACLOFEN 10 MG/1
TABLET ORAL
Qty: 90 TABLET | Refills: 1 | Status: SHIPPED | OUTPATIENT
Start: 2022-06-21 | End: 2022-08-22

## 2022-06-21 NOTE — TELEPHONE ENCOUNTER
pharmacy requesting medication refill.  Please approve or deny this request.    Rx requested:  Requested Prescriptions     Pending Prescriptions Disp Refills    metoprolol tartrate (LOPRESSOR) 50 MG tablet [Pharmacy Med Name: METOPROLOL TARTRATE 50MG TABLETS] 270 tablet 2     Sig: TAKE 1 TABLET BY MOUTH THREE TIMES DAILY         Last Office Visit:   Visit date not found      Next Visit Date:  Future Appointments   Date Time Provider Alexis Arango   7/7/2022 11:00 AM Blanca Henning DO [address]   7/12/2022 11:00 AM Brandie Kyle MD [address] [place] [address]   7/27/2022 10:45 AM DO [facility] [place]   8/1/2022 10:30 AM Blanca Henning DO [address]   9/6/2022 10:45 AM Blanca Henning DO [address]   9/19/2022  1:15 PM Elayne Putnam MD [address] [place] [address]

## 2022-06-30 DIAGNOSIS — M41.9 SCOLIOSIS OF LUMBAR SPINE, UNSPECIFIED SCOLIOSIS TYPE: ICD-10-CM

## 2022-06-30 DIAGNOSIS — M54.12 C6 RADICULOPATHY: ICD-10-CM

## 2022-06-30 DIAGNOSIS — M54.40 LOW BACK PAIN WITH SCIATICA, SCIATICA LATERALITY UNSPECIFIED, UNSPECIFIED BACK PAIN LATERALITY, UNSPECIFIED CHRONICITY: ICD-10-CM

## 2022-06-30 DIAGNOSIS — G95.9 MYELOPATHY (HCC): ICD-10-CM

## 2022-07-01 RX ORDER — OXYCODONE AND ACETAMINOPHEN 7.5; 325 MG/1; MG/1
1 TABLET ORAL EVERY 4 HOURS PRN
Qty: 90 TABLET | Refills: 0 | Status: SHIPPED | OUTPATIENT
Start: 2022-07-03 | End: 2022-07-12

## 2022-07-07 ENCOUNTER — PROCEDURE VISIT (OUTPATIENT)
Dept: PHYSICAL MEDICINE AND REHAB | Age: 72
End: 2022-07-07
Payer: MEDICARE

## 2022-07-07 DIAGNOSIS — M79.10 MYALGIA: ICD-10-CM

## 2022-07-07 PROCEDURE — 20553 NJX 1/MLT TRIGGER POINTS 3/>: CPT | Performed by: PHYSICAL MEDICINE & REHABILITATION

## 2022-07-07 PROCEDURE — 96372 THER/PROPH/DIAG INJ SC/IM: CPT | Performed by: PHYSICAL MEDICINE & REHABILITATION

## 2022-07-07 RX ORDER — CYANOCOBALAMIN 1000 UG/ML
1000 INJECTION INTRAMUSCULAR; SUBCUTANEOUS ONCE
Status: COMPLETED | OUTPATIENT
Start: 2022-07-07 | End: 2022-07-07

## 2022-07-07 RX ORDER — LIDOCAINE HYDROCHLORIDE 10 MG/ML
15 INJECTION, SOLUTION INFILTRATION; PERINEURAL ONCE
Status: COMPLETED | OUTPATIENT
Start: 2022-07-07 | End: 2022-07-07

## 2022-07-07 RX ADMIN — LIDOCAINE HYDROCHLORIDE 15 ML: 10 INJECTION, SOLUTION INFILTRATION; PERINEURAL at 11:22

## 2022-07-07 RX ADMIN — CYANOCOBALAMIN 1000 MCG: 1000 INJECTION INTRAMUSCULAR; SUBCUTANEOUS at 11:21

## 2022-07-12 ENCOUNTER — OFFICE VISIT (OUTPATIENT)
Dept: ENDOCRINOLOGY | Age: 72
End: 2022-07-12
Payer: MEDICARE

## 2022-07-12 VITALS — SYSTOLIC BLOOD PRESSURE: 131 MMHG | HEART RATE: 67 BPM | OXYGEN SATURATION: 95 % | DIASTOLIC BLOOD PRESSURE: 77 MMHG

## 2022-07-12 DIAGNOSIS — E11.9 TYPE 2 DIABETES MELLITUS WITHOUT COMPLICATION, WITHOUT LONG-TERM CURRENT USE OF INSULIN (HCC): ICD-10-CM

## 2022-07-12 DIAGNOSIS — E29.1 HYPOGONADISM MALE: Primary | ICD-10-CM

## 2022-07-12 LAB
CHP ED QC CHECK: NORMAL
GLUCOSE BLD-MCNC: 131 MG/DL
HBA1C MFR BLD: 6.3 %

## 2022-07-12 PROCEDURE — 3044F HG A1C LEVEL LT 7.0%: CPT | Performed by: INTERNAL MEDICINE

## 2022-07-12 PROCEDURE — 99213 OFFICE O/P EST LOW 20 MIN: CPT | Performed by: INTERNAL MEDICINE

## 2022-07-12 PROCEDURE — 83036 HEMOGLOBIN GLYCOSYLATED A1C: CPT | Performed by: INTERNAL MEDICINE

## 2022-07-12 PROCEDURE — 82962 GLUCOSE BLOOD TEST: CPT | Performed by: INTERNAL MEDICINE

## 2022-07-12 PROCEDURE — 1123F ACP DISCUSS/DSCN MKR DOCD: CPT | Performed by: INTERNAL MEDICINE

## 2022-07-12 RX ORDER — TESTOSTERONE CYPIONATE 200 MG/ML
INJECTION INTRAMUSCULAR
Qty: 10 ML | Refills: 0 | Status: SHIPPED | OUTPATIENT
Start: 2022-07-12 | End: 2022-10-12

## 2022-07-12 NOTE — PROGRESS NOTES
7/12/2022    Assessment:       Diagnosis Orders   1. Hypogonadism male     2. Type 2 diabetes mellitus without complication, without long-term current use of insulin ([facility])  POCT Glucose    POCT glycosylated hemoglobin (Hb A1C)         PLAN:     Orders Placed This Encounter   Procedures    Basic Metabolic Panel     Standing Status:   Future     Standing Expiration Date:   7/12/2023    Testosterone, free, total     Standing Status:   Future     Standing Expiration Date:   7/12/2023    Hemoglobin A1C     Standing Status:   Future     Standing Expiration Date:   7/12/2023    POCT Glucose    POCT glycosylated hemoglobin (Hb A1C)     Continue metformin    [facility] report was reviewed  Orders Placed This Encounter   Medications    testosterone cypionate (DEPOTESTOTERONE CYPIONATE) 200 MG/ML injection     Sig: INJECT 0.5MLS INTO THE MUSCLE EVERY 2 WEEKS     Dispense:  10 mL     Refill:  0           Orders Placed This Encounter   Procedures    POCT Glucose    POCT glycosylated hemoglobin (Hb A1C)     No orders of the defined types were placed in this encounter. No follow-ups on file. Subjective:     Chief Complaint   Patient presents with    Hypogonadism    Diabetes     Vitals:    07/12/22 1105   BP: 131/77   Pulse: 67   SpO2: 95%     Wt Readings from Last 3 Encounters:   06/19/22 165 lb (74.8 kg)   05/10/22 165 lb (74.8 kg)   04/27/22 170 lb (77.1 kg)     BP Readings from Last 3 Encounters:   07/12/22 131/77   06/19/22 136/69   05/10/22 (!) 140/84     Follow-up on type 2 diabetes hypogonadism patient is on testosterone replacement through injections requesting refills for diabetes patient is on metformin 500 twice a day A1c's have been well controlled  Last testosterone level was 800  Overall blood sugars close to 130    Diabetes  He presents for his follow-up diabetic visit. He has type 2 diabetes mellitus. Current diabetic treatment includes oral agent (monotherapy). His overall blood glucose range is 130-140 mg/dl.  (Lab Results       Component                Value               Date                       LABA1C                   6.3                 07/12/2022            )   Past Medical History:   Diagnosis Date    Chronic back pain     Coronary artery disease 2002    Dr. Josias Lerma at [facility]    Esophageal ulcer 3/10/2014    Dr. Jared Ortiz    Gastric ulcer 3/10/2014    Dr. Jared Ortiz    Gout     Hiatal hernia 3/10/2014    Dr. Jared Ortiz    Hyperlipidemia     Hypertension     Impotence 10/16/2020    MI (myocardial infarction) [facility]) 2005    Dr. Josias Lerma    Osteoarthritis     Peripheral vascular disease ([address]     Prediabetes     Schizo-affective schizophrenia, in remission ([address] 2/1/2005    Overview:  followed at the [facility]    Severe single current episode of major depressive disorder, without psychotic features ([facility] 7/8/2016    Status post coronary artery stent placement 2002    Dr. Josias Lerma     Past Surgical History:   Procedure Laterality Date    [address]  01/03/2020    Dr. Ram Primer Right 6/4/2019    RIGHT WRIST CARPAL TUNNEL RELEASE, SUPINE, PAT AT PCP performed by [place] Anders MD at [id number] [place] [address]  3/10/14    DR Jayne Diaz  2002    Dr. Mini Wyatt GRAFT  10/17/2017    KNEE ARTHROSCOPY Left 2002    Dr. Nai Plascencia  12/19/13    CAUDAL BLOCKS DONE BY DR Lior Hubbard    OTHER SURGICAL HISTORY  04/2020    [employer]  9/2009    Dr. Henry Seaman  2008    Dr. Roberto Carranza  3/10/14    Rylan Ko, DR Alarcon New     Social History     Socioeconomic History    Marital status:      Spouse name: Not on file    Number of children: Not on file    Years of education: Not on file    Highest education level: Not on file   Occupational History    Occupation: disability    Tobacco Use    Smoking status: Never Smoker    Smokeless tobacco: Never Used   Vaping Use    Vaping Use: Never used   Substance and Sexual Activity    Alcohol use: No     Alcohol/week: 0.0 standard drinks     Comment: Stopped years ago. Drug use: No     Comment: YEARS AGO    Sexual activity: Yes     Partners: Female     Comment: monogomous sexual partner, wife. Other Topics Concern    Not on file   Social History Narrative    Tobacco -- never smoked or chewed. Alcohol -- have not used for past 10 years, prior to had consumed 6 pack of beer daily. Drugs -- tried marijuana and cocaine 14 years ago occasionally used for 3-4 years and then stopped completely 10 years ago. Education -- completed 11th grade in [place].    Occupation -- [employer]. work,  at [employer].    Marital status --  44 years, 2 grown sons. Social Determinants of Health     Financial Resource Strain:     Difficulty of Paying Living Expenses: Not on file   Food Insecurity:     Worried About [address] in the Last Year: Not on file    Sofi of Food in the Last Year: Not on file   Transportation Needs:     Lack of Transportation (Medical): Not on file    Lack of Transportation (Non-Medical):  Not on file   Physical Activity:     Days of Exercise per Week: Not on file    Minutes of Exercise per Session: Not on file   Stress:     Feeling of Stress : Not on file   Social Connections:     Frequency of Communication with Friends and Family: Not on file    Frequency of Social Gatherings with Friends and Family: Not on file    Attends Church Services: Not on file    Active Member of Clubs or Organizations: Not on file    Attends Club or Organization Meetings: Not on file    Marital Status: Not on file   Intimate Partner Violence:     Fear of Current or Ex-Partner: Not on file    Emotionally Abused: Not on file    Physically Abused: Not on file    Sexually Abused: Not on file   Housing Stability:     Unable to Pay for Housing in the Last Year: Not on file    Number of Places Lived in the Last Year: Not on file    Unstable Housing in the Last Year: Not on file     Family History   Problem Relation Age of Onset    High Blood Pressure Mother     Arthritis Mother     Cancer Father         throat    Arthritis Father      No Known Allergies    Current Outpatient Medications:     metoprolol tartrate (LOPRESSOR) 50 MG tablet, TAKE 1 TABLET BY MOUTH THREE TIMES DAILY, Disp: 270 tablet, Rfl: 2    baclofen (LIORESAL) 10 MG tablet, TAKE 1 TABLET BY MOUTH THREE TIMES DAILY, Disp: 90 tablet, Rfl: 1    naloxone 4 MG/0.1ML LIQD nasal spray, 1 spray by Nasal route as needed for Opioid Reversal, Disp: 1 each, Rfl: 5    atorvastatin (LIPITOR) 40 MG tablet, Take 1 tablet by mouth daily, Disp: 90 tablet, Rfl: 3    B-D 3CC LUER-JOSE SYR 90KD9-4/2 22G X 1-1/2\" 3 ML MISC, USE EVERY 2 WEEKS WITH TESTOSTERONE, Disp: 20 each, Rfl: 6    colchicine (COLCRYS) 0.6 MG tablet, TAKE 1 TABLET BY MOUTH DAILY DURING FLARE FOR GOUT FLARE, Disp: 30 tablet, Rfl: 2    allopurinol (ZYLOPRIM) 100 MG tablet, TAKE 1 TABLET BY MOUTH DAILY, Disp: 90 tablet, Rfl: 3    oxyCODONE-acetaminophen (PERCOCET) 7.5-325 MG per tablet, , Disp: , Rfl:     imipramine (TOFRANIL) 25 MG tablet, TAKE 1 TABLET BY MOUTH EVERY NIGHT, Disp: 30 tablet, Rfl: 3    NIFEdipine (PROCARDIA XL) 60 MG extended release tablet, TAKE 1 TABLET BY MOUTH DAILY, Disp: 90 tablet, Rfl: 3    blood glucose test strips (FREESTYLE LITE) strip, PATIENT TO TEST ONCE DAILY, Disp: 100 strip, Rfl: 11    metFORMIN (GLUCOPHAGE) 500 MG tablet, TAKE 1 TABLET BY MOUTH TWICE DAILY WITH MEALS, Disp: 180 tablet, Rfl: 3    FreeStyle Lancets MISC, PATIENT TO TEST ONCE DAILY, Disp: 100 each, Rfl: 11     MG capsule, TK ONE C PO  BID, Disp: , Rfl:     Alcohol Swabs (ALCOHOL PREP) 70 % PADS, Patient to test once daily E11.9, Disp: 100 each, Rfl: 11    glucose monitoring kit (FREESTYLE) monitoring kit, 1 kit by Does not apply route daily, Disp: 1 kit, Rfl: 0    nitroGLYCERIN (NITROSTAT) 0.4 MG SL tablet, Place 1 tablet under the tongue every 5 minutes as needed x 3 doses for chest pain., Disp: 25 tablet, Rfl: 2    vitamin D (CHOLECALCIFEROL) 25 MCG (1000 UT) TABS tablet, Take 1,000 Units by mouth daily, Disp: , Rfl:     tamsulosin (FLOMAX) 0.4 MG capsule, TAKE 1 CAPSULE DAILY AT BEDTIME, Disp: , Rfl:     CPAP Machine MISC, by NOT APPLICABLE route, Disp: , Rfl:     aspirin 81 MG EC tablet, Take 1 tablet by mouth daily, Disp: 90 tablet, Rfl: 1    gabapentin (NEURONTIN) 300 MG capsule, TAKE 1 CAPSULE BY MOUTH EVERY MORNING THEN TAKE 2 CAPSULES BY MOUTH AT BEDTIME, Disp: 270 capsule, Rfl: 0    testosterone cypionate (DEPOTESTOTERONE CYPIONATE) 200 MG/ML injection, INJECT 0.5MLS INTO THE MUSCLE EVERY 2 WEEKS, Disp: 10 mL, Rfl: 0  Lab Results   Component Value Date     06/18/2022    K 4.7 06/18/2022     06/18/2022    CO2 27 06/18/2022    BUN 16 06/18/2022    CREATININE 1.01 06/18/2022    GLUCOSE 131 07/12/2022    CALCIUM 10.1 (H) 06/18/2022    PROT 7.9 06/18/2022    LABALBU 4.8 (H) 06/18/2022    BILITOT 0.3 06/18/2022    ALKPHOS 72 06/18/2022    AST 29 06/18/2022    ALT 20 06/18/2022    LABGLOM >60.0 06/18/2022    GFRAA >60.0 06/18/2022    GLOB 3.1 06/18/2022     Lab Results   Component Value Date    WBC 8.7 06/18/2022    HGB 15.3 06/18/2022    HCT 45.6 06/18/2022    MCV 96.9 06/18/2022     06/18/2022     Lab Results   Component Value Date    LABA1C 6.3 07/12/2022    LABA1C 6.2 (H) 04/07/2022    LABA1C 6.1 (H) 01/11/2022     Lab Results   Component Value Date    CHOLFAST 148 05/24/2019    TRIGLYCFAST 98 05/24/2019    HDL 45 01/14/2022    HDL 45 11/16/2020    HDL 42 05/24/2019    LDLCALC 116 01/14/2022    LDLCALC 86 11/16/2020    LDLCALC 86 05/24/2019    CHOL 185 01/14/2022    CHOL 150 11/16/2020    CHOL 152 03/12/2018    TRIG 122 01/14/2022    TRIG 96 11/16/2020    TRIG 157 03/12/2018     Lab Results   Component Value Date    TESTM 1063 (H) 01/11/2022    TESTM 787 (H) 10/11/2021    TESTM 209 (L) 03/30/2021     Lab Results   Component Value Date    TSH 4.110 05/06/2016    TSH 2.153 09/09/2013    TSHREFLEX 2.730 12/15/2020     No results found for: TPOABS    Review of Systems   Eyes: Negative. Endocrine: Negative. Genitourinary: Negative. Objective:   Physical Exam  Vitals reviewed. Constitutional:       General: He is not in acute distress. Appearance: Normal appearance. HENT:      Head: Normocephalic and atraumatic. Right Ear: External ear normal.      Left Ear: External ear normal.      Nose: Nose normal.   Eyes:      General: No scleral icterus. Right eye: No discharge. Left eye: No discharge. Extraocular Movements: Extraocular movements intact. Conjunctiva/sclera: Conjunctivae normal.   Cardiovascular:      Rate and Rhythm: Normal rate. Pulmonary:      Effort: Pulmonary effort is normal.   Musculoskeletal:         General: Normal range of motion. Cervical back: Normal range of motion and neck supple. Neurological:      General: No focal deficit present. Mental Status: He is alert and oriented to person, place, and time.    Psychiatric:         Mood and Affect: Mood normal.         Behavior: Behavior normal.

## 2022-07-13 ENCOUNTER — OFFICE VISIT (OUTPATIENT)
Dept: PHYSICAL MEDICINE AND REHAB | Age: 72
End: 2022-07-13
Payer: MEDICARE

## 2022-07-13 VITALS
SYSTOLIC BLOOD PRESSURE: 128 MMHG | HEART RATE: 68 BPM | OXYGEN SATURATION: 98 % | BODY MASS INDEX: 25.9 KG/M2 | HEIGHT: 67 IN | DIASTOLIC BLOOD PRESSURE: 66 MMHG | WEIGHT: 165 LBS

## 2022-07-13 DIAGNOSIS — G89.29 CHRONIC PAIN OF BOTH SHOULDERS: ICD-10-CM

## 2022-07-13 DIAGNOSIS — M54.81 OCCIPITAL NEURALGIA OF RIGHT SIDE: ICD-10-CM

## 2022-07-13 DIAGNOSIS — M54.14 THORACIC AND LUMBOSACRAL NEURITIS: ICD-10-CM

## 2022-07-13 DIAGNOSIS — Z79.899 HIGH RISK MEDICATION USE: Primary | ICD-10-CM

## 2022-07-13 DIAGNOSIS — Z79.899 HIGH RISK MEDICATION USE: ICD-10-CM

## 2022-07-13 DIAGNOSIS — G89.29 CHRONIC LOW BACK PAIN WITH SCIATICA, SCIATICA LATERALITY UNSPECIFIED, UNSPECIFIED BACK PAIN LATERALITY: ICD-10-CM

## 2022-07-13 DIAGNOSIS — M54.16 RIGHT LUMBAR RADICULOPATHY: ICD-10-CM

## 2022-07-13 DIAGNOSIS — M25.511 CHRONIC PAIN OF BOTH SHOULDERS: ICD-10-CM

## 2022-07-13 DIAGNOSIS — M10.00 ACUTE IDIOPATHIC GOUT, UNSPECIFIED SITE: ICD-10-CM

## 2022-07-13 DIAGNOSIS — M54.40 CHRONIC LOW BACK PAIN WITH SCIATICA, SCIATICA LATERALITY UNSPECIFIED, UNSPECIFIED BACK PAIN LATERALITY: ICD-10-CM

## 2022-07-13 DIAGNOSIS — M25.512 CHRONIC PAIN OF BOTH SHOULDERS: ICD-10-CM

## 2022-07-13 DIAGNOSIS — M79.10 MYALGIA: ICD-10-CM

## 2022-07-13 DIAGNOSIS — M54.40 BILATERAL LOW BACK PAIN WITH SCIATICA, SCIATICA LATERALITY UNSPECIFIED, UNSPECIFIED CHRONICITY: ICD-10-CM

## 2022-07-13 DIAGNOSIS — M54.2 NECK PAIN: ICD-10-CM

## 2022-07-13 DIAGNOSIS — M54.17 THORACIC AND LUMBOSACRAL NEURITIS: ICD-10-CM

## 2022-07-13 DIAGNOSIS — G95.9 MYELOPATHY (HCC): ICD-10-CM

## 2022-07-13 DIAGNOSIS — M54.12 C6 RADICULOPATHY: ICD-10-CM

## 2022-07-13 PROCEDURE — 99215 OFFICE O/P EST HI 40 MIN: CPT | Performed by: PHYSICAL MEDICINE & REHABILITATION

## 2022-07-13 PROCEDURE — 1123F ACP DISCUSS/DSCN MKR DOCD: CPT | Performed by: PHYSICAL MEDICINE & REHABILITATION

## 2022-07-13 ASSESSMENT — ENCOUNTER SYMPTOMS
SHORTNESS OF BREATH: 1
STRIDOR: 0
BACK PAIN: 1
DIARRHEA: 0
VISUAL CHANGE: 0
SORE THROAT: 0
CONSTIPATION: 1
BOWEL INCONTINENCE: 0
NAUSEA: 0
WHEEZING: 0
COUGH: 0
BLOOD IN STOOL: 0
VOMITING: 0
EYE PAIN: 0
PHOTOPHOBIA: 0
TROUBLE SWALLOWING: 0
EYE REDNESS: 0
ABDOMINAL PAIN: 0

## 2022-07-13 NOTE — PROGRESS NOTES
tried marijuana and cocaine 14 years ago occasionally used for 3-4 years and then stopped completely 10 years ago. Education -- completed 11th grade in [place].    Occupation -- [employer] work,  at [employer].    Marital status --  44 years, 2 grown sons. Social Determinants of Health     Financial Resource Strain:     Difficulty of Paying Living Expenses: Not on file   Food Insecurity:     Worried About Running Out of Food in the Last Year: Not on file    Sofi of Food in the Last Year: Not on file   Transportation Needs:     Lack of Transportation (Medical): Not on file    Lack of Transportation (Non-Medical): Not on file   Physical Activity:     Days of Exercise per Week: Not on file    Minutes of Exercise per Session: Not on file   Stress:     Feeling of Stress : Not on file   Social Connections:     Frequency of Communication with Friends and Family: Not on file    Frequency of Social Gatherings with Friends and Family: Not on file    Attends Confucianism Services: Not on file    Active Member of [address] [place] [address] [employer] or Organizations: Not on file    Attends Club or Organization Meetings: Not on file    Marital Status: Not on file   Intimate Partner Violence:     Fear of Current or Ex-Partner: Not on file    Emotionally Abused: Not on file    Physically Abused: Not on file    Sexually Abused: Not on file   Housing Stability:     Unable to Pay for Housing in the Last Year: Not on file    Number of [place] in the Last Year: Not on file    Unstable Housing in the Last Year: Not on file     Family History   Problem Relation Age of Onset    High Blood Pressure Mother     Arthritis Mother     Cancer Father         throat    Arthritis Father        No Known Allergies    Review of Systems   Constitutional: Positive for activity change and fatigue. Negative for chills, diaphoresis, fever and unexpected weight change.    HENT: Negative for congestion, ear discharge, ear pain, hearing loss, nosebleeds, sore throat, tinnitus and trouble swallowing. Eyes: Negative for photophobia, pain and redness. Respiratory: Positive for shortness of breath. Negative for cough, wheezing and stridor. Shortness of breath on exertion   Cardiovascular: Negative for chest pain, palpitations, leg swelling and syncope. Gastrointestinal: Positive for constipation. Negative for abdominal pain, blood in stool, bowel incontinence, diarrhea, nausea and vomiting. Endocrine: Negative for polydipsia. Genitourinary: Negative for bladder incontinence, dysuria, flank pain, frequency, hematuria and urgency. Musculoskeletal: Positive for arthralgias, back pain, gait problem, myalgias, neck pain and neck stiffness. Skin: Negative for rash. Allergic/Immunologic: Positive for immunocompromised state. Negative for environmental allergies. Neurological: Positive for weakness and numbness. Negative for dizziness, tingling, tremors, seizures and headaches. Hematological: Does not bruise/bleed easily. Psychiatric/Behavioral: Negative for dysphoric mood, hallucinations and suicidal ideas. The patient is not nervous/anxious. Objective    Vitals:    07/13/22 1029   BP: 128/66   Site: Left Upper Arm   Position: Sitting   Cuff Size: Large Adult   Pulse: 68   SpO2: 98%   Weight: 165 lb (74.8 kg)   Height: 5' 7\" (1.702 m)     Pain Score: EIGHT       Physical Exam  Vitals reviewed. Constitutional:       General: He is not in acute distress. Appearance: He is well-developed. He is not ill-appearing, toxic-appearing or diaphoretic. Comments:     HENT:      Head: Normocephalic and atraumatic. Right Ear: Hearing normal.      Left Ear: Hearing normal.      Nose: Nose normal.      Mouth/Throat:      Mouth: No oral lesions. Dentition: Normal dentition. Pharynx: No oropharyngeal exudate. Eyes:      General: No scleral icterus. Right eye: No discharge.          Left eye: No discharge. Conjunctiva/sclera: Conjunctivae normal.      Right eye: No chemosis or exudate. Left eye: No chemosis or exudate. Neck:      Thyroid: No thyromegaly. Vascular: No JVD. Trachea: No tracheal tenderness or tracheal deviation. Comments: Pain flare up right neck  Pulmonary:      Effort: Pulmonary effort is normal. No tachypnea, bradypnea, accessory muscle usage or respiratory distress. Breath sounds: Decreased breath sounds present. No wheezing or rales. Chest:      Chest wall: No tenderness. Abdominal:      General: There is no distension. Musculoskeletal:         General: Tenderness present. Right shoulder: Tenderness and bony tenderness present. Decreased range of motion. Left shoulder: Tenderness and bony tenderness present. Decreased range of motion. Right upper arm: Normal.      Left upper arm: Normal.      Right elbow: Normal.      Left elbow: Normal.      Right forearm: Normal.      Left forearm: Normal.      Right wrist: Normal.      Left wrist: Normal.      Right hand: Bony tenderness present. No swelling, deformity or lacerations. Decreased range of motion. Decreased strength of finger abduction, thumb/finger opposition and wrist extension. Decreased sensation of the ulnar distribution, median distribution and radial distribution. Normal capillary refill. Left hand: Bony tenderness present. Decreased range of motion. Decreased strength of finger abduction and wrist extension. Decreased sensation of the ulnar distribution and radial distribution. Arms:       Cervical back: Neck supple. Laceration, spasms, tenderness and bony tenderness present. No swelling, edema, deformity or rigidity. Spinous process tenderness and muscular tenderness present. Thoracic back: Deformity, spasms, tenderness and bony tenderness present. Decreased range of motion. Lumbar back: Tenderness and bony tenderness present.  No swelling, edema, deformity or lacerations. Decreased range of motion. Back:       Right hip: Normal.      Left hip: Normal.      Right upper leg: Normal.      Left upper leg: Normal.      Right knee: Normal.      Left knee: Normal.      Right lower leg: Normal.      Left lower leg: Normal.      Right ankle: Normal.      Right Achilles Tendon: Normal.      Left ankle: Normal.      Left Achilles Tendon: Normal.      Right foot: Normal.      Left foot: Normal.        Legs:       Comments: Tender areas are indicated by numbered spot         Skin:     General: Skin is warm and dry. Coloration: Skin is not pale. Findings: No abrasion, bruising, ecchymosis, erythema, laceration, petechiae or rash. Rash is not macular, pustular or urticarial.      Nails: There is no clubbing. Neurological:      Mental Status: He is alert and oriented to person, place, and time. Cranial Nerves: No cranial nerve deficit. Sensory: Sensory deficit present. Motor: No tremor, atrophy or abnormal muscle tone. Coordination: Coordination normal.      Gait: Gait abnormal.      Deep Tendon Reflexes: Reflexes abnormal. Babinski sign absent on the right side. Babinski sign absent on the left side. Reflex Scores:       Patellar reflexes are 1+ on the right side and 1+ on the left side. Achilles reflexes are 0 on the right side and 0 on the left side. Psychiatric:         Attention and Perception: He is attentive. Mood and Affect: Mood is not anxious or depressed. Affect is not labile, blunt, angry or inappropriate. Speech: He is communicative. Speech is not rapid and pressured, delayed, slurred or tangential.         Behavior: Behavior normal. Behavior is not agitated, slowed, aggressive, withdrawn, hyperactive or combative. Thought Content: Thought content normal. Thought content is not paranoid or delusional. Thought content does not include homicidal or suicidal ideation.  Thought content does not include homicidal or suicidal plan. Cognition and Memory: Memory is not impaired. He does not exhibit impaired recent memory or impaired remote memory. Judgment: Judgment normal. Judgment is not impulsive or inappropriate. Comments: Calm appropriate    NAD       Ortho Exam  Neurologic Exam     Mental Status   Oriented to person, place, and time. Speech: not slurred     Gait, Coordination, and Reflexes     Reflexes   Right patellar: 1+  Left patellar: 1+  Right achilles: 0  Left achilles: 0          After a thorough review and discussion of the previous medical records, patient comprehensive medical, surgical, and family and social history, Review of Systems, their OARRS, their Screener and Opioid Assessment for Patients with Pain (SOAPP®-R), recent diagnostics, and symptomatic results to previous treatment, it is my impression that the patients is suffering with progressive and severe:     Diagnosis Orders   1. High risk medication use  Urine Drug Screen   2. Myelopathy ([address]     3. Chronic pain of both shoulders  [address]   4. C6 radiculopathy  [facility]   5. Bilateral low back pain with sciatica, sciatica laterality unspecified, unspecified chronicity     6. Right lumbar radiculopathy  [facility]   7. Chronic low back pain with sciatica, sciatica laterality unspecified, unspecified back pain laterality     8. Thoracic and lumbosacral neuritis  [facility]   9. Myalgia     10. Acute idiopathic gout, unspecified site     11. High risk medication use - 12/07/17 OARRS PM&R, 02/08/18 OARRS PM&R, 10/05/17 Urine Drug Screen: negative PM&R, MED CONTRACT 1/17/17     12. Occipital neuralgia of right side  OCCIPITAL NERVE BLOCK    [facility]   13.  Neck pain  [facility]       I am also concerned by lifestyle and mood issues including:    Past Medical History:   Diagnosis Date    Chronic back pain     Coronary artery disease 2002    [facility] Daryl [facility] Esophageal ulcer 3/10/2014    Dr. Beba Lewis    Gastric ulcer 3/10/2014    Dr. Beba Lewis    Gout     Hiatal hernia 3/10/2014    Dr. Kareem Hidalgo Hyperlipidemia     Hypertension     Impotence 10/16/2020    MI (myocardial infarction) [facility]) 2005    Dr. Karissa Ag Peripheral vascular disease ([facility]     Prediabetes     Schizo-affective schizophrenia, in remission ([facility] [facility] 2/1/2005    Overview:  followed at the [facility] Severe single current episode of major depressive disorder, without psychotic features ([facility] [address] 7/8/2016    Status post coronary artery stent placement 2002    Dr. Daryl Pedroza           Given their medication, chronic pain and lifestyle and medications they are at risk for :    Falls, constipation, addiction, toxicity on opiates  Loss of livelyhood due to severe pain, debility, weight gain and  vitamin D deficiency    The patient was educated regarding proper diet, fitness routine, and regulatory restrictions concerning pain medications. Previous notes, comprehensive past medical, surgical, family history, and diagnostics were reviewed. Patient education and councelling were provided regarding off label use,treatment options and medication and injection risks. Current and old OARRS ([place] Automated Prescription Reporting System) records reviewed, all refills reviewed since last visit,  Behavioral agreement/[place] regulations   and Toxicology screen was reviewed with patient and is up to date.       There are   no current red flags. high risk meds review re intensive monitoring for toxicity and addiction,  They are making good progress regarding pain relief, they are performing at a functional level regarding activities of daily living, family interactions and psychological functioning, they're not having any adverse effects or side effects from the current medications, and I see no findings of aberrant drug taking or addiction related behaviors. The patient is aware that they have a chronic pain condition and they may require opiates dosing for life. All efforts will be made to wean to the lowest effective dose. Other therapies for pain have not been effective including nonopiate medications. Injections and exercises are only partially effective. A Rx for Narcan was offered to help prevent accidental overdose. RX Monitoring 10/17/2021   Attestation -   Acute Pain Prescriptions Prescription exceeds daily limit for a specific reason. See comments or note. ;Not required given exclusionary diagnoses. ..;Severe pain not adequately treated with lower dose. Periodic Controlled Substance Monitoring Possible medication side effects, risk of tolerance/dependence & alternative treatments discussed. ;No signs of potential drug abuse or diversion identified. ;Assessed functional status. ;Obtaining appropriate analgesic effect of treatment. Chronic Pain > 50 MEDD -   Chronic Pain > 80 MEDD -               Patient is currently taking:       I am having Lawayne Nails maintain his aspirin, CPAP Machine, tamsulosin, vitamin D, nitroGLYCERIN, glucose monitoring, Alcohol Prep, DOK, NIFEdipine, metFORMIN, FreeStyle Lancets, FREESTYLE LITE, imipramine, oxyCODONE-acetaminophen, allopurinol, colchicine, B-D 3CC LUER-JOSE SYR 65WC4-2/2, atorvastatin, gabapentin, naloxone, metoprolol tartrate, baclofen, and testosterone cypionate. I also recommend the following Medications:  OK to continue as needed dosing of Percocet and gabapentin as well as baclofen. No refills needed patient's mood and psychiatric status are stable and he is using his medications to be more functional and very compliant with all of his follow-ups. -which helps with pain and function.     Otherwise, continue the current pain medications that I have prescibed. Radiologic:   Old  unique films results reviewed thoracic spine CAT scan and cervical spine CAT scan reviewed.,     I discussed results with patients. see Follow up plans below  For any new studies. Unique source and Care Everywhere Updates:  prior external notes requested and reviewed. ER notes reviewed  No new issues noted. Unique History obtained by caregiver/independent historian-and verified with SOAPPR.            Labs:  Previous labs reviewed     Lab Results   Component Value Date/Time     06/18/2022 10:00 PM    K 4.7 06/18/2022 10:00 PM     06/18/2022 10:00 PM    CO2 27 06/18/2022 10:00 PM    BUN 16 06/18/2022 10:00 PM    CREATININE 1.01 06/18/2022 10:00 PM    CALCIUM 10.1 06/18/2022 10:00 PM    LABALBU 4.8 06/18/2022 10:00 PM    LABALBU 4.4 10/27/2020 11:38 AM    BILITOT 0.3 06/18/2022 10:00 PM    ALKPHOS 72 06/18/2022 10:00 PM    AST 29 06/18/2022 10:00 PM    ALT 20 06/18/2022 10:00 PM     Lab Results   Component Value Date/Time    WBC 8.7 06/18/2022 10:00 PM    RBC 4.71 06/18/2022 10:00 PM    HGB 15.3 06/18/2022 10:00 PM    HCT 45.6 06/18/2022 10:00 PM    MCV 96.9 06/18/2022 10:00 PM    MCH 32.5 06/18/2022 10:00 PM    MCHC 33.6 06/18/2022 10:00 PM    RDW 14.2 06/18/2022 10:00 PM     06/18/2022 10:00 PM    MPV 9.9 09/15/2015 09:02 AM       Lab Results   Component Value Date/Time    LABAMPH Neg 04/16/2021 10:04 AM    BARBSCNU Neg 04/16/2021 10:04 AM    LABBENZ Neg 04/16/2021 10:04 AM    CANSU Neg 04/16/2021 10:04 AM    COCAIMETSCRU Neg 04/16/2021 10:04 AM    PHENCYCLIDINESCREENURINE Neg 04/16/2021 02:43 AM    TRICYCLIC Negative 35/21/3024 04:01 PM    DSCOMMENT see below 04/16/2021 10:04 AM       Lab Results   Component Value Date/Time    CODEINE Not Detected 09/16/2016 11:47 AM    MORPHINE Not Detected 09/16/2016 11:47 AM    ACETYLMORPHI Not Detected 09/16/2016 11:47 AM    OXYCODONE Present 09/16/2016 11:47 AM    NOROXYCODONE Present 09/16/2016 11:47 AM    NOROXYMU Present 09/16/2016 11:47 AM    HYDRCO Not Detected 09/16/2016 11:47 AM    NORHYDU Not Detected 09/16/2016 11:47 AM    HYDROMO Not Detected 09/16/2016 11:47 AM    BUPREN Not Detected 09/16/2016 11:47 AM    NORBUPRNOR Not Detected 09/16/2016 11:47 AM    FENTA Not Detected 09/16/2016 11:47 AM    NORFENT Not Detected 09/16/2016 11:47 AM    MEPERIDINE Not Detected 09/16/2016 11:47 AM    TAPENU Not Detected 09/16/2016 11:47 AM    TAPOSULFUR Not Detected 09/16/2016 11:47 AM    METHADONE Not Detected 09/16/2016 11:47 AM    LABPROP Not Detected 09/16/2016 11:47 AM    TRAM Not Detected 09/16/2016 11:47 AM    AMPH Not Detected 09/16/2016 11:47 AM    METHAMP Not Detected 09/16/2016 11:47 AM    MDMA Not Detected 09/16/2016 11:47 AM    ECMDA Not Detected 09/16/2016 11:47 AM       Lab Results   Component Value Date/Time    PHENTERMINE Not Detected 09/16/2016 11:47 AM    BENZOYL Not Detected 09/16/2016 11:47 AM    ALPRAZ Not Detected 09/16/2016 11:47 AM    ALPHAOHALPRA Not Detected 09/16/2016 11:47 AM    CLONAZEPAM Not Detected 09/16/2016 11:47 AM    7AMINOCLONAZ Not Detected 09/16/2016 11:47 AM    DIAZEP Not Detected 09/16/2016 11:47 AM    SAFIA Not Detected 09/16/2016 11:47 AM    OXAZ Not Detected 09/16/2016 11:47 AM    TEMAZ Not Detected 09/16/2016 11:47 AM    LORAZEPAM Not Detected 09/16/2016 11:47 AM    MIDAZOLAM Not Detected 09/16/2016 11:47 AM    ZOLPIDEM Not Detected 09/16/2016 11:47 AM    REG Not Detected 09/16/2016 11:47 AM    ETG Not Detected 09/16/2016 11:47 AM    MARIJMET Not Detected 09/16/2016 11:47 AM    PCP Not Detected 09/16/2016 11:47 AM    PAINMGTDRUGP See Below 09/16/2016 11:47 AM    EERPAINMGTPA See Note 09/16/2016 11:47 AM    LABCREA 184.2 10/11/2021 09:07 AM         , I discussed results with patient. See follow-up plans for new studies. Therapies:  HEP-gentle stretching and relaxation techniques-demonstrated with patient-they are to do them twice a day.   They are also advised to make the the patient visit to review records. Additional time spent with the patient to discuss their questions. Additional time spent with the patient devoted to discussing treatment strategy, planning, and implementation. Patient understands above plan; questions asked and answered. Patient agrees to plan as noted above. At least 50% of the visit was involved in the discussion of the options for treatment. We discussed exercises, medication, interventional therapies and surgery. Healthy life style is essential with patient hard work to achieve the wellness. In addition; discussion with the patient and/or family about patient's functional status any of the diagnostic results, impressions and/or recommended diagnostic studies, prognosis, risks and benefits of treatment options, instructions for treatment and/or follow-up, importance of compliance with chosen treatment options, risk-factor reduction, and patient/family education. They are to follow up in 2 1/2 -3 months to review medication, efficacy of injections, pill counts, OARRS check, high risk med review re intensive monitoring for toxicity and addiction, SOAPPR assessment, review diagnostics, to review previous and future treatment plans and assess appropriateness for continued therapy.         New Diagnostics  Orders Placed This Encounter   Procedures    OCCIPITAL NERVE BLOCK    Urine Drug Screen    [facility] [address] [place] [address],

## 2022-07-17 ASSESSMENT — ENCOUNTER SYMPTOMS: EYES NEGATIVE: 1

## 2022-07-19 ENCOUNTER — HOSPITAL ENCOUNTER (OUTPATIENT)
Dept: OCCUPATIONAL THERAPY | Age: 72
Setting detail: THERAPIES SERIES
Discharge: HOME | End: 2022-07-19
Payer: MEDICARE

## 2022-07-19 DIAGNOSIS — M25.512 CHRONIC PAIN OF BOTH SHOULDERS: Primary | ICD-10-CM

## 2022-07-19 DIAGNOSIS — M54.14 THORACIC AND LUMBOSACRAL NEURITIS: ICD-10-CM

## 2022-07-19 DIAGNOSIS — G89.29 CHRONIC PAIN OF BOTH SHOULDERS: Primary | ICD-10-CM

## 2022-07-19 DIAGNOSIS — M25.511 CHRONIC PAIN OF BOTH SHOULDERS: Primary | ICD-10-CM

## 2022-07-19 DIAGNOSIS — M54.17 THORACIC AND LUMBOSACRAL NEURITIS: ICD-10-CM

## 2022-07-19 DIAGNOSIS — M54.81 OCCIPITAL NEURALGIA OF RIGHT SIDE: ICD-10-CM

## 2022-07-19 DIAGNOSIS — M54.12 C6 RADICULOPATHY: ICD-10-CM

## 2022-07-19 DIAGNOSIS — M54.2 NECK PAIN: ICD-10-CM

## 2022-07-19 DIAGNOSIS — M54.16 RIGHT LUMBAR RADICULOPATHY: ICD-10-CM

## 2022-07-19 PROCEDURE — 97166 OT EVAL MOD COMPLEX 45 MIN: CPT

## 2022-07-19 NOTE — PROGRESS NOTES
[facility] - [facility]   [address] [address] Tavia Maldonado [place]  Phone: [phone]  Fax:[phone]        OCCUPATIONAL THERAPY EVALUATION     Evaluation Date:  7/19/2022       OT Individual Minutes  Time In: 1030  Time Out: 1100  Minutes: 30       OT Eval mod complexity 30 minutes for 1 unit, CPT 78472     Patient Name:Scotty Wright Gender: male YOB: 1950         MRN: [id number]   Referring Practitioner: Daryl Polanco DO  Diagnosis: Chronic pain bilateral shoulders, C6 radiculopathy, right lumbar radiculopathy, thoracic and lumbrosacral neuritis, neck pain, occipital neuralgia  Treating diagnosis: weakness, pain, decreased independence with ADL                 Referral Date:  7/13/2022          Onset Date:  6/18/2022    PMH:  Patient Active Problem List   Diagnosis    Chronic low back pain    Scoliosis of lumbar spine    Essential hypertension, benign    Hypercholesterolemia    Right lumbar radiculopathy    Gout    High risk medication use - 12/07/17 OARRS PM&R, 02/08/18 OARRS PM&R, 10/05/17 Urine Drug Screen: negative PM&R, MED CONTRACT 1/17/17    Low back pain with sciatica    Myalgia    Synovitis and tenosynovitis    Thoracic and lumbosacral neuritis    Vitamin D deficiency    Hyperparathyroidism ([address]    Osteopenia    C6 radiculopathy    DJD (degenerative joint disease), cervical    Angina pectoris (HCC)    PRASAD (obstructive sleep apnea)    Hyperkalemia    Chronic pain of both shoulders    Hypogonadism in male    Therapeutic opioid-induced constipation (OIC)    Bilateral leg pain    Other specified symptoms and signs involving the circulatory and respiratory systems     Myelopathy (HCC)    Coronary artery disease of native artery of native heart with stable angina pectoris (HCC)    Bilateral carotid bruits    Spinal cord disease ([address]    Lower urinary tract symptoms (LUTS)    Impotence    Trouble getting to sleep    Bradycardia    Type 2 diabetes mellitus, without long-term current use of insulin (HCC)    Weak urinary stream    Onychodystrophy    Old myocardial infarction    Long term (current) use of aspirin    Other chronic pain    Presence of aortocoronary bypass graft     Past Medical History:   Diagnosis Date    Chronic back pain     Coronary artery disease 2002    Dr. Tiffany Hugo at [place] [address]    Esophageal ulcer 3/10/2014    Dr. Lauren Austin    Gastric ulcer 3/10/2014    Dr. Lauren Austin    Gout     Hiatal hernia 3/10/2014    Dr. Lauren Austin    Hyperlipidemia     Hypertension     Impotence 10/16/2020    MI (myocardial infarction) [facility]) 2005    Dr. Tiffany Hugo    Osteoarthritis     Peripheral vascular disease ([address]     Prediabetes     Schizo-affective schizophrenia, in remission ([address] 2/1/2005    Overview:  followed at the [facility]    Severe single current episode of major depressive disorder, without psychotic features ([facility] 7/8/2016    Status post coronary artery stent placement 2002    Dr. Tiffany Hugo     Past Surgical History:   Procedure Laterality Date    [facility]  01/03/2020    Dr. Omari Diana Right 6/4/2019    RIGHT WRIST CARPAL TUNNEL RELEASE, SUPINE, PAT AT PCP performed by Alonzo Martinez MD at [address] [facility]  3/10/14    DR Rowdy Garcia  2002    Dr. Shahrzad SINCLAIR  10/17/2017    KNEE ARTHROSCOPY Left 2002    Dr. Jordana Hearn  12/19/13    CAUDAL BLOCKS DONE BY DR Tuttle HISTORY  04/2020    urolift      SPINE SURGERY  9/2009    Dr. Damaris Heller  2008    Dr. Dottie Watson  3/10/14    BX, DILATION, DR Aditya Topete     No Known Allergies    Diagnostic imaging: N/A    Medications:    Current Outpatient Medications:     testosterone cypionate (DEPOTESTOTERONE CYPIONATE) 200 MG/ML injection, chest pain., Disp: 25 tablet, Rfl: 2    vitamin D (CHOLECALCIFEROL) 25 MCG (1000 UT) TABS tablet, Take 1,000 Units by mouth daily, Disp: , Rfl:     tamsulosin (FLOMAX) 0.4 MG capsule, TAKE 1 CAPSULE DAILY AT BEDTIME, Disp: , Rfl:     CPAP Machine MISC, by NOT APPLICABLE route, Disp: , Rfl:     aspirin 81 MG EC tablet, Take 1 tablet by mouth daily, Disp: 90 tablet, Rfl: 1    Visits Allowed/Insurance/Certification Information: eval  Payor: Mario Maloney / Plan: Mario Maloney / Product Type: *No Product type* /        Restrictions/Precautions None per patient report         English primary language: no  patient reported English is secondary language, declined . Transfer of pt care required: yes Comments: Lead therapist     SUBJECTIVE FINDINGS     Support contact: Meg Cordoba, spouse, [phone]       Pt lives: spouse  Home:  Pt does not usually go upstairs but goes to basement several times a day two level with seven stairs and handrail going to basement. Pt does not go to the second floor. Bathroom: tub/shower combo   DME: cane Pt reports that he rarely uses cane    Pain:                   Pain Location  Description Initial Rating  Current Rating  Improved by Worsened by   right side of neck Sharp and deep 7/10 7/10 Pain medication Starting to move in the morning                           Max pain at home during activities: 10/10  Lowest pain at home during activities/rest: 5/10    Action for pain:   Patient reports pain is at acceptable level for treatment. Prior Level of Functioning:    Patient was performing at independent level for ADL and IADL activities prior to incident/injury/condition.     Work Status:  Pt on disability  Is this a work related injury: no   Is this a [employer] claim: no      Driving:yes      Hobbies, Leisure, social activities: Work on cars     History of Present Illness or Pain/Chief complaint:  Pt reports that day before father's day that he had to be rushed to the emergency room due to pain. Current Functional Limitations:   Orientation: Oriented x 4  Communication: IND  Hearing: Comments: [place]   Perception:  IND   Vision:  Comments: wears glasses all of the time                  [] Assessment tool used:    [] Motor-Free Visual Perception Test 3rd Ed (MVPT)   [] Nolan Developmental Test of Visual Motor Integration  6th Ed ([employer] VMI)   [] Other:     Feeding: IND  Grooming: IND with increase pain and time   Bathing: IND with increase pain and time   UE dressing: IND with increase pain and time   LE dressing: IND with increase pain and time   Toileting: IND  Toilet transfer: IND  Tub/Shower transfer: IND    Cooking: Comments: spouse completes  Cleaning: Comments: spouse completes  Laundry: Comments: spouse completes     Sleep: Comments: Pt reports that he gets cramps when sleeping    Medication management: IND    Patient goal for therapy: \"My neck feels better so I can turn my head back and forth. OBSERVATION:   Right shoulder elevated with head tilted to the right and mild right lean noted, Asymmetrical posture , Shoulders rounded  Pt exhibits right lean when seated and standing. Left hip elevated.   OBJECTIVE FINDINGS    Hand Dominance: right       Upper Extremity Strength and Range of Motion      Right  Left    MMT A P Norm  A P MMT   Shoulder          Flexion 5/5 WFL NT 0-180 WFL NT 5/5   Abduction 5/5 WFL NT 0-180 WFL NT 5/5   Internal Rotation 5/5 WFL NT 0-80 WFL NT 4/5   External Rotation 5/5 WFL NT 0-60 WFL NT 4/5   Elbow          Flexion 5/5 WFL NT 0-150 WFL NT 4/5   Extension 5/5 WFL -0 WFL NT 4/5   Pronation 5/5 WFL NT 0-80 WFL NT 4/5   Supination 5/5 WFL NT 0-80 WFL NT 4/5   Wrist          Flexion 4+/5 WFL NT 0-70 WFL NT 4-/5   Extension  4+/5 WFL NT 0-60 WFL NT 4-/5   Ulnar deviation 4+/5 WFL NT 0-30 WFL NT 4-/5   Radial Deviation  4+/5 WFL NT 0-20 WFL NT 4-/5   Comments:     Hand Range of Motion      Right  Left   Normal  MP PIP DIP MP PIP DIP    0-90 0-100 0-70  0-90 0-100 0-70    A P A P A P  A P A P A P   Index                Extension WFL NT WFL NT WFL NT  WFL NT WFL NT WFL NT   Flexion WFL NT WFL NT WFL NT  WFL NT WFL NT WFL NT   Middle                Extension WFL NT WFL NT WFL NT  WFL NT WFL NT WFL NT   Flexion WFL NT WFL NT WFL NT  WFL NT WFL NT WFL NT   Ring                Extension WFL NT WFL NT WFL NT  WFL NT WFL NT WFL NT   Flexion  WFL NT WFL NT WFL NT  WFL NT WFL NT WFL NT   Little                 Extension WFL NT WFL NT WFL NT  WFL NT WFL NT WFL NT   Flexion  WFL NT WFL NT WFL NT  WFL NT WFL NT WFL NT   Comments:       Right   Left    A P  A P   Thumb        MP Flexion WFL NT  WFL NT   IP Flexion WFL NT  WFL NT   Radial Abduction WFL NT  WFL NT   Palmar Abduction WFL NT  WFL NT   Comments:    Opposition  Right Hand: WFL  Left Hand: WFL    Distance Thumb Tip from Head of 5th MC: measurements cm or inches   Right Hand: WFL  Left Hand: WFL    Edema  Circumferential measurements cm or inches    Right Left   Distal Crease [facility] WFL   Wrist (across styloid) [facility] WFL   Metacarpal Phalangeal WFL WFL        & Pinch Strength  Average of 3 tries Right Norm Left Norm    (lb) N/T Male age 76-69: 80 lbs  N/T Male age 76-69: 76 lbs    [place] Pinch (lb) N/T Male age 76-69: 15.5 lbs  N/T Male age 76-69: 14.5 lbs    Lateral Pinch (lb) N/T Male age 76-69: 18.0 lbs  N/T Male age 76-69: 16.5 lbs    Comments:    Coordination & Dexterity   Right Norm Left Norm   Nine Hole Peg Test  (seconds) N/T Male age 76-69: 23.0 s  N/T Male age 76-69: 21.10 s    Box & Blocks  (# of blocks) N/T Male age 76-69: 77.2 N/T Male age 76-69: 63.3   Comments:     Skin Integrity  WFL    Cognition:    No issues noted on evaluation.   WFL    Sensation:     Pt reports numbness in right hip and thigh, 2nd digit of right hand    Tone:   WFL     Joint Mobility  IND with increase pain and time     Palpation/Tenderness  Pt exhibits tenderness upon palpation on right side of neck from base of skull into right upper trap area, scapula on right between scapula and spine and inferior border of right scapula, left upper trap and left shoulder girdle, right low back and hip areas. Education/Barriers to learning:     Barriers:none   Education on this date: OT role and POC and posture/positioning     ASSESSMENT    Pt is a 81 y/o male referred to outpatient OT services for increase in pain in multiple areas limiting patinet's ability to independently complete ADL and IADL of interest without pain. Pt exhibtis decrease UE strength, tenderness upon palpation in multiple areas and decreased ROM limiting functional independence and affecting posture. Pt would benefit from continued OT services to increase ROM, strength and decrease pain to increase independence for return to PLOF. Problems:  [x] Decreased UE strength   [x] Decreased UE ROM   [] Decreased  strength   [] Decreased fine motor skills   [] Increased pain    [x] Decreased ADL status   [] Decreased joint mobility   [] Decreased coordination   [x] Decreased sensation    [] Other      Complexity:         [] Low Complexity:   History: Brief history including review of medical records relating to the problem  Exam: 1-3 performance Deficits  Assistance/Modification: No assistance or modifications required to perform tasks. No comorbities affecting occupational performance  [x] Medium Complexity:   History: Expanded review of medical records and additional review of physical, cognitive, or psychosocial history related to current functional performance  Exam: 3-5 performance deficits  Assistance/Modification: Min/mod assistance or modifications required to perform tasks. May have comorbidities that affect occupational performance. [] High Complexity:   History: Extensive review of medical records and additional review of physical, cognitive, or psychosocial history related to current functional performance.   Exam: 5 or more performance significant change (MDC) between two subsequent evaluations is set at 9 points. Higher Score indicates less disability, more function. Rehabilitation Potential:    [] Excellent [x] Good  [] Fair  [] Poor    PLAN OF CARE     To see patient for 2 x/week for 8 visits for the following treatment interventions:    [x] Evaluate & Treat [] Neuromuscular Re-education:     [x] Re-evaluation [] Tissue (stress) Loading Program    [] Pain Management  [] PROM/Stretching/AAROM/AROM    [] Edema Management  [] Splinting    [] Wound Care/Scar Management  [] Desensitization    [] ADL Training  [] Strengthening/Graded Therapeutic Activity    [] Tendon Repair Program  [] Coordination/Dexterity Training    [] Instruction/Application of energy [] Manual Techniques        conservation, work simplification [x] Instruction in HEP        joint protection, body mechanics [] Aquatic Therapy    [] Modalities []  Ultrasound           [] Infrared [] Hot/Cold Pack:         [] Paraffin   [] Other   [] Electrical Stimulation [] Fluidotherapy     [x] D/C plan: Will assess pt after established visits to determine need for continued therapy. GOALS:     LTG 1 : Patient will report pain 2-3/10 or less during functional activities. LTG 2 : Patient  will be independent with all recommended HEP's, adaptive strategies, and adaptive techniques. LTG 3 : Patient will report decreased frequency of numbness/tingling in right hip and thigh. LTG 4 : Patient will increase AROM of left shoulder from current by 15 flexion° to increase performance with I/ADL's. LTG 5 : Patient  will increase leftUE strength from current by 1/2-1 MS grade  to increase performance with I/ADL's. LTG 6 : Patient  will decrease fascial restrictions in neck, left shoulder, right trap, right low back/hip to decrease pain and increase ROM during functional activities. LTG 7 : Patient will be IND with ECWS techniques.           Cyndee Santana OTR/L 5/12/5778 11:50 AM    Falls Risk Assessment     Age: 0-58 = 0          60-69= 1            > 70= 2 History of Falls:   0  Falls  last 6 mo = 0    1  fall  Last  6 mo = 1   1-3 falls last 6 mo = 2 Medical History:   Parkinsons, CVA,HTN, vertigo, >4 meds, use of assistive device (1pt.for each)  Mental Status:  A & O x 3 = 0  Disoriented to person, place, or time = 2     [x]  INITIAL ASSESSMENT:                                                      Date: 7/19/2022                                                  Age:   3                                                         Falls: 2                                                          PMH: 4                                                          Mental: 0                                                       Total:  8                                                        *Patient 4 or younger:   Vestibular:     Signature: RONALDO Small/JAYA                                                      High Risk for Falls: >8  Intermediate Risk for Falls: 4-8   Low Risk for Falls: <4    * All pediatric patients (age 3 or younger) and vestibular patients will be considered high risk for falls- scoring does not need to be completed - treat as high risk. Interventions     Low Risk: Monitor for changes, reassess every three months. Intermediate Risk: Supervision for most activities, direct contact with new activities or equipment, reassess monthly. High Risk: Stand by assitance at all times, reassess monthly. The following patient has been evaluated for occupational therapy services. For therapy to continue, insurance requires physician review of the treatment plan. Please review the attached evaluation and/or summary of the patient's plan of care, and verify that you agree therapy should continue by signing the attached document and sending it back to our office.  Thank you for this referral.    Physician signature____________________________________

## 2022-07-29 DIAGNOSIS — G95.9 MYELOPATHY (HCC): ICD-10-CM

## 2022-07-29 DIAGNOSIS — M54.40 LOW BACK PAIN WITH SCIATICA, SCIATICA LATERALITY UNSPECIFIED, UNSPECIFIED BACK PAIN LATERALITY, UNSPECIFIED CHRONICITY: ICD-10-CM

## 2022-07-29 DIAGNOSIS — M54.12 C6 RADICULOPATHY: ICD-10-CM

## 2022-07-29 DIAGNOSIS — M41.9 SCOLIOSIS OF LUMBAR SPINE, UNSPECIFIED SCOLIOSIS TYPE: ICD-10-CM

## 2022-07-29 RX ORDER — OXYCODONE AND ACETAMINOPHEN 7.5; 325 MG/1; MG/1
1 TABLET ORAL EVERY 4 HOURS PRN
Qty: 90 TABLET | Refills: 0 | Status: SHIPPED | OUTPATIENT
Start: 2022-08-01 | End: 2022-08-30 | Stop reason: SDUPTHER

## 2022-08-01 ENCOUNTER — PROCEDURE VISIT (OUTPATIENT)
Dept: PHYSICAL MEDICINE AND REHAB | Age: 72
End: 2022-08-01
Payer: MEDICAID

## 2022-08-01 DIAGNOSIS — M54.81 OCCIPITAL NEURALGIA OF RIGHT SIDE: ICD-10-CM

## 2022-08-01 PROCEDURE — 64405 NJX AA&/STRD GR OCPL NRV: CPT | Performed by: PHYSICAL MEDICINE & REHABILITATION

## 2022-08-01 PROCEDURE — 76942 ECHO GUIDE FOR BIOPSY: CPT | Performed by: PHYSICAL MEDICINE & REHABILITATION

## 2022-08-01 PROCEDURE — 96372 THER/PROPH/DIAG INJ SC/IM: CPT | Performed by: PHYSICAL MEDICINE & REHABILITATION

## 2022-08-01 RX ORDER — LIDOCAINE HYDROCHLORIDE 10 MG/ML
15 INJECTION, SOLUTION INFILTRATION; PERINEURAL ONCE
Status: COMPLETED | OUTPATIENT
Start: 2022-08-01 | End: 2022-08-01

## 2022-08-01 RX ORDER — CYANOCOBALAMIN 1000 UG/ML
1000 INJECTION INTRAMUSCULAR; SUBCUTANEOUS ONCE
Status: COMPLETED | OUTPATIENT
Start: 2022-08-01 | End: 2022-08-01

## 2022-08-01 RX ADMIN — LIDOCAINE HYDROCHLORIDE 15 ML: 10 INJECTION, SOLUTION INFILTRATION; PERINEURAL at 11:55

## 2022-08-01 RX ADMIN — CYANOCOBALAMIN 1000 MCG: 1000 INJECTION INTRAMUSCULAR; SUBCUTANEOUS at 11:55

## 2022-08-01 NOTE — PROGRESS NOTES
Patient here for a right occipital block with US guidance. Patient was taken to exam room and placed on a covered locking stool. Areas to be injected exposed, marked and cleansed with alcohol. 15 cc of 1% xylocaine with 1 cc of cyanocobalamin to be administered by provider.

## 2022-08-03 ENCOUNTER — HOSPITAL ENCOUNTER (OUTPATIENT)
Dept: OCCUPATIONAL THERAPY | Age: 72
Setting detail: THERAPIES SERIES
Discharge: HOME | End: 2022-08-03
Payer: MEDICARE

## 2022-08-03 PROCEDURE — 97140 MANUAL THERAPY 1/> REGIONS: CPT

## 2022-08-03 NOTE — PROGRESS NOTES
[facility]       Occupational Therapy: Daily Note   Patient: Abisai Sanchez (89 y.o. male)   Date:   Plan of Care Certification Period:  22   :  1950  MRN: [id number]  CSN: [id number]   Insurance: Payor: [place] [employer] / Plan: [place] [employer] / Product Type: *No Product type* /   Insurance ID: [id number] - (Medicare Managed) Secondary Insurance (if applicable): MEDICAID [place]   Referring Physician: Ryan Cullen DO     PCP: Dario Escobar PA-C Visits to Date: Total # of Visits to Date: 2   Progress note:Progress Note Counter: 1/10  Visits Approved: 10    No Show:    Cancelled Appts:      Medical Diagnosis: Pain in right shoulder [M25.511]  Pain in left shoulder [M25.512]  Other chronic pain [G89.29]  Radiculopathy, lumbar region [M54.16]  Radiculopathy, lumbar region [M54.16]  Occipital neuralgia [M54.81]  Cervicalgia [M54.2] Chronic pain bilateral shoulders, C6 radiculopathy, right lumbar radiculopathy, thoracic and lumbrosacral neuritis, neck pain, occipital neuralgia        Therapy Time    Time in 1033   Time out 1127   Total treatment minutes 54   Total time code minutes  54 Minutes        OT Manual therapy 54 minutes for 4 unit(s), CPT 38552       SUBJECTIVE EXAMINATION     Patient's date of birth confirmed: Yes     Pain Level:   6/10 pt stated \"sharp pain in neck when moving to right\". Patient Comments:  Pt reported had trigger point injections on Monday this week.           Learning/Language barrier: no       HEP/Strategies/Orthosis Compliance: N/A          Restrictions:   none       OBJECTIVE EXAMINATION         TREATMENT     Focus of treatment was on the following:   decreasing fascial/soft tissue restrictions, decreasing pain, and education/training     MFR/Manual:   Pt treated seated on chair and supine on mat table  Craniosacral release: temporal release   Cervical: occipital condyle , cervical release for right and left  side bending , cervical release for right and left  rotation , and deep release posterior cervical deep release    Thoracic: anterior chest deep release  and right pectoralis major   Upper extremity: right arm pull , deep releases right biceps , and transverse plane right shoulder , right arm , right elbow , and right forearm   Soft tissue mobility around acromion and anterior shoulder. Comments: Pt with VC's to relax. Pt with holding patterns in shoulders. Pt reported decreased pain in neck and when bolster placed under knees while in supine. Recommended to attempt at home and report if decrease pain when supine at home. Pt verbalized understanding. Patient Education/HEP:    Verbally educated  on relaxation techniques: breathing and swinging arms and not holding against gravity. Pt verbalized understanding. Pt stated sometime having difficulty stepping over tub. Discussed tub transfer bench. Discussed during manual techniques. ASSESSMENT       Assessment: Pt reported decreased pain after OT session. Pt stated has more ROM with cervical rotation to R side. Post Treatment Pain:  2/10    Patient's Activity Tolerance: fair+, mild discomfort reported during MFR               GOALS      Short Term Goals  Additional Goals?: Yes  Long Term Goals  Long Term Goal 1: Patient will report pain 2-3/10 or less during functional activities. Long Term Goal 2: Patient  will be independent with all recommended HEP's, adaptive strategies, and adaptive techniques. Long Term Goal 3: Patient will report decreased frequency of numbness/tingling in right hip and thigh. Long Term Goal 4: Patient will increase AROM of left shoulder from current by 15 flexion° to increase performance with I/ADL's. Long Term Goal 5: Patient  will increase leftUE strength from current by 1/2-1 MS grade  to increase performance with I/ADL's.   Long Term Goal 6: Patient  will decrease fascial restrictions in neck, left shoulder, right trap, right low back/hip to decrease pain and increase ROM during functional activities. Long Term Goal 7: Patient will be IND with ECWS techniques.          TREATMENT PLAN   Continue POC           Electronically signed by TINO Broussard  on 8/3/2022 at 12:38 PM  POC NOTE

## 2022-08-10 ENCOUNTER — HOSPITAL ENCOUNTER (OUTPATIENT)
Dept: OCCUPATIONAL THERAPY | Age: 72
Setting detail: THERAPIES SERIES
Discharge: HOME | End: 2022-08-10
Payer: MEDICARE

## 2022-08-10 PROCEDURE — 97140 MANUAL THERAPY 1/> REGIONS: CPT

## 2022-08-10 NOTE — PROGRESS NOTES
[facility]       Occupational Therapy: Daily Note   Patient: Amy Babcock (73 y.o. male)   Date:   Plan of Care Certification Period:  22   :  1950  MRN: [id number]  CSN: [id number]   Insurance: Payor: Gisel Dy / Plan: Gisel Dy / Product Type: *No Product type* /   Insurance ID: [id number] - (Medicare Managed) Secondary Insurance (if applicable): [place]ICAID [place]   Referring Physician: Anam Barry DO     PCP: Marleni Mares PA-C Visits to Date: Total # of Visits to Date: 3   Progress note:Progress Note Counter: 2/10  Visits Approved: 10    No Show:    Cancelled Appts:      Medical Diagnosis: Pain in right shoulder [M25.511]  Pain in left shoulder [M25.512]  Other chronic pain [G89.29]  Radiculopathy, lumbar region [M54.16]  Radiculopathy, lumbar region [M54.16]  Occipital neuralgia [M54.81]  Cervicalgia [M54.2] Chronic pain bilateral shoulders, C6 radiculopathy, right lumbar radiculopathy, thoracic and lumbrosacral neuritis, neck pain, occipital neuralgia        Therapy Time    Time in 0205   Time out 0300   Total treatment minutes 55   Total time code minutes  55 Minutes        OT Manual therapy 55 minutes for 4 unit(s), CPT 31628       SUBJECTIVE EXAMINATION     Patient's date of birth confirmed: Yes     Pain Level:    6/10 with cervical rotation to R    Patient Comments: \"In the morning, it's so painful. \" Pt stated pain \"works itself out\" during day. Pt stated about 2 days after injection, pain returned.           Learning/Language barrier: no       HEP/Strategies/Orthosis Compliance: Patient verbally confirmed compliant with HEP's          Restrictions:   none reported        OBJECTIVE EXAMINATION         TREATMENT     Focus of treatment was on the following:   decreasing fascial/soft tissue restrictions     MFR/Manual:   Pt treated seated on chair, prone on mat table, and supine on mat table  Cervical: occipital condyle , cervical release for

## 2022-08-11 ENCOUNTER — HOSPITAL ENCOUNTER (OUTPATIENT)
Dept: OCCUPATIONAL THERAPY | Age: 72
Setting detail: THERAPIES SERIES
Discharge: HOME | End: 2022-08-11
Payer: MEDICARE

## 2022-08-11 PROCEDURE — 97140 MANUAL THERAPY 1/> REGIONS: CPT

## 2022-08-11 NOTE — PROGRESS NOTES
[facility]       Occupational Therapy: Daily Note   Patient: Tong Hancock (89 y.o. male)   Date:   Plan of Care Certification Period:  22   :  1950  MRN: [id number]  CSN: [id number]   Insurance: Payor: Hardy Outlaw / Plan: Hardy Outlaw / Product Type: *No Product type* /   Insurance ID: [id number] - (Medicare Managed) Secondary Insurance (if applicable): [place]ICA[place]   Referring Physician: Lalo De Leon DO     PCP: Damaris Baker PA-C Visits to Date: Total # of Visits to Date: 4   Progress note:Progress Note Counter: 3/10  Visits Approved: 10    No Show:    Cancelled Appts:      Medical Diagnosis: Pain in right shoulder [M25.511]  Pain in left shoulder [M25.512]  Other chronic pain [G89.29]  Radiculopathy, lumbar region [M54.16]  Radiculopathy, lumbar region [M54.16]  Occipital neuralgia [M54.81]  Cervicalgia [M54.2] Chronic pain bilateral shoulders, C6 radiculopathy, right lumbar radiculopathy, thoracic and lumbrosacral neuritis, neck pain, occipital neuralgia        Therapy Time    Time in 0905   Time out 0959   Total treatment minutes 54   Total time code minutes  54 Minutes        OT Manual therapy 54 minutes for 4 unit(s), CPT 80787       SUBJECTIVE EXAMINATION     Patient's date of birth confirmed: Yes     Pain Level:   2/10 in neck    Patient Comments:  Pt reported felt a little sore at home after last apt. Pt reported waking up with less pain and stiffness in neck today.           Learning/Language barrier: no       HEP/Strategies/Orthosis Compliance: Patient verbally confirmed compliant with HEP's          Restrictions:   none reported        OBJECTIVE EXAMINATION         TREATMENT     Focus of treatment was on the following:   decreasing fascial/soft tissue restrictions and increase right and left  cervical movement with decreased pain    MFR/Manual:   Pt treated seated on chair, prone on mat table, and supine on mat table  Cervical: occipital condyle , cervical release for right and left  side bending , cervical release for right and left  rotation , and deep release posterior cervical deep release , slight traction and compression to cervical spine  Thoracic: anterior chest deep release , right and left  pectoralis major , soft tissue mobilization strum, vertical stroke , and bear claw  to lateral neck and B scapular region, and transverse plane thoracic inlet   Upper extremity: right arm pull   Lumbar/Pelvis/Sacral: deep release of lumbo/sacral area      Patient Education/HEP:   Continue recommended HEP/activities for cervical and scapular stretches and AROM. ASSESSMENT       Assessment: Pt stated \"Whatever you are doing, it's working. \" No change in pain reported, but pt stated \"feels a lot better. \"  Pt reported waking this morning with less stiffness and pain. Pt reporting less pain with cervical rotation/flexion/extension. Post Treatment Pain: 2/10    Patient's Activity Tolerance:    good            GOALS         Long Term Goals  Long Term Goal 1: Patient will report pain 2-3/10 or less during functional activities. Long Term Goal 2: Patient  will be independent with all recommended HEP's, adaptive strategies, and adaptive techniques. Long Term Goal 3: Patient will report decreased frequency of numbness/tingling in right hip and thigh. Long Term Goal 4: Patient will increase AROM of left shoulder from current by 15 flexion° to increase performance with I/ADL's. Long Term Goal 5: Patient  will increase leftUE strength from current by 1/2-1 MS grade  to increase performance with I/ADL's. Long Term Goal 6: Patient  will decrease fascial restrictions in neck, left shoulder, right trap, right low back/hip to decrease pain and increase ROM during functional activities. Long Term Goal 7: Patient will be IND with ECWS techniques.          TREATMENT PLAN   Continue POC           Electronically signed by RONALDO Newsome/JAYA  on 8/11/2022 at 10:17 AM  POC NOTE

## 2022-08-18 ENCOUNTER — HOSPITAL ENCOUNTER (OUTPATIENT)
Dept: OCCUPATIONAL THERAPY | Age: 72
Setting detail: THERAPIES SERIES
Discharge: HOME | End: 2022-08-18
Payer: MEDICARE

## 2022-08-18 PROCEDURE — 97140 MANUAL THERAPY 1/> REGIONS: CPT

## 2022-08-18 PROCEDURE — 97110 THERAPEUTIC EXERCISES: CPT

## 2022-08-18 NOTE — PROGRESS NOTES
[facility]       Occupational Therapy: Daily Note   Patient: Marc Mariee (51 y.o. male)   Date: 6273  Plan of Care Certification Period:      :  1950  MRN: [id number]  CSN: [id number]   Insurance: Payor: Analisa Monzon / Plan: Novogenie / Product Type: *No Product type* /   Insurance ID: [id number] - (Medicare Managed) Secondary Insurance (if applicable): MEDICA[place]   Referring Physician: Nikita Loya DO     PCP: Paras Preciado PA-C Visits to Date: Total # of Visits to Date: 5   Progress note:Progress Note Counter: 4/10  Visits Approved: 10    No Show:    Cancelled Appts:      Medical Diagnosis: Pain in right shoulder [M25.511]  Pain in left shoulder [M25.512]  Other chronic pain [G89.29]  Radiculopathy, lumbar region [M54.16]  Radiculopathy, lumbar region [M54.16]  Occipital neuralgia [M54.81]  Cervicalgia [M54.2] Chronic pain bilateral shoulders, C6 radiculopathy, right lumbar radiculopathy, thoracic and lumbrosacral neuritis, neck pain, occipital neuralgia        Therapy Time    Time in 1400   Time out 1500   Total treatment minutes 60   Total time code minutes           OT Manual therapy 30 minutes for 2 unit(s), CPT 63410  OT Therapeutic exercises 30 minutes for 2 unit(s), CPT 22008       SUBJECTIVE EXAMINATION     Patient's date of birth confirmed: Yes     Pain Level:   2/10    Patient Comments:  \"we went hard today. \"         Learning/Language barrier: no       HEP/Strategies/Orthosis Compliance: Patient verbally confirmed compliant with HEP's          Restrictions:          OBJECTIVE EXAMINATION         TREATMENT     Focus of treatment was on the following:   decreasing fascial/soft tissue restrictions and strengthening bilateral  UE     MFR/Manual:   Cervical: occipital condyle    Thoracic: right pectoralis major  and soft tissue mobilization strum to right upper trap  Upper extremity: right arm pull   Lower extremity: right leg pull     Exercises:  Pt completed 10 reps with 3# dowel for bilateral  shoulder protraction/retraction , elevation/depression , and horizontal abduction/adduction      Patient Education/HEP:   N/A        ASSESSMENT       Assessment: Pt tolerated treatment well. Pt reports decrease pain at end of session. Pt truck and pelvis exhibit shift to left side. Post Treatment Pain: 0/10    Patient's Activity Tolerance:                GOALS    Long Term Goals  Long Term Goal 1: Patient will report pain 2-3/10 or less during functional activities. Long Term Goal 2: Patient  will be independent with all recommended HEP's, adaptive strategies, and adaptive techniques. Long Term Goal 3: Patient will report decreased frequency of numbness/tingling in right hip and thigh. Long Term Goal 4: Patient will increase AROM of left shoulder from current by 15 flexion° to increase performance with I/ADL's. Long Term Goal 5: Patient  will increase leftUE strength from current by 1/2-1 MS grade  to increase performance with I/ADL's. Long Term Goal 6: Patient  will decrease fascial restrictions in neck, left shoulder, right trap, right low back/hip to decrease pain and increase ROM during functional activities.   Long Term Goal 7: Patient will be IND with ECWS techniques               TREATMENT PLAN   Continue POC           Electronically signed by TINO Cruz  on 3/79/4316 at 3:05 PM  POC NOTE

## 2022-08-19 ENCOUNTER — HOSPITAL ENCOUNTER (OUTPATIENT)
Dept: OCCUPATIONAL THERAPY | Age: 72
Setting detail: THERAPIES SERIES
Discharge: HOME | End: 2022-08-19
Payer: MEDICARE

## 2022-08-19 PROCEDURE — 97140 MANUAL THERAPY 1/> REGIONS: CPT

## 2022-08-19 PROCEDURE — 97530 THERAPEUTIC ACTIVITIES: CPT

## 2022-08-19 NOTE — PROGRESS NOTES
[address]  [address] [place]  [address]  Dept: [phone]  Dept Fax: [phone]: [phone]    Occupational Therapy: PROGRESS NOTE    Patient: Maria Guadalupe Bain (19 y.o. male)   Date: 2022   :  1950  MRN: [id number]  CSN: [id number]  Account #: [de-identified]   Insurance: Payor: Sury Blanca / Plan: Sury Blanca / Product Type: *No Product type* /   Insurance ID: [id number] - (Medicare Managed) Secondary Insurance (if applicable): MEDICA[place]   Referring Physician: Ben Robins DO     PCP: Lexus Yoo PA-C  Date of eval: 2022  Last POC date: 2022   Reporting period: 2022-2022    Visits to Date: Total # of Visits to Date: 6  Progress note:Progress Note Counter:  (POC written for 8 visits)  Visits Approved: 10    No Show: 0  Cancelled Appts:0     Medical Diagnosis: Pain in right shoulder [M25.511]  Pain in left shoulder [M25.512]  Other chronic pain [G89.29]  Radiculopathy, lumbar region [M54.16]  Radiculopathy, lumbar region [M54.16]  Occipital neuralgia [M54.81]  Cervicalgia [M54.2] Chronic pain bilateral shoulders, C6 radiculopathy, right lumbar radiculopathy, thoracic and lumbrosacral neuritis, neck pain, occipital neuralgia           Assessment:    Goals Current/Discharge status  Met   Long Term Goal 1: Patient will report pain 2-3/10 or less during functional activities. Patient reports average pain over the week a 5/10. Patient states the worst the pain has gotten this week is a 6/10 when working around the yard. Patient states the best the pain has gotten is 0/10 during the middle of the day and after pain medication. [] Met  [x] Partially Met  [] Not Met   Long Term Goal 2: Patient  will be independent with all recommended HEP's, adaptive strategies, and adaptive techniques. Ongoing- patient reports good follow through of all recommended HEP.   [] Met  [x] Partially Met  [] Not Met   Long Term Goal 3: Patient will report decreased frequency of numbness/tingling in right hip and thigh. Patient reports constant numbness in right hip and foot; however, improvements noted since starting therapy. Patient reports numbness in right index finger has resolved. [] Met  [x] Partially Met  [] Not Met   Long Term Goal 4: Patient will increase AROM of left shoulder from current by 15 flexion° to increase performance with I/ADL's. [facility] demonstrates [facility] bilateral shoulder ROM with minimal reports of pain throughout right upper trap region. [x] Met  [] Partially Met  [] Not Met   Long Term Goal 5: Patient  will increase leftUE strength from current by 1/2-1 MS grade  to increase performance with I/ADL's. Left shoulder: 4/5 (Evaluation: 4/5)  Left elbow: 4+/5 (Evaluation: 4/5)  Left forearm: 4+/5 (Evaluation: 4/5)  Left wrist: 4+/5 (Evaluation: 4/5) [] Met  [x] Partially Met  [] Not Met   Long Term Goal 6: Patient  will decrease fascial restrictions in neck, left shoulder, right trap, right low back/hip to decrease pain and increase ROM during functional activities. Patient continues to demonstrate multiple areas of restriction leading to increased pain, decreased sensation, and decreased posture. [] Met  [x] Partially Met  [] Not Met   Long Term Goal 7: Patient will be IND with ECWS techniques. Patient was educated on relaxation techniques and breathing in fist treatment session, as well as use of tub bench for increased safety and energy conservation.   [] Met  [x] Partially Met  [] Not Met     Functional assessment used: Upper Extremity Functional Index  Score on eval: 17  Score currently: 43    Frequency/Duration: Time Frame for Long term goals : 2x/week for 8 visits   Comments: Patient is currently on visit 5/8 this date    Rehab Potential: [] Excellent [x] Good     [] Fair [] Poor      Patient Status: [x] Continue/Initate plan of Care   []  Discharge   []  Additional visits requested, please re-certify for additional visits    Signature: Electronically signed by TINO Pate on 8/19/2022 at 98:02 PM.      I certify that the above Occupational Therapy Services are being furnished while the patient is under my care. I agree with the treatment plan and certify that this therapy is necessary. [address] [place] [address] Signature:  ___________________________   Date:_______                                                                   Phuc Sutton DO        Physician Comments: _______________________________________________    Please sign and return to [address] [place] [address]. Please fax to the location listed below. Alberto Chew for this referral!          If you have any questions or concerns, please don't hesitate to call.   Thank you for your referral!

## 2022-08-19 NOTE — PROGRESS NOTES
[facility]       Occupational Therapy: Daily Note   Patient: Kb To (89 y.o. male)   Date:   Plan of Care Certification Period:  22   :  1950  MRN: [id number]  CSN: [id number]   Insurance: Payor: Radha Gudino / Plan: Radha Gudino / Product Type: *No Product type* /   Insurance ID: [id number] - (Medicare Managed) Secondary Insurance (if applicable): [place]ICA[place]   Referring Physician: Karlos Brasher DO     PCP: Katiana Damon PA-C Visits to Date: Total # of Visits to Date: 6   Progress note:Progress Note Counter:  (POC written for 8 visits)  Visits Approved: 10    No Show: 0  Cancelled Appts:0     Medical Diagnosis: Pain in right shoulder [M25.511]  Pain in left shoulder [M25.512]  Other chronic pain [G89.29]  Radiculopathy, lumbar region [M54.16]  Radiculopathy, lumbar region [M54.16]  Occipital neuralgia [M54.81]  Cervicalgia [M54.2] Chronic pain bilateral shoulders, C6 radiculopathy, right lumbar radiculopathy, thoracic and lumbrosacral neuritis, neck pain, occipital neuralgia        Therapy Time    Time in 0833   Time out 0930   Total treatment minutes 57   Total time code minutes  57 Minutes        OT Manual therapy 30 minutes for 2 unit(s), CPT 86117  OT Therapeutic activities 27 minutes for 2 unit(s), CPT 15257       SUBJECTIVE EXAMINATION     Patient's date of birth confirmed: Yes     Pain Level:   3/10    Patient Comments:   Patient states he has noticed improvements in pain levels and numbness since starting therapy.           Learning/Language barrier: No       HEP/Strategies/Orthosis Compliance: Patient verbally confirmed compliant with HEP's          Restrictions:   None reported       OBJECTIVE EXAMINATION         TREATMENT     Focus of treatment was on the following:   Reassessment, decreasing fascial/soft tissue restrictions, increase mobility, decrease pain    Patient was reassessed for progress towards current goals as follows:    Pain  Patient reports average pain over the week a 5/10. Patient states the worst the pain has gotten this week is a 6/10 when working around the yard. Patient states the best the pain has gotten is 0/10 during the middle of the day and after pain medication. Patient reports taking pain medication 3x/day every day; however, does not always take when feeling good. Numbness/Tingling  Patient reports constant numbness in right hip and foot; however, improvements noted since starting therapy. Patient reports numbness in right index finger has resolved. AROM  Patient demonstrates Kasumi-sou bilateral shoulder ROM with minimal reports of pain throughout right upper trap region. Strength  Left shoulder: 4/5 (Evaluation: 4/5)  Left elbow: 4+/5 (Evaluation: 4/5)  Left forearm: 4+/5 (Evaluation: 4/5)  Left wrist: 4+/5 (Evaluation: 4/5)    Fascial Restrictions  Patient continues to demonstrate multiple areas of restriction leading to increased pain, decreased sensation, and decreased posture. Energy Conservation:  Patient was educated on relaxation techniques and breathing in fist treatment session, as well as use of tub bench for increased safety and energy conservation. MFR/Manual:   Patient was treated supine on mat table  Cervical: occipital condyle, cervical release for right side bending, slight traction and compression to cervical spine  Upper extremity: transverse plane release to right shoulder, right arm pull      Patient Education/HEP:   Continue recommended HEP/activities. ASSESSMENT       Assessment:   Patient tolerated treatment session well. Patient reports improvements in pain level and numbness and tingling. Patient demonstrates [facility] AROM with minimal reports of pain. Patient demonstrates improved overall strength. Patient continues to report increased pain throughout right UE and low back.         Post Treatment Pain: 1/10    Patient's Activity Tolerance: Patient tolerated treatment well              GOALS         Long Term Goals  Time Frame for Long term goals : 2x/week for 8 visits  Long Term Goal 1: Patient will report pain 2-3/10 or less during functional activities. Long Term Goal 2: Patient  will be independent with all recommended HEP's, adaptive strategies, and adaptive techniques. Long Term Goal 3: Patient will report decreased frequency of numbness/tingling in right hip and thigh. Long Term Goal 4: Patient will increase AROM of left shoulder from current by 15 flexion° to increase performance with I/ADL's. Long Term Goal 5: Patient  will increase leftUE strength from current by 1/2-1 MS grade  to increase performance with I/ADL's. Long Term Goal 6: Patient  will decrease fascial restrictions in neck, left shoulder, right trap, right low back/hip to decrease pain and increase ROM during functional activities. Long Term Goal 7: Patient will be IND with ECWS techniques. TREATMENT PLAN     Patient to continue progress towards current plan of care.  Next Visit: 8/24/2022           Electronically signed by TINO Anderson  on 8/19/2022 at 12:41 PM  POC NOTE

## 2022-08-20 DIAGNOSIS — M62.838 SPASM OF MUSCLE: ICD-10-CM

## 2022-08-20 DIAGNOSIS — M79.604 BILATERAL LEG PAIN: ICD-10-CM

## 2022-08-20 DIAGNOSIS — M79.605 BILATERAL LEG PAIN: ICD-10-CM

## 2022-08-22 RX ORDER — GABAPENTIN 300 MG/1
CAPSULE ORAL
Qty: 270 CAPSULE | Refills: 0 | Status: SHIPPED | OUTPATIENT
Start: 2022-08-22 | End: 2022-09-29

## 2022-08-22 RX ORDER — BACLOFEN 10 MG/1
TABLET ORAL
Qty: 90 TABLET | Refills: 1 | Status: SHIPPED | OUTPATIENT
Start: 2022-08-22 | End: 2022-10-19

## 2022-08-24 ENCOUNTER — HOSPITAL ENCOUNTER (OUTPATIENT)
Dept: OCCUPATIONAL THERAPY | Age: 72
Setting detail: THERAPIES SERIES
Discharge: HOME | End: 2022-08-24
Payer: MEDICARE

## 2022-08-24 NOTE — PROGRESS NOTES
Therapy                            Cancellation/No-show Note    Date: 2022  Patient Name: Estrella Guadalupe    : 1950  (20 y.o.)     MRN: [id number]    Account #: [de-identified]    No Show: 0  Canceled Appointment: 1    Comments: For today's appointment patient:  [x]  Cancelled  []  Rescheduled appointment  []  No-show   []  Called pt to remind of next appointment     Reason given by patient:  []  Patient ill  []  Conflicting appointment  []  No transportation    []  Conflict with work  []  No reason given  [x]  Other:  Pt stated he was too sore to come in today. [x] Pt has future appointments scheduled, no follow up needed  [] Pt requests to be on hold.     Reason:   If > 2 weeks please discuss with therapist.  [] Therapist to call pt for follow up     Signature: ZACH Villalobos 2022 8:56 AM

## 2022-08-26 ENCOUNTER — HOSPITAL ENCOUNTER (OUTPATIENT)
Dept: OCCUPATIONAL THERAPY | Age: 72
Setting detail: THERAPIES SERIES
Discharge: HOME | End: 2022-08-26
Payer: MEDICARE

## 2022-08-26 PROCEDURE — 97530 THERAPEUTIC ACTIVITIES: CPT

## 2022-08-26 PROCEDURE — 97140 MANUAL THERAPY 1/> REGIONS: CPT

## 2022-08-26 NOTE — PROGRESS NOTES
[facility]       Occupational Therapy: Daily Note   Patient: Barbara Golden (33 y.o. male)   Date:   Plan of Care Certification Period:      :  1950  MRN: [id number]  CSN: [id number]   Insurance: Payor: Zigmund Steve / Plan: Zigmund Steve / Product Type: *No Product type* /   Insurance ID: [id number] - (Medicare Managed) Secondary Insurance (if applicable): MEDICA[place]   Referring Physician: Carlos Alberto Valdes DO     PCP: April Zamudio PA-C Visits to Date:     Progress note:   Visits Approved:      No Show:    Cancelled Appts:      Medical Diagnosis: Pain in right shoulder [M25.511]  Pain in left shoulder [M25.512]  Other chronic pain [G89.29]  Radiculopathy, lumbar region [M54.16]  Radiculopathy, lumbar region [M54.16]  Occipital neuralgia [M54.81]  Cervicalgia [M54.2]          Therapy Time    Time in  1400   Time out  1500   Total treatment minutes  60   Total time code minutes   60        OT Manual therapy 45 minutes for 3 unit(s), CPT 40319  OT Therapeutic activities 15 minutes for 1 unit(s), CPT 86641       SUBJECTIVE EXAMINATION     Patient's date of birth confirmed: Yes     Pain Level:   3/10    Patient Comments:           Learning/Language barrier: n/a       HEP/Strategies/Orthosis Compliance: Parent/caregiver verbally confirmed non-compliant with HEP's and adaptive strategies. Informed of possible benefits of HEP and importance of completion.           Restrictions:          OBJECTIVE EXAMINATION         TREATMENT     Focus of treatment was on the following:   decreasing fascial/soft tissue restrictions, decreasing pain, and increase bilateral  shoulder active ROM     MFR/Manual:   Pt treated supine on mat table  Cervical: occipital condyle  and cervical hold with anterior chest release    Thoracic: anterior chest deep release , bilateral  pectoralis major , and bilateral  pectoralis minor   Upper extremity: bilateral  arm pull  and Other : B upper traps  Lumbar/Pelvis/Sacral: pelvic floor transverse plane and bilateral  psoas release     Followed with AROM overhead bilaterally           Patient Education/HEP:   Continue recommended HEP/activities. ASSESSMENT       Assessment: Pt tolerated treatment well. Pt reported decreased  pain after OT treatment. Pt making good  progress towards goals.          Post Treatment Pain: 2/10    Patient's Activity Tolerance:    good            GOALS       As per POC            TREATMENT PLAN   Continue POC Pt will be at end of POC           Electronically signed by Juju Solo OTR/L  on 8/26/2022 at 2:04 PM Electronically signed by Juju Solo OTR/L on 8/26/2022 at 4:20 PM   POC NOTE

## 2022-08-29 DIAGNOSIS — M54.40 LOW BACK PAIN WITH SCIATICA, SCIATICA LATERALITY UNSPECIFIED, UNSPECIFIED BACK PAIN LATERALITY, UNSPECIFIED CHRONICITY: ICD-10-CM

## 2022-08-29 DIAGNOSIS — M54.12 C6 RADICULOPATHY: ICD-10-CM

## 2022-08-29 DIAGNOSIS — M41.9 SCOLIOSIS OF LUMBAR SPINE, UNSPECIFIED SCOLIOSIS TYPE: ICD-10-CM

## 2022-08-29 DIAGNOSIS — G95.9 MYELOPATHY (HCC): ICD-10-CM

## 2022-08-30 RX ORDER — OXYCODONE AND ACETAMINOPHEN 7.5; 325 MG/1; MG/1
1 TABLET ORAL EVERY 4 HOURS PRN
Qty: 90 TABLET | Refills: 0 | Status: SHIPPED | OUTPATIENT
Start: 2022-08-30 | End: 2022-09-23 | Stop reason: ALTCHOICE

## 2022-08-30 ASSESSMENT — ENCOUNTER SYMPTOMS
PHOTOPHOBIA: 0
TROUBLE SWALLOWING: 0
BACK PAIN: 1
BOWEL INCONTINENCE: 0
ABDOMINAL PAIN: 0
VISUAL CHANGE: 0

## 2022-08-30 NOTE — PROGRESS NOTES
nSubjective  Abril Clinton, 80 y.o. male presents today with:    Back Pain TP injections 09/06/22 gave 85% reduction in pain and still ongoing       Shoulder Pain Rt shoulder; has finished OT which helped while going but now pain seems to be the same in the mornings. Leg Pain bilateral       Foot Pain bilateral       Discuss Medications Percocet and Gabapentin, pill count today - compliant. Would like to discuss increasing his pain medication to a stronger dose. Neck Pain Rt occipital block 08/01/22 gave 95% reduction in pain and still ongoing      Recent pain flareup diagnoses -trialed OT myofascial release which helped temporarily but is not continuing to help his pain. We discussed Medrol Dosepak versus daily prednisone for a while and transitioning to a slightly higher dose of Percocet while continuing vitamin D gabapentin and baclofen. Recent injections helped--medications are not as helpful. Injections still help very much. He would like to schedule monthly trigger point-no longer needing sciatic blocks. Back Pain  This is a chronic problem. The current episode started more than 1 year ago. The problem occurs daily. The problem is unchanged. The pain is present in the lumbar spine. The quality of the pain is described as aching, cramping, shooting and stabbing. The pain radiates to the right foot, right knee and right thigh. The pain is at a severity of 9/10. The pain is severe. The pain is Worse during the day. The symptoms are aggravated by bending, lying down, position, sitting, standing and twisting. Stiffness is present In the morning. Associated symptoms include leg pain, numbness and weakness. Pertinent negatives include no abdominal pain, bladder incontinence, bowel incontinence, chest pain, dysuria, fever, headaches, paresis, perianal numbness or tingling. Risk factors include lack of exercise and sedentary lifestyle.  He has tried analgesics, home exercises, NSAIDs, muscle relaxant, heat and walking (SI injections which help, trigger point injections, OXY-IR ) for the symptoms. The treatment provided mild relief. Shoulder Pain   This is a chronic problem. The pain is at a severity of 9/10. Associated symptoms include numbness. Pertinent negatives include no fever or tingling. The symptoms are aggravated by contact. He has tried NSAIDS, OTC ointments, OTC pain meds, rest, oral narcotics and heat for the symptoms. The treatment provided mild relief. Leg Pain   Associated symptoms include numbness. Pertinent negatives include no muscle weakness or tingling. Foot Pain   This is a chronic problem. The current episode started more than 1 year ago. Associated symptoms include numbness. Pertinent negatives include no fever or tingling. Neck Pain   This is a recurrent problem. The current episode started more than 1 month ago. The problem occurs constantly. The problem has been gradually worsening. The pain is present in the occipital region. The quality of the pain is described as aching. The pain is at a severity of 6/10. The pain is moderate. The symptoms are aggravated by twisting. The pain is Same all the time. Stiffness is present In the morning. Associated symptoms include leg pain, numbness and weakness. Pertinent negatives include no chest pain, fever, headaches, pain with swallowing, paresis, photophobia, syncope, tingling, trouble swallowing or visual change. He has tried heat, acetaminophen, bed rest, home exercises, muscle relaxants, neck support, ice, NSAIDs and oral narcotics for the symptoms. The treatment provided moderate relief.      Past Medical History:   Diagnosis Date    Chronic back pain     Coronary artery disease 2002    Dr. Elaine Rossi at [facility]    Esophageal ulcer 3/10/2014    Dr. Long Roberson    Gastric ulcer 3/10/2014    Dr. Long Roberson    Gout     Hiatal hernia 3/10/2014    Dr. Long Roberson    Hyperlipidemia     Hypertension     Impotence 10/16/2020 MI (myocardial infarction) [facility]) 2005    Dr. Christian Wells    Osteoarthritis     Peripheral vascular disease ([facility] [address]     Prediabetes     Schizo-affective schizophrenia, in remission ([facility] [address] 2/1/2005    Overview:  followed at the [facility]    Severe single current episode of major depressive disorder, without psychotic features ([facility] [address] 7/8/2016    Status post coronary artery stent placement 2002    Dr. Christian Wells     Past Surgical History:   Procedure Laterality Date    [address]  01/03/2020    Dr. Beth Euceda Right 6/4/2019    RIGHT WRIST CARPAL TUNNEL RELEASE, SUPINE, PAT AT PCP performed by Caitlin Fuller MD at [address]  3/10/14    DR Jose Covarrubias  2002    Dr. Purnima Sanderson GRAFT  10/17/2017    KNEE ARTHROSCOPY Left 2002    Dr. Kevin Guzmán  12/19/13    CAUDAL BLOCKS DONE BY DR Marcela Bobby    OTHER SURGICAL HISTORY  04/2020    [facility] Kristan  9/2009    Dr. Billie Uriostegui  2008    Dr. Tiara Villavicencio  3/10/14    Dottie Kim, DR [facility]     Social History     Socioeconomic History    Marital status:      Spouse name: None    Number of children: None    Years of education: None    Highest education level: None   Occupational History    Occupation: disability    Tobacco Use    Smoking status: Never    Smokeless tobacco: Never   Vaping Use    Vaping Use: Never used   Substance and Sexual Activity    Alcohol use: No     Alcohol/week: 0.0 standard drinks     Comment: Stopped years ago. Drug use: No     Comment: YEARS AGO    Sexual activity: Yes     Partners: Female     Comment: monogomous sexual partner, wife. Social History Narrative    Tobacco -- never smoked or chewed.     Alcohol -- have not used for past 10 years, prior to had consumed 6 pack of beer daily. Drugs -- tried marijuana and cocaine 14 years ago occasionally used for 3-4 years and then stopped completely 10 years ago. Education -- completed 11th grade in [place].    Occupation -- [employer] work,  at [employer].    Marital status --  44 years, 2 grown sons. Family History   Problem Relation Age of Onset    High Blood Pressure Mother     Arthritis Mother     Cancer Father         throat    Arthritis Father        No Known Allergies    Review of Systems   Constitutional:  Positive for activity change and fatigue. Negative for chills, diaphoresis and fever. HENT:  Negative for congestion, ear discharge, ear pain, hearing loss, nosebleeds, sore throat, tinnitus and trouble swallowing. Eyes:  Negative for photophobia, pain and redness. Respiratory:  Positive for shortness of breath. Negative for cough, wheezing and stridor. Shortness of breath on exertion   Cardiovascular:  Negative for chest pain, palpitations, leg swelling and syncope. Gastrointestinal:  Positive for constipation. Negative for abdominal pain, blood in stool, bowel incontinence, diarrhea, nausea and vomiting. Endocrine: Negative for polydipsia. Genitourinary:  Negative for bladder incontinence, dysuria, flank pain, frequency, hematuria and urgency. Musculoskeletal:  Positive for back pain, gait problem, myalgias and neck pain. Skin:  Negative for rash. Allergic/Immunologic: Positive for immunocompromised state. Negative for environmental allergies. Neurological:  Positive for weakness and numbness. Negative for dizziness, tingling, tremors, seizures and headaches. Hematological:  Does not bruise/bleed easily. Psychiatric/Behavioral:  Negative for hallucinations and suicidal ideas. The patient is not nervous/anxious.       Objective    Vitals:    09/23/22 1132   BP: 138/70   Site: Left Upper Arm   Position: Sitting   Cuff Size: Medium Adult   Pulse: 58   SpO2: 99%   Weight: 161 lb (73 kg)   Height: 5' 7\" (1.702 m)     Pain Score:   6     Physical Exam  Vitals reviewed. Constitutional:       General: He is not in acute distress. Appearance: He is well-developed. He is not ill-appearing, toxic-appearing or diaphoretic. Comments:     HENT:      Head: Normocephalic and atraumatic. Right Ear: Hearing normal.      Left Ear: Hearing normal.      Nose: Nose normal.      Mouth/Throat:      Mouth: No oral lesions. Dentition: Normal dentition. Pharynx: No oropharyngeal exudate. Eyes:      General: No scleral icterus. Right eye: No discharge. Left eye: No discharge. Conjunctiva/sclera: Conjunctivae normal.      Right eye: No chemosis or exudate. Left eye: No chemosis or exudate. Neck:      Thyroid: No thyromegaly. Vascular: No JVD. Trachea: No tracheal tenderness or tracheal deviation. Pulmonary:      Effort: Pulmonary effort is normal. No tachypnea, bradypnea, accessory muscle usage or respiratory distress. Breath sounds: Decreased breath sounds present. No wheezing or rales. Chest:      Chest wall: No tenderness. Abdominal:      General: There is no distension. Musculoskeletal:         General: Tenderness present. Right shoulder: Tenderness and bony tenderness present. Decreased range of motion. Left shoulder: Tenderness and bony tenderness present. Decreased range of motion. Right upper arm: Normal.      Left upper arm: Normal.      Right elbow: Normal.      Left elbow: Normal.      Right forearm: Normal.      Left forearm: Normal.      Right wrist: Normal.      Left wrist: Normal.      Right hand: Bony tenderness present. No swelling, deformity or lacerations. Decreased range of motion. Decreased strength of finger abduction, thumb/finger opposition and wrist extension. Decreased sensation of the ulnar distribution, median distribution and radial distribution. Normal capillary refill. Left hand: Bony tenderness present. Decreased range of motion. Decreased strength of finger abduction and wrist extension. Decreased sensation of the ulnar distribution and radial distribution. Arms:       Cervical back: Neck supple. Laceration, spasms, tenderness and bony tenderness present. No swelling, edema, deformity or rigidity. Spinous process tenderness and muscular tenderness present. Thoracic back: Deformity, spasms, tenderness and bony tenderness present. Decreased range of motion. Lumbar back: Tenderness and bony tenderness present. No swelling, edema, deformity or lacerations. Decreased range of motion. Back:       Right hip: Normal.      Left hip: Normal.      Right upper leg: Normal.      Left upper leg: Normal.      Right knee: Normal.      Left knee: Normal.      Right lower leg: Normal.      Left lower leg: Normal.      Right ankle: Normal.      Right Achilles Tendon: Normal.      Left ankle: Normal.      Left Achilles Tendon: Normal.      Right foot: Normal.      Left foot: Normal.        Legs:       Comments: Tender areas are indicated by numbered spot         Skin:     General: Skin is warm and dry. Coloration: Skin is not pale. Findings: No abrasion, bruising, ecchymosis, erythema, laceration, petechiae or rash. Rash is not macular, pustular or urticarial.      Nails: There is no clubbing. Neurological:      Mental Status: He is alert and oriented to person, place, and time. Cranial Nerves: No cranial nerve deficit. Sensory: Sensory deficit present. Motor: No tremor, atrophy or abnormal muscle tone. Coordination: Coordination normal.      Gait: Gait abnormal.      Deep Tendon Reflexes: Reflexes abnormal. Babinski sign absent on the right side. Babinski sign absent on the left side. Reflex Scores:       Patellar reflexes are 1+ on the right side and 1+ on the left side.        Achilles reflexes are 0 on the right side and 0 on the left side. Psychiatric:         Attention and Perception: He is attentive. Mood and Affect: Mood is not anxious or depressed. Affect is not labile, blunt, angry or inappropriate. Speech: He is communicative. Speech is not rapid and pressured, delayed, slurred or tangential.         Behavior: Behavior normal. Behavior is not agitated, slowed, aggressive, withdrawn, hyperactive or combative. Thought Content: Thought content normal. Thought content is not paranoid or delusional. Thought content does not include homicidal or suicidal ideation. Thought content does not include homicidal or suicidal plan. Cognition and Memory: Memory is not impaired. He does not exhibit impaired recent memory or impaired remote memory. Judgment: Judgment normal. Judgment is not impulsive or inappropriate. Comments: Calm appropriate    NAD     Ortho Exam  Neurologic Exam     Mental Status   Oriented to person, place, and time. Speech: not slurred     Gait, Coordination, and Reflexes     Reflexes   Right patellar: 1+  Left patellar: 1+  Right achilles: 0  Left achilles: 0        After a thorough review and discussion of the previous medical records, patient comprehensive medical, surgical, and family and social history, Review of Systems, their OARRS, their Screener and Opioid Assessment for Patients with Pain (SOAPP®-R), recent diagnostics, and symptomatic results to previous treatment, it is my impression that the patients is suffering with progressive and severe:     Diagnosis Orders   1. Chronic pain of both shoulders  predniSONE (DELTASONE) 10 MG tablet      2. Myelopathy (HCC)  oxyCODONE-acetaminophen (PERCOCET)  MG per tablet      3. Spinal cord disease ([address]  oxyCODONE-acetaminophen (PERCOCET)  MG per tablet      4. Right lumbar radiculopathy  oxyCODONE-acetaminophen (PERCOCET)  MG per tablet    predniSONE (DELTASONE) 10 MG tablet      5.  C6 radiculopathy oxyCODONE-acetaminophen (PERCOCET)  MG per tablet      6. Bilateral leg pain        7. High risk medication use  oxyCODONE-acetaminophen (PERCOCET)  MG per tablet          I am also concerned by lifestyle and mood issues including:    Past Medical History:   Diagnosis Date    Chronic back pain     Coronary artery disease 2002    Dr. Josias Lerma at [facility]    Esophageal ulcer 3/10/2014    Dr. Jared Ortiz    Gastric ulcer 3/10/2014    Dr. Jared Ortiz    Gout     Hiatal hernia 3/10/2014    Dr. Jared Ortiz    Hyperlipidemia     Hypertension     Impotence 10/16/2020    MI (myocardial infarction) [facility]) 2005    Dr. Josias Lerma    Osteoarthritis     Peripheral vascular disease ([facility] [address]     Prediabetes     Schizo-affective schizophrenia, in remission ([facility] [address] 2/1/2005    Overview:  followed at the [facility]    Severe single current episode of major depressive disorder, without psychotic features ([facility] [address] 7/8/2016    Status post coronary artery stent placement 2002    Dr. Josias Lerma           Given their medication, chronic pain and lifestyle and medications they are at risk for :    Falls, constipation, addiction, toxicity on opiates  Loss of livelyhood due to severe pain, debility, weight gain and  vitamin D deficiency    The patient was educated regarding proper diet, fitness routine, and regulatory restrictions concerning pain medications. Previous notes, comprehensive past medical, surgical, family history, and diagnostics were reviewed. Patient education and councelling were provided regarding off label use,treatment options and medication and injection risks. Current and old OARRS ([place] Automated Prescription Reporting System) records reviewed, all refills reviewed since last visit,  Behavioral agreement/[employer] regulations   and Toxicology screen was reviewed with patient and is up to date.       There are   no current red flags. high risk meds review re intensive monitoring for toxicity and addiction,  They are making good progress regarding pain relief, they are performing at a functional level regarding activities of daily living, family interactions and psychological functioning, they're not having any adverse effects or side effects from the current medications, and I see no findings of aberrant drug taking or addiction related behaviors. The patient is aware that they have a chronic pain condition and they may require opiates dosing for life. All efforts will be made to wean to the lowest effective dose. Other therapies for pain have not been effective including nonopiate medications. Injections and exercises are only partially effective. A Rx for Narcan was offered to help prevent accidental overdose. RX Monitoring 10/17/2021   Attestation -   Acute Pain Prescriptions Prescription exceeds daily limit for a specific reason. See comments or note. ;Not required given exclusionary diagnoses. ..;Severe pain not adequately treated with lower dose. Periodic Controlled Substance Monitoring Possible medication side effects, risk of tolerance/dependence & alternative treatments discussed. ;No signs of potential drug abuse or diversion identified. ;Assessed functional status. ;Obtaining appropriate analgesic effect of treatment. Chronic Pain > 50 MEDD -   Chronic Pain > 80 MEDD -               Patient is currently taking:       I have discontinued Scotty Wright's oxyCODONE-acetaminophen. I am also having him start on oxyCODONE-acetaminophen and predniSONE. Additionally, I am having him maintain his aspirin, CPAP Machine, tamsulosin, vitamin D, nitroGLYCERIN, glucose monitoring, Alcohol Prep, DOK, FreeStyle Lancets, FREESTYLE LITE, imipramine, allopurinol, colchicine, B-D 3CC LUER-JOSE SYR 94AP7-8/2, atorvastatin, naloxone, metoprolol tartrate, testosterone cypionate, gabapentin, baclofen, NIFEdipine, pravastatin, and metFORMIN.               I also recommend the following Medications:    Orders Placed This Encounter   Medications    oxyCODONE-acetaminophen (PERCOCET)  MG per tablet     Sig: Take 1 tablet by mouth every 6 hours as needed for Pain for up to 30 days. Intended supply: 30 days     Dispense:  90 tablet     Refill:  0     Reduce doses taken as pain becomes manageable    predniSONE (DELTASONE) 10 MG tablet     Sig: Take 1 tablet by mouth daily for 10 days     Dispense:  10 tablet     Refill:  0          -which helps with pain and function. Otherwise, continue the current pain medications that I have prescibed. Radiologic:   Old  unique films results reviewed OA cervical spine thoracic spine lumbar spine,     I discussed results with patients. see Follow up plans below  For any new studies. Unique source and Care Everywhere Updates:  prior external notes requested and reviewed. Patient went to OT myofascial release with temporary relief. No new issues noted. Unique History obtained by caregiver/independent historian-and verified with SOAPPR.           Labs:  Previous labs reviewed     Lab Results   Component Value Date/Time     06/18/2022 10:00 PM    K 4.7 06/18/2022 10:00 PM     06/18/2022 10:00 PM    CO2 27 06/18/2022 10:00 PM    BUN 16 06/18/2022 10:00 PM    CREATININE 1.01 06/18/2022 10:00 PM    CALCIUM 10.1 06/18/2022 10:00 PM    LABALBU 4.8 06/18/2022 10:00 PM    LABALBU 4.4 10/27/2020 11:38 AM    BILITOT 0.3 06/18/2022 10:00 PM    ALKPHOS 72 06/18/2022 10:00 PM    AST 29 06/18/2022 10:00 PM    ALT 20 06/18/2022 10:00 PM     Lab Results   Component Value Date/Time    WBC 8.7 06/18/2022 10:00 PM    RBC 4.71 06/18/2022 10:00 PM    HGB 15.3 06/18/2022 10:00 PM    HCT 45.6 06/18/2022 10:00 PM    MCV 96.9 06/18/2022 10:00 PM    MCH 32.5 06/18/2022 10:00 PM    MCHC 33.6 06/18/2022 10:00 PM    RDW 14.2 06/18/2022 10:00 PM     06/18/2022 10:00 PM    MPV 9.9 09/15/2015 09:02 AM       Lab Results   Component Value Date/Time    LABAMPH Neg 04/16/2021 10:04 AM BARBSCNU Neg 04/16/2021 10:04 AM    LABBENZ Neg 04/16/2021 10:04 AM    CANSU Neg 04/16/2021 10:04 AM    COCAIMETSCRU Neg 04/16/2021 10:04 AM    PHENCYCLIDINESCREENURINE Neg 04/16/2021 89:51 AM    TRICYCLIC Negative 36/44/2128 04:01 PM    DSCOMMENT see below 04/16/2021 10:04 AM       Lab Results   Component Value Date/Time    CODEINE Not Detected 09/16/2016 11:47 AM    MORPHINE Not Detected 09/16/2016 11:47 AM    ACETYLMORPHI Not Detected 09/16/2016 11:47 AM    OXYCODONE Present 09/16/2016 11:47 AM    NOROXYCODONE Present 09/16/2016 11:47 AM    NOROXYMU Present 09/16/2016 11:47 AM    HYDRCO Not Detected 09/16/2016 11:47 AM    NORHYDU Not Detected 09/16/2016 11:47 AM    HYDROMO Not Detected 09/16/2016 11:47 AM    BUPREN Not Detected 09/16/2016 11:47 AM    NORBUPRNOR Not Detected 09/16/2016 11:47 AM    FENTA Not Detected 09/16/2016 11:47 AM    NORFENT Not Detected 09/16/2016 11:47 AM    MEPERIDINE Not Detected 09/16/2016 11:47 AM    TAPENU Not Detected 09/16/2016 11:47 AM    TAPOSULFUR Not Detected 09/16/2016 11:47 AM    METHADONE Not Detected 09/16/2016 11:47 AM    LABPROP Not Detected 09/16/2016 11:47 AM    TRAM Not Detected 09/16/2016 11:47 AM    AMPH Not Detected 09/16/2016 11:47 AM    METHAMP Not Detected 09/16/2016 11:47 AM    MDMA Not Detected 09/16/2016 11:47 AM    ECMDA Not Detected 09/16/2016 11:47 AM       Lab Results   Component Value Date/Time    PHENTERMINE Not Detected 09/16/2016 11:47 AM    BENZOYL Not Detected 09/16/2016 11:47 AM    ALPRAZ Not Detected 09/16/2016 11:47 AM    ALPHAOHALPRA Not Detected 09/16/2016 11:47 AM    CLONAZEPAM Not Detected 09/16/2016 11:47 AM    7AMINOCLONAZ Not Detected 09/16/2016 11:47 AM    DIAZEP Not Detected 09/16/2016 11:47 AM    SAFIA Not Detected 09/16/2016 11:47 AM    OXAZ Not Detected 09/16/2016 11:47 AM    TEMAZ Not Detected 09/16/2016 11:47 AM    LORAZEPAM Not Detected 09/16/2016 11:47 AM    MIDAZOLAM Not Detected 09/16/2016 11:47 AM    ZOLPIDEM Not Detected Follow up with Primary Care Physician regarding their general medical needs. Stressed the importance of following up with PCP and specialists for his/her chronic diseases, health, CV, and cancer screening and continued care. Will follow disease activity/progression and adjust therapeutic regimen to disease activity and severity. Discussed medication dosage, usage, goals of therapy, and side effects. Available test results were reviewed -Discussed findings, impression and plan with patient. An additional 10 minutes were spent outside of the patient visit to review records. Additional time spent with the patient to discuss their questions. Additional time spent with the patient devoted to discussing treatment strategy, planning, and implementation. Patient understands above plan; questions asked and answered. Patient agrees to plan as noted above. At least 50% of the visit was involved in the discussion of the options for treatment. We discussed exercises, medication, interventional therapies and surgery. Healthy life style is essential with patient hard work to achieve the wellness. In addition; discussion with the patient and/or family about patient's functional status any of the diagnostic results, impressions and/or recommended diagnostic studies, prognosis, risks and benefits of treatment options, instructions for treatment and/or follow-up, importance of compliance with chosen treatment options, risk-factor reduction, and patient/family education. They are to follow up in 2 1/2 -3 months to review medication, efficacy of injections, pill counts, OARRS check, high risk med review re intensive monitoring for toxicity and addiction, SOAPPR assessment, review diagnostics, to review previous and future treatment plans and assess appropriateness for continued therapy. New Diagnostics  No orders of the defined types were placed in this encounter.       Renée Base, DO

## 2022-08-31 ENCOUNTER — HOSPITAL ENCOUNTER (OUTPATIENT)
Dept: OCCUPATIONAL THERAPY | Age: 72
Setting detail: THERAPIES SERIES
Discharge: HOME | End: 2022-08-31
Payer: MEDICARE

## 2022-08-31 PROCEDURE — 97110 THERAPEUTIC EXERCISES: CPT

## 2022-08-31 PROCEDURE — 97140 MANUAL THERAPY 1/> REGIONS: CPT

## 2022-08-31 NOTE — PROGRESS NOTES
[facility]       Occupational Therapy: Daily Note   Patient: Tania Andre (82 y.o. male)   Date:   Plan of Care Certification Period:  22   :  1950  MRN: [id number]  CSN: [id number]   Insurance: Payor: Venia Raw / Plan: Venia Raw / Product Type: *No Product type* /   Insurance ID: [id number] - (Medicare Managed) Secondary Insurance (if applicable): [place]ICA[place]   Referring Physician: Alfonso Aviles DO     PCP: Terry Aguilar PA-C Visits to Date: Total # of Visits to Date: 6   Progress note:Progress Note Counter:  (POC written for 8 visits)  Visits Approved: 10    No Show: 0  Cancelled Appts:1     Medical Diagnosis: Pain in right shoulder [M25.511]  Pain in left shoulder [M25.512]  Other chronic pain [G89.29]  Radiculopathy, lumbar region [M54.16]  Radiculopathy, lumbar region [M54.16]  Occipital neuralgia [M54.81]  Cervicalgia [M54.2]          Therapy Time    Time in 903   Time out 0958   Total treatment minutes 55   Total time code minutes           OT Manual therapy 25 minutes for 2 unit(s), CPT 91809  OT Therapeutic exercises 30 minutes for 2 unit(s), CPT 49702       SUBJECTIVE EXAMINATION     Patient's date of birth confirmed: Yes     Pain Level:   3/10    Patient Comments:  Pain in right side of neck, in the morning it is worse and eases up throughout the day. States he sees pain doctor on the , and receives trigger point injections. Learning/Language barrier: no       HEP/Strategies/Orthosis Compliance: Patient verbally confirmed compliant with HEP's   Pt stated he is completing one exercise at home daily.         Restrictions:   Restrictions/Precautions: None        OBJECTIVE EXAMINATION         TREATMENT     Focus of treatment was on the following:   decreasing pain and Exercise: Pt completed 15 reps with visual instruction for bilateral  shoulder protraction/retraction , elevation/depression , and internal rotation , bilateral elbow flexion/extension , and bilateral  forearm supination/pronation  Pt utilizing a 2# weight to complete exercises with good follow through on form. When completing AROM of neck (gentle neck stretches, ear to shoulder, pt states he can feel \"some pain\" when coming back to midline from L shoulder. Also completing head turning and holding 3 to 5 seconds on each side with no pain noted. MFR/Manual:   Cervical: cervical release for right side bending  with slight traction. UE transverse plane to right shoulder as well as right arm pull. Manual Therapy: (CPT 17651) Treatment Reasoning     Other: Cervical: cervical release for right side bending, slight traction, UE transferse plane to right shoulder as well as right arm pull. Passive ROM to RUE with prolonged hold at end range to stretch  Limitations addressed: Joint motion  Therapist provided: Manual cuing                      Patient Education/HEP:   Continue recommended HEP/activities. ASSESSMENT       Assessment: Pt making good  progress towards goals. Pt states he does not pay much attention to the numbness/tingling in his leg, he has it sometimes but it does not bother him. Post Treatment Pain: 2/10    Patient's Activity Tolerance:  Pt tolerating session well, demonstrating good follow through during session. Continually stating \"I like it, this feels good. \"                  GOALS         Long Term Goals  Time Frame for Long term goals : 2x/week for 8 visits  Long Term Goal 1: Patient will report pain 2-3/10 or less during functional activities. Long Term Goal 2: Patient  will be independent with all recommended HEP's, adaptive strategies, and adaptive techniques. Long Term Goal 3: Patient will report decreased frequency of numbness/tingling in right hip and thigh. Long Term Goal 4: Patient will increase AROM of left shoulder from current by 15 flexion° to increase performance with I/ADL's.   Long Term Goal 5: Patient  will increase leftUE strength from current by 1/2-1 MS grade  to increase performance with I/ADL's. Long Term Goal 6: Patient  will decrease fascial restrictions in neck, left shoulder, right trap, right low back/hip to decrease pain and increase ROM during functional activities. Long Term Goal 7: Patient will be IND with ECWS techniques.          TREATMENT PLAN   Continue POC           Electronically signed by ZACH Figueredo  on 8/31/2022 at 9:59 AM

## 2022-09-02 ENCOUNTER — HOSPITAL ENCOUNTER (OUTPATIENT)
Dept: OCCUPATIONAL THERAPY | Age: 72
Setting detail: THERAPIES SERIES
Discharge: HOME | End: 2022-09-02
Payer: MEDICARE

## 2022-09-02 PROCEDURE — 97530 THERAPEUTIC ACTIVITIES: CPT

## 2022-09-02 NOTE — PROGRESS NOTES
[facility]       Occupational Therapy: Daily Note   Patient: Bridget Hightower (71 y.o. male)   Date:   Plan of Care Certification Period:  22   :  1950  MRN: [id number]  CSN: [id number]   Insurance: Payor: Reginaldo Nash / Plan: Reginaldo Apa / Product Type: *No Product type* /   Insurance ID: [id number] - (Medicare Managed) Secondary Insurance (if applicable): MEDICA[place]   Referring Physician: Antony Laws DO     PCP: Nilay Diaz PA-C Visits to Date: Total # of Visits to Date: 7   Progress note:Progress Note Counter:  (POC written for 8 visits)  Visits Approved: 10    No Show: 0  Cancelled Appts:1     Medical Diagnosis: Pain in right shoulder [M25.511]  Pain in left shoulder [M25.512]  Other chronic pain [G89.29]  Radiculopathy, lumbar region [M54.16]  Radiculopathy, lumbar region [M54.16]  Occipital neuralgia [M54.81]  Cervicalgia [M54.2] Chronic pain bilateral shoulders, C6 radiculopathy, right lumbar radiculopathy, thoracic and lumbrosacral neuritis, neck pain, occipital neuralgia          Therapy Time    Time in 0200   Time out 0253   Total treatment minutes 53   Total time code minutes  53 Minutes        OT Therapeutic activities 53 minutes for 4 unit(s), CPT 32156       SUBJECTIVE EXAMINATION     Patient's date of birth confirmed: Yes     Pain Level:   2/10 pain in neck   5/10 pain in back       Patient Comments:   Patient states that he feels improvements in pain level since starting therapy. Patient states that he mowed his lawn today which caused increased pain in his shoulder and lower back. He states that he would like to be able to walk more without getting so much pain.        Learning/Language barrier: no       HEP/Strategies/Orthosis Compliance: Patient verbally confirmed compliant with HEP's          Restrictions: None reported            OBJECTIVE EXAMINATION         TREATMENT     Focus of treatment was on the following:   Decreasing pain and exercise:     Pt completed 10 reps with visual instruction for bilateral shoulder protraction/retraction, bilateral  elbow flexion/extension , and bilateral forearm supination/pronation  Pt utilizing a 4# weight to complete exercises with good follow through on form. Pt completed 10 reps with 5# dowel bhavin for bilateral shoulder flexion and horizontal abduction/adduction. Pt completed light cervical flexion/extension and lateral flexion/extension stretch; holding 3 to 5 seconds on each side with stiffness noted. Patient Education/HEP:   Patient verbally confirmed compliant with HEPs. Pt stated he completes exercises at home daily. Patient issued yellow theraband to add to HEP. ASSESSMENT       Assessment: Pt making good progress toward goals. Post Treatment Pain: 1/10 neck pain and 0/10 lower back pain     Patient's Activity Tolerance: Pt tolerating session well, demonstrating good follow through during session. Patient reported decreased pain at the end of the session. GOALS         Long Term Goals  Time Frame for Long term goals : 2x/week for 8 visits  Long Term Goal 1: Patient will report pain 2-3/10 or less during functional activities. Long Term Goal 2: Patient  will be independent with all recommended HEP's, adaptive strategies, and adaptive techniques. Long Term Goal 3: Patient will report decreased frequency of numbness/tingling in right hip and thigh. Long Term Goal 4: Patient will increase AROM of left shoulder from current by 15 flexion° to increase performance with I/ADL's. Long Term Goal 5: Patient  will increase leftUE strength from current by 1/2-1 MS grade  to increase performance with I/ADL's. Additional Goals?: Yes  Long Term Goal 6: Patient  will decrease fascial restrictions in neck, left shoulder, right trap, right low back/hip to decrease pain and increase ROM during functional activities.   Long Term Goal 7: Patient will be IND with ECWS techniques.          TREATMENT PLAN   Continue POC           Electronically signed by Lele Hoskins OT  on 9/2/2022 at 2:58 PM

## 2022-09-06 ENCOUNTER — PROCEDURE VISIT (OUTPATIENT)
Dept: PHYSICAL MEDICINE AND REHAB | Age: 72
End: 2022-09-06
Payer: MEDICARE

## 2022-09-06 DIAGNOSIS — M79.10 MYALGIA: Primary | ICD-10-CM

## 2022-09-06 PROCEDURE — 96372 THER/PROPH/DIAG INJ SC/IM: CPT | Performed by: PHYSICAL MEDICINE & REHABILITATION

## 2022-09-06 PROCEDURE — 20553 NJX 1/MLT TRIGGER POINTS 3/>: CPT | Performed by: PHYSICAL MEDICINE & REHABILITATION

## 2022-09-06 RX ORDER — LIDOCAINE HYDROCHLORIDE 10 MG/ML
15 INJECTION, SOLUTION INFILTRATION; PERINEURAL ONCE
Status: COMPLETED | OUTPATIENT
Start: 2022-09-06 | End: 2022-09-06

## 2022-09-06 RX ORDER — CYANOCOBALAMIN 1000 UG/ML
1000 INJECTION INTRAMUSCULAR; SUBCUTANEOUS ONCE
Status: COMPLETED | OUTPATIENT
Start: 2022-09-06 | End: 2022-09-06

## 2022-09-06 RX ADMIN — CYANOCOBALAMIN 1000 MCG: 1000 INJECTION INTRAMUSCULAR; SUBCUTANEOUS at 14:18

## 2022-09-06 RX ADMIN — LIDOCAINE HYDROCHLORIDE 15 ML: 10 INJECTION, SOLUTION INFILTRATION; PERINEURAL at 14:19

## 2022-09-06 NOTE — PROGRESS NOTES
Patient here for trigger point injections. Patient taken back to exam room, and placed on drape locking stool. Areas to be injected marked appropriately, and cleansed with alcohol. 15cc of 1% Lidocaine and 1cc of B12 to be injected by provider.

## 2022-09-07 ENCOUNTER — HOSPITAL ENCOUNTER (OUTPATIENT)
Dept: OCCUPATIONAL THERAPY | Age: 72
Setting detail: THERAPIES SERIES
Discharge: HOME | End: 2022-09-07
Payer: MEDICARE

## 2022-09-07 PROCEDURE — 97530 THERAPEUTIC ACTIVITIES: CPT

## 2022-09-07 PROCEDURE — 97140 MANUAL THERAPY 1/> REGIONS: CPT

## 2022-09-07 NOTE — PROGRESS NOTES
[facility]       Occupational Therapy: Daily Note   Patient: Barbara Golden (89 y.o. male)   Date:   Plan of Care Certification Period:  22   :  1950  MRN: [id number]  CSN: [id number]   Insurance: Payor: Zigmund Steve / Plan: Zigmund Steve / Product Type: *No Product type* /   Insurance ID: [id number] - (Medicare Managed) Secondary Insurance (if applicable): [place]ICA[place]   Referring Physician: Carlos Alberto Valdes DO     PCP: April Zamudio PA-C Visits to Date: Total # of Visits to Date: 9   Progress note:Progress Note Counter:  (POC written for 8 visits)  Visits Approved: 10    No Show: 0  Cancelled Appts:1     Medical Diagnosis: Pain in right shoulder [M25.511]  Pain in left shoulder [M25.512]  Other chronic pain [G89.29]  Radiculopathy, lumbar region [M54.16]  Radiculopathy, lumbar region [M54.16]  Occipital neuralgia [M54.81]  Cervicalgia [M54.2]          Therapy Time    Time in 0900   Time out 0947   Total treatment minutes 47   Total time code minutes  47 Minutes        OT Manual therapy 15 minutes for 1 unit(s), CPT 49177  OT Therapeutic activities 32 minutes for 2 unit(s), CPT 69662       SUBJECTIVE EXAMINATION     Patient's date of birth confirmed: Yes     Pain Level:   1/10    Patient Comments:   \"I have been feeling a lot better. I woke up feeling fine. \"         Learning/Language barrier: no       HEP/Strategies/Orthosis Compliance: Patient verbally confirmed compliant with HEP's          Restrictions: none reported            OBJECTIVE EXAMINATION     Pt stated highest pain /10. Pt stated pain often does not occur during activity, but later after activity. Pt reported yard work is one of the more demanding tasks. Pt stated has not had cramps for awhile. Pt stated numbness \"not too bad\". Pt stated \"I haven't noticed it (tingling). \"     Upper Extremity Strength and Range of Motion                  22   Left eval     MMT A P Norm  A P MMT   Shoulder Flexion 5/5 WFL NT 0-180 WFL NT 5/5   Abduction 5/5 WFL NT 0-180 WFL NT 5/5   Internal Rotation 5/5/ WFL NT 0-80 WFL NT 4/5   External Rotation 5/5 WFL NT 0-60 WFL NT 4/5   Elbow                Flexion 5/5 WFL NT 0-150 WFL NT 4/5   Extension 5/5 WFL -0 WFL NT 4/5   Pronation 5/5 WFL NT 0-80 WFL NT 4/5   Supination 5/5 WFL NT 0-80 WFL NT 4/5   Wrist                Flexion 5/5 WFL NT 0-70 WFL NT 4-/5   Extension  5/5 WFL NT 0-60 WFL NT 4-/5   Ulnar deviation NT WFL NT 0-30 WFL NT 4-/5   Radial Deviation  NT WFL NT 0-20 WFL NT 4-/5   Comments: No pain reported during MMT on 9/7/22    The Upper Extremity Functional Index  Today, do you or would you have any difficulty at all with:   Any of your usual work, housework, or school activities[de-identified] Moderate difficulty  Your usual hobbies, re creational or sporting activities[de-identified] A little bit of difficulty  Lifting a bag of groceries to waist level[de-identified] No difficulty  Lifting a bag of groceries above your head[de-identified] Quite a bit of difficulty  Grooming your hair[de-identified] Moderate difficulty  Pushing up on your hands (eg from bathtub or chair):: No difficulty  Preparing food (eg peeling, cutting):: Quite a bit of difficulty  Driving[de-identified] No difficulty  Vacuuming, sweeping or raking[de-identified] Quite a bit of difficulty  Dressing[de-identified] A little bit of difficulty (difficulty with tying shoes and donning socks)  Doing up buttons[de-identified] No difficulty  Using tools or appliances[de-identified] No difficulty  Opening doors[de-identified] No difficulty  Cleaning[de-identified] No difficulty  Tying or lacing shoes[de-identified] Quite a bit of difficulty  Sleeping[de-identified] A little bit of difficulty  Laundering clothes (eg washing, ironing, folding):: No difficulty  Opening a jar[de-identified] A little bit of difficulty  Throwing a ball[de-identified] Extreme difficulty or unable to perform activity  Carrying a small suitcase with your affected limb[de-identified] Moderate difficulty  UEFI Total Score: 54  UEFI Total Percentage: 67.5 %     TREATMENT     Focus of treatment was on the following:   assess

## 2022-09-07 NOTE — PROGRESS NOTES
[facility]  [address], [facility] [address]  [address]  [address]  Dept: [phone]  Dept Fax: [phone]: [phone]       Patient: Alicia Katz (80 y.o. male)   Date: 2022   :  1950  MRN: [id number]  CSN: [id number]  Account #: [de-identified]   Insurance: Payor: Bradley Montano / Plan: Bradley Montano / Product Type: *No Product type* /   Insurance ID: [id number] - (Medicare Managed) Secondary Insurance (if applicable): [place]   Referring Physician: Millicent Kimball DO     PCP: Giselle Cortes PA-C  Date of eval: 2022 Visits to Date: Total # of Visits to Date: 9  Progress note:Progress Note Counter:  (POC written for 8 visits)  Visits Approved: 10    No Show: 0  Cancelled Appts:1     Medical Diagnosis: Pain in right shoulder [M25.511]  Pain in left shoulder [M25.512]  Other chronic pain [G89.29]  Radiculopathy, lumbar region [M54.16]  Radiculopathy, lumbar region [M54.16]  Occipital neuralgia [M54.81]  Cervicalgia [M54.2]          Treating diagnosis: weakness, pain, decreased independence with ADL       Assessment:    Goals Current/Discharge status  Met   Long Term Goal 1: Patient will report pain 2-3/10 or less during functional activities. Pt stated highest pain 5/10. Pt stated pain often does not occur during activity, but later after activity. Pt reported yard work is one of the more demanding tasks [] Met  [x] Partially Met  [] Not Met   Long Term Goal 2: Patient  will be independent with all recommended HEP's, adaptive strategies, and adaptive techniques. Pt with verbal encouragement to break up activities. Pt stated likes to get activity done from start to finish (like cutting grass). [] Met  [x] Partially Met  [] Not Met   Long Term Goal 3: Patient will report decreased frequency of numbness/tingling in right hip and thigh. Pt stated numbness \"not too bad\".  Pt stated \"I haven't noticed it (tingling). \"  [] Met  [] Partially Met  [] Not Met   Long Term Goal 4: Patient will increase AROM of left shoulder from current by 15 flexion° to increase performance with I/ADL's. Pt had met on previous assessment. Pt with full AROM with no report of pain. [] Met  [] Partially Met  [] Not Met   Long Term Goal 5: Patient  will increase leftUE strength from current by 1/2-1 MS grade  to increase performance with I/ADL's. See chart below. Pt 5/5 LUE all planes without pain. [x] Met  [] Partially Met  [] Not Met   Long Term Goal 6: Patient  will decrease fascial restrictions in neck, left shoulder, right trap, right low back/hip to decrease pain and increase ROM during functional activities. Pt with decreased soft tissue restrictions in neck, shoulders, and back. Pt reported feeling more loose and less pain. [x] Met  [] Partially Met  [] Not Met   Long Term Goal 7: Patient will be IND with ECWS techniques. Pt with VC's to break up activities (like cutting grass).   [] Met  [x] Partially Met  [] Not Met     Upper Extremity Strength and Range of Motion                          9/7/22   Left eval     MMT A P Norm  A P MMT   Shoulder                 Flexion 5/5 WFL NT 0-180 WFL NT 5/5   Abduction 5/5 WFL NT 0-180 WFL NT 5/5   Internal Rotation 5/5/ WFL NT 0-80 WFL NT 4/5   External Rotation 5/5 WFL NT 0-60 WFL NT 4/5   Elbow                 Flexion 5/5 WFL NT 0-150 WFL NT 4/5   Extension 5/5 WFL -0 WFL NT 4/5   Pronation 5/5 WFL NT 0-80 WFL NT 4/5   Supination 5/5 WFL NT 0-80 WFL NT 4/5   Wrist                 Flexion 5/5 WFL NT 0-70 WFL NT 4-/5   Extension  5/5 WFL NT 0-60 WFL NT 4-/5   Ulnar deviation NT WFL NT 0-30 WFL NT 4-/5   Radial Deviation  NT WFL NT 0-20 WFL NT 4-/5   Comments: No pain reported during MMT on 9/7/22    Functional assessment used: Upper Extremity Functional Index  Score on eval:  UEFI Total Score: 17  UEFI Total Percentage: 21.25 %    Score at d/c:  UEFI Total Score: 54  UEFI Total Percentage: 67.5 %     Plan: D/C from OT    Thank you for referral of this patient. Electronically signed by:   RONALDO Skelton/L 9/7/2022 12:14 PM

## 2022-09-09 ENCOUNTER — HOSPITAL ENCOUNTER (OUTPATIENT)
Dept: OCCUPATIONAL THERAPY | Age: 72
Setting detail: THERAPIES SERIES
End: 2022-09-09
Payer: MEDICARE

## 2022-09-19 ENCOUNTER — OFFICE VISIT (OUTPATIENT)
Dept: CARDIOLOGY CLINIC | Age: 72
End: 2022-09-19
Payer: MEDICARE

## 2022-09-19 VITALS
SYSTOLIC BLOOD PRESSURE: 124 MMHG | WEIGHT: 161 LBS | HEIGHT: 67 IN | HEART RATE: 73 BPM | BODY MASS INDEX: 25.27 KG/M2 | RESPIRATION RATE: 14 BRPM | DIASTOLIC BLOOD PRESSURE: 70 MMHG | OXYGEN SATURATION: 98 %

## 2022-09-19 DIAGNOSIS — I25.118 CORONARY ARTERY DISEASE OF NATIVE ARTERY OF NATIVE HEART WITH STABLE ANGINA PECTORIS (HCC): ICD-10-CM

## 2022-09-19 DIAGNOSIS — E78.00 HYPERCHOLESTEROLEMIA: ICD-10-CM

## 2022-09-19 DIAGNOSIS — I10 ESSENTIAL HYPERTENSION, BENIGN: ICD-10-CM

## 2022-09-19 DIAGNOSIS — R09.89 BILATERAL CAROTID BRUITS: Primary | ICD-10-CM

## 2022-09-19 PROCEDURE — 99214 OFFICE O/P EST MOD 30 MIN: CPT | Performed by: INTERNAL MEDICINE

## 2022-09-19 PROCEDURE — 1123F ACP DISCUSS/DSCN MKR DOCD: CPT | Performed by: INTERNAL MEDICINE

## 2022-09-19 RX ORDER — PRAVASTATIN SODIUM 40 MG
40 TABLET ORAL DAILY
COMMUNITY
Start: 2022-08-20

## 2022-09-19 ASSESSMENT — ENCOUNTER SYMPTOMS
BLOOD IN STOOL: 0
WHEEZING: 0
RESPIRATORY NEGATIVE: 1
STRIDOR: 0
BACK PAIN: 1
NAUSEA: 0
COUGH: 0
EYES NEGATIVE: 1
SHORTNESS OF BREATH: 0
GASTROINTESTINAL NEGATIVE: 1
CHEST TIGHTNESS: 0

## 2022-09-19 NOTE — TELEPHONE ENCOUNTER
Rx request   Requested Prescriptions     Pending Prescriptions Disp Refills    NIFEdipine (PROCARDIA XL) 60 MG extended release tablet [Pharmacy Med Name: NIFEDIPINE 60MG ER (XL/OS) TABLETS] 90 tablet 3     Sig: TAKE 1 TABLET BY MOUTH DAILY     LOV Visit 1/7/22  Last refill 10/5/21  Next Visit Date:  Future Appointments   Date Time Provider Alexis Arango   9/23/2022 11:00 AM [employer], DO [address]   9/26/2022 10:45 AM [employer], DO [address]   10/3/2022  2:15 PM LUIS Castro  [facility]   10/18/2022 11:15 AM [facility] [place]   1/10/2023  9:00 AM Rita Thayer  S [address]   1/19/2023  1:30 PM Tatyana Gr MD [address] [place] [address]

## 2022-09-19 NOTE — PROGRESS NOTES
Subsequent Progress Note  Patient: Michael Blackburn  YOB: 1950  MRN: [id number]    Chief Complaint: htn cad lipid preop back   Chief Complaint   Patient presents with    Follow-up     4 month     Coronary Artery Disease       CV Data:  Prior CAD/Stentsx 2  10/17/2017 CABG x2 EF 55  10/2018  Stress echo EF 55  Persistent HyperK  7/2020 CUS mild   12/2020 GXT negative   10/21 CUS mild     Subjective/HPI: no cp no osb no falls noblled has back arthritis getting shots with some releif. 10/25/2019 No cp no sob no falls no bleed. Takes meds. Eating well. 2/26/2020 no cp no sob. Had extensive back SX. Did well. 6/26/2020 no cp no osb no falls no bleed. Takes meds. Walks and active no bleed    10/27/2020 pt will have ED surgery at [facility] next week. He is active and has no CP no SOB no falls no bleed. 1/6/21 no cp no sob no falls no bleed    5/7/21 no cp no sob no falls nob leed no edema K is always high 5.3 recently. Recently HR 40s and Metoprolol was backed off to Bid from prior tid.     9/10/21 doing well no cp no sob active taking meds. No falls. No bleed. 1/18/22 doing well no  Cp no spb not dizzy no bleed. Takes meds. Jagjit Michellee active     5/10/22 doping well no cp no sob nofalsl no bleed. BP at home good. Last OV BP good as well. Today little elevated- states he rushed In here. 9/19/22 doing very well no cp no sob no falls no bleed takes meds.      EKG: SR 71 RBBB    Past Medical History:   Diagnosis Date    Chronic back pain     Coronary artery disease 2002    Dr. Anitra Mcdonald at [facility]    Esophageal ulcer 3/10/2014    Dr. José Antonio Christiansen    Gastric ulcer 3/10/2014    Dr. José Antonio Christiansen    Gout     Hiatal hernia 3/10/2014    Dr. José Antonio Christiansen    Hyperlipidemia     Hypertension     Impotence 10/16/2020    MI (myocardial infarction) [facility]) 2005    Dr. Anitra Mcdonald    Osteoarthritis     Peripheral vascular disease ([facility]     Prediabetes     Schizo-affective schizophrenia, in remission ([facility] 2/1/2005 Overview:  [facility]    Severe single current episode of major depressive disorder, without psychotic features ([address] 7/8/2016    Status post coronary artery stent placement 2002    Dr. Andie Harris       Past Surgical History:   Procedure Laterality Date    [address] [facility]  01/03/2020    Dr. Tanesha Nassar Right 6/4/2019    RIGHT WRIST CARPAL TUNNEL RELEASE, SUPINE, PAT AT PCP performed by Orquidea Hines MD at [address]  3/10/14    DR Norma Man  2002    Dr. Deidra Bosworth GRAFT  10/17/2017    KNEE ARTHROSCOPY Left 2002    Dr. Rios Handler  12/19/13    CAUDAL BLOCKS DONE BY DR Eli Ojeda    OTHER SURGICAL HISTORY  04/2020    urolift Chad Mock  9/2009    Dr. Barbara Haro  2008    Dr. Ryan Clay  3/10/14    Jaylene Reddy, DR Levy San       Family History   Problem Relation Age of Onset    High Blood Pressure Mother     Arthritis Mother     Cancer Father         throat    Arthritis Father        Social History     Socioeconomic History    Marital status:    Occupational History    Occupation: disability    Tobacco Use    Smoking status: Never    Smokeless tobacco: Never   Vaping Use    Vaping Use: Never used   Substance and Sexual Activity    Alcohol use: No     Alcohol/week: 0.0 standard drinks     Comment: Stopped years ago. Drug use: No     Comment: YEARS AGO    Sexual activity: Yes     Partners: Female     Comment: monogomous sexual partner, wife. Social History Narrative    Tobacco -- never smoked or chewed. Alcohol -- have not used for past 10 years, prior to had consumed 6 pack of beer daily. Drugs -- tried marijuana and cocaine 14 years ago occasionally used for 3-4 years and then stopped completely 10 years ago. Education -- completed 11th grade in [place].    Occupation -- [employer]. work,  at [employer].    Marital status --  44 years, 2 grown sons. No Known Allergies    Current Outpatient Medications   Medication Sig Dispense Refill    pravastatin (PRAVACHOL) 40 MG tablet       oxyCODONE-acetaminophen (PERCOCET) 7.5-325 MG per tablet Take 1 tablet by mouth every 4 hours as needed for Pain (Max 90 tablets per month) for up to 30 days.  Intended supply: 30 days 90 tablet 0    gabapentin (NEURONTIN) 300 MG capsule TAKE 1 CAPSULE BY MOUTH EVERY MORNING THEN TAKE 2 CAPSULES BY MOUTH AT BEDTIME 270 capsule 0    baclofen (LIORESAL) 10 MG tablet TAKE 1 TABLET BY MOUTH THREE TIMES DAILY 90 tablet 1    testosterone cypionate (DEPOTESTOTERONE CYPIONATE) 200 MG/ML injection INJECT 0.5MLS INTO THE MUSCLE EVERY 2 WEEKS 10 mL 0    metoprolol tartrate (LOPRESSOR) 50 MG tablet TAKE 1 TABLET BY MOUTH THREE TIMES DAILY 270 tablet 2    naloxone 4 MG/0.1ML LIQD nasal spray 1 spray by Nasal route as needed for Opioid Reversal 1 each 5    atorvastatin (LIPITOR) 40 MG tablet Take 1 tablet by mouth daily 90 tablet 3    B-D 3CC LUER-JOSE SYR 88LQ2-2/2 22G X 1-1/2\" 3 ML MISC USE EVERY 2 WEEKS WITH TESTOSTERONE 20 each 6    colchicine (COLCRYS) 0.6 MG tablet TAKE 1 TABLET BY MOUTH DAILY DURING FLARE FOR GOUT FLARE 30 tablet 2    allopurinol (ZYLOPRIM) 100 MG tablet TAKE 1 TABLET BY MOUTH DAILY 90 tablet 3    oxyCODONE-acetaminophen (PERCOCET) 7.5-325 MG per tablet       imipramine (TOFRANIL) 25 MG tablet TAKE 1 TABLET BY MOUTH EVERY NIGHT 30 tablet 3    NIFEdipine (PROCARDIA XL) 60 MG extended release tablet TAKE 1 TABLET BY MOUTH DAILY 90 tablet 3    blood glucose test strips (FREESTYLE LITE) strip PATIENT TO TEST ONCE DAILY 100 strip 11    metFORMIN (GLUCOPHAGE) 500 MG tablet TAKE 1 TABLET BY MOUTH TWICE DAILY WITH MEALS 180 tablet 3    FreeStyle Lancets MISC PATIENT TO TEST ONCE DAILY 100 each 11     MG heard.  Pulmonary/Chest: Effort normal and breath sounds normal. He has no wheezes. He has no rales. He exhibits no tenderness. Abdominal: Soft. Bowel sounds are normal. There is no abdominal tenderness. Musculoskeletal:         General: No tenderness or edema. Normal range of motion. Cervical back: Normal range of motion and neck supple. Neurological: He is alert and oriented to person, place, and time. Skin: Skin is warm. No cyanosis. Nails show no clubbing.      LABS:  CBC:   Lab Results   Component Value Date/Time    WBC 8.7 06/18/2022 10:00 PM    RBC 4.71 06/18/2022 10:00 PM    HGB 15.3 06/18/2022 10:00 PM    HCT 45.6 06/18/2022 10:00 PM    MCV 96.9 06/18/2022 10:00 PM    MCH 32.5 06/18/2022 10:00 PM    MCHC 33.6 06/18/2022 10:00 PM    RDW 14.2 06/18/2022 10:00 PM     06/18/2022 10:00 PM    MPV 9.9 09/15/2015 09:02 AM     Lipids:  Lab Results   Component Value Date    CHOL 185 01/14/2022    CHOL 150 11/16/2020    CHOL 152 03/12/2018     Lab Results   Component Value Date    TRIG 122 01/14/2022    TRIG 96 11/16/2020    TRIG 157 03/12/2018     Lab Results   Component Value Date    HDL 45 01/14/2022    HDL 45 11/16/2020    HDL 42 05/24/2019     Lab Results   Component Value Date    LDLCALC 116 01/14/2022    LDLCALC 86 11/16/2020    LDLCALC 86 05/24/2019     No results found for: LABVLDL, VLDL  No results found for: CHOLHDLRATIO  CMP:    Lab Results   Component Value Date/Time     06/18/2022 10:00 PM    K 4.7 06/18/2022 10:00 PM     06/18/2022 10:00 PM    CO2 27 06/18/2022 10:00 PM    BUN 16 06/18/2022 10:00 PM    CREATININE 1.01 06/18/2022 10:00 PM    GFRAA >60.0 06/18/2022 10:00 PM    LABGLOM >60.0 06/18/2022 10:00 PM    GLUCOSE 131 07/12/2022 11:00 AM    GLUCOSE 115 10/27/2020 11:38 AM    PROT 7.9 06/18/2022 10:00 PM    LABALBU 4.8 06/18/2022 10:00 PM    LABALBU 4.4 10/27/2020 11:38 AM    CALCIUM 10.1 06/18/2022 10:00 PM    BILITOT 0.3 06/18/2022 10:00 PM    ALKPHOS 72 06/18/2022 Onychodystrophy    Old myocardial infarction    Long term (current) use of aspirin    Other chronic pain    Presence of aortocoronary bypass graft       There are no discontinued medications. Modified Medications    No medications on file       No orders of the defined types were placed in this encounter. Assessment/Plan:    1. Hypercholesterolemia  Statin - cotninue. Labs reviewed. Low fat diet. .. LDL not to goal.  adelina dc Prava and stat Atorva 40 qd.     2. Essential hypertension, benign   stable - continue meds. Low salt diet    3. Coronary artery disease with hx of myocardial infarct w/o hx of CABG  No angina - continue CV meds. 4. Carotid Bruits- CUS - was cancelled due to COVID-19. Will reschedule. -- stable with mild plaque - will continue surveillance    5. Preop ED - implant at -did well. Counseling:  Heart Healthy Lifestyle, Low Salt Diet, Take Precautions to Prevent Falls and Walk Daily    Return in about 4 months (around 1/19/2023).       Electronically signed by Daniele Elkins MD on 9/19/2022 at 1:43 PM

## 2022-09-22 RX ORDER — NIFEDIPINE 60 MG/1
TABLET, EXTENDED RELEASE ORAL
Qty: 90 TABLET | Refills: 3 | Status: SHIPPED | OUTPATIENT
Start: 2022-09-22

## 2022-09-23 ENCOUNTER — OFFICE VISIT (OUTPATIENT)
Dept: PHYSICAL MEDICINE AND REHAB | Age: 72
End: 2022-09-23
Payer: MEDICARE

## 2022-09-23 VITALS
BODY MASS INDEX: 25.27 KG/M2 | DIASTOLIC BLOOD PRESSURE: 70 MMHG | SYSTOLIC BLOOD PRESSURE: 138 MMHG | HEIGHT: 67 IN | OXYGEN SATURATION: 99 % | HEART RATE: 58 BPM | WEIGHT: 161 LBS

## 2022-09-23 DIAGNOSIS — M54.12 C6 RADICULOPATHY: ICD-10-CM

## 2022-09-23 DIAGNOSIS — G95.9 MYELOPATHY (HCC): ICD-10-CM

## 2022-09-23 DIAGNOSIS — M25.511 CHRONIC PAIN OF BOTH SHOULDERS: Primary | ICD-10-CM

## 2022-09-23 DIAGNOSIS — G89.29 CHRONIC PAIN OF BOTH SHOULDERS: Primary | ICD-10-CM

## 2022-09-23 DIAGNOSIS — M79.605 BILATERAL LEG PAIN: ICD-10-CM

## 2022-09-23 DIAGNOSIS — M54.16 RIGHT LUMBAR RADICULOPATHY: ICD-10-CM

## 2022-09-23 DIAGNOSIS — Z79.899 HIGH RISK MEDICATION USE: ICD-10-CM

## 2022-09-23 DIAGNOSIS — G95.9 SPINAL CORD DISEASE (HCC): ICD-10-CM

## 2022-09-23 DIAGNOSIS — M25.512 CHRONIC PAIN OF BOTH SHOULDERS: Primary | ICD-10-CM

## 2022-09-23 DIAGNOSIS — M79.604 BILATERAL LEG PAIN: ICD-10-CM

## 2022-09-23 PROCEDURE — 1123F ACP DISCUSS/DSCN MKR DOCD: CPT | Performed by: PHYSICAL MEDICINE & REHABILITATION

## 2022-09-23 PROCEDURE — 99215 OFFICE O/P EST HI 40 MIN: CPT | Performed by: PHYSICAL MEDICINE & REHABILITATION

## 2022-09-23 RX ORDER — PREDNISONE 10 MG/1
10 TABLET ORAL DAILY
Qty: 10 TABLET | Refills: 0 | Status: SHIPPED | OUTPATIENT
Start: 2022-09-23 | End: 2022-10-03

## 2022-09-23 RX ORDER — OXYCODONE AND ACETAMINOPHEN 10; 325 MG/1; MG/1
1 TABLET ORAL EVERY 6 HOURS PRN
Qty: 90 TABLET | Refills: 0 | Status: SHIPPED | OUTPATIENT
Start: 2022-09-23 | End: 2022-10-24 | Stop reason: SDUPTHER

## 2022-09-23 ASSESSMENT — ENCOUNTER SYMPTOMS
SORE THROAT: 0
CONSTIPATION: 1
STRIDOR: 0
COUGH: 0
NAUSEA: 0
DIARRHEA: 0
EYE PAIN: 0
SHORTNESS OF BREATH: 1
VOMITING: 0
BLOOD IN STOOL: 0
EYE REDNESS: 0
WHEEZING: 0

## 2022-09-24 DIAGNOSIS — Z79.899 HIGH RISK MEDICATION USE: ICD-10-CM

## 2022-09-24 LAB
AMPHETAMINE SCREEN, URINE: ABNORMAL
BARBITURATE SCREEN URINE: ABNORMAL
BENZODIAZEPINE SCREEN, URINE: ABNORMAL
CANNABINOID SCREEN URINE: ABNORMAL
COCAINE METABOLITE SCREEN URINE: ABNORMAL
FENTANYL SCREEN, URINE: ABNORMAL
Lab: ABNORMAL
METHADONE SCREEN, URINE: ABNORMAL
OPIATE SCREEN URINE: ABNORMAL
OXYCODONE URINE: POSITIVE
PHENCYCLIDINE SCREEN URINE: ABNORMAL
PROPOXYPHENE SCREEN: ABNORMAL

## 2022-09-26 ENCOUNTER — PROCEDURE VISIT (OUTPATIENT)
Dept: PHYSICAL MEDICINE AND REHAB | Age: 72
End: 2022-09-26
Payer: MEDICARE

## 2022-09-26 DIAGNOSIS — M77.8 SHOULDER TENDINITIS, RIGHT: Primary | ICD-10-CM

## 2022-09-26 DIAGNOSIS — M25.511 CHRONIC RIGHT SHOULDER PAIN: ICD-10-CM

## 2022-09-26 DIAGNOSIS — G89.29 CHRONIC RIGHT SHOULDER PAIN: ICD-10-CM

## 2022-09-26 PROCEDURE — 96372 THER/PROPH/DIAG INJ SC/IM: CPT | Performed by: PHYSICAL MEDICINE & REHABILITATION

## 2022-09-26 PROCEDURE — 20611 DRAIN/INJ JOINT/BURSA W/US: CPT | Performed by: PHYSICAL MEDICINE & REHABILITATION

## 2022-09-26 RX ORDER — LIDOCAINE HYDROCHLORIDE 20 MG/ML
2 INJECTION, SOLUTION INFILTRATION; PERINEURAL ONCE
Status: COMPLETED | OUTPATIENT
Start: 2022-09-26 | End: 2022-09-26

## 2022-09-26 RX ORDER — CYANOCOBALAMIN 1000 UG/ML
1000 INJECTION INTRAMUSCULAR; SUBCUTANEOUS ONCE
Status: COMPLETED | OUTPATIENT
Start: 2022-09-26 | End: 2022-09-26

## 2022-09-26 RX ORDER — LIDOCAINE HYDROCHLORIDE 10 MG/ML
7 INJECTION, SOLUTION INFILTRATION; PERINEURAL ONCE
Status: COMPLETED | OUTPATIENT
Start: 2022-09-26 | End: 2022-09-26

## 2022-09-26 RX ADMIN — LIDOCAINE HYDROCHLORIDE 2 ML: 20 INJECTION, SOLUTION INFILTRATION; PERINEURAL at 15:26

## 2022-09-26 RX ADMIN — CYANOCOBALAMIN 1000 MCG: 1000 INJECTION INTRAMUSCULAR; SUBCUTANEOUS at 15:25

## 2022-09-26 RX ADMIN — LIDOCAINE HYDROCHLORIDE 7 ML: 10 INJECTION, SOLUTION INFILTRATION; PERINEURAL at 15:25

## 2022-09-26 NOTE — PROGRESS NOTES
Patient here for a right shoulder injection with US guidance. Patient taken back to exam room and placed on covered locking exam stool. Right shoulder exposed, marked appropriately, and cleansed with alcohol. 2 cc of 2% lidocaine with 2 cc of kenalog and 7 cc of 1% lidocaine with 1 cc of cyanocobalamin to be administered by provider.

## 2022-10-18 ENCOUNTER — PROCEDURE VISIT (OUTPATIENT)
Dept: PHYSICAL MEDICINE AND REHAB | Age: 72
End: 2022-10-18
Payer: MEDICARE

## 2022-10-18 DIAGNOSIS — M89.8X1 PAIN OF RIGHT SCAPULA: Primary | ICD-10-CM

## 2022-10-18 PROCEDURE — 76942 ECHO GUIDE FOR BIOPSY: CPT | Performed by: PHYSICAL MEDICINE & REHABILITATION

## 2022-10-18 PROCEDURE — 64418 NJX AA&/STRD SPRSCAP NRV: CPT | Performed by: PHYSICAL MEDICINE & REHABILITATION

## 2022-10-18 PROCEDURE — 96372 THER/PROPH/DIAG INJ SC/IM: CPT | Performed by: PHYSICAL MEDICINE & REHABILITATION

## 2022-10-18 RX ORDER — CYANOCOBALAMIN 1000 UG/ML
1000 INJECTION INTRAMUSCULAR; SUBCUTANEOUS ONCE
Status: COMPLETED | OUTPATIENT
Start: 2022-10-18 | End: 2022-10-18

## 2022-10-18 RX ORDER — LIDOCAINE HYDROCHLORIDE 20 MG/ML
5 INJECTION, SOLUTION INFILTRATION; PERINEURAL ONCE
Status: COMPLETED | OUTPATIENT
Start: 2022-10-18 | End: 2022-10-18

## 2022-10-18 RX ORDER — LIDOCAINE HYDROCHLORIDE 10 MG/ML
7 INJECTION, SOLUTION INFILTRATION; PERINEURAL ONCE
Status: COMPLETED | OUTPATIENT
Start: 2022-10-18 | End: 2022-10-18

## 2022-10-18 RX ADMIN — LIDOCAINE HYDROCHLORIDE 7 ML: 10 INJECTION, SOLUTION INFILTRATION; PERINEURAL at 12:32

## 2022-10-18 RX ADMIN — CYANOCOBALAMIN 1000 MCG: 1000 INJECTION INTRAMUSCULAR; SUBCUTANEOUS at 12:32

## 2022-10-18 RX ADMIN — LIDOCAINE HYDROCHLORIDE 5 ML: 20 INJECTION, SOLUTION INFILTRATION; PERINEURAL at 12:33

## 2022-10-18 NOTE — PROGRESS NOTES
Patient here for right suprascapular block with US guidance. Patient taken to exam room and seated on draped locking stool with area to be injected exposed, marked appropriately, and cleansed with alcohol. 5 cc of 2% lidocaine an 7 cc of 1% xylocaine with 1 cc of cyanocobalamin to be administered by provider.

## 2022-10-19 ENCOUNTER — HOSPITAL ENCOUNTER (OUTPATIENT)
Dept: ULTRASOUND IMAGING | Age: 72
Discharge: HOME | End: 2022-10-21
Payer: MEDICARE

## 2022-10-19 DIAGNOSIS — M79.604 BILATERAL LEG PAIN: ICD-10-CM

## 2022-10-19 DIAGNOSIS — M79.605 BILATERAL LEG PAIN: ICD-10-CM

## 2022-10-19 DIAGNOSIS — R09.89 BILATERAL CAROTID BRUITS: ICD-10-CM

## 2022-10-19 DIAGNOSIS — M62.838 SPASM OF MUSCLE: ICD-10-CM

## 2022-10-19 DIAGNOSIS — I10 ESSENTIAL HYPERTENSION, BENIGN: Primary | ICD-10-CM

## 2022-10-19 PROCEDURE — 93880 EXTRACRANIAL BILAT STUDY: CPT

## 2022-10-19 PROCEDURE — 93880 EXTRACRANIAL BILAT STUDY: CPT | Performed by: INTERNAL MEDICINE

## 2022-10-19 RX ORDER — BACLOFEN 10 MG/1
TABLET ORAL
Qty: 90 TABLET | Refills: 1 | Status: SHIPPED | OUTPATIENT
Start: 2022-10-19

## 2022-10-19 NOTE — TELEPHONE ENCOUNTER
Requesting medication refill. Please approve or deny this request.    Rx requested:  Requested Prescriptions     Pending Prescriptions Disp Refills    atorvastatin (LIPITOR) 40 MG tablet [Pharmacy Med Name: ATORVASTATIN 40MG TABLETS] 90 tablet 3     Sig: TAKE 1 TABLET BY MOUTH DAILY         Last Office Visit:   9/19/2022      Next Visit Date:  Future Appointments   Date Time Provider Alexis Arango   11/8/2022 10:45 AM Eleno Grey, DO [address]   11/18/2022  2:45 PM LUIS Martinez [facility]   12/8/2022 11:00 AM Eleno Grey, DO [address]   1/10/2023  9:00 AM Reina Demarco  [address]   1/19/2023  1:30 PM Mitesh Vazquez  [facility]               Last refill 7/21/2022. Please approve or deny.

## 2022-10-21 RX ORDER — ATORVASTATIN CALCIUM 40 MG/1
40 TABLET, FILM COATED ORAL DAILY
Qty: 90 TABLET | Refills: 3 | Status: SHIPPED | OUTPATIENT
Start: 2022-10-21

## 2022-10-24 DIAGNOSIS — G95.9 SPINAL CORD DISEASE (HCC): ICD-10-CM

## 2022-10-24 DIAGNOSIS — Z79.899 HIGH RISK MEDICATION USE: ICD-10-CM

## 2022-10-24 DIAGNOSIS — M54.12 C6 RADICULOPATHY: ICD-10-CM

## 2022-10-24 DIAGNOSIS — M54.16 RIGHT LUMBAR RADICULOPATHY: ICD-10-CM

## 2022-10-24 DIAGNOSIS — G95.9 MYELOPATHY (HCC): ICD-10-CM

## 2022-10-24 RX ORDER — OXYCODONE AND ACETAMINOPHEN 10; 325 MG/1; MG/1
1 TABLET ORAL EVERY 6 HOURS PRN
Qty: 90 TABLET | Refills: 0 | Status: SHIPPED | OUTPATIENT
Start: 2022-10-24 | End: 2022-11-23

## 2022-11-08 ENCOUNTER — PROCEDURE VISIT (OUTPATIENT)
Dept: PHYSICAL MEDICINE AND REHAB | Age: 72
End: 2022-11-08
Payer: MEDICARE

## 2022-11-08 DIAGNOSIS — M79.10 MYALGIA: ICD-10-CM

## 2022-11-08 PROCEDURE — 20553 NJX 1/MLT TRIGGER POINTS 3/>: CPT | Performed by: PHYSICAL MEDICINE & REHABILITATION

## 2022-11-08 PROCEDURE — 96372 THER/PROPH/DIAG INJ SC/IM: CPT | Performed by: PHYSICAL MEDICINE & REHABILITATION

## 2022-11-09 RX ORDER — LIDOCAINE HYDROCHLORIDE 10 MG/ML
15 INJECTION, SOLUTION INFILTRATION; PERINEURAL ONCE
Status: COMPLETED | OUTPATIENT
Start: 2022-11-09 | End: 2022-11-09

## 2022-11-09 RX ORDER — CYANOCOBALAMIN 1000 UG/ML
1000 INJECTION, SOLUTION INTRAMUSCULAR; SUBCUTANEOUS ONCE
Status: COMPLETED | OUTPATIENT
Start: 2022-11-09 | End: 2022-11-09

## 2022-11-09 RX ADMIN — LIDOCAINE HYDROCHLORIDE 15 ML: 10 INJECTION, SOLUTION INFILTRATION; PERINEURAL at 13:18

## 2022-11-09 RX ADMIN — CYANOCOBALAMIN 1000 MCG: 1000 INJECTION, SOLUTION INTRAMUSCULAR; SUBCUTANEOUS at 13:18

## 2022-11-18 ENCOUNTER — OFFICE VISIT (OUTPATIENT)
Dept: FAMILY MEDICINE CLINIC | Age: 72
End: 2022-11-18
Payer: MEDICARE

## 2022-11-18 VITALS
WEIGHT: 161 LBS | HEART RATE: 78 BPM | HEIGHT: 67 IN | RESPIRATION RATE: 15 BRPM | TEMPERATURE: 96.7 F | DIASTOLIC BLOOD PRESSURE: 88 MMHG | SYSTOLIC BLOOD PRESSURE: 130 MMHG | OXYGEN SATURATION: 99 % | BODY MASS INDEX: 25.27 KG/M2

## 2022-11-18 DIAGNOSIS — E11.9 TYPE 2 DIABETES MELLITUS WITHOUT COMPLICATION, WITHOUT LONG-TERM CURRENT USE OF INSULIN (HCC): Primary | ICD-10-CM

## 2022-11-18 DIAGNOSIS — J06.9 ACUTE URI: ICD-10-CM

## 2022-11-18 DIAGNOSIS — I25.118 CORONARY ARTERY DISEASE OF NATIVE ARTERY OF NATIVE HEART WITH STABLE ANGINA PECTORIS (HCC): ICD-10-CM

## 2022-11-18 DIAGNOSIS — K59.03 THERAPEUTIC OPIOID-INDUCED CONSTIPATION (OIC): ICD-10-CM

## 2022-11-18 DIAGNOSIS — I10 ESSENTIAL HYPERTENSION, BENIGN: Primary | ICD-10-CM

## 2022-11-18 DIAGNOSIS — I10 ESSENTIAL HYPERTENSION, BENIGN: ICD-10-CM

## 2022-11-18 DIAGNOSIS — R09.89 BILATERAL CAROTID BRUITS: ICD-10-CM

## 2022-11-18 DIAGNOSIS — T40.2X5A THERAPEUTIC OPIOID-INDUCED CONSTIPATION (OIC): ICD-10-CM

## 2022-11-18 DIAGNOSIS — E29.1 HYPOGONADISM IN MALE: ICD-10-CM

## 2022-11-18 DIAGNOSIS — Z00.00 MEDICARE ANNUAL WELLNESS VISIT, SUBSEQUENT: Primary | ICD-10-CM

## 2022-11-18 PROCEDURE — G0439 PPPS, SUBSEQ VISIT: HCPCS | Performed by: PHYSICIAN ASSISTANT

## 2022-11-18 PROCEDURE — 1123F ACP DISCUSS/DSCN MKR DOCD: CPT | Performed by: PHYSICIAN ASSISTANT

## 2022-11-18 PROCEDURE — 3078F DIAST BP <80 MM HG: CPT | Performed by: PHYSICIAN ASSISTANT

## 2022-11-18 PROCEDURE — 3044F HG A1C LEVEL LT 7.0%: CPT | Performed by: PHYSICIAN ASSISTANT

## 2022-11-18 PROCEDURE — 99213 OFFICE O/P EST LOW 20 MIN: CPT | Performed by: PHYSICIAN ASSISTANT

## 2022-11-18 PROCEDURE — 3074F SYST BP LT 130 MM HG: CPT | Performed by: PHYSICIAN ASSISTANT

## 2022-11-18 RX ORDER — BENZONATATE 100 MG/1
100 CAPSULE ORAL EVERY 8 HOURS
Qty: 30 CAPSULE | Refills: 0 | Status: SHIPPED | OUTPATIENT
Start: 2022-11-18 | End: 2022-11-28

## 2022-11-18 RX ORDER — AMOXICILLIN AND CLAVULANATE POTASSIUM 875; 125 MG/1; MG/1
1 TABLET, FILM COATED ORAL 2 TIMES DAILY
Qty: 20 TABLET | Refills: 0 | Status: SHIPPED | OUTPATIENT
Start: 2022-11-18 | End: 2022-11-28

## 2022-11-18 RX ORDER — GABAPENTIN 300 MG/1
CAPSULE ORAL
Qty: 270 CAPSULE | Refills: 0 | Status: SHIPPED | OUTPATIENT
Start: 2022-11-18 | End: 2022-12-18

## 2022-11-18 RX ORDER — PRAVASTATIN SODIUM 40 MG
TABLET ORAL
Qty: 90 TABLET | Refills: 3 | Status: SHIPPED | OUTPATIENT
Start: 2022-11-18

## 2022-11-18 SDOH — ECONOMIC STABILITY: TRANSPORTATION INSECURITY
IN THE PAST 12 MONTHS, HAS THE LACK OF TRANSPORTATION KEPT YOU FROM MEDICAL APPOINTMENTS OR FROM GETTING MEDICATIONS?: NO

## 2022-11-18 SDOH — ECONOMIC STABILITY: TRANSPORTATION INSECURITY
IN THE PAST 12 MONTHS, HAS LACK OF TRANSPORTATION KEPT YOU FROM MEETINGS, WORK, OR FROM GETTING THINGS NEEDED FOR DAILY LIVING?: NO

## 2022-11-18 SDOH — ECONOMIC STABILITY: FOOD INSECURITY: WITHIN THE PAST 12 MONTHS, THE FOOD YOU BOUGHT JUST DIDN'T LAST AND YOU DIDN'T HAVE MONEY TO GET MORE.: NEVER TRUE

## 2022-11-18 SDOH — ECONOMIC STABILITY: FOOD INSECURITY: WITHIN THE PAST 12 MONTHS, YOU WORRIED THAT YOUR FOOD WOULD RUN OUT BEFORE YOU GOT MONEY TO BUY MORE.: NEVER TRUE

## 2022-11-18 ASSESSMENT — PATIENT HEALTH QUESTIONNAIRE - PHQ9
9. THOUGHTS THAT YOU WOULD BE BETTER OFF DEAD, OR OF HURTING YOURSELF: 0
7. TROUBLE CONCENTRATING ON THINGS, SUCH AS READING THE NEWSPAPER OR WATCHING TELEVISION: 0
2. FEELING DOWN, DEPRESSED OR HOPELESS: 0
5. POOR APPETITE OR OVEREATING: 0
SUM OF ALL RESPONSES TO PHQ QUESTIONS 1-9: 0
1. LITTLE INTEREST OR PLEASURE IN DOING THINGS: 0
8. MOVING OR SPEAKING SO SLOWLY THAT OTHER PEOPLE COULD HAVE NOTICED. OR THE OPPOSITE, BEING SO FIGETY OR RESTLESS THAT YOU HAVE BEEN MOVING AROUND A LOT MORE THAN USUAL: 0
SUM OF ALL RESPONSES TO PHQ9 QUESTIONS 1 & 2: 0
SUM OF ALL RESPONSES TO PHQ QUESTIONS 1-9: 0
3. TROUBLE FALLING OR STAYING ASLEEP: 0
10. IF YOU CHECKED OFF ANY PROBLEMS, HOW DIFFICULT HAVE THESE PROBLEMS MADE IT FOR YOU TO DO YOUR WORK, TAKE CARE OF THINGS AT HOME, OR GET ALONG WITH OTHER PEOPLE: 0
4. FEELING TIRED OR HAVING LITTLE ENERGY: 0
6. FEELING BAD ABOUT YOURSELF - OR THAT YOU ARE A FAILURE OR HAVE LET YOURSELF OR YOUR FAMILY DOWN: 0

## 2022-11-18 ASSESSMENT — ENCOUNTER SYMPTOMS
SINUS PAIN: 1
CONSTIPATION: 0
COUGH: 1
CHEST TIGHTNESS: 0
WHEEZING: 0
BLOOD IN STOOL: 0
ABDOMINAL DISTENTION: 0
COLOR CHANGE: 0
SHORTNESS OF BREATH: 0
SINUS PRESSURE: 1
VOMITING: 0
RECTAL PAIN: 0
ABDOMINAL PAIN: 0
BACK PAIN: 1
DIARRHEA: 0

## 2022-11-18 ASSESSMENT — SOCIAL DETERMINANTS OF HEALTH (SDOH): HOW HARD IS IT FOR YOU TO PAY FOR THE VERY BASICS LIKE FOOD, HOUSING, MEDICAL CARE, AND HEATING?: NOT HARD AT ALL

## 2022-11-18 ASSESSMENT — LIFESTYLE VARIABLES
HOW OFTEN DO YOU HAVE A DRINK CONTAINING ALCOHOL: NEVER
HOW MANY STANDARD DRINKS CONTAINING ALCOHOL DO YOU HAVE ON A TYPICAL DAY: PATIENT DOES NOT DRINK

## 2022-11-18 NOTE — PROGRESS NOTES
Loyd, 80 y.o. male presents today with:  Chief Complaint   Patient presents with    Follow-up    Congestion     X 1 week; some body aches and no fever, runny nose        HPI  Last OV with me: 1/7/22. C/o 9 day history of sinus congestion and cough. Cough seems to be worse at night. Does not seem to be improving. No fever. Sputum is green. Bradycardia/CAD/HTN. Denies CP, GUILLORY, SOB. Currently taking 50 mg metoprolol BID. Patient had exercise stress test on 12/30/2020--had a peak HR of 155 BPM.    EKG 10/2020 showed rate of 66 BPM.   Recently had carotid US. Stable stenosis. Intermittent dizziness. Low testosterone/prediabetes. Managed by endocrinology. Continue with testosterone q 14 days. No side effects from medication. Checks blood sugars 1-2 times/day. Fasting 100 or less. No hypo/hyperglycemic episodes. Has upcoming follow up with Dr. [place] Elias. Gout. Stable on allopurinol. Denies any recent flares or unilateral joint swelling/erythema. Opioid induced constipation. Manageable at this time with regimen of warm milk of mag and stool softener. BM every other day, soft, well-formed. Denies abdominal bloating/pain. This has improved. Chronic back pain. Followed by PMR. Receives injections with Dr. Hui Tellez. No fall/injury/trauma. Pain is controlled with injections, medication. Review of Systems   Constitutional:  Negative for activity change, appetite change, chills, diaphoresis and fatigue. HENT:  Positive for congestion, sinus pressure and sinus pain. Eyes:  Negative for visual disturbance. Respiratory:  Positive for cough. Negative for chest tightness, shortness of breath and wheezing. Cardiovascular:  Negative for chest pain, palpitations and leg swelling. Gastrointestinal:  Negative for abdominal distention, abdominal pain, blood in stool, constipation (improved), diarrhea, rectal pain and vomiting. Musculoskeletal:  Positive for arthralgias and back pain (improved since surgery). Negative for gait problem and myalgias. Skin:  Negative for color change, rash and wound. Neurological:  Negative for dizziness, weakness, light-headedness, numbness and headaches. Psychiatric/Behavioral:  Negative for dysphoric mood and sleep disturbance. The patient is not nervous/anxious.       Past Medical History:   Diagnosis Date    Chronic back pain     Coronary artery disease 2002    Dr. Kanwal Vega at [facility]    Esophageal ulcer 3/10/2014    Dr. Jb Rowe    Gastric ulcer 3/10/2014    Dr. Jb Rowe    Gout     Hiatal hernia 3/10/2014    Dr. Jb Rowe    Hyperlipidemia     Hypertension     Impotence 10/16/2020    MI (myocardial infarction) [facility]) 2005    Dr. Kanwal Vega    Osteoarthritis     Peripheral vascular disease ([address]     Prediabetes     Schizo-affective schizophrenia, in remission ([address] 2/1/2005    Overview:  followed at the [facility]    Severe single current episode of major depressive disorder, without psychotic features ([address] 7/8/2016    Status post coronary artery stent placement 2002    Dr. Kanwal Vega     Past Surgical History:   Procedure Laterality Date    [address] [facility]  01/03/2020    Dr. Meg Celaya Right 6/4/2019    RIGHT WRIST CARPAL TUNNEL RELEASE, SUPINE, PAT AT PCP performed by Diamond Mojica MD at [facility]  3/10/14    DR Timmy Bowling  2002    Dr. Key Molina  10/17/2017    KNEE ARTHROSCOPY Left 2002    Dr. Cristina Burr  12/19/13    CAUDAL BLOCKS DONE BY DR Jenna Martins    OTHER SURGICAL HISTORY  04/2020    urolift      SPINE SURGERY  9/2009    Dr. Subhash Cherry  2008    Dr. Bosch Sportsman  3/10/14    [address] [place] History     Socioeconomic History    Marital status:      Spouse name: Not on file    Number of children: Not on file    Years of education: Not on file    Highest education level: Not on file   Occupational History    Occupation: disability    Tobacco Use    Smoking status: Never    Smokeless tobacco: Never   Vaping Use    Vaping Use: Never used   Substance and Sexual Activity    Alcohol use: No     Alcohol/week: 0.0 standard drinks     Comment: Stopped years ago. Drug use: No     Comment: YEARS AGO    Sexual activity: Yes     Partners: Female     Comment: monogomous sexual partner, wife. Other Topics Concern    Not on file   Social History Narrative    Tobacco -- never smoked or chewed. Alcohol -- have not used for past 10 years, prior to had consumed 6 pack of beer daily. Drugs -- tried marijuana and cocaine 14 years ago occasionally used for 3-4 years and then stopped completely 10 years ago. Education -- completed 11th grade in [employer].    Occupation -- [employer]. work,  at [employer].    Marital status --  44 years, 2 grown sons. Social Determinants of Health     Financial Resource Strain: Low Risk     Difficulty of Paying Living Expenses: Not hard at all   Food Insecurity: No Food Insecurity    Worried About [address] [employer] in the Last Year: Never true    [facility] in the Last Year: Never true   Transportation Needs: No Transportation Needs    Lack of Transportation (Medical): No    Lack of Transportation (Non-Medical):  No   Physical Activity: Inactive    Days of Exercise per Week: 0 days    Minutes of Exercise per Session: 0 min   Stress: Not on file   Social Connections: Not on file   Intimate Partner Violence: Not on file   Housing Stability: Not on file     Family History   Problem Relation Age of Onset    High Blood Pressure Mother     Arthritis Mother     Cancer Father         throat    Arthritis Father      No Known Allergies  Current Outpatient Medications   Medication Sig Dispense Refill    pravastatin (PRAVACHOL) 40 MG tablet TAKE 1 TABLET BY MOUTH EVERY DAY 90 tablet 3    gabapentin (NEURONTIN) 300 MG capsule TAKE 1 CAPSULE BY MOUTH EVERY MORNING THEN TAKE 2 CAPSULES BY MOUTH AT BEDTIME 270 capsule 0    amoxicillin-clavulanate (AUGMENTIN) 875-125 MG per tablet Take 1 tablet by mouth 2 times daily for 10 days 20 tablet 0    benzonatate (TESSALON PERLES) 100 MG capsule Take 1 capsule by mouth in the morning and 1 capsule at noon and 1 capsule in the evening. Do all this for 10 days. 30 capsule 0    oxyCODONE-acetaminophen (PERCOCET)  MG per tablet Take 1 tablet by mouth every 6 hours as needed for Pain for up to 30 days.  Intended supply: 30 days 90 tablet 0    atorvastatin (LIPITOR) 40 MG tablet TAKE 1 TABLET BY MOUTH DAILY 90 tablet 3    baclofen (LIORESAL) 10 MG tablet TAKE 1 TABLET BY MOUTH THREE TIMES DAILY 90 tablet 1    NIFEdipine (PROCARDIA XL) 60 MG extended release tablet TAKE 1 TABLET BY MOUTH DAILY 90 tablet 3    metFORMIN (GLUCOPHAGE) 500 MG tablet TAKE 1 TABLET BY MOUTH TWICE DAILY WITH MEALS 180 tablet 3    metoprolol tartrate (LOPRESSOR) 50 MG tablet TAKE 1 TABLET BY MOUTH THREE TIMES DAILY 270 tablet 2    B-D 3CC LUER-JOSE SYR 75IP7-4/2 22G X 1-1/2\" 3 ML MISC USE EVERY 2 WEEKS WITH TESTOSTERONE 20 each 6    colchicine (COLCRYS) 0.6 MG tablet TAKE 1 TABLET BY MOUTH DAILY DURING FLARE FOR GOUT FLARE 30 tablet 2    allopurinol (ZYLOPRIM) 100 MG tablet TAKE 1 TABLET BY MOUTH DAILY 90 tablet 3    imipramine (TOFRANIL) 25 MG tablet TAKE 1 TABLET BY MOUTH EVERY NIGHT 30 tablet 3    blood glucose test strips (FREESTYLE LITE) strip PATIENT TO TEST ONCE DAILY 100 strip 11    FreeStyle Lancets MISC PATIENT TO TEST ONCE DAILY 100 each 11     MG capsule TK ONE C PO  BID      Alcohol Swabs (ALCOHOL PREP) 70 % PADS Patient to test once daily E11.9 100 each 11    glucose monitoring kit (FREESTYLE) monitoring kit 1 kit by Does not apply route daily 1 kit 0    nitroGLYCERIN (NITROSTAT) 0.4 MG SL tablet Place 1 tablet under the tongue every 5 minutes as needed x 3 doses for chest pain. 25 tablet 2    vitamin D (CHOLECALCIFEROL) 25 MCG (1000 UT) TABS tablet Take 1,000 Units by mouth daily      tamsulosin (FLOMAX) 0.4 MG capsule TAKE 1 CAPSULE DAILY AT BEDTIME      CPAP Machine MISC by NOT APPLICABLE route      aspirin 81 MG EC tablet Take 1 tablet by mouth daily 90 tablet 1    testosterone cypionate (DEPOTESTOTERONE CYPIONATE) 200 MG/ML injection INJECT 0.5MLS INTO THE MUSCLE EVERY 2 WEEKS 10 mL 0    naloxone 4 MG/0.1ML LIQD nasal spray 1 spray by Nasal route as needed for Opioid Reversal (Patient not taking: No sig reported) 1 each 5     No current facility-administered medications for this visit. PMH, Surgical Hx, Family Hx, and Social Hx reviewed and updated. Health Maintenance reviewed. Objective  Vitals:    11/18/22 1501   BP: 130/88   Site: Left Upper Arm   Position: Sitting   Cuff Size: Medium Adult   Pulse: 78   Resp: 15   Temp: (!) 96.7 °F (35.9 °C)   TempSrc: Temporal   SpO2: 99%   Weight: 161 lb (73 kg)   Height: 5' 7\" (1.702 m)     BP Readings from Last 3 Encounters:   11/18/22 130/88   09/23/22 138/70   09/19/22 124/70     Wt Readings from Last 3 Encounters:   11/18/22 161 lb (73 kg)   09/23/22 161 lb (73 kg)   09/19/22 161 lb (73 kg)     Physical Exam  Vitals reviewed. Constitutional:       General: He is not in acute distress. Appearance: Normal appearance. He is normal weight. He is not ill-appearing or toxic-appearing. HENT:      Head: Normocephalic and atraumatic. Right Ear: External ear normal.      Left Ear: External ear normal.      Nose: Congestion present. Right Sinus: Maxillary sinus tenderness present. Left Sinus: Maxillary sinus tenderness present.       Mouth/Throat:      Mouth: Mucous membranes are moist.   Eyes:      Conjunctiva/sclera: Conjunctivae normal. Cardiovascular:      Rate and Rhythm: Normal rate and regular rhythm. Pulmonary:      Effort: Pulmonary effort is normal.      Breath sounds: Normal breath sounds. No wheezing or rhonchi. Abdominal:      General: There is no distension. Palpations: Abdomen is soft. There is no mass. Tenderness: There is no abdominal tenderness. There is no guarding. Hernia: No hernia is present. Musculoskeletal:         General: No swelling or tenderness. Skin:     General: Skin is warm and dry. Neurological:      General: No focal deficit present. Mental Status: He is alert and oriented to person, place, and time. Psychiatric:         Mood and Affect: Mood normal.         Behavior: Behavior normal.         Thought Content: Thought content normal.         Judgment: Judgment normal.     Assessment & Plan   Vijaya ACUÑA was seen today for follow-up and congestion. Diagnoses and all orders for this visit:    Type 2 diabetes mellitus without complication, without long-term current use of insulin (HCC)  -     Microalbumin / Creatinine Urine Ratio; Future  -     CBC; Future  -     Comprehensive Metabolic Panel; Future    Essential hypertension, benign  -     CBC; Future  -     Comprehensive Metabolic Panel; Future    Coronary artery disease of native artery of native heart with stable angina pectoris (HCC)    Bilateral carotid bruits    Therapeutic opioid-induced constipation (OIC)    Hypogonadism in male    Acute URI  -     amoxicillin-clavulanate (AUGMENTIN) 875-125 MG per tablet; Take 1 tablet by mouth 2 times daily for 10 days  -     benzonatate (TESSALON PERLES) 100 MG capsule; Take 1 capsule by mouth in the morning and 1 capsule at noon and 1 capsule in the evening. Do all this for 10 days.     Orders Placed This Encounter   Procedures    Microalbumin / Creatinine Urine Ratio     Standing Status:   Future     Standing Expiration Date:   11/18/2023    CBC     Standing Status:   Future     Standing Expiration Date:   11/18/2023    Comprehensive Metabolic Panel     Standing Status:   Future     Standing Expiration Date:   11/18/2023     Orders Placed This Encounter   Medications    amoxicillin-clavulanate (AUGMENTIN) 875-125 MG per tablet     Sig: Take 1 tablet by mouth 2 times daily for 10 days     Dispense:  20 tablet     Refill:  0    benzonatate (TESSALON PERLES) 100 MG capsule     Sig: Take 1 capsule by mouth in the morning and 1 capsule at noon and 1 capsule in the evening. Do all this for 10 days. Dispense:  30 capsule     Refill:  0     There are no discontinued medications. Return in about 6 months (around 5/18/2023) for follow up in office. Reviewed with the patient: current clinical status, medications, activities and diet. Side effects, adverse effects of the medication prescribed today, as well as treatment plan/ rationale and result expectations have been discussed with the patient who expresses understanding and desires to proceed. Close follow up to evaluate treatment results and for coordination of care. I have reviewed the patient's medical history in detail and updated the computerized patient record.     Sheryl Tellez PA-C

## 2022-11-18 NOTE — PATIENT INSTRUCTIONS
Personalized Preventive Plan for Allie Marshall - 11/18/2022  Medicare offers a range of preventive health benefits. Some of the tests and screenings are paid in full while other may be subject to a deductible, co-insurance, and/or copay. Some of these benefits include a comprehensive review of your medical history including lifestyle, illnesses that may run in your family, and various assessments and screenings as appropriate. After reviewing your medical record and screening and assessments performed today your provider may have ordered immunizations, labs, imaging, and/or referrals for you. A list of these orders (if applicable) as well as your Preventive Care list are included within your After Visit Summary for your review. Other Preventive Recommendations:    A preventive eye exam performed by an eye specialist is recommended every 1-2 years to screen for glaucoma; cataracts, macular degeneration, and other eye disorders. A preventive dental visit is recommended every 6 months. Try to get at least 150 minutes of exercise per week or 10,000 steps per day on a pedometer . Order or download the FREE \"Exercise & Physical Activity: Your Everyday Guide\" from [employer]. Call 0-119.657.9728 or search [employer] online. You need 3655-4117 mg of calcium and 7081-8793 IU of vitamin D per day. It is possible to meet your calcium requirement with diet alone, but a vitamin D supplement is usually necessary to meet this goal.  When exposed to the sun, use a sunscreen that protects against both UVA and UVB radiation with an SPF of 30 or greater. Reapply every 2 to 3 hours or after sweating, drying off with a towel, or swimming. Always wear a seat belt when traveling in a car. Always wear a helmet when riding a bicycle or motorcycle.

## 2022-11-18 NOTE — PROGRESS NOTES
Medicare Annual Wellness Visit    Michael Jerry is here for Medicare AWV    Assessment & Plan   Medicare annual wellness visit, subsequent    Recommendations for Preventive Services Due: see orders and patient instructions/AVS.  Recommended screening schedule for the next 5-10 years is provided to the patient in written form: see Patient Instructions/AVS.     Return for Medicare Annual Wellness Visit in 1 year. Subjective     Patient's complete Health Risk Assessment and screening values have been reviewed and are found in Flowsheets. The following problems were reviewed today and where indicated follow up appointments were made and/or referrals ordered. Positive Risk Factor Screenings with Interventions:    Fall Risk:  Do you feel unsteady or are you worried about falling? : no  2 or more falls in past year?: (!) yes  Fall with injury in past year?: no   Fall Risk Interventions:    Patient declines any further evaluation/treatment for this issue            Opioid Risk: (Low risk score <55) Opioid risk score: 31    Patient is low risk for opioid use disorder or overdose. Last PDMP Conchita Mcgowan as Reviewed:  Review User Review Instant Review Result   Navya Goodwin 11/14/2022  2:19 PM     Reviewed PDMP [1]     Last Controlled Substance Monitoring Documentation      [facility] Visit from 10/20/2021 in [facility]   Periodic Controlled Substance Monitoring Possible medication side effects, risk of tolerance/dependence & alternative treatments discussed., No signs of potential drug abuse or diversion identified. , Assessed functional status., Obtaining appropriate analgesic effect of treatment. filed at 10/17/2021 0721   Acute Pain Prescriptions Prescription exceeds daily limit for a specific reason. See comments or note., Not required given exclusionary diagnoses. .., Severe pain not adequately treated with lower dose.  filed at 10/17/2021 8218   Chronic Pain > 50 MEDD Re-evaluated the status of the patient's underlying condition causing pain. , Considered consultation with a specialist., Obtained or confirmed \"Consent for Opioid Use\" on file. filed at 09/23/2021 [address] and ACP:  General  In general, how would you say your health is?: Good  In the past 7 days, have you experienced any of the following: New or Increased Pain, New or Increased Fatigue, Loneliness, Social Isolation, Stress or Anger?: No  Do you get the social and emotional support that you need?: Yes  Do you have a Living Will?: (!) No    Advance Directives       Power of [facility] Will ACP-Advance Directive ACP-Power of     Not on File Not on File Not on File Not on File          General Health Risk Interventions:  No Living Will: Patient referred to [place] Habits/Nutrition:  Physical Activity: Inactive    Days of Exercise per Week: 0 days    Minutes of Exercise per Session: 0 min     Have you lost any weight without trying in the past 3 months?: No     Have you seen the dentist within the past year?: Yes  Health Habits/Nutrition Interventions:  Inadequate physical activity:  stays very active at home with yard/housework    Hearing/Vision:  Do you or your family notice any trouble with your hearing that hasn't been managed with hearing aids?: No  Do you have difficulty driving, watching TV, or doing any of your daily activities because of your eyesight?: (!) Yes  Have you had an eye exam within the past year?: Yes  No results found.   Hearing/Vision Interventions:  Vision concerns:  patient declines any further evaluation/treatment for this issue    Safety:  Do you have working smoke detectors?: Yes  Do you have any tripping hazards - loose or unsecured carpets or rugs?: No  Do you have any tripping hazards - clutter in doorways, halls, or stairs?: No  Do you have either shower bars, grab bars, non-slip mats or non-slip surfaces in your shower or tablet TAKE 1 TABLET BY MOUTH EVERY NIGHT  Lorene Lu MD   blood glucose test strips (FREESTYLE LITE) strip PATIENT TO TEST ONCE DAILY  Supriya Hensley MD   FreeStyle Lancets MISC PATIENT TO TEST ONCE DAILY  Supriya Hensley MD    MG capsule TK ONE C PO  BID  Historical Provider, MD   Alcohol Swabs (ALCOHOL PREP) 70 % PADS Patient to test once daily E11.9  Sheryl Tellez PA-C   glucose monitoring kit (FREESTYLE) monitoring kit 1 kit by Does not apply route daily  Sheryl Tellez PA-C   nitroGLYCERIN (NITROSTAT) 0.4 MG SL tablet Place 1 tablet under the tongue every 5 minutes as needed x 3 doses for chest pain.   Sheryl Tellez PA-C   vitamin D (CHOLECALCIFEROL) 25 MCG (1000 UT) TABS tablet Take 1,000 Units by mouth daily  Historical Provider, MD   tamsulosin (FLOMAX) 0.4 MG capsule TAKE 1 CAPSULE DAILY AT BEDTIME  Historical Provider, MD   CPAP Machine MISC by NOT APPLICABLE route  Historical Provider, MD   aspirin 81 MG EC tablet Take 1 tablet by mouth daily  Sheryl Tellez PA-C       CareTeam (Including outside providers/suppliers regularly involved in providing care):   Patient Care Team:  Lester Christine PA-C as PCP - General (Family Medicine)  Lester Christine PA-C as PCP [facility]  Devin Francis DO (Physical Medicine and Rehab)  Lorene Lu MD (Endocrinology)  Ramon Whitney MD (Gastroenterology)  Mari Alcala MD as Cardiologist (Cardiology)     Reviewed and updated this visit:

## 2022-11-21 ENCOUNTER — CLINICAL DOCUMENTATION (OUTPATIENT)
Dept: SPIRITUAL SERVICES | Age: 72
End: 2022-11-21

## 2022-11-22 DIAGNOSIS — I10 ESSENTIAL HYPERTENSION, BENIGN: ICD-10-CM

## 2022-11-22 RX ORDER — ATORVASTATIN CALCIUM 40 MG/1
40 TABLET, FILM COATED ORAL DAILY
Qty: 90 TABLET | Refills: 3 | Status: SHIPPED | OUTPATIENT
Start: 2022-11-22

## 2022-11-22 NOTE — TELEPHONE ENCOUNTER
adelina dc Prava and stat Atorva 40 qd.  This message is in the last OV note-patient called to have Atorvastatin refilled/ Pravastatin was refilled-please amend patient's medication list

## 2022-11-23 DIAGNOSIS — E29.1 HYPOGONADISM MALE: ICD-10-CM

## 2022-11-23 DIAGNOSIS — I10 ESSENTIAL HYPERTENSION, BENIGN: ICD-10-CM

## 2022-11-23 DIAGNOSIS — E11.9 TYPE 2 DIABETES MELLITUS WITHOUT COMPLICATION, WITHOUT LONG-TERM CURRENT USE OF INSULIN (HCC): ICD-10-CM

## 2022-11-23 LAB
ALBUMIN SERPL-MCNC: 4.4 G/DL (ref 3.5–4.6)
ALP BLD-CCNC: 67 U/L (ref 35–104)
ALT SERPL-CCNC: 21 U/L (ref 0–41)
ANION GAP SERPL CALCULATED.3IONS-SCNC: 10 MEQ/L (ref 9–15)
AST SERPL-CCNC: 21 U/L (ref 0–40)
BILIRUB SERPL-MCNC: 0.5 MG/DL (ref 0.2–0.7)
BUN BLDV-MCNC: 13 MG/DL (ref 8–23)
CALCIUM SERPL-MCNC: 9.7 MG/DL (ref 8.5–9.9)
CHLORIDE BLD-SCNC: 99 MEQ/L (ref 95–107)
CO2: 32 MEQ/L (ref 20–31)
CREAT SERPL-MCNC: 0.93 MG/DL (ref 0.7–1.2)
CREATININE URINE: 119.5 MG/DL
GFR SERPL CREATININE-BSD FRML MDRD: >60 ML/MIN/{1.73_M2}
GLOBULIN: 3 G/DL (ref 2.3–3.5)
GLUCOSE BLD-MCNC: 116 MG/DL (ref 70–99)
HBA1C MFR BLD: 6.4 % (ref 4.8–5.9)
HCT VFR BLD CALC: 45.5 % (ref 42–52)
HEMOGLOBIN: 15 G/DL (ref 14–18)
MCH RBC QN AUTO: 32.5 PG (ref 27–31.3)
MCHC RBC AUTO-ENTMCNC: 33 % (ref 33–37)
MCV RBC AUTO: 98.5 FL (ref 79–92.2)
MICROALBUMIN UR-MCNC: <1.2 MG/DL
MICROALBUMIN/CREAT UR-RTO: NORMAL MG/G (ref 0–30)
PDW BLD-RTO: 14.4 % (ref 11.5–14.5)
PLATELET # BLD: 299 K/UL (ref 130–400)
POTASSIUM SERPL-SCNC: 5.2 MEQ/L (ref 3.4–4.9)
RBC # BLD: 4.62 M/UL (ref 4.7–6.1)
SEX HORMONE BINDING GLOBULIN: 48 NMOL/L (ref 11–80)
SODIUM BLD-SCNC: 141 MEQ/L (ref 135–144)
TESTOSTERONE FREE-NONMALE: 79.6 PG/ML (ref 47–244)
TESTOSTERONE TOTAL: 486 NG/DL (ref 220–1000)
TOTAL PROTEIN: 7.4 G/DL (ref 6.3–8)
WBC # BLD: 5.7 K/UL (ref 4.8–10.8)

## 2022-12-07 ASSESSMENT — ENCOUNTER SYMPTOMS
BOWEL INCONTINENCE: 0
BACK PAIN: 1
TROUBLE SWALLOWING: 0
ABDOMINAL PAIN: 0
VISUAL CHANGE: 0
PHOTOPHOBIA: 0

## 2022-12-07 NOTE — PROGRESS NOTES
Loyd, 71 y.o. male presents today with:       Back Pain  Trigger point 11/18/22 80% reduction in pain lasting 2months       Shoulder Pain Right. Right scapular injection 10/18/22 70% reduction in pain still ongoing. Right shoulder injection 09/26/22 70% reduction in pain lasting still ongoing. Leg Pain bilateral       Foot Pain bilateral       Neck Pain        Medication Refill Medication count today compliace      He will need a refill while I am gone on vacation I have asked the MA to postdate a Percocet refill. He is okay to get it when he is due which will be when I am out of town no signs of overuse abuse he is never getting early fills. He is doing well with combining it with Percocet with gabapentin and baclofen as well as vitamin D. He is able to minimize his dosing with trigger points monthly. Recent pain flareup diagnoses -trialed OT myofascial release which helped temporarily but is not continuing to help his pain. We discussed Medrol Dosepak versus daily prednisone for a while and transitioning to a slightly higher dose of Percocet while continuing vitamin D gabapentin and baclofen. Recent injections helped--medications are not as helpful. Injections still help very much. He would like to schedule monthly trigger point-no longer needing sciatic blocks. Back Pain  This is a chronic problem. The current episode started more than 1 year ago. The problem occurs daily. The problem is unchanged. The pain is present in the lumbar spine. The quality of the pain is described as aching, cramping, shooting and stabbing. The pain radiates to the right foot, right knee and right thigh. The pain is at a severity of 9/10. The pain is severe. The pain is Worse during the day. The symptoms are aggravated by bending, lying down, position, sitting, standing and twisting. Stiffness is present In the morning. Associated symptoms include leg pain, numbness and weakness.  Pertinent negatives include no abdominal pain, bladder incontinence, bowel incontinence, chest pain, dysuria, fever, headaches, paresis, perianal numbness or tingling. Risk factors include lack of exercise and sedentary lifestyle. He has tried analgesics, home exercises, NSAIDs, muscle relaxant, heat and walking (SI injections which help, trigger point injections, OXY-IR ) for the symptoms. The treatment provided mild relief. Shoulder Pain   This is a chronic problem. The pain is at a severity of 9/10. Associated symptoms include numbness. Pertinent negatives include no fever or tingling. The symptoms are aggravated by contact. He has tried NSAIDS, OTC ointments, OTC pain meds, rest, oral narcotics and heat for the symptoms. The treatment provided mild relief. Leg Pain   Associated symptoms include numbness. Pertinent negatives include no muscle weakness or tingling. Foot Pain   This is a chronic problem. The current episode started more than 1 year ago. Associated symptoms include numbness. Pertinent negatives include no fever or tingling. Neck Pain   This is a recurrent problem. The current episode started more than 1 month ago. The problem occurs constantly. The problem has been gradually worsening. The pain is present in the occipital region. The quality of the pain is described as aching. The pain is at a severity of 6/10. The pain is moderate. The symptoms are aggravated by twisting. The pain is Same all the time. Stiffness is present In the morning. Associated symptoms include leg pain, numbness and weakness. Pertinent negatives include no chest pain, fever, headaches, pain with swallowing, paresis, photophobia, syncope, tingling, trouble swallowing or visual change. He has tried heat, acetaminophen, bed rest, home exercises, muscle relaxants, neck support, ice, NSAIDs and oral narcotics for the symptoms. The treatment provided moderate relief.      Past Medical History:   Diagnosis Date    Chronic back pain Coronary artery disease 2002    Dr. Judah Rosado at [place] [id number]    Esophageal ulcer 3/10/2014    Dr. Daya Coko    Gastric ulcer 3/10/2014    Dr. Daya Cook    Gout     Hiatal hernia 3/10/2014    Dr. Daya Cook    Hyperlipidemia     Hypertension     Impotence 10/16/2020    MI (myocardial infarction) [facility]) 2005    Dr. Judah Rosado    Osteoarthritis     Peripheral vascular disease ([facility] [address]     Prediabetes     Schizo-affective schizophrenia, in remission ([address] 2/1/2005    Overview:  followed at the [facility]    Severe single current episode of major depressive disorder, without psychotic features ([address] 7/8/2016    Status post coronary artery stent placement 2002    Dr. Judah Rosado     Past Surgical History:   Procedure Laterality Date    [address]  01/03/2020    Dr. La Haider Right 6/4/2019    RIGHT WRIST CARPAL TUNNEL RELEASE, SUPINE, PAT AT PCP performed by Vianca Michael MD at [address]  3/10/14    DR Ryan Ulrich  2002    Dr. Lasha Cross GRAFT  10/17/2017    KNEE ARTHROSCOPY Left 2002    Dr. Marleny Iqbal  12/19/13    CAUDAL BLOCKS DONE BY DR Lamar Zhou    OTHER SURGICAL HISTORY  04/2020    [employer]  9/2009    Dr. Elsa Kay  2008    Dr. Miguel Paige  3/10/14    Eden Bailey, DR Amara Mcneil     Social History     Socioeconomic History    Marital status:    Occupational History    Occupation: disability    Tobacco Use    Smoking status: Never    Smokeless tobacco: Never   Vaping Use    Vaping Use: Never used   Substance and Sexual Activity    Alcohol use: No     Alcohol/week: 0.0 standard drinks     Comment: Stopped years ago.     Drug use: No     Comment: YEARS AGO    Sexual activity: Yes     Partners: Female     Comment: monogomous sexual partner, wife. Social History Narrative    Tobacco -- never smoked or chewed. Alcohol -- have not used for past 10 years, prior to had consumed 6 pack of beer daily. Drugs -- tried marijuana and cocaine 14 years ago occasionally used for 3-4 years and then stopped completely 10 years ago. Education -- completed 11th grade in [employer].    Occupation -- [address] work,  at [employer].    Marital status --  44 years, 2 grown sons. Social Determinants of Health     Financial Resource Strain: Low Risk     Difficulty of Paying Living Expenses: Not hard at all   Food Insecurity: No Food Insecurity    Worried About [employer] in the Last Year: Never true    [employer] in the Last Year: Never true   Transportation Needs: No Transportation Needs    Lack of Transportation (Medical): No    Lack of Transportation (Non-Medical): No   Physical Activity: Inactive    Days of Exercise per Week: 0 days    Minutes of Exercise per Session: 0 min     Family History   Problem Relation Age of Onset    High Blood Pressure Mother     Arthritis Mother     Cancer Father         throat    Arthritis Father        No Known Allergies    Review of Systems   Constitutional:  Negative for fever. HENT:  Negative for trouble swallowing. Eyes:  Negative for photophobia. Cardiovascular:  Negative for chest pain and syncope. Gastrointestinal:  Negative for abdominal pain and bowel incontinence. Genitourinary:  Negative for bladder incontinence and dysuria. Musculoskeletal:  Positive for back pain and neck pain. Neurological:  Positive for weakness and numbness. Negative for tingling and headaches. Objective    There were no vitals filed for this visit. No data recorded     Physical Exam  Vitals reviewed. Constitutional:       General: He is not in acute distress. Appearance: He is well-developed. He is not ill-appearing, toxic-appearing or diaphoretic. Comments:     HENT:      Head: Normocephalic and atraumatic. Right Ear: Hearing normal.      Left Ear: Hearing normal.      Nose: Nose normal.      Mouth/Throat:      Mouth: No oral lesions. Dentition: Normal dentition. Pharynx: No oropharyngeal exudate. Eyes:      General: No scleral icterus. Right eye: No discharge. Left eye: No discharge. Conjunctiva/sclera: Conjunctivae normal.      Right eye: No chemosis or exudate. Left eye: No chemosis or exudate. Neck:      Thyroid: No thyromegaly. Vascular: No JVD. Trachea: No tracheal tenderness or tracheal deviation. Pulmonary:      Effort: Pulmonary effort is normal. No tachypnea, bradypnea, accessory muscle usage or respiratory distress. Breath sounds: Decreased breath sounds present. No wheezing or rales. Chest:      Chest wall: No tenderness. Abdominal:      General: There is no distension. Musculoskeletal:         General: Tenderness present. Right shoulder: Tenderness and bony tenderness present. Decreased range of motion. Left shoulder: Tenderness and bony tenderness present. Decreased range of motion. Right upper arm: Normal.      Left upper arm: Normal.      Right elbow: Normal.      Left elbow: Normal.      Right forearm: Normal.      Left forearm: Normal.      Right wrist: Normal.      Left wrist: Normal.      Right hand: Bony tenderness present. No swelling, deformity or lacerations. Decreased range of motion. Decreased strength of finger abduction, thumb/finger opposition and wrist extension. Decreased sensation of the ulnar distribution, median distribution and radial distribution. Normal capillary refill. Left hand: Bony tenderness present. Decreased range of motion. Decreased strength of finger abduction and wrist extension. Decreased sensation of the ulnar distribution and radial distribution. Arms:       Cervical back: Neck supple.  Laceration, spasms, tenderness and bony tenderness present. No swelling, edema, deformity or rigidity. Spinous process tenderness and muscular tenderness present. Thoracic back: Deformity, spasms, tenderness and bony tenderness present. Decreased range of motion. Lumbar back: Tenderness and bony tenderness present. No swelling, edema, deformity or lacerations. Decreased range of motion. Back:       Right hip: Normal.      Left hip: Normal.      Right upper leg: Normal.      Left upper leg: Normal.      Right knee: Normal.      Left knee: Normal.      Right lower leg: Normal.      Left lower leg: Normal.      Right ankle: Normal.      Right Achilles Tendon: Normal.      Left ankle: Normal.      Left Achilles Tendon: Normal.      Right foot: Normal.      Left foot: Normal.        Legs:       Comments: Tender areas are indicated by numbered spot         Skin:     General: Skin is warm and dry. Coloration: Skin is not pale. Findings: No abrasion, bruising, ecchymosis, erythema, laceration, petechiae or rash. Rash is not macular, pustular or urticarial.      Nails: There is no clubbing. Neurological:      Mental Status: He is alert and oriented to person, place, and time. Cranial Nerves: No cranial nerve deficit. Sensory: Sensory deficit present. Motor: No tremor, atrophy or abnormal muscle tone. Coordination: Coordination normal.      Gait: Gait abnormal.      Deep Tendon Reflexes: Reflexes abnormal. Babinski sign absent on the right side. Babinski sign absent on the left side. Reflex Scores:       Patellar reflexes are 1+ on the right side and 1+ on the left side. Achilles reflexes are 0 on the right side and 0 on the left side. Psychiatric:         Attention and Perception: He is attentive. Mood and Affect: Mood is not anxious or depressed. Affect is not labile, blunt, angry or inappropriate. Speech: He is communicative.  Speech is not rapid and pressured, delayed, slurred or tangential.         Behavior: Behavior normal. Behavior is not agitated, slowed, aggressive, withdrawn, hyperactive or combative. Thought Content: Thought content normal. Thought content is not paranoid or delusional. Thought content does not include homicidal or suicidal ideation. Thought content does not include homicidal or suicidal plan. Cognition and Memory: Memory is not impaired. He does not exhibit impaired recent memory or impaired remote memory. Judgment: Judgment normal. Judgment is not impulsive or inappropriate. Comments: Calm appropriate    NAD     Ortho Exam  Neurologic Exam     Mental Status   Oriented to person, place, and time. Speech: not slurred   Level of consciousness: alert  Knowledge: good. Gait, Coordination, and Reflexes     Reflexes   Right patellar: 1+  Left patellar: 1+  Right achilles: 0  Left achilles: 0        After a thorough review and discussion of the previous medical records, patient comprehensive medical, surgical, and family and social history, Review of Systems, their OARRS, their Screener and Opioid Assessment for Patients with Pain (SOAPP®-R), recent diagnostics, and symptomatic results to previous treatment, it is my impression that the patients is suffering with progressive and severe:     Diagnosis Orders   1. Right lumbar radiculopathy        2. Chronic midline low back pain with sciatica, sciatica laterality unspecified        3. Chronic pain of both shoulders        4. Spinal cord disease ([address]        5.  Acute bilateral low back pain with sciatica, sciatica laterality unspecified            I am also concerned by lifestyle and mood issues including:    Past Medical History:   Diagnosis Date    Chronic back pain     Coronary artery disease 2002    [facility] Linda Marquez at [facility]    Esophageal ulcer 3/10/2014    Dr. Pura Strange    Gastric ulcer 3/10/2014    Dr. Pura Strange    Gout     Hiatal hernia 3/10/2014     [address]    Hyperlipidemia     Hypertension     Impotence 10/16/2020    MI (myocardial infarction) [facility]) 2005    Dr. Natalee Leon    Osteoarthritis     Peripheral vascular disease ([facility] [address]     Prediabetes     Schizo-affective schizophrenia, in remission ([facility] [address] 2/1/2005    Overview:  followed at the [facility]    Severe single current episode of major depressive disorder, without psychotic features ([facility] [address] 7/8/2016    Status post coronary artery stent placement 2002    Dr. Natalee Leon           Given their medication, chronic pain and lifestyle and medications they are at risk for :    Falls, constipation, addiction, toxicity on opiates  Loss of livelyhood due to severe pain, debility, weight gain and  vitamin D deficiency    The patient was educated regarding proper diet, fitness routine, and regulatory restrictions concerning pain medications. Previous notes, comprehensive past medical, surgical, family history, and diagnostics were reviewed. Patient education and councelling were provided regarding off label use,treatment options and medication and injection risks. Current and old OARRS ([place] Automated Prescription Reporting System) records reviewed, all refills reviewed since last visit,  Behavioral agreement/[employer] regulations   and Toxicology screen was reviewed with patient and is up to date. There are   no current red flags. high risk meds review re intensive monitoring for toxicity and addiction,  They are making good progress regarding pain relief, they are performing at a functional level regarding activities of daily living, family interactions and psychological functioning, they're not having any adverse effects or side effects from the current medications, and I see no findings of aberrant drug taking or addiction related behaviors. The patient is aware that they have a chronic pain condition and they may require opiates dosing for life.   All efforts will be made to wean to the lowest effective dose. Other therapies for pain have not been effective including nonopiate medications. Injections and exercises are only partially effective. A Rx for Narcan was offered to help prevent accidental overdose. RX Monitoring 10/17/2021   Attestation -   Acute Pain Prescriptions Prescription exceeds daily limit for a specific reason. See comments or note. ;Not required given exclusionary diagnoses. ..;Severe pain not adequately treated with lower dose. Periodic Controlled Substance Monitoring Possible medication side effects, risk of tolerance/dependence & alternative treatments discussed. ;No signs of potential drug abuse or diversion identified. ;Assessed functional status. ;Obtaining appropriate analgesic effect of treatment. Chronic Pain > 50 MEDD -   Chronic Pain > 80 MEDD -               Patient is currently taking:       I am having Amy Babcock maintain his aspirin, CPAP Machine, tamsulosin, vitamin D, nitroGLYCERIN, glucose monitoring, Alcohol Prep, DOK, FreeStyle Lancets, FREESTYLE LITE, imipramine, allopurinol, colchicine, B-D 3CC LUER-JOSE SYR 26HD6-6/2, naloxone, metoprolol tartrate, testosterone cypionate, NIFEdipine, metFORMIN, baclofen, pravastatin, gabapentin, and atorvastatin. I also recommend the following Medications:    No orders of the defined types were placed in this encounter.       -which helps with pain and function. Otherwise, continue the current pain medications that I have prescibed. Radiologic:   Old  unique films results reviewed  ,     I discussed results with patients. see Follow up plans below  For any new studies. Unique source and Care Everywhere Updates:  prior external notes requested and reviewed. No new issues noted. Unique History obtained by caregiver/independent historian-and verified with SOAPPR. Electrodiagnostic:  Previous studies requested,     I discussed results with patient.       See follow-up plans for new studies.         Labs:  Previous labs reviewed     Lab Results   Component Value Date/Time     11/23/2022 08:14 AM    K 5.2 11/23/2022 08:14 AM    CL 99 11/23/2022 08:14 AM    CO2 32 11/23/2022 08:14 AM    BUN 13 11/23/2022 08:14 AM    CREATININE 0.93 11/23/2022 08:14 AM    CALCIUM 9.7 11/23/2022 08:14 AM    LABALBU 4.4 11/23/2022 08:14 AM    LABALBU 4.4 10/27/2020 11:38 AM    BILITOT 0.5 11/23/2022 08:14 AM    ALKPHOS 67 11/23/2022 08:14 AM    AST 21 11/23/2022 08:14 AM    ALT 21 11/23/2022 08:14 AM     Lab Results   Component Value Date/Time    WBC 5.7 11/23/2022 07:59 AM    RBC 4.62 11/23/2022 07:59 AM    HGB 15.0 11/23/2022 07:59 AM    HCT 45.5 11/23/2022 07:59 AM    MCV 98.5 11/23/2022 07:59 AM    MCH 32.5 11/23/2022 07:59 AM    MCHC 33.0 11/23/2022 07:59 AM    RDW 14.4 11/23/2022 07:59 AM     11/23/2022 07:59 AM    MPV 9.9 09/15/2015 09:02 AM       Lab Results   Component Value Date/Time    LABAMPH Neg 09/24/2022 08:41 AM    BARBSCNU Neg 09/24/2022 08:41 AM    LABBENZ Neg 09/24/2022 08:41 AM    CANSU Neg 09/24/2022 08:41 AM    COCAIMETSCRU Neg 09/24/2022 08:41 AM    PHENCYCLIDINESCREENURINE Neg 09/24/2022 09:33 AM    TRICYCLIC Negative 57/68/4574 04:01 PM    DSCOMMENT see below 09/24/2022 08:41 AM       Lab Results   Component Value Date/Time    CODEINE Not Detected 09/16/2016 11:47 AM    MORPHINE Not Detected 09/16/2016 11:47 AM    ACETYLMORPHI Not Detected 09/16/2016 11:47 AM    OXYCODONE Present 09/16/2016 11:47 AM    NOROXYCODONE Present 09/16/2016 11:47 AM    NOROXYMU Present 09/16/2016 11:47 AM    HYDRCO Not Detected 09/16/2016 11:47 AM    NORHYDU Not Detected 09/16/2016 11:47 AM    HYDROMO Not Detected 09/16/2016 11:47 AM    BUPREN Not Detected 09/16/2016 11:47 AM    NORBUPRNOR Not Detected 09/16/2016 11:47 AM    FENTA Not Detected 09/16/2016 11:47 AM    NORFENT Not Detected 09/16/2016 11:47 AM    MEPERIDINE Not Detected 09/16/2016 11:47 AM    TAPENU Not Detected 09/16/2016 11:47 AM TAPOSULFUR Not Detected 09/16/2016 11:47 AM    METHADONE Not Detected 09/16/2016 11:47 AM    LABPROP Not Detected 09/16/2016 11:47 AM    TRAM Not Detected 09/16/2016 11:47 AM    AMPH Not Detected 09/16/2016 11:47 AM    METHAMP Not Detected 09/16/2016 11:47 AM    MDMA Not Detected 09/16/2016 11:47 AM    ECMDA Not Detected 09/16/2016 11:47 AM       Lab Results   Component Value Date/Time    PHENTERMINE Not Detected 09/16/2016 11:47 AM    BENZOYL Not Detected 09/16/2016 11:47 AM    ALPRAZ Not Detected 09/16/2016 11:47 AM    ALPHAOHALPRA Not Detected 09/16/2016 11:47 AM    CLONAZEPAM Not Detected 09/16/2016 11:47 AM    7AMINOCLONAZ Not Detected 09/16/2016 11:47 AM    DIAZEP Not Detected 09/16/2016 11:47 AM    SAFIA Not Detected 09/16/2016 11:47 AM    OXAZ Not Detected 09/16/2016 11:47 AM    [place] Not Detected 09/16/2016 11:47 AM    LORAZEPAM Not Detected 09/16/2016 11:47 AM    MIDAZOLAM Not Detected 09/16/2016 11:47 AM    ZOLPIDEM Not Detected 09/16/2016 11:47 AM    REG Not Detected 09/16/2016 11:47 AM    ETG Not Detected 09/16/2016 11:47 AM    MARIJMET Not Detected 09/16/2016 11:47 AM    PCP Not Detected 09/16/2016 11:47 AM    PAINMGTDRUGP See Below 09/16/2016 11:47 AM    EERPAINMGTPA See Note 09/16/2016 11:47 AM    LABCREA 119.5 11/23/2022 08:19 AM         , I discussed results with patient. See follow-up plans for new studies. Therapies:  HEP-gentle stretching and relaxation techniques-demonstrated with patient-they are to do them twice a day.   They are also advised to make the following lifestyle changes:   Goals        Exercise 3x per week (30 min per time)      SOAPP-R GOAL LESS THAN 9      09/16/16 SCORE: 33-HIGH RISK   11/11/16 score: 27-high risk     01/13/17 score: 16-moderate risk   03/13/17 Score: 11-moderate risk   06/01/17 score: 15-moderate risk  08/03/17 score: 15-moderate risk  10/04/17 score: 18-moderate risk  12/08/17 score: 11-moderate risk  02/09/18 score: 12-moderate risk  04/13/18 score: 14-moderate risk  7/12/18 score 10-moderate risk  11/29/18 score 17- moderate risk  01/28/19 score  16-moderate risk  6/10/19 score: 11- moderate risk  8/15/19 score: 17- moderate risk   4/8/19 score  7- low risk  3/11/20 score: 17- moderate risk  5- score- 16 - moderate risk   7/20/20 score: 21- high risk  10/16/20 score: 16- moderate risk  1/15/21 score: 16- moderate risk  4/14/21 score: 22- high risk  7/14/21 score: 14- moderate risk  10/20/21 score: 12- moderate risk  1/20/21 score: 20- moderate risk  07/13/22 score : 15 - moderate risk  09/23/22 score:  12 - moderate              Injections or Epidurals:  Injection options were discussed. Patient gave verbal consent to ordered injections. See follow-up plans for planned injections. Please trigger points and suprascapular nerve blocks. Supplements:  Vitamin D with increased dosing during the rainy months,   Education was given on:   Dietary and Fitness--daily stretches and low carb diet-in chair Yoga when possible             Follow up with Primary Care Physician regarding their general medical needs. Stressed the importance of following up with PCP and specialists for his/her chronic diseases, health, CV, and cancer screening and continued care. Will follow disease activity/progression and adjust therapeutic regimen to disease activity and severity. Discussed medication dosage, usage, goals of therapy, and side effects. Available test results were reviewed -Discussed findings, impression and plan with patient. An additional 8 minutes were spent outside of the patient visit to review records. Additional time spent with the patient to discuss their questions. Additional time spent with the patient devoted to discussing treatment strategy, planning, and implementation. Patient understands above plan; questions asked and answered. Patient agrees to plan as noted above.         At least 50% of the visit was involved in the discussion of the options for treatment. We discussed exercises, medication, interventional therapies and surgery. Healthy life style is essential with patient hard work to achieve the wellness. In addition; discussion with the patient and/or family about patient's functional status any of the diagnostic results, impressions and/or recommended diagnostic studies, prognosis, risks and benefits of treatment options, instructions for treatment and/or follow-up, importance of compliance with chosen treatment options, risk-factor reduction, and patient/family education.        They are to follow up in 2 1/2 -3 months to review medication, efficacy of injections, pill counts, OARRS check, high risk med review re intensive monitoring for toxicity and addiction, SOAPPR assessment, review diagnostics, to review previous and future treatment plans and assess appropriateness for continued therapy.        New Diagnostics  No orders of the defined types were placed in this encounter.      Brenda Stubbs, DO

## 2022-12-18 DIAGNOSIS — R42 DIZZINESS: ICD-10-CM

## 2022-12-18 DIAGNOSIS — M79.604 BILATERAL LEG PAIN: ICD-10-CM

## 2022-12-18 DIAGNOSIS — M54.16 RIGHT LUMBAR RADICULOPATHY: ICD-10-CM

## 2022-12-18 DIAGNOSIS — R73.03 PREDIABETES: ICD-10-CM

## 2022-12-18 DIAGNOSIS — G95.9 MYELOPATHY (HCC): ICD-10-CM

## 2022-12-18 DIAGNOSIS — M79.605 BILATERAL LEG PAIN: ICD-10-CM

## 2022-12-18 DIAGNOSIS — G95.9 SPINAL CORD DISEASE (HCC): ICD-10-CM

## 2022-12-18 DIAGNOSIS — Z79.899 HIGH RISK MEDICATION USE: ICD-10-CM

## 2022-12-18 DIAGNOSIS — M54.12 C6 RADICULOPATHY: ICD-10-CM

## 2022-12-18 DIAGNOSIS — M62.838 SPASM OF MUSCLE: ICD-10-CM

## 2022-12-19 RX ORDER — BLOOD SUGAR DIAGNOSTIC
STRIP MISCELLANEOUS
Qty: 100 STRIP | Refills: 11 | Status: SHIPPED | OUTPATIENT
Start: 2022-12-19

## 2022-12-19 RX ORDER — BACLOFEN 10 MG/1
TABLET ORAL
Qty: 90 TABLET | Refills: 1 | Status: SHIPPED | OUTPATIENT
Start: 2022-12-19

## 2022-12-19 RX ORDER — LANCETS 28 GAUGE
EACH MISCELLANEOUS
Qty: 100 EACH | Refills: 11 | Status: SHIPPED | OUTPATIENT
Start: 2022-12-19

## 2022-12-19 RX ORDER — OXYCODONE AND ACETAMINOPHEN 10; 325 MG/1; MG/1
1 TABLET ORAL EVERY 6 HOURS PRN
Qty: 90 TABLET | Refills: 0 | Status: SHIPPED | OUTPATIENT
Start: 2022-12-22 | End: 2023-01-21

## 2023-01-06 ENCOUNTER — OFFICE VISIT (OUTPATIENT)
Dept: PHYSICAL MEDICINE AND REHAB | Age: 73
End: 2023-01-06
Payer: MEDICARE

## 2023-01-06 VITALS
BODY MASS INDEX: 25.27 KG/M2 | WEIGHT: 161 LBS | SYSTOLIC BLOOD PRESSURE: 118 MMHG | HEIGHT: 67 IN | DIASTOLIC BLOOD PRESSURE: 55 MMHG

## 2023-01-06 DIAGNOSIS — G89.29 CHRONIC PAIN OF BOTH SHOULDERS: ICD-10-CM

## 2023-01-06 DIAGNOSIS — M54.40 ACUTE BILATERAL LOW BACK PAIN WITH SCIATICA, SCIATICA LATERALITY UNSPECIFIED: ICD-10-CM

## 2023-01-06 DIAGNOSIS — G95.9 MYELOPATHY (HCC): ICD-10-CM

## 2023-01-06 DIAGNOSIS — M54.16 RIGHT LUMBAR RADICULOPATHY: Primary | ICD-10-CM

## 2023-01-06 DIAGNOSIS — Z79.899 HIGH RISK MEDICATION USE: ICD-10-CM

## 2023-01-06 DIAGNOSIS — M25.511 CHRONIC PAIN OF BOTH SHOULDERS: ICD-10-CM

## 2023-01-06 DIAGNOSIS — M79.10 MYALGIA: ICD-10-CM

## 2023-01-06 DIAGNOSIS — G89.29 CHRONIC MIDLINE LOW BACK PAIN WITH SCIATICA, SCIATICA LATERALITY UNSPECIFIED: ICD-10-CM

## 2023-01-06 DIAGNOSIS — G95.9 SPINAL CORD DISEASE (HCC): ICD-10-CM

## 2023-01-06 DIAGNOSIS — M54.12 C6 RADICULOPATHY: ICD-10-CM

## 2023-01-06 DIAGNOSIS — M25.512 CHRONIC PAIN OF BOTH SHOULDERS: ICD-10-CM

## 2023-01-06 DIAGNOSIS — M54.40 CHRONIC MIDLINE LOW BACK PAIN WITH SCIATICA, SCIATICA LATERALITY UNSPECIFIED: ICD-10-CM

## 2023-01-06 PROCEDURE — 3074F SYST BP LT 130 MM HG: CPT | Performed by: PHYSICAL MEDICINE & REHABILITATION

## 2023-01-06 PROCEDURE — 1123F ACP DISCUSS/DSCN MKR DOCD: CPT | Performed by: PHYSICAL MEDICINE & REHABILITATION

## 2023-01-06 PROCEDURE — 3078F DIAST BP <80 MM HG: CPT | Performed by: PHYSICAL MEDICINE & REHABILITATION

## 2023-01-06 PROCEDURE — 99214 OFFICE O/P EST MOD 30 MIN: CPT | Performed by: PHYSICAL MEDICINE & REHABILITATION

## 2023-01-06 RX ORDER — OXYCODONE AND ACETAMINOPHEN 10; 325 MG/1; MG/1
1 TABLET ORAL EVERY 6 HOURS PRN
Qty: 90 TABLET | Refills: 0 | Status: SHIPPED | OUTPATIENT
Start: 2023-01-20 | End: 2023-02-19

## 2023-01-09 ENCOUNTER — TELEPHONE (OUTPATIENT)
Dept: ENDOCRINOLOGY | Age: 73
End: 2023-01-09

## 2023-01-09 ENCOUNTER — PROCEDURE VISIT (OUTPATIENT)
Dept: PHYSICAL MEDICINE AND REHAB | Age: 73
End: 2023-01-09

## 2023-01-09 DIAGNOSIS — M79.10 MYALGIA: ICD-10-CM

## 2023-01-09 RX ORDER — LIDOCAINE HYDROCHLORIDE 10 MG/ML
16 INJECTION, SOLUTION INFILTRATION; PERINEURAL ONCE
Status: COMPLETED | OUTPATIENT
Start: 2023-01-09 | End: 2023-01-09

## 2023-01-09 RX ADMIN — LIDOCAINE HYDROCHLORIDE 16 ML: 10 INJECTION, SOLUTION INFILTRATION; PERINEURAL at 12:11

## 2023-01-10 ENCOUNTER — OFFICE VISIT (OUTPATIENT)
Dept: ENDOCRINOLOGY | Age: 73
End: 2023-01-10
Payer: COMMERCIAL

## 2023-01-10 VITALS
WEIGHT: 162 LBS | HEART RATE: 63 BPM | DIASTOLIC BLOOD PRESSURE: 82 MMHG | SYSTOLIC BLOOD PRESSURE: 165 MMHG | OXYGEN SATURATION: 97 % | HEIGHT: 67 IN | BODY MASS INDEX: 25.43 KG/M2

## 2023-01-10 DIAGNOSIS — E29.1 HYPOGONADISM MALE: Primary | ICD-10-CM

## 2023-01-10 DIAGNOSIS — E11.9 TYPE 2 DIABETES MELLITUS WITHOUT COMPLICATION, WITHOUT LONG-TERM CURRENT USE OF INSULIN (HCC): ICD-10-CM

## 2023-01-10 DIAGNOSIS — N53.19 ANEJACULATION: ICD-10-CM

## 2023-01-10 LAB
CHP ED QC CHECK: NORMAL
GLUCOSE BLD-MCNC: 146 MG/DL

## 2023-01-10 PROCEDURE — 1123F ACP DISCUSS/DSCN MKR DOCD: CPT | Performed by: INTERNAL MEDICINE

## 2023-01-10 PROCEDURE — 3079F DIAST BP 80-89 MM HG: CPT | Performed by: INTERNAL MEDICINE

## 2023-01-10 PROCEDURE — 82962 GLUCOSE BLOOD TEST: CPT | Performed by: INTERNAL MEDICINE

## 2023-01-10 PROCEDURE — 3077F SYST BP >= 140 MM HG: CPT | Performed by: INTERNAL MEDICINE

## 2023-01-10 PROCEDURE — 99213 OFFICE O/P EST LOW 20 MIN: CPT | Performed by: INTERNAL MEDICINE

## 2023-01-10 RX ORDER — IMIPRAMINE HCL 25 MG
TABLET ORAL
Qty: 30 TABLET | Refills: 3 | Status: SHIPPED | OUTPATIENT
Start: 2023-01-10

## 2023-01-10 RX ORDER — SILDENAFIL 100 MG/1
100 TABLET, FILM COATED ORAL PRN
Qty: 30 TABLET | Refills: 3 | Status: SHIPPED | OUTPATIENT
Start: 2023-01-10

## 2023-01-10 RX ORDER — TESTOSTERONE CYPIONATE 200 MG/ML
INJECTION INTRAMUSCULAR
Qty: 10 ML | Refills: 0 | Status: SHIPPED | OUTPATIENT
Start: 2023-01-10 | End: 2023-07-07

## 2023-01-10 RX ORDER — SILDENAFIL 100 MG/1
100 TABLET, FILM COATED ORAL PRN
Qty: 30 TABLET | Refills: 3 | Status: SHIPPED | OUTPATIENT
Start: 2023-01-10 | End: 2023-01-10 | Stop reason: SDUPTHER

## 2023-01-10 RX ORDER — IMIPRAMINE HCL 25 MG
TABLET ORAL
Qty: 30 TABLET | Refills: 3 | Status: CANCELLED | OUTPATIENT
Start: 2023-01-10

## 2023-01-10 NOTE — PROGRESS NOTES
1/10/2023    Assessment:       Diagnosis Orders   1. Hypogonadism male        2. Type 2 diabetes mellitus without complication, without long-term current use of insulin ([facility])  POCT Glucose      3. Anejaculation              PLAN:     Orders Placed This Encounter   Procedures    Basic Metabolic Panel     Standing Status:   Future     Standing Expiration Date:   1/10/2024    Testosterone, free, total     Standing Status:   Future     Standing Expiration Date:   1/10/2024    Hemoglobin A1C     Standing Status:   Future     Standing Expiration Date:   1/10/2024    POCT Glucose     Orders Placed This Encounter   Medications    testosterone cypionate (DEPOTESTOTERONE CYPIONATE) 200 MG/ML injection     Sig: INJECT 0.5MLS INTO THE MUSCLE EVERY 2 WEEKS     Dispense:  10 mL     Refill:  0    imipramine (TOFRANIL) 25 MG tablet     Sig: TAKE 1 TABLET BY MOUTH EVERY NIGHT     Dispense:  30 tablet     Refill:  3    DISCONTD: sildenafil (VIAGRA) 100 MG tablet     Sig: Take 1 tablet by mouth as needed for Erectile Dysfunction     Dispense:  30 tablet     Refill:  3    sildenafil (VIAGRA) 100 MG tablet     Sig: Take 1 tablet by mouth as needed for Erectile Dysfunction     Dispense:  30 tablet     Refill:  3     Continue current dose of metformin testosterone imipramine Viagra follow-up in 6 months  [facility] report was reviewed    Orders Placed This Encounter   Procedures    POCT Glucose     No orders of the defined types were placed in this encounter. No follow-ups on file.   Subjective:     Chief Complaint   Patient presents with    Hypogonadism    Diabetes     Vitals:    01/10/23 0906 01/10/23 0911   BP: (!) 162/83 (!) 165/82   Site: Left Upper Arm Left Upper Arm   Position: Sitting Sitting   Cuff Size: Medium Adult Medium Adult   Pulse: 63    SpO2: 97%    Weight: 162 lb (73.5 kg)    Height: 5' 7\" (1.702 m)      Wt Readings from Last 3 Encounters:   01/10/23 162 lb (73.5 kg)   01/06/23 161 lb (73 kg)   11/18/22 161 lb (73 kg) BP Readings from Last 3 Encounters:   01/10/23 (!) 165/82   01/06/23 (!) 118/55   11/18/22 130/88     Follow-up on type 2 diabetes patient on metformin A1c 6.4 has been stable history of erectile dysfunction and history of condition coronary artery disease patient taking testosterone injections at home last testosterone was in the 400 range requesting refill    Diabetes  He presents for his follow-up diabetic visit. He has type 2 diabetes mellitus. Pertinent negatives for diabetes include no polyuria. Symptoms are stable. Diabetic complications include heart disease and impotence. Current diabetic treatment includes oral agent (monotherapy). His overall blood glucose range is 130-140 mg/dl.  (Hemoglobin A1C       Date                     Value               Ref Range           Status                11/23/2022               6.4 (H)             4.8 - 5.9 %         Final            ----------  )   Past Medical History:   Diagnosis Date    Chronic back pain     Coronary artery disease 2002    Dr. Artemio Armstrong at [facility]    Esophageal ulcer 3/10/2014    Dr. Maribell Zheng    Gastric ulcer 3/10/2014    Dr. Maribell Zheng    Gout     Hiatal hernia 3/10/2014    Dr. Maribell Zheng    Hyperlipidemia     Hypertension     Impotence 10/16/2020    MI (myocardial infarction) [facility]) 2005    Dr. Artemio Armstrong    Osteoarthritis     Peripheral vascular disease ([address]     Prediabetes     Schizo-affective schizophrenia, in remission ([address] 2/1/2005    Overview:  followed at the [facility]    Severe single current episode of major depressive disorder, without psychotic features ([address] 7/8/2016    Status post coronary artery stent placement 2002    Dr. Artemio Armstrong     Past Surgical History:   Procedure Laterality Date    [address]  01/03/2020    Dr. Jacques Lemons Right 6/4/2019    RIGHT WRIST CARPAL TUNNEL RELEASE, SUPINE, PAT AT PCP performed by Jamey Silva MD at [facility]    COLONOSCOPY  3/10/14    DR Amna Garcia STENT PLACEMENT  2002     [address] GRAFT  10/17/2017    KNEE ARTHROSCOPY Left 2002    Dr. Stokes Eth HISTORY  12/19/13    CAUDAL BLOCKS DONE BY DR Marcio Pérez    OTHER SURGICAL HISTORY  04/2020    urolift      SPINE SURGERY  9/2009    Dr. Roberto Ferrara  2008    Dr. Dillon Hurtado  3/10/14    Yoly Godoy, DR Marcia aLrsen     Social History     Socioeconomic History    Marital status:      Spouse name: Not on file    Number of children: Not on file    Years of education: Not on file    Highest education level: Not on file   Occupational History    Occupation: disability    Tobacco Use    Smoking status: Never    Smokeless tobacco: Never   Vaping Use    Vaping Use: Never used   Substance and Sexual Activity    Alcohol use: No     Alcohol/week: 0.0 standard drinks     Comment: Stopped years ago. Drug use: No     Comment: YEARS AGO    Sexual activity: Yes     Partners: Female     Comment: monogomous sexual partner, wife. Other Topics Concern    Not on file   Social History Narrative    Tobacco -- never smoked or chewed. Alcohol -- have not used for past 10 years, prior to had consumed 6 pack of beer daily. Drugs -- tried marijuana and cocaine 14 years ago occasionally used for 3-4 years and then stopped completely 10 years ago. Education -- completed 11th grade in [place].    Occupation -- [employer] & [employer] work,  at [employer].    Marital status --  44 years, 2 grown sons.      Social Determinants of Health     Financial Resource Strain: Low Risk     Difficulty of Paying Living Expenses: Not hard at all   Food Insecurity: No Food Insecurity    Worried About [employer] in the Last Year: Never true    Ran Out of Food in the Last Year: Never true   Transportation Needs: No Transportation Needs    Lack of Transportation (Medical): No    Lack of Transportation (Non-Medical): No   Physical Activity: Inactive    Days of Exercise per Week: 0 days    Minutes of Exercise per Session: 0 min   Stress: Not on file   Social Connections: Not on file   Intimate Partner Violence: Not on file   Housing Stability: Not on file     Family History   Problem Relation Age of Onset    High Blood Pressure Mother     Arthritis Mother     Cancer Father         throat    Arthritis Father      No Known Allergies    Current Outpatient Medications:     [START ON 1/20/2023] oxyCODONE-acetaminophen (PERCOCET)  MG per tablet, Take 1 tablet by mouth every 6 hours as needed for Pain for up to 30 days.  Intended supply: 30 days, Disp: 90 tablet, Rfl: 0    FreeStyle Lancets MISC, PATEINT TO TEST ONCE DAILY, Disp: 100 each, Rfl: 11    blood glucose test strips (ONETOUCH ULTRA) strip, USE TO TEST BLOOD SUGAR EVERY DAY, Disp: 100 strip, Rfl: 11    baclofen (LIORESAL) 10 MG tablet, TAKE 1 TABLET BY MOUTH THREE TIMES DAILY, Disp: 90 tablet, Rfl: 1    atorvastatin (LIPITOR) 40 MG tablet, Take 1 tablet by mouth daily, Disp: 90 tablet, Rfl: 3    pravastatin (PRAVACHOL) 40 MG tablet, TAKE 1 TABLET BY MOUTH EVERY DAY, Disp: 90 tablet, Rfl: 3    NIFEdipine (PROCARDIA XL) 60 MG extended release tablet, TAKE 1 TABLET BY MOUTH DAILY, Disp: 90 tablet, Rfl: 3    metFORMIN (GLUCOPHAGE) 500 MG tablet, TAKE 1 TABLET BY MOUTH TWICE DAILY WITH MEALS, Disp: 180 tablet, Rfl: 3    metoprolol tartrate (LOPRESSOR) 50 MG tablet, TAKE 1 TABLET BY MOUTH THREE TIMES DAILY, Disp: 270 tablet, Rfl: 2    naloxone 4 MG/0.1ML LIQD nasal spray, 1 spray by Nasal route as needed for Opioid Reversal, Disp: 1 each, Rfl: 5    B-D 3CC LUER-JOSE SYR 96YQ2-0/2 22G X 1-1/2\" 3 ML MISC, USE EVERY 2 WEEKS WITH TESTOSTERONE, Disp: 20 each, Rfl: 6    colchicine (COLCRYS) 0.6 MG tablet, TAKE 1 TABLET BY MOUTH DAILY DURING FLARE FOR GOUT FLARE, Disp: 30 tablet, Rfl: 2    allopurinol (ZYLOPRIM) 100 MG tablet, TAKE 1 TABLET BY MOUTH DAILY, Disp: 90 tablet, Rfl: 3    imipramine (TOFRANIL) 25 MG tablet, TAKE 1 TABLET BY MOUTH EVERY NIGHT, Disp: 30 tablet, Rfl: 3     MG capsule, TK ONE C PO  BID, Disp: , Rfl:     Alcohol Swabs (ALCOHOL PREP) 70 % PADS, Patient to test once daily E11.9, Disp: 100 each, Rfl: 11    glucose monitoring kit (FREESTYLE) monitoring kit, 1 kit by Does not apply route daily, Disp: 1 kit, Rfl: 0    nitroGLYCERIN (NITROSTAT) 0.4 MG SL tablet, Place 1 tablet under the tongue every 5 minutes as needed x 3 doses for chest pain., Disp: 25 tablet, Rfl: 2    vitamin D (CHOLECALCIFEROL) 25 MCG (1000 UT) TABS tablet, Take 1,000 Units by mouth daily, Disp: , Rfl:     tamsulosin (FLOMAX) 0.4 MG capsule, TAKE 1 CAPSULE DAILY AT BEDTIME, Disp: , Rfl:     CPAP Machine MISC, by NOT APPLICABLE route, Disp: , Rfl:     aspirin 81 MG EC tablet, Take 1 tablet by mouth daily, Disp: 90 tablet, Rfl: 1    gabapentin (NEURONTIN) 300 MG capsule, TAKE 1 CAPSULE BY MOUTH EVERY MORNING THEN TAKE 2 CAPSULES BY MOUTH AT BEDTIME, Disp: 270 capsule, Rfl: 0    testosterone cypionate (DEPOTESTOTERONE CYPIONATE) 200 MG/ML injection, INJECT 0.5MLS INTO THE MUSCLE EVERY 2 WEEKS, Disp: 10 mL, Rfl: 0  Lab Results   Component Value Date     11/23/2022    K 5.2 (H) 11/23/2022    CL 99 11/23/2022    CO2 32 (H) 11/23/2022    BUN 13 11/23/2022    CREATININE 0.93 11/23/2022    GLUCOSE 146 01/10/2023    CALCIUM 9.7 11/23/2022    PROT 7.4 11/23/2022    LABALBU 4.4 11/23/2022    BILITOT 0.5 11/23/2022    ALKPHOS 67 11/23/2022    AST 21 11/23/2022    ALT 21 11/23/2022    LABGLOM >60.0 11/23/2022    GFRAA >60.0 06/18/2022    GLOB 3.0 11/23/2022     Lab Results   Component Value Date    WBC 5.7 11/23/2022    HGB 15.0 11/23/2022    HCT 45.5 11/23/2022    MCV 98.5 (H) 11/23/2022     11/23/2022     Lab Results   Component Value Date    LABA1C 6.4 (H) 11/23/2022    LABA1C 6.3 07/12/2022 LABA1C 6.2 (H) 04/07/2022     Lab Results   Component Value Date    CHOLFAST 148 05/24/2019    TRIGLYCFAST 98 05/24/2019    HDL 45 01/14/2022    HDL 45 11/16/2020    HDL 42 05/24/2019    LDLCALC 116 01/14/2022    LDLCALC 86 11/16/2020    LDLCALC 86 05/24/2019    CHOL 185 01/14/2022    CHOL 150 11/16/2020    CHOL 152 03/12/2018    TRIG 122 01/14/2022    TRIG 96 11/16/2020    TRIG 157 03/12/2018     Lab Results   Component Value Date    TESTM 1063 (H) 01/11/2022    TESTM 787 (H) 10/11/2021    TESTM 209 (L) 03/30/2021     Lab Results   Component Value Date    TSH 4.110 05/06/2016    TSH 2.153 09/09/2013    TSHREFLEX 2.730 12/15/2020     No results found for: TPOABS    Review of Systems   Endocrine: Negative for polyuria. Genitourinary:  Positive for impotence. Objective:   Physical Exam  Vitals reviewed. Constitutional:       General: He is not in acute distress. Appearance: Normal appearance. HENT:      Head: Normocephalic and atraumatic. Right Ear: External ear normal.      Left Ear: External ear normal.      Nose: Nose normal.   Eyes:      General: No scleral icterus. Right eye: No discharge. Left eye: No discharge. Extraocular Movements: Extraocular movements intact. Conjunctiva/sclera: Conjunctivae normal.   Cardiovascular:      Rate and Rhythm: Normal rate. Pulmonary:      Effort: Pulmonary effort is normal.   Musculoskeletal:         General: Normal range of motion. Cervical back: Normal range of motion and neck supple. Neurological:      General: No focal deficit present. Mental Status: He is alert and oriented to person, place, and time.    Psychiatric:         Mood and Affect: Mood normal.         Behavior: Behavior normal.

## 2023-01-19 ENCOUNTER — OFFICE VISIT (OUTPATIENT)
Dept: CARDIOLOGY CLINIC | Age: 73
End: 2023-01-19
Payer: COMMERCIAL

## 2023-01-19 VITALS
SYSTOLIC BLOOD PRESSURE: 136 MMHG | DIASTOLIC BLOOD PRESSURE: 78 MMHG | OXYGEN SATURATION: 97 % | WEIGHT: 162.2 LBS | BODY MASS INDEX: 25.4 KG/M2 | HEART RATE: 91 BPM

## 2023-01-19 DIAGNOSIS — R00.1 BRADYCARDIA: ICD-10-CM

## 2023-01-19 DIAGNOSIS — I10 ESSENTIAL HYPERTENSION, BENIGN: ICD-10-CM

## 2023-01-19 PROCEDURE — 3078F DIAST BP <80 MM HG: CPT | Performed by: INTERNAL MEDICINE

## 2023-01-19 PROCEDURE — 99214 OFFICE O/P EST MOD 30 MIN: CPT | Performed by: INTERNAL MEDICINE

## 2023-01-19 PROCEDURE — 3075F SYST BP GE 130 - 139MM HG: CPT | Performed by: INTERNAL MEDICINE

## 2023-01-19 PROCEDURE — 1123F ACP DISCUSS/DSCN MKR DOCD: CPT | Performed by: INTERNAL MEDICINE

## 2023-01-19 RX ORDER — ATORVASTATIN CALCIUM 40 MG/1
40 TABLET, FILM COATED ORAL DAILY
Qty: 90 TABLET | Refills: 3 | Status: SHIPPED | OUTPATIENT
Start: 2023-01-19

## 2023-01-19 RX ORDER — METOPROLOL TARTRATE 50 MG/1
50 TABLET, FILM COATED ORAL 2 TIMES DAILY
Qty: 180 TABLET | Refills: 3 | Status: SHIPPED | OUTPATIENT
Start: 2023-01-19

## 2023-01-19 ASSESSMENT — ENCOUNTER SYMPTOMS
SHORTNESS OF BREATH: 0
CHEST TIGHTNESS: 0
NAUSEA: 0
BACK PAIN: 1
RESPIRATORY NEGATIVE: 1
COUGH: 0
EYES NEGATIVE: 1
STRIDOR: 0
WHEEZING: 0
GASTROINTESTINAL NEGATIVE: 1
BLOOD IN STOOL: 0

## 2023-01-19 NOTE — PROGRESS NOTES
Subsequent Progress Note  Patient: Lori Ramirez  YOB: 1950  MRN: [id number]    Chief Complaint: htn cad lipid preop back   Chief Complaint   Patient presents with    Follow-up     4 month    Coronary Artery Disease    Results     Wants to discuss Carotid US done in October       CV Data:  Prior CAD/Stentsx 2  10/17/2017 CABG x2 EF 55  10/2018  Stress echo EF 55  Persistent HyperK  7/2020 CUS mild   12/2020 GXT negative   10/21 CUS mild   10/22 CUS mild     Subjective/HPI: no cp no osb no falls noblled has back arthritis getting shots with some releif. 10/25/2019 No cp no sob no falls no bleed. Takes meds. Eating well. 2/26/2020 no cp no sob. Had extensive back SX. Did well. 6/26/2020 no cp no osb no falls no bleed. Takes meds. Walks and active no bleed    10/27/2020 pt will have ED surgery at [facility] next week. He is active and has no CP no SOB no falls no bleed. 1/6/21 no cp no sob no falls no bleed    5/7/21 no cp no sob no falls nob leed no edema K is always high 5.3 recently. Recently HR 40s and Metoprolol was backed off to Bid from prior tid.     9/10/21 doing well no cp no sob active taking meds. No falls. No bleed. 1/18/22 doing well no  Cp no spb not dizzy no bleed. Takes meds. Madhu Bailey active     5/10/22 doping well no cp no sob nofalsl no bleed. BP at home good. Last OV BP good as well. Today little elevated- states he rushed In here. 9/19/22 doing very well no cp no sob no falls no bleed takes meds. 1/19/23 doing well active no cp no sob no falls no bleed. EKG: SR 71 RBBB    Lives w wife  Nonsmoker  Retired - [employer].        Past Medical History:   Diagnosis Date    Chronic back pain     Coronary artery disease 2002    Dr. Adrienne Sher at [facility]    Esophageal ulcer 3/10/2014    Dr. Blount Counts    Gastric ulcer 3/10/2014    Dr. Blount Counts    Gout     Hiatal hernia 3/10/2014    Dr. Blount Counts    Hyperlipidemia     Hypertension     Impotence 10/16/2020    MI (myocardial infarction) [facility]) 2005    Dr. Anita Gatica    Osteoarthritis     Peripheral vascular disease ([facility]     Prediabetes     Schizo-affective schizophrenia, in remission ([facility] 2/1/2005    Overview:  followed at the [facility]    Severe single current episode of major depressive disorder, without psychotic features ([facility] 7/8/2016    Status post coronary artery stent placement 2002    Dr. Anita Gatica       Past Surgical History:   Procedure Laterality Date    [address]  01/03/2020    Dr. Jalil Sheikh Right 6/4/2019    RIGHT WRIST CARPAL TUNNEL RELEASE, SUPINE, PAT AT PCP performed by Darryn Norman MD at [address]  3/10/14    DR Ming Crump  2002    Dr. Petty [facility] GRAFT  10/17/2017    KNEE ARTHROSCOPY Left 2002    Dr. Hardik Martinez  12/19/13    CAUDAL BLOCKS DONE BY DR Izabella Kinney    OTHER SURGICAL HISTORY  04/2020    urolift Kayleigh Montiel  9/2009    Dr. Yuli Joyner  2008    Dr. Nikita Matthew  3/10/14    Sue Pérez, DR Maicol Hampton       Family History   Problem Relation Age of Onset    High Blood Pressure Mother     Arthritis Mother     Cancer Father         throat    Arthritis Father        Social History     Socioeconomic History    Marital status:      Spouse name: None    Number of children: None    Years of education: None    Highest education level: None   Occupational History    Occupation: disability    Tobacco Use    Smoking status: Never    Smokeless tobacco: Never   Vaping Use    Vaping Use: Never used   Substance and Sexual Activity    Alcohol use: No     Alcohol/week: 0.0 standard drinks     Comment: Stopped years ago.     Drug use: No     Comment: YEARS AGO    Sexual activity: Yes     Partners: Female     Comment: monogomous sexual partner, wife. Social History Narrative    Tobacco -- never smoked or chewed. Alcohol -- have not used for past 10 years, prior to had consumed 6 pack of beer daily. Drugs -- tried marijuana and cocaine 14 years ago occasionally used for 3-4 years and then stopped completely 10 years ago. Education -- completed 11th grade in [place].    Occupation -- [address] work,  at [employer].    Marital status --  44 years, 2 grown sons. Social Determinants of Health     Financial Resource Strain: Low Risk     Difficulty of Paying Living Expenses: Not hard at all   Food Insecurity: No Food Insecurity    Worried About [email or web address] in the Last Year: Never true    [address] in the Last Year: Never true   Transportation Needs: No Transportation Needs    Lack of Transportation (Medical): No    Lack of Transportation (Non-Medical): No   Physical Activity: Inactive    Days of Exercise per Week: 0 days    Minutes of Exercise per Session: 0 min       No Known Allergies    Current Outpatient Medications   Medication Sig Dispense Refill    atorvastatin (LIPITOR) 40 MG tablet Take 1 tablet by mouth daily 90 tablet 3    metoprolol tartrate (LOPRESSOR) 50 MG tablet Take 1 tablet by mouth 2 times daily 180 tablet 3    testosterone cypionate (DEPOTESTOTERONE CYPIONATE) 200 MG/ML injection INJECT 0.5MLS INTO THE MUSCLE EVERY 2 WEEKS 10 mL 0    imipramine (TOFRANIL) 25 MG tablet TAKE 1 TABLET BY MOUTH EVERY NIGHT 30 tablet 3    sildenafil (VIAGRA) 100 MG tablet Take 1 tablet by mouth as needed for Erectile Dysfunction 30 tablet 3    [START ON 1/20/2023] oxyCODONE-acetaminophen (PERCOCET)  MG per tablet Take 1 tablet by mouth every 6 hours as needed for Pain for up to 30 days.  Intended supply: 30 days 90 tablet 0    FreeStyle Lancets MISC PATEINT TO TEST ONCE DAILY 100 each 11    blood glucose test strips (ONETOUCH ULTRA) strip USE TO TEST BLOOD SUGAR EVERY  strip 11 baclofen (LIORESAL) 10 MG tablet TAKE 1 TABLET BY MOUTH THREE TIMES DAILY 90 tablet 1    NIFEdipine (PROCARDIA XL) 60 MG extended release tablet TAKE 1 TABLET BY MOUTH DAILY 90 tablet 3    metFORMIN (GLUCOPHAGE) 500 MG tablet TAKE 1 TABLET BY MOUTH TWICE DAILY WITH MEALS 180 tablet 3    metoprolol tartrate (LOPRESSOR) 50 MG tablet TAKE 1 TABLET BY MOUTH THREE TIMES DAILY 270 tablet 2    naloxone 4 MG/0.1ML LIQD nasal spray 1 spray by Nasal route as needed for Opioid Reversal 1 each 5    B-D 3CC LUER-JOSE SYR 93QZ9-5/2 22G X 1-1/2\" 3 ML MISC USE EVERY 2 WEEKS WITH TESTOSTERONE 20 each 6    colchicine (COLCRYS) 0.6 MG tablet TAKE 1 TABLET BY MOUTH DAILY DURING FLARE FOR GOUT FLARE 30 tablet 2    allopurinol (ZYLOPRIM) 100 MG tablet TAKE 1 TABLET BY MOUTH DAILY 90 tablet 3     MG capsule TK ONE C PO  BID      Alcohol Swabs (ALCOHOL PREP) 70 % PADS Patient to test once daily E11.9 100 each 11    glucose monitoring kit (FREESTYLE) monitoring kit 1 kit by Does not apply route daily 1 kit 0    nitroGLYCERIN (NITROSTAT) 0.4 MG SL tablet Place 1 tablet under the tongue every 5 minutes as needed x 3 doses for chest pain. 25 tablet 2    vitamin D (CHOLECALCIFEROL) 25 MCG (1000 UT) TABS tablet Take 1,000 Units by mouth daily      tamsulosin (FLOMAX) 0.4 MG capsule TAKE 1 CAPSULE DAILY AT BEDTIME      CPAP Machine MISC by NOT APPLICABLE route      aspirin 81 MG EC tablet Take 1 tablet by mouth daily 90 tablet 1    gabapentin (NEURONTIN) 300 MG capsule TAKE 1 CAPSULE BY MOUTH EVERY MORNING THEN TAKE 2 CAPSULES BY MOUTH AT BEDTIME 270 capsule 0     No current facility-administered medications for this visit. Review of Systems:   Review of Systems   Constitutional: Negative. Negative for diaphoresis and fatigue. HENT: Negative. Eyes: Negative. Respiratory: Negative. Negative for cough, chest tightness, shortness of breath, wheezing and stridor. Cardiovascular: Negative.   Negative for chest pain, palpitations and leg swelling. Gastrointestinal: Negative. Negative for blood in stool and nausea. Genitourinary: Negative. Musculoskeletal:  Positive for arthralgias and back pain. Skin: Negative. Neurological: Negative. Negative for dizziness, syncope, weakness and light-headedness. Hematological: Negative. Psychiatric/Behavioral: Negative. Physical Examination:    /78 (Site: Left Upper Arm, Position: Sitting, Cuff Size: Medium Adult)   Pulse 91   Wt 162 lb 3.2 oz (73.6 kg)   SpO2 97%   BMI 25.40 kg/m²    Physical Exam   Constitutional: He appears healthy. No distress. HENT:   Normal cephalic and Atraumatic   Eyes: Pupils are equal, round, and reactive to light. Neck: Thyroid normal. No JVD present. No neck adenopathy. No thyromegaly present. Cardiovascular: Normal rate, regular rhythm, intact distal pulses and normal pulses. Murmur heard. Pulmonary/Chest: Effort normal and breath sounds normal. He has no wheezes. He has no rales. He exhibits no tenderness. Abdominal: Soft. Bowel sounds are normal. There is no abdominal tenderness. Musculoskeletal:         General: No tenderness or edema. Normal range of motion. Cervical back: Normal range of motion and neck supple. Neurological: He is alert and oriented to person, place, and time. Skin: Skin is warm. No cyanosis. Nails show no clubbing.      LABS:  CBC:   Lab Results   Component Value Date/Time    WBC 5.7 11/23/2022 07:59 AM    RBC 4.62 11/23/2022 07:59 AM    HGB 15.0 11/23/2022 07:59 AM    HCT 45.5 11/23/2022 07:59 AM    MCV 98.5 11/23/2022 07:59 AM    MCH 32.5 11/23/2022 07:59 AM    MCHC 33.0 11/23/2022 07:59 AM    RDW 14.4 11/23/2022 07:59 AM     11/23/2022 07:59 AM    MPV 9.9 09/15/2015 09:02 AM     Lipids:  Lab Results   Component Value Date    CHOL 185 01/14/2022    CHOL 150 11/16/2020    CHOL 152 03/12/2018     Lab Results   Component Value Date    TRIG 122 01/14/2022    TRIG 96 11/16/2020 TRIG 157 03/12/2018     Lab Results   Component Value Date    HDL 45 01/14/2022    HDL 45 11/16/2020    HDL 42 05/24/2019     Lab Results   Component Value Date    LDLCALC 116 01/14/2022    LDLCALC 86 11/16/2020    LDLCALC 86 05/24/2019     No results found for: LABVLDL, VLDL  No results found for: CHOLHDLRATIO  CMP:    Lab Results   Component Value Date/Time     11/23/2022 08:14 AM    K 5.2 11/23/2022 08:14 AM    CL 99 11/23/2022 08:14 AM    CO2 32 11/23/2022 08:14 AM    BUN 13 11/23/2022 08:14 AM    CREATININE 0.93 11/23/2022 08:14 AM    GFRAA >60.0 06/18/2022 10:00 PM    LABGLOM >60.0 11/23/2022 08:14 AM    GLUCOSE 146 01/10/2023 09:07 AM    GLUCOSE 115 10/27/2020 11:38 AM    PROT 7.4 11/23/2022 08:14 AM    LABALBU 4.4 11/23/2022 08:14 AM    LABALBU 4.4 10/27/2020 11:38 AM    CALCIUM 9.7 11/23/2022 08:14 AM    BILITOT 0.5 11/23/2022 08:14 AM    ALKPHOS 67 11/23/2022 08:14 AM    AST 21 11/23/2022 08:14 AM    ALT 21 11/23/2022 08:14 AM     BMP:    Lab Results   Component Value Date/Time     11/23/2022 08:14 AM    K 5.2 11/23/2022 08:14 AM    CL 99 11/23/2022 08:14 AM    CO2 32 11/23/2022 08:14 AM    BUN 13 11/23/2022 08:14 AM    LABALBU 4.4 11/23/2022 08:14 AM    LABALBU 4.4 10/27/2020 11:38 AM    CREATININE 0.93 11/23/2022 08:14 AM    CALCIUM 9.7 11/23/2022 08:14 AM    GFRAA >60.0 06/18/2022 10:00 PM    LABGLOM >60.0 11/23/2022 08:14 AM    GLUCOSE 146 01/10/2023 09:07 AM    GLUCOSE 115 10/27/2020 11:38 AM     Magnesium:    Lab Results   Component Value Date/Time    MG 2.07 01/06/2020 10:56 AM     TSH:  Lab Results   Component Value Date    TSH 4.110 05/06/2016       Patient Active Problem List   Diagnosis    Chronic low back pain    Scoliosis of lumbar spine    Essential hypertension, benign    Hypercholesterolemia    Right lumbar radiculopathy    Gout    High risk medication use - 12/07/17 OARRS PM&R, 02/08/18 OARRS PM&R, 10/05/17 Urine Drug Screen: negative PM&R, MED CONTRACT 1/17/17    Low back pain with sciatica    Myalgia    Synovitis and tenosynovitis    Thoracic and lumbosacral neuritis    Vitamin D deficiency    Hyperparathyroidism ([address]    Osteopenia    C6 radiculopathy    DJD (degenerative joint disease), cervical    Angina pectoris (HCC)    PRASAD (obstructive sleep apnea)    Hyperkalemia    Chronic pain of both shoulders    Hypogonadism in male    Therapeutic opioid-induced constipation (OIC)    Bilateral leg pain    Other specified symptoms and signs involving the circulatory and respiratory systems     Myelopathy (HCC)    Coronary artery disease of native artery of native heart with stable angina pectoris (HCC)    Bilateral carotid bruits    Spinal cord disease (HCC)    Lower urinary tract symptoms (LUTS)    Impotence    Trouble getting to sleep    Bradycardia    Type 2 diabetes mellitus, without long-term current use of insulin (HCC)    Weak urinary stream    Onychodystrophy    Old myocardial infarction    Long term (current) use of aspirin    Other chronic pain    Presence of aortocoronary bypass graft    Acute URI       Medications Discontinued During This Encounter   Medication Reason    pravastatin (PRAVACHOL) 40 MG tablet     atorvastatin (LIPITOR) 40 MG tablet REORDER         Modified Medications    Modified Medication Previous Medication    ATORVASTATIN (LIPITOR) 40 MG TABLET atorvastatin (LIPITOR) 40 MG tablet       Take 1 tablet by mouth daily    Take 1 tablet by mouth daily       Orders Placed This Encounter   Medications    atorvastatin (LIPITOR) 40 MG tablet     Sig: Take 1 tablet by mouth daily     Dispense:  90 tablet     Refill:  3    metoprolol tartrate (LOPRESSOR) 50 MG tablet     Sig: Take 1 tablet by mouth 2 times daily     Dispense:  180 tablet     Refill:  3         Assessment/Plan:    1. Hypercholesterolemia  Statin - cotninue. Labs reviewed. Low fat diet. .. LDL not to goal.  adelina hirsch Prava and stat Atorva 40 qd.     2. Essential hypertension, benign   stable - continue meds.  Low salt diet    3. Coronary artery disease with hx of myocardial infarct w/o hx of CABG  No angina - continue CV meds. 4. Carotid Bruits- CUS - was cancelled due to COVID-19. Will reschedule. -- stable with mild plaque - will continue surveillance    5. Preop ED - implant at -did well. Counseling:  Heart Healthy Lifestyle, Low Salt Diet, Take Precautions to Prevent Falls and Walk Daily    No follow-ups on file.       Electronically signed by Shaneka Pineda MD on 1/19/2023 at 1:53 PM

## 2023-01-23 DIAGNOSIS — G95.9 SPINAL CORD DISEASE (HCC): ICD-10-CM

## 2023-01-23 DIAGNOSIS — G95.9 MYELOPATHY (HCC): ICD-10-CM

## 2023-01-23 DIAGNOSIS — M54.12 C6 RADICULOPATHY: ICD-10-CM

## 2023-01-23 DIAGNOSIS — M54.16 RIGHT LUMBAR RADICULOPATHY: ICD-10-CM

## 2023-01-23 DIAGNOSIS — Z79.899 HIGH RISK MEDICATION USE: ICD-10-CM

## 2023-01-24 RX ORDER — OXYCODONE AND ACETAMINOPHEN 10; 325 MG/1; MG/1
1 TABLET ORAL EVERY 6 HOURS PRN
Qty: 90 TABLET | Refills: 0 | Status: SHIPPED | OUTPATIENT
Start: 2023-02-18 | End: 2023-03-20

## 2023-01-30 ENCOUNTER — PROCEDURE VISIT (OUTPATIENT)
Dept: PHYSICAL MEDICINE AND REHAB | Age: 73
End: 2023-01-30
Payer: COMMERCIAL

## 2023-01-30 DIAGNOSIS — M89.8X1 PAIN OF RIGHT SCAPULA: Primary | ICD-10-CM

## 2023-01-30 PROCEDURE — 76942 ECHO GUIDE FOR BIOPSY: CPT | Performed by: PHYSICAL MEDICINE & REHABILITATION

## 2023-01-30 PROCEDURE — 64418 NJX AA&/STRD SPRSCAP NRV: CPT | Performed by: PHYSICAL MEDICINE & REHABILITATION

## 2023-01-30 RX ORDER — LIDOCAINE HYDROCHLORIDE 10 MG/ML
13 INJECTION, SOLUTION INFILTRATION; PERINEURAL ONCE
Status: COMPLETED | OUTPATIENT
Start: 2023-01-30 | End: 2023-01-30

## 2023-01-30 RX ADMIN — LIDOCAINE HYDROCHLORIDE 13 ML: 10 INJECTION, SOLUTION INFILTRATION; PERINEURAL at 12:17

## 2023-01-30 NOTE — PROGRESS NOTES
Patient here for Right scapular injection with U/S. Patient taken back to exam room, and placed on drape locking stool. Areas to be injected marked appropriately, and cleansed with alcohol. 13cc of 1% Lidocaine,  to be injected by provider.

## 2023-02-16 DIAGNOSIS — M79.604 BILATERAL LEG PAIN: ICD-10-CM

## 2023-02-16 DIAGNOSIS — M62.838 SPASM OF MUSCLE: ICD-10-CM

## 2023-02-16 DIAGNOSIS — M79.605 BILATERAL LEG PAIN: ICD-10-CM

## 2023-02-20 RX ORDER — BACLOFEN 10 MG/1
TABLET ORAL
Qty: 90 TABLET | Refills: 1 | Status: SHIPPED | OUTPATIENT
Start: 2023-02-20

## 2023-02-20 RX ORDER — GABAPENTIN 300 MG/1
CAPSULE ORAL
Qty: 270 CAPSULE | Refills: 0 | Status: SHIPPED | OUTPATIENT
Start: 2023-02-20 | End: 2023-05-21

## 2023-02-28 ASSESSMENT — ENCOUNTER SYMPTOMS
TROUBLE SWALLOWING: 0
ABDOMINAL PAIN: 0
BOWEL INCONTINENCE: 0
PHOTOPHOBIA: 0
BACK PAIN: 1
VISUAL CHANGE: 0

## 2023-02-28 NOTE — PROGRESS NOTES
narcotics and heat for the symptoms. The treatment provided mild relief. Leg Pain   Associated symptoms include numbness. Pertinent negatives include no muscle weakness or tingling. Foot Pain   This is a chronic problem. The current episode started more than 1 year ago. Associated symptoms include numbness. Pertinent negatives include no fever or tingling. Neck Pain   This is a recurrent problem. The current episode started more than 1 month ago. The problem occurs constantly. The problem has been gradually worsening. The pain is present in the occipital region. The quality of the pain is described as aching. The pain is at a severity of 6/10. The pain is moderate. The symptoms are aggravated by twisting. The pain is Same all the time. Stiffness is present In the morning. Associated symptoms include leg pain, numbness and weakness. Pertinent negatives include no chest pain, fever, headaches, pain with swallowing, paresis, photophobia, syncope, tingling, trouble swallowing or visual change. He has tried heat, acetaminophen, bed rest, home exercises, muscle relaxants, neck support, ice, NSAIDs and oral narcotics for the symptoms. The treatment provided moderate relief.      Past Medical History:   Diagnosis Date    Chronic back pain     Coronary artery disease 2002    Dr. Josias Lerma at [facility]    Esophageal ulcer 3/10/2014    Dr. Jared Ortiz    Gastric ulcer 3/10/2014    Dr. Jared Ortiz    Gout     Hiatal hernia 3/10/2014    Dr. Jared Ortiz    Hyperlipidemia     Hypertension     Impotence 10/16/2020    MI (myocardial infarction) [facility]) 2005    Dr. Josias Lerma    Osteoarthritis     Peripheral vascular disease ([address]     Prediabetes     Schizo-affective schizophrenia, in remission ([address] 2/1/2005    Overview:  followed at the [facility]    Severe single current episode of major depressive disorder, without psychotic features ([address] 7/8/2016    Status post coronary artery stent placement 2002

## 2023-03-06 ENCOUNTER — PROCEDURE VISIT (OUTPATIENT)
Dept: PHYSICAL MEDICINE AND REHAB | Age: 73
End: 2023-03-06

## 2023-03-06 ENCOUNTER — CLINICAL DOCUMENTATION (OUTPATIENT)
Dept: SPIRITUAL SERVICES | Age: 73
End: 2023-03-06

## 2023-03-06 DIAGNOSIS — M79.10 MYALGIA: ICD-10-CM

## 2023-03-06 RX ORDER — LIDOCAINE HYDROCHLORIDE 10 MG/ML
16 INJECTION, SOLUTION INFILTRATION; PERINEURAL ONCE
Status: COMPLETED | OUTPATIENT
Start: 2023-03-06 | End: 2023-03-06

## 2023-03-06 RX ADMIN — LIDOCAINE HYDROCHLORIDE 16 ML: 10 INJECTION, SOLUTION INFILTRATION; PERINEURAL at 14:08

## 2023-03-06 NOTE — ACP (ADVANCE CARE PLANNING)
Advance Care Planning   Ambulatory ACP Specialist Patient Outreach    Date:  3/6/2023  ACP Specialist:  ANTONIO Rao    Outreach call to patient in follow-up to ACP Specialist referral from: Rita Abel PA-C    [x] PCP  [] Provider   [] Ambulatory Care Management [] Other for Reason:    [x] Advance Directive Assistance  [] Code Status Discussion  [] Complete Portable DNR Order  [] Discuss Goals of Care  [] Complete POST/MOST  [] Early ACP Decision-Making  [] Other    Date Referral Received: 11/18/22    Today's Outreach:  [] First   [] Second  [x] Third                               Third outreach made by []  phone  [] email []   [employer]     Intervention:  [x] Spoke with Patient  [] Left VM requesting return call      Outcome: ACP Specialist called patient to confirm if he was going to see Dr. Lenny Santamaria today. Patient said he was going to see his doctor, but said he was \"too busy\" to meet  to discuss ACP. This referral will be closed. Next Step:   [] ACP scheduled conversation  [] Outreach again in one week               [] Email / Mail ACP Info Sheets  [] Email / Mail Advance Directive            [x] Close Referral. Routing closure to referring provider/staff and to ACP Specialist . [] Closure Letter mailed to Patient with Invitation to Contact ACP Specialist if/when ready.     Thank you for this referral.

## 2023-03-18 DIAGNOSIS — M1A.9XX0 CHRONIC GOUT WITHOUT TOPHUS, UNSPECIFIED CAUSE, UNSPECIFIED SITE: ICD-10-CM

## 2023-03-20 RX ORDER — ALLOPURINOL 100 MG/1
100 TABLET ORAL DAILY
Qty: 90 TABLET | Refills: 0 | Status: SHIPPED | OUTPATIENT
Start: 2023-03-20

## 2023-03-20 NOTE — TELEPHONE ENCOUNTER
Comments:     Last Office Visit (last PCP visit):   11/18/2022    Next Visit Date:  Future Appointments   Date Time Provider Alexis Arango   3/29/2023 10:45 AM Nishant Forbes DO [address] [place] [address]   5/19/2023  9:15 AM Pilar Wilson PA-C [facility]   5/22/2023  3:15 PM Denise Cast  [facility]   7/11/2023  9:30 AM Annmarie Cruz MD [address] [place] [address]   12/19/2023  9:30 AM [facility]       **If hasn't been seen in over a year OR hasn't followed up according to last diabetes/ADHD visit, make appointment for patient before sending refill to provider.     Rx requested:  Requested Prescriptions     Pending Prescriptions Disp Refills    allopurinol (ZYLOPRIM) 100 MG tablet [Pharmacy Med Name: ALLOPURINOL 100MG TABLETS] 90 tablet 3     Sig: TAKE 1 TABLET BY MOUTH DAILY

## 2023-03-22 DIAGNOSIS — M54.12 C6 RADICULOPATHY: ICD-10-CM

## 2023-03-22 DIAGNOSIS — G95.9 MYELOPATHY (HCC): ICD-10-CM

## 2023-03-22 DIAGNOSIS — G95.9 SPINAL CORD DISEASE (HCC): ICD-10-CM

## 2023-03-22 DIAGNOSIS — Z79.899 HIGH RISK MEDICATION USE: ICD-10-CM

## 2023-03-22 DIAGNOSIS — M54.16 RIGHT LUMBAR RADICULOPATHY: ICD-10-CM

## 2023-03-22 RX ORDER — OXYCODONE AND ACETAMINOPHEN 10; 325 MG/1; MG/1
1 TABLET ORAL EVERY 6 HOURS PRN
Qty: 90 TABLET | Refills: 0 | Status: SHIPPED | OUTPATIENT
Start: 2023-03-22 | End: 2023-04-21

## 2023-03-29 ENCOUNTER — OFFICE VISIT (OUTPATIENT)
Dept: PHYSICAL MEDICINE AND REHAB | Age: 73
End: 2023-03-29
Payer: COMMERCIAL

## 2023-03-29 VITALS
DIASTOLIC BLOOD PRESSURE: 70 MMHG | HEIGHT: 67 IN | WEIGHT: 162 LBS | BODY MASS INDEX: 25.43 KG/M2 | SYSTOLIC BLOOD PRESSURE: 155 MMHG

## 2023-03-29 DIAGNOSIS — M54.17 THORACIC AND LUMBOSACRAL NEURITIS: ICD-10-CM

## 2023-03-29 DIAGNOSIS — F11.20 OPIOID DEPENDENCE WITH CURRENT USE (HCC): ICD-10-CM
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
## 2023-03-29 DIAGNOSIS — Z78.9 ACTIVITY OF DAILY LIVING ALTERATION: ICD-10-CM

## 2023-03-29 DIAGNOSIS — M79.10 MYALGIA: ICD-10-CM

## 2023-03-29 DIAGNOSIS — M54.14 THORACIC AND LUMBOSACRAL NEURITIS: ICD-10-CM

## 2023-03-29 DIAGNOSIS — G95.9 SPINAL CORD DISEASE (HCC): ICD-10-CM

## 2023-03-29 DIAGNOSIS — M54.40 CHRONIC RIGHT-SIDED LOW BACK PAIN WITH SCIATICA, SCIATICA LATERALITY UNSPECIFIED: ICD-10-CM

## 2023-03-29 DIAGNOSIS — G89.29 CHRONIC RIGHT-SIDED LOW BACK PAIN WITH SCIATICA, SCIATICA LATERALITY UNSPECIFIED: ICD-10-CM

## 2023-03-29 DIAGNOSIS — Z79.899 HIGH RISK MEDICATION USE: ICD-10-CM

## 2023-03-29 DIAGNOSIS — M54.16 RIGHT LUMBAR RADICULOPATHY: ICD-10-CM

## 2023-03-29 DIAGNOSIS — G89.29 OTHER CHRONIC PAIN: Primary | ICD-10-CM

## 2023-03-29 DIAGNOSIS — G95.9 MYELOPATHY (HCC): ICD-10-CM

## 2023-03-29 DIAGNOSIS — M54.12 C6 RADICULOPATHY: ICD-10-CM

## 2023-03-29 PROCEDURE — 99214 OFFICE O/P EST MOD 30 MIN: CPT | Performed by: PHYSICAL MEDICINE & REHABILITATION

## 2023-03-29 PROCEDURE — 1123F ACP DISCUSS/DSCN MKR DOCD: CPT | Performed by: PHYSICAL MEDICINE & REHABILITATION

## 2023-03-29 PROCEDURE — 3077F SYST BP >= 140 MM HG: CPT | Performed by: PHYSICAL MEDICINE & REHABILITATION

## 2023-03-29 PROCEDURE — 3078F DIAST BP <80 MM HG: CPT | Performed by: PHYSICAL MEDICINE & REHABILITATION

## 2023-03-29 RX ORDER — OXYCODONE AND ACETAMINOPHEN 10; 325 MG/1; MG/1
1 TABLET ORAL EVERY 6 HOURS PRN
Qty: 90 TABLET | Refills: 0 | Status: SHIPPED | OUTPATIENT
Start: 2023-04-21 | End: 2023-05-21

## 2023-04-05 ENCOUNTER — PROCEDURE VISIT (OUTPATIENT)
Dept: PHYSICAL MEDICINE AND REHAB | Age: 73
End: 2023-04-05

## 2023-04-05 DIAGNOSIS — M79.10 MYALGIA: ICD-10-CM

## 2023-04-05 RX ORDER — CYANOCOBALAMIN 1000 UG/ML
1000 INJECTION, SOLUTION INTRAMUSCULAR; SUBCUTANEOUS ONCE
Status: COMPLETED | OUTPATIENT
Start: 2023-04-05 | End: 2023-04-05

## 2023-04-05 RX ORDER — LIDOCAINE HYDROCHLORIDE 10 MG/ML
15 INJECTION, SOLUTION INFILTRATION; PERINEURAL ONCE
Status: COMPLETED | OUTPATIENT
Start: 2023-04-05 | End: 2023-04-05

## 2023-04-05 RX ADMIN — CYANOCOBALAMIN 1000 MCG: 1000 INJECTION, SOLUTION INTRAMUSCULAR; SUBCUTANEOUS at 11:35

## 2023-04-05 RX ADMIN — LIDOCAINE HYDROCHLORIDE 15 ML: 10 INJECTION, SOLUTION INFILTRATION; PERINEURAL at 11:38

## 2023-04-24 DIAGNOSIS — G95.9 MYELOPATHY (HCC): ICD-10-CM

## 2023-04-24 DIAGNOSIS — G95.9 SPINAL CORD DISEASE (HCC): ICD-10-CM

## 2023-04-24 DIAGNOSIS — M54.12 C6 RADICULOPATHY: ICD-10-CM

## 2023-04-24 DIAGNOSIS — M54.16 RIGHT LUMBAR RADICULOPATHY: ICD-10-CM

## 2023-04-24 DIAGNOSIS — Z79.899 HIGH RISK MEDICATION USE: ICD-10-CM

## 2023-04-24 RX ORDER — OXYCODONE AND ACETAMINOPHEN 10; 325 MG/1; MG/1
1 TABLET ORAL EVERY 6 HOURS PRN
Qty: 90 TABLET | Refills: 0 | Status: SHIPPED | OUTPATIENT
Start: 2023-04-24 | End: 2023-05-24

## 2023-05-08 ENCOUNTER — PROCEDURE VISIT (OUTPATIENT)
Dept: PHYSICAL MEDICINE AND REHAB | Age: 73
End: 2023-05-08
Payer: COMMERCIAL

## 2023-05-08 DIAGNOSIS — M79.10 MYALGIA: ICD-10-CM

## 2023-05-08 PROCEDURE — 96372 THER/PROPH/DIAG INJ SC/IM: CPT | Performed by: PHYSICAL MEDICINE & REHABILITATION

## 2023-05-08 PROCEDURE — 20553 NJX 1/MLT TRIGGER POINTS 3/>: CPT | Performed by: PHYSICAL MEDICINE & REHABILITATION

## 2023-05-08 RX ORDER — CYANOCOBALAMIN 1000 UG/ML
1000 INJECTION, SOLUTION INTRAMUSCULAR; SUBCUTANEOUS ONCE
Status: COMPLETED | OUTPATIENT
Start: 2023-05-08 | End: 2023-05-08

## 2023-05-08 RX ORDER — LIDOCAINE HYDROCHLORIDE 10 MG/ML
15 INJECTION, SOLUTION INFILTRATION; PERINEURAL ONCE
Status: COMPLETED | OUTPATIENT
Start: 2023-05-08 | End: 2023-05-08

## 2023-05-08 RX ADMIN — CYANOCOBALAMIN 1000 MCG: 1000 INJECTION, SOLUTION INTRAMUSCULAR; SUBCUTANEOUS at 15:51

## 2023-05-08 RX ADMIN — LIDOCAINE HYDROCHLORIDE 15 ML: 10 INJECTION, SOLUTION INFILTRATION; PERINEURAL at 15:52

## 2023-05-08 NOTE — PROGRESS NOTES
Patient here for trigger point injections. Patient taken back to exam room, and placed on drape locking stool. Areas to be injected marked appropriately, and cleansed with alcohol. 15cc of 1% Lidocaine and b12 to be injected by provider.

## 2023-05-17 DIAGNOSIS — M79.604 BILATERAL LEG PAIN: ICD-10-CM

## 2023-05-17 DIAGNOSIS — M62.838 SPASM OF MUSCLE: ICD-10-CM

## 2023-05-17 DIAGNOSIS — M79.605 BILATERAL LEG PAIN: ICD-10-CM

## 2023-05-17 RX ORDER — BACLOFEN 10 MG/1
TABLET ORAL
Qty: 90 TABLET | Refills: 1 | Status: SHIPPED | OUTPATIENT
Start: 2023-05-17

## 2023-05-19 ENCOUNTER — OFFICE VISIT (OUTPATIENT)
Dept: FAMILY MEDICINE CLINIC | Age: 73
End: 2023-05-19
Payer: COMMERCIAL

## 2023-05-19 VITALS
WEIGHT: 159 LBS | HEIGHT: 67 IN | DIASTOLIC BLOOD PRESSURE: 80 MMHG | OXYGEN SATURATION: 99 % | HEART RATE: 67 BPM | SYSTOLIC BLOOD PRESSURE: 138 MMHG | RESPIRATION RATE: 16 BRPM | BODY MASS INDEX: 24.96 KG/M2

## 2023-05-19 DIAGNOSIS — M25.511 CHRONIC PAIN OF BOTH SHOULDERS: ICD-10-CM

## 2023-05-19 DIAGNOSIS — M10.00 ACUTE IDIOPATHIC GOUT, UNSPECIFIED SITE: ICD-10-CM

## 2023-05-19 DIAGNOSIS — I10 ESSENTIAL HYPERTENSION, BENIGN: ICD-10-CM

## 2023-05-19 DIAGNOSIS — I25.118 CORONARY ARTERY DISEASE OF NATIVE ARTERY OF NATIVE HEART WITH STABLE ANGINA PECTORIS (HCC): ICD-10-CM

## 2023-05-19 DIAGNOSIS — G47.33 OSA (OBSTRUCTIVE SLEEP APNEA): ICD-10-CM

## 2023-05-19 DIAGNOSIS — E29.1 HYPOGONADISM IN MALE: ICD-10-CM

## 2023-05-19 DIAGNOSIS — E78.00 HYPERCHOLESTEROLEMIA: Chronic | ICD-10-CM

## 2023-05-19 DIAGNOSIS — M25.512 CHRONIC PAIN OF BOTH SHOULDERS: ICD-10-CM

## 2023-05-19 DIAGNOSIS — E11.9 TYPE 2 DIABETES MELLITUS WITHOUT COMPLICATION, WITHOUT LONG-TERM CURRENT USE OF INSULIN (HCC): ICD-10-CM

## 2023-05-19 DIAGNOSIS — E55.9 VITAMIN D DEFICIENCY: ICD-10-CM

## 2023-05-19 DIAGNOSIS — G89.29 CHRONIC PAIN OF BOTH SHOULDERS: ICD-10-CM

## 2023-05-19 DIAGNOSIS — E11.9 TYPE 2 DIABETES MELLITUS WITHOUT COMPLICATION, WITHOUT LONG-TERM CURRENT USE OF INSULIN (HCC): Primary | ICD-10-CM

## 2023-05-19 DIAGNOSIS — E21.3 HYPERPARATHYROIDISM (HCC): ICD-10-CM

## 2023-05-19 LAB
ALBUMIN SERPL-MCNC: 4.6 G/DL (ref 3.5–4.6)
ALP SERPL-CCNC: 65 U/L (ref 35–104)
ALT SERPL-CCNC: 27 U/L (ref 0–41)
ANION GAP SERPL CALCULATED.3IONS-SCNC: 10 MEQ/L (ref 9–15)
AST SERPL-CCNC: 33 U/L (ref 0–40)
BILIRUB SERPL-MCNC: 0.5 MG/DL (ref 0.2–0.7)
BUN SERPL-MCNC: 18 MG/DL (ref 8–23)
CALCIUM SERPL-MCNC: 9.9 MG/DL (ref 8.5–9.9)
CHLORIDE SERPL-SCNC: 96 MEQ/L (ref 95–107)
CHOLEST SERPL-MCNC: 116 MG/DL (ref 0–199)
CO2 SERPL-SCNC: 31 MEQ/L (ref 20–31)
CREAT SERPL-MCNC: 1.05 MG/DL (ref 0.7–1.2)
ERYTHROCYTE [DISTWIDTH] IN BLOOD BY AUTOMATED COUNT: 14.9 % (ref 11.5–14.5)
GLOBULIN SER CALC-MCNC: 3.1 G/DL (ref 2.3–3.5)
GLUCOSE SERPL-MCNC: 102 MG/DL (ref 70–99)
HBA1C MFR BLD: 6 % (ref 4.8–5.9)
HCT VFR BLD AUTO: 47 % (ref 42–52)
HDLC SERPL-MCNC: 47 MG/DL (ref 40–59)
HGB BLD-MCNC: 15.8 G/DL (ref 14–18)
LDLC SERPL CALC-MCNC: 55 MG/DL (ref 0–129)
MCH RBC QN AUTO: 33.1 PG (ref 27–31.3)
MCHC RBC AUTO-ENTMCNC: 33.6 % (ref 33–37)
MCV RBC AUTO: 98.5 FL (ref 79–92.2)
PLATELET # BLD AUTO: 283 K/UL (ref 130–400)
POTASSIUM SERPL-SCNC: 5.1 MEQ/L (ref 3.4–4.9)
PROT SERPL-MCNC: 7.7 G/DL (ref 6.3–8)
RBC # BLD AUTO: 4.77 M/UL (ref 4.7–6.1)
SHBG SERPL-SCNC: 54 NMOL/L (ref 11–80)
SODIUM SERPL-SCNC: 137 MEQ/L (ref 135–144)
TESTOST FREE SERPL-MCNC: 137.1 PG/ML (ref 47–244)
TESTOST SERPL-MCNC: 823 NG/DL (ref 220–1000)
TRIGL SERPL-MCNC: 68 MG/DL (ref 0–150)
URATE SERPL-MCNC: 5.7 MG/DL (ref 3.4–7)
VITAMIN D 25-HYDROXY: 39.2 NG/ML
WBC # BLD AUTO: 5.1 K/UL (ref 4.8–10.8)

## 2023-05-19 PROCEDURE — 3079F DIAST BP 80-89 MM HG: CPT | Performed by: PHYSICIAN ASSISTANT

## 2023-05-19 PROCEDURE — 99214 OFFICE O/P EST MOD 30 MIN: CPT | Performed by: PHYSICIAN ASSISTANT

## 2023-05-19 PROCEDURE — 3075F SYST BP GE 130 - 139MM HG: CPT | Performed by: PHYSICIAN ASSISTANT

## 2023-05-19 PROCEDURE — 1123F ACP DISCUSS/DSCN MKR DOCD: CPT | Performed by: PHYSICIAN ASSISTANT

## 2023-05-19 SDOH — ECONOMIC STABILITY: FOOD INSECURITY: WITHIN THE PAST 12 MONTHS, YOU WORRIED THAT YOUR FOOD WOULD RUN OUT BEFORE YOU GOT MONEY TO BUY MORE.: NEVER TRUE

## 2023-05-19 SDOH — ECONOMIC STABILITY: HOUSING INSECURITY
IN THE LAST 12 MONTHS, WAS THERE A TIME WHEN YOU DID NOT HAVE A STEADY PLACE TO SLEEP OR SLEPT IN A SHELTER (INCLUDING NOW)?: NO

## 2023-05-19 SDOH — ECONOMIC STABILITY: INCOME INSECURITY: HOW HARD IS IT FOR YOU TO PAY FOR THE VERY BASICS LIKE FOOD, HOUSING, MEDICAL CARE, AND HEATING?: NOT HARD AT ALL

## 2023-05-19 SDOH — ECONOMIC STABILITY: FOOD INSECURITY: WITHIN THE PAST 12 MONTHS, THE FOOD YOU BOUGHT JUST DIDN'T LAST AND YOU DIDN'T HAVE MONEY TO GET MORE.: NEVER TRUE

## 2023-05-19 ASSESSMENT — ENCOUNTER SYMPTOMS
SHORTNESS OF BREATH: 0
COLOR CHANGE: 0
SINUS PAIN: 0
WHEEZING: 0
COUGH: 0
CONSTIPATION: 0
BLOOD IN STOOL: 0
ABDOMINAL PAIN: 0
SINUS PRESSURE: 0
CHEST TIGHTNESS: 0
RECTAL PAIN: 0
VOMITING: 0
BACK PAIN: 1
ABDOMINAL DISTENTION: 0
DIARRHEA: 0

## 2023-05-19 ASSESSMENT — PATIENT HEALTH QUESTIONNAIRE - PHQ9
SUM OF ALL RESPONSES TO PHQ QUESTIONS 1-9: 0
2. FEELING DOWN, DEPRESSED OR HOPELESS: 0
SUM OF ALL RESPONSES TO PHQ QUESTIONS 1-9: 0
1. LITTLE INTEREST OR PLEASURE IN DOING THINGS: 0
SUM OF ALL RESPONSES TO PHQ9 QUESTIONS 1 & 2: 0
SUM OF ALL RESPONSES TO PHQ QUESTIONS 1-9: 0
SUM OF ALL RESPONSES TO PHQ QUESTIONS 1-9: 0

## 2023-05-19 NOTE — PROGRESS NOTES
Loyd, 71 y.o. male presents today with:  Chief Complaint   Patient presents with    6 Month Follow-Up    Fatigue     States he has been feeling weak and dizziness     Fall     Had a fall off is bed x2 months ago and hurt leg        HPI  Last OV with me: 11/18/22. Due for routine labs. Overall, feeling 'ok'. Had a fall about 2 months ago. Injured L leg. This has since healed, would like this just checked today. C/o bilateral shoulder pain. This is chronic. Denies recent injury/fall. R shoulder worse than L. Last imaging was in 2019. Has received intraarticular injections without proper relief. Pain is worse at the end of the day, 'aching' type pain. Takes percocet per PMR without much relief. Has noticed increased fatigue. Admits to not using his CPAP machine in a long time. On average, sleeps about 5 hours/night. Wife has witnessed patient snoring, 'making funny noises'. Needs follow up in sleep clinic. Bradycardia/CAD/HTN. Denies CP, GUILLORY, SOB. Currently taking 50 mg metoprolol BID. Recently had carotid US. Stable stenosis. Low testosterone/prediabetes. Managed by endocrinology. Continue with testosterone q 14 days. No side effects from medication. Checks blood sugars 1-2 times/day. Fasting 100 or less. No hypo/hyperglycemic episodes. Gout. Stable on allopurinol. Denies any recent flares or unilateral joint swelling/erythema. Opioid induced constipation. Manageable at this time with regimen of warm milk of mag and stool softener. BM every other day, soft, well-formed. Denies abdominal bloating/pain. This has improved. Chronic back pain. Followed by PMR. Receives injections with Dr. Quique Teran. No fall/injury/trauma. Pain is controlled with injections, medication. Review of Systems   Constitutional:  Positive for fatigue. Negative for activity change, appetite change, chills and diaphoresis.    HENT:

## 2023-05-22 ENCOUNTER — OFFICE VISIT (OUTPATIENT)
Dept: CARDIOLOGY CLINIC | Age: 73
End: 2023-05-22
Payer: COMMERCIAL

## 2023-05-22 VITALS
DIASTOLIC BLOOD PRESSURE: 78 MMHG | OXYGEN SATURATION: 98 % | WEIGHT: 162 LBS | BODY MASS INDEX: 25.37 KG/M2 | SYSTOLIC BLOOD PRESSURE: 134 MMHG | HEART RATE: 70 BPM

## 2023-05-22 DIAGNOSIS — R00.1 BRADYCARDIA: ICD-10-CM

## 2023-05-22 DIAGNOSIS — R09.89 BILATERAL CAROTID BRUITS: ICD-10-CM

## 2023-05-22 DIAGNOSIS — I25.118 CORONARY ARTERY DISEASE OF NATIVE ARTERY OF NATIVE HEART WITH STABLE ANGINA PECTORIS (HCC): ICD-10-CM

## 2023-05-22 DIAGNOSIS — I10 ESSENTIAL HYPERTENSION, BENIGN: Primary | ICD-10-CM

## 2023-05-22 DIAGNOSIS — E78.00 HYPERCHOLESTEROLEMIA: ICD-10-CM

## 2023-05-22 PROCEDURE — 3078F DIAST BP <80 MM HG: CPT | Performed by: INTERNAL MEDICINE

## 2023-05-22 PROCEDURE — 99204 OFFICE O/P NEW MOD 45 MIN: CPT | Performed by: INTERNAL MEDICINE

## 2023-05-22 PROCEDURE — 1123F ACP DISCUSS/DSCN MKR DOCD: CPT | Performed by: INTERNAL MEDICINE

## 2023-05-22 PROCEDURE — 3075F SYST BP GE 130 - 139MM HG: CPT | Performed by: INTERNAL MEDICINE

## 2023-05-22 ASSESSMENT — ENCOUNTER SYMPTOMS
COUGH: 0
EYES NEGATIVE: 1
SHORTNESS OF BREATH: 0
BLOOD IN STOOL: 0
BACK PAIN: 1
RESPIRATORY NEGATIVE: 1
GASTROINTESTINAL NEGATIVE: 1
WHEEZING: 0
NAUSEA: 0
CHEST TIGHTNESS: 0
STRIDOR: 0

## 2023-05-22 NOTE — PROGRESS NOTES
12/07/17 OARRS PM&R, 02/08/18 OARRS PM&R, 10/05/17 Urine Drug Screen: negative PM&R, MED CONTRACT 1/17/17    Low back pain with sciatica    Myalgia    Synovitis and tenosynovitis    Thoracic and lumbosacral neuritis    Vitamin D deficiency    Hyperparathyroidism ([facility]    Osteopenia    C6 radiculopathy    DJD (degenerative joint disease), cervical    Angina pectoris (HCC)    PRASAD (obstructive sleep apnea)    Hyperkalemia    Chronic pain of both shoulders    Hypogonadism in male    Therapeutic opioid-induced constipation (OIC)    Bilateral leg pain    Other specified symptoms and signs involving the circulatory and respiratory systems     Myelopathy (HCC)    Coronary artery disease of native artery of native heart with stable angina pectoris (HCC)    Bilateral carotid bruits    Spinal cord disease ([facility]    Lower urinary tract symptoms (LUTS)    Impotence    Trouble getting to sleep    Bradycardia    Type 2 diabetes mellitus, without long-term current use of insulin (HCC)    Weak urinary stream    Onychodystrophy    Old myocardial infarction    Long term (current) use of aspirin    Other chronic pain    Presence of aortocoronary bypass graft    Acute URI    Opioid dependence with current use ([facility] [address] [facility]       There are no discontinued medications. Modified Medications    No medications on file       No orders of the defined types were placed in this encounter. Assessment/Plan:    1. Hypercholesterolemia  Statin - cotninue. Labs reviewed. Low fat diet. .. LDL not to goal.  adelina dc Prava and stat Atorva 40 qd.     2. Essential hypertension, benign   stable - continue meds. Low salt diet    3. Coronary artery disease with hx of myocardial infarct w/o hx of CABG  No angina - continue CV meds. 4. Carotid Bruits- CUS - was cancelled due to COVID-19. Will reschedule. -- stable with mild plaque - will continue surveillance    5. Preop ED - implant at -did well.    Counseling:  Heart Healthy Lifestyle, Low Salt Diet,

## 2023-05-23 DIAGNOSIS — Z79.899 HIGH RISK MEDICATION USE: ICD-10-CM

## 2023-05-23 DIAGNOSIS — G95.9 MYELOPATHY (HCC): ICD-10-CM

## 2023-05-23 DIAGNOSIS — M54.16 RIGHT LUMBAR RADICULOPATHY: ICD-10-CM

## 2023-05-23 DIAGNOSIS — M54.12 C6 RADICULOPATHY: ICD-10-CM

## 2023-05-23 DIAGNOSIS — G95.9 SPINAL CORD DISEASE (HCC): ICD-10-CM

## 2023-05-24 RX ORDER — OXYCODONE AND ACETAMINOPHEN 10; 325 MG/1; MG/1
1 TABLET ORAL EVERY 6 HOURS PRN
Qty: 90 TABLET | Refills: 0 | Status: SHIPPED | OUTPATIENT
Start: 2023-05-24 | End: 2023-06-23

## 2023-06-07 ENCOUNTER — PROCEDURE VISIT (OUTPATIENT)
Dept: PHYSICAL MEDICINE AND REHAB | Age: 73
End: 2023-06-07

## 2023-06-07 DIAGNOSIS — M79.10 MYALGIA: Primary | ICD-10-CM

## 2023-06-07 RX ORDER — LIDOCAINE HYDROCHLORIDE 10 MG/ML
15 INJECTION, SOLUTION INFILTRATION; PERINEURAL ONCE
Status: COMPLETED | OUTPATIENT
Start: 2023-06-07 | End: 2023-06-07

## 2023-06-07 RX ORDER — CYANOCOBALAMIN 1000 UG/ML
1000 INJECTION, SOLUTION INTRAMUSCULAR; SUBCUTANEOUS ONCE
Status: COMPLETED | OUTPATIENT
Start: 2023-06-07 | End: 2023-06-07

## 2023-06-07 RX ADMIN — CYANOCOBALAMIN 1000 MCG: 1000 INJECTION, SOLUTION INTRAMUSCULAR; SUBCUTANEOUS at 11:32

## 2023-06-07 RX ADMIN — LIDOCAINE HYDROCHLORIDE 15 ML: 10 INJECTION, SOLUTION INFILTRATION; PERINEURAL at 11:34

## 2023-06-16 DIAGNOSIS — M1A.9XX0 CHRONIC GOUT WITHOUT TOPHUS, UNSPECIFIED CAUSE, UNSPECIFIED SITE: ICD-10-CM

## 2023-06-19 ENCOUNTER — OFFICE VISIT (OUTPATIENT)
Dept: PULMONOLOGY | Age: 73
End: 2023-06-19
Payer: COMMERCIAL

## 2023-06-19 VITALS
WEIGHT: 165 LBS | HEART RATE: 66 BPM | OXYGEN SATURATION: 97 % | SYSTOLIC BLOOD PRESSURE: 132 MMHG | DIASTOLIC BLOOD PRESSURE: 64 MMHG | BODY MASS INDEX: 25.84 KG/M2 | TEMPERATURE: 97.7 F

## 2023-06-19 DIAGNOSIS — G47.33 OSA (OBSTRUCTIVE SLEEP APNEA): Primary | ICD-10-CM

## 2023-06-19 PROCEDURE — 99204 OFFICE O/P NEW MOD 45 MIN: CPT | Performed by: INTERNAL MEDICINE

## 2023-06-19 PROCEDURE — 3078F DIAST BP <80 MM HG: CPT | Performed by: INTERNAL MEDICINE

## 2023-06-19 PROCEDURE — 3075F SYST BP GE 130 - 139MM HG: CPT | Performed by: INTERNAL MEDICINE

## 2023-06-19 RX ORDER — ALLOPURINOL 100 MG/1
100 TABLET ORAL DAILY
Qty: 90 TABLET | Refills: 0 | Status: SHIPPED | OUTPATIENT
Start: 2023-06-19

## 2023-06-19 ASSESSMENT — ENCOUNTER SYMPTOMS
CHEST TIGHTNESS: 0
SHORTNESS OF BREATH: 0
VOICE CHANGE: 0
WHEEZING: 0
COUGH: 0
RHINORRHEA: 0
VOMITING: 0
NAUSEA: 0
DIARRHEA: 0
ABDOMINAL PAIN: 0
SORE THROAT: 0
EYE ITCHING: 0
APNEA: 1

## 2023-06-19 NOTE — PROGRESS NOTES
Subjective:     Mark Wilson is a 71 y.o. male who complains today of:     Chief Complaint   Patient presents with    New Patient     Ref by PCP Rosalinda to f/u on PRASAD, pt hasn't used machine since 2018 because he feels claustrophobic with the full face mask       HPI   He has PRASAD he has not used CPAP since 2018. He had moderate PRASAD  with AHI 26    He is complaining of snoring, no daytime sleepiness and tiredness. C/o witness apnea. He wakes up with gasping for air. C/o wakes up frequently during sleep. No complaint of morning headache. He does not have restful sleep. He does not take daily naps. He does  fall asleep while watching TV. He does  have a complaint of sleepiness while driving. Allergies:  Patient has no known allergies.   Past Medical History:   Diagnosis Date    Chronic back pain     Coronary artery disease 2002    Dr. Martina Jaramillo at [facility]    Esophageal ulcer 3/10/2014    Dr. Breezy Elias    Gastric ulcer 3/10/2014    Dr. Breezy Elias    Gout     Hiatal hernia 3/10/2014    Dr. Breezy Elias    Hyperlipidemia     Hypertension     Impotence 10/16/2020    MI (myocardial infarction) [facility]) 2005    Dr. Martina Jaramillo    Osteoarthritis     Peripheral vascular disease ([facility] [address]     Prediabetes     Schizo-affective schizophrenia, in remission ([address] 2/1/2005    Overview:  followed at the [facility]    Severe single current episode of major depressive disorder, without psychotic features ([address] 7/8/2016    Status post coronary artery stent placement 2002    Dr. Martina Jaramillo     Past Surgical History:   Procedure Laterality Date    [address] [facility]  01/03/2020    Dr. Nabeel Jain Right 6/4/2019    RIGHT WRIST CARPAL TUNNEL RELEASE, SUPINE, PAT AT PCP performed by Denia Douglas MD at [address] [facility]  3/10/14    DR Esteban Disla  2002    Dr. Aletha Sam

## 2023-06-21 DIAGNOSIS — G95.9 SPINAL CORD DISEASE (HCC): ICD-10-CM

## 2023-06-21 DIAGNOSIS — Z79.899 HIGH RISK MEDICATION USE: ICD-10-CM

## 2023-06-21 DIAGNOSIS — M54.16 RIGHT LUMBAR RADICULOPATHY: ICD-10-CM

## 2023-06-21 DIAGNOSIS — M54.12 C6 RADICULOPATHY: ICD-10-CM

## 2023-06-21 DIAGNOSIS — G95.9 MYELOPATHY (HCC): ICD-10-CM

## 2023-06-22 DIAGNOSIS — Z79.899 HIGH RISK MEDICATION USE: ICD-10-CM

## 2023-06-22 DIAGNOSIS — M54.12 C6 RADICULOPATHY: ICD-10-CM

## 2023-06-22 DIAGNOSIS — G95.9 SPINAL CORD DISEASE (HCC): ICD-10-CM

## 2023-06-22 DIAGNOSIS — G95.9 MYELOPATHY (HCC): ICD-10-CM

## 2023-06-22 DIAGNOSIS — M54.16 RIGHT LUMBAR RADICULOPATHY: ICD-10-CM

## 2023-06-22 RX ORDER — OXYCODONE AND ACETAMINOPHEN 10; 325 MG/1; MG/1
1 TABLET ORAL EVERY 6 HOURS PRN
Qty: 90 TABLET | Refills: 0 | Status: SHIPPED | OUTPATIENT
Start: 2023-06-22 | End: 2023-07-22

## 2023-07-11 ENCOUNTER — OFFICE VISIT (OUTPATIENT)
Dept: ENDOCRINOLOGY | Age: 73
End: 2023-07-11
Payer: MEDICARE

## 2023-07-11 VITALS
BODY MASS INDEX: 23.86 KG/M2 | DIASTOLIC BLOOD PRESSURE: 68 MMHG | WEIGHT: 152 LBS | OXYGEN SATURATION: 96 % | HEART RATE: 62 BPM | HEIGHT: 67 IN | SYSTOLIC BLOOD PRESSURE: 108 MMHG

## 2023-07-11 DIAGNOSIS — E29.1 HYPOGONADISM MALE: Primary | ICD-10-CM

## 2023-07-11 DIAGNOSIS — E11.9 TYPE 2 DIABETES MELLITUS WITHOUT COMPLICATION, WITHOUT LONG-TERM CURRENT USE OF INSULIN (HCC): ICD-10-CM

## 2023-07-11 LAB
CHP ED QC CHECK: NORMAL
GLUCOSE BLD-MCNC: 165 MG/DL

## 2023-07-11 PROCEDURE — 99213 OFFICE O/P EST LOW 20 MIN: CPT | Performed by: INTERNAL MEDICINE

## 2023-07-11 PROCEDURE — 1123F ACP DISCUSS/DSCN MKR DOCD: CPT | Performed by: INTERNAL MEDICINE

## 2023-07-11 PROCEDURE — 3074F SYST BP LT 130 MM HG: CPT | Performed by: INTERNAL MEDICINE

## 2023-07-11 PROCEDURE — 3044F HG A1C LEVEL LT 7.0%: CPT | Performed by: INTERNAL MEDICINE

## 2023-07-11 PROCEDURE — 3078F DIAST BP <80 MM HG: CPT | Performed by: INTERNAL MEDICINE

## 2023-07-11 PROCEDURE — 82962 GLUCOSE BLOOD TEST: CPT | Performed by: INTERNAL MEDICINE

## 2023-07-11 RX ORDER — TESTOSTERONE CYPIONATE 200 MG/ML
INJECTION, SOLUTION INTRAMUSCULAR
Qty: 10 ML | Refills: 0 | Status: SHIPPED | OUTPATIENT
Start: 2023-07-11 | End: 2024-01-05

## 2023-07-11 RX ORDER — TAMSULOSIN HYDROCHLORIDE 0.4 MG/1
CAPSULE ORAL
Qty: 30 CAPSULE | Refills: 5 | Status: SHIPPED | OUTPATIENT
Start: 2023-07-11 | End: 2023-07-12 | Stop reason: SDUPTHER

## 2023-07-11 RX ORDER — GABAPENTIN 300 MG/1
CAPSULE ORAL
Qty: 270 CAPSULE | Refills: 0 | Status: CANCELLED | OUTPATIENT
Start: 2023-07-11 | End: 2023-10-09

## 2023-07-11 RX ORDER — METOPROLOL TARTRATE 50 MG/1
50 TABLET, FILM COATED ORAL 3 TIMES DAILY
Qty: 270 TABLET | Refills: 2 | Status: CANCELLED | OUTPATIENT
Start: 2023-07-11

## 2023-07-11 ASSESSMENT — ENCOUNTER SYMPTOMS: EYES NEGATIVE: 1

## 2023-07-11 NOTE — PROGRESS NOTES
alert and oriented to person, place, and time.    Psychiatric:         Mood and Affect: Mood normal.         Behavior: Behavior normal.

## 2023-07-12 RX ORDER — TAMSULOSIN HYDROCHLORIDE 0.4 MG/1
CAPSULE ORAL
Qty: 90 CAPSULE | Refills: 3 | Status: SHIPPED | OUTPATIENT
Start: 2023-07-12

## 2023-07-16 DIAGNOSIS — M79.604 BILATERAL LEG PAIN: ICD-10-CM

## 2023-07-16 DIAGNOSIS — M79.605 BILATERAL LEG PAIN: ICD-10-CM

## 2023-07-16 DIAGNOSIS — M62.838 SPASM OF MUSCLE: ICD-10-CM

## 2023-07-17 RX ORDER — BACLOFEN 10 MG/1
TABLET ORAL
Qty: 90 TABLET | Refills: 1 | Status: SHIPPED | OUTPATIENT
Start: 2023-07-17

## 2023-07-17 NOTE — TELEPHONE ENCOUNTER
Patient here for Right shoulder injection with U/S. Patient taken back to exam room, and placed on drape locking stool. Area marked, and cleansed appropriately with alcohol. 2cc of 2% Lidocaine, 2cc of Kenalog, and 7cc of 1% Lidocaine and 1cc of b12 to be injected by provider.

## 2023-07-20 DIAGNOSIS — G95.9 MYELOPATHY (HCC): ICD-10-CM

## 2023-07-20 DIAGNOSIS — G95.9 SPINAL CORD DISEASE (HCC): ICD-10-CM

## 2023-07-20 DIAGNOSIS — M54.12 C6 RADICULOPATHY: ICD-10-CM

## 2023-07-20 DIAGNOSIS — M54.16 RIGHT LUMBAR RADICULOPATHY: ICD-10-CM

## 2023-07-20 DIAGNOSIS — Z79.899 HIGH RISK MEDICATION USE: ICD-10-CM

## 2023-07-20 RX ORDER — OXYCODONE AND ACETAMINOPHEN 10; 325 MG/1; MG/1
1 TABLET ORAL EVERY 6 HOURS PRN
Qty: 90 TABLET | Refills: 0 | Status: SHIPPED | OUTPATIENT
Start: 2023-07-21 | End: 2023-08-20

## 2023-07-20 RX ORDER — OXYCODONE AND ACETAMINOPHEN 10; 325 MG/1; MG/1
1 TABLET ORAL EVERY 6 HOURS PRN
Qty: 90 TABLET | Refills: 0 | Status: SHIPPED | OUTPATIENT
Start: 2023-07-21 | End: 2023-07-20 | Stop reason: SDUPTHER

## 2023-07-26 DIAGNOSIS — G95.9 MYELOPATHY (HCC): ICD-10-CM

## 2023-07-26 DIAGNOSIS — G95.9 SPINAL CORD DISEASE (HCC): ICD-10-CM

## 2023-07-26 DIAGNOSIS — Z79.899 HIGH RISK MEDICATION USE: ICD-10-CM

## 2023-07-26 DIAGNOSIS — M54.12 C6 RADICULOPATHY: ICD-10-CM

## 2023-07-26 DIAGNOSIS — M54.16 RIGHT LUMBAR RADICULOPATHY: ICD-10-CM

## 2023-07-26 RX ORDER — GABAPENTIN 300 MG/1
CAPSULE ORAL
Qty: 270 CAPSULE | Refills: 0 | Status: SHIPPED | OUTPATIENT
Start: 2023-07-26 | End: 2023-10-25

## 2023-07-27 ENCOUNTER — PROCEDURE VISIT (OUTPATIENT)
Dept: PHYSICAL MEDICINE AND REHAB | Age: 73
End: 2023-07-27
Payer: MEDICARE

## 2023-07-27 DIAGNOSIS — M77.8 SHOULDER TENDINITIS, RIGHT: ICD-10-CM

## 2023-07-27 PROCEDURE — 96372 THER/PROPH/DIAG INJ SC/IM: CPT | Performed by: PHYSICAL MEDICINE & REHABILITATION

## 2023-07-27 PROCEDURE — 20611 DRAIN/INJ JOINT/BURSA W/US: CPT | Performed by: PHYSICAL MEDICINE & REHABILITATION

## 2023-07-27 RX ORDER — CYANOCOBALAMIN 1000 UG/ML
1000 INJECTION, SOLUTION INTRAMUSCULAR; SUBCUTANEOUS ONCE
Status: COMPLETED | OUTPATIENT
Start: 2023-07-27 | End: 2023-07-27

## 2023-07-27 RX ORDER — LIDOCAINE HYDROCHLORIDE 20 MG/ML
2 INJECTION, SOLUTION INFILTRATION; PERINEURAL ONCE
Status: COMPLETED | OUTPATIENT
Start: 2023-07-27 | End: 2023-07-27

## 2023-07-27 RX ORDER — LIDOCAINE HYDROCHLORIDE 10 MG/ML
7 INJECTION, SOLUTION INFILTRATION; PERINEURAL ONCE
Status: COMPLETED | OUTPATIENT
Start: 2023-07-27 | End: 2023-07-27

## 2023-07-27 RX ADMIN — LIDOCAINE HYDROCHLORIDE 7 ML: 10 INJECTION, SOLUTION INFILTRATION; PERINEURAL at 14:02

## 2023-07-27 RX ADMIN — CYANOCOBALAMIN 1000 MCG: 1000 INJECTION, SOLUTION INTRAMUSCULAR; SUBCUTANEOUS at 13:58

## 2023-07-27 RX ADMIN — LIDOCAINE HYDROCHLORIDE 2 ML: 20 INJECTION, SOLUTION INFILTRATION; PERINEURAL at 14:00

## 2023-08-18 DIAGNOSIS — M54.16 RIGHT LUMBAR RADICULOPATHY: ICD-10-CM

## 2023-08-18 DIAGNOSIS — Z79.899 HIGH RISK MEDICATION USE: ICD-10-CM

## 2023-08-18 DIAGNOSIS — M54.12 C6 RADICULOPATHY: ICD-10-CM

## 2023-08-18 DIAGNOSIS — G95.9 MYELOPATHY (HCC): ICD-10-CM

## 2023-08-18 DIAGNOSIS — G95.9 SPINAL CORD DISEASE (HCC): ICD-10-CM

## 2023-08-18 RX ORDER — OXYCODONE AND ACETAMINOPHEN 10; 325 MG/1; MG/1
1 TABLET ORAL EVERY 6 HOURS PRN
Qty: 90 TABLET | Refills: 0 | Status: SHIPPED | OUTPATIENT
Start: 2023-08-20 | End: 2023-09-19

## 2023-08-20 ASSESSMENT — ENCOUNTER SYMPTOMS
SHORTNESS OF BREATH: 0
TROUBLE SWALLOWING: 0
EYE REDNESS: 0
CONSTIPATION: 1
WHEEZING: 0
DIARRHEA: 0
PHOTOPHOBIA: 0
BLOOD IN STOOL: 0
SORE THROAT: 0
NAUSEA: 0
EYE PAIN: 0
STRIDOR: 0
BOWEL INCONTINENCE: 0
VISUAL CHANGE: 0
ABDOMINAL PAIN: 0
BACK PAIN: 1
VOMITING: 0
COUGH: 0

## 2023-08-20 NOTE — PROGRESS NOTES
[address] [facility], 71 y.o. male presents today with:       Shoulder Pain Right shoulder pain injection given on 8/28/2023 with 80% lasted 2 months. Pain level is 8       Back Pain Lower back pain injection given on 8/28/2023 80% lasted 2 months. Pain level is 8       Medication Refill Patient did bring medications today. No refills needed at this time       Follow-up Patient saw Marleni Gunter on 8/29/2023 for right shoulder pain. No future surgery at this time. Neck Pain Patient states neck pain has resolved      Pain flared up SP right shoulder injection from NP in ortho. He is hestitant to go back. Recent pain flareup  dt a fall at home--Recent injections helped--medications are not as helpful. Injections still help very much. He would like to schedule monthly trigger point-no longer needing sciatic blocks. Back Pain  This is a chronic problem. The current episode started more than 1 year ago. The problem occurs daily. The problem is unchanged. The pain is present in the lumbar spine. The quality of the pain is described as aching, cramping, shooting and stabbing. The pain radiates to the right foot, right knee and right thigh. The pain is at a severity of 9/10. The pain is severe. The pain is Worse during the day. The symptoms are aggravated by bending, lying down, position, sitting, standing and twisting. Stiffness is present In the morning. Associated symptoms include leg pain, numbness and weakness. Pertinent negatives include no abdominal pain, bladder incontinence, bowel incontinence, chest pain, dysuria, fever, headaches, paresis, perianal numbness or tingling. Risk factors include lack of exercise and sedentary lifestyle. He has tried analgesics, home exercises, NSAIDs, muscle relaxant, heat and walking (SI injections which help, trigger point injections, OXY-IR ) for the symptoms. The treatment provided mild relief. Shoulder Pain   This is a chronic problem.  The pain is at

## 2023-08-21 ENCOUNTER — HOSPITAL ENCOUNTER (OUTPATIENT)
Dept: GENERAL RADIOLOGY | Age: 73
Discharge: HOME | End: 2023-08-23
Payer: MEDICARE

## 2023-08-21 ENCOUNTER — OFFICE VISIT (OUTPATIENT)
Dept: PULMONOLOGY | Age: 73
End: 2023-08-21
Payer: MEDICARE

## 2023-08-21 ENCOUNTER — OFFICE VISIT (OUTPATIENT)
Dept: FAMILY MEDICINE CLINIC | Age: 73
End: 2023-08-21
Payer: MEDICARE

## 2023-08-21 VITALS
WEIGHT: 152 LBS | HEART RATE: 64 BPM | DIASTOLIC BLOOD PRESSURE: 60 MMHG | SYSTOLIC BLOOD PRESSURE: 110 MMHG | OXYGEN SATURATION: 97 % | BODY MASS INDEX: 23.81 KG/M2 | TEMPERATURE: 97.5 F

## 2023-08-21 VITALS
OXYGEN SATURATION: 98 % | BODY MASS INDEX: 23.86 KG/M2 | SYSTOLIC BLOOD PRESSURE: 100 MMHG | WEIGHT: 152 LBS | DIASTOLIC BLOOD PRESSURE: 68 MMHG | HEIGHT: 67 IN | RESPIRATION RATE: 18 BRPM | HEART RATE: 67 BPM

## 2023-08-21 DIAGNOSIS — R00.1 BRADYCARDIA: ICD-10-CM

## 2023-08-21 DIAGNOSIS — T40.2X5A THERAPEUTIC OPIOID-INDUCED CONSTIPATION (OIC): ICD-10-CM

## 2023-08-21 DIAGNOSIS — I25.118 CORONARY ARTERY DISEASE OF NATIVE ARTERY OF NATIVE HEART WITH STABLE ANGINA PECTORIS (HCC): ICD-10-CM

## 2023-08-21 DIAGNOSIS — K59.03 THERAPEUTIC OPIOID-INDUCED CONSTIPATION (OIC): ICD-10-CM

## 2023-08-21 DIAGNOSIS — G89.29 CHRONIC RIGHT SHOULDER PAIN: Primary | ICD-10-CM

## 2023-08-21 DIAGNOSIS — E11.9 TYPE 2 DIABETES MELLITUS WITHOUT COMPLICATION, WITHOUT LONG-TERM CURRENT USE OF INSULIN (HCC): ICD-10-CM

## 2023-08-21 DIAGNOSIS — G47.33 OSA (OBSTRUCTIVE SLEEP APNEA): ICD-10-CM

## 2023-08-21 DIAGNOSIS — M25.511 CHRONIC RIGHT SHOULDER PAIN: ICD-10-CM

## 2023-08-21 DIAGNOSIS — E29.1 HYPOGONADISM IN MALE: ICD-10-CM

## 2023-08-21 DIAGNOSIS — M19.011 PRIMARY OSTEOARTHRITIS OF RIGHT SHOULDER: ICD-10-CM

## 2023-08-21 DIAGNOSIS — G89.29 CHRONIC RIGHT SHOULDER PAIN: ICD-10-CM

## 2023-08-21 DIAGNOSIS — F11.20 OPIOID DEPENDENCE WITH CURRENT USE (HCC): ICD-10-CM

## 2023-08-21 DIAGNOSIS — G47.33 OSA (OBSTRUCTIVE SLEEP APNEA): Primary | ICD-10-CM

## 2023-08-21 DIAGNOSIS — M25.511 CHRONIC RIGHT SHOULDER PAIN: Primary | ICD-10-CM

## 2023-08-21 PROBLEM — J06.9 ACUTE URI: Status: RESOLVED | Noted: 2022-11-18 | Resolved: 2023-08-21

## 2023-08-21 PROCEDURE — 73030 X-RAY EXAM OF SHOULDER: CPT

## 2023-08-21 PROCEDURE — 3078F DIAST BP <80 MM HG: CPT | Performed by: INTERNAL MEDICINE

## 2023-08-21 PROCEDURE — 1123F ACP DISCUSS/DSCN MKR DOCD: CPT | Performed by: PHYSICIAN ASSISTANT

## 2023-08-21 PROCEDURE — 3044F HG A1C LEVEL LT 7.0%: CPT | Performed by: PHYSICIAN ASSISTANT

## 2023-08-21 PROCEDURE — 99214 OFFICE O/P EST MOD 30 MIN: CPT | Performed by: PHYSICIAN ASSISTANT

## 2023-08-21 PROCEDURE — 3074F SYST BP LT 130 MM HG: CPT | Performed by: INTERNAL MEDICINE

## 2023-08-21 PROCEDURE — 1123F ACP DISCUSS/DSCN MKR DOCD: CPT | Performed by: INTERNAL MEDICINE

## 2023-08-21 PROCEDURE — 3074F SYST BP LT 130 MM HG: CPT | Performed by: PHYSICIAN ASSISTANT

## 2023-08-21 PROCEDURE — 99213 OFFICE O/P EST LOW 20 MIN: CPT | Performed by: INTERNAL MEDICINE

## 2023-08-21 PROCEDURE — 3078F DIAST BP <80 MM HG: CPT | Performed by: PHYSICIAN ASSISTANT

## 2023-08-21 ASSESSMENT — ENCOUNTER SYMPTOMS
WHEEZING: 0
ABDOMINAL DISTENTION: 0
SINUS PRESSURE: 0
VOMITING: 0
SHORTNESS OF BREATH: 0
ABDOMINAL PAIN: 0
COUGH: 0
BLOOD IN STOOL: 0
CHEST TIGHTNESS: 0
SORE THROAT: 0
BACK PAIN: 1
SHORTNESS OF BREATH: 0
SINUS PAIN: 0
RECTAL PAIN: 0
WHEEZING: 0
VOMITING: 0
DIARRHEA: 0
RHINORRHEA: 0
VOICE CHANGE: 0
DIARRHEA: 0
COUGH: 0
ABDOMINAL PAIN: 0
EYE ITCHING: 0
CHEST TIGHTNESS: 0
COLOR CHANGE: 0
CONSTIPATION: 0
NAUSEA: 0

## 2023-08-21 NOTE — PROGRESS NOTES
imipramine (TOFRANIL) 25 MG tablet TAKE 1 TABLET BY MOUTH EVERY NIGHT 30 tablet 3    sildenafil (VIAGRA) 100 MG tablet Take 1 tablet by mouth as needed for Erectile Dysfunction 30 tablet 3    FreeStyle Lancets MISC PATEINT TO TEST ONCE DAILY 100 each 11    blood glucose test strips (ONETOUCH ULTRA) strip USE TO TEST BLOOD SUGAR EVERY  strip 11    NIFEdipine (PROCARDIA XL) 60 MG extended release tablet TAKE 1 TABLET BY MOUTH DAILY 90 tablet 3    metFORMIN (GLUCOPHAGE) 500 MG tablet TAKE 1 TABLET BY MOUTH TWICE DAILY WITH MEALS 180 tablet 3    metoprolol tartrate (LOPRESSOR) 50 MG tablet TAKE 1 TABLET BY MOUTH THREE TIMES DAILY 270 tablet 2    naloxone 4 MG/0.1ML LIQD nasal spray 1 spray by Nasal route as needed for Opioid Reversal 1 each 5    B-D 3CC LUER-JOSE SYR 64UQ4-4/2 22G X 1-1/2\" 3 ML MISC USE EVERY 2 WEEKS WITH TESTOSTERONE 20 each 6    colchicine (COLCRYS) 0.6 MG tablet TAKE 1 TABLET BY MOUTH DAILY DURING FLARE FOR GOUT FLARE 30 tablet 2     MG capsule TK ONE C PO  BID      Alcohol Swabs (ALCOHOL PREP) 70 % PADS Patient to test once daily E11.9 100 each 11    glucose monitoring kit (FREESTYLE) monitoring kit 1 kit by Does not apply route daily 1 kit 0    nitroGLYCERIN (NITROSTAT) 0.4 MG SL tablet Place 1 tablet under the tongue every 5 minutes as needed x 3 doses for chest pain. 25 tablet 2    vitamin D (CHOLECALCIFEROL) 25 MCG (1000 UT) TABS tablet Take 1 tablet by mouth daily      CPAP Machine MISC by NOT APPLICABLE route      aspirin 81 MG EC tablet Take 1 tablet by mouth daily 90 tablet 1     No current facility-administered medications for this visit. Assessment/Plan:     1. PRASAD (obstructive sleep apnea)  He has PRASAD he has not used CPAP since 2018. He had moderate PRASAD  with AHI 26  He is going for CPAP study in oct 17 th. He is complaining of snoring, no daytime sleepiness and tiredness. C/o witness apnea. He wakes up with gasping for air.  C/o wakes up frequently during

## 2023-08-21 NOTE — PROGRESS NOTES
[address] [facility], 71 y.o. male presents today with:  Chief Complaint   Patient presents with    3 Month Follow-Up       HPI  Last ov with me: 5/19/23. C/o bilateral shoulder pain. This is chronic. Denies recent injury/fall. R shoulder worse than L. Last imaging was in 2019. Has received intraarticular injections without proper relief. Pain is worse at the end of the day, 'aching' type pain. Takes percocet per PMR without much relief. Has recently received trigger point injections, which did not help pain. He is RHD. Open to ortho referral.      Has noticed increased fatigue. Admits to not using his CPAP machine in a long time. On average, sleeps about 5 hours/night. Wife has witnessed patient snoring, 'making funny noises'. Has follow up today with pulmonology. Bradycardia/CAD/HTN. Denies CP, GUILLORY, SOB. Currently taking 50 mg metoprolol BID. Carotid US--Stable stenosis. Low testosterone/prediabetes. Managed by endocrinology. Continue with testosterone q 14 days. No side effects from medication. Checks blood sugars 1-2 times/day. Fasting 100 or less. No hypo/hyperglycemic episodes. Gout. Stable on allopurinol. Denies any recent flares or unilateral joint swelling/erythema. Opioid induced constipation. Manageable at this time with regimen of warm milk of mag and stool softener. BM every other day, soft, well-formed. Denies abdominal bloating/pain. This has improved. Chronic back pain. Followed by PMR. Receives injections with Dr. Soha Jj. No fall/injury/trauma. Pain is controlled with injections, medication. Review of Systems   Constitutional:  Positive for fatigue. Negative for activity change, appetite change, chills and diaphoresis. HENT:  Negative for congestion, sinus pressure, sinus pain and sneezing. Eyes:  Negative for visual disturbance.    Respiratory:  Negative for cough, chest tightness, shortness of

## 2023-08-28 ENCOUNTER — PROCEDURE VISIT (OUTPATIENT)
Dept: PHYSICAL MEDICINE AND REHAB | Age: 73
End: 2023-08-28
Payer: MEDICARE

## 2023-08-28 DIAGNOSIS — M79.10 MYALGIA: ICD-10-CM

## 2023-08-28 PROCEDURE — 96372 THER/PROPH/DIAG INJ SC/IM: CPT | Performed by: PHYSICAL MEDICINE & REHABILITATION

## 2023-08-28 PROCEDURE — 20553 NJX 1/MLT TRIGGER POINTS 3/>: CPT | Performed by: PHYSICAL MEDICINE & REHABILITATION

## 2023-08-28 RX ORDER — CYANOCOBALAMIN 1000 UG/ML
1000 INJECTION, SOLUTION INTRAMUSCULAR; SUBCUTANEOUS ONCE
Status: COMPLETED | OUTPATIENT
Start: 2023-08-28 | End: 2023-08-28

## 2023-08-28 RX ORDER — LIDOCAINE HYDROCHLORIDE 10 MG/ML
15 INJECTION, SOLUTION INFILTRATION; PERINEURAL ONCE
Status: COMPLETED | OUTPATIENT
Start: 2023-08-28 | End: 2023-08-28

## 2023-08-28 RX ADMIN — LIDOCAINE HYDROCHLORIDE 15 ML: 10 INJECTION, SOLUTION INFILTRATION; PERINEURAL at 11:52

## 2023-08-28 RX ADMIN — CYANOCOBALAMIN 1000 MCG: 1000 INJECTION, SOLUTION INTRAMUSCULAR; SUBCUTANEOUS at 11:51

## 2023-08-29 ENCOUNTER — OFFICE VISIT (OUTPATIENT)
Dept: ORTHOPEDIC SURGERY | Age: 73
End: 2023-08-29
Payer: MEDICARE

## 2023-08-29 VITALS
HEIGHT: 67 IN | BODY MASS INDEX: 23.54 KG/M2 | OXYGEN SATURATION: 97 % | TEMPERATURE: 97.9 F | WEIGHT: 150 LBS | HEART RATE: 93 BPM

## 2023-08-29 DIAGNOSIS — S46.211A RUPTURE OF RIGHT PROXIMAL BICEPS TENDON, INITIAL ENCOUNTER: ICD-10-CM

## 2023-08-29 DIAGNOSIS — M19.011 ARTHRITIS OF RIGHT ACROMIOCLAVICULAR JOINT: ICD-10-CM

## 2023-08-29 DIAGNOSIS — M25.511 CHRONIC RIGHT SHOULDER PAIN: Primary | ICD-10-CM

## 2023-08-29 DIAGNOSIS — G89.29 CHRONIC RIGHT SHOULDER PAIN: Primary | ICD-10-CM

## 2023-08-29 PROCEDURE — 1123F ACP DISCUSS/DSCN MKR DOCD: CPT | Performed by: PHYSICIAN ASSISTANT

## 2023-08-29 PROCEDURE — 99203 OFFICE O/P NEW LOW 30 MIN: CPT | Performed by: PHYSICIAN ASSISTANT

## 2023-08-29 PROCEDURE — 20610 DRAIN/INJ JOINT/BURSA W/O US: CPT | Performed by: PHYSICIAN ASSISTANT

## 2023-08-29 RX ORDER — LIDOCAINE HYDROCHLORIDE 10 MG/ML
5 INJECTION, SOLUTION INFILTRATION; PERINEURAL ONCE
Status: COMPLETED | OUTPATIENT
Start: 2023-08-29 | End: 2023-08-29

## 2023-08-29 RX ORDER — TRIAMCINOLONE ACETONIDE 40 MG/ML
40 INJECTION, SUSPENSION INTRA-ARTICULAR; INTRAMUSCULAR ONCE
Status: COMPLETED | OUTPATIENT
Start: 2023-08-29 | End: 2023-08-29

## 2023-08-29 RX ADMIN — LIDOCAINE HYDROCHLORIDE 5 ML: 10 INJECTION, SOLUTION INFILTRATION; PERINEURAL at 11:33

## 2023-08-29 RX ADMIN — TRIAMCINOLONE ACETONIDE 40 MG: 40 INJECTION, SUSPENSION INTRA-ARTICULAR; INTRAMUSCULAR at 11:34

## 2023-08-29 NOTE — PROGRESS NOTES
[facility]      Subjective:      Chief Complaint   Patient presents with    New Patient     Pt presents with right shoulder pain       HPI: Anthony Soriano is a 71 y.o. male who is here for chronic right shoulder pain. Is been going on for few years now. Is been bothering him significantly as of late. There was no trauma. He is right-hand dominant. He has received PinPoint injections by Dr. Rosalie Kimball in the past.  He is on long-term Percocet for chronic back pain, he has multilevel fusions. No significant weakness or loss of motion. There is no numbness and tingling down his arm or neck pain.     Past Medical History:   Diagnosis Date    Chronic back pain     Coronary artery disease 2002    Dr. Paula Marinelli at [facility]    Esophageal ulcer 3/10/2014    Dr. Meka Serrano    Gastric ulcer 3/10/2014    Dr. Meka Serrano    Gout     Hiatal hernia 3/10/2014    Dr. Meka Serrano    Hyperlipidemia     Hypertension     Impotence 10/16/2020    MI (myocardial infarction) [facility]) 2005    Dr. Paula Marinelli    Osteoarthritis     Peripheral vascular disease ([address])     Prediabetes     Schizo-affective schizophrenia, in remission ([address]) 2/1/2005    Overview:  followed at the [facility]    Severe single current episode of major depressive disorder, without psychotic features ([address]) 7/8/2016    Status post coronary artery stent placement 2002    Dr. Paula Marinelli      Past Surgical History:   Procedure Laterality Date    [address] [place] [address]  01/03/2020    Dr. Romana Jovel Right 6/4/2019    RIGHT WRIST CARPAL TUNNEL RELEASE, SUPINE, PAT AT PCP performed by Francesco Vitale MD at [address] [place] [address] Road  3/10/14    DR Abdirashid Wilson  2002    Dr. Mirela Fox  10/17/2017    KNEE ARTHROSCOPY Left 2002    Dr. Walt Vasquez  12/19/13

## 2023-09-08 ENCOUNTER — OFFICE VISIT (OUTPATIENT)
Dept: PHYSICAL MEDICINE AND REHAB | Age: 73
End: 2023-09-08
Payer: MEDICARE

## 2023-09-08 VITALS — HEART RATE: 71 BPM | DIASTOLIC BLOOD PRESSURE: 62 MMHG | SYSTOLIC BLOOD PRESSURE: 138 MMHG

## 2023-09-08 DIAGNOSIS — M54.12 C6 RADICULOPATHY: ICD-10-CM

## 2023-09-08 DIAGNOSIS — G89.29 CHRONIC PAIN OF BOTH SHOULDERS: Primary | ICD-10-CM

## 2023-09-08 DIAGNOSIS — M54.17 THORACIC AND LUMBOSACRAL NEURITIS: ICD-10-CM

## 2023-09-08 DIAGNOSIS — M54.40 LOW BACK PAIN WITH SCIATICA, SCIATICA LATERALITY UNSPECIFIED, UNSPECIFIED BACK PAIN LATERALITY, UNSPECIFIED CHRONICITY: ICD-10-CM

## 2023-09-08 DIAGNOSIS — Z79.899 HIGH RISK MEDICATION USE: ICD-10-CM

## 2023-09-08 DIAGNOSIS — G95.9 SPINAL CORD DISEASE (HCC): ICD-10-CM

## 2023-09-08 DIAGNOSIS — M25.511 CHRONIC PAIN OF BOTH SHOULDERS: Primary | ICD-10-CM

## 2023-09-08 DIAGNOSIS — G95.9 MYELOPATHY (HCC): ICD-10-CM

## 2023-09-08 DIAGNOSIS — M25.512 CHRONIC PAIN OF BOTH SHOULDERS: Primary | ICD-10-CM

## 2023-09-08 DIAGNOSIS — M54.14 THORACIC AND LUMBOSACRAL NEURITIS: ICD-10-CM

## 2023-09-08 DIAGNOSIS — M54.16 RIGHT LUMBAR RADICULOPATHY: ICD-10-CM

## 2023-09-08 PROCEDURE — 3078F DIAST BP <80 MM HG: CPT | Performed by: PHYSICAL MEDICINE & REHABILITATION

## 2023-09-08 PROCEDURE — 1123F ACP DISCUSS/DSCN MKR DOCD: CPT | Performed by: PHYSICAL MEDICINE & REHABILITATION

## 2023-09-08 PROCEDURE — 99214 OFFICE O/P EST MOD 30 MIN: CPT | Performed by: PHYSICAL MEDICINE & REHABILITATION

## 2023-09-08 PROCEDURE — 3075F SYST BP GE 130 - 139MM HG: CPT | Performed by: PHYSICAL MEDICINE & REHABILITATION

## 2023-09-08 RX ORDER — OXYCODONE AND ACETAMINOPHEN 10; 325 MG/1; MG/1
1 TABLET ORAL EVERY 6 HOURS PRN
Qty: 90 TABLET | Refills: 0 | Status: SHIPPED | OUTPATIENT
Start: 2023-09-08 | End: 2023-10-08

## 2023-09-10 DIAGNOSIS — M79.604 BILATERAL LEG PAIN: ICD-10-CM

## 2023-09-10 DIAGNOSIS — M1A.9XX0 CHRONIC GOUT WITHOUT TOPHUS, UNSPECIFIED CAUSE, UNSPECIFIED SITE: ICD-10-CM

## 2023-09-10 DIAGNOSIS — M79.605 BILATERAL LEG PAIN: ICD-10-CM

## 2023-09-10 DIAGNOSIS — M62.838 SPASM OF MUSCLE: ICD-10-CM

## 2023-09-11 RX ORDER — BACLOFEN 10 MG/1
TABLET ORAL
Qty: 90 TABLET | Refills: 1 | Status: SHIPPED | OUTPATIENT
Start: 2023-09-11

## 2023-09-11 RX ORDER — ALLOPURINOL 100 MG/1
100 TABLET ORAL DAILY
Qty: 90 TABLET | Refills: 0 | Status: SHIPPED | OUTPATIENT
Start: 2023-09-11

## 2023-09-11 RX ORDER — NIFEDIPINE 60 MG/1
TABLET, EXTENDED RELEASE ORAL
Qty: 90 TABLET | Refills: 3 | Status: SHIPPED | OUTPATIENT
Start: 2023-09-11

## 2023-09-11 NOTE — TELEPHONE ENCOUNTER
Comments:     Last Office Visit (last PCP visit):   8/21/2023    Next Visit Date:  Future Appointments   Date Time Provider [address]   9/26/2023  1:15 PM Carlos A Couch MD [facility]   9/27/2023 11:30 AM Albert Garcia DO [address] [facility]   10/12/2023 10:30 AM Kim Mckeon PA-C [facility]   10/24/2023 10:15 AM Randy Rea MD [facility]   10/27/2023 10:30 AM Albert Garcia DO [facility]   11/13/2023 10:30 [facility]   11/27/2023 10:45 AM Albert Garcia DO [facility]   12/4/2023 10:15 AM Albert Garcia DO [facility]   12/19/2023  9:30 AM LUIS Clarke   1/4/2024  8:30 AM LUIS Longoria [facility] [place]       **If hasn't been seen in over a year OR hasn't followed up according to last diabetes/ADHD visit, make appointment for patient before sending refill to provider.     Rx requested:  Requested Prescriptions     Pending Prescriptions Disp Refills    NIFEdipine (PROCARDIA XL) 60 MG extended release tablet [Pharmacy Med Name: NIFEDIPINE 60MG ER (XL/OS) TABLETS] 90 tablet 3     Sig: TAKE 1 TABLET BY MOUTH DAILY    allopurinol (ZYLOPRIM) 100 MG tablet [Pharmacy Med Name: ALLOPURINOL 100MG TABLETS] 90 tablet 0     Sig: TAKE 1 TABLET BY MOUTH DAILY

## 2023-09-21 ENCOUNTER — HOSPITAL ENCOUNTER (OUTPATIENT)
Dept: SLEEP CENTER | Age: 73
Discharge: HOME | End: 2023-09-23
Payer: MEDICARE

## 2023-09-21 PROCEDURE — 95810 POLYSOM 6/> YRS 4/> PARAM: CPT

## 2023-09-26 ENCOUNTER — OFFICE VISIT (OUTPATIENT)
Dept: CARDIOLOGY CLINIC | Age: 73
End: 2023-09-26
Payer: MEDICARE

## 2023-09-26 VITALS
BODY MASS INDEX: 23.35 KG/M2 | OXYGEN SATURATION: 99 % | DIASTOLIC BLOOD PRESSURE: 74 MMHG | HEIGHT: 67 IN | HEART RATE: 64 BPM | WEIGHT: 148.8 LBS | SYSTOLIC BLOOD PRESSURE: 136 MMHG

## 2023-09-26 DIAGNOSIS — I25.118 CORONARY ARTERY DISEASE OF NATIVE ARTERY OF NATIVE HEART WITH STABLE ANGINA PECTORIS (HCC): ICD-10-CM

## 2023-09-26 DIAGNOSIS — E78.00 HYPERCHOLESTEROLEMIA: ICD-10-CM

## 2023-09-26 DIAGNOSIS — I10 ESSENTIAL HYPERTENSION, BENIGN: ICD-10-CM

## 2023-09-26 DIAGNOSIS — R09.89 BILATERAL CAROTID BRUITS: ICD-10-CM

## 2023-09-26 DIAGNOSIS — Z00.00 PE (PHYSICAL EXAM), ANNUAL: Primary | ICD-10-CM

## 2023-09-26 PROCEDURE — 3078F DIAST BP <80 MM HG: CPT | Performed by: INTERNAL MEDICINE

## 2023-09-26 PROCEDURE — 99214 OFFICE O/P EST MOD 30 MIN: CPT | Performed by: INTERNAL MEDICINE

## 2023-09-26 PROCEDURE — 3075F SYST BP GE 130 - 139MM HG: CPT | Performed by: INTERNAL MEDICINE

## 2023-09-26 PROCEDURE — 1123F ACP DISCUSS/DSCN MKR DOCD: CPT | Performed by: INTERNAL MEDICINE

## 2023-09-26 ASSESSMENT — ENCOUNTER SYMPTOMS
GASTROINTESTINAL NEGATIVE: 1
BLOOD IN STOOL: 0
COUGH: 0
EYES NEGATIVE: 1
SHORTNESS OF BREATH: 0
STRIDOR: 0
BACK PAIN: 1
CHEST TIGHTNESS: 0
NAUSEA: 0
WHEEZING: 0
RESPIRATORY NEGATIVE: 1

## 2023-09-26 NOTE — PROGRESS NOTES
hypertension, benign    Hypercholesterolemia    Right lumbar radiculopathy    Gout    High risk medication use - 12/07/17 OARRS PM&R, 02/08/18 OARRS PM&R, 10/05/17 Urine Drug Screen: negative PM&R, MED CONTRACT 1/17/17    Low back pain with sciatica    Myalgia    Synovitis and tenosynovitis    Thoracic and lumbosacral neuritis    Vitamin D deficiency    Hyperparathyroidism ([address])    Osteopenia    C6 radiculopathy    DJD (degenerative joint disease), cervical    Angina pectoris (HCC)    PRASAD (obstructive sleep apnea)    Hyperkalemia    Chronic pain of both shoulders    Hypogonadism in male    Therapeutic opioid-induced constipation (OIC)    Bilateral leg pain    Other specified symptoms and signs involving the circulatory and respiratory systems     Myelopathy (HCC)    Coronary artery disease of native artery of native heart with stable angina pectoris ([address])    Bilateral carotid bruits    Spinal cord disease ([address])    Lower urinary tract symptoms (LUTS)    Impotence    Trouble getting to sleep    Bradycardia    Type 2 diabetes mellitus, without long-term current use of insulin (HCC)    Weak urinary stream    Onychodystrophy    Old myocardial infarction    Long term (current) use of aspirin    Chronic right shoulder pain    Presence of aortocoronary bypass graft    Opioid dependence with current use (HCC)    Primary osteoarthritis of right shoulder       Medications Discontinued During This Encounter   Medication Reason    metoprolol tartrate (LOPRESSOR) 50 MG tablet     nitroGLYCERIN (NITROSTAT) 0.4 MG SL tablet            Modified Medications    No medications on file       No orders of the defined types were placed in this encounter. Assessment/Plan:    1. Hypercholesterolemia  Statin - cotninue. Labs reviewed. Low fat diet. .. LDL not to goal.  adelina dc Prava and stat Atorva 40 qd.     2. Essential hypertension, benign   stable - continue meds. Low salt diet    3.  Coronary artery disease with hx of myocardial

## 2023-09-27 ENCOUNTER — PROCEDURE VISIT (OUTPATIENT)
Dept: PHYSICAL MEDICINE AND REHAB | Age: 73
End: 2023-09-27
Payer: MEDICARE

## 2023-09-27 DIAGNOSIS — M77.8 SHOULDER TENDINITIS, RIGHT: Primary | ICD-10-CM

## 2023-09-27 PROCEDURE — 20611 DRAIN/INJ JOINT/BURSA W/US: CPT | Performed by: PHYSICAL MEDICINE & REHABILITATION

## 2023-09-27 RX ORDER — LIDOCAINE HYDROCHLORIDE 10 MG/ML
7 INJECTION, SOLUTION INFILTRATION; PERINEURAL ONCE
Status: COMPLETED | OUTPATIENT
Start: 2023-09-27 | End: 2023-09-27

## 2023-09-27 RX ORDER — LIDOCAINE HYDROCHLORIDE 20 MG/ML
20 INJECTION, SOLUTION INFILTRATION; PERINEURAL ONCE
Status: COMPLETED | OUTPATIENT
Start: 2023-09-27 | End: 2023-09-27

## 2023-09-27 RX ORDER — CYANOCOBALAMIN 1000 UG/ML
1000 INJECTION, SOLUTION INTRAMUSCULAR; SUBCUTANEOUS ONCE
Status: COMPLETED | OUTPATIENT
Start: 2023-09-27 | End: 2023-09-27

## 2023-09-27 RX ADMIN — CYANOCOBALAMIN 1000 MCG: 1000 INJECTION, SOLUTION INTRAMUSCULAR; SUBCUTANEOUS at 16:09

## 2023-09-27 RX ADMIN — LIDOCAINE HYDROCHLORIDE 7 ML: 10 INJECTION, SOLUTION INFILTRATION; PERINEURAL at 16:09

## 2023-09-27 RX ADMIN — LIDOCAINE HYDROCHLORIDE 20 ML: 20 INJECTION, SOLUTION INFILTRATION; PERINEURAL at 16:10

## 2023-10-17 DIAGNOSIS — Z79.899 HIGH RISK MEDICATION USE: ICD-10-CM

## 2023-10-17 DIAGNOSIS — M54.12 C6 RADICULOPATHY: ICD-10-CM

## 2023-10-17 DIAGNOSIS — G95.9 SPINAL CORD DISEASE (HCC): ICD-10-CM

## 2023-10-17 DIAGNOSIS — G95.9 MYELOPATHY (HCC): ICD-10-CM

## 2023-10-17 DIAGNOSIS — M54.16 RIGHT LUMBAR RADICULOPATHY: ICD-10-CM

## 2023-10-17 RX ORDER — OXYCODONE AND ACETAMINOPHEN 10; 325 MG/1; MG/1
1 TABLET ORAL EVERY 6 HOURS PRN
Qty: 90 TABLET | Refills: 0 | Status: SHIPPED | OUTPATIENT
Start: 2023-10-17 | End: 2023-11-16

## 2023-10-17 NOTE — TELEPHONE ENCOUNTER
R/F Percocet Chronic cough x 1 month. Likely allergic. Will refer for allergy testing and pulmonary. Loratidine and tessalon perles given. I have discussed the discharge plan with the patient. The patient agrees with the plan, as discussed.  The patient understands Emergency Department diagnosis is a preliminary diagnosis often based on limited information and that the patient must adhere to the follow-up plan as discussed.  The patient understands that if the symptoms worsen or if prescribed medications do not have the desired/planned effect that the patient may return to the Emergency Department at any time for further evaluation and treatment.

## 2023-10-24 ENCOUNTER — OFFICE VISIT (OUTPATIENT)
Dept: PULMONOLOGY | Age: 73
End: 2023-10-24
Payer: COMMERCIAL

## 2023-10-24 VITALS
BODY MASS INDEX: 23.18 KG/M2 | OXYGEN SATURATION: 95 % | WEIGHT: 148 LBS | TEMPERATURE: 97.3 F | SYSTOLIC BLOOD PRESSURE: 134 MMHG | DIASTOLIC BLOOD PRESSURE: 78 MMHG | HEART RATE: 80 BPM

## 2023-10-24 DIAGNOSIS — G47.33 OSA (OBSTRUCTIVE SLEEP APNEA): Primary | ICD-10-CM

## 2023-10-24 DIAGNOSIS — M54.12 C6 RADICULOPATHY: ICD-10-CM

## 2023-10-24 DIAGNOSIS — M54.16 RIGHT LUMBAR RADICULOPATHY: ICD-10-CM

## 2023-10-24 DIAGNOSIS — G95.9 SPINAL CORD DISEASE (HCC): ICD-10-CM

## 2023-10-24 DIAGNOSIS — G95.9 MYELOPATHY (HCC): ICD-10-CM

## 2023-10-24 DIAGNOSIS — Z79.899 HIGH RISK MEDICATION USE: ICD-10-CM

## 2023-10-24 PROCEDURE — 99214 OFFICE O/P EST MOD 30 MIN: CPT | Performed by: INTERNAL MEDICINE

## 2023-10-24 PROCEDURE — 3075F SYST BP GE 130 - 139MM HG: CPT | Performed by: INTERNAL MEDICINE

## 2023-10-24 PROCEDURE — 3078F DIAST BP <80 MM HG: CPT | Performed by: INTERNAL MEDICINE

## 2023-10-24 PROCEDURE — 1123F ACP DISCUSS/DSCN MKR DOCD: CPT | Performed by: INTERNAL MEDICINE

## 2023-10-24 RX ORDER — OXYCODONE AND ACETAMINOPHEN 10; 325 MG/1; MG/1
1 TABLET ORAL EVERY 6 HOURS PRN
Qty: 90 TABLET | Refills: 0 | Status: SHIPPED | OUTPATIENT
Start: 2023-10-24 | End: 2023-11-23

## 2023-10-24 ASSESSMENT — ENCOUNTER SYMPTOMS
SORE THROAT: 0
CHEST TIGHTNESS: 0
VOMITING: 0
WHEEZING: 0
ABDOMINAL PAIN: 0
SHORTNESS OF BREATH: 0
VOICE CHANGE: 0
NAUSEA: 0
DIARRHEA: 0
EYE ITCHING: 0
COUGH: 0
RHINORRHEA: 0

## 2023-10-24 NOTE — PROGRESS NOTES
Subjective:     Michael Haider is a 71 y.o. male who complains today of:     Chief Complaint   Patient presents with    Follow-up     2m f/u for SS results        HPI  He had sleep study  done  on 9/21/23. PSG show AHI 24.8 , low O2 sat 24 min and moderate snoring   He will go for sleep study    He is complaining of snoring, no daytime sleepiness and tiredness. C/o witness apnea. He wakes up with gasping for air. C/o wakes up frequently during sleep. No complaint of morning headache. He does not have restful sleep. He does not take daily naps. He does  fall asleep while watching TV. He does  have a complaint of sleepiness while driving. Allergies:  Patient has no known allergies.   Past Medical History:   Diagnosis Date    Chronic back pain     Coronary artery disease 2002    Dr. Bc Martinez at [facility]    Esophageal ulcer 3/10/2014    Dr. Reji Barger    Gastric ulcer 3/10/2014    Dr. Reji Barger    Gout     Hiatal hernia 3/10/2014    Dr. Reji Barger    Hyperlipidemia     Hypertension     Impotence 10/16/2020    MI (myocardial infarction) [facility]) 2005    Dr. Bc Martinez    Osteoarthritis     Peripheral vascular disease ([address])     Prediabetes     Schizo-affective schizophrenia, in remission ([address]) 2/1/2005    Overview:  followed at the [facility]    Severe single current episode of major depressive disorder, without psychotic features ([address]) 7/8/2016    Status post coronary artery stent placement 2002    Dr. Bc Martinez     Past Surgical History:   Procedure Laterality Date    [address]  01/03/2020    Dr. Miguelito Thomas Right 6/4/2019    RIGHT WRIST CARPAL TUNNEL RELEASE, SUPINE, PAT AT PCP performed by Brandon Ortega MD at [address]  3/10/14    DR Amanda Ryan  2002    Dr. Amanda Dry GRAFT  10/17/2017    KNEE ARTHROSCOPY

## 2023-10-27 ENCOUNTER — PROCEDURE VISIT (OUTPATIENT)
Dept: PHYSICAL MEDICINE AND REHAB | Age: 73
End: 2023-10-27
Payer: COMMERCIAL

## 2023-10-27 DIAGNOSIS — M77.8 SHOULDER TENDINITIS, RIGHT: Primary | ICD-10-CM

## 2023-10-27 PROCEDURE — 20611 DRAIN/INJ JOINT/BURSA W/US: CPT | Performed by: PHYSICAL MEDICINE & REHABILITATION

## 2023-10-27 PROCEDURE — 96372 THER/PROPH/DIAG INJ SC/IM: CPT | Performed by: PHYSICAL MEDICINE & REHABILITATION

## 2023-10-27 RX ORDER — LIDOCAINE HYDROCHLORIDE 10 MG/ML
15 INJECTION, SOLUTION INFILTRATION; PERINEURAL ONCE
Status: COMPLETED | OUTPATIENT
Start: 2023-10-27 | End: 2023-10-27

## 2023-10-27 RX ORDER — CYANOCOBALAMIN 1000 UG/ML
1000 INJECTION, SOLUTION INTRAMUSCULAR; SUBCUTANEOUS ONCE
Status: COMPLETED | OUTPATIENT
Start: 2023-10-27 | End: 2023-10-27

## 2023-10-27 RX ORDER — LIDOCAINE HYDROCHLORIDE 20 MG/ML
2 INJECTION, SOLUTION INFILTRATION; PERINEURAL ONCE
Status: COMPLETED | OUTPATIENT
Start: 2023-10-27 | End: 2023-10-27

## 2023-10-27 RX ADMIN — LIDOCAINE HYDROCHLORIDE 15 ML: 10 INJECTION, SOLUTION INFILTRATION; PERINEURAL at 11:42

## 2023-10-27 RX ADMIN — LIDOCAINE HYDROCHLORIDE 2 ML: 20 INJECTION, SOLUTION INFILTRATION; PERINEURAL at 11:44

## 2023-10-27 RX ADMIN — CYANOCOBALAMIN 1000 MCG: 1000 INJECTION, SOLUTION INTRAMUSCULAR; SUBCUTANEOUS at 11:41

## 2023-10-27 NOTE — PROGRESS NOTES
Patient here for right shoulder injection with U/S. Patient taken back to exam room, and placed on drape locking stool. Area marked, and cleansed appropriately with alcohol. 2cc of 2% Lidocaine, 2cc of Kenalog, and 15cc of 1% Lidocaine and 1cc of b12 to be injected by provider.

## 2023-11-03 DIAGNOSIS — E11.9 TYPE 2 DIABETES MELLITUS WITHOUT COMPLICATION, WITHOUT LONG-TERM CURRENT USE OF INSULIN (HCC): ICD-10-CM

## 2023-11-03 DIAGNOSIS — E29.1 HYPOGONADISM MALE: ICD-10-CM

## 2023-11-03 LAB
ANION GAP SERPL CALCULATED.3IONS-SCNC: 12 MEQ/L (ref 9–15)
BUN SERPL-MCNC: 16 MG/DL (ref 8–23)
CALCIUM SERPL-MCNC: 9.6 MG/DL (ref 8.5–9.9)
CHLORIDE SERPL-SCNC: 100 MEQ/L (ref 95–107)
CO2 SERPL-SCNC: 30 MEQ/L (ref 20–31)
CREAT SERPL-MCNC: 0.95 MG/DL (ref 0.7–1.2)
GLUCOSE SERPL-MCNC: 149 MG/DL (ref 70–99)
HBA1C MFR BLD: 6.4 % (ref 4.8–5.9)
POTASSIUM SERPL-SCNC: 4.6 MEQ/L (ref 3.4–4.9)
SODIUM SERPL-SCNC: 142 MEQ/L (ref 135–144)

## 2023-11-04 LAB
SHBG SERPL-SCNC: 40 NMOL/L (ref 11–80)
TESTOST FREE SERPL-MCNC: 200 PG/ML (ref 47–244)
TESTOST SERPL-MCNC: 941 NG/DL (ref 220–1000)

## 2023-11-07 DIAGNOSIS — M79.604 BILATERAL LEG PAIN: ICD-10-CM

## 2023-11-07 DIAGNOSIS — M62.838 SPASM OF MUSCLE: ICD-10-CM

## 2023-11-07 DIAGNOSIS — M79.605 BILATERAL LEG PAIN: ICD-10-CM

## 2023-11-07 RX ORDER — BACLOFEN 10 MG/1
TABLET ORAL
Qty: 90 TABLET | Refills: 1 | Status: SHIPPED | OUTPATIENT
Start: 2023-11-07

## 2023-11-07 RX ORDER — GABAPENTIN 300 MG/1
CAPSULE ORAL
Qty: 270 CAPSULE | Refills: 3 | Status: SHIPPED | OUTPATIENT
Start: 2023-11-07 | End: 2023-12-07

## 2023-11-13 ENCOUNTER — OFFICE VISIT (OUTPATIENT)
Dept: ENDOCRINOLOGY | Age: 73
End: 2023-11-13
Payer: COMMERCIAL

## 2023-11-13 VITALS
BODY MASS INDEX: 23.39 KG/M2 | HEIGHT: 67 IN | HEART RATE: 58 BPM | SYSTOLIC BLOOD PRESSURE: 115 MMHG | OXYGEN SATURATION: 98 % | DIASTOLIC BLOOD PRESSURE: 62 MMHG | WEIGHT: 149 LBS

## 2023-11-13 DIAGNOSIS — E29.1 HYPOGONADISM MALE: ICD-10-CM

## 2023-11-13 DIAGNOSIS — E11.9 TYPE 2 DIABETES MELLITUS WITHOUT COMPLICATION, WITHOUT LONG-TERM CURRENT USE OF INSULIN (HCC): Primary | ICD-10-CM

## 2023-11-13 LAB
CHP ED QC CHECK: NORMAL
GLUCOSE BLD-MCNC: 164 MG/DL

## 2023-11-13 PROCEDURE — 3074F SYST BP LT 130 MM HG: CPT | Performed by: INTERNAL MEDICINE

## 2023-11-13 PROCEDURE — 1123F ACP DISCUSS/DSCN MKR DOCD: CPT | Performed by: INTERNAL MEDICINE

## 2023-11-13 PROCEDURE — 99213 OFFICE O/P EST LOW 20 MIN: CPT | Performed by: INTERNAL MEDICINE

## 2023-11-13 PROCEDURE — 3044F HG A1C LEVEL LT 7.0%: CPT | Performed by: INTERNAL MEDICINE

## 2023-11-13 PROCEDURE — 3078F DIAST BP <80 MM HG: CPT | Performed by: INTERNAL MEDICINE

## 2023-11-13 PROCEDURE — 82962 GLUCOSE BLOOD TEST: CPT | Performed by: INTERNAL MEDICINE

## 2023-11-13 RX ORDER — SILDENAFIL 100 MG/1
100 TABLET, FILM COATED ORAL PRN
Qty: 30 TABLET | Refills: 3 | Status: SHIPPED | OUTPATIENT
Start: 2023-11-13

## 2023-11-13 ASSESSMENT — ENCOUNTER SYMPTOMS
BACK PAIN: 1
PHOTOPHOBIA: 0
ABDOMINAL PAIN: 0
BOWEL INCONTINENCE: 0
VISUAL CHANGE: 0
TROUBLE SWALLOWING: 0

## 2023-11-13 NOTE — PROGRESS NOTES
BX, DILATION, DR Harmony Fuentes     Social History     Socioeconomic History    Marital status:      Spouse name: Not on file    Number of children: Not on file    Years of education: Not on file    Highest education level: Not on file   Occupational History    Occupation: disability    Tobacco Use    Smoking status: Never    Smokeless tobacco: Never   Vaping Use    Vaping Use: Never used   Substance and Sexual Activity    Alcohol use: No     Alcohol/week: 0.0 standard drinks of alcohol     Comment: Stopped years ago. Drug use: No     Comment: YEARS AGO    Sexual activity: Yes     Partners: Female     Comment: monogomous sexual partner, wife. Other Topics Concern    Not on file   Social History Narrative    Tobacco -- never smoked or chewed. Alcohol -- have not used for past 10 years, prior to had consumed 6 pack of beer daily. Drugs -- tried marijuana and cocaine 14 years ago occasionally used for 3-4 years and then stopped completely 10 years ago. Education -- completed 11th grade in [place].    Occupation -- inD[employer] work,  at [employer].    Marital status --  44 years, 2 grown sons. Social Determinants of Health     Financial Resource Strain: Low Risk  (5/19/2023)    Overall Financial Resource Strain (CARDIA)     Difficulty of Paying Living Expenses: Not hard at all   Food Insecurity: No Food Insecurity (5/19/2023)    Hunger Vital Sign     Worried About Running Out of Food in the Last Year: Never true     Ran Out of Food in the Last Year: Never true   Transportation Needs: No Transportation Needs (5/19/2023)    PRAPARE - Transportation     Lack of Transportation (Medical): No     Lack of Transportation (Non-Medical):  No   Physical Activity: Inactive (11/18/2022)    Exercise Vital Sign     Days of Exercise per Week: 0 days     Minutes of Exercise per Session: 0 min   Stress: Not on file   Social Connections: Not on file   Intimate Partner Violence: Not on file

## 2023-11-13 NOTE — PROGRESS NOTES
[address] [facility], 71 y.o. male presents today with:       Back Pain Trigger point injection 12/1/23 with 90% reduction in pain lasting still lasting       Neck Pain         Shoulder Pain Right shoulder. Right shoulder injection 9/27/23 and 10/27/23 with 50% reduction in pain lasting 2 weeks. Medication Refill Percocet, Gabapentin, Baclofen, Vit D. Patient did not bring his pills      Recent pain flareup  dt a fall at home--Recent injections helped--medications are not as helpful. Injections still help very much. He would like to schedule monthly trigger point-no longer needing sciatic blocks. Back Pain  This is a chronic problem. The current episode started more than 1 year ago. The problem occurs daily. The problem is unchanged. The pain is present in the lumbar spine. The quality of the pain is described as aching, cramping, shooting and stabbing. The pain radiates to the right foot, right knee and right thigh. The pain is at a severity of 9/10. The pain is severe. The pain is Worse during the day. The symptoms are aggravated by bending, lying down, position, sitting, standing and twisting. Stiffness is present In the morning. Associated symptoms include leg pain, numbness and weakness. Pertinent negatives include no abdominal pain, bladder incontinence, bowel incontinence, chest pain, dysuria, fever, headaches, paresis, perianal numbness or tingling. Risk factors include lack of exercise and sedentary lifestyle. He has tried analgesics, home exercises, NSAIDs, muscle relaxant, heat and walking (SI injections which help, trigger point injections, OXY-IR ) for the symptoms. The treatment provided mild relief. Shoulder Pain   This is a chronic problem. The pain is at a severity of 9/10. Associated symptoms include numbness. Pertinent negatives include no fever or tingling. The symptoms are aggravated by contact.  He has tried NSAIDS, OTC ointments, OTC pain meds, rest, oral narcotics and heat

## 2023-11-20 DIAGNOSIS — M54.12 C6 RADICULOPATHY: ICD-10-CM

## 2023-11-20 DIAGNOSIS — G95.9 SPINAL CORD DISEASE (HCC): ICD-10-CM

## 2023-11-20 DIAGNOSIS — G95.9 MYELOPATHY (HCC): ICD-10-CM

## 2023-11-20 DIAGNOSIS — Z79.899 HIGH RISK MEDICATION USE: ICD-10-CM

## 2023-11-20 DIAGNOSIS — M54.16 RIGHT LUMBAR RADICULOPATHY: ICD-10-CM

## 2023-11-21 DIAGNOSIS — Z79.899 HIGH RISK MEDICATION USE: ICD-10-CM

## 2023-11-21 DIAGNOSIS — M54.16 RIGHT LUMBAR RADICULOPATHY: ICD-10-CM

## 2023-11-21 DIAGNOSIS — G95.9 MYELOPATHY (HCC): ICD-10-CM

## 2023-11-21 DIAGNOSIS — G95.9 SPINAL CORD DISEASE (HCC): ICD-10-CM

## 2023-11-21 DIAGNOSIS — M54.12 C6 RADICULOPATHY: ICD-10-CM

## 2023-11-21 RX ORDER — OXYCODONE AND ACETAMINOPHEN 10; 325 MG/1; MG/1
1 TABLET ORAL EVERY 6 HOURS PRN
Qty: 90 TABLET | Refills: 0 | Status: SHIPPED | OUTPATIENT
Start: 2023-11-22 | End: 2023-11-21

## 2023-11-22 RX ORDER — OXYCODONE AND ACETAMINOPHEN 10; 325 MG/1; MG/1
1 TABLET ORAL EVERY 6 HOURS PRN
Qty: 90 TABLET | Refills: 0 | Status: SHIPPED | OUTPATIENT
Start: 2023-11-22 | End: 2023-12-22

## 2023-12-01 ENCOUNTER — PROCEDURE VISIT (OUTPATIENT)
Dept: PHYSICAL MEDICINE AND REHAB | Age: 73
End: 2023-12-01

## 2023-12-01 DIAGNOSIS — M79.10 MYALGIA: Primary | ICD-10-CM

## 2023-12-01 RX ORDER — LIDOCAINE HYDROCHLORIDE 10 MG/ML
15 INJECTION, SOLUTION INFILTRATION; PERINEURAL ONCE
Status: COMPLETED | OUTPATIENT
Start: 2023-12-01 | End: 2023-12-01

## 2023-12-01 RX ORDER — CYANOCOBALAMIN 1000 UG/ML
1000 INJECTION, SOLUTION INTRAMUSCULAR; SUBCUTANEOUS ONCE
Status: COMPLETED | OUTPATIENT
Start: 2023-12-01 | End: 2023-12-01

## 2023-12-01 RX ADMIN — LIDOCAINE HYDROCHLORIDE 15 ML: 10 INJECTION, SOLUTION INFILTRATION; PERINEURAL at 11:15

## 2023-12-01 RX ADMIN — CYANOCOBALAMIN 1000 MCG: 1000 INJECTION, SOLUTION INTRAMUSCULAR; SUBCUTANEOUS at 11:14

## 2023-12-04 ENCOUNTER — OFFICE VISIT (OUTPATIENT)
Dept: PHYSICAL MEDICINE AND REHAB | Age: 73
End: 2023-12-04
Payer: COMMERCIAL

## 2023-12-04 VITALS
WEIGHT: 149 LBS | HEIGHT: 67 IN | BODY MASS INDEX: 23.39 KG/M2 | HEART RATE: 75 BPM | SYSTOLIC BLOOD PRESSURE: 138 MMHG | DIASTOLIC BLOOD PRESSURE: 78 MMHG

## 2023-12-04 DIAGNOSIS — M79.10 MYALGIA: ICD-10-CM

## 2023-12-04 DIAGNOSIS — M54.40 ACUTE RIGHT-SIDED LOW BACK PAIN WITH SCIATICA, SCIATICA LATERALITY UNSPECIFIED: ICD-10-CM

## 2023-12-04 DIAGNOSIS — G89.29 CHRONIC PAIN OF BOTH SHOULDERS: Primary | ICD-10-CM

## 2023-12-04 DIAGNOSIS — M54.17 THORACIC AND LUMBOSACRAL NEURITIS: ICD-10-CM

## 2023-12-04 DIAGNOSIS — M54.14 THORACIC AND LUMBOSACRAL NEURITIS: ICD-10-CM

## 2023-12-04 DIAGNOSIS — M25.511 CHRONIC PAIN OF BOTH SHOULDERS: Primary | ICD-10-CM

## 2023-12-04 DIAGNOSIS — M25.512 CHRONIC PAIN OF BOTH SHOULDERS: Primary | ICD-10-CM

## 2023-12-04 DIAGNOSIS — G95.9 SPINAL CORD DISEASE (HCC): ICD-10-CM

## 2023-12-04 DIAGNOSIS — G95.9 MYELOPATHY (HCC): ICD-10-CM

## 2023-12-04 DIAGNOSIS — M25.511 CHRONIC RIGHT SHOULDER PAIN: ICD-10-CM

## 2023-12-04 DIAGNOSIS — G89.29 CHRONIC RIGHT SHOULDER PAIN: ICD-10-CM

## 2023-12-04 DIAGNOSIS — Z79.899 HIGH RISK MEDICATION USE: ICD-10-CM

## 2023-12-04 DIAGNOSIS — M54.12 C6 RADICULOPATHY: ICD-10-CM

## 2023-12-04 DIAGNOSIS — G89.29 CHRONIC LOW BACK PAIN WITH SCIATICA, SCIATICA LATERALITY UNSPECIFIED, UNSPECIFIED BACK PAIN LATERALITY: ICD-10-CM

## 2023-12-04 DIAGNOSIS — M54.40 CHRONIC LOW BACK PAIN WITH SCIATICA, SCIATICA LATERALITY UNSPECIFIED, UNSPECIFIED BACK PAIN LATERALITY: ICD-10-CM

## 2023-12-04 DIAGNOSIS — M54.16 RIGHT LUMBAR RADICULOPATHY: ICD-10-CM

## 2023-12-04 LAB
AMPHET UR QL SCN: ABNORMAL
BARBITURATES UR QL SCN: ABNORMAL
BENZODIAZ UR QL SCN: ABNORMAL
CANNABINOIDS UR QL SCN: ABNORMAL
COCAINE UR QL SCN: ABNORMAL
DRUG SCREEN COMMENT UR-IMP: ABNORMAL
FENTANYL SCREEN, URINE: ABNORMAL
METHADONE UR QL SCN: ABNORMAL
OPIATES UR QL SCN: ABNORMAL
OXYCODONE UR QL SCN: POSITIVE
PCP UR QL SCN: ABNORMAL
PROPOXYPH UR QL SCN: ABNORMAL

## 2023-12-04 PROCEDURE — 3078F DIAST BP <80 MM HG: CPT | Performed by: PHYSICAL MEDICINE & REHABILITATION

## 2023-12-04 PROCEDURE — 1123F ACP DISCUSS/DSCN MKR DOCD: CPT | Performed by: PHYSICAL MEDICINE & REHABILITATION

## 2023-12-04 PROCEDURE — 3074F SYST BP LT 130 MM HG: CPT | Performed by: PHYSICAL MEDICINE & REHABILITATION

## 2023-12-04 PROCEDURE — 99214 OFFICE O/P EST MOD 30 MIN: CPT | Performed by: PHYSICAL MEDICINE & REHABILITATION

## 2023-12-04 RX ORDER — OXYCODONE AND ACETAMINOPHEN 10; 325 MG/1; MG/1
1 TABLET ORAL EVERY 6 HOURS PRN
Qty: 90 TABLET | Refills: 0 | Status: SHIPPED | OUTPATIENT
Start: 2023-12-21 | End: 2024-01-20

## 2023-12-07 DIAGNOSIS — M1A.9XX0 CHRONIC GOUT WITHOUT TOPHUS, UNSPECIFIED CAUSE, UNSPECIFIED SITE: ICD-10-CM

## 2023-12-07 RX ORDER — METOPROLOL TARTRATE 50 MG/1
50 TABLET, FILM COATED ORAL 2 TIMES DAILY
Qty: 180 TABLET | Refills: 3 | Status: SHIPPED | OUTPATIENT
Start: 2023-12-07

## 2023-12-07 RX ORDER — ALLOPURINOL 100 MG/1
100 TABLET ORAL DAILY
Qty: 90 TABLET | Refills: 0 | Status: SHIPPED | OUTPATIENT
Start: 2023-12-07

## 2023-12-07 NOTE — TELEPHONE ENCOUNTER
Requesting medication refill. Please approve or deny this request.    Rx requested:  Requested Prescriptions     Pending Prescriptions Disp Refills    metoprolol tartrate (LOPRESSOR) 50 MG tablet [Pharmacy Med Name: METOPROLOL TARTRATE 50MG TABLETS] 180 tablet 3     Sig: TAKE 1 TABLET BY MOUTH TWICE DAILY         Last Office Visit:   9/26/2023      Next Visit Date:  Future Appointments   Date Time Provider [address]  [facility]   12/11/2023  1:15 PM Immanuel Davis DO [facility]   12/18/2023 10:30 AM BC Ramirez [facility]   12/27/2023  8:45 AM Leni Sheridan MD [facility]   1/9/2024 10:15 AM Gamaliel Anna DO [facility] [place]   1/31/2024 12:45 PM Francheska Villareal MD [facility]   2/9/2024 10:30 AM Gamaliel [facility] DO [facility] [place]   2/19/2024 10:15 AM [facility]iel [facility] DO [facility] [place]   3/11/2024 10:30 AM Gamaliel Anna DO [facility] [place]   5/13/2024  9:00 AM Darvin Karimi MD [facility]               Last refill 01/19/2023. Please approve or deny.

## 2023-12-07 NOTE — TELEPHONE ENCOUNTER
Please approve or deny request. Thank you!     Rx requested:  Requested Prescriptions     Pending Prescriptions Disp Refills    allopurinol (ZYLOPRIM) 100 MG tablet [Pharmacy Med Name: ALLOPURINOL 100MG TABLETS] 90 tablet 0     Sig: TAKE 1 TABLET BY MOUTH DAILY         Last Office Visit:   8/21/2023      Next Visit Date:  Future Appointments   Date Time Provider [address]   12/11/2023  1:15 PM DO LASHAE Roper [facility]   12/18/2023 10:30 AM Hasmukh Humphries PA [place] [facility]   12/27/2023  8:45 AM Christianne Mayer MD [place]   1/9/2024 10:15 AM DO Kvng Castañeda [facility] EM[facility]GENCY [facility] [place]   1/31/2024 12:45 PM Saman Villareal MD [facility]   2/9/2024 10:30 AM Lindsey Foster DO [place] [facility]Y [facility] [place]   2/19/2024 10:15 AM Lindsey Foster DO [facility]   3/11/2024 10:30 AM Lindsey Foster DO [facility] EM[facility]GENCY [facility] [place]   5/13/2024  9:00 AM Luli Peguero MD [facility]

## 2023-12-13 ENCOUNTER — TELEPHONE (OUTPATIENT)
Dept: ENDOCRINOLOGY | Age: 73
End: 2023-12-13

## 2024-01-04 ENCOUNTER — OFFICE VISIT (OUTPATIENT)
Dept: ORTHOPEDIC SURGERY | Age: 74
End: 2024-01-04
Payer: COMMERCIAL

## 2024-01-04 VITALS
HEART RATE: 65 BPM | TEMPERATURE: 97.5 F | BODY MASS INDEX: 23.39 KG/M2 | WEIGHT: 149 LBS | OXYGEN SATURATION: 98 % | HEIGHT: 67 IN

## 2024-01-04 DIAGNOSIS — G89.29 CHRONIC RIGHT SHOULDER PAIN: Primary | ICD-10-CM

## 2024-01-04 DIAGNOSIS — M25.511 CHRONIC RIGHT SHOULDER PAIN: Primary | ICD-10-CM

## 2024-01-04 PROCEDURE — 1123F ACP DISCUSS/DSCN MKR DOCD: CPT | Performed by: PHYSICIAN ASSISTANT

## 2024-01-04 PROCEDURE — 99214 OFFICE O/P EST MOD 30 MIN: CPT | Performed by: PHYSICIAN ASSISTANT

## 2024-01-04 PROCEDURE — 20610 DRAIN/INJ JOINT/BURSA W/O US: CPT | Performed by: PHYSICIAN ASSISTANT

## 2024-01-04 RX ORDER — LIDOCAINE HYDROCHLORIDE 10 MG/ML
5 INJECTION, SOLUTION INFILTRATION; PERINEURAL ONCE
Status: COMPLETED | OUTPATIENT
Start: 2024-01-04 | End: 2024-01-04

## 2024-01-04 RX ORDER — KETOROLAC TROMETHAMINE 30 MG/ML
60 INJECTION, SOLUTION INTRAMUSCULAR; INTRAVENOUS ONCE
Status: COMPLETED | OUTPATIENT
Start: 2024-01-04 | End: 2024-01-04

## 2024-01-04 RX ADMIN — LIDOCAINE HYDROCHLORIDE 5 ML: 10 INJECTION, SOLUTION INFILTRATION; PERINEURAL at 11:57

## 2024-01-04 RX ADMIN — KETOROLAC TROMETHAMINE 60 MG: 30 INJECTION, SOLUTION INTRAMUSCULAR; INTRAVENOUS at 12:00

## 2024-01-04 ASSESSMENT — ENCOUNTER SYMPTOMS: RESPIRATORY NEGATIVE: 1

## 2024-01-04 NOTE — PROGRESS NOTES
Scotty Wright (:  1950) is a 73 y.o. male,Established patient, here for evaluation of the following chief complaint(s):  Shoulder Pain (F/u chronic right shoulder pain)         ASSESSMENT/PLAN:  1. Chronic right shoulder pain  -     LA ARTHROCENTESIS ASPIR&/INJ MAJOR JT/BURSA W/O US  -     ketorolac (TORADOL) injection 60 mg; 60 mg, IntraMUSCular, ONCE, 1 dose, On Thu 24 at 1145Do not administer for more than 5 days  -     lidocaine 1 % injection 5 mL; 5 mL, Intra-artICUlar, ONCE, 1 dose, On Thu 24 at 1145      No follow-ups on file.         Subjective   SUBJECTIVE/OBJECTIVE:  This is 73-year-old right-hand-dominant male following up for complaint of right shoulder pain.  States he has noticed improvement in his shoulder pain with subacromial bursa Toradol injection at his last visit.  He states his pain improved about 80%.  He states his pain seems to be worse when he lays down to go to sleep.  He denies any new injury.  He denies change in sensation of the arm.        Review of Systems   Constitutional: Negative.    HENT: Negative.     Respiratory: Negative.     Skin: Negative.    Neurological: Negative.           Objective   Physical Exam  Musculoskeletal:      Comments: Hide right shoulder-no acromioclavicular, clavicle, SC joint tenderness with palpation.  Abduction and abduction strength is symmetrical.  Internal rotation is 0 degrees, external rotation is 120 degrees.  Supraspinatus test elicits mild pain but no specific weakness.  Apprehension sign is negative.  Biceps contour is intact.  Sensation is intact distally to light touch.     Shoulder Injection Procedure Note    Pre-operative Diagnosis:    Diagnosis Orders   1. Chronic right shoulder pain  LA ARTHROCENTESIS ASPIR&/INJ MAJOR JT/BURSA W/O US    ketorolac (TORADOL) injection 60 mg    lidocaine 1 % injection 5 mL           Post-operative Diagnosis:    Diagnosis Orders   1. Chronic right shoulder pain  LA ARTHROCENTESIS ASPIR&/INJ

## 2024-01-06 DIAGNOSIS — M79.605 BILATERAL LEG PAIN: ICD-10-CM

## 2024-01-06 DIAGNOSIS — M62.838 SPASM OF MUSCLE: ICD-10-CM

## 2024-01-06 DIAGNOSIS — M79.604 BILATERAL LEG PAIN: ICD-10-CM

## 2024-01-08 RX ORDER — BACLOFEN 10 MG/1
TABLET ORAL
Qty: 90 TABLET | Refills: 1 | Status: SHIPPED | OUTPATIENT
Start: 2024-01-08

## 2024-01-09 ENCOUNTER — PROCEDURE VISIT (OUTPATIENT)
Dept: PHYSICAL MEDICINE AND REHAB | Age: 74
End: 2024-01-09
Payer: COMMERCIAL

## 2024-01-09 DIAGNOSIS — M79.10 MYALGIA: Primary | ICD-10-CM

## 2024-01-09 PROCEDURE — 96372 THER/PROPH/DIAG INJ SC/IM: CPT | Performed by: PHYSICAL MEDICINE & REHABILITATION

## 2024-01-09 PROCEDURE — 20553 NJX 1/MLT TRIGGER POINTS 3/>: CPT | Performed by: PHYSICAL MEDICINE & REHABILITATION

## 2024-01-09 RX ORDER — CYANOCOBALAMIN 1000 UG/ML
1000 INJECTION, SOLUTION INTRAMUSCULAR; SUBCUTANEOUS ONCE
Status: COMPLETED | OUTPATIENT
Start: 2024-01-09 | End: 2024-01-09

## 2024-01-09 RX ORDER — LIDOCAINE HYDROCHLORIDE 20 MG/ML
2 INJECTION, SOLUTION INFILTRATION; PERINEURAL ONCE
Status: CANCELLED | OUTPATIENT
Start: 2024-01-09 | End: 2024-01-09

## 2024-01-09 RX ORDER — LIDOCAINE HYDROCHLORIDE 10 MG/ML
15 INJECTION, SOLUTION INFILTRATION; PERINEURAL ONCE
Status: COMPLETED | OUTPATIENT
Start: 2024-01-09 | End: 2024-01-09

## 2024-01-09 RX ADMIN — CYANOCOBALAMIN 1000 MCG: 1000 INJECTION, SOLUTION INTRAMUSCULAR; SUBCUTANEOUS at 11:35

## 2024-01-09 RX ADMIN — LIDOCAINE HYDROCHLORIDE 15 ML: 10 INJECTION, SOLUTION INFILTRATION; PERINEURAL at 11:36

## 2024-01-09 NOTE — PROGRESS NOTES
Patient here for trigger point injections. Patient taken back to exam room, and placed on drape locking stool. Areas to be injected marked appropriately, and cleansed with alcohol. 15cc of 1% Lidocaine with 1cc B-12 to be injected by provider.

## 2024-01-17 ENCOUNTER — OFFICE VISIT (OUTPATIENT)
Dept: FAMILY MEDICINE CLINIC | Age: 74
End: 2024-01-17
Payer: COMMERCIAL

## 2024-01-17 VITALS
SYSTOLIC BLOOD PRESSURE: 136 MMHG | HEART RATE: 72 BPM | WEIGHT: 152 LBS | OXYGEN SATURATION: 98 % | HEIGHT: 67 IN | DIASTOLIC BLOOD PRESSURE: 60 MMHG | BODY MASS INDEX: 23.86 KG/M2 | TEMPERATURE: 98.2 F

## 2024-01-17 DIAGNOSIS — Z76.89 ENCOUNTER TO ESTABLISH CARE WITH NEW DOCTOR: Primary | ICD-10-CM

## 2024-01-17 DIAGNOSIS — Z79.899 HIGH RISK MEDICATION USE: ICD-10-CM

## 2024-01-17 DIAGNOSIS — M25.511 CHRONIC PAIN OF BOTH SHOULDERS: ICD-10-CM

## 2024-01-17 DIAGNOSIS — I25.118 CORONARY ARTERY DISEASE OF NATIVE ARTERY OF NATIVE HEART WITH STABLE ANGINA PECTORIS (HCC): ICD-10-CM

## 2024-01-17 DIAGNOSIS — M54.16 RIGHT LUMBAR RADICULOPATHY: ICD-10-CM

## 2024-01-17 DIAGNOSIS — E21.3 HYPERPARATHYROIDISM (HCC): ICD-10-CM

## 2024-01-17 DIAGNOSIS — E11.51 TYPE 2 DIABETES MELLITUS WITH DIABETIC PERIPHERAL ANGIOPATHY WITHOUT GANGRENE, UNSPECIFIED WHETHER LONG TERM INSULIN USE (HCC): ICD-10-CM

## 2024-01-17 DIAGNOSIS — G95.9 MYELOPATHY (HCC): ICD-10-CM

## 2024-01-17 DIAGNOSIS — M25.512 CHRONIC PAIN OF BOTH SHOULDERS: ICD-10-CM

## 2024-01-17 DIAGNOSIS — K59.03 THERAPEUTIC OPIOID-INDUCED CONSTIPATION (OIC): ICD-10-CM

## 2024-01-17 DIAGNOSIS — T40.2X5A THERAPEUTIC OPIOID-INDUCED CONSTIPATION (OIC): ICD-10-CM

## 2024-01-17 DIAGNOSIS — M54.12 C6 RADICULOPATHY: ICD-10-CM

## 2024-01-17 DIAGNOSIS — G47.33 OSA (OBSTRUCTIVE SLEEP APNEA): ICD-10-CM

## 2024-01-17 DIAGNOSIS — E11.59 TYPE 2 DIABETES MELLITUS WITH OTHER CIRCULATORY COMPLICATIONS (HCC): ICD-10-CM

## 2024-01-17 DIAGNOSIS — F11.20 OPIOID DEPENDENCE WITH CURRENT USE (HCC): ICD-10-CM

## 2024-01-17 DIAGNOSIS — G95.9 SPINAL CORD DISEASE (HCC): ICD-10-CM

## 2024-01-17 DIAGNOSIS — E29.1 HYPOGONADISM IN MALE: ICD-10-CM

## 2024-01-17 DIAGNOSIS — G89.29 CHRONIC PAIN OF BOTH SHOULDERS: ICD-10-CM

## 2024-01-17 DIAGNOSIS — R63.4 WEIGHT LOSS: ICD-10-CM

## 2024-01-17 PROCEDURE — 3078F DIAST BP <80 MM HG: CPT | Performed by: STUDENT IN AN ORGANIZED HEALTH CARE EDUCATION/TRAINING PROGRAM

## 2024-01-17 PROCEDURE — 3075F SYST BP GE 130 - 139MM HG: CPT | Performed by: STUDENT IN AN ORGANIZED HEALTH CARE EDUCATION/TRAINING PROGRAM

## 2024-01-17 PROCEDURE — 1123F ACP DISCUSS/DSCN MKR DOCD: CPT | Performed by: STUDENT IN AN ORGANIZED HEALTH CARE EDUCATION/TRAINING PROGRAM

## 2024-01-17 PROCEDURE — 99215 OFFICE O/P EST HI 40 MIN: CPT | Performed by: STUDENT IN AN ORGANIZED HEALTH CARE EDUCATION/TRAINING PROGRAM

## 2024-01-17 RX ORDER — LUBIPROSTONE 24 UG/1
24 CAPSULE ORAL 2 TIMES DAILY WITH MEALS
Qty: 180 CAPSULE | Refills: 1 | Status: SHIPPED | OUTPATIENT
Start: 2024-01-17

## 2024-01-17 RX ORDER — LUBIPROSTONE 24 UG/1
24 CAPSULE ORAL 2 TIMES DAILY WITH MEALS
Qty: 60 CAPSULE | Refills: 3 | Status: SHIPPED | OUTPATIENT
Start: 2024-01-17 | End: 2024-01-17

## 2024-01-17 RX ORDER — OXYCODONE AND ACETAMINOPHEN 10; 325 MG/1; MG/1
1 TABLET ORAL EVERY 6 HOURS PRN
Qty: 90 TABLET | Refills: 0 | Status: SHIPPED | OUTPATIENT
Start: 2024-01-19 | End: 2024-02-18

## 2024-01-17 ASSESSMENT — PATIENT HEALTH QUESTIONNAIRE - PHQ9
SUM OF ALL RESPONSES TO PHQ QUESTIONS 1-9: 1
SUM OF ALL RESPONSES TO PHQ9 QUESTIONS 1 & 2: 1
1. LITTLE INTEREST OR PLEASURE IN DOING THINGS: 0
SUM OF ALL RESPONSES TO PHQ QUESTIONS 1-9: 1
2. FEELING DOWN, DEPRESSED OR HOPELESS: 1
SUM OF ALL RESPONSES TO PHQ QUESTIONS 1-9: 1
SUM OF ALL RESPONSES TO PHQ QUESTIONS 1-9: 1

## 2024-01-17 NOTE — PROGRESS NOTES
Subjective  Scotty Wright, 73 y.o. male presents today with:  Chief Complaint   Patient presents with    Establish Care     Was seeing Sheryl Tellez prior.     Weight Loss     States he has had quite a bit of weight loss over the past 4-6 months. Is unsure why. States he did gain a few pounds since his last weight check, but is concerned about why he lost it to begin with.     Constipation     States he has chronic constipation & has to take Milk of Magnesia every night. States he has to fill the cup that comes with the medication for it to help. States he has been using this for sometime & spends a lot of money since its not covered by his insurance. Would like to discuss.     Shoulder Pain     States he has chronic bilateral shoulder pain & has for a year or more. Does see pain management & ortho. States the treatment does not seem to be helping so far & would like to discuss.     Hypertension     Occasionally checks BP at home. States sometimes its normal & other times its elevated. States he does have chest pains, heart palpitations, headaches, dizziness, SOB & swelling in his feet with numbness. Does follow with cardiology & is scheduled for f/u 1/31/24.     Sleep Apnea     Does have CPAP machine, but is not wearing it. States the mask makes him claustrophobic. States he has not contacted the sleep med Dr about this yet. Plans on contacting them he states.     Diabetes     A1c was 6.4 on 11/3/23. Checks blood sugar 2-3 times a week. Follows with Dr. Gallardo.     Hyperlipidemia     Lipid panel done 5/19/23.        Patient with appointment to establish care with new physician.  Previously seeing Sheryl Tellez.    Patient with type 2 diabetes mellitus.  Last A1c was 6.4%.  Checks blood sugar 2-3 times per week.  He does follow with endocrinology for this.    Patient also with weight loss over the last 4 to 6 months.  He did gain a few pounds from his last weight check but is concerned about continued weight loss.  He is

## 2024-01-18 ENCOUNTER — TELEPHONE (OUTPATIENT)
Dept: FAMILY MEDICINE CLINIC | Age: 74
End: 2024-01-18

## 2024-01-18 NOTE — TELEPHONE ENCOUNTER
Received notice from pharmacy PA was needed for Amitiza.     Started PA on Cover My Meds, but before completing it showed covered alternatives as    Lactulose  Linzess  Movantik  Polyethylene Glycol 3350  Trulance    Patient had:  Lactulose 8/2019 - current  Movantik 12/2019 - 6/2020  Amitiza 8 MCG - 6/2018 - 10/2018  Amitiza 24 MCG - 10/2018 - current    PA was submitted. Waiting for determination.

## 2024-01-22 ASSESSMENT — ENCOUNTER SYMPTOMS
ABDOMINAL PAIN: 0
RHINORRHEA: 0
EYE DISCHARGE: 0
VOMITING: 0
WHEEZING: 0
SHORTNESS OF BREATH: 1
DIARRHEA: 0
COUGH: 0
NAUSEA: 0
SORE THROAT: 0

## 2024-01-29 NOTE — PROGRESS NOTES
Subjective  Scotty Wright, 73 y.o. male presents today with:    Reason for Visit  Back Pain Trigger point injection 1/9/24 with 40% reduction in pain lasting 2 weeks       Neck Pain        Shoulder Pain Right       Medication Refill Percocet, Gabapentin, Baclofen, Vit D. Pill count today      He is doing well on his current dose of medication with no signs of overuse abuse or sedation however his pain medication did not seem to be lasting him throughout the entire day.  He is however on the highest dose that I feel comfortable with.  He is also trying to stay fit, he takes vitamins, he gets injections for his shoulder pain which is flared up to a 9 out of 10.  He will have his right shoulder done later this week and will schedule a left shoulder next month.      Recent pain flareup  dt a fall at home--Recent injections helped--medications are not as helpful. Injections still help very much.         Back Pain  This is a chronic problem. The current episode started more than 1 year ago. The problem occurs daily. The problem is unchanged. The pain is present in the lumbar spine. The quality of the pain is described as aching, cramping, shooting and stabbing. The pain radiates to the right foot, right knee and right thigh. The pain is at a severity of 9/10. The pain is severe. The pain is Worse during the day. The symptoms are aggravated by bending, lying down, position, sitting, standing and twisting. Stiffness is present In the morning. Associated symptoms include leg pain, numbness and weakness. Pertinent negatives include no abdominal pain, bladder incontinence, bowel incontinence, chest pain, dysuria, fever, headaches, paresis, perianal numbness or tingling. Risk factors include lack of exercise and sedentary lifestyle. He has tried analgesics, home exercises, NSAIDs, muscle relaxant, heat and walking (SI injections which help, trigger point injections, OXY-IR ) for the symptoms. The treatment provided mild

## 2024-01-31 ENCOUNTER — OFFICE VISIT (OUTPATIENT)
Dept: CARDIOLOGY CLINIC | Age: 74
End: 2024-01-31
Payer: COMMERCIAL

## 2024-01-31 VITALS
HEIGHT: 67 IN | HEART RATE: 60 BPM | BODY MASS INDEX: 24.23 KG/M2 | SYSTOLIC BLOOD PRESSURE: 130 MMHG | DIASTOLIC BLOOD PRESSURE: 74 MMHG | OXYGEN SATURATION: 99 % | WEIGHT: 154.4 LBS

## 2024-01-31 DIAGNOSIS — R09.89 BILATERAL CAROTID BRUITS: ICD-10-CM

## 2024-01-31 DIAGNOSIS — I10 ESSENTIAL HYPERTENSION, BENIGN: Primary | ICD-10-CM

## 2024-01-31 DIAGNOSIS — I25.118 CORONARY ARTERY DISEASE OF NATIVE ARTERY OF NATIVE HEART WITH STABLE ANGINA PECTORIS (HCC): ICD-10-CM

## 2024-01-31 DIAGNOSIS — E78.00 HYPERCHOLESTEROLEMIA: ICD-10-CM

## 2024-01-31 PROCEDURE — 3078F DIAST BP <80 MM HG: CPT | Performed by: INTERNAL MEDICINE

## 2024-01-31 PROCEDURE — 1123F ACP DISCUSS/DSCN MKR DOCD: CPT | Performed by: INTERNAL MEDICINE

## 2024-01-31 PROCEDURE — 99214 OFFICE O/P EST MOD 30 MIN: CPT | Performed by: INTERNAL MEDICINE

## 2024-01-31 PROCEDURE — 3075F SYST BP GE 130 - 139MM HG: CPT | Performed by: INTERNAL MEDICINE

## 2024-01-31 ASSESSMENT — ENCOUNTER SYMPTOMS
EYES NEGATIVE: 1
BACK PAIN: 1
STRIDOR: 0
SHORTNESS OF BREATH: 0
NAUSEA: 0
GASTROINTESTINAL NEGATIVE: 1
COUGH: 0
CHEST TIGHTNESS: 0
BLOOD IN STOOL: 0
RESPIRATORY NEGATIVE: 1
WHEEZING: 0

## 2024-01-31 NOTE — PROGRESS NOTES
Subsequent Progress Note  Patient: Scotty Wright  YOB: 1950  MRN: [id number]    Chief Complaint: htn cad lipid preop back   Chief Complaint   Patient presents with    Follow-up     4 month    Hypertension       CV Data:  Prior CAD/Stentsx 2  10/17/2017 CABG x2 EF 55  10/2018  Stress echo EF 55  Persistent HyperK  7/2020 CUS mild   12/2020 GXT negative   10/21 CUS mild   10/22 CUS mild     Subjective/HPI: no cp no osb no falls noblled has back arthritis getting shots with some releif.     10/25/2019 No cp no sob no falls no bleed. Takes meds. Eating well.     2/26/2020 no cp no sob. Had extensive back SX. Did well.     6/26/2020 no cp no osb no falls no bleed. Takes meds. Walks and active no bleed    10/27/2020 pt will have ED surgery at  next week. He is active and has no CP no SOB no falls no bleed.      1/6/21 no cp no sob no falls no bleed    5/7/21 no cp no sob no falls nob leed no edema K is always high 5.3 recently.   Recently HR 40s and Metoprolol was backed off to Bid from prior tid.     9/10/21 doing well no cp no sob active taking meds. No falls. No bleed.     1/18/22 doing well no  Cp no spb not dizzy no bleed. Takes meds.. active     5/10/22 doping well no cp no sob nofalsl no bleed. BP at home good. Last OV BP good as well. Today little elevated- states he rushed In here.     9/19/22 doing very well no cp no sob no falls no bleed takes meds.     1/19/23 doing well active no cp no sob no falls no bleed.     5/22/23  doing very well no cp no sob no fals no bleed active.     9/26/23 doing very well active at home. No cp no sob no falls no bleed     1/31/24 getting shoulder injections and after feels brief palpitations. No falls no cp no sob + mild LE edema.     EKG: SR 71 RBBB    Lives w wife  Nonsmoker  Retired - [employer].       Past Medical History:   Diagnosis Date    Chronic back pain     Coronary artery disease 2002    Dr. Hairston at [facility]    Esophageal ulcer 03/10/2014

## 2024-02-02 ENCOUNTER — PREP FOR PROCEDURE (OUTPATIENT)
Dept: GASTROENTEROLOGY | Age: 74
End: 2024-02-02

## 2024-02-02 ENCOUNTER — OFFICE VISIT (OUTPATIENT)
Dept: GASTROENTEROLOGY | Age: 74
End: 2024-02-02
Payer: COMMERCIAL

## 2024-02-02 VITALS
HEART RATE: 66 BPM | BODY MASS INDEX: 23.86 KG/M2 | HEIGHT: 67 IN | DIASTOLIC BLOOD PRESSURE: 60 MMHG | OXYGEN SATURATION: 99 % | SYSTOLIC BLOOD PRESSURE: 129 MMHG | WEIGHT: 152 LBS

## 2024-02-02 DIAGNOSIS — K59.00 CONSTIPATION, UNSPECIFIED CONSTIPATION TYPE: ICD-10-CM

## 2024-02-02 DIAGNOSIS — R63.4 WEIGHT LOSS: ICD-10-CM

## 2024-02-02 DIAGNOSIS — R63.4 WEIGHT LOSS: Primary | ICD-10-CM

## 2024-02-02 PROCEDURE — 3074F SYST BP LT 130 MM HG: CPT | Performed by: SPECIALIST

## 2024-02-02 PROCEDURE — 3078F DIAST BP <80 MM HG: CPT | Performed by: SPECIALIST

## 2024-02-02 PROCEDURE — 1123F ACP DISCUSS/DSCN MKR DOCD: CPT | Performed by: SPECIALIST

## 2024-02-02 PROCEDURE — 99203 OFFICE O/P NEW LOW 30 MIN: CPT | Performed by: SPECIALIST

## 2024-02-02 RX ORDER — SODIUM CHLORIDE 9 MG/ML
INJECTION, SOLUTION INTRAVENOUS CONTINUOUS
Status: CANCELLED | OUTPATIENT
Start: 2024-02-02

## 2024-02-02 RX ORDER — SODIUM CHLORIDE 9 MG/ML
INJECTION, SOLUTION INTRAVENOUS PRN
Status: CANCELLED | OUTPATIENT
Start: 2024-02-02

## 2024-02-02 RX ORDER — SODIUM CHLORIDE 0.9 % (FLUSH) 0.9 %
5-40 SYRINGE (ML) INJECTION PRN
Status: CANCELLED | OUTPATIENT
Start: 2024-02-02

## 2024-02-02 RX ORDER — SODIUM CHLORIDE 0.9 % (FLUSH) 0.9 %
5-40 SYRINGE (ML) INJECTION EVERY 12 HOURS SCHEDULED
Status: CANCELLED | OUTPATIENT
Start: 2024-02-02

## 2024-02-02 RX ORDER — POLYETHYLENE GLYCOL 3350, SODIUM CHLORIDE, SODIUM BICARBONATE, POTASSIUM CHLORIDE 420; 11.2; 5.72; 1.48 G/4L; G/4L; G/4L; G/4L
4000 POWDER, FOR SOLUTION ORAL ONCE
Qty: 4000 ML | Refills: 0 | Status: SHIPPED | OUTPATIENT
Start: 2024-02-02 | End: 2024-02-02

## 2024-02-02 ASSESSMENT — ENCOUNTER SYMPTOMS
ABDOMINAL PAIN: 0
EYES NEGATIVE: 1
ANAL BLEEDING: 0
VOMITING: 0
CONSTIPATION: 1
RECTAL PAIN: 0
RESPIRATORY NEGATIVE: 1
BLOOD IN STOOL: 0
NAUSEA: 0
ABDOMINAL DISTENTION: 0
DIARRHEA: 0

## 2024-02-02 NOTE — PROGRESS NOTES
Gastroenterology Clinic Visit    Scotty Wright  [id number]  Chief Complaint   Patient presents with    New Patient     Weight loss therapeutic opioid induced constipation.       HPI: 73 y.o. male presents to the clinic with history of 15 pound weight loss in the last few months, no change in appetite, no dysphagia, no nausea no emesis no abdominal pain, has constipation and takes milk of magnesia, been on Percocet for back pain.  No fever no chills  Family history father had throat cancer, social history does not smoke drinks alcohol very occasionally  Review of Systems   Constitutional:  Positive for unexpected weight change.   HENT: Negative.     Eyes: Negative.    Respiratory: Negative.     Cardiovascular:         History of coronary artery disease   Gastrointestinal:  Positive for constipation. Negative for abdominal distention, abdominal pain, anal bleeding, blood in stool, diarrhea, nausea, rectal pain and vomiting.   Endocrine:        Diabetes type 2   Genitourinary: Negative.    Musculoskeletal: Negative.    Neurological: Negative.    Psychiatric/Behavioral: Negative.          Past Medical History:   Diagnosis Date    Chronic back pain     Coronary artery disease 2002    Dr. Hairston at [facility]    Esophageal ulcer 03/10/2014    Dr. Reis    Gastric ulcer 03/10/2014    Dr. Reis    Gout     Hiatal hernia 03/10/2014    Dr. Reis    Hyperlipidemia     Hypertension     Impotence 10/16/2020    MI (myocardial infarction) ([facility]) 2005    Dr. Hairston    PRASAD (obstructive sleep apnea) 04/04/2018    Osteoarthritis     Peripheral vascular disease ([facility])     Prediabetes     Schizo-affective schizophrenia, in remission ([facility]) 02/01/2005    Overview:  followed at the [facility]    Severe single current episode of major depressive disorder, without psychotic features ([facility]) 07/08/2016    Status post coronary artery stent placement 2002    Dr. Hairston    Type 2 diabetes mellitus, without

## 2024-02-13 DIAGNOSIS — Z79.899 HIGH RISK MEDICATION USE: ICD-10-CM

## 2024-02-13 DIAGNOSIS — M54.12 C6 RADICULOPATHY: ICD-10-CM

## 2024-02-13 DIAGNOSIS — G95.9 SPINAL CORD DISEASE (HCC): ICD-10-CM

## 2024-02-13 DIAGNOSIS — M54.16 RIGHT LUMBAR RADICULOPATHY: ICD-10-CM

## 2024-02-13 DIAGNOSIS — G95.9 MYELOPATHY (HCC): ICD-10-CM

## 2024-02-15 RX ORDER — OXYCODONE AND ACETAMINOPHEN 10; 325 MG/1; MG/1
1 TABLET ORAL EVERY 6 HOURS PRN
Qty: 90 TABLET | Refills: 0 | Status: SHIPPED | OUTPATIENT
Start: 2024-02-17 | End: 2024-03-18

## 2024-02-19 ENCOUNTER — OFFICE VISIT (OUTPATIENT)
Dept: PHYSICAL MEDICINE AND REHAB | Age: 74
End: 2024-02-19
Payer: COMMERCIAL

## 2024-02-19 VITALS
BODY MASS INDEX: 23.86 KG/M2 | SYSTOLIC BLOOD PRESSURE: 160 MMHG | WEIGHT: 152 LBS | DIASTOLIC BLOOD PRESSURE: 78 MMHG | HEIGHT: 67 IN

## 2024-02-19 DIAGNOSIS — Z79.899 HIGH RISK MEDICATION USE: ICD-10-CM

## 2024-02-19 DIAGNOSIS — G89.29 CHRONIC PAIN OF BOTH SHOULDERS: ICD-10-CM

## 2024-02-19 DIAGNOSIS — M54.14 THORACIC AND LUMBOSACRAL NEURITIS: ICD-10-CM

## 2024-02-19 DIAGNOSIS — G95.9 SPINAL CORD DISEASE (HCC): ICD-10-CM

## 2024-02-19 DIAGNOSIS — M41.26 OTHER IDIOPATHIC SCOLIOSIS, LUMBAR REGION: ICD-10-CM

## 2024-02-19 DIAGNOSIS — M54.40 CHRONIC RIGHT-SIDED LOW BACK PAIN WITH SCIATICA, SCIATICA LATERALITY UNSPECIFIED: Primary | ICD-10-CM

## 2024-02-19 DIAGNOSIS — M25.511 CHRONIC PAIN OF BOTH SHOULDERS: ICD-10-CM

## 2024-02-19 DIAGNOSIS — M54.12 C6 RADICULOPATHY: ICD-10-CM

## 2024-02-19 DIAGNOSIS — E55.9 VITAMIN D DEFICIENCY: ICD-10-CM

## 2024-02-19 DIAGNOSIS — M54.16 RIGHT LUMBAR RADICULOPATHY: ICD-10-CM

## 2024-02-19 DIAGNOSIS — M54.17 THORACIC AND LUMBOSACRAL NEURITIS: ICD-10-CM

## 2024-02-19 DIAGNOSIS — M10.00 IDIOPATHIC GOUT, UNSPECIFIED CHRONICITY, UNSPECIFIED SITE: ICD-10-CM

## 2024-02-19 DIAGNOSIS — M25.512 CHRONIC PAIN OF BOTH SHOULDERS: ICD-10-CM

## 2024-02-19 DIAGNOSIS — G89.29 CHRONIC RIGHT-SIDED LOW BACK PAIN WITH SCIATICA, SCIATICA LATERALITY UNSPECIFIED: Primary | ICD-10-CM

## 2024-02-19 PROCEDURE — 99214 OFFICE O/P EST MOD 30 MIN: CPT | Performed by: PHYSICAL MEDICINE & REHABILITATION

## 2024-02-19 PROCEDURE — 1123F ACP DISCUSS/DSCN MKR DOCD: CPT | Performed by: PHYSICAL MEDICINE & REHABILITATION

## 2024-02-19 PROCEDURE — 3077F SYST BP >= 140 MM HG: CPT | Performed by: PHYSICAL MEDICINE & REHABILITATION

## 2024-02-19 PROCEDURE — 3078F DIAST BP <80 MM HG: CPT | Performed by: PHYSICAL MEDICINE & REHABILITATION

## 2024-02-19 RX ORDER — GABAPENTIN 300 MG/1
CAPSULE ORAL
Qty: 270 CAPSULE | Refills: 3 | Status: SHIPPED | OUTPATIENT
Start: 2024-02-19 | End: 2024-06-02

## 2024-02-19 ASSESSMENT — ENCOUNTER SYMPTOMS
ABDOMINAL PAIN: 0
TROUBLE SWALLOWING: 0
BACK PAIN: 1
PHOTOPHOBIA: 0
BOWEL INCONTINENCE: 0
VISUAL CHANGE: 0

## 2024-02-20 ENCOUNTER — PROCEDURE VISIT (OUTPATIENT)
Dept: PHYSICAL MEDICINE AND REHAB | Age: 74
End: 2024-02-20
Payer: COMMERCIAL

## 2024-02-20 DIAGNOSIS — G89.29 CHRONIC LEFT SHOULDER PAIN: Primary | ICD-10-CM

## 2024-02-20 DIAGNOSIS — M25.512 CHRONIC LEFT SHOULDER PAIN: Primary | ICD-10-CM

## 2024-02-20 PROCEDURE — 20611 DRAIN/INJ JOINT/BURSA W/US: CPT | Performed by: PHYSICAL MEDICINE & REHABILITATION

## 2024-02-20 PROCEDURE — 96372 THER/PROPH/DIAG INJ SC/IM: CPT | Performed by: PHYSICAL MEDICINE & REHABILITATION

## 2024-02-20 RX ORDER — LIDOCAINE HYDROCHLORIDE 10 MG/ML
7 INJECTION, SOLUTION INFILTRATION; PERINEURAL ONCE
Status: COMPLETED | OUTPATIENT
Start: 2024-02-20 | End: 2024-02-20

## 2024-02-20 RX ORDER — CYANOCOBALAMIN 1000 UG/ML
1000 INJECTION, SOLUTION INTRAMUSCULAR; SUBCUTANEOUS ONCE
Status: COMPLETED | OUTPATIENT
Start: 2024-02-20 | End: 2024-02-20

## 2024-02-20 RX ORDER — LIDOCAINE HYDROCHLORIDE 20 MG/ML
2 INJECTION, SOLUTION INFILTRATION; PERINEURAL ONCE
Status: COMPLETED | OUTPATIENT
Start: 2024-02-20 | End: 2024-02-20

## 2024-02-20 RX ADMIN — CYANOCOBALAMIN 1000 MCG: 1000 INJECTION, SOLUTION INTRAMUSCULAR; SUBCUTANEOUS at 10:29

## 2024-02-20 RX ADMIN — LIDOCAINE HYDROCHLORIDE 7 ML: 10 INJECTION, SOLUTION INFILTRATION; PERINEURAL at 10:32

## 2024-02-20 RX ADMIN — LIDOCAINE HYDROCHLORIDE 2 ML: 20 INJECTION, SOLUTION INFILTRATION; PERINEURAL at 10:33

## 2024-02-20 NOTE — PROGRESS NOTES
Patient here for Left Shoulder injection with U/S. Patient taken back to exam room, and placed on drape locking stool. Area marked, and cleansed appropriately with alcohol. 2cc of 2% Lidocaine, 2cc of Kenalog, and 7cc of 1% Lidocaine with 1cc B-12  to be injected by provider.

## 2024-03-01 ENCOUNTER — HOSPITAL ENCOUNTER (OUTPATIENT)
Dept: GENERAL RADIOLOGY | Age: 74
Discharge: HOME | End: 2024-03-01
Attending: PHYSICAL MEDICINE & REHABILITATION
Payer: COMMERCIAL

## 2024-03-01 ENCOUNTER — HOSPITAL ENCOUNTER (OUTPATIENT)
Dept: GENERAL RADIOLOGY | Age: 74
End: 2024-03-01
Attending: PHYSICAL MEDICINE & REHABILITATION
Payer: COMMERCIAL

## 2024-03-01 DIAGNOSIS — E29.1 HYPOGONADISM MALE: ICD-10-CM

## 2024-03-01 DIAGNOSIS — M25.511 CHRONIC PAIN OF BOTH SHOULDERS: ICD-10-CM

## 2024-03-01 DIAGNOSIS — G89.29 CHRONIC PAIN OF BOTH SHOULDERS: ICD-10-CM

## 2024-03-01 DIAGNOSIS — M25.512 CHRONIC PAIN OF BOTH SHOULDERS: ICD-10-CM

## 2024-03-01 DIAGNOSIS — E11.9 TYPE 2 DIABETES MELLITUS WITHOUT COMPLICATION, WITHOUT LONG-TERM CURRENT USE OF INSULIN (HCC): ICD-10-CM

## 2024-03-01 LAB
ANION GAP SERPL CALCULATED.3IONS-SCNC: 13 MEQ/L (ref 9–15)
BUN SERPL-MCNC: 17 MG/DL (ref 8–23)
CALCIUM SERPL-MCNC: 9.8 MG/DL (ref 8.5–9.9)
CHLORIDE SERPL-SCNC: 101 MEQ/L (ref 95–107)
CO2 SERPL-SCNC: 30 MEQ/L (ref 20–31)
CREAT SERPL-MCNC: 0.9 MG/DL (ref 0.7–1.2)
GLUCOSE SERPL-MCNC: 87 MG/DL (ref 70–99)
HBA1C MFR BLD: 6.3 % (ref 4.8–5.9)
POTASSIUM SERPL-SCNC: 5.1 MEQ/L (ref 3.4–4.9)
PSA SERPL-MCNC: 2.71 NG/ML (ref 0–4)
SHBG SERPL-SCNC: 52 NMOL/L (ref 11–80)
SODIUM SERPL-SCNC: 144 MEQ/L (ref 135–144)
TESTOST FREE SERPL-MCNC: 45.3 PG/ML (ref 47–244)
TESTOST SERPL-MCNC: 310 NG/DL (ref 220–1000)

## 2024-03-01 PROCEDURE — 73030 X-RAY EXAM OF SHOULDER: CPT

## 2024-03-01 PROCEDURE — 36415 COLL VENOUS BLD VENIPUNCTURE: CPT | Performed by: INTERNAL MEDICINE

## 2024-03-02 DIAGNOSIS — M79.605 BILATERAL LEG PAIN: ICD-10-CM

## 2024-03-02 DIAGNOSIS — M62.838 SPASM OF MUSCLE: ICD-10-CM

## 2024-03-02 DIAGNOSIS — R73.03 PREDIABETES: ICD-10-CM

## 2024-03-02 DIAGNOSIS — R42 DIZZINESS: ICD-10-CM

## 2024-03-02 DIAGNOSIS — M1A.9XX0 CHRONIC GOUT WITHOUT TOPHUS, UNSPECIFIED CAUSE, UNSPECIFIED SITE: ICD-10-CM

## 2024-03-02 DIAGNOSIS — M79.604 BILATERAL LEG PAIN: ICD-10-CM

## 2024-03-02 NOTE — TELEPHONE ENCOUNTER
pharmacy requesting medication refill. Please approve or deny this request.    Rx requested:  Requested Prescriptions     Pending Prescriptions Disp Refills    FreeStyle Lancets MISC [Pharmacy Med Name: FREESTYLE LANCETS 100] 100 each 11     Sig: PATIENT TO TEST ONCE DAILY    ONETOUCH ULTRA strip [Pharmacy Med Name: ONE TOUCH ULTRA BLUE TESTST(NEW)100] 100 strip 11     Sig: USE TO TEST BLOOD SUGAR EVERY DAY    allopurinol (ZYLOPRIM) 100 MG tablet [Pharmacy Med Name: ALLOPURINOL 100MG TABLETS] 90 tablet 0     Sig: TAKE 1 TABLET BY MOUTH DAILY         Last Office Visit:   8/21/2023      Next Visit Date:  Future Appointments   Date Time Provider Department Center   3/19/2024  9:00 AM Jayne Vega DO [facility]   3/19/2024  6:00 PM Jayne Vega DO [facility]   3/26/2024 11:15 AM Brenda Stubbs DO [facility]   4/4/2024  9:30 AM Segun Humphries PA [facility]   4/25/2024 11:30 AM Brenda Stubbs DO [facility]   5/3/2024 10:15 AM Brenda Stubbs DO [facility]   5/13/2024  9:00 AM Ab Gallardo MD [facility]   5/31/2024 12:30 PM Pollo Villareal MD [facility]

## 2024-03-04 RX ORDER — BACLOFEN 10 MG/1
TABLET ORAL
Qty: 90 TABLET | Refills: 1 | Status: SHIPPED | OUTPATIENT
Start: 2024-03-04

## 2024-03-04 RX ORDER — LANCETS 28 GAUGE
EACH MISCELLANEOUS
Qty: 100 EACH | Refills: 0 | Status: SHIPPED | OUTPATIENT
Start: 2024-03-04

## 2024-03-04 RX ORDER — BLOOD SUGAR DIAGNOSTIC
STRIP MISCELLANEOUS
Qty: 100 STRIP | Refills: 0 | Status: SHIPPED | OUTPATIENT
Start: 2024-03-04

## 2024-03-04 RX ORDER — ALLOPURINOL 100 MG/1
100 TABLET ORAL DAILY
Qty: 90 TABLET | Refills: 0 | Status: SHIPPED | OUTPATIENT
Start: 2024-03-04

## 2024-03-15 DIAGNOSIS — G95.9 MYELOPATHY (HCC): ICD-10-CM

## 2024-03-15 DIAGNOSIS — M54.12 C6 RADICULOPATHY: ICD-10-CM

## 2024-03-15 DIAGNOSIS — Z79.899 HIGH RISK MEDICATION USE: ICD-10-CM

## 2024-03-15 DIAGNOSIS — M54.16 RIGHT LUMBAR RADICULOPATHY: ICD-10-CM

## 2024-03-15 DIAGNOSIS — G95.9 SPINAL CORD DISEASE (HCC): ICD-10-CM

## 2024-03-16 RX ORDER — OXYCODONE AND ACETAMINOPHEN 10; 325 MG/1; MG/1
1 TABLET ORAL EVERY 6 HOURS PRN
Qty: 90 TABLET | Refills: 0 | Status: SHIPPED | OUTPATIENT
Start: 2024-03-17 | End: 2024-04-16

## 2024-03-19 ENCOUNTER — OFFICE VISIT (OUTPATIENT)
Dept: FAMILY MEDICINE CLINIC | Age: 74
End: 2024-03-19

## 2024-03-19 ENCOUNTER — OFFICE VISIT (OUTPATIENT)
Dept: FAMILY MEDICINE CLINIC | Age: 74
End: 2024-03-19
Payer: COMMERCIAL

## 2024-03-19 VITALS
TEMPERATURE: 97 F | HEART RATE: 72 BPM | SYSTOLIC BLOOD PRESSURE: 128 MMHG | WEIGHT: 154 LBS | HEIGHT: 67 IN | OXYGEN SATURATION: 98 % | DIASTOLIC BLOOD PRESSURE: 84 MMHG | BODY MASS INDEX: 24.17 KG/M2

## 2024-03-19 VITALS
HEIGHT: 67 IN | WEIGHT: 154 LBS | TEMPERATURE: 97.6 F | HEART RATE: 72 BPM | OXYGEN SATURATION: 98 % | SYSTOLIC BLOOD PRESSURE: 128 MMHG | BODY MASS INDEX: 24.17 KG/M2 | DIASTOLIC BLOOD PRESSURE: 84 MMHG

## 2024-03-19 DIAGNOSIS — K59.03 THERAPEUTIC OPIOID-INDUCED CONSTIPATION (OIC): ICD-10-CM

## 2024-03-19 DIAGNOSIS — M1A.9XX0 CHRONIC GOUT WITHOUT TOPHUS, UNSPECIFIED CAUSE, UNSPECIFIED SITE: ICD-10-CM

## 2024-03-19 DIAGNOSIS — E11.51 TYPE 2 DIABETES MELLITUS WITH DIABETIC PERIPHERAL ANGIOPATHY WITHOUT GANGRENE, UNSPECIFIED WHETHER LONG TERM INSULIN USE (HCC): ICD-10-CM

## 2024-03-19 DIAGNOSIS — I20.9 ANGINA PECTORIS, UNSPECIFIED (HCC): ICD-10-CM

## 2024-03-19 DIAGNOSIS — T40.2X5A THERAPEUTIC OPIOID-INDUCED CONSTIPATION (OIC): ICD-10-CM

## 2024-03-19 DIAGNOSIS — I10 ESSENTIAL HYPERTENSION, BENIGN: Primary | ICD-10-CM

## 2024-03-19 DIAGNOSIS — Z00.00 MEDICARE ANNUAL WELLNESS VISIT, SUBSEQUENT: Primary | ICD-10-CM

## 2024-03-19 DIAGNOSIS — I25.2 HISTORY OF MI (MYOCARDIAL INFARCTION): ICD-10-CM

## 2024-03-19 PROCEDURE — 3079F DIAST BP 80-89 MM HG: CPT | Performed by: STUDENT IN AN ORGANIZED HEALTH CARE EDUCATION/TRAINING PROGRAM

## 2024-03-19 PROCEDURE — 1123F ACP DISCUSS/DSCN MKR DOCD: CPT | Performed by: STUDENT IN AN ORGANIZED HEALTH CARE EDUCATION/TRAINING PROGRAM

## 2024-03-19 PROCEDURE — G0439 PPPS, SUBSEQ VISIT: HCPCS | Performed by: STUDENT IN AN ORGANIZED HEALTH CARE EDUCATION/TRAINING PROGRAM

## 2024-03-19 PROCEDURE — 3074F SYST BP LT 130 MM HG: CPT | Performed by: STUDENT IN AN ORGANIZED HEALTH CARE EDUCATION/TRAINING PROGRAM

## 2024-03-19 RX ORDER — BLOOD SUGAR DIAGNOSTIC
STRIP MISCELLANEOUS
Qty: 100 STRIP | Refills: 11 | Status: SHIPPED | OUTPATIENT
Start: 2024-03-19

## 2024-03-19 RX ORDER — ASPIRIN 81 MG/1
81 TABLET ORAL DAILY
Qty: 90 TABLET | Refills: 3 | Status: SHIPPED | OUTPATIENT
Start: 2024-03-19

## 2024-03-19 RX ORDER — UBIQUINOL 100 MG
CAPSULE ORAL
Qty: 100 EACH | Refills: 11 | Status: SHIPPED | OUTPATIENT
Start: 2024-03-19

## 2024-03-19 RX ORDER — ALLOPURINOL 100 MG/1
100 TABLET ORAL DAILY
Qty: 90 TABLET | Refills: 3 | Status: SHIPPED | OUTPATIENT
Start: 2024-03-19

## 2024-03-19 ASSESSMENT — PATIENT HEALTH QUESTIONNAIRE - PHQ9
2. FEELING DOWN, DEPRESSED OR HOPELESS: NOT AT ALL
SUM OF ALL RESPONSES TO PHQ QUESTIONS 1-9: 1
1. LITTLE INTEREST OR PLEASURE IN DOING THINGS: SEVERAL DAYS
SUM OF ALL RESPONSES TO PHQ9 QUESTIONS 1 & 2: 1
SUM OF ALL RESPONSES TO PHQ QUESTIONS 1-9: 1

## 2024-03-19 NOTE — PATIENT INSTRUCTIONS
healthy:  Brush the teeth with fluoride toothpaste twice a day--in the morning and at night--and floss at least once a day. Plaque can quickly build up on the teeth of older adults.  Watch for the signs of gum disease. These signs include gums that bleed after brushing or after eating hard foods, such as apples.  See a dentist regularly. Many experts recommend checkups every 6 months.  Keep the dentist up to date on any new medications the person is taking.  Encourage a balanced diet that includes whole grains, vegetables, and fruits, and that is low in saturated fat and sodium.  Encourage the person you're caring for not to use tobacco products. They can affect dental and general health.  Many older adults have a fixed income and feel that they can't afford dental care. But most towns and [facility] have programs in which dentists help older adults by lowering fees. Contact your area's public health offices or  for information about dental care in your area.  Using a toothbrush  Older adults with arthritis sometimes have trouble brushing their teeth because they can't easily hold the toothbrush. Their hands and fingers may be stiff, painful, or weak. If this is the case, you can:  Offer an electric toothbrush.  Enlarge the handle of a non-electric toothbrush by wrapping a sponge, an elastic bandage, or adhesive tape around it.  Push the toothbrush handle through a ball made of rubber or soft foam.  Make the handle longer and thicker by taping Popsicle sticks or tongue depressors to it.  You may also be able to buy special toothbrushes, toothpaste dispensers, and floss holders.  Your doctor may recommend a soft-bristle toothbrush if the person you care for bleeds easily. Bleeding can happen because of a health problem or from certain medicines.  A toothpaste for sensitive teeth may help if the person you care for has sensitive teeth.  How do you brush and floss someone's teeth?  If the person you are

## 2024-03-19 NOTE — PROGRESS NOTES
magnesium hydroxide (MILK OF MAGNESIA CONCENTRATE) 2400 MG/10ML SUSP Take 10 mLs by mouth once as needed  Lianet Jones MD   lubiprostone (AMITIZA) 24 MCG capsule TAKE 1 CAPSULE BY MOUTH TWICE DAILY WITH MEALS  Jayne Vega DO   metoprolol tartrate (LOPRESSOR) 50 MG tablet TAKE 1 TABLET BY MOUTH TWICE DAILY  Pollo Villareal MD   NIFEdipine (PROCARDIA XL) 60 MG extended release tablet TAKE 1 TABLET BY MOUTH DAILY  Sheryl Tellez PA-C   metFORMIN (GLUCOPHAGE) 500 MG tablet TAKE 1 TABLET BY MOUTH TWICE DAILY WITH MEALS  Ab Gallardo MD   tamsulosin (FLOMAX) 0.4 MG capsule TAKE 1 CAPSULE DAILY AT BEDTIME  Ab Gallardo MD   testosterone cypionate (DEPOTESTOTERONE CYPIONATE) 200 MG/ML injection INJECT 0.5MLS INTO THE MUSCLE EVERY 2 WEEKS  Ab Gallardo MD   atorvastatin (LIPITOR) 40 MG tablet Take 1 tablet by mouth daily  Pollo Villareal MD   imipramine (TOFRANIL) 25 MG tablet TAKE 1 TABLET BY MOUTH EVERY NIGHT  Ab Gallardo MD   naloxone 4 MG/0.1ML LIQD nasal spray 1 spray by Nasal route as needed for Opioid Reversal  Deandre Jackson MD   B-D 3CC LUER-JOSE SYR 91YP9-9/2 22G X 1-1/2\" 3 ML MISC USE EVERY 2 WEEKS WITH TESTOSTERONE  Ab Gallardo MD   colchicine (COLCRYS) 0.6 MG tablet TAKE 1 TABLET BY MOUTH DAILY DURING FLARE FOR GOUT FLARE  Sheryl Tellez PA-C    MG capsule TK ONE C PO  BID  Lianet Jones MD   glucose monitoring kit (FREESTYLE) monitoring kit 1 kit by Does not apply route daily  Sheryl Tellez PA-C   vitamin D (CHOLECALCIFEROL) 25 MCG (1000 UT) TABS tablet Take 1 tablet by mouth daily  Lianet Jones MD   CPAP Machine MISC by NOT APPLICABLE route  Patient not taking: Reported on 3/19/2024  Lianet Jones MD CareTeam (Including outside providers/suppliers regularly involved in providing care):   Patient Care Team:  Jayne Vega DO as PCP - General (Family Medicine)  Jayne Vega DO as PCP - Empaneled Provider  Brenda Stubbs DO (Physical Medicine

## 2024-03-19 NOTE — PROGRESS NOTES
ONCE DAILY PRIOR TO CHECKING BLOOD SUGAR  Dispense: 100 each; Refill: 11    4. Chronic gout without tophus, unspecified cause, unspecified site  Stable, chronic.  Continue allopurinol 100 mg daily and colchicine 0.6 mg daily as needed.  Refill sent to the pharmacy.  Continue to monitor.  Follow-up as scheduled.  - allopurinol (ZYLOPRIM) 100 MG tablet; Take 1 tablet by mouth daily  Dispense: 90 tablet; Refill: 3    5. History of MI (myocardial infarction)  Stable, chronic.  Continue baby aspirin daily refills  - aspirin 81 MG EC tablet; Take 1 tablet by mouth daily  Dispense: 90 tablet; Refill: 3    6. Therapeutic opioid-induced constipation (OIC)  Stable, chronic.  Continue Amitiza 24 mg twice daily.  Refills not needed.  Continue to monitor.  Follow-up as scheduled    Orders Placed This Encounter   Procedures    Microalbumin / Creatinine Urine Ratio     Standing Status:   Future     Standing Expiration Date:   3/19/2025     Orders Placed This Encounter   Medications    allopurinol (ZYLOPRIM) 100 MG tablet     Sig: Take 1 tablet by mouth daily     Dispense:  90 tablet     Refill:  3    aspirin 81 MG EC tablet     Sig: Take 1 tablet by mouth daily     Dispense:  90 tablet     Refill:  3    blood glucose test strips (ONETOUCH ULTRA) strip     Sig: CHECK BLOOD SUGAR ONCE DAILY     Dispense:  100 strip     Refill:  11     GIVE BRAND PER INSURANCE CHOICE    Alcohol Swabs (ALCOHOL PREP) 70 % PADS     Sig: CLEANSE AREA ONCE DAILY PRIOR TO CHECKING BLOOD SUGAR     Dispense:  100 each     Refill:  11     GIVE BRAND PER INSURANCE CHOICE     Medications Discontinued During This Encounter   Medication Reason    Misc. Devices (BATH BENCH WITH BACK) MISC LIST CLEANUP    sildenafil (VIAGRA) 100 MG tablet Therapy completed    aspirin 81 MG EC tablet REORDER    Alcohol Swabs (ALCOHOL PREP) 70 % PADS REORDER    blood glucose test strips (ONETOUCH ULTRA) strip REORDER    allopurinol (ZYLOPRIM) 100 MG tablet REORDER     Return in about

## 2024-03-22 ASSESSMENT — ENCOUNTER SYMPTOMS
WHEEZING: 0
VOMITING: 0
SORE THROAT: 0
ABDOMINAL PAIN: 0
CONSTIPATION: 1
NAUSEA: 0
DIARRHEA: 0
EYE DISCHARGE: 0
RHINORRHEA: 0
COUGH: 0
SHORTNESS OF BREATH: 0

## 2024-03-26 ENCOUNTER — PROCEDURE VISIT (OUTPATIENT)
Dept: PHYSICAL MEDICINE AND REHAB | Age: 74
End: 2024-03-26
Payer: COMMERCIAL

## 2024-03-26 DIAGNOSIS — M25.511 CHRONIC RIGHT SHOULDER PAIN: Primary | ICD-10-CM

## 2024-03-26 DIAGNOSIS — G89.29 CHRONIC RIGHT SHOULDER PAIN: Primary | ICD-10-CM

## 2024-03-26 PROCEDURE — 20611 DRAIN/INJ JOINT/BURSA W/US: CPT | Performed by: PHYSICAL MEDICINE & REHABILITATION

## 2024-03-26 PROCEDURE — 96372 THER/PROPH/DIAG INJ SC/IM: CPT | Performed by: PHYSICAL MEDICINE & REHABILITATION

## 2024-03-26 RX ORDER — LIDOCAINE HYDROCHLORIDE 20 MG/ML
2 INJECTION, SOLUTION INFILTRATION; PERINEURAL ONCE
Status: COMPLETED | OUTPATIENT
Start: 2024-03-26 | End: 2024-03-26

## 2024-03-26 RX ORDER — CYANOCOBALAMIN 1000 UG/ML
1000 INJECTION, SOLUTION INTRAMUSCULAR; SUBCUTANEOUS ONCE
Status: COMPLETED | OUTPATIENT
Start: 2024-03-26 | End: 2024-03-26

## 2024-03-26 RX ORDER — LIDOCAINE HYDROCHLORIDE 10 MG/ML
7 INJECTION, SOLUTION INFILTRATION; PERINEURAL ONCE
Status: COMPLETED | OUTPATIENT
Start: 2024-03-26 | End: 2024-03-26

## 2024-03-26 RX ADMIN — LIDOCAINE HYDROCHLORIDE 2 ML: 20 INJECTION, SOLUTION INFILTRATION; PERINEURAL at 11:54

## 2024-03-26 RX ADMIN — CYANOCOBALAMIN 1000 MCG: 1000 INJECTION, SOLUTION INTRAMUSCULAR; SUBCUTANEOUS at 11:48

## 2024-03-26 RX ADMIN — LIDOCAINE HYDROCHLORIDE 7 ML: 10 INJECTION, SOLUTION INFILTRATION; PERINEURAL at 11:51

## 2024-03-26 NOTE — PROGRESS NOTES
Patient here for Right Shoulder injection with U/S. Patient taken back to exam room, and placed on drape locking stool. Area marked, and cleansed appropriately with alcohol. 2cc of 2% Lidocaine, 2cc of Kenalog, and 7cc of 1% Lidocaine with 1cc B-12 to be injected by provider.

## 2024-04-10 NOTE — PROGRESS NOTES
Subjective  CaioDOMENICO Wright, 73 y.o. male presents today with:    Reason for Visit  Back Pain Trigger point injection 4/24/24 with 90% reduction in pain lasting 1/1/2 week       Neck Pain        Shoulder Pain Left shoulder injection 2/24/24 with 90% reduction in pain lasting  couple weeks. Right shoulder injection 3/26/24 with 75% reduction in pain lasting 1 1/2 week.       Medication Refill Percocet, Gabapentin, Baclofen, Vit D. Pill count today, complaint.      His injections though still very helpful are having limited relief.  I have sent him to orthopedic surgery for surgical evaluation they reviewed x-rays and MRIs and showed full-thickness tears bilaterally and recommending shoulder replacement surgery.  He does not want that and wants to stick with the medications and the injections.  He does have a follow-up to discuss surgical plans with Dr. Jennings on 7/6/2024      Recent pain flareup  dt a fall at home--Recent injections helped--medications are not as helpful. Injections still help very much.  He would like to schedule monthly trigger point-no longer needing sciatic blocks.       Back Pain  This is a chronic problem. The current episode started more than 1 year ago. The problem occurs daily. The problem is unchanged. The pain is present in the lumbar spine. The quality of the pain is described as aching, cramping, shooting and stabbing. The pain radiates to the right foot, right knee and right thigh. The pain is at a severity of 9/10. The pain is severe. The pain is Worse during the day. The symptoms are aggravated by bending, lying down, position, sitting, standing and twisting. Stiffness is present In the morning. Associated symptoms include leg pain, numbness and weakness. Pertinent negatives include no abdominal pain, bladder incontinence, bowel incontinence, chest pain, dysuria, fever, headaches, paresis, perianal numbness or tingling. Risk factors include lack of exercise and sedentary lifestyle. He

## 2024-04-15 DIAGNOSIS — Z79.899 HIGH RISK MEDICATION USE: ICD-10-CM

## 2024-04-15 DIAGNOSIS — G95.9 MYELOPATHY (HCC): ICD-10-CM

## 2024-04-15 DIAGNOSIS — G95.9 SPINAL CORD DISEASE (HCC): ICD-10-CM

## 2024-04-15 DIAGNOSIS — M54.16 RIGHT LUMBAR RADICULOPATHY: ICD-10-CM

## 2024-04-15 DIAGNOSIS — M54.12 C6 RADICULOPATHY: ICD-10-CM

## 2024-04-15 RX ORDER — OXYCODONE AND ACETAMINOPHEN 10; 325 MG/1; MG/1
1 TABLET ORAL EVERY 6 HOURS PRN
Qty: 90 TABLET | Refills: 0 | Status: SHIPPED | OUTPATIENT
Start: 2024-04-15 | End: 2024-05-15

## 2024-04-18 ENCOUNTER — HOSPITAL ENCOUNTER (OUTPATIENT)
Dept: MRI IMAGING | Age: 74
Discharge: HOME | End: 2024-04-20
Payer: MEDICARE

## 2024-04-18 DIAGNOSIS — M25.512 BILATERAL SHOULDER PAIN, UNSPECIFIED CHRONICITY: ICD-10-CM

## 2024-04-18 DIAGNOSIS — M25.511 BILATERAL SHOULDER PAIN, UNSPECIFIED CHRONICITY: ICD-10-CM

## 2024-04-18 DIAGNOSIS — M75.100 TEAR OF ROTATOR CUFF, UNSPECIFIED LATERALITY, UNSPECIFIED TEAR EXTENT, UNSPECIFIED WHETHER TRAUMATIC: ICD-10-CM

## 2024-04-18 PROCEDURE — 73221 MRI JOINT UPR EXTREM W/O DYE: CPT

## 2024-04-25 ENCOUNTER — PROCEDURE VISIT (OUTPATIENT)
Dept: PHYSICAL MEDICINE AND REHAB | Age: 74
End: 2024-04-25
Payer: MEDICARE

## 2024-04-25 DIAGNOSIS — M79.10 MYALGIA: Primary | ICD-10-CM

## 2024-04-25 PROCEDURE — 96372 THER/PROPH/DIAG INJ SC/IM: CPT | Performed by: PHYSICAL MEDICINE & REHABILITATION

## 2024-04-25 PROCEDURE — 20553 NJX 1/MLT TRIGGER POINTS 3/>: CPT | Performed by: PHYSICAL MEDICINE & REHABILITATION

## 2024-04-25 RX ORDER — LIDOCAINE HYDROCHLORIDE 10 MG/ML
15 INJECTION, SOLUTION INFILTRATION; PERINEURAL ONCE
Status: COMPLETED | OUTPATIENT
Start: 2024-04-25 | End: 2024-04-25

## 2024-04-25 RX ORDER — CYANOCOBALAMIN 1000 UG/ML
1000 INJECTION, SOLUTION INTRAMUSCULAR; SUBCUTANEOUS ONCE
Status: COMPLETED | OUTPATIENT
Start: 2024-04-25 | End: 2024-04-25

## 2024-04-25 RX ADMIN — LIDOCAINE HYDROCHLORIDE 15 ML: 10 INJECTION, SOLUTION INFILTRATION; PERINEURAL at 15:01

## 2024-04-25 RX ADMIN — CYANOCOBALAMIN 1000 MCG: 1000 INJECTION, SOLUTION INTRAMUSCULAR; SUBCUTANEOUS at 14:59

## 2024-05-01 DIAGNOSIS — M79.605 BILATERAL LEG PAIN: ICD-10-CM

## 2024-05-01 DIAGNOSIS — M79.604 BILATERAL LEG PAIN: ICD-10-CM

## 2024-05-01 DIAGNOSIS — M62.838 SPASM OF MUSCLE: ICD-10-CM

## 2024-05-02 RX ORDER — BACLOFEN 10 MG/1
TABLET ORAL
Qty: 90 TABLET | Refills: 1 | Status: SHIPPED | OUTPATIENT
Start: 2024-05-02

## 2024-05-03 ENCOUNTER — OFFICE VISIT (OUTPATIENT)
Dept: PHYSICAL MEDICINE AND REHAB | Age: 74
End: 2024-05-03
Payer: MEDICARE

## 2024-05-03 VITALS
SYSTOLIC BLOOD PRESSURE: 137 MMHG | DIASTOLIC BLOOD PRESSURE: 66 MMHG | BODY MASS INDEX: 24.17 KG/M2 | HEIGHT: 67 IN | WEIGHT: 154 LBS

## 2024-05-03 DIAGNOSIS — G95.9 MYELOPATHY (HCC): ICD-10-CM

## 2024-05-03 DIAGNOSIS — M54.12 C6 RADICULOPATHY: ICD-10-CM

## 2024-05-03 DIAGNOSIS — M41.06 INFANTILE IDIOPATHIC SCOLIOSIS OF LUMBAR REGION: ICD-10-CM

## 2024-05-03 DIAGNOSIS — M85.80 OSTEOPENIA, UNSPECIFIED LOCATION: ICD-10-CM

## 2024-05-03 DIAGNOSIS — M25.512 CHRONIC PAIN OF BOTH SHOULDERS: Primary | ICD-10-CM

## 2024-05-03 DIAGNOSIS — Z79.899 HIGH RISK MEDICATION USE: ICD-10-CM

## 2024-05-03 DIAGNOSIS — E55.9 VITAMIN D DEFICIENCY: ICD-10-CM

## 2024-05-03 DIAGNOSIS — M54.16 RIGHT LUMBAR RADICULOPATHY: ICD-10-CM

## 2024-05-03 DIAGNOSIS — M54.40 BILATERAL LOW BACK PAIN WITH SCIATICA, SCIATICA LATERALITY UNSPECIFIED, UNSPECIFIED CHRONICITY: ICD-10-CM

## 2024-05-03 DIAGNOSIS — M25.511 CHRONIC PAIN OF BOTH SHOULDERS: Primary | ICD-10-CM

## 2024-05-03 DIAGNOSIS — M54.41 CHRONIC MIDLINE LOW BACK PAIN WITH RIGHT-SIDED SCIATICA: ICD-10-CM

## 2024-05-03 DIAGNOSIS — G95.9 SPINAL CORD DISEASE (HCC): ICD-10-CM

## 2024-05-03 DIAGNOSIS — G89.29 CHRONIC MIDLINE LOW BACK PAIN WITH RIGHT-SIDED SCIATICA: ICD-10-CM

## 2024-05-03 DIAGNOSIS — G89.29 CHRONIC PAIN OF BOTH SHOULDERS: Primary | ICD-10-CM

## 2024-05-03 PROCEDURE — 3075F SYST BP GE 130 - 139MM HG: CPT | Performed by: PHYSICAL MEDICINE & REHABILITATION

## 2024-05-03 PROCEDURE — 99214 OFFICE O/P EST MOD 30 MIN: CPT | Performed by: PHYSICAL MEDICINE & REHABILITATION

## 2024-05-03 PROCEDURE — 1123F ACP DISCUSS/DSCN MKR DOCD: CPT | Performed by: PHYSICAL MEDICINE & REHABILITATION

## 2024-05-03 PROCEDURE — 3078F DIAST BP <80 MM HG: CPT | Performed by: PHYSICAL MEDICINE & REHABILITATION

## 2024-05-03 RX ORDER — OXYCODONE AND ACETAMINOPHEN 10; 325 MG/1; MG/1
1 TABLET ORAL EVERY 6 HOURS PRN
Qty: 90 TABLET | Refills: 0 | Status: SHIPPED | OUTPATIENT
Start: 2024-05-14 | End: 2024-06-13

## 2024-05-09 DIAGNOSIS — I10 ESSENTIAL HYPERTENSION, BENIGN: ICD-10-CM

## 2024-05-09 RX ORDER — ATORVASTATIN CALCIUM 40 MG/1
40 TABLET, FILM COATED ORAL DAILY
Qty: 90 TABLET | Refills: 3 | Status: SHIPPED | OUTPATIENT
Start: 2024-05-09

## 2024-05-09 NOTE — TELEPHONE ENCOUNTER
Requesting medication refill. Please approve or deny this request.    Rx requested:  Requested Prescriptions     Pending Prescriptions Disp Refills    atorvastatin (LIPITOR) 40 MG tablet [Pharmacy Med Name: ATORVASTATIN 40MG TABLETS] 90 tablet 3     Sig: TAKE 1 TABLET BY MOUTH DAILY         Last Office Visit:   1/31/2024      Next Visit Date:  Future Appointments   Date Time Provider Department Center   5/31/2024 12:30 PM Pollo Villareal MD [facility]   6/3/2024  2:30 PM Ab Gallardo MD [facility]   6/4/2024 10:15 AM Brenda Stubbs DO [facility]   7/9/2024 10:15 AM Brenda Stubbs DO [facility]   7/22/2024 10:00 AM Brenda Stubbs DO [facility]   7/23/2024  9:45 AM Jayne Vega DO [facility]   8/8/2024 11:00 AM Brenda Stubbs DO [facility]             Please approve or deny.

## 2024-05-31 ENCOUNTER — OFFICE VISIT (OUTPATIENT)
Dept: CARDIOLOGY CLINIC | Age: 74
End: 2024-05-31
Payer: MEDICARE

## 2024-05-31 ENCOUNTER — TELEPHONE (OUTPATIENT)
Dept: CARDIOLOGY CLINIC | Age: 74
End: 2024-05-31

## 2024-05-31 VITALS
SYSTOLIC BLOOD PRESSURE: 122 MMHG | BODY MASS INDEX: 23.7 KG/M2 | HEIGHT: 67 IN | HEART RATE: 85 BPM | RESPIRATION RATE: 14 BRPM | DIASTOLIC BLOOD PRESSURE: 84 MMHG | OXYGEN SATURATION: 96 % | WEIGHT: 151 LBS

## 2024-05-31 DIAGNOSIS — I25.118 CORONARY ARTERY DISEASE OF NATIVE ARTERY OF NATIVE HEART WITH STABLE ANGINA PECTORIS (HCC): ICD-10-CM

## 2024-05-31 DIAGNOSIS — I10 ESSENTIAL HYPERTENSION, BENIGN: Primary | ICD-10-CM

## 2024-05-31 DIAGNOSIS — R94.31 ABNORMAL EKG: ICD-10-CM

## 2024-05-31 PROCEDURE — 93000 ELECTROCARDIOGRAM COMPLETE: CPT | Performed by: INTERNAL MEDICINE

## 2024-05-31 PROCEDURE — 3079F DIAST BP 80-89 MM HG: CPT | Performed by: INTERNAL MEDICINE

## 2024-05-31 PROCEDURE — 1123F ACP DISCUSS/DSCN MKR DOCD: CPT | Performed by: INTERNAL MEDICINE

## 2024-05-31 PROCEDURE — 3074F SYST BP LT 130 MM HG: CPT | Performed by: INTERNAL MEDICINE

## 2024-05-31 PROCEDURE — 99214 OFFICE O/P EST MOD 30 MIN: CPT | Performed by: INTERNAL MEDICINE

## 2024-05-31 ASSESSMENT — ENCOUNTER SYMPTOMS
CONSTIPATION: 0
ABDOMINAL PAIN: 0
COUGH: 0
COLOR CHANGE: 0
WHEEZING: 0
SHORTNESS OF BREATH: 0
CHEST TIGHTNESS: 0
NAUSEA: 0
VOMITING: 0
DIARRHEA: 0
APNEA: 0
RHINORRHEA: 0
EYE REDNESS: 0

## 2024-05-31 NOTE — PROGRESS NOTES
ULTRA) strip CHECK BLOOD SUGAR ONCE DAILY 100 strip 11    Alcohol Swabs (ALCOHOL PREP) 70 % PADS CLEANSE AREA ONCE DAILY PRIOR TO CHECKING BLOOD SUGAR 100 each 11    FreeStyle Lancets MISC PATIENT TO TEST ONCE DAILY 100 each 0    gabapentin (NEURONTIN) 300 MG capsule TAKE 1 CAPSULE BY MOUTH EVERY MORNING THEN TAKE 2 CAPSULES BY MOUTH AT BEDTIME 270 capsule 3    magnesium hydroxide (MILK OF MAGNESIA CONCENTRATE) 2400 MG/10ML SUSP Take 10 mLs by mouth once as needed      lubiprostone (AMITIZA) 24 MCG capsule TAKE 1 CAPSULE BY MOUTH TWICE DAILY WITH MEALS 180 capsule 1    metoprolol tartrate (LOPRESSOR) 50 MG tablet TAKE 1 TABLET BY MOUTH TWICE DAILY 180 tablet 3    NIFEdipine (PROCARDIA XL) 60 MG extended release tablet TAKE 1 TABLET BY MOUTH DAILY 90 tablet 3    metFORMIN (GLUCOPHAGE) 500 MG tablet TAKE 1 TABLET BY MOUTH TWICE DAILY WITH MEALS 180 tablet 3    tamsulosin (FLOMAX) 0.4 MG capsule TAKE 1 CAPSULE DAILY AT BEDTIME 90 capsule 3    imipramine (TOFRANIL) 25 MG tablet TAKE 1 TABLET BY MOUTH EVERY NIGHT 30 tablet 3    naloxone 4 MG/0.1ML LIQD nasal spray 1 spray by Nasal route as needed for Opioid Reversal 1 each 5    B-D 3CC LUER-JOSE SYR 14QT7-3/2 22G X 1-1/2\" 3 ML MISC USE EVERY 2 WEEKS WITH TESTOSTERONE 20 each 6    colchicine (COLCRYS) 0.6 MG tablet TAKE 1 TABLET BY MOUTH DAILY DURING FLARE FOR GOUT FLARE 30 tablet 2     MG capsule TK ONE C PO  BID      glucose monitoring kit (FREESTYLE) monitoring kit 1 kit by Does not apply route daily 1 kit 0    vitamin D (CHOLECALCIFEROL) 25 MCG (1000 UT) TABS tablet Take 1 tablet by mouth daily      CPAP Machine MISC by NOT APPLICABLE route      testosterone cypionate (DEPOTESTOTERONE CYPIONATE) 200 MG/ML injection INJECT 0.5MLS INTO THE MUSCLE EVERY 2 WEEKS 10 mL 0     No current facility-administered medications for this visit.       Patient has no known allergies.    Review of Systems:  Review of Systems   Constitutional:  Negative for activity change,

## 2024-06-03 ENCOUNTER — OFFICE VISIT (OUTPATIENT)
Dept: ENDOCRINOLOGY | Age: 74
End: 2024-06-03
Payer: MEDICARE

## 2024-06-03 VITALS
DIASTOLIC BLOOD PRESSURE: 67 MMHG | OXYGEN SATURATION: 95 % | BODY MASS INDEX: 23.7 KG/M2 | SYSTOLIC BLOOD PRESSURE: 125 MMHG | HEIGHT: 67 IN | HEART RATE: 68 BPM | WEIGHT: 151 LBS

## 2024-06-03 DIAGNOSIS — E29.1 HYPOGONADISM MALE: ICD-10-CM

## 2024-06-03 DIAGNOSIS — E11.9 TYPE 2 DIABETES MELLITUS WITHOUT COMPLICATION, WITHOUT LONG-TERM CURRENT USE OF INSULIN (HCC): Primary | ICD-10-CM

## 2024-06-03 LAB
CHP ED QC CHECK: NORMAL
GLUCOSE BLD-MCNC: 126 MG/DL

## 2024-06-03 PROCEDURE — 3078F DIAST BP <80 MM HG: CPT | Performed by: INTERNAL MEDICINE

## 2024-06-03 PROCEDURE — 1123F ACP DISCUSS/DSCN MKR DOCD: CPT | Performed by: INTERNAL MEDICINE

## 2024-06-03 PROCEDURE — 99213 OFFICE O/P EST LOW 20 MIN: CPT | Performed by: INTERNAL MEDICINE

## 2024-06-03 PROCEDURE — 3044F HG A1C LEVEL LT 7.0%: CPT | Performed by: INTERNAL MEDICINE

## 2024-06-03 PROCEDURE — 82962 GLUCOSE BLOOD TEST: CPT | Performed by: INTERNAL MEDICINE

## 2024-06-03 PROCEDURE — 3074F SYST BP LT 130 MM HG: CPT | Performed by: INTERNAL MEDICINE

## 2024-06-03 RX ORDER — TESTOSTERONE CYPIONATE 200 MG/ML
INJECTION, SOLUTION INTRAMUSCULAR
Qty: 10 ML | Refills: 0 | Status: SHIPPED | OUTPATIENT
Start: 2024-06-03 | End: 2025-03-27

## 2024-06-03 RX ORDER — SYRINGE WITH NEEDLE, 1 ML 25GX5/8"
SYRINGE, EMPTY DISPOSABLE MISCELLANEOUS
Qty: 20 EACH | Refills: 6 | Status: SHIPPED | OUTPATIENT
Start: 2024-06-03

## 2024-06-03 NOTE — PROGRESS NOTES
6/3/2024    Assessment:       Diagnosis Orders   1. Type 2 diabetes mellitus without complication, without long-term current use of insulin ([facility])  POCT Glucose      2. Hypogonadism male              PLAN:     Orders Placed This Encounter   Procedures    Basic Metabolic Panel     Standing Status:   Future     Standing Expiration Date:   6/3/2025    Hemoglobin A1C     Standing Status:   Future     Standing Expiration Date:   6/3/2025    POCT Glucose     DIABETES FOOT EXAM     Continue current dose of metformin and testosterone injections patient to follow-up in 3 to 6 months    Orders Placed This Encounter   Procedures    POCT Glucose     No orders of the defined types were placed in this encounter.    No follow-ups on file.  Subjective:     Chief Complaint   Patient presents with    Diabetes    Hypogonadism     Vitals:    06/03/24 1431   BP: 125/67   Pulse: 68   SpO2: 95%   Weight: 68.5 kg (151 lb)   Height: 1.702 m (5' 7.01\")     Wt Readings from Last 3 Encounters:   06/03/24 68.5 kg (151 lb)   05/31/24 68.5 kg (151 lb)   05/03/24 69.9 kg (154 lb)     BP Readings from Last 3 Encounters:   06/03/24 125/67   05/31/24 122/84   05/03/24 137/66       Follow-up on type 2 diabetes history of hypogonadism lab review hemoglobin A1c was 6.3 on metformin 500 mg twice daily for testosterone patient on testosterone injection 100 mg every 2 weeks testosterone level was in the 300 range PSA was 2.7  Patient also has history of heart disease  Hemoglobin A1C       Date                     Value               Ref Range           Status                03/01/2024               6.3 (H)             4.8 - 5.9 %         Final            ----------  Scheduled for surgery on shoulder through orthopedic  Reviewed OARRS    Diabetes  He presents for his follow-up diabetic visit. He has type 2 diabetes mellitus. There are no hypoglycemic complications. Symptoms are stable. Diabetic complications include heart disease. Current diabetic

## 2024-06-04 ENCOUNTER — PROCEDURE VISIT (OUTPATIENT)
Dept: PHYSICAL MEDICINE AND REHAB | Age: 74
End: 2024-06-04
Payer: MEDICARE

## 2024-06-04 DIAGNOSIS — M79.10 MYALGIA: Primary | ICD-10-CM

## 2024-06-04 PROCEDURE — 20553 NJX 1/MLT TRIGGER POINTS 3/>: CPT | Performed by: PHYSICAL MEDICINE & REHABILITATION

## 2024-06-04 PROCEDURE — 96372 THER/PROPH/DIAG INJ SC/IM: CPT | Performed by: PHYSICAL MEDICINE & REHABILITATION

## 2024-06-04 RX ORDER — LIDOCAINE HYDROCHLORIDE 10 MG/ML
15 INJECTION, SOLUTION INFILTRATION; PERINEURAL ONCE
Status: COMPLETED | OUTPATIENT
Start: 2024-06-04 | End: 2024-06-04

## 2024-06-04 RX ORDER — CYANOCOBALAMIN 1000 UG/ML
1000 INJECTION, SOLUTION INTRAMUSCULAR; SUBCUTANEOUS ONCE
Status: COMPLETED | OUTPATIENT
Start: 2024-06-04 | End: 2024-06-04

## 2024-06-04 RX ADMIN — CYANOCOBALAMIN 1000 MCG: 1000 INJECTION, SOLUTION INTRAMUSCULAR; SUBCUTANEOUS at 11:05

## 2024-06-04 RX ADMIN — LIDOCAINE HYDROCHLORIDE 15 ML: 10 INJECTION, SOLUTION INFILTRATION; PERINEURAL at 11:06

## 2024-06-04 ASSESSMENT — ENCOUNTER SYMPTOMS
BACK PAIN: 1
EYES NEGATIVE: 1

## 2024-06-04 NOTE — PROGRESS NOTES
Patient here for trigger point injections. Patient taken back to exam room, and placed on drape locking stool. Areas to be injected marked appropriately, and cleansed with alcohol. 15cc of 1% Lidocaine and to be injected by provider.

## 2024-06-10 DIAGNOSIS — M54.16 RIGHT LUMBAR RADICULOPATHY: ICD-10-CM

## 2024-06-10 DIAGNOSIS — Z79.899 HIGH RISK MEDICATION USE: ICD-10-CM

## 2024-06-10 DIAGNOSIS — M54.12 C6 RADICULOPATHY: ICD-10-CM

## 2024-06-10 DIAGNOSIS — G95.9 MYELOPATHY (HCC): ICD-10-CM

## 2024-06-10 DIAGNOSIS — G95.9 SPINAL CORD DISEASE (HCC): ICD-10-CM

## 2024-06-10 RX ORDER — OXYCODONE AND ACETAMINOPHEN 10; 325 MG/1; MG/1
1 TABLET ORAL EVERY 6 HOURS PRN
Qty: 90 TABLET | Refills: 0 | Status: SHIPPED | OUTPATIENT
Start: 2024-06-12 | End: 2024-07-12

## 2024-06-22 NOTE — PROGRESS NOTES
Subjective  Caiohua Wright, 73 y.o. male presents today with:    Reason for Visit  Back Pain Trigger point injection 6/4/24 with 90% reduction in pain lasting still ongoing.       Neck Pain        Shoulder Pain Left shoulder shoulder injection 7/9/24 with 90% reduction in pain lasting still on going.       Medication Refill Percocet, Gabapentin, Baclofen, Vit D. Pill count today, compliant.      Patient had been scheduled with Dr. Jennings for a total shoulder replacement but is hoping with the current injections which are helping he will be able to avoid surgery.  I am also hoping to get his MME down below 40 we will start tapering his dose from 90 of the 10/3/2020 Feiss down to 80 of the 10/3/2025's with goals of going to 7.5 325 at next visit.         Back Pain  This is a chronic problem. The current episode started more than 1 year ago. The problem occurs daily. The problem is unchanged. The pain is present in the lumbar spine. The quality of the pain is described as aching, cramping, shooting and stabbing. The pain radiates to the right foot, right knee and right thigh. The pain is at a severity of 9/10. The pain is severe. The pain is Worse during the day. The symptoms are aggravated by bending, lying down, position, sitting, standing and twisting. Stiffness is present In the morning. Associated symptoms include leg pain, numbness and weakness. Pertinent negatives include no abdominal pain, bladder incontinence, bowel incontinence, chest pain, dysuria, fever, headaches, paresis, perianal numbness or tingling. Risk factors include lack of exercise and sedentary lifestyle. He has tried analgesics, home exercises, NSAIDs, muscle relaxant, heat and walking (SI injections which help, trigger point injections, OXY-IR ) for the symptoms. The treatment provided mild relief.   Shoulder Pain   This is a chronic problem. The pain is at a severity of 9/10. Associated symptoms include numbness. Pertinent negatives include

## 2024-07-09 ENCOUNTER — PROCEDURE VISIT (OUTPATIENT)
Dept: PHYSICAL MEDICINE AND REHAB | Age: 74
End: 2024-07-09
Payer: MEDICARE

## 2024-07-09 DIAGNOSIS — G89.29 CHRONIC PAIN OF BOTH SHOULDERS: ICD-10-CM

## 2024-07-09 DIAGNOSIS — M25.511 CHRONIC PAIN OF BOTH SHOULDERS: ICD-10-CM

## 2024-07-09 DIAGNOSIS — M25.512 CHRONIC PAIN OF BOTH SHOULDERS: ICD-10-CM

## 2024-07-09 PROCEDURE — 20611 DRAIN/INJ JOINT/BURSA W/US: CPT | Performed by: PHYSICAL MEDICINE & REHABILITATION

## 2024-07-09 PROCEDURE — 96372 THER/PROPH/DIAG INJ SC/IM: CPT | Performed by: PHYSICAL MEDICINE & REHABILITATION

## 2024-07-09 RX ORDER — LIDOCAINE HYDROCHLORIDE 20 MG/ML
2 INJECTION, SOLUTION INFILTRATION; PERINEURAL ONCE
Status: COMPLETED | OUTPATIENT
Start: 2024-07-09 | End: 2024-07-09

## 2024-07-09 RX ORDER — CYANOCOBALAMIN 1000 UG/ML
1000 INJECTION, SOLUTION INTRAMUSCULAR; SUBCUTANEOUS ONCE
Status: COMPLETED | OUTPATIENT
Start: 2024-07-09 | End: 2024-07-09

## 2024-07-09 RX ORDER — LIDOCAINE HYDROCHLORIDE 10 MG/ML
7 INJECTION, SOLUTION INFILTRATION; PERINEURAL ONCE
Status: COMPLETED | OUTPATIENT
Start: 2024-07-09 | End: 2024-07-09

## 2024-07-09 RX ADMIN — CYANOCOBALAMIN 1000 MCG: 1000 INJECTION, SOLUTION INTRAMUSCULAR; SUBCUTANEOUS at 12:23

## 2024-07-09 RX ADMIN — LIDOCAINE HYDROCHLORIDE 2 ML: 20 INJECTION, SOLUTION INFILTRATION; PERINEURAL at 12:21

## 2024-07-09 RX ADMIN — LIDOCAINE HYDROCHLORIDE 7 ML: 10 INJECTION, SOLUTION INFILTRATION; PERINEURAL at 12:20

## 2024-07-09 NOTE — PROGRESS NOTES
Patient here for Right shoulder injection with U/S. Patient taken back to exam room, and placed on drape locking stool. Area marked, and cleansed appropriately with alcohol. 2cc of 2% Lidocaine, 2cc of Kenalog, and 7cc of 1% Lidocaine and 1cc b12 to be injected by provider.

## 2024-07-11 DIAGNOSIS — M54.12 C6 RADICULOPATHY: ICD-10-CM

## 2024-07-11 DIAGNOSIS — G95.9 SPINAL CORD DISEASE (HCC): ICD-10-CM

## 2024-07-11 DIAGNOSIS — G95.9 MYELOPATHY (HCC): ICD-10-CM

## 2024-07-11 DIAGNOSIS — Z79.899 HIGH RISK MEDICATION USE: ICD-10-CM

## 2024-07-11 DIAGNOSIS — M54.16 RIGHT LUMBAR RADICULOPATHY: ICD-10-CM

## 2024-07-11 RX ORDER — OXYCODONE AND ACETAMINOPHEN 10; 325 MG/1; MG/1
1 TABLET ORAL EVERY 6 HOURS PRN
Qty: 90 TABLET | Refills: 0 | Status: SHIPPED | OUTPATIENT
Start: 2024-07-12 | End: 2024-08-11

## 2024-07-22 ENCOUNTER — OFFICE VISIT (OUTPATIENT)
Dept: PHYSICAL MEDICINE AND REHAB | Age: 74
End: 2024-07-22

## 2024-07-22 VITALS
SYSTOLIC BLOOD PRESSURE: 130 MMHG | BODY MASS INDEX: 23.7 KG/M2 | WEIGHT: 151 LBS | HEART RATE: 74 BPM | DIASTOLIC BLOOD PRESSURE: 69 MMHG | HEIGHT: 67 IN

## 2024-07-22 DIAGNOSIS — E55.9 VITAMIN D DEFICIENCY: ICD-10-CM

## 2024-07-22 DIAGNOSIS — M41.86 OTHER FORM OF SCOLIOSIS OF LUMBAR SPINE: ICD-10-CM

## 2024-07-22 DIAGNOSIS — G95.9 SPINAL CORD DISEASE (HCC): ICD-10-CM

## 2024-07-22 DIAGNOSIS — Z79.899 HIGH RISK MEDICATION USE: ICD-10-CM

## 2024-07-22 DIAGNOSIS — M54.17 THORACIC AND LUMBOSACRAL NEURITIS: ICD-10-CM

## 2024-07-22 DIAGNOSIS — M54.16 RIGHT LUMBAR RADICULOPATHY: Primary | ICD-10-CM

## 2024-07-22 DIAGNOSIS — M79.10 MYALGIA: ICD-10-CM

## 2024-07-22 DIAGNOSIS — G89.29 CHRONIC RIGHT SHOULDER PAIN: ICD-10-CM

## 2024-07-22 DIAGNOSIS — G95.9 MYELOPATHY (HCC): ICD-10-CM

## 2024-07-22 DIAGNOSIS — M54.40 MIDLINE LOW BACK PAIN WITH SCIATICA, SCIATICA LATERALITY UNSPECIFIED, UNSPECIFIED CHRONICITY: ICD-10-CM

## 2024-07-22 DIAGNOSIS — M25.511 CHRONIC RIGHT SHOULDER PAIN: ICD-10-CM

## 2024-07-22 DIAGNOSIS — M54.12 C6 RADICULOPATHY: ICD-10-CM

## 2024-07-22 DIAGNOSIS — M54.14 THORACIC AND LUMBOSACRAL NEURITIS: ICD-10-CM

## 2024-07-22 RX ORDER — OXYCODONE AND ACETAMINOPHEN 10; 325 MG/1; MG/1
1 TABLET ORAL EVERY 6 HOURS PRN
Qty: 80 TABLET | Refills: 0 | Status: SHIPPED | OUTPATIENT
Start: 2024-08-10 | End: 2024-09-09

## 2024-07-22 RX ORDER — GABAPENTIN 300 MG/1
CAPSULE ORAL
Qty: 270 CAPSULE | Refills: 3 | Status: SHIPPED | OUTPATIENT
Start: 2024-07-22 | End: 2024-12-23

## 2024-07-23 ENCOUNTER — OFFICE VISIT (OUTPATIENT)
Dept: FAMILY MEDICINE CLINIC | Age: 74
End: 2024-07-23
Payer: MEDICARE

## 2024-07-23 VITALS
WEIGHT: 150 LBS | HEART RATE: 60 BPM | DIASTOLIC BLOOD PRESSURE: 62 MMHG | TEMPERATURE: 97.5 F | BODY MASS INDEX: 23.54 KG/M2 | OXYGEN SATURATION: 98 % | SYSTOLIC BLOOD PRESSURE: 120 MMHG | HEIGHT: 67 IN

## 2024-07-23 DIAGNOSIS — M54.40 ACUTE RIGHT-SIDED LOW BACK PAIN WITH SCIATICA, SCIATICA LATERALITY UNSPECIFIED: ICD-10-CM

## 2024-07-23 DIAGNOSIS — I25.2 HISTORY OF MI (MYOCARDIAL INFARCTION): ICD-10-CM

## 2024-07-23 DIAGNOSIS — R42 DIZZINESS: ICD-10-CM

## 2024-07-23 DIAGNOSIS — I10 ESSENTIAL HYPERTENSION, BENIGN: Primary | ICD-10-CM

## 2024-07-23 DIAGNOSIS — R73.03 PREDIABETES: ICD-10-CM

## 2024-07-23 DIAGNOSIS — E21.3 HYPERPARATHYROIDISM (HCC): ICD-10-CM

## 2024-07-23 DIAGNOSIS — K59.03 THERAPEUTIC OPIOID-INDUCED CONSTIPATION (OIC): ICD-10-CM

## 2024-07-23 DIAGNOSIS — E29.1 HYPOGONADISM IN MALE: ICD-10-CM

## 2024-07-23 DIAGNOSIS — T40.2X5A THERAPEUTIC OPIOID-INDUCED CONSTIPATION (OIC): ICD-10-CM

## 2024-07-23 DIAGNOSIS — M19.011 PRIMARY OSTEOARTHRITIS OF RIGHT SHOULDER: ICD-10-CM

## 2024-07-23 DIAGNOSIS — E11.9 TYPE 2 DIABETES MELLITUS WITHOUT COMPLICATION, WITHOUT LONG-TERM CURRENT USE OF INSULIN (HCC): ICD-10-CM

## 2024-07-23 PROCEDURE — 3044F HG A1C LEVEL LT 7.0%: CPT | Performed by: STUDENT IN AN ORGANIZED HEALTH CARE EDUCATION/TRAINING PROGRAM

## 2024-07-23 PROCEDURE — 99214 OFFICE O/P EST MOD 30 MIN: CPT | Performed by: STUDENT IN AN ORGANIZED HEALTH CARE EDUCATION/TRAINING PROGRAM

## 2024-07-23 PROCEDURE — 3078F DIAST BP <80 MM HG: CPT | Performed by: STUDENT IN AN ORGANIZED HEALTH CARE EDUCATION/TRAINING PROGRAM

## 2024-07-23 PROCEDURE — 3074F SYST BP LT 130 MM HG: CPT | Performed by: STUDENT IN AN ORGANIZED HEALTH CARE EDUCATION/TRAINING PROGRAM

## 2024-07-23 PROCEDURE — 1123F ACP DISCUSS/DSCN MKR DOCD: CPT | Performed by: STUDENT IN AN ORGANIZED HEALTH CARE EDUCATION/TRAINING PROGRAM

## 2024-07-23 RX ORDER — NIFEDIPINE 60 MG/1
60 TABLET, EXTENDED RELEASE ORAL DAILY
Qty: 90 TABLET | Refills: 3 | Status: SHIPPED | OUTPATIENT
Start: 2024-07-23

## 2024-07-23 RX ORDER — MULTIVIT-MIN/IRON FUM/FOLIC AC 7.5 MG-4
1 TABLET ORAL DAILY
Qty: 90 TABLET | Refills: 3 | Status: SHIPPED | OUTPATIENT
Start: 2024-07-23

## 2024-07-23 RX ORDER — LUBIPROSTONE 24 UG/1
24 CAPSULE ORAL 2 TIMES DAILY WITH MEALS
Qty: 180 CAPSULE | Refills: 3 | Status: SHIPPED | OUTPATIENT
Start: 2024-07-23

## 2024-07-23 RX ORDER — LANCETS 28 GAUGE
EACH MISCELLANEOUS
Qty: 100 EACH | Refills: 3 | Status: SHIPPED | OUTPATIENT
Start: 2024-07-23

## 2024-07-23 NOTE — PROGRESS NOTES
Subjective  Scotty Wright, 73 y.o. male presents today with:  Chief Complaint   Patient presents with    Follow-up    Hypertension     Checks BP maybe once a week. States sometime systolic reading is high & diastolic is low. Denies chest pains, heart palpitations, headaches, dizziness, SOB or edema. Does follow with cardiology.     Diabetes     A1c was 6.3 on 3/1/24. Checks blood sugar twice a week. Follows with Dr. Gallardo.     Hyperparathyroid     Labs done 12/15/20.     Hypogonadism     Labs done 3/1/24. Follows with Dr. Gallardo.     Chronic Pain     Continues to follow with pain management & takes medications as directed.     Constipation     Continues to be an issue. Takes medications as directed. Did cancel his colonoscopy. States he really didn't want to be put to sleep & feels since he is 73, he really doesn't need to have this done.     Hyperlipidemia     Lipid panel done 5/19/23.     Shoulder Surgery     States he is scheduled for shoulder surgery 8/23/24 & has decided he does not want to have this done any longer. Would like to discuss.        Patient with 4-month follow-up appointment.    Patient with essential hypertension.  Normotensive in the office today.  He is currently on metoprolol tartrate 50 mg twice daily, nifedipine 60 mg daily.  Tolerating well.  He does follow with cardiology.  He does occasionally check blood pressure at home and it is usually normal sometimes systolic mildly elevated.    Patient also with prediabetes.  He is on metformin 500 mg twice daily.  He does follow with endocrinology.  He also has hypogonadism for which he follows with endocrinology as well as hyperparathyroidism.    Patient also with chronic pain related to shoulders and low back pain.  He is following with pain management.  He had been scheduled to have shoulder surgery but he reports that the injection that he had significantly helped and does not feel he needs surgery.  He is going to contact his orthopedic surgeon

## 2024-07-24 ASSESSMENT — ENCOUNTER SYMPTOMS
NAUSEA: 0
CONSTIPATION: 1
BACK PAIN: 1
VOMITING: 0
SHORTNESS OF BREATH: 0
EYE DISCHARGE: 0
WHEEZING: 0
DIARRHEA: 0
ABDOMINAL PAIN: 0
SORE THROAT: 0
RHINORRHEA: 0
COUGH: 0

## 2024-07-25 DIAGNOSIS — M79.605 BILATERAL LEG PAIN: ICD-10-CM

## 2024-07-25 DIAGNOSIS — M62.838 SPASM OF MUSCLE: ICD-10-CM

## 2024-07-25 DIAGNOSIS — M79.604 BILATERAL LEG PAIN: ICD-10-CM

## 2024-07-25 RX ORDER — BACLOFEN 10 MG/1
TABLET ORAL
Qty: 90 TABLET | Refills: 1 | Status: SHIPPED | OUTPATIENT
Start: 2024-07-25

## 2024-08-08 ENCOUNTER — PROCEDURE VISIT (OUTPATIENT)
Dept: PHYSICAL MEDICINE AND REHAB | Age: 74
End: 2024-08-08
Payer: MEDICARE

## 2024-08-08 DIAGNOSIS — M25.511 CHRONIC PAIN OF BOTH SHOULDERS: ICD-10-CM

## 2024-08-08 DIAGNOSIS — G89.29 CHRONIC PAIN OF BOTH SHOULDERS: ICD-10-CM

## 2024-08-08 DIAGNOSIS — M25.512 CHRONIC PAIN OF BOTH SHOULDERS: ICD-10-CM

## 2024-08-08 PROCEDURE — 96372 THER/PROPH/DIAG INJ SC/IM: CPT | Performed by: PHYSICAL MEDICINE & REHABILITATION

## 2024-08-08 RX ORDER — LIDOCAINE HYDROCHLORIDE 10 MG/ML
7 INJECTION, SOLUTION INFILTRATION; PERINEURAL ONCE
Status: COMPLETED | OUTPATIENT
Start: 2024-08-08 | End: 2024-08-08

## 2024-08-08 RX ORDER — LIDOCAINE HYDROCHLORIDE 20 MG/ML
2 INJECTION, SOLUTION INFILTRATION; PERINEURAL ONCE
Status: COMPLETED | OUTPATIENT
Start: 2024-08-08 | End: 2024-08-08

## 2024-08-08 RX ORDER — CYANOCOBALAMIN 1000 UG/ML
1000 INJECTION, SOLUTION INTRAMUSCULAR; SUBCUTANEOUS ONCE
Status: COMPLETED | OUTPATIENT
Start: 2024-08-08 | End: 2024-08-08

## 2024-08-08 RX ADMIN — LIDOCAINE HYDROCHLORIDE 7 ML: 10 INJECTION, SOLUTION INFILTRATION; PERINEURAL at 13:04

## 2024-08-08 RX ADMIN — LIDOCAINE HYDROCHLORIDE 2 ML: 20 INJECTION, SOLUTION INFILTRATION; PERINEURAL at 13:05

## 2024-08-08 RX ADMIN — CYANOCOBALAMIN 1000 MCG: 1000 INJECTION, SOLUTION INTRAMUSCULAR; SUBCUTANEOUS at 13:06

## 2024-08-09 ENCOUNTER — TELEPHONE (OUTPATIENT)
Dept: FAMILY MEDICINE CLINIC | Age: 74
End: 2024-08-09

## 2024-08-09 DIAGNOSIS — Z12.11 SCREENING FOR COLON CANCER: Primary | ICD-10-CM

## 2024-08-09 RX ORDER — TAMSULOSIN HYDROCHLORIDE 0.4 MG/1
CAPSULE ORAL
Qty: 30 CAPSULE | Refills: 1 | Status: SHIPPED | OUTPATIENT
Start: 2024-08-09

## 2024-08-09 NOTE — TELEPHONE ENCOUNTER
Patient calling in refusing the Colonoscopy says he's uncomfortable with this type of procedure.  Patient says that he talked with (them) already about this and (they) said that he can get a blood test for this done instead of having the procedure.    Patient would like a call back- [phone]

## 2024-08-09 NOTE — TELEPHONE ENCOUNTER
Natty ordered.  Please make sure he knows it is a stool test and not a blood test.  They will mail the kit to his home.  Thanks

## 2024-08-09 NOTE — TELEPHONE ENCOUNTER
Comments: Pharmacy Request    Last Office Visit (last PCP visit):   6/3/2024    Next Visit Date:  Future Appointments   Date Time Provider Department Center   9/9/2024  1:30 PM Brenda Stubbs DO [facility]   10/7/2024 10:15 AM Brenda Stubbs DO [facility]   10/9/2024 10:30 AM Brenda Stubbs DO [facility]   12/5/2024  9:15 AM Ab Gallardo MD [facility]   12/10/2024 12:15 PM Pollo Villareal MD [facility]   1/24/2025  9:00 AM [facility]       If hasn't been seen in over a year OR hasn't followed up according to last diabetes/ADHD visit, make appointment for patient before sending refill to provider.    Rx requested:  Requested Prescriptions     Pending Prescriptions Disp Refills    tamsulosin (FLOMAX) 0.4 MG capsule [Pharmacy Med Name: TAMSULOSIN 0.4MG CAPSULES] 30 capsule      Sig: TAKE 1 CAPSULE BY MOUTH DAILY AT BEDTIME

## 2024-08-14 RX ORDER — TAMSULOSIN HYDROCHLORIDE 0.4 MG/1
CAPSULE ORAL
Qty: 30 CAPSULE | Refills: 1 | Status: SHIPPED | OUTPATIENT
Start: 2024-08-14

## 2024-08-15 LAB — NONINV COLON CA DNA+OCC BLD SCRN STL QL: NEGATIVE

## 2024-09-09 ENCOUNTER — PROCEDURE VISIT (OUTPATIENT)
Dept: PHYSICAL MEDICINE AND REHAB | Age: 74
End: 2024-09-09
Payer: MEDICARE

## 2024-09-09 DIAGNOSIS — G95.9 MYELOPATHY (HCC): ICD-10-CM

## 2024-09-09 DIAGNOSIS — Z79.899 HIGH RISK MEDICATION USE: ICD-10-CM

## 2024-09-09 DIAGNOSIS — M25.512 CHRONIC LEFT SHOULDER PAIN: Primary | ICD-10-CM

## 2024-09-09 DIAGNOSIS — M54.16 RIGHT LUMBAR RADICULOPATHY: ICD-10-CM

## 2024-09-09 DIAGNOSIS — M54.12 C6 RADICULOPATHY: ICD-10-CM

## 2024-09-09 DIAGNOSIS — G95.9 SPINAL CORD DISEASE (HCC): ICD-10-CM

## 2024-09-09 DIAGNOSIS — G89.29 CHRONIC LEFT SHOULDER PAIN: Primary | ICD-10-CM

## 2024-09-09 PROCEDURE — 96372 THER/PROPH/DIAG INJ SC/IM: CPT | Performed by: PHYSICAL MEDICINE & REHABILITATION

## 2024-09-09 RX ORDER — LIDOCAINE HYDROCHLORIDE 20 MG/ML
2 INJECTION, SOLUTION INFILTRATION; PERINEURAL ONCE
Status: COMPLETED | OUTPATIENT
Start: 2024-09-09 | End: 2024-09-09

## 2024-09-09 RX ORDER — OXYCODONE AND ACETAMINOPHEN 10; 325 MG/1; MG/1
1 TABLET ORAL EVERY 6 HOURS PRN
Qty: 80 TABLET | Refills: 0 | Status: SHIPPED | OUTPATIENT
Start: 2024-09-09 | End: 2024-10-09

## 2024-09-09 RX ORDER — LIDOCAINE HYDROCHLORIDE 10 MG/ML
7 INJECTION, SOLUTION INFILTRATION; PERINEURAL ONCE
Status: COMPLETED | OUTPATIENT
Start: 2024-09-09 | End: 2024-09-09

## 2024-09-09 RX ORDER — CYANOCOBALAMIN 1000 UG/ML
1000 INJECTION, SOLUTION INTRAMUSCULAR; SUBCUTANEOUS ONCE
Status: COMPLETED | OUTPATIENT
Start: 2024-09-09 | End: 2024-09-09

## 2024-09-09 RX ADMIN — CYANOCOBALAMIN 1000 MCG: 1000 INJECTION, SOLUTION INTRAMUSCULAR; SUBCUTANEOUS at 14:41

## 2024-09-09 RX ADMIN — LIDOCAINE HYDROCHLORIDE 2 ML: 20 INJECTION, SOLUTION INFILTRATION; PERINEURAL at 14:45

## 2024-09-09 RX ADMIN — LIDOCAINE HYDROCHLORIDE 7 ML: 10 INJECTION, SOLUTION INFILTRATION; PERINEURAL at 14:47

## 2024-09-11 NOTE — PROGRESS NOTES
Subjective  Caiohua Wright, 73 y.o. male presents today with:    Reason for Visit  Back Pain        Neck Pain        Shoulder Pain Left----Right Shoulder injection on 8/8/24 with 80% reduction in pain lasting 1 month. Left Shoulder injection on 9/9/24 with 80% reduction in pain lasting 1 month.        Medication Refill Percocet, Gabapentin, Baclofen, Vit D. Pill count today, compliant.        Recent B shoulder flare up with increased activity--shoulder injections are helping.  Struggling with the lower Percocet 10 mg 80 per month.       Back Pain  This is a chronic problem. The current episode started more than 1 year ago. The problem occurs daily. The problem is unchanged. The pain is present in the lumbar spine. The quality of the pain is described as aching, cramping, shooting and stabbing. The pain radiates to the right foot, right knee and right thigh. The pain is at a severity of 9/10. The pain is severe. The pain is Worse during the day. The symptoms are aggravated by bending, lying down, position, sitting, standing and twisting. Stiffness is present In the morning. Associated symptoms include leg pain, numbness and weakness. Pertinent negatives include no abdominal pain, bladder incontinence, bowel incontinence, chest pain, dysuria, fever, headaches, paresis, perianal numbness or tingling. Risk factors include lack of exercise and sedentary lifestyle. He has tried analgesics, home exercises, NSAIDs, muscle relaxant, heat and walking (SI injections which help, trigger point injections, OXY-IR ) for the symptoms. The treatment provided mild relief.   Shoulder Pain   This is a chronic problem. The pain is at a severity of 9/10. Associated symptoms include numbness. Pertinent negatives include no fever or tingling. The symptoms are aggravated by contact. He has tried NSAIDS, OTC ointments, OTC pain meds, rest, oral narcotics and heat for the symptoms. The treatment provided mild relief.   Leg Pain   Associated

## 2024-09-16 RX ORDER — TAMSULOSIN HYDROCHLORIDE 0.4 MG/1
CAPSULE ORAL
Qty: 30 CAPSULE | Refills: 1 | Status: SHIPPED | OUTPATIENT
Start: 2024-09-16

## 2024-09-17 DIAGNOSIS — M79.605 BILATERAL LEG PAIN: ICD-10-CM

## 2024-09-17 DIAGNOSIS — M79.604 BILATERAL LEG PAIN: ICD-10-CM

## 2024-09-17 DIAGNOSIS — M62.838 SPASM OF MUSCLE: ICD-10-CM

## 2024-09-19 RX ORDER — BACLOFEN 10 MG/1
TABLET ORAL
Qty: 90 TABLET | Refills: 1 | OUTPATIENT
Start: 2024-09-19

## 2024-10-07 ENCOUNTER — OFFICE VISIT (OUTPATIENT)
Dept: PHYSICAL MEDICINE AND REHAB | Age: 74
End: 2024-10-07
Payer: MEDICARE

## 2024-10-07 VITALS
BODY MASS INDEX: 22.91 KG/M2 | DIASTOLIC BLOOD PRESSURE: 60 MMHG | WEIGHT: 146 LBS | SYSTOLIC BLOOD PRESSURE: 138 MMHG | HEIGHT: 67 IN

## 2024-10-07 DIAGNOSIS — M62.838 SPASM OF MUSCLE: ICD-10-CM

## 2024-10-07 DIAGNOSIS — M54.16 RIGHT LUMBAR RADICULOPATHY: ICD-10-CM

## 2024-10-07 DIAGNOSIS — M79.605 BILATERAL LEG PAIN: ICD-10-CM

## 2024-10-07 DIAGNOSIS — Z79.899 HIGH RISK MEDICATION USE: ICD-10-CM

## 2024-10-07 DIAGNOSIS — M25.511 CHRONIC RIGHT SHOULDER PAIN: ICD-10-CM

## 2024-10-07 DIAGNOSIS — M54.40 MIDLINE LOW BACK PAIN WITH SCIATICA, SCIATICA LATERALITY UNSPECIFIED, UNSPECIFIED CHRONICITY: ICD-10-CM

## 2024-10-07 DIAGNOSIS — M79.604 BILATERAL LEG PAIN: ICD-10-CM

## 2024-10-07 DIAGNOSIS — M54.14 THORACIC AND LUMBOSACRAL NEURITIS: ICD-10-CM

## 2024-10-07 DIAGNOSIS — G89.29 CHRONIC RIGHT SHOULDER PAIN: ICD-10-CM

## 2024-10-07 DIAGNOSIS — G95.9 MYELOPATHY (HCC): Primary | ICD-10-CM

## 2024-10-07 DIAGNOSIS — M54.17 THORACIC AND LUMBOSACRAL NEURITIS: ICD-10-CM

## 2024-10-07 DIAGNOSIS — G95.9 SPINAL CORD DISEASE (HCC): ICD-10-CM

## 2024-10-07 DIAGNOSIS — M54.12 C6 RADICULOPATHY: ICD-10-CM

## 2024-10-07 PROCEDURE — 1123F ACP DISCUSS/DSCN MKR DOCD: CPT | Performed by: PHYSICAL MEDICINE & REHABILITATION

## 2024-10-07 PROCEDURE — 99214 OFFICE O/P EST MOD 30 MIN: CPT | Performed by: PHYSICAL MEDICINE & REHABILITATION

## 2024-10-07 PROCEDURE — 3075F SYST BP GE 130 - 139MM HG: CPT | Performed by: PHYSICAL MEDICINE & REHABILITATION

## 2024-10-07 PROCEDURE — 3078F DIAST BP <80 MM HG: CPT | Performed by: PHYSICAL MEDICINE & REHABILITATION

## 2024-10-07 RX ORDER — BACLOFEN 10 MG/1
TABLET ORAL
Qty: 90 TABLET | Refills: 1 | Status: SHIPPED | OUTPATIENT
Start: 2024-10-07

## 2024-10-07 RX ORDER — OXYCODONE AND ACETAMINOPHEN 10; 325 MG/1; MG/1
1 TABLET ORAL EVERY 6 HOURS PRN
Qty: 80 TABLET | Refills: 0 | Status: SHIPPED | OUTPATIENT
Start: 2024-10-09 | End: 2024-11-08

## 2024-10-09 ENCOUNTER — PROCEDURE VISIT (OUTPATIENT)
Dept: PHYSICAL MEDICINE AND REHAB | Age: 74
End: 2024-10-09
Payer: MEDICARE

## 2024-10-09 DIAGNOSIS — M79.10 MYALGIA: Primary | ICD-10-CM

## 2024-10-09 PROCEDURE — 96372 THER/PROPH/DIAG INJ SC/IM: CPT | Performed by: PHYSICAL MEDICINE & REHABILITATION

## 2024-10-09 PROCEDURE — 20553 NJX 1/MLT TRIGGER POINTS 3/>: CPT | Performed by: PHYSICAL MEDICINE & REHABILITATION

## 2024-10-09 RX ORDER — LIDOCAINE HYDROCHLORIDE 10 MG/ML
15 INJECTION, SOLUTION INFILTRATION; PERINEURAL ONCE
Status: COMPLETED | OUTPATIENT
Start: 2024-10-09 | End: 2024-10-09

## 2024-10-09 RX ORDER — CYANOCOBALAMIN 1000 UG/ML
1000 INJECTION, SOLUTION INTRAMUSCULAR; SUBCUTANEOUS ONCE
Status: COMPLETED | OUTPATIENT
Start: 2024-10-09 | End: 2024-10-09

## 2024-10-09 RX ADMIN — LIDOCAINE HYDROCHLORIDE 15 ML: 10 INJECTION, SOLUTION INFILTRATION; PERINEURAL at 13:27

## 2024-10-09 RX ADMIN — CYANOCOBALAMIN 1000 MCG: 1000 INJECTION, SOLUTION INTRAMUSCULAR; SUBCUTANEOUS at 13:26

## 2024-10-16 DIAGNOSIS — I10 ESSENTIAL HYPERTENSION, BENIGN: ICD-10-CM

## 2024-10-16 RX ORDER — NIFEDIPINE 60 MG/1
60 TABLET, EXTENDED RELEASE ORAL DAILY
Qty: 90 TABLET | Refills: 3 | Status: SHIPPED | OUTPATIENT
Start: 2024-10-16

## 2024-10-16 NOTE — TELEPHONE ENCOUNTER
Comments:     Last Office Visit (last PCP visit):   8/21/2023    Next Visit Date:  Future Appointments   Date Time Provider Department Center   11/12/2024 10:15 AM Brenda Stubbs DO [facility]   12/5/2024  9:15 AM Ab Gallardo MD [facility]   12/10/2024 12:15 PM Pollo Villareal [facility]   12/13/2024 10:30 AM Brenda Stubbs DO [facility]   12/18/2024 10:00 AM Brenda Stubbs DO [facility]   1/24/2025  9:00 AM Jayne Vega DO [facility]       **If hasn't been seen in over a year OR hasn't followed up according to last diabetes/ADHD visit, make appointment for patient before sending refill to provider.    Rx requested:  Requested Prescriptions     Pending Prescriptions Disp Refills    NIFEdipine (PROCARDIA XL) 60 MG extended release tablet [Pharmacy Med Name: NIFEDIPINE 60MG ER (XL/OS) TABLETS] 90 tablet 3     Sig: TAKE 1 TABLET BY MOUTH DAILY

## 2024-11-04 DIAGNOSIS — G95.9 SPINAL CORD DISEASE (HCC): ICD-10-CM

## 2024-11-04 DIAGNOSIS — M54.12 C6 RADICULOPATHY: ICD-10-CM

## 2024-11-04 DIAGNOSIS — G95.9 MYELOPATHY (HCC): ICD-10-CM

## 2024-11-04 DIAGNOSIS — M54.16 RIGHT LUMBAR RADICULOPATHY: ICD-10-CM

## 2024-11-04 DIAGNOSIS — Z79.899 HIGH RISK MEDICATION USE: ICD-10-CM

## 2024-11-04 RX ORDER — OXYCODONE AND ACETAMINOPHEN 10; 325 MG/1; MG/1
1 TABLET ORAL EVERY 6 HOURS PRN
Qty: 80 TABLET | Refills: 0 | Status: SHIPPED | OUTPATIENT
Start: 2024-11-07 | End: 2024-12-07

## 2024-11-12 ENCOUNTER — PROCEDURE VISIT (OUTPATIENT)
Dept: PHYSICAL MEDICINE AND REHAB | Age: 74
End: 2024-11-12
Payer: MEDICARE

## 2024-11-12 DIAGNOSIS — G89.29 CHRONIC LEFT SHOULDER PAIN: Primary | ICD-10-CM

## 2024-11-12 DIAGNOSIS — M25.512 CHRONIC LEFT SHOULDER PAIN: Primary | ICD-10-CM

## 2024-11-12 PROCEDURE — 96372 THER/PROPH/DIAG INJ SC/IM: CPT | Performed by: PHYSICAL MEDICINE & REHABILITATION

## 2024-11-12 PROCEDURE — 20611 DRAIN/INJ JOINT/BURSA W/US: CPT | Performed by: PHYSICAL MEDICINE & REHABILITATION

## 2024-11-12 RX ORDER — LIDOCAINE HYDROCHLORIDE 20 MG/ML
2 INJECTION, SOLUTION INFILTRATION; PERINEURAL ONCE
Status: COMPLETED | OUTPATIENT
Start: 2024-11-12 | End: 2024-11-12

## 2024-11-12 RX ORDER — LIDOCAINE HYDROCHLORIDE 10 MG/ML
7 INJECTION, SOLUTION INFILTRATION; PERINEURAL ONCE
Status: COMPLETED | OUTPATIENT
Start: 2024-11-12 | End: 2024-11-12

## 2024-11-12 RX ORDER — CYANOCOBALAMIN 1000 UG/ML
1000 INJECTION, SOLUTION INTRAMUSCULAR; SUBCUTANEOUS ONCE
Status: COMPLETED | OUTPATIENT
Start: 2024-11-12 | End: 2024-11-12

## 2024-11-12 RX ADMIN — CYANOCOBALAMIN 1000 MCG: 1000 INJECTION, SOLUTION INTRAMUSCULAR; SUBCUTANEOUS at 10:42

## 2024-11-12 RX ADMIN — LIDOCAINE HYDROCHLORIDE 2 ML: 20 INJECTION, SOLUTION INFILTRATION; PERINEURAL at 10:41

## 2024-11-12 RX ADMIN — LIDOCAINE HYDROCHLORIDE 7 ML: 10 INJECTION, SOLUTION INFILTRATION; PERINEURAL at 10:44

## 2024-11-26 DIAGNOSIS — E11.9 TYPE 2 DIABETES MELLITUS WITHOUT COMPLICATION, WITHOUT LONG-TERM CURRENT USE OF INSULIN (HCC): ICD-10-CM

## 2024-11-26 LAB
ANION GAP SERPL CALCULATED.3IONS-SCNC: 8 MEQ/L (ref 9–15)
BUN SERPL-MCNC: 18 MG/DL (ref 8–23)
CALCIUM SERPL-MCNC: 9.7 MG/DL (ref 8.5–9.9)
CHLORIDE SERPL-SCNC: 100 MEQ/L (ref 95–107)
CO2 SERPL-SCNC: 33 MEQ/L (ref 20–31)
CREAT SERPL-MCNC: 0.98 MG/DL (ref 0.7–1.2)
ESTIMATED AVERAGE GLUCOSE: 143 MG/DL
GLUCOSE SERPL-MCNC: 135 MG/DL (ref 70–99)
HBA1C MFR BLD: 6.6 % (ref 4–6)
POTASSIUM SERPL-SCNC: 4.8 MEQ/L (ref 3.4–4.9)
SODIUM SERPL-SCNC: 141 MEQ/L (ref 135–144)

## 2024-12-03 DIAGNOSIS — M54.16 RIGHT LUMBAR RADICULOPATHY: ICD-10-CM

## 2024-12-03 DIAGNOSIS — M54.12 C6 RADICULOPATHY: ICD-10-CM

## 2024-12-03 DIAGNOSIS — G95.9 SPINAL CORD DISEASE (HCC): ICD-10-CM

## 2024-12-03 DIAGNOSIS — G95.9 MYELOPATHY (HCC): ICD-10-CM

## 2024-12-03 DIAGNOSIS — Z79.899 HIGH RISK MEDICATION USE: ICD-10-CM

## 2024-12-03 RX ORDER — OXYCODONE AND ACETAMINOPHEN 10; 325 MG/1; MG/1
1 TABLET ORAL EVERY 6 HOURS PRN
Qty: 80 TABLET | Refills: 0 | Status: SHIPPED | OUTPATIENT
Start: 2024-12-06 | End: 2025-01-05

## 2024-12-04 ENCOUNTER — TELEPHONE (OUTPATIENT)
Dept: FAMILY MEDICINE CLINIC | Age: 74
End: 2024-12-04

## 2024-12-04 NOTE — TELEPHONE ENCOUNTER
Daughter in Law - Carisa called- wanted to let provider know of PT condition.    PT was at the ed at  on 12/1 - found to have UTI- was given antibiotic and sent home.    He rec'd a call today that they were calling in a stronger antibiotic     PT is still not feeling well.    They just wanted provider to know and wants to make sure that his chart was rev'd  from

## 2024-12-04 NOTE — TELEPHONE ENCOUNTER
Patient called in stating that we can release information to daughter in law Carisa Wright. Patient will need to update HIPAA Patient Communication Release of Information.

## 2024-12-05 ENCOUNTER — OFFICE VISIT (OUTPATIENT)
Dept: ENDOCRINOLOGY | Age: 74
End: 2024-12-05
Payer: MEDICARE

## 2024-12-05 VITALS
DIASTOLIC BLOOD PRESSURE: 78 MMHG | HEART RATE: 82 BPM | HEIGHT: 67 IN | BODY MASS INDEX: 22.6 KG/M2 | WEIGHT: 144 LBS | SYSTOLIC BLOOD PRESSURE: 149 MMHG

## 2024-12-05 DIAGNOSIS — E11.9 TYPE 2 DIABETES MELLITUS WITHOUT COMPLICATION, WITHOUT LONG-TERM CURRENT USE OF INSULIN (HCC): Primary | ICD-10-CM

## 2024-12-05 DIAGNOSIS — E29.1 HYPOGONADISM MALE: ICD-10-CM

## 2024-12-05 LAB
CHP ED QC CHECK: NORMAL
GLUCOSE BLD-MCNC: 144 MG/DL

## 2024-12-05 PROCEDURE — 1123F ACP DISCUSS/DSCN MKR DOCD: CPT | Performed by: INTERNAL MEDICINE

## 2024-12-05 PROCEDURE — 3078F DIAST BP <80 MM HG: CPT | Performed by: INTERNAL MEDICINE

## 2024-12-05 PROCEDURE — 3077F SYST BP >= 140 MM HG: CPT | Performed by: INTERNAL MEDICINE

## 2024-12-05 PROCEDURE — 1159F MED LIST DOCD IN RCRD: CPT | Performed by: INTERNAL MEDICINE

## 2024-12-05 PROCEDURE — 99213 OFFICE O/P EST LOW 20 MIN: CPT | Performed by: INTERNAL MEDICINE

## 2024-12-05 PROCEDURE — 82962 GLUCOSE BLOOD TEST: CPT | Performed by: INTERNAL MEDICINE

## 2024-12-05 PROCEDURE — 3044F HG A1C LEVEL LT 7.0%: CPT | Performed by: INTERNAL MEDICINE

## 2024-12-05 RX ORDER — MULTIVITAMIN
1 TABLET ORAL DAILY
COMMUNITY
Start: 2024-09-08

## 2024-12-05 RX ORDER — TESTOSTERONE CYPIONATE 200 MG/ML
INJECTION, SOLUTION INTRAMUSCULAR
Qty: 10 ML | Refills: 3 | Status: SHIPPED | OUTPATIENT
Start: 2024-12-05 | End: 2025-09-28

## 2024-12-05 RX ORDER — CIPROFLOXACIN 500 MG/1
500 TABLET, FILM COATED ORAL 2 TIMES DAILY
COMMUNITY
Start: 2024-12-04 | End: 2024-12-11

## 2024-12-05 RX ORDER — SYRINGE WITH NEEDLE, 1 ML 25GX5/8"
SYRINGE, EMPTY DISPOSABLE MISCELLANEOUS
Qty: 20 EACH | Refills: 6 | Status: SHIPPED | OUTPATIENT
Start: 2024-12-05

## 2024-12-05 ASSESSMENT — ENCOUNTER SYMPTOMS: EYES NEGATIVE: 1

## 2024-12-05 NOTE — PROGRESS NOTES
12/5/2024    Assessment:       Diagnosis Orders   1. Type 2 diabetes mellitus without complication, without long-term current use of insulin ([facility])  POCT Glucose      2. Hypogonadism male              PLAN:     Orders Placed This Encounter   Procedures    Basic Metabolic Panel     Standing Status:   Future     Standing Expiration Date:   12/5/2025    CBC     Standing Status:   Future     Standing Expiration Date:   12/5/2025    Testosterone, free, total     Standing Status:   Future     Standing Expiration Date:   12/5/2025    Hemoglobin A1C     Standing Status:   Future     Standing Expiration Date:   12/5/2025    PSA, Diagnostic     Standing Status:   Future     Standing Expiration Date:   12/5/2025    POCT Glucose     Orders Placed This Encounter   Medications    testosterone cypionate (DEPOTESTOTERONE CYPIONATE) 200 MG/ML injection     Sig: INJECT 0.5MLS INTO THE MUSCLE EVERY 2 WEEKS     Dispense:  10 mL     Refill:  3    SYRINGE-NEEDLE, DISP, 3 ML (B-D 3CC LUER-JOSE SYR [id number]/2) 22G X 1-1/2\" 3 ML MISC     Sig: USE EVERY 2 WEEKS WITH TESTOSTERONE     Dispense:  20 each     Refill:  6     Continue current dose of metformin continue testosterone placement patient to follow-up in 3 to 6 months time    Orders Placed This Encounter   Procedures    POCT Glucose     No orders of the defined types were placed in this encounter.    No follow-ups on file.  Subjective:     Chief Complaint   Patient presents with    Diabetes    Hypogonadism     Vitals:    12/05/24 0928   BP: (!) 149/78   Pulse: 82   Weight: 65.3 kg (144 lb)   Height: 1.702 m (5' 7.01\")     Wt Readings from Last 3 Encounters:   12/05/24 65.3 kg (144 lb)   10/07/24 66.2 kg (146 lb)   07/23/24 68 kg (150 lb)     BP Readings from Last 3 Encounters:   12/05/24 (!) 149/78   10/07/24 138/60   07/23/24 120/62     Follow-up with type diabetes on 500 mg twice daily hypogonadism hemoglobin A1c done recently was 6.6 recent visit to ER was to hospital for UTI initially

## 2024-12-06 NOTE — PROGRESS NOTES
Detected 09/16/2016 11:47 AM    FENTA Not Detected 09/16/2016 11:47 AM    NORFENT Not Detected 09/16/2016 11:47 AM    MEPERIDINE Not Detected 09/16/2016 11:47 AM    TAPENU Not Detected 09/16/2016 11:47 AM    TAPOSULFUR Not Detected 09/16/2016 11:47 AM    METHADONE Not Detected 09/16/2016 11:47 AM    TRAM Not Detected 09/16/2016 11:47 AM    METHAMP Not Detected 09/16/2016 11:47 AM    ECMDA Not Detected 09/16/2016 11:47 AM       Lab Results   Component Value Date/Time    PHENTERMINE Not Detected 09/16/2016 11:47 AM    ALPRAZ Not Detected 09/16/2016 11:47 AM    ALPHAOHALPRA Not Detected 09/16/2016 11:47 AM    CLONAZEPAM Not Detected 09/16/2016 11:47 AM    7AMINOCLONAZ Not Detected 09/16/2016 11:47 AM    OXAZ Not Detected 09/16/2016 11:47 AM    TEMAZ Not Detected 09/16/2016 11:47 AM    LORAZEPAM Not Detected 09/16/2016 11:47 AM    MIDAZOLAM Not Detected 09/16/2016 11:47 AM    ZOLPIDEM Not Detected 09/16/2016 11:47 AM    ETG Not Detected 09/16/2016 11:47 AM    MARIJMET Not Detected 09/16/2016 11:47 AM    PCP Not Detected 09/16/2016 11:47 AM    PAINMGTDRUGP See Below 09/16/2016 11:47 AM    EERPAINMGTPA See Note 09/16/2016 11:47 AM         , I discussed results with patient.  See follow-up plans for new studies.    Therapies:  HEP-gentle stretching and relaxation techniques-demonstrated with patient-they are to do them twice a day.  They are also advised to make the following lifestyle changes:   Goals        Exercise 3x per week (30 min per time)      SOAPP-R GOAL LESS THAN 9      09/16/16 SCORE: 33-HIGH RISK   11/11/16 score: 27-high risk     01/13/17 score: 16-moderate risk   03/13/17 Score: 11-moderate risk   06/01/17 score: 15-moderate risk  08/03/17 score: 15-moderate risk  10/04/17 score: 18-moderate risk  12/08/17 score: 11-moderate risk  02/09/18 score: 12-moderate risk  04/13/18 score: 14-moderate risk  7/12/18 score 10-moderate risk  11/29/18 score 17- moderate risk  01/28/19 score  16-moderate risk  6/10/19

## 2024-12-09 DIAGNOSIS — M79.605 BILATERAL LEG PAIN: ICD-10-CM

## 2024-12-09 DIAGNOSIS — M62.838 SPASM OF MUSCLE: ICD-10-CM

## 2024-12-09 DIAGNOSIS — M79.604 BILATERAL LEG PAIN: ICD-10-CM

## 2024-12-09 RX ORDER — BACLOFEN 10 MG/1
TABLET ORAL
Qty: 90 TABLET | Refills: 1 | Status: SHIPPED | OUTPATIENT
Start: 2024-12-09

## 2024-12-10 ENCOUNTER — OFFICE VISIT (OUTPATIENT)
Dept: FAMILY MEDICINE CLINIC | Age: 74
End: 2024-12-10

## 2024-12-10 ENCOUNTER — OFFICE VISIT (OUTPATIENT)
Dept: CARDIOLOGY CLINIC | Age: 74
End: 2024-12-10
Payer: MEDICARE

## 2024-12-10 VITALS
WEIGHT: 145 LBS | BODY MASS INDEX: 22.76 KG/M2 | HEIGHT: 67 IN | SYSTOLIC BLOOD PRESSURE: 124 MMHG | TEMPERATURE: 97.6 F | OXYGEN SATURATION: 98 % | DIASTOLIC BLOOD PRESSURE: 64 MMHG | HEART RATE: 70 BPM

## 2024-12-10 VITALS
HEART RATE: 80 BPM | OXYGEN SATURATION: 99 % | SYSTOLIC BLOOD PRESSURE: 130 MMHG | WEIGHT: 148 LBS | RESPIRATION RATE: 16 BRPM | DIASTOLIC BLOOD PRESSURE: 72 MMHG | BODY MASS INDEX: 23.17 KG/M2

## 2024-12-10 DIAGNOSIS — Z87.440 RECENT URINARY TRACT INFECTION: Primary | ICD-10-CM

## 2024-12-10 DIAGNOSIS — R01.1 HEART MURMUR: ICD-10-CM

## 2024-12-10 DIAGNOSIS — I10 ESSENTIAL HYPERTENSION, BENIGN: ICD-10-CM

## 2024-12-10 DIAGNOSIS — R09.89 BILATERAL CAROTID BRUITS: ICD-10-CM

## 2024-12-10 DIAGNOSIS — E11.9 TYPE 2 DIABETES MELLITUS WITHOUT COMPLICATION, WITHOUT LONG-TERM CURRENT USE OF INSULIN (HCC): ICD-10-CM

## 2024-12-10 DIAGNOSIS — T40.2X5A THERAPEUTIC OPIOID-INDUCED CONSTIPATION (OIC): ICD-10-CM

## 2024-12-10 DIAGNOSIS — E29.1 HYPOGONADISM IN MALE: ICD-10-CM

## 2024-12-10 DIAGNOSIS — R00.1 BRADYCARDIA: Primary | ICD-10-CM

## 2024-12-10 DIAGNOSIS — K59.03 THERAPEUTIC OPIOID-INDUCED CONSTIPATION (OIC): ICD-10-CM

## 2024-12-10 PROCEDURE — 1159F MED LIST DOCD IN RCRD: CPT | Performed by: INTERNAL MEDICINE

## 2024-12-10 PROCEDURE — 1123F ACP DISCUSS/DSCN MKR DOCD: CPT | Performed by: INTERNAL MEDICINE

## 2024-12-10 PROCEDURE — 99214 OFFICE O/P EST MOD 30 MIN: CPT | Performed by: INTERNAL MEDICINE

## 2024-12-10 PROCEDURE — 3078F DIAST BP <80 MM HG: CPT | Performed by: INTERNAL MEDICINE

## 2024-12-10 PROCEDURE — 3075F SYST BP GE 130 - 139MM HG: CPT | Performed by: INTERNAL MEDICINE

## 2024-12-10 RX ORDER — ATORVASTATIN CALCIUM 40 MG/1
40 TABLET, FILM COATED ORAL DAILY
Qty: 90 TABLET | Refills: 3 | Status: SHIPPED | OUTPATIENT
Start: 2024-12-10

## 2024-12-10 ASSESSMENT — ENCOUNTER SYMPTOMS
WHEEZING: 0
COUGH: 0
BLOOD IN STOOL: 0
SHORTNESS OF BREATH: 0
BACK PAIN: 1
CHEST TIGHTNESS: 0
EYES NEGATIVE: 1
STRIDOR: 0
NAUSEA: 0
GASTROINTESTINAL NEGATIVE: 1
RESPIRATORY NEGATIVE: 1

## 2024-12-10 NOTE — PROGRESS NOTES
Subjective  Scotty Wright, 74 y.o. male presents today with:  Chief Complaint   Patient presents with    Follow-up     Went to the ER at  on 11/30/24 with fever & dizziness. Treated for UTI.        History of Present Illness  The patient is a 74-year-old male who presents for follow-up.    He was previously evaluated in the emergency room for a urinary tract infection (UTI) at the end of November, which was associated with symptoms of dizziness and confusion. He reports an incident of disorientation while driving, where he perceived his surroundings as reversed and experienced a loss of direction, despite being near his residence. This episode occurred prior to his ER visit. He was initially prescribed Bactrim in the ER, but due to its ineffectiveness, it was subsequently replaced with ciprofloxacin. Since starting ciprofloxacin, he reports significant improvement in his condition, with no further episodes of dizziness or confusion. He has not experienced any gastrointestinal side effects from the medication. He has 2 doses of ciprofloxacin left.    He continues to receive testosterone injections, which he administers himself at home. He reports that these injections have been beneficial, providing him with increased energy and motivation.    He also reports chronic constipation, for which he has tried various treatments without success. The only effective treatment for his constipation has been milk of magnesia, which he takes at night. This regimen results in bowel movements between 5:00 and 6:00 in the morning, and occasionally another during the day.    He had a consultation with Dr. Villareal today, who did not make any changes to his current treatment plan. He also saw Dr. Ambrose, who ordered some labs. He is still seeing Dr. Rivera for pain management. He does not need any medication refills. He is scheduled for an ultrasound.  He has no other concerns at this time.      Past Medical History:   Diagnosis Date

## 2024-12-10 NOTE — PROGRESS NOTES
Subsequent Progress Note  Patient: Scotty Wright  YOB: 1950  MRN: [id number]    Chief Complaint: htn cad lipid preop back   Chief Complaint   Patient presents with    6 Month Follow-Up       CV Data:  Prior CAD/Stentsx 2  10/17/2017 CABG x2 EF 55  10/2018  Stress echo EF 55  Persistent HyperK  7/2020 CUS mild   12/2020 GXT negative   10/21 CUS mild   10/22 CUS mild     Subjective/HPI: no cp no osb no falls noblled has back arthritis getting shots with some releif.     10/25/2019 No cp no sob no falls no bleed. Takes meds. Eating well.     2/26/2020 no cp no sob. Had extensive back SX. Did well.     6/26/2020 no cp no osb no falls no bleed. Takes meds. Walks and active no bleed    10/27/2020 pt will have ED surgery at  next week. He is active and has no CP no SOB no falls no bleed.      1/6/21 no cp no sob no falls no bleed    5/7/21 no cp no sob no falls nob leed no edema K is always high 5.3 recently.   Recently HR 40s and Metoprolol was backed off to Bid from prior tid.     9/10/21 doing well no cp no sob active taking meds. No falls. No bleed.     1/18/22 doing well no  Cp no spb not dizzy no bleed. Takes meds.. active     5/10/22 doping well no cp no sob nofalsl no bleed. BP at home good. Last OV BP good as well. Today little elevated- states he rushed In here.     9/19/22 doing very well no cp no sob no falls no bleed takes meds.     1/19/23 doing well active no cp no sob no falls no bleed.     5/22/23  doing very well no cp no sob no fals no bleed active.     9/26/23 doing very well active at home. No cp no sob no falls no bleed     1/31/24 getting shoulder injections and after feels brief palpitations. No falls no cp no sob + mild LE edema.     12/10/24 doing well no cp no so bno falls no bleed take meds.    EKG: SR 71 RBBB    Lives w wife  Nonsmoker  Retired - [employer].       Past Medical History:   Diagnosis Date    Chronic back pain     Coronary artery disease 2002    Dr. Hairston at [facility]

## 2024-12-13 ENCOUNTER — PROCEDURE VISIT (OUTPATIENT)
Dept: PHYSICAL MEDICINE AND REHAB | Age: 74
End: 2024-12-13

## 2024-12-13 DIAGNOSIS — M25.512 CHRONIC LEFT SHOULDER PAIN: Primary | ICD-10-CM

## 2024-12-13 DIAGNOSIS — G89.29 CHRONIC LEFT SHOULDER PAIN: Primary | ICD-10-CM

## 2024-12-13 RX ORDER — LIDOCAINE HYDROCHLORIDE 10 MG/ML
7 INJECTION, SOLUTION INFILTRATION; PERINEURAL ONCE
Status: COMPLETED | OUTPATIENT
Start: 2024-12-13 | End: 2024-12-13

## 2024-12-13 RX ORDER — LIDOCAINE HYDROCHLORIDE 20 MG/ML
2 INJECTION, SOLUTION INFILTRATION; PERINEURAL ONCE
Status: COMPLETED | OUTPATIENT
Start: 2024-12-13 | End: 2024-12-13

## 2024-12-13 RX ORDER — CYANOCOBALAMIN 1000 UG/ML
1000 INJECTION, SOLUTION INTRAMUSCULAR; SUBCUTANEOUS ONCE
Status: COMPLETED | OUTPATIENT
Start: 2024-12-13 | End: 2024-12-13

## 2024-12-13 RX ADMIN — CYANOCOBALAMIN 1000 MCG: 1000 INJECTION, SOLUTION INTRAMUSCULAR; SUBCUTANEOUS at 12:00

## 2024-12-13 RX ADMIN — LIDOCAINE HYDROCHLORIDE 7 ML: 10 INJECTION, SOLUTION INFILTRATION; PERINEURAL at 12:02

## 2024-12-13 RX ADMIN — LIDOCAINE HYDROCHLORIDE 2 ML: 20 INJECTION, SOLUTION INFILTRATION; PERINEURAL at 12:01

## 2024-12-13 NOTE — PROGRESS NOTES
Patient here for Left shoulder injection with U/S. Patient taken back to exam room, and placed on drape locking stool. Area marked, and cleansed appropriately with alcohol. 2cc of 2% Lidocaine, 2cc of Kenalog, and 7cc of 1% Lidocaine and 1cc of b12 to be injected by provider.

## 2024-12-18 ENCOUNTER — OFFICE VISIT (OUTPATIENT)
Dept: PHYSICAL MEDICINE AND REHAB | Age: 74
End: 2024-12-18
Payer: MEDICARE

## 2024-12-18 VITALS
SYSTOLIC BLOOD PRESSURE: 142 MMHG | BODY MASS INDEX: 22.76 KG/M2 | HEART RATE: 72 BPM | DIASTOLIC BLOOD PRESSURE: 68 MMHG | WEIGHT: 145 LBS | HEIGHT: 67 IN

## 2024-12-18 DIAGNOSIS — M54.12 C6 RADICULOPATHY: ICD-10-CM

## 2024-12-18 DIAGNOSIS — M54.40 LOW BACK PAIN WITH SCIATICA, SCIATICA LATERALITY UNSPECIFIED, UNSPECIFIED BACK PAIN LATERALITY, UNSPECIFIED CHRONICITY: ICD-10-CM

## 2024-12-18 DIAGNOSIS — G95.9 SPINAL CORD DISEASE (HCC): Primary | ICD-10-CM

## 2024-12-18 DIAGNOSIS — E55.9 VITAMIN D DEFICIENCY: ICD-10-CM

## 2024-12-18 DIAGNOSIS — M54.17 THORACIC AND LUMBOSACRAL NEURITIS: ICD-10-CM

## 2024-12-18 DIAGNOSIS — G95.9 MYELOPATHY (HCC): ICD-10-CM

## 2024-12-18 DIAGNOSIS — M54.14 THORACIC AND LUMBOSACRAL NEURITIS: ICD-10-CM

## 2024-12-18 PROCEDURE — 1123F ACP DISCUSS/DSCN MKR DOCD: CPT | Performed by: PHYSICAL MEDICINE & REHABILITATION

## 2024-12-18 PROCEDURE — 3077F SYST BP >= 140 MM HG: CPT | Performed by: PHYSICAL MEDICINE & REHABILITATION

## 2024-12-18 PROCEDURE — 3078F DIAST BP <80 MM HG: CPT | Performed by: PHYSICAL MEDICINE & REHABILITATION

## 2024-12-18 PROCEDURE — 1159F MED LIST DOCD IN RCRD: CPT | Performed by: PHYSICAL MEDICINE & REHABILITATION

## 2024-12-18 PROCEDURE — 99214 OFFICE O/P EST MOD 30 MIN: CPT | Performed by: PHYSICAL MEDICINE & REHABILITATION

## 2024-12-18 PROCEDURE — 1160F RVW MEDS BY RX/DR IN RCRD: CPT | Performed by: PHYSICAL MEDICINE & REHABILITATION

## 2024-12-18 PROCEDURE — 1125F AMNT PAIN NOTED PAIN PRSNT: CPT | Performed by: PHYSICAL MEDICINE & REHABILITATION

## 2024-12-18 RX ORDER — GABAPENTIN 300 MG/1
CAPSULE ORAL
Qty: 270 CAPSULE | Refills: 3 | Status: SHIPPED | OUTPATIENT
Start: 2024-12-23 | End: 2025-05-21

## 2024-12-18 RX ORDER — PREDNISONE 10 MG/1
10 TABLET ORAL DAILY
Qty: 10 TABLET | Refills: 0 | Status: SHIPPED | OUTPATIENT
Start: 2024-12-18 | End: 2024-12-28

## 2024-12-31 DIAGNOSIS — G95.9 SPINAL CORD DISEASE (HCC): ICD-10-CM

## 2024-12-31 DIAGNOSIS — G95.9 MYELOPATHY (HCC): ICD-10-CM

## 2024-12-31 DIAGNOSIS — M54.12 C6 RADICULOPATHY: ICD-10-CM

## 2024-12-31 DIAGNOSIS — M54.16 RIGHT LUMBAR RADICULOPATHY: ICD-10-CM

## 2024-12-31 DIAGNOSIS — Z79.899 HIGH RISK MEDICATION USE: ICD-10-CM

## 2024-12-31 RX ORDER — OXYCODONE AND ACETAMINOPHEN 10; 325 MG/1; MG/1
1 TABLET ORAL EVERY 6 HOURS PRN
Qty: 80 TABLET | Refills: 0 | Status: SHIPPED | OUTPATIENT
Start: 2025-01-05 | End: 2025-02-04

## 2025-01-09 RX ORDER — METOPROLOL TARTRATE 50 MG
50 TABLET ORAL 2 TIMES DAILY
Qty: 180 TABLET | Refills: 3 | Status: SHIPPED | OUTPATIENT
Start: 2025-01-09

## 2025-01-09 NOTE — TELEPHONE ENCOUNTER
Requesting medication refill. Please approve or deny this request.    Rx requested:  Requested Prescriptions     Pending Prescriptions Disp Refills    metoprolol tartrate (LOPRESSOR) 50 MG tablet [Pharmacy Med Name: METOPROLOL TARTRATE 50MG TABLETS] 180 tablet 3     Sig: TAKE 1 TABLET BY MOUTH TWICE DAILY         Last Office Visit:   12/10/2024      Next Visit Date:  Future Appointments   Date Time Provider Department Center   1/13/2025  9:30 AM [facility]   1/13/2025 10:30 AM [facility]  [facility]   1/14/2025 10:15 AM Brenda Stubbs DO [facility]   2/13/2025 11:00 AM Brenda Stubbs DO [facility]   3/10/2025 10:45 AM Brenda Stubbs DO [facility]   3/17/2025 11:00 AM Brenda Stubbs DO [facility]   3/24/2025  9:00 AM Jayne Vega DO [facility]   6/5/2025  9:45 AM Ab Gallardo MD [facility]   6/6/2025 12:00 PM Pollo Villareal MD [facility]               Last refill 12/07/2023. Please approve or deny.

## 2025-01-13 ENCOUNTER — HOSPITAL ENCOUNTER (OUTPATIENT)
Dept: ULTRASOUND IMAGING | Age: 75
Discharge: HOME | End: 2025-01-15
Attending: INTERNAL MEDICINE
Payer: MEDICARE

## 2025-01-13 ENCOUNTER — HOSPITAL ENCOUNTER (OUTPATIENT)
Age: 75
Discharge: HOME | End: 2025-01-15
Attending: INTERNAL MEDICINE
Payer: MEDICARE

## 2025-01-13 DIAGNOSIS — R01.1 HEART MURMUR: ICD-10-CM

## 2025-01-13 DIAGNOSIS — R09.89 BILATERAL CAROTID BRUITS: ICD-10-CM

## 2025-01-13 LAB
ECHO AO ROOT DIAM: 3.1 CM
ECHO AV AREA PEAK VELOCITY: 1.9 CM2
ECHO AV AREA VTI: 1.8 CM2
ECHO AV CUSP MM: 1.8 CM
ECHO AV MEAN GRADIENT: 5 MMHG
ECHO AV MEAN VELOCITY: 1.1 M/S
ECHO AV PEAK GRADIENT: 11 MMHG
ECHO AV PEAK VELOCITY: 1.7 M/S
ECHO AV VELOCITY RATIO: 0.59
ECHO AV VTI: 31.7 CM
ECHO LA DIAMETER: 3.6 CM
ECHO LA TO AORTIC ROOT RATIO: 1.16
ECHO LA VOL A-L A2C: 59 ML (ref 18–58)
ECHO LA VOL A-L A4C: 41 ML (ref 18–58)
ECHO LA VOL MOD A2C: 57 ML (ref 18–58)
ECHO LA VOL MOD A4C: 38 ML (ref 18–58)
ECHO LA VOLUME AREA LENGTH: 54 ML
ECHO LV E' LATERAL VELOCITY: 14.83 CM/S
ECHO LV E' SEPTAL VELOCITY: 7.82 CM/S
ECHO LV EDV A2C: 101 ML
ECHO LV EDV A4C: 89 ML
ECHO LV EDV BP: 98 ML (ref 67–155)
ECHO LV EJECTION FRACTION A2C: 61 %
ECHO LV EJECTION FRACTION A4C: 73 %
ECHO LV EJECTION FRACTION BIPLANE: 69 % (ref 55–100)
ECHO LV ESV A2C: 39 ML
ECHO LV ESV A4C: 24 ML
ECHO LV ESV BP: 30 ML (ref 22–58)
ECHO LV FRACTIONAL SHORTENING: 26 % (ref 28–44)
ECHO LV INTERNAL DIMENSION DIASTOLIC: 3.8 CM (ref 4.2–5.9)
ECHO LV INTERNAL DIMENSION SYSTOLIC: 2.8 CM
ECHO LV IVSD: 1.2 CM (ref 0.6–1)
ECHO LV IVSS: 1.4 CM
ECHO LV MASS 2D: 163 G (ref 88–224)
ECHO LV POSTERIOR WALL DIASTOLIC: 1.3 CM (ref 0.6–1)
ECHO LV POSTERIOR WALL SYSTOLIC: 1.5 CM
ECHO LV RELATIVE WALL THICKNESS RATIO: 0.68
ECHO LVOT AREA: 3.1 CM2
ECHO LVOT AV VTI INDEX: 0.6
ECHO LVOT DIAM: 2 CM
ECHO LVOT MEAN GRADIENT: 2 MMHG
ECHO LVOT PEAK GRADIENT: 4 MMHG
ECHO LVOT PEAK VELOCITY: 1 M/S
ECHO LVOT SV: 59.3 ML
ECHO LVOT VTI: 18.9 CM
ECHO MV A VELOCITY: 0.87 M/S
ECHO MV E DECELERATION TIME (DT): 193.5 MS
ECHO MV E VELOCITY: 0.76 M/S
ECHO MV E/A RATIO: 0.87
ECHO MV E/E' LATERAL: 5.12
ECHO MV E/E' RATIO (AVERAGED): 7.42
ECHO MV E/E' SEPTAL: 9.72
ECHO PV MAX VELOCITY: 1.3 M/S
ECHO PV PEAK GRADIENT: 7 MMHG
ECHO RV INTERNAL DIMENSION: 3.2 CM
ECHO RV TAPSE: 2 CM (ref 1.7–?)
VAS LEFT CCA DIST EDV: 15.5 CM/S
VAS LEFT CCA DIST PSV: 95.7 CM/S
VAS LEFT CCA MID EDV: 16.2 CM/S
VAS LEFT CCA MID PSV: 93.2 CM/S
VAS LEFT CCA PROX EDV: 16.5 CM/S
VAS LEFT CCA PROX PSV: 145 CM/S
VAS LEFT ECA EDV: 6.39 CM/S
VAS LEFT ECA PSV: 84.5 CM/S
VAS LEFT ICA DIST EDV: 17.7 CM/S
VAS LEFT ICA DIST PSV: 70.3 CM/S
VAS LEFT ICA MID EDV: 24.1 CM/S
VAS LEFT ICA MID PSV: 92.9 CM/S
VAS LEFT ICA PROX EDV: 15.7 CM/S
VAS LEFT ICA PROX PSV: 90.4 CM/S
VAS LEFT ICA/CCA PSV: 1
VAS LEFT VERTEBRAL EDV: 15.2 CM/S
VAS LEFT VERTEBRAL PSV: 84.5 CM/S
VAS RIGHT CCA DIST EDV: 14.3 CM/S
VAS RIGHT CCA DIST PSV: 99.4 CM/S
VAS RIGHT CCA MID EDV: 11.2 CM/S
VAS RIGHT CCA MID PSV: 96.3 CM/S
VAS RIGHT CCA PROX EDV: 13 CM/S
VAS RIGHT CCA PROX PSV: 101 CM/S
VAS RIGHT ECA EDV: 12.5 CM/S
VAS RIGHT ECA PSV: 135 CM/S
VAS RIGHT ICA DIST EDV: 17.1 CM/S
VAS RIGHT ICA DIST PSV: 75.5 CM/S
VAS RIGHT ICA MID EDV: 14 CM/S
VAS RIGHT ICA MID PSV: 62.7 CM/S
VAS RIGHT ICA PROX EDV: 9.9 CM/S
VAS RIGHT ICA PROX PSV: 79.6 CM/S
VAS RIGHT ICA/CCA PSV: 0.83
VAS RIGHT VERTEBRAL EDV: 13.2 CM/S
VAS RIGHT VERTEBRAL PSV: 76 CM/S

## 2025-01-13 PROCEDURE — 93306 TTE W/DOPPLER COMPLETE: CPT

## 2025-01-13 PROCEDURE — 93306 TTE W/DOPPLER COMPLETE: CPT | Performed by: INTERNAL MEDICINE

## 2025-01-13 PROCEDURE — 93880 EXTRACRANIAL BILAT STUDY: CPT | Performed by: INTERNAL MEDICINE

## 2025-01-13 PROCEDURE — 93880 EXTRACRANIAL BILAT STUDY: CPT

## 2025-01-14 ENCOUNTER — PROCEDURE VISIT (OUTPATIENT)
Dept: PHYSICAL MEDICINE AND REHAB | Age: 75
End: 2025-01-14
Payer: MEDICARE

## 2025-01-14 DIAGNOSIS — M79.10 MYALGIA: Primary | ICD-10-CM

## 2025-01-14 PROCEDURE — 20553 NJX 1/MLT TRIGGER POINTS 3/>: CPT | Performed by: PHYSICAL MEDICINE & REHABILITATION

## 2025-01-14 PROCEDURE — 96372 THER/PROPH/DIAG INJ SC/IM: CPT | Performed by: PHYSICAL MEDICINE & REHABILITATION

## 2025-01-14 RX ORDER — CYANOCOBALAMIN 1000 UG/ML
1000 INJECTION, SOLUTION INTRAMUSCULAR; SUBCUTANEOUS ONCE
Status: COMPLETED | OUTPATIENT
Start: 2025-01-14 | End: 2025-01-14

## 2025-01-14 RX ORDER — LIDOCAINE HYDROCHLORIDE 10 MG/ML
15 INJECTION, SOLUTION INFILTRATION; PERINEURAL ONCE
Status: COMPLETED | OUTPATIENT
Start: 2025-01-14 | End: 2025-01-14

## 2025-01-14 RX ADMIN — LIDOCAINE HYDROCHLORIDE 15 ML: 10 INJECTION, SOLUTION INFILTRATION; PERINEURAL at 10:43

## 2025-01-14 RX ADMIN — CYANOCOBALAMIN 1000 MCG: 1000 INJECTION, SOLUTION INTRAMUSCULAR; SUBCUTANEOUS at 10:42

## 2025-01-20 RX ORDER — TAMSULOSIN HYDROCHLORIDE 0.4 MG/1
CAPSULE ORAL
Qty: 30 CAPSULE | Refills: 1 | Status: SHIPPED | OUTPATIENT
Start: 2025-01-20 | End: 2025-01-21 | Stop reason: SDUPTHER

## 2025-01-20 NOTE — TELEPHONE ENCOUNTER
Requested Prescriptions     Pending Prescriptions Disp Refills    tamsulosin (FLOMAX) 0.4 MG capsule 30 capsule 1     Sig: TAKE 1 CAPSULE BY MOUTH DAILY AT BEDTIME

## 2025-01-21 RX ORDER — TAMSULOSIN HYDROCHLORIDE 0.4 MG/1
CAPSULE ORAL
Qty: 90 CAPSULE | Refills: 1 | Status: SHIPPED | OUTPATIENT
Start: 2025-01-21

## 2025-02-03 DIAGNOSIS — M79.604 BILATERAL LEG PAIN: ICD-10-CM

## 2025-02-03 DIAGNOSIS — M54.16 RIGHT LUMBAR RADICULOPATHY: ICD-10-CM

## 2025-02-03 DIAGNOSIS — M62.838 SPASM OF MUSCLE: ICD-10-CM

## 2025-02-03 DIAGNOSIS — G95.9 SPINAL CORD DISEASE (HCC): ICD-10-CM

## 2025-02-03 DIAGNOSIS — M79.605 BILATERAL LEG PAIN: ICD-10-CM

## 2025-02-03 DIAGNOSIS — G95.9 MYELOPATHY (HCC): ICD-10-CM

## 2025-02-03 DIAGNOSIS — M54.12 C6 RADICULOPATHY: ICD-10-CM

## 2025-02-03 DIAGNOSIS — Z79.899 HIGH RISK MEDICATION USE: ICD-10-CM

## 2025-02-03 RX ORDER — OXYCODONE AND ACETAMINOPHEN 10; 325 MG/1; MG/1
1 TABLET ORAL EVERY 6 HOURS PRN
Qty: 80 TABLET | Refills: 0 | Status: SHIPPED | OUTPATIENT
Start: 2025-02-03 | End: 2025-03-05

## 2025-02-03 RX ORDER — BACLOFEN 10 MG/1
TABLET ORAL
Qty: 90 TABLET | Refills: 1 | Status: SHIPPED | OUTPATIENT
Start: 2025-02-03

## 2025-02-13 ENCOUNTER — PROCEDURE VISIT (OUTPATIENT)
Dept: PHYSICAL MEDICINE AND REHAB | Age: 75
End: 2025-02-13

## 2025-02-13 DIAGNOSIS — G89.29 CHRONIC RIGHT SHOULDER PAIN: Primary | ICD-10-CM

## 2025-02-13 DIAGNOSIS — M25.511 CHRONIC RIGHT SHOULDER PAIN: Primary | ICD-10-CM

## 2025-02-13 RX ORDER — LIDOCAINE HYDROCHLORIDE 10 MG/ML
7 INJECTION, SOLUTION INFILTRATION; PERINEURAL ONCE
Status: COMPLETED | OUTPATIENT
Start: 2025-02-13 | End: 2025-02-13

## 2025-02-13 RX ORDER — CYANOCOBALAMIN 1000 UG/ML
1000 INJECTION, SOLUTION INTRAMUSCULAR; SUBCUTANEOUS ONCE
Status: COMPLETED | OUTPATIENT
Start: 2025-02-13 | End: 2025-02-13

## 2025-02-13 RX ORDER — LIDOCAINE HYDROCHLORIDE 20 MG/ML
2 INJECTION, SOLUTION INFILTRATION; PERINEURAL ONCE
Status: COMPLETED | OUTPATIENT
Start: 2025-02-13 | End: 2025-02-13

## 2025-02-13 RX ADMIN — LIDOCAINE HYDROCHLORIDE 7 ML: 10 INJECTION, SOLUTION INFILTRATION; PERINEURAL at 11:44

## 2025-02-13 RX ADMIN — LIDOCAINE HYDROCHLORIDE 2 ML: 20 INJECTION, SOLUTION INFILTRATION; PERINEURAL at 11:44

## 2025-02-13 RX ADMIN — CYANOCOBALAMIN 1000 MCG: 1000 INJECTION, SOLUTION INTRAMUSCULAR; SUBCUTANEOUS at 11:44

## 2025-02-14 DIAGNOSIS — K59.03 THERAPEUTIC OPIOID-INDUCED CONSTIPATION (OIC): ICD-10-CM

## 2025-02-14 DIAGNOSIS — T40.2X5A THERAPEUTIC OPIOID-INDUCED CONSTIPATION (OIC): ICD-10-CM

## 2025-02-14 DIAGNOSIS — E29.1 HYPOGONADISM MALE: ICD-10-CM

## 2025-02-15 RX ORDER — LUBIPROSTONE 24 UG/1
24 CAPSULE ORAL 2 TIMES DAILY WITH MEALS
Qty: 180 CAPSULE | Refills: 1 | OUTPATIENT
Start: 2025-02-15

## 2025-02-17 RX ORDER — TESTOSTERONE CYPIONATE 200 MG/ML
INJECTION, SOLUTION INTRAMUSCULAR
Qty: 10 ML | OUTPATIENT
Start: 2025-02-17

## 2025-02-17 RX ORDER — SILDENAFIL 100 MG/1
100 TABLET, FILM COATED ORAL PRN
Qty: 30 TABLET | Refills: 3 | OUTPATIENT
Start: 2025-02-17

## 2025-02-17 NOTE — PROGRESS NOTES
tenderness and bony tenderness present. No swelling, edema, deformity or rigidity. Spinous process tenderness and muscular tenderness present.      Thoracic back: Deformity, spasms, tenderness and bony tenderness present. Decreased range of motion.      Lumbar back: Tenderness and bony tenderness present. No swelling, edema, deformity or lacerations. Decreased range of motion.        Back:       Right hip: Normal.      Left hip: Normal.      Right upper leg: Normal.      Left upper leg: Normal.      Right knee: Normal.      Left knee: Decreased range of motion. Tenderness present.      Right lower leg: Normal.      Left lower leg: Normal.      Right ankle: Normal.      Right Achilles Tendon: Normal.      Left ankle: Normal.      Left Achilles Tendon: Normal.      Right foot: Normal.      Left foot: Normal.        Legs:       Comments: Tender areas are indicated by numbered spot         Skin:     General: Skin is warm and dry.      Coloration: Skin is not pale.      Findings: No abrasion, bruising, ecchymosis, erythema, laceration, petechiae or rash. Rash is not macular, pustular or urticarial.      Nails: There is no clubbing.   Neurological:      Mental Status: He is alert and oriented to person, place, and time.      Cranial Nerves: No cranial nerve deficit.      Sensory: Sensory deficit present.      Motor: No tremor, atrophy or abnormal muscle tone.      Coordination: Coordination normal. Finger-Nose-Finger Test abnormal.      Gait: Gait abnormal and tandem walk abnormal.      Deep Tendon Reflexes: Reflexes abnormal. Babinski sign absent on the right side. Babinski sign absent on the left side.      Reflex Scores:       Patellar reflexes are 1+ on the right side and 1+ on the left side.       Achilles reflexes are 0 on the right side and 0 on the left side.  Psychiatric:         Attention and Perception: He is attentive.         Mood and Affect: Mood is not anxious or depressed. Affect is not labile, blunt,

## 2025-03-03 DIAGNOSIS — Z79.899 HIGH RISK MEDICATION USE: ICD-10-CM

## 2025-03-03 DIAGNOSIS — M54.16 RIGHT LUMBAR RADICULOPATHY: ICD-10-CM

## 2025-03-03 DIAGNOSIS — G95.9 MYELOPATHY (HCC): ICD-10-CM

## 2025-03-03 DIAGNOSIS — M54.12 C6 RADICULOPATHY: ICD-10-CM

## 2025-03-03 DIAGNOSIS — G95.9 SPINAL CORD DISEASE (HCC): ICD-10-CM

## 2025-03-05 RX ORDER — OXYCODONE AND ACETAMINOPHEN 10; 325 MG/1; MG/1
1 TABLET ORAL EVERY 6 HOURS PRN
Qty: 80 TABLET | Refills: 0
Start: 2025-03-05 | End: 2025-04-04

## 2025-03-10 ENCOUNTER — OFFICE VISIT (OUTPATIENT)
Dept: PHYSICAL MEDICINE AND REHAB | Age: 75
End: 2025-03-10
Payer: MEDICARE

## 2025-03-10 VITALS
DIASTOLIC BLOOD PRESSURE: 72 MMHG | WEIGHT: 145 LBS | HEART RATE: 65 BPM | BODY MASS INDEX: 22.76 KG/M2 | HEIGHT: 67 IN | SYSTOLIC BLOOD PRESSURE: 148 MMHG

## 2025-03-10 DIAGNOSIS — M54.16 RIGHT LUMBAR RADICULOPATHY: ICD-10-CM

## 2025-03-10 DIAGNOSIS — G95.9 MYELOPATHY (HCC): ICD-10-CM

## 2025-03-10 DIAGNOSIS — G89.29 CHRONIC MIDLINE LOW BACK PAIN WITH RIGHT-SIDED SCIATICA: ICD-10-CM

## 2025-03-10 DIAGNOSIS — M54.40 LOW BACK PAIN WITH SCIATICA, SCIATICA LATERALITY UNSPECIFIED, UNSPECIFIED BACK PAIN LATERALITY, UNSPECIFIED CHRONICITY: ICD-10-CM

## 2025-03-10 DIAGNOSIS — G95.9 SPINAL CORD DISEASE (HCC): ICD-10-CM

## 2025-03-10 DIAGNOSIS — G89.29 CHRONIC RIGHT SHOULDER PAIN: ICD-10-CM

## 2025-03-10 DIAGNOSIS — M54.14 THORACIC AND LUMBOSACRAL NEURITIS: Primary | ICD-10-CM

## 2025-03-10 DIAGNOSIS — E55.9 VITAMIN D DEFICIENCY: ICD-10-CM

## 2025-03-10 DIAGNOSIS — M54.12 C6 RADICULOPATHY: ICD-10-CM

## 2025-03-10 DIAGNOSIS — M25.511 CHRONIC RIGHT SHOULDER PAIN: ICD-10-CM

## 2025-03-10 DIAGNOSIS — M54.17 THORACIC AND LUMBOSACRAL NEURITIS: Primary | ICD-10-CM

## 2025-03-10 DIAGNOSIS — M54.41 CHRONIC MIDLINE LOW BACK PAIN WITH RIGHT-SIDED SCIATICA: ICD-10-CM

## 2025-03-10 PROCEDURE — 1125F AMNT PAIN NOTED PAIN PRSNT: CPT | Performed by: PHYSICAL MEDICINE & REHABILITATION

## 2025-03-10 PROCEDURE — 1159F MED LIST DOCD IN RCRD: CPT | Performed by: PHYSICAL MEDICINE & REHABILITATION

## 2025-03-10 PROCEDURE — 3078F DIAST BP <80 MM HG: CPT | Performed by: PHYSICAL MEDICINE & REHABILITATION

## 2025-03-10 PROCEDURE — 3077F SYST BP >= 140 MM HG: CPT | Performed by: PHYSICAL MEDICINE & REHABILITATION

## 2025-03-10 PROCEDURE — 1123F ACP DISCUSS/DSCN MKR DOCD: CPT | Performed by: PHYSICAL MEDICINE & REHABILITATION

## 2025-03-10 PROCEDURE — 99214 OFFICE O/P EST MOD 30 MIN: CPT | Performed by: PHYSICAL MEDICINE & REHABILITATION

## 2025-03-17 ENCOUNTER — PROCEDURE VISIT (OUTPATIENT)
Dept: PHYSICAL MEDICINE AND REHAB | Age: 75
End: 2025-03-17
Payer: MEDICARE

## 2025-03-17 DIAGNOSIS — M1A.9XX0 CHRONIC GOUT WITHOUT TOPHUS, UNSPECIFIED CAUSE, UNSPECIFIED SITE: ICD-10-CM

## 2025-03-17 DIAGNOSIS — M25.512 CHRONIC LEFT SHOULDER PAIN: Primary | ICD-10-CM

## 2025-03-17 DIAGNOSIS — G89.29 CHRONIC LEFT SHOULDER PAIN: Primary | ICD-10-CM

## 2025-03-17 PROCEDURE — 20611 DRAIN/INJ JOINT/BURSA W/US: CPT | Performed by: PHYSICAL MEDICINE & REHABILITATION

## 2025-03-17 PROCEDURE — 96372 THER/PROPH/DIAG INJ SC/IM: CPT | Performed by: PHYSICAL MEDICINE & REHABILITATION

## 2025-03-17 RX ORDER — ALLOPURINOL 100 MG/1
100 TABLET ORAL DAILY
Qty: 90 TABLET | Refills: 3 | Status: SHIPPED | OUTPATIENT
Start: 2025-03-17

## 2025-03-17 RX ORDER — LIDOCAINE HYDROCHLORIDE 20 MG/ML
2 INJECTION, SOLUTION INFILTRATION; PERINEURAL ONCE
Status: COMPLETED | OUTPATIENT
Start: 2025-03-17 | End: 2025-03-17

## 2025-03-17 RX ORDER — LIDOCAINE HYDROCHLORIDE 10 MG/ML
7 INJECTION, SOLUTION INFILTRATION; PERINEURAL ONCE
Status: COMPLETED | OUTPATIENT
Start: 2025-03-17 | End: 2025-03-17

## 2025-03-17 RX ORDER — CYANOCOBALAMIN 1000 UG/ML
1000 INJECTION, SOLUTION INTRAMUSCULAR; SUBCUTANEOUS ONCE
Status: COMPLETED | OUTPATIENT
Start: 2025-03-17 | End: 2025-03-17

## 2025-03-17 RX ADMIN — LIDOCAINE HYDROCHLORIDE 2 ML: 20 INJECTION, SOLUTION INFILTRATION; PERINEURAL at 14:16

## 2025-03-17 RX ADMIN — CYANOCOBALAMIN 1000 MCG: 1000 INJECTION, SOLUTION INTRAMUSCULAR; SUBCUTANEOUS at 14:16

## 2025-03-17 RX ADMIN — LIDOCAINE HYDROCHLORIDE 7 ML: 10 INJECTION, SOLUTION INFILTRATION; PERINEURAL at 14:16

## 2025-03-17 NOTE — TELEPHONE ENCOUNTER
Please approve or deny request. Thank you!    Rx requested:  Requested Prescriptions     Pending Prescriptions Disp Refills    allopurinol (ZYLOPRIM) 100 MG tablet [Pharmacy Med Name: ALLOPURINOL 100MG TABLETS] 90 tablet 3     Sig: TAKE 1 TABLET BY MOUTH DAILY         Last Office Visit:   12/10/2024      Next Visit Date:  Future Appointments   Date Time Provider Department Center   3/24/2025  9:00 AM Jayne Vega DO [facility]   3/28/2025 10:15 AM Brenda Stubbs DO [facility]   5/28/2025 10:45 AM Brenda Stubbs DO [facility]   6/5/2025  9:45 AM Ab Gallardo MD [facility]   6/6/2025 12:00 PM Pollo Villareal MD [facility]

## 2025-03-21 SDOH — ECONOMIC STABILITY: INCOME INSECURITY: IN THE LAST 12 MONTHS, WAS THERE A TIME WHEN YOU WERE NOT ABLE TO PAY THE MORTGAGE OR RENT ON TIME?: NO

## 2025-03-21 SDOH — ECONOMIC STABILITY: FOOD INSECURITY: WITHIN THE PAST 12 MONTHS, YOU WORRIED THAT YOUR FOOD WOULD RUN OUT BEFORE YOU GOT MONEY TO BUY MORE.: NEVER TRUE

## 2025-03-21 SDOH — ECONOMIC STABILITY: FOOD INSECURITY: WITHIN THE PAST 12 MONTHS, THE FOOD YOU BOUGHT JUST DIDN'T LAST AND YOU DIDN'T HAVE MONEY TO GET MORE.: NEVER TRUE

## 2025-03-21 ASSESSMENT — PATIENT HEALTH QUESTIONNAIRE - PHQ9
2. FEELING DOWN, DEPRESSED OR HOPELESS: NOT AT ALL
1. LITTLE INTEREST OR PLEASURE IN DOING THINGS: NOT AT ALL
SUM OF ALL RESPONSES TO PHQ QUESTIONS 1-9: 0
SUM OF ALL RESPONSES TO PHQ9 QUESTIONS 1 & 2: 0
1. LITTLE INTEREST OR PLEASURE IN DOING THINGS: NOT AT ALL
2. FEELING DOWN, DEPRESSED OR HOPELESS: NOT AT ALL
SUM OF ALL RESPONSES TO PHQ QUESTIONS 1-9: 0

## 2025-03-24 ENCOUNTER — OFFICE VISIT (OUTPATIENT)
Age: 75
End: 2025-03-24
Payer: MEDICARE

## 2025-03-24 VITALS
SYSTOLIC BLOOD PRESSURE: 130 MMHG | WEIGHT: 150 LBS | HEART RATE: 71 BPM | HEIGHT: 67 IN | OXYGEN SATURATION: 97 % | TEMPERATURE: 97.6 F | BODY MASS INDEX: 23.54 KG/M2 | DIASTOLIC BLOOD PRESSURE: 66 MMHG

## 2025-03-24 DIAGNOSIS — G89.29 CHRONIC LOW BACK PAIN WITH SCIATICA, SCIATICA LATERALITY UNSPECIFIED, UNSPECIFIED BACK PAIN LATERALITY: ICD-10-CM

## 2025-03-24 DIAGNOSIS — E11.9 TYPE 2 DIABETES MELLITUS WITHOUT COMPLICATION, WITHOUT LONG-TERM CURRENT USE OF INSULIN: ICD-10-CM

## 2025-03-24 DIAGNOSIS — G47.33 OSA (OBSTRUCTIVE SLEEP APNEA): ICD-10-CM

## 2025-03-24 DIAGNOSIS — T40.2X5A THERAPEUTIC OPIOID-INDUCED CONSTIPATION (OIC): ICD-10-CM

## 2025-03-24 DIAGNOSIS — I25.2 HISTORY OF MI (MYOCARDIAL INFARCTION): ICD-10-CM

## 2025-03-24 DIAGNOSIS — K59.03 THERAPEUTIC OPIOID-INDUCED CONSTIPATION (OIC): ICD-10-CM

## 2025-03-24 DIAGNOSIS — I20.9 ANGINA PECTORIS, UNSPECIFIED: ICD-10-CM

## 2025-03-24 DIAGNOSIS — I10 ESSENTIAL HYPERTENSION, BENIGN: Primary | ICD-10-CM

## 2025-03-24 DIAGNOSIS — M54.40 CHRONIC LOW BACK PAIN WITH SCIATICA, SCIATICA LATERALITY UNSPECIFIED, UNSPECIFIED BACK PAIN LATERALITY: ICD-10-CM

## 2025-03-24 PROCEDURE — G2211 COMPLEX E/M VISIT ADD ON: HCPCS | Performed by: STUDENT IN AN ORGANIZED HEALTH CARE EDUCATION/TRAINING PROGRAM

## 2025-03-24 PROCEDURE — 99214 OFFICE O/P EST MOD 30 MIN: CPT | Performed by: STUDENT IN AN ORGANIZED HEALTH CARE EDUCATION/TRAINING PROGRAM

## 2025-03-24 PROCEDURE — 3078F DIAST BP <80 MM HG: CPT | Performed by: STUDENT IN AN ORGANIZED HEALTH CARE EDUCATION/TRAINING PROGRAM

## 2025-03-24 PROCEDURE — 1123F ACP DISCUSS/DSCN MKR DOCD: CPT | Performed by: STUDENT IN AN ORGANIZED HEALTH CARE EDUCATION/TRAINING PROGRAM

## 2025-03-24 PROCEDURE — 3075F SYST BP GE 130 - 139MM HG: CPT | Performed by: STUDENT IN AN ORGANIZED HEALTH CARE EDUCATION/TRAINING PROGRAM

## 2025-03-24 PROCEDURE — 1160F RVW MEDS BY RX/DR IN RCRD: CPT | Performed by: STUDENT IN AN ORGANIZED HEALTH CARE EDUCATION/TRAINING PROGRAM

## 2025-03-24 PROCEDURE — 1159F MED LIST DOCD IN RCRD: CPT | Performed by: STUDENT IN AN ORGANIZED HEALTH CARE EDUCATION/TRAINING PROGRAM

## 2025-03-24 RX ORDER — PEN NEEDLE, DIABETIC 31 GX5/16"
NEEDLE, DISPOSABLE MISCELLANEOUS
COMMUNITY
End: 2025-03-24 | Stop reason: SDUPTHER

## 2025-03-24 RX ORDER — ASPIRIN 81 MG/1
81 TABLET ORAL DAILY
Qty: 90 TABLET | Refills: 3 | Status: SHIPPED | OUTPATIENT
Start: 2025-03-24

## 2025-03-24 RX ORDER — PEN NEEDLE, DIABETIC 31 GX5/16"
NEEDLE, DISPOSABLE MISCELLANEOUS
Qty: 100 EACH | Refills: 5 | Status: SHIPPED | OUTPATIENT
Start: 2025-03-24

## 2025-03-24 RX ORDER — MULTIVIT-MIN/IRON FUM/FOLIC AC 7.5 MG-4
1 TABLET ORAL DAILY
Qty: 90 TABLET | Refills: 3 | Status: SHIPPED | OUTPATIENT
Start: 2025-03-24

## 2025-03-24 NOTE — PROGRESS NOTES
Subjective  Scotty Wright, 74 y.o. male presents today with:  Chief Complaint   Patient presents with    Follow-up    Hypertension     Occasionally checks BP at home. Denies chest pains, heart palpitations, headaches, SOB or edema. Does have occasional dizziness.     Diabetes     A1c was 6.6 on 11/26/24. Has not been checking blood sugars, as his insurance no longer covers his brand of strips. Follows with Dr. Gallardo. Is scheduled in June for follow up.     Sleep Apnea     Has not been wearing CPAP. States he feels he's been doing fine without it.     Hypogonadism     Labs done 3/1/24. Follows with Dr. Gallardo.     Lumbar Radiculopathy     Follows with pain management.     Hyperlipidemia     Lipid panel done 5/19/23.        History of Present Illness  The patient is a 74-year-old male who presents for follow-up .    Adherence to the prescribed regimen of metoprolol 50 mg twice daily and nifedipine 60 mg is reported. A recent echocardiogram and ultrasound were performed under the care of Dr. Villareal, a cardiologist, with results indicating normal findings.  He denies any chest pain, shortness breath or palpitations.    A weight gain of 5 pounds is noted and he reports eating better.  Multivitamin supplements and baby aspirin require refills. The last consultation with Dr. Contreras was in 07/2024, during which testosterone treatment was administered and reported as beneficial.  He also has history of diabetes which has been well-managed.    Pain management services continue with stable pain levels and ongoing constipation, which is managed effectively. Current medications include atorvastatin 42 mg, gabapentin, and oxycodone, with a plan to gradually reduce the oxycodone dosage.    Diabetes management is effective, with an A1c level of 6.6.    The use of the CPAP machine has been discontinued due to perceived lack of necessity, with satisfactory sleep quality reported without it.    No recent gout flare-ups have been

## 2025-03-28 ENCOUNTER — PROCEDURE VISIT (OUTPATIENT)
Dept: PHYSICAL MEDICINE AND REHAB | Age: 75
End: 2025-03-28
Payer: MEDICARE

## 2025-03-28 DIAGNOSIS — M54.40 LOW BACK PAIN WITH SCIATICA, SCIATICA LATERALITY UNSPECIFIED, UNSPECIFIED BACK PAIN LATERALITY, UNSPECIFIED CHRONICITY: Primary | ICD-10-CM

## 2025-03-28 PROCEDURE — 76942 ECHO GUIDE FOR BIOPSY: CPT | Performed by: PHYSICAL MEDICINE & REHABILITATION

## 2025-03-28 PROCEDURE — 96372 THER/PROPH/DIAG INJ SC/IM: CPT | Performed by: PHYSICAL MEDICINE & REHABILITATION

## 2025-03-28 PROCEDURE — 64445 NJX AA&/STRD SCIATIC NRV IMG: CPT | Performed by: PHYSICAL MEDICINE & REHABILITATION

## 2025-03-28 RX ORDER — CYANOCOBALAMIN 1000 UG/ML
1000 INJECTION, SOLUTION INTRAMUSCULAR; SUBCUTANEOUS ONCE
Status: COMPLETED | OUTPATIENT
Start: 2025-03-28 | End: 2025-03-28

## 2025-03-28 RX ORDER — LIDOCAINE HYDROCHLORIDE 10 MG/ML
7 INJECTION, SOLUTION INFILTRATION; PERINEURAL ONCE
Status: COMPLETED | OUTPATIENT
Start: 2025-03-28 | End: 2025-03-28

## 2025-03-28 RX ORDER — LIDOCAINE HYDROCHLORIDE 20 MG/ML
5 INJECTION, SOLUTION INFILTRATION; PERINEURAL ONCE
Status: COMPLETED | OUTPATIENT
Start: 2025-03-28 | End: 2025-03-28

## 2025-03-28 RX ADMIN — CYANOCOBALAMIN 1000 MCG: 1000 INJECTION, SOLUTION INTRAMUSCULAR; SUBCUTANEOUS at 10:39

## 2025-03-28 RX ADMIN — LIDOCAINE HYDROCHLORIDE 7 ML: 10 INJECTION, SOLUTION INFILTRATION; PERINEURAL at 10:39

## 2025-03-28 RX ADMIN — LIDOCAINE HYDROCHLORIDE 5 ML: 20 INJECTION, SOLUTION INFILTRATION; PERINEURAL at 10:39

## 2025-03-28 NOTE — PROGRESS NOTES
Patient here for Right sciatic injection with U/S. Patient taken back to exam room, and placed on drape locking stool. Areas to be injected marked appropriately, and cleansed with alcohol. 7cc of 1% Lidocaine, and 5cc of 2% Lidocaine and 1cc of b12 to be injected by provider.

## 2025-03-31 DIAGNOSIS — G95.9 SPINAL CORD DISEASE (HCC): ICD-10-CM

## 2025-03-31 DIAGNOSIS — M54.12 C6 RADICULOPATHY: ICD-10-CM

## 2025-03-31 DIAGNOSIS — G95.9 MYELOPATHY (HCC): ICD-10-CM

## 2025-03-31 DIAGNOSIS — Z79.899 HIGH RISK MEDICATION USE: ICD-10-CM

## 2025-03-31 DIAGNOSIS — M54.16 RIGHT LUMBAR RADICULOPATHY: ICD-10-CM

## 2025-03-31 RX ORDER — OXYCODONE AND ACETAMINOPHEN 10; 325 MG/1; MG/1
1 TABLET ORAL EVERY 6 HOURS PRN
Qty: 80 TABLET | Refills: 0 | Status: SHIPPED | OUTPATIENT
Start: 2025-03-31 | End: 2025-04-30

## 2025-04-01 ENCOUNTER — RESULTS FOLLOW-UP (OUTPATIENT)
Age: 75
End: 2025-04-01

## 2025-04-01 DIAGNOSIS — I20.9 ANGINA PECTORIS, UNSPECIFIED: ICD-10-CM

## 2025-04-01 DIAGNOSIS — E11.9 TYPE 2 DIABETES MELLITUS WITHOUT COMPLICATION, WITHOUT LONG-TERM CURRENT USE OF INSULIN: ICD-10-CM

## 2025-04-01 LAB
CHOLEST SERPL-MCNC: 141 MG/DL (ref 0–199)
CREAT UR-MCNC: 129.1 MG/DL
HDLC SERPL-MCNC: 57 MG/DL (ref 40–59)
LDL CHOLESTEROL: 68 MG/DL (ref 0–129)
MICROALBUMIN UR-MCNC: <1.2 MG/DL
MICROALBUMIN/CREAT UR-RTO: NORMAL MG/G (ref 0–30)
TRIGLYCERIDE, FASTING: 81 MG/DL (ref 0–150)

## 2025-04-04 DIAGNOSIS — M62.838 SPASM OF MUSCLE: ICD-10-CM

## 2025-04-04 DIAGNOSIS — M79.604 BILATERAL LEG PAIN: ICD-10-CM

## 2025-04-04 DIAGNOSIS — M79.605 BILATERAL LEG PAIN: ICD-10-CM

## 2025-04-04 RX ORDER — BACLOFEN 10 MG/1
10 TABLET ORAL 3 TIMES DAILY
Qty: 90 TABLET | Refills: 1 | Status: SHIPPED | OUTPATIENT
Start: 2025-04-04

## 2025-04-28 DIAGNOSIS — G95.9 MYELOPATHY (HCC): ICD-10-CM

## 2025-04-28 DIAGNOSIS — M54.16 RIGHT LUMBAR RADICULOPATHY: ICD-10-CM

## 2025-04-28 DIAGNOSIS — G95.9 SPINAL CORD DISEASE (HCC): ICD-10-CM

## 2025-04-28 DIAGNOSIS — Z79.899 HIGH RISK MEDICATION USE: ICD-10-CM

## 2025-04-28 DIAGNOSIS — M54.12 C6 RADICULOPATHY: ICD-10-CM

## 2025-04-30 RX ORDER — OXYCODONE AND ACETAMINOPHEN 10; 325 MG/1; MG/1
1 TABLET ORAL EVERY 6 HOURS PRN
Qty: 80 TABLET | Refills: 0 | Status: SHIPPED | OUTPATIENT
Start: 2025-04-30 | End: 2025-05-30

## 2025-05-05 NOTE — PROGRESS NOTES
is not in acute distress.     Appearance: He is well-developed. He is not ill-appearing, toxic-appearing or diaphoretic.      Comments:     HENT:      Head: Normocephalic and atraumatic.        Right Ear: Hearing normal.      Left Ear: Hearing normal.      Nose: Nose normal.      Mouth/Throat:      Mouth: No oral lesions.      Dentition: Normal dentition.      Pharynx: No oropharyngeal exudate.   Eyes:      General: No scleral icterus.        Right eye: No discharge.         Left eye: No discharge.      Conjunctiva/sclera: Conjunctivae normal.      Right eye: No chemosis or exudate.     Left eye: No chemosis or exudate.  Neck:      Thyroid: No thyromegaly.      Vascular: No JVD.      Trachea: No tracheal tenderness or tracheal deviation.     Pulmonary:      Effort: Pulmonary effort is normal. No tachypnea, bradypnea, accessory muscle usage or respiratory distress.      Breath sounds: Decreased breath sounds present. No wheezing or rales.   Chest:      Chest wall: No tenderness.   Abdominal:      General: There is no distension.   Musculoskeletal:         General: Tenderness present.      Right shoulder: Tenderness and bony tenderness present. Decreased range of motion.      Left shoulder: Tenderness and bony tenderness present. Decreased range of motion.      Right upper arm: Normal.      Left upper arm: Normal.      Right elbow: Normal.      Left elbow: Normal.      Right forearm: Normal.      Left forearm: Normal.      Right wrist: Normal.      Left wrist: Normal.      Right hand: Bony tenderness present. No swelling, deformity or lacerations. Decreased range of motion. Decreased strength of finger abduction, thumb/finger opposition and wrist extension. Decreased sensation of the ulnar distribution, median distribution and radial distribution. Normal capillary refill.      Left hand: Bony tenderness present. Decreased range of motion. Decreased strength of finger abduction and wrist extension. Decreased sensation

## 2025-05-23 ENCOUNTER — PROCEDURE VISIT (OUTPATIENT)
Dept: PHYSICAL MEDICINE AND REHAB | Age: 75
End: 2025-05-23

## 2025-05-23 DIAGNOSIS — M54.40 LOW BACK PAIN WITH SCIATICA, SCIATICA LATERALITY UNSPECIFIED, UNSPECIFIED BACK PAIN LATERALITY, UNSPECIFIED CHRONICITY: Primary | ICD-10-CM

## 2025-05-23 RX ORDER — LIDOCAINE HYDROCHLORIDE 20 MG/ML
5 INJECTION, SOLUTION INFILTRATION; PERINEURAL ONCE
Status: COMPLETED | OUTPATIENT
Start: 2025-05-23 | End: 2025-05-23

## 2025-05-23 RX ORDER — CYANOCOBALAMIN 1000 UG/ML
1000 INJECTION, SOLUTION INTRAMUSCULAR; SUBCUTANEOUS ONCE
Status: COMPLETED | OUTPATIENT
Start: 2025-05-23 | End: 2025-05-23

## 2025-05-23 RX ORDER — LIDOCAINE HYDROCHLORIDE 10 MG/ML
7 INJECTION, SOLUTION INFILTRATION; PERINEURAL ONCE
Status: COMPLETED | OUTPATIENT
Start: 2025-05-23 | End: 2025-05-23

## 2025-05-23 RX ADMIN — LIDOCAINE HYDROCHLORIDE 5 ML: 20 INJECTION, SOLUTION INFILTRATION; PERINEURAL at 12:24

## 2025-05-23 RX ADMIN — LIDOCAINE HYDROCHLORIDE 7 ML: 10 INJECTION, SOLUTION INFILTRATION; PERINEURAL at 12:24

## 2025-05-23 RX ADMIN — CYANOCOBALAMIN 1000 MCG: 1000 INJECTION, SOLUTION INTRAMUSCULAR; SUBCUTANEOUS at 12:25

## 2025-05-23 NOTE — PROGRESS NOTES
Patient here for Left sciatic injection with U/S. Patient taken back to exam room, and placed on drape locking stool. Areas to be injected marked appropriately, and cleansed with alcohol. 7cc of 1% Lidocaine, and 5cc of 2% Lidocaine and 1cc of b12 to be injected by provider.

## 2025-05-28 ENCOUNTER — OFFICE VISIT (OUTPATIENT)
Dept: PHYSICAL MEDICINE AND REHAB | Age: 75
End: 2025-05-28
Payer: MEDICARE

## 2025-05-28 VITALS
BODY MASS INDEX: 23.54 KG/M2 | HEART RATE: 62 BPM | SYSTOLIC BLOOD PRESSURE: 126 MMHG | DIASTOLIC BLOOD PRESSURE: 71 MMHG | WEIGHT: 150 LBS | HEIGHT: 67 IN

## 2025-05-28 DIAGNOSIS — G95.9 MYELOPATHY (HCC): ICD-10-CM

## 2025-05-28 DIAGNOSIS — E55.9 VITAMIN D DEFICIENCY: ICD-10-CM

## 2025-05-28 DIAGNOSIS — G89.29 CHRONIC BILATERAL LOW BACK PAIN WITH LEFT-SIDED SCIATICA: ICD-10-CM

## 2025-05-28 DIAGNOSIS — M54.42 CHRONIC BILATERAL LOW BACK PAIN WITH LEFT-SIDED SCIATICA: ICD-10-CM

## 2025-05-28 DIAGNOSIS — M62.838 SPASM OF MUSCLE: ICD-10-CM

## 2025-05-28 DIAGNOSIS — G95.9 SPINAL CORD DISEASE (HCC): ICD-10-CM

## 2025-05-28 DIAGNOSIS — M54.12 C6 RADICULOPATHY: ICD-10-CM

## 2025-05-28 DIAGNOSIS — M79.604 BILATERAL LEG PAIN: ICD-10-CM

## 2025-05-28 DIAGNOSIS — Z79.899 HIGH RISK MEDICATION USE: ICD-10-CM

## 2025-05-28 DIAGNOSIS — M79.605 BILATERAL LEG PAIN: ICD-10-CM

## 2025-05-28 DIAGNOSIS — M54.17 THORACIC AND LUMBOSACRAL NEURITIS: Primary | ICD-10-CM

## 2025-05-28 DIAGNOSIS — M54.14 THORACIC AND LUMBOSACRAL NEURITIS: Primary | ICD-10-CM

## 2025-05-28 DIAGNOSIS — M54.16 RIGHT LUMBAR RADICULOPATHY: ICD-10-CM

## 2025-05-28 DIAGNOSIS — G89.29 CHRONIC RIGHT SHOULDER PAIN: ICD-10-CM

## 2025-05-28 DIAGNOSIS — M25.511 CHRONIC RIGHT SHOULDER PAIN: ICD-10-CM

## 2025-05-28 PROCEDURE — 1125F AMNT PAIN NOTED PAIN PRSNT: CPT | Performed by: PHYSICAL MEDICINE & REHABILITATION

## 2025-05-28 PROCEDURE — 99214 OFFICE O/P EST MOD 30 MIN: CPT | Performed by: PHYSICAL MEDICINE & REHABILITATION

## 2025-05-28 PROCEDURE — 3078F DIAST BP <80 MM HG: CPT | Performed by: PHYSICAL MEDICINE & REHABILITATION

## 2025-05-28 PROCEDURE — 3074F SYST BP LT 130 MM HG: CPT | Performed by: PHYSICAL MEDICINE & REHABILITATION

## 2025-05-28 PROCEDURE — 1123F ACP DISCUSS/DSCN MKR DOCD: CPT | Performed by: PHYSICAL MEDICINE & REHABILITATION

## 2025-05-28 PROCEDURE — 1160F RVW MEDS BY RX/DR IN RCRD: CPT | Performed by: PHYSICAL MEDICINE & REHABILITATION

## 2025-05-28 PROCEDURE — 1159F MED LIST DOCD IN RCRD: CPT | Performed by: PHYSICAL MEDICINE & REHABILITATION

## 2025-05-28 RX ORDER — BACLOFEN 10 MG/1
10 TABLET ORAL 3 TIMES DAILY
Qty: 90 TABLET | Refills: 1 | Status: SHIPPED | OUTPATIENT
Start: 2025-05-28

## 2025-05-28 RX ORDER — OXYCODONE AND ACETAMINOPHEN 10; 325 MG/1; MG/1
1 TABLET ORAL EVERY 6 HOURS PRN
Qty: 80 TABLET | Refills: 0 | Status: SHIPPED | OUTPATIENT
Start: 2025-05-29 | End: 2025-06-28

## 2025-05-28 RX ORDER — GABAPENTIN 300 MG/1
CAPSULE ORAL
Qty: 270 CAPSULE | Refills: 3 | Status: SHIPPED | OUTPATIENT
Start: 2025-05-28 | End: 2025-10-24

## 2025-06-01 DIAGNOSIS — M79.605 BILATERAL LEG PAIN: ICD-10-CM

## 2025-06-01 DIAGNOSIS — M79.604 BILATERAL LEG PAIN: ICD-10-CM

## 2025-06-01 DIAGNOSIS — M62.838 SPASM OF MUSCLE: ICD-10-CM

## 2025-06-02 DIAGNOSIS — E29.1 HYPOGONADISM MALE: ICD-10-CM

## 2025-06-02 DIAGNOSIS — E11.9 TYPE 2 DIABETES MELLITUS WITHOUT COMPLICATION, WITHOUT LONG-TERM CURRENT USE OF INSULIN (HCC): ICD-10-CM

## 2025-06-02 LAB
ANION GAP SERPL CALCULATED.3IONS-SCNC: 6 MEQ/L (ref 9–15)
BUN SERPL-MCNC: 21 MG/DL (ref 8–23)
CALCIUM SERPL-MCNC: 9.8 MG/DL (ref 8.5–9.9)
CHLORIDE SERPL-SCNC: 102 MEQ/L (ref 95–107)
CO2 SERPL-SCNC: 33 MEQ/L (ref 20–31)
CREAT SERPL-MCNC: 1.06 MG/DL (ref 0.7–1.2)
ERYTHROCYTE [DISTWIDTH] IN BLOOD BY AUTOMATED COUNT: 13.6 % (ref 11.5–14.5)
ESTIMATED AVERAGE GLUCOSE: 131 MG/DL
GLUCOSE SERPL-MCNC: 104 MG/DL (ref 70–99)
HBA1C MFR BLD: 6.2 % (ref 4–6)
HCT VFR BLD AUTO: 41.8 % (ref 42–52)
HGB BLD-MCNC: 13.9 G/DL (ref 14–18)
MCH RBC QN AUTO: 32.6 PG (ref 27–31.3)
MCHC RBC AUTO-ENTMCNC: 33.3 % (ref 33–37)
MCV RBC AUTO: 98.1 FL (ref 79–92.2)
PLATELET # BLD AUTO: 300 K/UL (ref 130–400)
POTASSIUM SERPL-SCNC: 4.9 MEQ/L (ref 3.4–4.9)
PSA SERPL-MCNC: 3.06 NG/ML (ref 0–4)
RBC # BLD AUTO: 4.26 M/UL (ref 4.7–6.1)
SHBG SERPL-SCNC: 59 NMOL/L (ref 19–76)
SODIUM SERPL-SCNC: 141 MEQ/L (ref 135–144)
TESTOST FREE SERPL-MCNC: 30.9 PG/ML (ref 47–244)
TESTOST SERPL-MCNC: 237 NG/DL (ref 193–740)
WBC # BLD AUTO: 6 K/UL (ref 4.8–10.8)

## 2025-06-02 PROCEDURE — 36415 COLL VENOUS BLD VENIPUNCTURE: CPT | Performed by: INTERNAL MEDICINE

## 2025-06-02 RX ORDER — BACLOFEN 10 MG/1
10 TABLET ORAL 3 TIMES DAILY
Qty: 90 TABLET | Refills: 1 | OUTPATIENT
Start: 2025-06-02

## 2025-06-02 RX ORDER — TAMSULOSIN HYDROCHLORIDE 0.4 MG/1
CAPSULE ORAL
Qty: 30 CAPSULE | Refills: 3 | Status: SHIPPED | OUTPATIENT
Start: 2025-06-02

## 2025-06-05 ENCOUNTER — OFFICE VISIT (OUTPATIENT)
Age: 75
End: 2025-06-05
Payer: MEDICARE

## 2025-06-05 VITALS
BODY MASS INDEX: 24.48 KG/M2 | SYSTOLIC BLOOD PRESSURE: 133 MMHG | HEART RATE: 68 BPM | DIASTOLIC BLOOD PRESSURE: 61 MMHG | HEIGHT: 67 IN | OXYGEN SATURATION: 98 % | WEIGHT: 156 LBS

## 2025-06-05 DIAGNOSIS — E11.9 TYPE 2 DIABETES MELLITUS WITHOUT COMPLICATION, WITHOUT LONG-TERM CURRENT USE OF INSULIN (HCC): ICD-10-CM

## 2025-06-05 DIAGNOSIS — E29.1 HYPOGONADISM MALE: Primary | ICD-10-CM

## 2025-06-05 LAB
CHP ED QC CHECK: NORMAL
GLUCOSE BLD-MCNC: 124 MG/DL

## 2025-06-05 PROCEDURE — 3075F SYST BP GE 130 - 139MM HG: CPT | Performed by: INTERNAL MEDICINE

## 2025-06-05 PROCEDURE — 1159F MED LIST DOCD IN RCRD: CPT | Performed by: INTERNAL MEDICINE

## 2025-06-05 PROCEDURE — 1123F ACP DISCUSS/DSCN MKR DOCD: CPT | Performed by: INTERNAL MEDICINE

## 2025-06-05 PROCEDURE — 3044F HG A1C LEVEL LT 7.0%: CPT | Performed by: INTERNAL MEDICINE

## 2025-06-05 PROCEDURE — 3078F DIAST BP <80 MM HG: CPT | Performed by: INTERNAL MEDICINE

## 2025-06-05 PROCEDURE — 82962 GLUCOSE BLOOD TEST: CPT | Performed by: INTERNAL MEDICINE

## 2025-06-05 PROCEDURE — 1125F AMNT PAIN NOTED PAIN PRSNT: CPT | Performed by: INTERNAL MEDICINE

## 2025-06-05 PROCEDURE — 99214 OFFICE O/P EST MOD 30 MIN: CPT | Performed by: INTERNAL MEDICINE

## 2025-06-05 RX ORDER — TESTOSTERONE CYPIONATE 200 MG/ML
INJECTION, SOLUTION INTRAMUSCULAR
Qty: 10 ML | Refills: 3 | Status: SHIPPED | OUTPATIENT
Start: 2025-06-05 | End: 2026-03-29

## 2025-06-05 RX ORDER — SYRINGE WITH NEEDLE, 1 ML 25GX5/8"
SYRINGE, EMPTY DISPOSABLE MISCELLANEOUS
Qty: 20 EACH | Refills: 6 | Status: SHIPPED | OUTPATIENT
Start: 2025-06-05

## 2025-06-05 ASSESSMENT — ENCOUNTER SYMPTOMS
BACK PAIN: 1
EYES NEGATIVE: 1

## 2025-06-05 NOTE — PROGRESS NOTES
6/5/2025    Assessment:       Diagnosis Orders   1. Hypogonadism male        2. Type 2 diabetes mellitus without complication, without long-term current use of insulin ([facility])  POCT Glucose            PLAN:     Orders Placed This Encounter   Procedures    Hemoglobin A1C     Standing Status:   Future     Expected Date:   6/5/2025     Expiration Date:   6/5/2026    Basic Metabolic Panel     Standing Status:   Future     Expected Date:   6/5/2025     Expiration Date:   6/5/2026    Testosterone, free, total     Standing Status:   Future     Expected Date:   6/5/2025     Expiration Date:   6/5/2026    CBC     Standing Status:   Future     Expected Date:   6/5/2025     Expiration Date:   6/5/2026    POCT Glucose    [facility] DIABETES FOOT EXAM     Orders Placed This Encounter   Medications    metFORMIN (GLUCOPHAGE) 500 MG tablet     Sig: Take 1 tablet by mouth 2 times daily (with meals)     Dispense:  180 tablet     Refill:  3    testosterone cypionate (DEPOTESTOTERONE CYPIONATE) 200 MG/ML injection     Sig: INJECT 0.5MLS INTO THE MUSCLE EVERY 2 WEEKS     Dispense:  10 mL     Refill:  3    SYRINGE-NEEDLE, DISP, 3 ML (B-D 3CC LUER-JOSE SYR 73BU7-5/2) 22G X 1-1/2\" 3 ML MISC     Sig: USE EVERY 2 WEEKS WITH TESTOSTERONE     Dispense:  20 each     Refill:  6     Continue current dose of metformin continue current dose of testosterone replacement patient to follow-up in 6 months time more than 50% of 30 minutes spent in patient education counseling      Orders Placed This Encounter   Procedures    POCT Glucose     No orders of the defined types were placed in this encounter.    No follow-ups on file.  Subjective:     Chief Complaint   Patient presents with    Diabetes    Hypogonadism    Other     Leg are swelling     Vitals:    06/05/25 0942   BP: 133/61   Pulse: 68   SpO2: 98%   Weight: 70.8 kg (156 lb)   Height: 1.702 m (5' 7\")     Wt Readings from Last 3 Encounters:   06/05/25 70.8 kg (156 lb)   05/28/25 68 kg (150 lb)   03/24/25 68

## 2025-06-06 ENCOUNTER — OFFICE VISIT (OUTPATIENT)
Age: 75
End: 2025-06-06
Payer: MEDICARE

## 2025-06-06 VITALS
HEIGHT: 67 IN | HEART RATE: 73 BPM | WEIGHT: 159 LBS | SYSTOLIC BLOOD PRESSURE: 142 MMHG | DIASTOLIC BLOOD PRESSURE: 88 MMHG | BODY MASS INDEX: 24.96 KG/M2 | OXYGEN SATURATION: 98 %

## 2025-06-06 DIAGNOSIS — E78.00 HYPERCHOLESTEROLEMIA: ICD-10-CM

## 2025-06-06 DIAGNOSIS — I25.118 CORONARY ARTERY DISEASE OF NATIVE ARTERY OF NATIVE HEART WITH STABLE ANGINA PECTORIS: Primary | ICD-10-CM

## 2025-06-06 DIAGNOSIS — R09.89 BILATERAL CAROTID BRUITS: ICD-10-CM

## 2025-06-06 DIAGNOSIS — R01.1 HEART MURMUR: ICD-10-CM

## 2025-06-06 DIAGNOSIS — R00.1 BRADYCARDIA: ICD-10-CM

## 2025-06-06 DIAGNOSIS — I10 ESSENTIAL HYPERTENSION, BENIGN: ICD-10-CM

## 2025-06-06 DIAGNOSIS — R94.31 ABNORMAL EKG: ICD-10-CM

## 2025-06-06 PROCEDURE — 99214 OFFICE O/P EST MOD 30 MIN: CPT | Performed by: INTERNAL MEDICINE

## 2025-06-06 PROCEDURE — 93000 ELECTROCARDIOGRAM COMPLETE: CPT | Performed by: INTERNAL MEDICINE

## 2025-06-06 PROCEDURE — 1159F MED LIST DOCD IN RCRD: CPT | Performed by: INTERNAL MEDICINE

## 2025-06-06 PROCEDURE — 3079F DIAST BP 80-89 MM HG: CPT | Performed by: INTERNAL MEDICINE

## 2025-06-06 PROCEDURE — 3077F SYST BP >= 140 MM HG: CPT | Performed by: INTERNAL MEDICINE

## 2025-06-06 PROCEDURE — 1123F ACP DISCUSS/DSCN MKR DOCD: CPT | Performed by: INTERNAL MEDICINE

## 2025-06-06 ASSESSMENT — ENCOUNTER SYMPTOMS
COUGH: 0
GASTROINTESTINAL NEGATIVE: 1
NAUSEA: 0
CHEST TIGHTNESS: 0
RESPIRATORY NEGATIVE: 1
WHEEZING: 0
SHORTNESS OF BREATH: 0
STRIDOR: 0
BACK PAIN: 1
BLOOD IN STOOL: 0
EYES NEGATIVE: 1

## 2025-06-06 NOTE — PROGRESS NOTES
Subsequent Progress Note  Patient: Scotty Wright  YOB: 1950  MRN: [id number]    Chief Complaint: htn cad lipid preop back   Chief Complaint   Patient presents with    6 Month Follow-Up    Hypertension    Coronary Artery Disease       CV Data:  Prior CAD/Stentsx 2  10/17/2017 CABG x2 EF 55  10/2018  Stress echo EF 55  Persistent HyperK  7/2020 CUS mild   12/2020 GXT negative   10/21 CUS mild   10/22 CUS mild   1/25 CUS mild   1/25 Echo EF 60-65    Subjective/HPI: no cp no osb no falls noblled has back arthritis getting shots with some releif.     10/25/2019 No cp no sob no falls no bleed. Takes meds. Eating well.     2/26/2020 no cp no sob. Had extensive back SX. Did well.     6/26/2020 no cp no osb no falls no bleed. Takes meds. Walks and active no bleed    10/27/2020 pt will have ED surgery at  next week. He is active and has no CP no SOB no falls no bleed.      1/6/21 no cp no sob no falls no bleed    5/7/21 no cp no sob no falls nob leed no edema K is always high 5.3 recently.   Recently HR 40s and Metoprolol was backed off to Bid from prior tid.     9/10/21 doing well no cp no sob active taking meds. No falls. No bleed.     1/18/22 doing well no  Cp no spb not dizzy no bleed. Takes meds.. active     5/10/22 doping well no cp no sob nofalsl no bleed. BP at home good. Last OV BP good as well. Today little elevated- states he rushed In here.     9/19/22 doing very well no cp no sob no falls no bleed takes meds.     1/19/23 doing well active no cp no sob no falls no bleed.     5/22/23  doing very well no cp no sob no fals no bleed active.     9/26/23 doing very well active at home. No cp no sob no falls no bleed     1/31/24 getting shoulder injections and after feels brief palpitations. No falls no cp no sob + mild LE edema.     12/10/24 doing well no cp no so bno falls no bleed take meds.    6/6/25 doing very well no cp no sob nobleed. No falls. Repeat /78    EKG: SR 73  RBBB    Lives w

## 2025-06-24 ENCOUNTER — PROCEDURE VISIT (OUTPATIENT)
Dept: PHYSICAL MEDICINE AND REHAB | Age: 75
End: 2025-06-24
Payer: MEDICARE

## 2025-06-24 DIAGNOSIS — M25.512 CHRONIC LEFT SHOULDER PAIN: Primary | ICD-10-CM

## 2025-06-24 DIAGNOSIS — Z79.899 HIGH RISK MEDICATION USE: ICD-10-CM

## 2025-06-24 DIAGNOSIS — G95.9 SPINAL CORD DISEASE (HCC): ICD-10-CM

## 2025-06-24 DIAGNOSIS — G95.9 MYELOPATHY (HCC): ICD-10-CM

## 2025-06-24 DIAGNOSIS — G89.29 CHRONIC LEFT SHOULDER PAIN: Primary | ICD-10-CM

## 2025-06-24 DIAGNOSIS — M54.12 C6 RADICULOPATHY: ICD-10-CM

## 2025-06-24 DIAGNOSIS — M54.16 RIGHT LUMBAR RADICULOPATHY: ICD-10-CM

## 2025-06-24 PROCEDURE — 20611 DRAIN/INJ JOINT/BURSA W/US: CPT | Performed by: PHYSICAL MEDICINE & REHABILITATION

## 2025-06-24 RX ORDER — LIDOCAINE HYDROCHLORIDE 20 MG/ML
2 INJECTION, SOLUTION INFILTRATION; PERINEURAL ONCE
Status: COMPLETED | OUTPATIENT
Start: 2025-06-24 | End: 2025-06-24

## 2025-06-24 RX ORDER — OXYCODONE AND ACETAMINOPHEN 10; 325 MG/1; MG/1
1 TABLET ORAL EVERY 6 HOURS PRN
Qty: 80 TABLET | Refills: 0 | Status: SHIPPED | OUTPATIENT
Start: 2025-06-27 | End: 2025-07-27

## 2025-06-24 RX ORDER — LIDOCAINE HYDROCHLORIDE 10 MG/ML
8 INJECTION, SOLUTION INFILTRATION; PERINEURAL ONCE
Status: COMPLETED | OUTPATIENT
Start: 2025-06-24 | End: 2025-06-24

## 2025-06-24 RX ADMIN — LIDOCAINE HYDROCHLORIDE 2 ML: 20 INJECTION, SOLUTION INFILTRATION; PERINEURAL at 11:16

## 2025-06-24 RX ADMIN — LIDOCAINE HYDROCHLORIDE 8 ML: 10 INJECTION, SOLUTION INFILTRATION; PERINEURAL at 11:16

## 2025-06-24 NOTE — PROGRESS NOTES
Patient here for Left Shoulder injection with U/S. Patient taken back to exam room, and placed on drape locking stool. Area marked, and cleansed appropriately with alcohol. 2cc of 2% Lidocaine, 2cc of Kenalog, and 8cc of 1% Lidocaine to be injected by provider.

## 2025-07-03 DIAGNOSIS — I10 ESSENTIAL HYPERTENSION, BENIGN: ICD-10-CM

## 2025-07-03 RX ORDER — NIFEDIPINE 60 MG/1
60 TABLET, EXTENDED RELEASE ORAL DAILY
Qty: 90 TABLET | Refills: 0 | Status: SHIPPED | OUTPATIENT
Start: 2025-07-03

## 2025-07-07 ENCOUNTER — TELEPHONE (OUTPATIENT)
Age: 75
End: 2025-07-07

## 2025-07-07 NOTE — TELEPHONE ENCOUNTER
Patient called about a form that is being faxed to our office from [employer]. He needs it filled out because he lost his extra benefits from them. They told him they faxed the form and his provider never filled it out so they had to term the benefits.    Please call patient to update on status of the form.

## 2025-07-22 DIAGNOSIS — M54.12 C6 RADICULOPATHY: ICD-10-CM

## 2025-07-22 DIAGNOSIS — G95.9 MYELOPATHY (HCC): ICD-10-CM

## 2025-07-22 DIAGNOSIS — Z79.899 HIGH RISK MEDICATION USE: ICD-10-CM

## 2025-07-22 DIAGNOSIS — G95.9 SPINAL CORD DISEASE (HCC): ICD-10-CM

## 2025-07-22 DIAGNOSIS — M54.16 RIGHT LUMBAR RADICULOPATHY: ICD-10-CM

## 2025-07-23 RX ORDER — OXYCODONE AND ACETAMINOPHEN 10; 325 MG/1; MG/1
1 TABLET ORAL EVERY 6 HOURS PRN
Qty: 80 TABLET | Refills: 0 | Status: SHIPPED | OUTPATIENT
Start: 2025-07-26 | End: 2025-08-25

## 2025-07-28 ENCOUNTER — PROCEDURE VISIT (OUTPATIENT)
Dept: PHYSICAL MEDICINE AND REHAB | Age: 75
End: 2025-07-28
Payer: MEDICARE

## 2025-07-28 DIAGNOSIS — G89.29 CHRONIC RIGHT SHOULDER PAIN: Primary | ICD-10-CM

## 2025-07-28 DIAGNOSIS — M25.511 CHRONIC RIGHT SHOULDER PAIN: Primary | ICD-10-CM

## 2025-07-28 PROCEDURE — 20611 DRAIN/INJ JOINT/BURSA W/US: CPT | Performed by: PHYSICAL MEDICINE & REHABILITATION

## 2025-07-28 PROCEDURE — 96372 THER/PROPH/DIAG INJ SC/IM: CPT | Performed by: PHYSICAL MEDICINE & REHABILITATION

## 2025-07-28 RX ORDER — CYANOCOBALAMIN 1000 UG/ML
1000 INJECTION, SOLUTION INTRAMUSCULAR; SUBCUTANEOUS ONCE
Status: COMPLETED | OUTPATIENT
Start: 2025-07-28 | End: 2025-07-28

## 2025-07-28 RX ORDER — LIDOCAINE HYDROCHLORIDE 20 MG/ML
2 INJECTION, SOLUTION INFILTRATION; PERINEURAL ONCE
Status: COMPLETED | OUTPATIENT
Start: 2025-07-28 | End: 2025-07-28

## 2025-07-28 RX ORDER — LIDOCAINE HYDROCHLORIDE 10 MG/ML
7 INJECTION, SOLUTION INFILTRATION; PERINEURAL ONCE
Status: COMPLETED | OUTPATIENT
Start: 2025-07-28 | End: 2025-07-28

## 2025-07-28 RX ADMIN — CYANOCOBALAMIN 1000 MCG: 1000 INJECTION, SOLUTION INTRAMUSCULAR; SUBCUTANEOUS at 11:05

## 2025-07-28 RX ADMIN — LIDOCAINE HYDROCHLORIDE 2 ML: 20 INJECTION, SOLUTION INFILTRATION; PERINEURAL at 11:06

## 2025-07-28 RX ADMIN — LIDOCAINE HYDROCHLORIDE 7 ML: 10 INJECTION, SOLUTION INFILTRATION; PERINEURAL at 11:06

## 2025-07-31 DIAGNOSIS — M79.605 BILATERAL LEG PAIN: ICD-10-CM

## 2025-07-31 DIAGNOSIS — M62.838 SPASM OF MUSCLE: ICD-10-CM

## 2025-07-31 DIAGNOSIS — M79.604 BILATERAL LEG PAIN: ICD-10-CM

## 2025-08-04 DIAGNOSIS — M79.605 BILATERAL LEG PAIN: ICD-10-CM

## 2025-08-04 DIAGNOSIS — M79.604 BILATERAL LEG PAIN: ICD-10-CM

## 2025-08-04 DIAGNOSIS — M62.838 SPASM OF MUSCLE: ICD-10-CM

## 2025-08-04 RX ORDER — BACLOFEN 10 MG/1
10 TABLET ORAL 3 TIMES DAILY
Qty: 90 TABLET | Refills: 1 | OUTPATIENT
Start: 2025-08-04

## 2025-08-06 RX ORDER — BACLOFEN 10 MG/1
10 TABLET ORAL 3 TIMES DAILY
Qty: 90 TABLET | Refills: 1 | Status: SHIPPED | OUTPATIENT
Start: 2025-08-06

## 2025-08-11 ENCOUNTER — OFFICE VISIT (OUTPATIENT)
Dept: PHYSICAL MEDICINE AND REHAB | Age: 75
End: 2025-08-11
Payer: MEDICARE

## 2025-08-11 VITALS
DIASTOLIC BLOOD PRESSURE: 69 MMHG | HEART RATE: 69 BPM | SYSTOLIC BLOOD PRESSURE: 138 MMHG | HEIGHT: 67 IN | BODY MASS INDEX: 24.96 KG/M2 | WEIGHT: 159 LBS

## 2025-08-11 DIAGNOSIS — M54.14 THORACIC AND LUMBOSACRAL NEURITIS: Primary | ICD-10-CM

## 2025-08-11 DIAGNOSIS — G89.29 CHRONIC LEFT-SIDED LOW BACK PAIN WITH RIGHT-SIDED SCIATICA: ICD-10-CM

## 2025-08-11 DIAGNOSIS — Z79.899 HIGH RISK MEDICATION USE: ICD-10-CM

## 2025-08-11 DIAGNOSIS — M19.011 PRIMARY OSTEOARTHRITIS OF RIGHT SHOULDER: ICD-10-CM

## 2025-08-11 DIAGNOSIS — G95.9 MYELOPATHY (HCC): ICD-10-CM

## 2025-08-11 DIAGNOSIS — M54.17 THORACIC AND LUMBOSACRAL NEURITIS: Primary | ICD-10-CM

## 2025-08-11 DIAGNOSIS — G95.9 SPINAL CORD DISEASE (HCC): ICD-10-CM

## 2025-08-11 DIAGNOSIS — G89.29 CHRONIC RIGHT SHOULDER PAIN: ICD-10-CM

## 2025-08-11 DIAGNOSIS — M54.16 RIGHT LUMBAR RADICULOPATHY: ICD-10-CM

## 2025-08-11 DIAGNOSIS — E55.9 VITAMIN D DEFICIENCY: ICD-10-CM

## 2025-08-11 DIAGNOSIS — M54.41 CHRONIC LEFT-SIDED LOW BACK PAIN WITH RIGHT-SIDED SCIATICA: ICD-10-CM

## 2025-08-11 DIAGNOSIS — M54.12 C6 RADICULOPATHY: ICD-10-CM

## 2025-08-11 DIAGNOSIS — M25.511 CHRONIC RIGHT SHOULDER PAIN: ICD-10-CM

## 2025-08-11 PROCEDURE — 3078F DIAST BP <80 MM HG: CPT | Performed by: PHYSICAL MEDICINE & REHABILITATION

## 2025-08-11 PROCEDURE — 3075F SYST BP GE 130 - 139MM HG: CPT | Performed by: PHYSICAL MEDICINE & REHABILITATION

## 2025-08-11 PROCEDURE — 99214 OFFICE O/P EST MOD 30 MIN: CPT | Performed by: PHYSICAL MEDICINE & REHABILITATION

## 2025-08-11 PROCEDURE — 1159F MED LIST DOCD IN RCRD: CPT | Performed by: PHYSICAL MEDICINE & REHABILITATION

## 2025-08-11 PROCEDURE — 1125F AMNT PAIN NOTED PAIN PRSNT: CPT | Performed by: PHYSICAL MEDICINE & REHABILITATION

## 2025-08-11 PROCEDURE — 1160F RVW MEDS BY RX/DR IN RCRD: CPT | Performed by: PHYSICAL MEDICINE & REHABILITATION

## 2025-08-11 PROCEDURE — 1123F ACP DISCUSS/DSCN MKR DOCD: CPT | Performed by: PHYSICAL MEDICINE & REHABILITATION

## 2025-08-11 RX ORDER — OXYCODONE AND ACETAMINOPHEN 10; 325 MG/1; MG/1
1 TABLET ORAL EVERY 6 HOURS PRN
Qty: 80 TABLET | Refills: 0 | Status: SHIPPED | OUTPATIENT
Start: 2025-08-24 | End: 2025-09-23

## 2025-08-11 RX ORDER — GABAPENTIN 100 MG/1
100 CAPSULE ORAL 3 TIMES DAILY
Qty: 270 CAPSULE | Refills: 1 | Status: SHIPPED | OUTPATIENT
Start: 2025-08-11 | End: 2025-12-09

## 2025-08-27 ENCOUNTER — PROCEDURE VISIT (OUTPATIENT)
Dept: PHYSICAL MEDICINE AND REHAB | Age: 75
End: 2025-08-27
Payer: MEDICARE

## 2025-08-27 DIAGNOSIS — M54.40 CHRONIC RIGHT-SIDED LOW BACK PAIN WITH SCIATICA, SCIATICA LATERALITY UNSPECIFIED: Primary | ICD-10-CM

## 2025-08-27 DIAGNOSIS — G89.29 CHRONIC RIGHT-SIDED LOW BACK PAIN WITH SCIATICA, SCIATICA LATERALITY UNSPECIFIED: Primary | ICD-10-CM

## 2025-08-27 PROCEDURE — 96372 THER/PROPH/DIAG INJ SC/IM: CPT | Performed by: PHYSICAL MEDICINE & REHABILITATION

## 2025-08-27 PROCEDURE — 64445 NJX AA&/STRD SCIATIC NRV IMG: CPT | Performed by: PHYSICAL MEDICINE & REHABILITATION

## 2025-08-27 RX ORDER — LIDOCAINE HYDROCHLORIDE 10 MG/ML
7 INJECTION, SOLUTION INFILTRATION; PERINEURAL ONCE
Status: COMPLETED | OUTPATIENT
Start: 2025-08-27 | End: 2025-08-27

## 2025-08-27 RX ORDER — LIDOCAINE HYDROCHLORIDE 20 MG/ML
5 INJECTION, SOLUTION INFILTRATION; PERINEURAL ONCE
Status: COMPLETED | OUTPATIENT
Start: 2025-08-27 | End: 2025-08-27

## 2025-08-27 RX ORDER — CYANOCOBALAMIN 1000 UG/ML
1000 INJECTION, SOLUTION INTRAMUSCULAR; SUBCUTANEOUS ONCE
Status: COMPLETED | OUTPATIENT
Start: 2025-08-27 | End: 2025-08-27

## 2025-08-27 RX ADMIN — LIDOCAINE HYDROCHLORIDE 5 ML: 20 INJECTION, SOLUTION INFILTRATION; PERINEURAL at 10:47

## 2025-08-27 RX ADMIN — CYANOCOBALAMIN 1000 MCG: 1000 INJECTION, SOLUTION INTRAMUSCULAR; SUBCUTANEOUS at 10:45

## 2025-08-27 RX ADMIN — LIDOCAINE HYDROCHLORIDE 7 ML: 10 INJECTION, SOLUTION INFILTRATION; PERINEURAL at 10:45

## 2025-09-05 ENCOUNTER — OFFICE VISIT (OUTPATIENT)
Age: 75
End: 2025-09-05

## 2025-09-05 VITALS — HEIGHT: 67 IN | TEMPERATURE: 97.4 F | BODY MASS INDEX: 24.48 KG/M2 | WEIGHT: 156 LBS

## 2025-09-05 DIAGNOSIS — M79.671 BILATERAL FOOT PAIN: Primary | ICD-10-CM

## 2025-09-05 DIAGNOSIS — E11.42 DIABETIC POLYNEUROPATHY ASSOCIATED WITH TYPE 2 DIABETES MELLITUS (HCC): ICD-10-CM

## 2025-09-05 DIAGNOSIS — M21.622 TAILOR'S BUNIONETTE, BILATERAL: ICD-10-CM

## 2025-09-05 DIAGNOSIS — M20.12 HALLUX ABDUCTO VALGUS, BILATERAL: ICD-10-CM

## 2025-09-05 DIAGNOSIS — M20.11 HALLUX ABDUCTO VALGUS, BILATERAL: ICD-10-CM

## 2025-09-05 DIAGNOSIS — M21.621 TAILOR'S BUNIONETTE, BILATERAL: ICD-10-CM

## 2025-09-05 DIAGNOSIS — L84 CALLUS: ICD-10-CM

## 2025-09-05 DIAGNOSIS — L60.2 ONYCHOGRYPHOSIS: ICD-10-CM

## 2025-09-05 DIAGNOSIS — B35.1 DERMATOPHYTOSIS OF NAIL: ICD-10-CM

## 2025-09-05 DIAGNOSIS — M79.672 BILATERAL FOOT PAIN: Primary | ICD-10-CM

## 2025-09-05 ASSESSMENT — ENCOUNTER SYMPTOMS
VOMITING: 0
SHORTNESS OF BREATH: 0
NAUSEA: 0
BACK PAIN: 1

## (undated) DEVICE — CHLORAPREP 26ML ORANGE

## (undated) DEVICE — GOWN,AURORA,NONREINFORCED,LARGE: Brand: MEDLINE

## (undated) DEVICE — SYRINGE IRRIG 60ML SFT PLIABLE BLB EZ TO GRP 1 HND USE W/

## (undated) DEVICE — GAUZE,SPONGE,4"X4",12PLY,STERILE,LF,2'S: Brand: MEDLINE

## (undated) DEVICE — GLOVE ORANGE PI 7 1/2   MSG9075

## (undated) DEVICE — 1010 S-DRAPE TOWEL DRAPE 10/BX: Brand: STERI-DRAPE™

## (undated) DEVICE — DRESSING GZ W1XL8IN COT XRFRM N ADH OVERWRAP CURAD

## (undated) DEVICE — BANDAGE COMPR W3INXL5YD HI E BGE W/ CLP SURE-WRAP

## (undated) DEVICE — GLOVE ORANGE PI 7   MSG9070

## (undated) DEVICE — SUTURE ETHLN SZ 4-0 L18IN NONABSORBABLE BLK L19MM PS-2 3/8 1667H

## (undated) DEVICE — HAND II: Brand: MEDLINE INDUSTRIES, INC.

## (undated) DEVICE — PADDING CAST N ADH 4 YDX3 IN HIGHLY ABSORBENT EZ APPL SOFROL

## (undated) DEVICE — ZIMMER® STERILE DISPOSABLE TOURNIQUET CUFF, DUAL PORT, SINGLE BLADDER, 18 IN. (46 CM)